# Patient Record
Sex: MALE | Race: WHITE | Employment: OTHER | URBAN - METROPOLITAN AREA
[De-identification: names, ages, dates, MRNs, and addresses within clinical notes are randomized per-mention and may not be internally consistent; named-entity substitution may affect disease eponyms.]

---

## 2017-01-03 ENCOUNTER — ANTICOAGULATION THERAPY VISIT (OUTPATIENT)
Dept: NURSING | Facility: CLINIC | Age: 67
End: 2017-01-03
Payer: COMMERCIAL

## 2017-01-03 DIAGNOSIS — Z79.01 LONG-TERM (CURRENT) USE OF ANTICOAGULANTS: Primary | ICD-10-CM

## 2017-01-03 DIAGNOSIS — I26.99 PULMONARY EMBOLISM WITH INFARCTION (H): ICD-10-CM

## 2017-01-03 LAB — INR POINT OF CARE: 3.3 (ref 0.86–1.14)

## 2017-01-03 PROCEDURE — 85610 PROTHROMBIN TIME: CPT | Mod: QW

## 2017-01-03 PROCEDURE — 99207 ZZC NO CHARGE NURSE ONLY: CPT

## 2017-01-03 PROCEDURE — 36416 COLLJ CAPILLARY BLOOD SPEC: CPT

## 2017-01-03 NOTE — MR AVS SNAPSHOT
Collin Frank   1/3/2017 2:40 PM   Anticoagulation Therapy Visit    Description:  66 year old male   Provider:  CP ANTI COAG   Department:  Cp Nurse           INR as of 1/3/2017     Selected INR 3.3! (1/3/2017)      Anticoagulation Summary as of 1/3/2017     INR goal 2.0-3.0   Selected INR 3.3! (1/3/2017)   Full instructions 1/3: 2.5 mg; 1/5: 2.5 mg; 1/7: 2.5 mg; Otherwise 7.5 mg on Thu; 5 mg all other days   Next INR check 1/10/2017    Indications   Long-term (current) use of anticoagulants [Z79.01] [Z79.01]  Pulmonary embolism with infarction (HCC) [I26.99] [I26.99]         Your next Anticoagulation Clinic appointment(s)     Gordon 10, 2017  2:40 PM   [facility]    [address] [facility] [id number] [phone]            Jan 19, 2017  1:40 PM   [facility] CP AN[facility] COAG   [facility]    [address] [facility] [id number] [phone]              Contact Numbers     [facility]  Please call [phone] or [phone]  to cancel and/or reschedule your appointment.   Please call [phone] with any problems or questions regarding your therapy          January 2017 Details    Sun Mon Tue Wed Thu Fri Sat     1               2               3      2.5 mg   See details      4      5 mg         5      2.5 mg         6      5 mg         7      2.5 mg           8      5 mg         9      5 mg         10            11               12               13               14                 15               16               17               18               19               20               21                 22               23               24               25               26               27               28                 29               30               31                     Date Details   01/03 This INR check       Date of next INR:  1/10/2017         How to take your warfarin dose     To take:  2.5 mg Take 0.5 of a 5 mg tablet.    To take:  5 mg Take 1 of the 5 mg tablets.

## 2017-01-03 NOTE — PROGRESS NOTES
ANTICOAGULATION FOLLOW-UP CLINIC VISIT    Patient Name:  Collin Frank  Date:  1/3/2017  Contact Type:  Face to Face    SUBJECTIVE:     Patient Findings     Positives Antibiotic use or infection (cipro respiratory)           OBJECTIVE    INR PROTIME   Date Value Ref Range Status   01/03/2017 3.3* 0.86 - 1.14 Final       ASSESSMENT / PLAN  INR assessment SUPRA    Recheck INR In: 1 WEEK    INR Location Clinic      Anticoagulation Summary as of 1/3/2017     INR goal 2.0-3.0   Selected INR 3.3! (1/3/2017)   Maintenance plan 7.5 mg (5 mg x 1.5) on Thu; 5 mg (5 mg x 1) all other days   Full instructions 1/3: 2.5 mg; 1/5: 2.5 mg; 1/7: 2.5 mg; Otherwise 7.5 mg on Thu; 5 mg all other days   Weekly total 37.5 mg   Plan last modified Minal Carrion, RN (9/1/2016)   Next INR check 1/10/2017   Target end date     Indications   Long-term (current) use of anticoagulants [Z79.01] [Z79.01]  Pulmonary embolism with infarction (HCC) [I26.99] [I26.99]         Anticoagulation Episode Summary     INR check location     Preferred lab     Send INR reminders to [facility]    Comments       Anticoagulation Care Providers     Provider Role Specialty Phone number    Isaac Membreno MD  [facility] [phone]            See the Encounter Report to view Anticoagulation Flowsheet and Dosing Calendar (Go to Encounters tab in chart review, and find the Anticoagulation Therapy Visit)    Dosage adjustment made based on physician directed care plan.    Minal Carrion, RN

## 2017-01-10 ENCOUNTER — ANTICOAGULATION THERAPY VISIT (OUTPATIENT)
Dept: NURSING | Facility: CLINIC | Age: 67
End: 2017-01-10
Payer: COMMERCIAL

## 2017-01-10 DIAGNOSIS — Z79.01 LONG-TERM (CURRENT) USE OF ANTICOAGULANTS: Primary | ICD-10-CM

## 2017-01-10 DIAGNOSIS — I26.99 PULMONARY EMBOLISM WITH INFARCTION (H): ICD-10-CM

## 2017-01-10 LAB — INR POINT OF CARE: 2.3 (ref 0.86–1.14)

## 2017-01-10 PROCEDURE — 36416 COLLJ CAPILLARY BLOOD SPEC: CPT

## 2017-01-10 PROCEDURE — 85610 PROTHROMBIN TIME: CPT | Mod: QW

## 2017-01-10 PROCEDURE — 99207 ZZC NO CHARGE NURSE ONLY: CPT

## 2017-01-10 NOTE — MR AVS SNAPSHOT
Collin Frank   1/10/2017 2:40 PM   Anticoagulation Therapy Visit    Description:  66 year old male   Provider:  CP ANTI COAG   Department:  C[facility] Nurse           INR as of 1/10/2017     Selected INR 2.3 (1/10/2017)      Anticoagulation Summary as of 1/10/2017     INR goal 2.0-3.0   Selected INR 2.3 (1/10/2017)   Full instructions 7.5 mg on Thu; 5 mg all other days   Next INR check 2/9/2017    Indications   Long-term (current) use of anticoagulants [Z79.01] [Z79.01]  Pulmonary embolism with infarction (HCC) [I26.99] [I26.99]         Your next Anticoagulation Clinic appointment(s)     Gordon 10, 2017  2:40 PM   Anticoagulation Visit with CP ANTI COAG   [facility]    [address] [facility] [id number] [phone]            Feb 09, 2017  1:40 PM   Anticoagulation Visit with CP ANTI COAG   [facility]    [address] [facility] [id number] [phone]              Contact Numbers     [facility]  Please call [phone] or [phone]  to cancel and/or reschedule your appointment.   Please call [phone] with any problems or questions regarding your therapy          January 2017 Details    Sun Mon Tue Wed Thu Fri Sat     1               2               3               4               5               6               7                 8               9               10      5 mg   See details      11      5 mg         12      7.5 mg         13      5 mg         14      5 mg           15      5 mg         16      5 mg         17      5 mg         18      5 mg         19      7.5 mg         20      5 mg         21      5 mg           22      5 mg         23      5 mg         24      5 mg         25      5 mg         26      7.5 mg         27      5 mg         28      5 mg           29      5 mg         30      5 mg         31      5  mg              Date Details   01/10 This INR check               How to take your warfarin dose     To take:  5 mg Take 1 of the 5 mg tablets.    To take:  7.5 mg Take 1.5 of the 5 mg tablets.           February 2017 Details    Sun Mon Tue Wed Thu Fri Sat        1      5 mg         2      7.5 mg         3      5 mg         4      5 mg           5      5 mg         6      5 mg         7      5 mg         8      5 mg         9            10               11                 12               13               14               15               16               17               18                 19               20               21               22               23               24               25                 26               27               28                    Date Details   No additional details    Date of next INR:  2/9/2017         How to take your warfarin dose     To take:  5 mg Take 1 of the 5 mg tablets.    To take:  7.5 mg Take 1.5 of the 5 mg tablets.

## 2017-01-10 NOTE — PROGRESS NOTES
ANTICOAGULATION FOLLOW-UP CLINIC VISIT    Patient Name:  Collin Frank  Date:  1/10/2017  Contact Type:  Face to Face    SUBJECTIVE: Patient is here in follow-up to finishing Cirpo Abx for respiratory illness.  During the duration we decreased his warfarin by  About 1/2-3/4 of his usual dose and he remained in therapeutic range with adjustment.  Plan to have patient call if he goes back on abx, otherwise we plan to recheck in his regular routine.     Patient Findings     Positives Antibiotic use or infection (done with cipro for respiratory illness)           OBJECTIVE    INR PROTIME   Date Value Ref Range Status   01/10/2017 2.3* 0.86 - 1.14 Final       ASSESSMENT / PLAN  INR assessment THER    Recheck INR In: 4 WEEKS    INR Location Clinic      Anticoagulation Summary as of 1/10/2017     INR goal 2.0-3.0   Selected INR 2.3 (1/10/2017)   Maintenance plan 7.5 mg (5 mg x 1.5) on Thu; 5 mg (5 mg x 1) all other days   Full instructions 7.5 mg on Thu; 5 mg all other days   Weekly total 37.5 mg   No change documented Minal Carrion, RN   Plan last modified Minal Carrion, RN (9/1/2016)   Next INR check 2/9/2017   Target end date     Indications   Long-term (current) use of anticoagulants [Z79.01] [Z79.01]  Pulmonary embolism with infarction (HCC) [I26.99] [I26.99]         Anticoagulation Episode Summary     INR check location     Preferred lab     Send INR reminders to [facility]    Comments       Anticoagulation Care Providers     Provider Role Specialty Phone number    ChentelarryIsaac MD  [facility] [phone]            See the Encounter Report to view Anticoagulation Flowsheet and Dosing Calendar (Go to Encounters tab in chart review, and find the Anticoagulation Therapy Visit)    Dosage adjustment made based on physician directed care plan.    Minal Carrion RN

## 2017-02-09 ENCOUNTER — OFFICE VISIT (OUTPATIENT)
Dept: FAMILY MEDICINE | Facility: CLINIC | Age: 67
End: 2017-02-09
Payer: COMMERCIAL

## 2017-02-09 ENCOUNTER — ANTICOAGULATION THERAPY VISIT (OUTPATIENT)
Dept: NURSING | Facility: CLINIC | Age: 67
End: 2017-02-09
Payer: COMMERCIAL

## 2017-02-09 VITALS
HEART RATE: 55 BPM | SYSTOLIC BLOOD PRESSURE: 129 MMHG | BODY MASS INDEX: 30.21 KG/M2 | HEIGHT: 69 IN | TEMPERATURE: 97.7 F | DIASTOLIC BLOOD PRESSURE: 77 MMHG | WEIGHT: 204 LBS

## 2017-02-09 DIAGNOSIS — Z23 NEED FOR VACCINATION AGAINST STREPTOCOCCUS PNEUMONIAE: ICD-10-CM

## 2017-02-09 DIAGNOSIS — D68.51 FACTOR 5 LEIDEN MUTATION, HETEROZYGOUS (H): Primary | ICD-10-CM

## 2017-02-09 DIAGNOSIS — Z79.01 LONG-TERM (CURRENT) USE OF ANTICOAGULANTS: Primary | ICD-10-CM

## 2017-02-09 DIAGNOSIS — E72.11 HYPERHOMOCYSTEINEMIA (H): ICD-10-CM

## 2017-02-09 DIAGNOSIS — I26.99 PULMONARY EMBOLISM WITH INFARCTION (H): ICD-10-CM

## 2017-02-09 LAB — INR POINT OF CARE: 2.2 (ref 0.86–1.14)

## 2017-02-09 PROCEDURE — 99207 ZZC NO CHARGE NURSE ONLY: CPT

## 2017-02-09 PROCEDURE — 90670 PCV13 VACCINE IM: CPT | Performed by: FAMILY MEDICINE

## 2017-02-09 PROCEDURE — 36416 COLLJ CAPILLARY BLOOD SPEC: CPT

## 2017-02-09 PROCEDURE — G0009 ADMIN PNEUMOCOCCAL VACCINE: HCPCS | Performed by: FAMILY MEDICINE

## 2017-02-09 PROCEDURE — 99213 OFFICE O/P EST LOW 20 MIN: CPT | Mod: 25 | Performed by: FAMILY MEDICINE

## 2017-02-09 PROCEDURE — 85610 PROTHROMBIN TIME: CPT | Mod: QW

## 2017-02-09 RX ORDER — WARFARIN SODIUM 5 MG/1
5 TABLET ORAL DAILY
COMMUNITY
End: 2017-02-09

## 2017-02-09 RX ORDER — WARFARIN SODIUM 5 MG/1
5 TABLET ORAL DAILY
Qty: 90 TABLET | Refills: 3 | Status: SHIPPED | OUTPATIENT
Start: 2017-02-09 | End: 2018-04-16

## 2017-02-09 NOTE — MR AVS SNAPSHOT
"              After Visit Summary   2/9/2017    Collin Frank    MRN: [id number]           Patient Information     Date Of Birth          1950        Visit Information        Provider Department      2/9/2017 2:00 PM Engelmann, Lauren Anneliese, MD [facility]        Today's Diagnoses     Factor 5 Leiden mutation, heterozygous (H)    -  1     Hyperhomocysteinemia (H)            Follow-ups after your visit        Your next 10 appointments already scheduled     Mar 09, 2017  1:40 PM   Anticoagulation Visit with CP ANTI COAG   [facility]    [address] [facility] [phone]              Who to contact     If you have questions or need follow up information about today's clinic visit or your schedule please contact [facility] directly at [phone].  Normal or non-critical lab and imaging results will be communicated to you by MyChart, letter or phone within 4 business days after the clinic has received the results. If you do not hear from us within 7 days, please contact the clinic through MyChart or phone. If you have a critical or abnormal lab result, we will notify you by phone as soon as possible.  Submit refill requests through [employer] or call your pharmacy and they will forward the refill request to us. Please allow 3 business days for your refill to be completed.          Additional Information About Your Visit        MyChart Information     [employer] lets you send messages to your doctor, view your test results, renew your prescriptions, schedule appointments and more. To sign up, go to [email or web address] . Click on \"Log in\" on the left side of the screen, which will take you to the Welcome page. Then click on \"Sign up Now\" on the right side of the page.     You will be asked to enter the access code listed below, as well as some personal information. " "Please follow the directions to create your username and password.     Your access code is: [email or web address]  Expires: 5/10/2017  3:01 PM     Your access code will  in 90 days. If you need help or a new code, please call your [facility] clinic or [phone].        Care EveryWhere ID     This is your Care EveryWhere ID. This could be used by other organizations to access your [facility] medical records  [id number]        Your Vitals Were     Pulse Temperature Height BMI (Body Mass Index)          62 97.7  F (36.5  C) (Oral) 5' 8.5\" (1.74 m) 30.56 kg/m2         Blood Pressure from Last 3 Encounters:   17 146/102   16 132/84   16 136/78    Weight from Last 3 Encounters:   17 204 lb (92.534 kg)   16 208 lb (94.348 kg)   16 202 lb (91.627 kg)              Today, you had the following     No orders found for display         Where to get your medicines      These medications were sent to [facility] - [place], [place] - [address] [place] [address]  [address] [place] [address], [facility] [address]    Hours:  Mon-Fri 8:30am-5:00pm Toll Free [phone] Phone:  [phone]    - warfarin 5 MG tablet       Primary Care Provider Office Phone # Fax #    Lauren Anneliese Engelmann, [phone]       [address] [address]        Thank you!     Thank you for choosing [facility]  for your care. Our goal is always to provide you with excellent care. Hearing back from our patients is one way we can continue to improve our services. Please take a few minutes to complete the written survey that you may receive in the mail after your visit with us. Thank you!             Your Updated Medication List - Protect others around you: Learn how to safely use, store and throw away your medicines at www.disposemymeds.org.          This list is accurate as of: 17  3:01 PM.  Always use your most " recent med list.                   Brand Name Dispense Instructions for use    albuterol 108 (90 BASE) MCG/ACT Inhaler    PROAIR HFA/PROVENTIL HFA/VENTOLIN HFA     Inhale 2 puffs into the lungs       cholecalciferol 1000 UNIT tablet    vitamin D    100 tablet    Take 1 tablet (1,000 Units) by mouth daily       warfarin 5 MG tablet    JANTOVEN    90 tablet    Take 1 tablet (5 mg) by mouth daily

## 2017-02-09 NOTE — NURSING NOTE
"Chief Complaint   Patient presents with     Recheck Medication       Initial /102 mmHg  Pulse 62  Temp(Src) 97.7  F (36.5  C) (Oral)  Ht 5' 8.5\" (1.74 m)  Wt 204 lb (92.534 kg)  BMI 30.56 kg/m2 Estimated body mass index is 30.56 kg/(m^2) as calculated from the following:    Height as of this encounter: 5' 8.5\" (1.74 m).    Weight as of this encounter: 204 lb (92.534 kg).  Medication Reconciliation: complete  Alfredito Goldman MA    "

## 2017-02-09 NOTE — PROGRESS NOTES
ANTICOAGULATION FOLLOW-UP CLINIC VISIT    Patient Name:  Collin Frank  Date:  2/9/2017  Contact Type:  Face to Face    SUBJECTIVE:  Patient reports he is seeing new PCP today to f/u on medication renewal due to previous MD assisted.  Patient also reports LLE discomfort similar to feeling of past blood blot / dvt back in 2011.  States no change in CMS, no lumps or bumps, and no skin discoloration.  He admits he did a lot of walking recently so might be a muscle strain or spasm.  He will tell doctor today.  His INR has been therapeutic.     Patient Findings     Positives No Problem Findings           OBJECTIVE    INR PROTIME   Date Value Ref Range Status   02/09/2017 2.2* 0.86 - 1.14 Final       ASSESSMENT / PLAN  INR assessment THER    Recheck INR In: 4 WEEKS    INR Location Clinic      Anticoagulation Summary as of 2/9/2017     INR goal 2.0-3.0   Selected INR 2.2 (2/9/2017)   Maintenance plan 7.5 mg (5 mg x 1.5) on Thu; 5 mg (5 mg x 1) all other days   Full instructions 7.5 mg on Thu; 5 mg all other days   Weekly total 37.5 mg   No change documented Minal Carrion, RN   Plan last modified Minal Carrion, RN (9/1/2016)   Next INR check 3/9/2017   Target end date     Indications   Long-term (current) use of anticoagulants [Z79.01] [Z79.01]  Pulmonary embolism with infarction (HCC) [I26.99] [I26.99]         Anticoagulation Episode Summary     INR check location     Preferred lab     Send INR reminders to [facility]    Comments       Anticoagulation Care Providers     Provider Role Specialty Phone number    Isaac Membreno MD  Family Practice [phone]            See the Encounter Report to view Anticoagulation Flowsheet and Dosing Calendar (Go to Encounters tab in chart review, and find the Anticoagulation Therapy Visit)    Dosage adjustment made based on physician directed care plan.    Minal Carrion, RN

## 2017-02-09 NOTE — PROGRESS NOTES
SUBJECTIVE:                                                    Collin Frank is a 66 year old male who presents to clinic today for the following health issues:    Medication Followup of Jantoven and refill     Taking Medication as prescribed: yes    Side Effects:  None    Medication Helping Symptoms:  Yes    Patient here for yearly renewal of warfarin rx. He reports no issues with the medications and regularly follows with INR clinic, last visit was today.        Problem list and histories reviewed & adjusted, as indicated.  Additional history: as documented    Patient Active Problem List   Diagnosis     Advanced directives, counseling/discussion     Hyperhomocysteinemia (H)     Acute venous embolism and thrombosis of deep vessels of proximal lower extremity (H)     CARDIOVASCULAR SCREENING; LDL GOAL LESS THAN 130     Factor 5 Leiden mutation, heterozygous (H)     May-Thurner syndrome     Long term current use of anticoagulant     Vitamin D deficiency     Renal stones     Hepatic hemangioma     FH: prostate cancer     Long-term (current) use of anticoagulants [Z79.01]     Pulmonary embolism with infarction (HCC) [I26.99]     Post-traumatic osteoarthritis of right knee     Benign non-nodular prostatic hyperplasia without lower urinary tract symptoms     Past Surgical History   Procedure Laterality Date     Colonoscopy  08     Normal. Repeat in 10 years     Rt knee orif       [facility]       Social History   Substance Use Topics     Smoking status: Former Smoker     Smokeless tobacco: Never Used     Alcohol use No     Family History   Problem Relation Age of Onset     Alzheimer Disease Mother      HEART DISEASE Father      Prostate Cancer Father      CANCER Brother 65     pancreatic        Prostate Cancer Brother          Current Outpatient Prescriptions   Medication Sig Dispense Refill     warfarin (JANTOVEN) 5 MG tablet Take 1 tablet (5 mg) by mouth daily 90 tablet 3     albuterol (PROAIR  "HFA/PROVENTIL HFA/VENTOLIN HFA) 108 (90 BASE) MCG/ACT Inhaler Inhale 2 puffs into the lungs       cholecalciferol (VITAMIN D) 1000 UNIT tablet Take 1 tablet (1,000 Units) by mouth daily 100 tablet 3       ROS:  C: NEGATIVE for fever, chills, change in weight  E/M: NEGATIVE for ear, mouth and throat problems  R: NEGATIVE for significant cough or SOB  CV: NEGATIVE for chest pain, palpitations or peripheral edema  ROS otherwise negative    OBJECTIVE:                                                    /77  Pulse 55  Temp 97.7  F (36.5  C) (Oral)  Ht 5' 8.5\" (1.74 m)  Wt 204 lb (92.5 kg)  BMI 30.57 kg/m2  Body mass index is 30.57 kg/(m^2).  GENERAL: healthy, alert and no distress  RESP: lungs clear to auscultation - no rales, rhonchi or wheezes  CV: regular rate and rhythm, normal S1 S2, no S3 or S4, no murmur, click or rub, no peripheral edema and peripheral pulses strong  ABDOMEN: soft, nontender, no hepatosplenomegaly, no masses and bowel sounds normal  MS: no gross musculoskeletal defects noted, no edema  PSYCH: mentation appears normal, affect normal/bright    Diagnostic Test Results:  Results for orders placed or performed in visit on 02/09/17   INR point of care   Result Value Ref Range    INR Protime 2.2 (A) 0.86 - 1.14        ASSESSMENT/PLAN:                                                        ICD-10-CM    1. Factor 5 Leiden mutation, heterozygous (H) D68.51 warfarin (JANTOVEN) 5 MG tablet   2. Hyperhomocysteinemia (H) E72.11 warfarin (JANTOVEN) 5 MG tablet   3. Need for vaccination against Streptococcus pneumoniae Z23 PNEUMOCOCCAL CONJ VACCINE 13 VALENT IM     VACCINE ADMINISTRATION, INITIAL     Will refill warfarin for 1 year. Advised continuing with INR nurse as scheduled.   Will update PCV-13 vaccine today.   Otherwise up to date with health maintenance, had yearly wellness exam in December.         See Patient Instructions    Lauren A. Engelmann, MD  [facility]    "

## 2017-02-09 NOTE — NURSING NOTE
Patient is covered under this program for the following reason:    Screening Questionnaire for Adult Immunization   Are you sick today?   No    Do you have allergies to medications, food, a vaccine component or latex?   No    Have you ever had a serious reaction after receiving a vaccination?   No    Do you have a long-term health problem with heart disease, lung disease,  asthma, kidney disease, metabolic (e.g., diabetes), anemia, or other blood disorder?   No    Do you have cancer, leukemia,HIV/ AIDS, or any immune system problem?   No       In the past 3 months, have you taken medications that weaken your immune system, such as cortisone, prednisone, other steroids, or anticancer drugs, or have you had radiation treatments?   No    Have you had a seizure or a brain, or other nervous system problem?   No    During the past year, have you received a transfusion of blood or blood       products, or been given a  immune (gamma) globulin or an antiviral drug?   No    For women: Are you pregnant or is there a chance you could become         pregnant during the next month?   No    Have you received any vaccinations in the past 4 weeks?   No     Immunization questionnaire answers were all negative.     VIS for Prevnar 13  given on same date of administration.  Staff signature/Title: Alfredito Goldman MA  Patient  informed to wait 20 min after shot

## 2017-03-09 ENCOUNTER — ANTICOAGULATION THERAPY VISIT (OUTPATIENT)
Dept: NURSING | Facility: CLINIC | Age: 67
End: 2017-03-09
Payer: COMMERCIAL

## 2017-03-09 DIAGNOSIS — Z79.01 LONG-TERM (CURRENT) USE OF ANTICOAGULANTS: ICD-10-CM

## 2017-03-09 DIAGNOSIS — I26.99 PULMONARY EMBOLISM WITH INFARCTION (H): ICD-10-CM

## 2017-03-09 LAB — INR POINT OF CARE: 3 (ref 0.86–1.14)

## 2017-03-09 PROCEDURE — 85610 PROTHROMBIN TIME: CPT | Mod: QW

## 2017-03-09 PROCEDURE — 99207 ZZC NO CHARGE NURSE ONLY: CPT

## 2017-03-09 PROCEDURE — 36416 COLLJ CAPILLARY BLOOD SPEC: CPT

## 2017-03-09 NOTE — PROGRESS NOTES
ANTICOAGULATION FOLLOW-UP CLINIC VISIT    Patient Name:  Collin Frank  Date:  3/9/2017  Contact Type:  Face to Face    SUBJECTIVE:  Patient is here today reports he is feeling great.  He is not using a cane.     Patient Findings     Positives No Problem Findings           OBJECTIVE    INR Protime   Date Value Ref Range Status   03/09/2017 3.0 (A) 0.86 - 1.14 Final       ASSESSMENT / PLAN  INR assessment THER    Recheck INR In: 4 WEEKS    INR Location Clinic      Anticoagulation Summary as of 3/9/2017     INR goal 2.0-3.0   Today's INR 3.0   Maintenance plan 7.5 mg (5 mg x 1.5) on Thu; 5 mg (5 mg x 1) all other days   Full instructions 7.5 mg on Thu; 5 mg all other days   Weekly total 37.5 mg   No change documented Minal Carrion RN   Plan last modified Minal Carrion RN (9/1/2016)   Next INR check 4/6/2017   Target end date     Indications   Long-term (current) use of anticoagulants [Z79.01] [Z79.01]  Pulmonary embolism with infarction (HCC) [I26.99] [I26.99]         Anticoagulation Episode Summary     INR check location     Preferred lab     Send INR reminders to  [facility]    Comments       Anticoagulation Care Providers     Provider Role Specialty Phone number    Isaac Membreno MD  Family Practice [phone]            See the Encounter Report to view Anticoagulation Flowsheet and Dosing Calendar (Go to Encounters tab in chart review, and find the Anticoagulation Therapy Visit)    Dosage adjustment made based on physician directed care plan.    Minal Carrion RN

## 2017-03-09 NOTE — MR AVS SNAPSHOT
Collin Frank   3/9/2017 1:40 PM   Anticoagulation Therapy Visit    Description:  66 year old male   Provider:  CP ANTI COAG   Department:  Cp Nurse           INR as of 3/9/2017     Today's INR 3.0      Anticoagulation Summary as of 3/9/2017     INR goal 2.0-3.0   Today's INR 3.0   Full instructions 7.5 mg on Thu; 5 mg all other days   Next INR check 4/6/2017    Indications   Long-term (current) use of anticoagulants [Z79.01] [Z79.01]  Pulmonary embolism with infarction (HCC) [I26.99] [I26.99]         Your next Anticoagulation Clinic appointment(s)     Mar 09, 2017  1:40 PM CST   Anticoagulation Visit with CP ANTI COAG   [facility] ([facility]    [address] [facility] [id number] [phone]            Apr 06, 2017  1:40 PM CDT   Anticoagulation Visit with CP ANTI COAG   [facility] ([facility])    [address] [facility] [id number] [phone]              Contact Numbers     [facility]  Please call [phone] or [phone]  to cancel and/or reschedule your appointment.   Please call [phone] with any problems or questions regarding your therapy          March 2017 Details    Sun Mon Tue Wed Thu Fri Sat        1               2               3               4                 5               6               7               8               9      7.5 mg   See details      10      5 mg         11      5 mg           12      5 mg         13      5 mg         14      5 mg         15      5 mg         16      7.5 mg         17      5 mg         18      5 mg           19      5 mg         20      5 mg         21      5 mg         22      5 mg         23      7.5 mg         24      5 mg         25      5 mg           26      5 mg         27      5 mg         28      5 mg         29      5 mg         30      7.5 mg         31      5 mg            Date Details   03/09 This INR check               How to take your warfarin dose     To take:  5 mg Take 1 of the 5 mg tablets.    To take:  7.5 mg Take 1.5 of the 5 mg tablets.           April 2017 Details    Sun Mon Tue Wed Thu Fri Sat           1      5 mg           2      5 mg         3      5 mg         4      5 mg         5      5 mg         6            7               8                 9               10               11               12               13               14               15                 16               17               18               19               20               21               22                 23               24               25               26               27               28               29                 30                      Date Details   No additional details    Date of next INR:  4/6/2017         How to take your warfarin dose     To take:  5 mg Take 1 of the 5 mg tablets.    To take:  7.5 mg Take 1.5 of the 5 mg tablets.

## 2017-03-28 ENCOUNTER — TRANSFERRED RECORDS (OUTPATIENT)
Dept: HEALTH INFORMATION MANAGEMENT | Facility: CLINIC | Age: 67
End: 2017-03-28

## 2017-03-31 ENCOUNTER — OFFICE VISIT (OUTPATIENT)
Dept: FAMILY MEDICINE | Facility: CLINIC | Age: 67
End: 2017-03-31
Payer: COMMERCIAL

## 2017-03-31 VITALS
OXYGEN SATURATION: 98 % | TEMPERATURE: 97 F | DIASTOLIC BLOOD PRESSURE: 75 MMHG | HEART RATE: 54 BPM | WEIGHT: 201 LBS | BODY MASS INDEX: 30.12 KG/M2 | SYSTOLIC BLOOD PRESSURE: 127 MMHG

## 2017-03-31 DIAGNOSIS — N20.0 CALCULUS OF KIDNEY: ICD-10-CM

## 2017-03-31 DIAGNOSIS — R61 NIGHT SWEATS: Primary | ICD-10-CM

## 2017-03-31 DIAGNOSIS — R31.29 MICROSCOPIC HEMATURIA: ICD-10-CM

## 2017-03-31 LAB
ALBUMIN UR-MCNC: NEGATIVE MG/DL
APPEARANCE UR: CLEAR
BILIRUB UR QL STRIP: NEGATIVE
COLOR UR AUTO: YELLOW
GLUCOSE UR STRIP-MCNC: NEGATIVE MG/DL
HGB UR QL STRIP: ABNORMAL
KETONES UR STRIP-MCNC: NEGATIVE MG/DL
LEUKOCYTE ESTERASE UR QL STRIP: NEGATIVE
MUCOUS THREADS #/AREA URNS LPF: PRESENT /LPF
NITRATE UR QL: NEGATIVE
PH UR STRIP: 6 PH (ref 5–7)
RBC #/AREA URNS AUTO: ABNORMAL /HPF (ref 0–2)
SP GR UR STRIP: >1.03 (ref 1–1.03)
URN SPEC COLLECT METH UR: ABNORMAL
UROBILINOGEN UR STRIP-ACNC: 0.2 EU/DL (ref 0.2–1)
WBC #/AREA URNS AUTO: ABNORMAL /HPF (ref 0–2)

## 2017-03-31 PROCEDURE — 99214 OFFICE O/P EST MOD 30 MIN: CPT | Performed by: FAMILY MEDICINE

## 2017-03-31 PROCEDURE — 81001 URINALYSIS AUTO W/SCOPE: CPT | Performed by: FAMILY MEDICINE

## 2017-03-31 ASSESSMENT — PAIN SCALES - GENERAL: PAINLEVEL: NO PAIN (0)

## 2017-03-31 NOTE — PROGRESS NOTES
SUBJECTIVE:                                                    Collin Frank is a 66 year old male who presents to clinic today for the following health issues:      ED/UC Followup: [place]    Facility:  [place]  Date of visit: 3/29/17  Reason for visit: Night sweats  Current Status: Good       Problem list and histories reviewed & adjusted, as indicated.  Additional history: as documented    Patient Active Problem List   Diagnosis     Advanced directives, counseling/discussion     Hyperhomocysteinemia (H)     Acute venous embolism and thrombosis of deep vessels of proximal lower extremity (H)     CARDIOVASCULAR SCREENING; LDL GOAL LESS THAN 130     Factor 5 Leiden mutation, heterozygous (H)     May-Thurner syndrome     Long term current use of anticoagulant     Vitamin D deficiency     Renal stones     Hepatic hemangioma     FH: prostate cancer     Long-term (current) use of anticoagulants [Z79.01]     Pulmonary embolism with infarction (HCC) [I26.99]     Post-traumatic osteoarthritis of right knee     Benign non-nodular prostatic hyperplasia without lower urinary tract symptoms     Past Surgical History:   Procedure Laterality Date     COLONOSCOPY  08    Normal. Repeat in 10 years     rt knee ORIF      [facility]       Social History   Substance Use Topics     Smoking status: Former Smoker     Smokeless tobacco: Never Used     Alcohol use No     Family History   Problem Relation Age of Onset     Alzheimer Disease Mother      HEART DISEASE Father      Prostate Cancer Father      CANCER Brother 65     pancreatic        Prostate Cancer Brother          Current Outpatient Prescriptions   Medication Sig Dispense Refill     warfarin (JANTOVEN) 5 MG tablet Take 1 tablet (5 mg) by mouth daily 90 tablet 3     cholecalciferol (VITAMIN D) 1000 UNIT tablet Take 1 tablet (1,000 Units) by mouth daily 100 tablet 3     No Known Allergies  Recent Labs   Lab Test  16   1509  12/01/15   1056  10/30/14    0751  10/16/12   1038  11/28/11   1051   A1C   --    --    --   5.5  5.9   LDL   --   108*  147*  141*  133*   HDL   --   53  54  47  31*   TRIG   --   122  66  67  110   ALT   --    --    --   33  61   CR  0.78  0.84  0.86  0.66  0.74   GFRESTIMATED  >90  Non  GFR Calc    >90  Non  GFR Calc    89  >90  >90   GFRESTBLACK  >90   GFR Calc    >90   GFR Calc    >90   GFR Calc    >90  >90   POTASSIUM  4.4  4.4  4.0  4.3  4.3   TSH   --    --    --    --   1.90      BP Readings from Last 3 Encounters:   03/31/17 127/75   02/09/17 129/77   12/27/16 132/84    Wt Readings from Last 3 Encounters:   03/31/17 201 lb (91.2 kg)   02/09/17 204 lb (92.5 kg)   12/27/16 208 lb (94.3 kg)          Review Of Systems  Skin: negative  Eyes: negative  Ears/Nose/Throat: negative  Respiratory: No shortness of breath, dyspnea on exertion, cough, or hemoptysis  Cardiovascular: negative  Gastrointestinal: negative  Genitourinary: negative  Musculoskeletal: negative  Neurologic: negative  Psychiatric: negative  Hematologic/Lymphatic/Immunologic: negative and patient has a history of clotting in past   Endocrine: negative    O: /75  Pulse 54  Temp 97  F (36.1  C) (Oral)  Wt 201 lb (91.2 kg)  SpO2 98%  BMI 30.12 kg/m2    Head: Normocephalic, atraumatic.  Eyes: Conjunctiva clear, non icteric. PERRLA.  Ears: External ears and TMs normal BL.  Nose: Septum midline, nasal mucosa pink and moist. No discharge.  Mouth / Throat: Normal dentition.  No oral lesions. Pharynx non erythematous, tonsils without hypertrophy.  Neck: Supple, no enlarged LN, trachea midline.    Chest wall normal to inspection and palpation. Good excursion bilaterally. Lungs clear to auscultation. Good air movement bilaterally without rales, wheezes, or rhonchi.   Regular rate and  rhythm. S1 and S2 normal, no murmurs, clicks, gallops or rubs. No edema or JVD.    The abdomen is soft without  tenderness, guarding, mass, rebound or organomegaly. Bowel sounds are normal. No CVA tenderness or inguinal adenopathy noted.    Skin abrasion on right ankle, no signs of infection             Reviewed and updated as needed this visit by clinical staff       Reviewed and updated as needed this visit by Provider         Results for orders placed or performed in visit on 03/31/17   *UA reflex to Microscopic and Culture ([facility] (except [place] and [place])   Result Value Ref Range    Color Urine Yellow     Appearance Urine Clear     Glucose Urine Negative NEG mg/dL    Bilirubin Urine Negative NEG    Ketones Urine Negative NEG mg/dL    Specific Gravity Urine >1.030 1.003 - 1.035    Blood Urine Large (A) NEG    pH Urine 6.0 5.0 - 7.0 pH    Protein Albumin Urine Negative NEG mg/dL    Urobilinogen Urine 0.2 0.2 - 1.0 EU/dL    Nitrite Urine Negative NEG    Leukocyte Esterase Urine Negative NEG    Source Midstream Urine    Urine Microscopic   Result Value Ref Range    WBC Urine O - 2 0 - 2 /HPF    RBC Urine 25-50 (A) 0 - 2 /HPF    Mucous Urine Present (A) NEG /LPF     (R31.29) Microscopic hematuria  (primary encounter diagnosis)  Comment: patient has a bladder diverticulum and non obstructing stones in the kidney   Plan: *UA reflex to Microscopic and Culture ([facility] (except [place] and         [place]), Urine Microscopic        Urology appointment       If night sweats not going away follow up in 1 month

## 2017-03-31 NOTE — LETTER
[facility]   [address] [place]  [address]  [phone]                                   March 31, 2017    Collin Frank  [address]   [facility] [address]        Dear Collin,    Your urine shows red blood cells in your urine     Results for orders placed or performed in visit on 03/31/17   *UA reflex to Microscopic and Culture ([facility] (except [place] and [place])   Result Value Ref Range    Color Urine Yellow     Appearance Urine Clear     Glucose Urine Negative NEG mg/dL    Bilirubin Urine Negative NEG    Ketones Urine Negative NEG mg/dL    Specific Gravity Urine >1.030 1.003 - 1.035    Blood Urine Large (A) NEG    pH Urine 6.0 5.0 - 7.0 pH    Protein Albumin Urine Negative NEG mg/dL    Urobilinogen Urine 0.2 0.2 - 1.0 EU/dL    Nitrite Urine Negative NEG    Leukocyte Esterase Urine Negative NEG    Source Midstream Urine    Urine Microscopic   Result Value Ref Range    WBC Urine O - 2 0 - 2 /HPF    RBC Urine 25-50 (A) 0 - 2 /HPF    Mucous Urine Present (A) NEG /LPF       If you have any questions please call the clinic at [phone]    Sincerely,    Tyler Pugh MD  bmd

## 2017-03-31 NOTE — PATIENT INSTRUCTIONS
Follow up with urology     Follow up regarding night sweats if symptoms tdo not go away in 1 month

## 2017-03-31 NOTE — NURSING NOTE
"Chief Complaint   Patient presents with     Emergency Department     3/29/17 - Night Sweats       Initial /75  Pulse 54  Temp 97  F (36.1  C) (Oral)  Wt 201 lb (91.2 kg)  SpO2 98%  BMI 30.12 kg/m2 Estimated body mass index is 30.12 kg/(m^2) as calculated from the following:    Height as of 2/9/17: 5' 8.5\" (1.74 m).    Weight as of this encounter: 201 lb (91.2 kg).  Medication Reconciliation: complete   Jose C MCDOWELL      "

## 2017-03-31 NOTE — MR AVS SNAPSHOT
After Visit Summary   3/31/2017    Collin Frank    MRN: [id number]           Patient Information     Date Of Birth          1950        Visit Information        Provider Department      3/31/2017 8:20 AM Tyler Pugh MD [facility]        Today's Diagnoses     Night sweats    -  1    Microscopic hematuria        Calculus of kidney          Care Instructions    Follow up with urology     Follow up regarding night sweats if symptoms tdo not go away in 1 month        Follow-ups after your visit        Additional Services     UROLOGY ADULT REFERRAL       Your provider has referred you to: [facility]: [facility] [phone]   [email or web address]    Please be aware that coverage of these services is subject to the terms and limitations of your health insurance plan.  Call member services at your health plan with any benefit or coverage questions.      Please bring the following with you to your appointment:    (1) Any X-Rays, CTs or MRIs which have been performed.  Contact the facility where they were done to arrange for  prior to your scheduled appointment.    (2) List of current medications  (3) This referral request   (4) Any documents/labs given to you for this referral                  Your next 10 appointments already scheduled     Apr 06, 2017  1:40 PM CDT   Anticoagulation Visit with CP ANTI COAG   [facility] ([facility])    [address] [place] [id number] [phone]              Who to contact     If you have questions or need follow up information about today's clinic visit or your schedule please contact [facility] directly at [phone].  Normal or non-critical lab and imaging results will be communicated to you by MyChart, letter or phone within 4 business days after the clinic has received the results. If  "you do not hear from us within 7 days, please contact the clinic through [employer] or phone. If you have a critical or abnormal lab result, we will notify you by phone as soon as possible.  Submit refill requests through [employer] or call your pharmacy and they will forward the refill request to us. Please allow 3 business days for your refill to be completed.          Additional Information About Your Visit        [employer] Doctor Information     [email or web address] lets you send messages to your doctor, view your test results, renew your prescriptions, schedule appointments and more. To sign up, go to [email or web address] . Click on \"Log in\" on the left side of the screen, which will take you to the Welcome page. Then click on \"Sign up Now\" on the right side of the page.     You will be asked to enter the access code listed below, as well as some personal information. Please follow the directions to create your username and password.     Your access code is: [email or web address]  Expires: 5/10/2017  4:01 PM     Your access code will  in 90 days. If you need help or a new code, please call your [facility] clinic or [phone].        Care EveryWhere ID     This is your Care EveryWhere ID. This could be used by other organizations to access your [facility] medical records  [id number]        Your Vitals Were     Pulse Temperature Pulse Oximetry BMI (Body Mass Index)          54 97  F (36.1  C) (Oral) 98% 30.12 kg/m2         Blood Pressure from Last 3 Encounters:   17 127/75   17 129/77   16 132/84    Weight from Last 3 Encounters:   17 201 lb (91.2 kg)   17 204 lb (92.5 kg)   16 208 lb (94.3 kg)              We Performed the Following     *UA reflex to Microscopic and Culture ([place] [facility] (except [place] and [place])     Urine Microscopic     UROLOGY ADULT REFERRAL          Today's Medication Changes          These changes are accurate as of: 3/31/17  9:56 AM.  If you have any " questions, ask your nurse or doctor.               Stop taking these medicines if you haven't already. Please contact your care team if you have questions.     albuterol 108 (90 BASE) MCG/ACT Inhaler   Commonly known as:  PROAIR HFA/PROVENTIL HFA/VENTOLIN HFA   Stopped by:  Tyler Pugh MD                    Primary Care Provider Office Phone # Fax #    Kendy Anneliese Engelmann, [phone]       [facility] [address]  [facility] [address]        Thank you!     Thank you for choosing [facility]  for your care. Our goal is always to provide you with excellent care. Hearing back from our patients is one way we can continue to improve our services. Please take a few minutes to complete the written survey that you may receive in the mail after your visit with us. Thank you!             Your Updated Medication List - Protect others around you: Learn how to safely use, store and throw away your medicines at www.disposemymeds.org.          This list is accurate as of: 3/31/17  9:56 AM.  Always use your most recent med list.                   Brand Name Dispense Instructions for use    cholecalciferol 1000 UNIT tablet    vitamin D    100 tablet    Take 1 tablet (1,000 Units) by mouth daily       warfarin 5 MG tablet    JANTOVEN    90 tablet    Take 1 tablet (5 mg) by mouth daily

## 2017-04-03 ENCOUNTER — ANTICOAGULATION THERAPY VISIT (OUTPATIENT)
Dept: NURSING | Facility: CLINIC | Age: 67
End: 2017-04-03
Payer: COMMERCIAL

## 2017-04-03 DIAGNOSIS — Z79.01 LONG-TERM (CURRENT) USE OF ANTICOAGULANTS: ICD-10-CM

## 2017-04-03 DIAGNOSIS — I26.99 PULMONARY EMBOLISM WITH INFARCTION (H): ICD-10-CM

## 2017-04-03 LAB — INR POINT OF CARE: 3.1 (ref 0.86–1.14)

## 2017-04-03 PROCEDURE — 99207 ZZC NO CHARGE NURSE ONLY: CPT

## 2017-04-03 PROCEDURE — 36416 COLLJ CAPILLARY BLOOD SPEC: CPT

## 2017-04-03 PROCEDURE — 85610 PROTHROMBIN TIME: CPT | Mod: QW

## 2017-04-03 NOTE — MR AVS SNAPSHOT
Collin Frank   4/3/2017 2:00 PM   Anticoagulation Therapy Visit    Description:  66 year old male   Provider:  CP ANTI COAG   Department:  Cp Nurse           INR as of 4/3/2017     Today's INR 3.1!      Anticoagulation Summary as of 4/3/2017     INR goal 2.0-3.0   Today's INR 3.1!   Full instructions 5 mg every day   Next INR check 4/24/2017    Indications   Long-term (current) use of anticoagulants [Z79.01] [Z79.01]  Pulmonary embolism with infarction (HCC) [I26.99] [I26.99]         Your next Anticoagulation Clinic appointment(s)     Apr 03, 2017  2:00 PM CDT   Anticoagulation Visit with CP ANTI COAG   [facility]    [address] [facility] [id number] [phone]            Apr 24, 2017  1:40 PM CDT   Anticoagulation Visit with CP ANTI COAG   [facility]    [address] [facility] [id number] [phone]              Contact Numbers     [facility]  Please call [phone] or [phone]  to cancel and/or reschedule your appointment.   Please call [phone] with any problems or questions regarding your therapy          April 2017 Details    Sun Mon Tue Wed Thu Fri Sat           1                 2               3      5 mg   See details      4      5 mg         5      5 mg         6      5 mg         7      5 mg         8      5 mg           9      5 mg         10      5 mg         11      5 mg         12      5 mg         13      5 mg         14      5 mg         15      5 mg           16      5 mg         17      5 mg         18      5 mg         19      5 mg         20      5 mg         21      5 mg         22      5 mg           23      5 mg         24            25               26               27               28               29                 30                      Date Details   04/03 This INR check        Date of next INR:  4/24/2017         How to take your warfarin dose     To take:  5 mg Take 1 of the 5 mg tablets.

## 2017-04-03 NOTE — PROGRESS NOTES
ANTICOAGULATION FOLLOW-UP CLINIC VISIT    Patient Name:  Collin Frank  Date:  4/3/2017  Contact Type:  Face to Face    SUBJECTIVE:     Patient Findings     Positives Change in diet/appetite (to see kidney doc for microscopic blood in urine sample.  also using ensure 1 a week.  avoid dairy products)           OBJECTIVE    INR Protime   Date Value Ref Range Status   04/03/2017 3.1 (A) 0.86 - 1.14 Final       ASSESSMENT / PLAN  INR assessment THER    Recheck INR In: 3 WEEKS    INR Location Clinic      Anticoagulation Summary as of 4/3/2017     INR goal 2.0-3.0   Today's INR 3.1!   Maintenance plan 5 mg (5 mg x 1) every day   Full instructions 5 mg every day   Weekly total 35 mg   Plan last modified Minal Carrion, RN (4/3/2017)   Next INR check 4/24/2017   Target end date     Indications   Long-term (current) use of anticoagulants [Z79.01] [Z79.01]  Pulmonary embolism with infarction (HCC) [I26.99] [I26.99]         Anticoagulation Episode Summary     INR check location     Preferred lab     Send INR reminders to [facility]    Comments       Anticoagulation Care Providers     Provider Role Specialty Phone number    OjdavidIsaac MD  [facility] [phone]            See the Encounter Report to view Anticoagulation Flowsheet and Dosing Calendar (Go to Encounters tab in chart review, and find the Anticoagulation Therapy Visit)    Dosage adjustment made based on physician directed care plan.    Minal Carrion, TIFFANIE

## 2017-04-10 ENCOUNTER — ANTICOAGULATION THERAPY VISIT (OUTPATIENT)
Dept: NURSING | Facility: CLINIC | Age: 67
End: 2017-04-10
Payer: COMMERCIAL

## 2017-04-10 DIAGNOSIS — Z79.01 LONG-TERM (CURRENT) USE OF ANTICOAGULANTS: ICD-10-CM

## 2017-04-10 DIAGNOSIS — I26.99 PULMONARY EMBOLISM WITH INFARCTION (H): ICD-10-CM

## 2017-04-10 LAB — INR POINT OF CARE: 3.8 (ref 0.86–1.14)

## 2017-04-10 PROCEDURE — 85610 PROTHROMBIN TIME: CPT | Mod: QW

## 2017-04-10 PROCEDURE — 99207 ZZC NO CHARGE NURSE ONLY: CPT

## 2017-04-10 PROCEDURE — 36416 COLLJ CAPILLARY BLOOD SPEC: CPT

## 2017-04-10 NOTE — MR AVS SNAPSHOT
Collin Frank   4/10/2017 1:40 PM   Anticoagulation Therapy Visit    Description:  66 year old male   Provider:  CP ANTI COAG   Department:  Cp Nurse           INR as of 4/10/2017     Today's INR 3.8!      Anticoagulation Summary as of 4/10/2017     INR goal 2.0-3.0   Today's INR 3.8!   Full instructions 4/10: Hold; Otherwise 2.5 mg on Sun, Wed, Fri; 5 mg all other days   Next INR check 4/17/2017    Indications   Long-term (current) use of anticoagulants [Z79.01] [Z79.01]  Pulmonary embolism with infarction (HCC) [I26.99] [I26.99]         Your next Anticoagulation Clinic appointment(s)     Apr 10, 2017  1:40 PM CDT   Anticoagulation Visit with CP ANTI COAG   [facility]    [address] [facility] [id number] [phone]            Apr 17, 2017  1:40 PM CDT   Anticoagulation Visit with CP ANTI COAG   [facility]    [address] [facility] [id number] [phone]              Contact Numbers     [facility]  Please call [phone] or [phone]  to cancel and/or reschedule your appointment.   Please call [phone] with any problems or questions regarding your therapy          April 2017 Details    Sun Mon Tue Wed Thu Fri Sat           1                 2               3               4               5               6               7               8                 9               10      Hold   See details      11      5 mg         12      2.5 mg         13      5 mg         14      2.5 mg         15      5 mg           16      2.5 mg         17            18               19               20               21               22                 23               24               25               26               27               28               29                 30                      Date Details   04/10 This INR check        Date of next INR:  4/17/2017         How to take your warfarin dose     To take:  2.5 mg Take 0.5 of a 5 mg tablet.    To take:  5 mg Take 1 of the 5 mg tablets.    Hold Do not take your warfarin dose. See the Details table to the right for additional instructions.

## 2017-04-10 NOTE — PROGRESS NOTES
ANTICOAGULATION FOLLOW-UP CLINIC VISIT    Patient Name:  Collin Frank  Date:  4/10/2017  Contact Type:  Face to Face    SUBJECTIVE: patient states night sweats are gone and thinks he may have passed a kidney stone over the weekend.  He is planning on cortisone shot this Thursday and will continue his diet change until he see's the urologist.     Patient Findings     Positives Change in diet/appetite (liq diet, adat.  some couscous and tabouleh, ensure daily)           OBJECTIVE    INR Protime   Date Value Ref Range Status   04/10/2017 3.8 (A) 0.86 - 1.14 Final       ASSESSMENT / PLAN  INR assessment SUPRA    Recheck INR In: 1 WEEK    INR Location Clinic      Anticoagulation Summary as of 4/10/2017     INR goal 2.0-3.0   Today's INR 3.8!   Maintenance plan 2.5 mg (5 mg x 0.5) on Sun, Wed, Fri; 5 mg (5 mg x 1) all other days   Full instructions 4/10: Hold; Otherwise 2.5 mg on Sun, Wed, Fri; 5 mg all other days   Weekly total 27.5 mg   Plan last modified Minal Carrion, RN (4/10/2017)   Next INR check 4/17/2017   Target end date     Indications   Long-term (current) use of anticoagulants [Z79.01] [Z79.01]  Pulmonary embolism with infarction (HCC) [I26.99] [I26.99]         Anticoagulation Episode Summary     INR check location     Preferred lab     Send INR reminders to  [facility]    Comments       Anticoagulation Care Providers     Provider Role Specialty Phone number    TemiIsaac MD  [facility] [phone]            See the Encounter Report to view Anticoagulation Flowsheet and Dosing Calendar (Go to Encounters tab in chart review, and find the Anticoagulation Therapy Visit)    Dosage adjustment made based on physician directed care plan.    Minal Carrion, RN

## 2017-04-13 ENCOUNTER — OFFICE VISIT (OUTPATIENT)
Dept: ORTHOPEDICS | Facility: CLINIC | Age: 67
End: 2017-04-13
Payer: COMMERCIAL

## 2017-04-13 VITALS — TEMPERATURE: 97.5 F | WEIGHT: 200 LBS | BODY MASS INDEX: 29.62 KG/M2 | HEIGHT: 69 IN | RESPIRATION RATE: 18 BRPM

## 2017-04-13 DIAGNOSIS — M17.12 PRIMARY OSTEOARTHRITIS OF LEFT KNEE: Primary | ICD-10-CM

## 2017-04-13 PROCEDURE — 20610 DRAIN/INJ JOINT/BURSA W/O US: CPT | Mod: LT | Performed by: ORTHOPAEDIC SURGERY

## 2017-04-13 RX ORDER — METHYLPREDNISOLONE ACETATE 80 MG/ML
80 INJECTION, SUSPENSION INTRA-ARTICULAR; INTRALESIONAL; INTRAMUSCULAR; SOFT TISSUE ONCE
Qty: 1 ML | Refills: 0 | OUTPATIENT
Start: 2017-04-13 | End: 2017-04-13

## 2017-04-13 NOTE — NURSING NOTE
"Chief Complaint   Patient presents with     RECHECK     Left knee OA last injection 11/8/16.       Initial Temp 97.5  F (36.4  C)  Resp 18  Ht 1.74 m (5' 8.5\")  Wt 90.7 kg (200 lb)  BMI 29.97 kg/m2 Estimated body mass index is 29.97 kg/(m^2) as calculated from the following:    Height as of this encounter: 1.74 m (5' 8.5\").    Weight as of this encounter: 90.7 kg (200 lb).  Medication Reconciliation: complete   Saray Turk MA      "

## 2017-04-13 NOTE — MR AVS SNAPSHOT
After Visit Summary   4/13/2017    Collin Frank    MRN: [id number]           Patient Information     Date Of Birth          1950        Visit Information        Provider Department      4/13/2017 2:30 PM Hernesto Love MD [facility]        Today's Diagnoses     Primary osteoarthritis of left knee    -  1      Care Instructions    You have had a steroid injection today.  For the first 2 hours there will likely be some numbing in the joint from the lidocaine.  This is a good sign, indicating that the injection is in the right place.  In 2 hours the lidocaine will wear off, and the joint will hurt like you had a shot.  Each day the cortisone makes it feel better.  It reaches peak effect in 2 weeks.  We expect it to last for 3 months.  You may resume regular activity when you feel ready.  If you are diabetic, your glucoses will be quite high for several days.          Follow-ups after your visit        Your next 10 appointments already scheduled     Apr 13, 2017  2:30 PM CDT   Return Visit with Hernesto Love MD   [facility] ([facility]    [address] [facility] [address]   [phone]            Apr 17, 2017  1:40 PM CDT   Anticoagulation Visit with CP ANTI COAG   [facility]    [address] [facility] [address]   [phone]            Apr 21, 2017 11:30 AM CDT   New Visit with Delfin Salazar MD   [facility]    [address] [facility] [facility] [place] [address]   [phone]            Apr 24, 2017  1:40 PM CDT   Anticoagulation Visit with CP ANTI COAG   [facility]    [address] [facility] [address]   [phone]              Who to contact     If you have questions or need follow up information about today's clinic  "visit or your schedule please contact [facility] directly at [phone].  Normal or non-critical lab and imaging results will be communicated to you by MyChart, letter or phone within 4 business days after the clinic has received the results. If you do not hear from us within 7 days, please contact the clinic through [employer] or phone. If you have a critical or abnormal lab result, we will notify you by phone as soon as possible.  Submit refill requests through [employer] or call your pharmacy and they will forward the refill request to us. Please allow 3 business days for your refill to be completed.          Additional Information About Your Visit        MyChart Information     [employer] lets you send messages to your doctor, view your test results, renew your prescriptions, schedule appointments and more. To sign up, go to [email or web address] . Click on \"Log in\" on the left side of the screen, which will take you to the Welcome page. Then click on \"Sign up Now\" on the right side of the page.     You will be asked to enter the access code listed below, as well as some personal information. Please follow the directions to create your username and password.     Your access code is: [email or web address]  Expires: 5/10/2017  4:01 PM     Your access code will  in 90 days. If you need help or a new code, please call your [place] clinic or [phone].        Care EveryWhere ID     This is your Care EveryWhere ID. This could be used by other organizations to access your [place] medical records  [id number]        Your Vitals Were     Temperature Respirations Height BMI (Body Mass Index)          97.5  F (36.4  C) 18 1.74 m (5' 8.5\") 29.97 kg/m2         Blood Pressure from Last 3 Encounters:   17 127/75   17 129/77   16 132/84    Weight from Last 3 Encounters:   17 90.7 kg (200 lb)   17 91.2 kg (201 lb)   17 92.5 kg (204 lb)              Today, you had the following     " No orders found for display       Primary Care Provider Office Phone # Fax #    Lauren Anneliese Engelmann, [phone]       [place] [address] [facility] [address]        Thank you!     Thank you for choosing [facility]  for your care. Our goal is always to provide you with excellent care. Hearing back from our patients is one way we can continue to improve our services. Please take a few minutes to complete the written survey that you may receive in the mail after your visit with us. Thank you!             Your Updated Medication List - Protect others around you: Learn how to safely use, store and throw away your medicines at www.disposemymeds.org.          This list is accurate as of: 4/13/17  2:06 PM.  Always use your most recent med list.                   Brand Name Dispense Instructions for use    cholecalciferol 1000 UNIT tablet    vitamin D    100 tablet    Take 1 tablet (1,000 Units) by mouth daily       warfarin 5 MG tablet    JANTOVEN    90 tablet    Take 1 tablet (5 mg) by mouth daily

## 2017-04-13 NOTE — LETTER
4/13/2017       RE: Collin Frank  [address]   [facility] [address]           Dear Colleague,    Thank you for referring your patient, Collin Frank, to the [facility]. Please see a copy of my visit note below.    Follow up left knee primary osteoarthritis.  Last injection 11/8/16.  Range of motion 3-124 degrees .    He  desires injection today of left knee(s).  Risks, benefits, potential complications and alternatives were discussed.   With the patient's consent, sterile prep was performed of left knee(s).  Left knee was injected with Depo Medrol 80 mg and lidocaine at anterolateral site.  Return to clinic as needed.    He eventually will want bilateral total knee arthroplasty.       The patient's left knee was prepped with betadine solution after verification of allergies. Area approximately 10 cm x 10 cm prepped in a sterile fashion. After injection, betadine removed with soap and water and band-aids applied.    1ml depo-medrol with 1% lidocaine plain injected into patient's left knee by Dr. Hernesto Love  LOT# [id number]  Exp. 08/2019    Again, thank you for allowing me to participate in the care of your patient.        Sincerely,              Hernesto Love MD

## 2017-04-13 NOTE — PROGRESS NOTES
Follow up left knee primary osteoarthritis.  Last injection 11/8/16.  Range of motion 3-124 degrees .    He  desires injection today of left knee(s).  Risks, benefits, potential complications and alternatives were discussed.   With the patient's consent, sterile prep was performed of left knee(s).  Left knee was injected with Depo Medrol 80 mg and lidocaine at anterolateral site.  Return to clinic as needed.    He eventually will want bilateral total knee arthroplasty.

## 2017-04-13 NOTE — PROGRESS NOTES
The patient's left knee was prepped with betadine solution after verification of allergies. Area approximately 10 cm x 10 cm prepped in a sterile fashion. After injection, betadine removed with soap and water and band-aids applied.    1ml depo-medrol with 1% lidocaine plain injected into patient's left knee by Dr. Hernesto Love  LOT# [id number]  Exp. 08/2019

## 2017-04-17 ENCOUNTER — ANTICOAGULATION THERAPY VISIT (OUTPATIENT)
Dept: NURSING | Facility: CLINIC | Age: 67
End: 2017-04-17
Payer: COMMERCIAL

## 2017-04-17 DIAGNOSIS — I26.99 PULMONARY EMBOLISM WITH INFARCTION (H): ICD-10-CM

## 2017-04-17 DIAGNOSIS — Z79.01 LONG-TERM (CURRENT) USE OF ANTICOAGULANTS: ICD-10-CM

## 2017-04-17 LAB — INR POINT OF CARE: 1.6 (ref 0.86–1.14)

## 2017-04-17 PROCEDURE — 85610 PROTHROMBIN TIME: CPT | Mod: QW

## 2017-04-17 PROCEDURE — 99207 ZZC NO CHARGE NURSE ONLY: CPT

## 2017-04-17 PROCEDURE — 36416 COLLJ CAPILLARY BLOOD SPEC: CPT

## 2017-04-17 NOTE — PROGRESS NOTES
ANTICOAGULATION FOLLOW-UP CLINIC VISIT    Patient Name:  Collin rFank  Date:  4/17/2017  Contact Type:  Face to Face    SUBJECTIVE:     Patient Findings     Positives Change in diet/appetite (continues to ADAT with spinach, cous cous)           OBJECTIVE    INR Protime   Date Value Ref Range Status   04/10/2017 3.8 (A) 0.86 - 1.14 Final       ASSESSMENT / PLAN  INR assessment SUB    Recheck INR In: 1 WEEK    INR Location Clinic      Anticoagulation Summary as of 4/17/2017     INR goal 2.0-3.0   Today's INR    Maintenance plan 7.5 mg (5 mg x 1.5) on Mon; 5 mg (5 mg x 1) all other days   Full instructions 7.5 mg on Mon; 5 mg all other days   Weekly total 37.5 mg   Plan last modified Minal Carrion RN (4/17/2017)   Next INR check 4/24/2017   Target end date     Indications   Long-term (current) use of anticoagulants [Z79.01] [Z79.01]  Pulmonary embolism with infarction (HCC) [I26.99] [I26.99]         Anticoagulation Episode Summary     INR check location     Preferred lab     Send INR reminders to  [facility]    Comments       Anticoagulation Care Providers     Provider Role Specialty Phone number    Isaac Membreno MD  [facility] [phone]            See the Encounter Report to view Anticoagulation Flowsheet and Dosing Calendar (Go to Encounters tab in chart review, and find the Anticoagulation Therapy Visit)    Dosage adjustment made based on physician directed care plan.    Minal Carrion RN

## 2017-04-17 NOTE — MR AVS SNAPSHOT
Collin Frank   4/17/2017 1:40 PM   Anticoagulation Therapy Visit    Description:  66 year old male   Provider:  CLAUDIA ANTI COAG   Department:  Cp Nurse           INR as of 4/17/2017     Today's INR 1.6!      Anticoagulation Summary as of 4/17/2017     INR goal 2.0-3.0   Today's INR 1.6!   Full instructions 7.5 mg on Mon; 5 mg all other days   Next INR check 4/24/2017    Indications   Long-term (current) use of anticoagulants [Z79.01] [Z79.01]  Pulmonary embolism with infarction (HCC) [I26.99] [I26.99]         Your next Anticoagulation Clinic appointment(s)     Apr 24, 2017  1:40 PM CDT   Anticoagulation Visit with CLAUDIA ANTI COAG   [facility]    [address] [place] [address] [phone]              Contact Numbers     [facility]  Please call [phone] or [phone]  to cancel and/or reschedule your appointment.   Please call [phone] with any problems or questions regarding your therapy          April 2017 Details    Sun Mon Tue Wed Thu Fri Sat           1                 2               3               4               5               6               7               8                 9               10               11               12               13               14               15                 16               17      7.5 mg   See details      18      5 mg         19      5 mg         20      5 mg         21      5 mg         22      5 mg           23      5 mg         24            25               26               27               28               29                 30                      Date Details   04/17 This INR check       Date of next INR:  4/24/2017         How to take your warfarin dose     To take:  5 mg Take 1 of the 5 mg tablets.    To take:  7.5 mg Take 1.5 of the 5 mg tablets.

## 2017-04-19 ENCOUNTER — TELEPHONE (OUTPATIENT)
Dept: UROLOGY | Facility: CLINIC | Age: 67
End: 2017-04-19

## 2017-04-19 NOTE — TELEPHONE ENCOUNTER
Called and spoke to patient's sister, updated that we did receive documentation from [facility]. Chelsi Gil RN

## 2017-04-19 NOTE — TELEPHONE ENCOUNTER
Reason for Call:  Medical records    Detailed comments: Sister is calling to see if the records from [facility] have been received yet. States she sent LIT to [place] on 4-5-2017 to have them send over patients records and she wants to make sure you have them before patient comes in to see you on Friday 4-21-17. Records were to have been faxed to this number ([phone]). Please call.    Phone Number Patient can be reached at: Other phone number: [phone]     Best Time: any    Can we leave a detailed message on this number? YES    Call taken on 4/19/2017 at 10:25 AM by Janelle Monroy

## 2017-04-21 ENCOUNTER — OFFICE VISIT (OUTPATIENT)
Dept: UROLOGY | Facility: CLINIC | Age: 67
End: 2017-04-21
Payer: COMMERCIAL

## 2017-04-21 VITALS — HEART RATE: 58 BPM | SYSTOLIC BLOOD PRESSURE: 126 MMHG | RESPIRATION RATE: 12 BRPM | DIASTOLIC BLOOD PRESSURE: 82 MMHG

## 2017-04-21 DIAGNOSIS — N20.0 CALCULUS OF KIDNEY: ICD-10-CM

## 2017-04-21 DIAGNOSIS — R31.29 MICROSCOPIC HEMATURIA: Primary | ICD-10-CM

## 2017-04-21 DIAGNOSIS — N32.3 BLADDER DIVERTICULUM: ICD-10-CM

## 2017-04-21 PROCEDURE — 88112 CYTOPATH CELL ENHANCE TECH: CPT | Performed by: UROLOGY

## 2017-04-21 PROCEDURE — 99204 OFFICE O/P NEW MOD 45 MIN: CPT | Mod: 25 | Performed by: UROLOGY

## 2017-04-21 PROCEDURE — 51798 US URINE CAPACITY MEASURE: CPT | Performed by: UROLOGY

## 2017-04-21 NOTE — NURSING NOTE
"Chief Complaint   Patient presents with     Hematuria       Initial /82 (BP Location: Right arm, Patient Position: Chair, Cuff Size: Adult Regular)  Pulse 58  Resp 12 Estimated body mass index is 29.97 kg/(m^2) as calculated from the following:    Height as of 4/13/17: 1.74 m (5' 8.5\").    Weight as of 4/13/17: 90.7 kg (200 lb).  Medication Reconciliation: complete   Chelsi Gil RN       "

## 2017-04-21 NOTE — PATIENT INSTRUCTIONS
Your cystoscopy is scheduled 5/19/2017 @ 10:00am. No preparation necessary. Please call  if you need to reschedule this appointment. Please complete your CT Scan sometime prior to your appointment for cystoscopy.   Please call  to schedule the CT scan at [facility], [address] [place] [address].

## 2017-04-21 NOTE — PROGRESS NOTES
Urology Note            S:  Collin Frank is a 66 year old male who was seen in a consultation at the request of Dr. STEVENSON for hematuria.   Patient has microscopic hematuria.  He has no other voiding  in addition to hematuria.  He has no flank pain.  He has no history of kidney stone but recent CT scan without contrast showed multiple small stones and bladder diverticulum.  He denies any trauma.  Recent UA showed 25-50 rbc/hpf.  Urine culture was neg.  He was a former smoker.  He was on coumadin.  Past Medical History:   Diagnosis Date     Long term current use of anticoagulant 10/16/2012     Current Outpatient Prescriptions   Medication Sig Dispense Refill     warfarin (JANTOVEN) 5 MG tablet Take 1 tablet (5 mg) by mouth daily 90 tablet 3     cholecalciferol (VITAMIN D) 1000 UNIT tablet Take 1 tablet (1,000 Units) by mouth daily 100 tablet 3     Past Surgical History:   Procedure Laterality Date     COLONOSCOPY  08    Normal. Repeat in 10 years     rt knee ORIF      [facility]      Social History     Social History     Marital status: Single     Spouse name: N/A     Number of children: N/A     Years of education: N/A     Occupational History     Not on file.     Social History Main Topics     Smoking status: Former Smoker     Smokeless tobacco: Never Used     Alcohol use No     Drug use: No     Sexual activity: No     Other Topics Concern     Parent/Sibling W/ Cabg, Mi Or Angioplasty Before 65f 55m? No     Social History Narrative     Family History   Problem Relation Age of Onset     Alzheimer Disease Mother      HEART DISEASE Father      Prostate Cancer Father      CANCER Brother 65     pancreatic        Prostate Cancer Brother           REVIEW OF SYSTEMS  =================  C: NEGATIVE for fever, chills, change in weight  I: NEGATIVE for worrisome rashes, moles or lesions  E/M: NEGATIVE for ear, mouth and throat problems  R: NEGATIVE for significant cough or SHORTNESS OF BREATH  CV:  NEGATIVE  for chest pain, palpitations or peripheral edema  GI: NEGATIVE for nausea, abdominal pain, heartburn, or change in bowel habits  NEURO: NEGATIVE numbness/weakness  : see HPI  PSYCH: NEGATIVE depression/anxiety  LYmph: no new enlarged lymph nodes  Ortho: no new trauma/movements           O: Exam:  Constitutional: healthy, alert and no distress  Head: Normocephalic. No masses, lesions, tenderness or abnormalities  ENT: ENT exam normal, no neck nodes or sinus tenderness  Cardiovascular: negative, PMI normal.   Respiratory: negative, no evidence of respiratory distress  Gastrointestinal: Abdomen soft, non-tender. BS normal. No masses, organomegaly  : no cva tenderness  Musculoskeletal: extremities normal- no gross deformities noted, gait normal and normal muscle tone  Skin: no suspicious lesions or rashes  Neurologic: Alert and oriented  Psychiatric: mentation appears normal. and affect normal/bright  Hematologic/Lymphatic/Immunologic: normal ant/post cervical, axillary, supraclavicular and inguinal nodes    Assessment:  Hematuria, microscopic  - h/o smoking and on coumdain                           Renal stones - no symptomatic                           Bladder diverticulum found on CT scan - bladder scan 103 ml    Recommendation: Schedule patient for CT scan with IV contrast, cysto, urine cytology next.

## 2017-04-21 NOTE — MR AVS SNAPSHOT
After Visit Summary   4/21/2017    Collin Frank    MRN: [id number]           Patient Information     Date Of Birth          1950        Visit Information        Provider Department      4/21/2017 11:30 AM Delfin Salazar MD [facility]        Today's Diagnoses     Microscopic hematuria    -  1    Calculus of kidney        Bladder diverticulum          Care Instructions    Your cystoscopy is scheduled 5/19/2017 @ 10:00am. No preparation necessary. Please call  if you need to reschedule this appointment. Please complete your CT Scan sometime prior to your appointment for cystoscopy.   Please call  to schedule the CT scan at [facility], [address] [place] [address].          Follow-ups after your visit        Your next 10 appointments already scheduled     Apr 21, 2017 11:30 AM CDT   New Visit with Delfin Salazar MD   [facility] ([facility]    [address] [place], [place] [address] [address]   [phone]            Apr 24, 2017  1:40 PM CDT   Anticoagulation Visit with CP ANTI COAG   [facility] ([facility])    [address] [place] [address]   [phone]            May 19, 2017 10:00 AM CDT   Return Visit with Delfin Salazar MD, BABAK CYSTO PROC ROOM   [facility] ([facility])    [address] [place] [place] [address] [address]   [phone]              Future tests that were ordered for you today     Open Future Orders        Priority Expected Expires Ordered    CT Abdomen Pelvis w Contrast Routine 4/21/2017 4/21/2018 4/21/2017            Who to contact     If you have questions or need follow up information about today's clinic visit or your schedule please contact [facility] directly at [phone].  Normal or non-critical lab and imaging results will be communicated to you by  "MyChart, letter or phone within 4 business days after the clinic has received the results. If you do not hear from us within 7 days, please contact the clinic through [employer] or phone. If you have a critical or abnormal lab result, we will notify you by phone as soon as possible.  Submit refill requests through [employer] or call your pharmacy and they will forward the refill request to us. Please allow 3 business days for your refill to be completed.          Additional Information About Your Visit        [employer] MD Information     [employer] lets you send messages to your doctor, view your test results, renew your prescriptions, schedule appointments and more. To sign up, go to [email or web address] . Click on \"Log in\" on the left side of the screen, which will take you to the Welcome page. Then click on \"Sign up Now\" on the right side of the page.     You will be asked to enter the access code listed below, as well as some personal information. Please follow the directions to create your username and password.     Your access code is: [email or web address]  Expires: 5/10/2017  4:01 PM     Your access code will  in 90 days. If you need help or a new code, please call your [place] clinic or [phone].        Care EveryWhere ID     This is your Care EveryWhere ID. This could be used by other organizations to access your [place] medical records  [id number]        Your Vitals Were     Pulse Respirations                58 12           Blood Pressure from Last 3 Encounters:   17 126/82   17 127/75   17 129/77    Weight from Last 3 Encounters:   17 90.7 kg (200 lb)   17 91.2 kg (201 lb)   17 92.5 kg (204 lb)              We Performed the Following     Cytology non gyn     MEASURE POST-VOID RESIDUAL URINE/BLADDER CAPACITY, US NON-IMAGING        Primary Care Provider Office Phone # Fax #    Lauren Anneliese Engelmann, [phone]       [facility] [address] [facility] [address]        Thank you!     Thank you for choosing [facility] [place]  for your care. Our goal is always to provide you with excellent care. Hearing back from our patients is one way we can continue to improve our services. Please take a few minutes to complete the written survey that you may receive in the mail after your visit with us. Thank you!             Your Updated Medication List - Protect others around you: Learn how to safely use, store and throw away your medicines at www.disposemymeds.org.          This list is accurate as of: 4/21/17 11:25 AM.  Always use your most recent med list.                   Brand Name Dispense Instructions for use    cholecalciferol 1000 UNIT tablet    vitamin D    100 tablet    Take 1 tablet (1,000 Units) by mouth daily       warfarin 5 MG tablet    JANTOVEN    90 tablet    Take 1 tablet (5 mg) by mouth daily

## 2017-04-24 ENCOUNTER — ANTICOAGULATION THERAPY VISIT (OUTPATIENT)
Dept: NURSING | Facility: CLINIC | Age: 67
End: 2017-04-24
Payer: COMMERCIAL

## 2017-04-24 DIAGNOSIS — Z79.01 LONG-TERM (CURRENT) USE OF ANTICOAGULANTS: ICD-10-CM

## 2017-04-24 DIAGNOSIS — I26.99 PULMONARY EMBOLISM WITH INFARCTION (H): ICD-10-CM

## 2017-04-24 LAB
COPATH REPORT: NORMAL
INR POINT OF CARE: 2.6 (ref 0.86–1.14)

## 2017-04-24 PROCEDURE — 36416 COLLJ CAPILLARY BLOOD SPEC: CPT

## 2017-04-24 PROCEDURE — 99207 ZZC NO CHARGE NURSE ONLY: CPT

## 2017-04-24 PROCEDURE — 85610 PROTHROMBIN TIME: CPT | Mod: QW

## 2017-04-24 NOTE — MR AVS SNAPSHOT
Collin Frank   4/24/2017 1:40 PM   Anticoagulation Therapy Visit    Description:  66 year old male   Provider:  CP ANTI COAG   Department:  Cp Nurse           INR as of 4/24/2017     Today's INR 2.6      Anticoagulation Summary as of 4/24/2017     INR goal 2.0-3.0   Today's INR 2.6   Full instructions 7.5 mg on Mon; 5 mg all other days   Next INR check 5/22/2017    Indications   Long-term (current) use of anticoagulants [Z79.01] [Z79.01]  Pulmonary embolism with infarction (HCC) [I26.99] [I26.99]         Your next Anticoagulation Clinic appointment(s)     Apr 24, 2017  1:40 PM CDT   Anticoagulation Visit with CP ANTI COAG   [facility] ([facility]    [address] [facility] [address] [phone]            May 22, 2017  1:40 PM CDT   Anticoagulation Visit with CP ANTI COAG   [facility] ([facility])    [address] [facility] [address] [phone]              Contact Numbers     [facility]  Please call [phone] or [phone]  to cancel and/or reschedule your appointment.   Please call [phone] with any problems or questions regarding your therapy          April 2017 Details    Sun Mon Tue Wed Thu Fri Sat           1                 2               3               4               5               6               7               8                 9               10               11               12               13               14               15                 16               17               18               19               20               21               22                 23               24      7.5 mg   See details      25      5 mg         26      5 mg         27      5 mg         28      5 mg         29      5 mg           30      5 mg                Date Details   04/24 This INR check               How to take your  warfarin dose     To take:  5 mg Take 1 of the 5 mg tablets.    To take:  7.5 mg Take 1.5 of the 5 mg tablets.           May 2017 Details    Sun Mon Tue Wed Thu Fri Sat      1      7.5 mg         2      5 mg         3      5 mg         4      5 mg         5      5 mg         6      5 mg           7      5 mg         8      7.5 mg         9      5 mg         10      5 mg         11      5 mg         12      5 mg         13      5 mg           14      5 mg         15      7.5 mg         16      5 mg         17      5 mg         18      5 mg         19      5 mg         20      5 mg           21      5 mg         22            23               24               25               26               27                 28               29               30               31                   Date Details   No additional details    Date of next INR:  5/22/2017         How to take your warfarin dose     To take:  5 mg Take 1 of the 5 mg tablets.    To take:  7.5 mg Take 1.5 of the 5 mg tablets.

## 2017-04-24 NOTE — PROGRESS NOTES
ANTICOAGULATION FOLLOW-UP CLINIC VISIT    Patient Name:  Collin Frank  Date:  4/24/2017  Contact Type:  Face to Face    SUBJECTIVE:     Patient Findings     Positives No Problem Findings           OBJECTIVE    INR Protime   Date Value Ref Range Status   04/24/2017 2.6 (A) 0.86 - 1.14 Final       ASSESSMENT / PLAN  INR assessment THER    Recheck INR In: 4 WEEKS    INR Location Clinic      Anticoagulation Summary as of 4/24/2017     INR goal 2.0-3.0   Today's INR 2.6   Maintenance plan 7.5 mg (5 mg x 1.5) on Mon; 5 mg (5 mg x 1) all other days   Full instructions 7.5 mg on Mon; 5 mg all other days   Weekly total 37.5 mg   No change documented Minal Carrion RN   Plan last modified Minal Carrion RN (4/17/2017)   Next INR check 5/22/2017   Target end date     Indications   Long-term (current) use of anticoagulants [Z79.01] [Z79.01]  Pulmonary embolism with infarction (HCC) [I26.99] [I26.99]         Anticoagulation Episode Summary     INR check location     Preferred lab     Send INR reminders to [facility] CLINIC    Comments       Anticoagulation Care Providers     Provider Role Specialty Phone number    Isaac Membreno MD  [facility] [phone]            See the Encounter Report to view Anticoagulation Flowsheet and Dosing Calendar (Go to Encounters tab in chart review, and find the Anticoagulation Therapy Visit)    Dosage adjustment made based on physician directed care plan.    Minal Carrion RN

## 2017-05-19 ENCOUNTER — OFFICE VISIT (OUTPATIENT)
Dept: UROLOGY | Facility: CLINIC | Age: 67
End: 2017-05-19
Payer: COMMERCIAL

## 2017-05-19 VITALS — OXYGEN SATURATION: 97 % | SYSTOLIC BLOOD PRESSURE: 132 MMHG | DIASTOLIC BLOOD PRESSURE: 71 MMHG | HEART RATE: 60 BPM

## 2017-05-19 DIAGNOSIS — N20.1 CALCULUS OF URETER: ICD-10-CM

## 2017-05-19 DIAGNOSIS — R31.9 HEMATURIA: Primary | ICD-10-CM

## 2017-05-19 PROCEDURE — 99207 ZZC DROP WITH A PROCEDURE: CPT | Mod: 25 | Performed by: UROLOGY

## 2017-05-19 PROCEDURE — 52000 CYSTOURETHROSCOPY: CPT | Performed by: UROLOGY

## 2017-05-19 NOTE — PROGRESS NOTES
S: Collin Frank is a 66 year old male returns for hematuria.    Patient is draped and prepped.  Flexible cystoscopy placed under direct vision.      The anterior urethra is normal   The prostatic urethra showed trilobar enlargement.     The length is 2cm,  the coaptation is 2 cm.     In the bladder there is trabeculation grade 1 with bladder diverticulum.  EXAMINATION: CT ABDOMEN PELVIS W/O & W CONTRAST, 4/25/2017 10:43  AM    TECHNIQUE:  Helical CT images from the lung bases through the  symphysis pubis were obtained without and with contrast using CT  urogram protocol. Contrast dose: Isovue 370 123cc    COMPARISON: None available    HISTORY: Other microscopic hematuria    FINDINGS:    Urinary tract: There are punctate calcifications in the superior pole  of the left kidney (series 3 image 127) which may represent tiny  nonobstructing renal calculi or vascular calcifications. There is a  punctate calcification in the distal right ureter (series 6 image 382)  measuring approximately 3 mm. There is no upstream ureteral dilation,  but there is asymmetric subtle urothelial enhancement (series 6 image  373). There is no calyceal or ureteral filling defect on delayed phase  images. Incidentally noted right posterolateral bladder diverticulum.  The urinary bladder is otherwise unremarkable. There is a fluid  attenuating lesion in the superior pole of the right kidney likely  represents simple cyst. A smaller hypoattenuating lesion of the  inferior pole of the left kidney is too small to characterize.    Prostatic     Remainder of the abdomen and pelvis: A hypoattenuating lesion of the  right hepatic lobe (series 6 image 104) is noted measuring 1 cm.  Additional smaller hypoattenuating lesions are too small to  characterize. The spleen, pancreas, gallbladder, and adrenal glands  are unremarkable.    The small and large bowel are normal in caliber. Mild diverticuli of  the sigmoid colon. The appendix is visualized and  unremarkable. There  is no abdominal free fluid or free air. A stent is noted in the left  external iliac vein and left common femoral vein. Prominent bilateral  inguinal fat.    Lung bases:  No suspicious pulmonary nodules. No pleural or  pericardial effusion.    Bones and soft tissues: Degenerative changes of the lumbar spine and  femoral acetabular joints, left greater than right.             Impression             IMPRESSION:   1. In the distal right ureter there is a 4 mm calcification with  adjacent urothelial thickening and subtle enhancement.  Finding raises  concern for a distal ureteral calculus with secondary inflammation. No  upstream ureteral dilatation, no right hydronephrosis to suggest  definite obstruction. No definite intraluminal filling defect is  identified to suggest definite malignancy at this time.  Follow up CT  may be considered, given the subtle enhancement surrounding the  calcification.    2. Low density hepatic lesion in the right hepatic lobe with possible  peripheral enhancement, could represent hemangioma, however  indeterminate and no definite delayed enhancement. Further evaluation  with ultrasound may be useful.   3. Punctate calcifications in the superior pole of the right kidney  may represent small renal calculi or vascular calcifications.    I have personally reviewed the examination and initial interpretation  and I agree with the findings.    RIMA HOUSTON MD        Assessment/Plan:  (R31.9) Hematuria  (primary encounter diagnosis)  Comment:    Plan:  See below  (N20.1) Calculus of ureter  Comment: possible ureteral stone.  He is not symptomatic.  Plan: repeat CT scan in six weeks

## 2017-05-19 NOTE — PATIENT INSTRUCTIONS
Please call [facility] to schedule a CT scan in 6 weeks. [phone]. Please arrange follow up with Dr. Salazar to discuss the results.

## 2017-05-19 NOTE — NURSING NOTE
"Chief Complaint   Patient presents with     Cystoscopy       Initial /71 (BP Location: Left arm, Patient Position: Chair, Cuff Size: Adult Large)  Pulse 60  SpO2 97% Estimated body mass index is 29.97 kg/(m^2) as calculated from the following:    Height as of 4/13/17: 1.74 m (5' 8.5\").    Weight as of 4/13/17: 90.7 kg (200 lb).  Medication Reconciliation: complete   Lauren Lund CMA      "

## 2017-05-19 NOTE — MR AVS SNAPSHOT
After Visit Summary   5/19/2017    Collin Frank    MRN: [id number]           Patient Information     Date Of Birth          1950        Visit Information        Provider Department      5/19/2017 10:00 AM Delfin Salazar MD; [facility] ROOM [facility]        Today's Diagnoses     Hematuria    -  1      Care Instructions    Please call [facility] to schedule a CT scan in 6 weeks. [phone]. Please arrange follow up with Dr. Salazar to discuss the results.           Follow-ups after your visit        Your next 10 appointments already scheduled     May 19, 2017 10:00 AM CDT   Return Visit with Delfin Salazar MD, [facility]   [facility] ([facility]    [address] [place] [id number] [phone]            May 22, 2017  1:40 PM CDT   Anticoagulation Visit with CP ANTI COAG   [facility] ([facility])    [address] [facility] [id number] [phone]              Future tests that were ordered for you today     Open Future Orders        Priority Expected Expires Ordered    CT Abdomen Pelvis w/o Contrast Routine 6/30/2017 7/3/2017 5/19/2017            Who to contact     If you have questions or need follow up information about today's clinic visit or your schedule please contact [facility] directly at [phone].  Normal or non-critical lab and imaging results will be communicated to you by MyChart, letter or phone within 4 business days after the clinic has received the results. If you do not hear from us within 7 days, please contact the clinic through MyChart or phone. If you have a critical or abnormal lab result, we will notify you by phone as soon as possible.  Submit refill requests through [employer] or call your pharmacy and they will forward the refill request to us. Please allow 3 business days for your refill to be  "completed.          Additional Information About Your Visit        [employer] Information     [employer] lets you send messages to your doctor, view your test results, renew your prescriptions, schedule appointments and more. To sign up, go to [email or web address] . Click on \"Log in\" on the left side of the screen, which will take you to the Welcome page. Then click on \"Sign up Now\" on the right side of the page.     You will be asked to enter the access code listed below, as well as some personal information. Please follow the directions to create your username and password.     Your access code is: [email or web address]  Expires: 2017  9:54 AM     Your access code will  in 90 days. If you need help or a new code, please call your [facility] clinic or [phone].        Care EveryWhere ID     This is your Care EveryWhere ID. This could be used by other organizations to access your [facility] medical records  [id number]        Your Vitals Were     Pulse Pulse Oximetry                60 97%           Blood Pressure from Last 3 Encounters:   17 132/71   17 126/82   17 127/75    Weight from Last 3 Encounters:   17 90.7 kg (200 lb)   17 91.2 kg (201 lb)   17 92.5 kg (204 lb)              We Performed the Following     CYSTOURETHROSCOPY        Primary Care Provider Office Phone # Fax #    Kendy Anneliese Engelmann, [phone]       [address] [address]        Thank you!     Thank you for choosing [facility]  for your care. Our goal is always to provide you with excellent care. Hearing back from our patients is one way we can continue to improve our services. Please take a few minutes to complete the written survey that you may receive in the mail after your visit with us. Thank you!             Your Updated Medication List - Protect others around you: Learn how to safely use, store and throw " away your medicines at www.disposemymeds.org.          This list is accurate as of: 5/19/17  9:54 AM.  Always use your most recent med list.                   Brand Name Dispense Instructions for use    cholecalciferol 1000 UNIT tablet    vitamin D    100 tablet    Take 1 tablet (1,000 Units) by mouth daily       warfarin 5 MG tablet    JANTOVEN    90 tablet    Take 1 tablet (5 mg) by mouth daily

## 2017-05-22 ENCOUNTER — ANTICOAGULATION THERAPY VISIT (OUTPATIENT)
Dept: NURSING | Facility: CLINIC | Age: 67
End: 2017-05-22
Payer: COMMERCIAL

## 2017-05-22 DIAGNOSIS — I26.99 PULMONARY EMBOLISM WITH INFARCTION (H): ICD-10-CM

## 2017-05-22 DIAGNOSIS — Z79.01 LONG-TERM (CURRENT) USE OF ANTICOAGULANTS: ICD-10-CM

## 2017-05-22 LAB — INR POINT OF CARE: 3 (ref 0.86–1.14)

## 2017-05-22 PROCEDURE — 99207 ZZC NO CHARGE NURSE ONLY: CPT

## 2017-05-22 PROCEDURE — 36416 COLLJ CAPILLARY BLOOD SPEC: CPT

## 2017-05-22 PROCEDURE — 85610 PROTHROMBIN TIME: CPT | Mod: QW

## 2017-05-22 NOTE — MR AVS SNAPSHOT
Collin Frank   5/22/2017 1:40 PM   Anticoagulation Therapy Visit    Description:  66 year old male   Provider:  CP ANTI COAG   Department:  Cp Nurse           INR as of 5/22/2017     Today's INR 3.0      Anticoagulation Summary as of 5/22/2017     INR goal 2.0-3.0   Today's INR 3.0   Full instructions 7.5 mg on Mon; 5 mg all other days   Next INR check 6/19/2017    Indications   Long-term (current) use of anticoagulants [Z79.01] [Z79.01]  Pulmonary embolism with infarction (HCC) [I26.99] [I26.99]         Your next Anticoagulation Clinic appointment(s)     May 22, 2017  1:40 PM CDT   Anticoagulation Visit with CP ANTI COAG   [facility] ([facility])    [address] [facility] [id number] [phone]            Jun 19, 2017  1:40 PM CDT   Anticoagulation Visit with CP ANTI COAG   [facility] ([facility])    [address] [facility] [phone]              Contact Numbers     [facility]  Please call [phone] or [phone]  to cancel and/or reschedule your appointment.   Please call [phone] with any problems or questions regarding your therapy          May 2017 Details    Sun Mon Tue Wed Thu Fri Sat      1               2               3               4               5               6                 7               8               9               10               11               12               13                 14               15               16               17               18               19               20                 21               22      7.5 mg   See details      23      5 mg         24      5 mg         25      5 mg         26      5 mg         27      5 mg           28      5 mg         29      7.5 mg         30      5 mg         31      5 mg             Date Details   05/22 This INR check               How  to take your warfarin dose     To take:  5 mg Take 1 of the 5 mg tablets.    To take:  7.5 mg Take 1.5 of the 5 mg tablets.           June 2017 Details    Sun Mon Tue Wed Thu Fri Sat         1      5 mg         2      5 mg         3      5 mg           4      5 mg         5      7.5 mg         6      5 mg         7      5 mg         8      5 mg         9      5 mg         10      5 mg           11      5 mg         12      7.5 mg         13      5 mg         14      5 mg         15      5 mg         16      5 mg         17      5 mg           18      5 mg         19            20               21               22               23               24                 25               26               27               28               29               30                 Date Details   No additional details    Date of next INR:  6/19/2017         How to take your warfarin dose     To take:  5 mg Take 1 of the 5 mg tablets.    To take:  7.5 mg Take 1.5 of the 5 mg tablets.

## 2017-05-22 NOTE — PROGRESS NOTES
ANTICOAGULATION FOLLOW-UP CLINIC VISIT    Patient Name:  Collin Frank  Date:  5/22/2017  Contact Type:  Face to Face    SUBJECTIVE:     Patient Findings     Positives No Problem Findings           OBJECTIVE    INR Protime   Date Value Ref Range Status   05/22/2017 3.0 (A) 0.86 - 1.14 Final       ASSESSMENT / PLAN  INR assessment THER    Recheck INR In: 4 WEEKS    INR Location Clinic      Anticoagulation Summary as of 5/22/2017     INR goal 2.0-3.0   Today's INR 3.0   Maintenance plan 7.5 mg (5 mg x 1.5) on Mon; 5 mg (5 mg x 1) all other days   Full instructions 7.5 mg on Mon; 5 mg all other days   Weekly total 37.5 mg   No change documented Minal Carrion RN   Plan last modified Minal Carrion RN (4/17/2017)   Next INR check 6/19/2017   Target end date     Indications   Long-term (current) use of anticoagulants [Z79.01] [Z79.01]  Pulmonary embolism with infarction (HCC) [I26.99] [I26.99]         Anticoagulation Episode Summary     INR check location     Preferred lab     Send INR reminders to [facility] CLINIC    Comments       Anticoagulation Care Providers     Provider Role Specialty Phone number    Isaac Membreno MD  [facility] [phone]            See the Encounter Report to view Anticoagulation Flowsheet and Dosing Calendar (Go to Encounters tab in chart review, and find the Anticoagulation Therapy Visit)    Dosage adjustment made based on physician directed care plan.    Minal Carrion RN

## 2017-06-19 ENCOUNTER — ANTICOAGULATION THERAPY VISIT (OUTPATIENT)
Dept: NURSING | Facility: CLINIC | Age: 67
End: 2017-06-19
Payer: COMMERCIAL

## 2017-06-19 DIAGNOSIS — Z79.01 LONG-TERM (CURRENT) USE OF ANTICOAGULANTS: ICD-10-CM

## 2017-06-19 DIAGNOSIS — I26.99 PULMONARY EMBOLISM WITH INFARCTION (H): ICD-10-CM

## 2017-06-19 LAB — INR POINT OF CARE: 2.2 (ref 0.86–1.14)

## 2017-06-19 PROCEDURE — 36416 COLLJ CAPILLARY BLOOD SPEC: CPT

## 2017-06-19 PROCEDURE — 99207 ZZC NO CHARGE NURSE ONLY: CPT

## 2017-06-19 PROCEDURE — 85610 PROTHROMBIN TIME: CPT | Mod: QW

## 2017-06-19 NOTE — PROGRESS NOTES
ANTICOAGULATION FOLLOW-UP CLINIC VISIT    Patient Name:  Collin Frank  Date:  6/19/2017  Contact Type:  Face to Face    SUBJECTIVE:     Patient Findings     Positives No Problem Findings           OBJECTIVE    INR Protime   Date Value Ref Range Status   06/19/2017 2.2 (A) 0.86 - 1.14 Final       ASSESSMENT / PLAN  INR assessment THER    Recheck INR In: 4 WEEKS    INR Location Clinic      Anticoagulation Summary as of 6/19/2017     INR goal 2.0-3.0   Today's INR 2.2   Maintenance plan 7.5 mg (5 mg x 1.5) on Mon; 5 mg (5 mg x 1) all other days   Full instructions 7.5 mg on Mon; 5 mg all other days   Weekly total 37.5 mg   No change documented Minal Carrion RN   Plan last modified Minal Carrion RN (4/17/2017)   Next INR check 7/17/2017   Target end date     Indications   Long-term (current) use of anticoagulants [Z79.01] [Z79.01]  Pulmonary embolism with infarction (HCC) [I26.99] [I26.99]         Anticoagulation Episode Summary     INR check location     Preferred lab     Send INR reminders to [facility] CLINIC    Comments       Anticoagulation Care Providers     Provider Role Specialty Phone number    Isaac Membreno MD  [facility] [phone]            See the Encounter Report to view Anticoagulation Flowsheet and Dosing Calendar (Go to Encounters tab in chart review, and find the Anticoagulation Therapy Visit)    Dosage adjustment made based on physician directed care plan.    Minal Carrion RN

## 2017-06-19 NOTE — MR AVS SNAPSHOT
Collin Frank   6/19/2017 1:40 PM   Anticoagulation Therapy Visit    Description:  67 year old male   Provider:  CP ANTI COAG   Department:  Cp Nurse           INR as of 6/19/2017     Today's INR 2.2      Anticoagulation Summary as of 6/19/2017     INR goal 2.0-3.0   Today's INR 2.2   Full instructions 7.5 mg on Mon; 5 mg all other days   Next INR check 7/17/2017    Indications   Long-term (current) use of anticoagulants [Z79.01] [Z79.01]  Pulmonary embolism with infarction (HCC) [I26.99] [I26.99]         Your next Anticoagulation Clinic appointment(s)     Jun 19, 2017  1:40 PM CDT   Anticoagulation Visit with CP ANTI COAG   [facility]    [address] [facility] [id number] [phone]            Jul 17, 2017  1:40 PM CDT   Anticoagulation Visit with CP ANTI COAG   [facility] ([facility])    [address] [facility] [id number] [phone]              Contact Numbers     [facility]  Please call [phone] or [phone]  to cancel and/or reschedule your appointment.   Please call [phone] with any problems or questions regarding your therapy          June 2017 Details    Sun Mon Tue Wed Thu Fri Sat         1               2               3                 4               5               6               7               8               9               10                 11               12               13               14               15               16               17                 18               19      7.5 mg   See details      20      5 mg         21      5 mg         22      5 mg         23      5 mg         24      5 mg           25      5 mg         26      7.5 mg         27      5 mg         28      5 mg         29      5 mg         30      5 mg           Date Details   06/19 This INR check               How to take  your warfarin dose     To take:  5 mg Take 1 of the 5 mg tablets.    To take:  7.5 mg Take 1.5 of the 5 mg tablets.           July 2017 Details    Sun Mon Tue Wed Thu Fri Sat           1      5 mg           2      5 mg         3      7.5 mg         4      5 mg         5      5 mg         6      5 mg         7      5 mg         8      5 mg           9      5 mg         10      7.5 mg         11      5 mg         12      5 mg         13      5 mg         14      5 mg         15      5 mg           16      5 mg         17            18               19               20               21               22                 23               24               25               26               27               28               29                 30               31                     Date Details   No additional details    Date of next INR:  7/17/2017         How to take your warfarin dose     To take:  5 mg Take 1 of the 5 mg tablets.    To take:  7.5 mg Take 1.5 of the 5 mg tablets.

## 2017-07-11 ENCOUNTER — OFFICE VISIT (OUTPATIENT)
Dept: UROLOGY | Facility: CLINIC | Age: 67
End: 2017-07-11
Payer: COMMERCIAL

## 2017-07-11 VITALS — SYSTOLIC BLOOD PRESSURE: 130 MMHG | RESPIRATION RATE: 11 BRPM | DIASTOLIC BLOOD PRESSURE: 74 MMHG | HEART RATE: 55 BPM

## 2017-07-11 DIAGNOSIS — N20.1 CALCULUS OF URETER: Primary | ICD-10-CM

## 2017-07-11 PROCEDURE — 99213 OFFICE O/P EST LOW 20 MIN: CPT | Performed by: UROLOGY

## 2017-07-11 NOTE — NURSING NOTE
"Chief Complaint   Patient presents with     Results       Initial /74 (BP Location: Right arm, Patient Position: Chair, Cuff Size: Adult Regular)  Pulse 55  Resp 11 Estimated body mass index is 29.97 kg/(m^2) as calculated from the following:    Height as of 4/13/17: 1.74 m (5' 8.5\").    Weight as of 4/13/17: 90.7 kg (200 lb).  Medication Reconciliation: complete   Chelsi Gil RN       "

## 2017-07-11 NOTE — PROGRESS NOTES
Chief Complaint   Patient presents with     Results       Collin Frank is a 67 year old male who presents today for follow up of   Chief Complaint   Patient presents with     Results    f/u for hematuria from ureteral stone.  He is doing well since last visit.  He has no pain or hematuria.    Current Outpatient Prescriptions   Medication Sig Dispense Refill     warfarin (JANTOVEN) 5 MG tablet Take 1 tablet (5 mg) by mouth daily 90 tablet 3     cholecalciferol (VITAMIN D) 1000 UNIT tablet Take 1 tablet (1,000 Units) by mouth daily 100 tablet 3     No Known Allergies   Past Medical History:   Diagnosis Date     Long term current use of anticoagulant 10/16/2012     Past Surgical History:   Procedure Laterality Date     COLONOSCOPY  08    Normal. Repeat in 10 years     rt knee ORIF      [facility]     Family History   Problem Relation Age of Onset     Alzheimer Disease Mother      HEART DISEASE Father      Prostate Cancer Father      CANCER Brother 65     pancreatic        Prostate Cancer Brother      Social History     Social History     Marital status: Single     Spouse name: N/A     Number of children: N/A     Years of education: N/A     Social History Main Topics     Smoking status: Former Smoker     Types: Cigarettes     Quit date: 2011     Smokeless tobacco: Never Used     Alcohol use No     Drug use: No     Sexual activity: No     Other Topics Concern     Parent/Sibling W/ Cabg, Mi Or Angioplasty Before 65f 55m? No     Social History Narrative       REVIEW OF SYSTEMS  =================  C: NEGATIVE for fever, chills, change in weight  I: NEGATIVE for worrisome rashes, moles or lesions  E/M: NEGATIVE for ear, mouth and throat problems  R: NEGATIVE for significant cough or SHORTNESS OF BREATH,   CV: NEGATIVE for chest pain, palpitations or peripheral edema  GI: NEGATIVE for nausea, abdominal pain, heartburn, or change in bowel habits  NEURO: NEGATIVE any motor/sensory changes  PSYCH:  NEGATIVE for recent mood disorder    Physical Exam:  /74 (BP Location: Right arm, Patient Position: Chair, Cuff Size: Adult Regular)  Pulse 55  Resp 11   Patient is pleasant, in no acute distress, good general condition.  Lung: no evidence of respiratory distress    Abdomen: Soft, nondistended, non tender. No masses. No rebound or guarding.   Exam: no cva tenderness  Skin: Warm and dry.  No redness.  Psych: normal mood and affect  Neuro: alert and oriented  CT ABDOMEN PELVIS W/O CONTRAST   6/29/2017 7:13 AM      HISTORY: Hematuria, unspecified    COMPARISON: CT on 4/25/2017    Technique: CT of the abdomen and pelvis were obtained without  intravenous contrast media. Images were reconstructed in the sagittal  and coronal planes.    Dose: 102  mGy*cm    Findings:  Abdomen and pelvis:   Limited evaluation of abdominal organs due to lack of contrast. Liver,  spleen, adrenals, gallbladder are unremarkable in this unenhanced  exam. Fatty atrophy of pancreas. Punctate calcification in the  posterior midpole of the right kidney (series 3, image 142) vascular  calcification versus a nonobstructing stone. The previously seen stone  in the distal right ureter is no longer visualized, likely passed.    Punctate stone in the midpole of the left kidney and another stone in  the upper pole of the right kidney. Bladder diverticulum from the  posterior lateral wall of the bladder better seen on delayed images on  4/25/2017.    No free fluid in the abdomen or pelvis. No free air in the abdomen.  Small bowel and colon are normal caliber without appreciable wall  thickening. No lymphadenopathy in the abdomen or pelvis. Coarse  calcifications within the prostate. Vascular stents noted in the left  external iliac vein and left femoral vein. Normal appendix. Sigmoid  diverticulosis without diverticulitis. Fat-containing inguinal  hernias.    Lung bases:  Clear    Bones:   No acute or suspicious bony abnormalities. Bulky  osteophytes in the  lower thoracic spine. Disc height loss at L4-L5. Degenerative changes  of the left hip.             Impression             Impression:  1. Punctate nonobstructing stone in left kidney and punctate stone in  upper pole of right kidney. Previously seen stone in distal right  ureter is no longer visualized, likely passed. No hydronephrosis.  2. Sigmoid diverticulosis.        I have personally reviewed the examination and initial interpretation  and I agree with the findings.    ELIN BERNAL MD        Assessment/Plan:   (N20.1) Calculus of ureter  (primary encounter diagnosis)  Comment: stone passed  Plan: f/u as needed.

## 2017-07-11 NOTE — MR AVS SNAPSHOT
"              After Visit Summary   7/11/2017    Collin Frank    MRN: [id number]           Patient Information     Date Of Birth          1950        Visit Information        Provider Department      7/11/2017 8:00 AM Delfin Salazar MD [facility]        Today's Diagnoses     Calculus of ureter    -  1       Follow-ups after your visit        Your next 10 appointments already scheduled     Jul 17, 2017  1:40 PM CDT   Anticoagulation Visit with CP ANTI COAG   [facility] ([facility])    [address] [facility] [address] [phone]              Who to contact     If you have questions or need follow up information about today's clinic visit or your schedule please contact [facility] directly at [phone].  Normal or non-critical lab and imaging results will be communicated to you by MyChart, letter or phone within 4 business days after the clinic has received the results. If you do not hear from us within 7 days, please contact the clinic through MyChart or phone. If you have a critical or abnormal lab result, we will notify you by phone as soon as possible.  Submit refill requests through [employer] or call your pharmacy and they will forward the refill request to us. Please allow 3 business days for your refill to be completed.          Additional Information About Your Visit        MyChart Information     [employer] lets you send messages to your doctor, view your test results, renew your prescriptions, schedule appointments and more. To sign up, go to [email or web address] . Click on \"Log in\" on the left side of the screen, which will take you to the Welcome page. Then click on \"Sign up Now\" on the right side of the page.     You will be asked to enter the access code listed below, as well as some personal information. Please follow the directions to create your username and password.     Your access " code is: [email or web address]  Expires: 2017  9:54 AM     Your access code will  in 90 days. If you need help or a new code, please call your [facility] clinic or [phone].        Care EveryWhere ID     This is your Care EveryWhere ID. This could be used by other organizations to access your [facility] medical records  [id number]        Your Vitals Were     Pulse Respirations                55 11           Blood Pressure from Last 3 Encounters:   17 130/74   17 132/71   17 126/82    Weight from Last 3 Encounters:   17 200 lb (90.7 kg)   17 201 lb (91.2 kg)   17 204 lb (92.5 kg)              Today, you had the following     No orders found for display       Primary Care Provider Office Phone # Fax #    Lauren Anneliese Engelmann, [phone]       [address] [facility] [address]        Equal Access to Services     COOKIE [facility]: Hadii aad ku hadasho Soomaali, waaxda luqadaha, qaybta kaalmada adeegyada, waxay idiin hayaan ubaldo herrera . So [facility] [phone].    ATENCIÓN: Si habla español, tiene a chen disposición servicios gratuitos de asistencia lingüística. Llame al [phone].    We comply with applicable federal civil rights laws and [place] laws. We do not discriminate on the basis of race, color, national origin, age, disability sex, sexual orientation or gender identity.            Thank you!     Thank you for choosing [facility]  for your care. Our goal is always to provide you with excellent care. Hearing back from our patients is one way we can continue to improve our services. Please take a few minutes to complete the written survey that you may receive in the mail after your visit with us. Thank you!             Your Updated Medication List - Protect others around you: Learn how to safely use, store and throw away your medicines at www.disposemymeds.org.          This list is  accurate as of: 7/11/17  9:15 AM.  Always use your most recent med list.                   Brand Name Dispense Instructions for use Diagnosis    cholecalciferol 1000 UNIT tablet    vitamin D    100 tablet    Take 1 tablet (1,000 Units) by mouth daily    Unspecified vitamin D deficiency       warfarin 5 MG tablet    JANTOVEN    90 tablet    Take 1 tablet (5 mg) by mouth daily    Hyperhomocysteinemia (H), Factor 5 Leiden mutation, heterozygous (H)

## 2017-07-17 ENCOUNTER — ANTICOAGULATION THERAPY VISIT (OUTPATIENT)
Dept: NURSING | Facility: CLINIC | Age: 67
End: 2017-07-17
Payer: COMMERCIAL

## 2017-07-17 DIAGNOSIS — Z79.01 LONG-TERM (CURRENT) USE OF ANTICOAGULANTS: ICD-10-CM

## 2017-07-17 DIAGNOSIS — I26.99 PULMONARY EMBOLISM WITH INFARCTION (H): ICD-10-CM

## 2017-07-17 LAB — INR POINT OF CARE: 2.5 (ref 0.86–1.14)

## 2017-07-17 PROCEDURE — 85610 PROTHROMBIN TIME: CPT | Mod: QW

## 2017-07-17 PROCEDURE — 99207 ZZC NO CHARGE NURSE ONLY: CPT

## 2017-07-17 PROCEDURE — 36416 COLLJ CAPILLARY BLOOD SPEC: CPT

## 2017-07-17 NOTE — MR AVS SNAPSHOT
Collin Frank   7/17/2017 1:40 PM   Anticoagulation Therapy Visit    Description:  67 year old male   Provider:  CLAUDIA ANTI COAG   Department:  Cp Nurse           INR as of 7/17/2017     Today's INR 2.5      Anticoagulation Summary as of 7/17/2017     INR goal 2.0-3.0   Today's INR 2.5   Full instructions 7.5 mg on Mon; 5 mg all other days   Next INR check 8/14/2017    Indications   Long-term (current) use of anticoagulants [Z79.01] [Z79.01]  Pulmonary embolism with infarction (HCC) [I26.99] [I26.99]         Your next Anticoagulation Clinic appointment(s)     Aug 14, 2017  1:40 PM CDT   Anticoagulation Visit with CLAUDIA ANTI JADON   [facility] ([facility]    [address] [place] [address] [phone]              Contact Numbers     [facility]  Please call [phone] or [phone]  to cancel and/or reschedule your appointment.   Please call [phone] with any problems or questions regarding your therapy          July 2017 Details    Sun Mon Tue Wed Thu Fri Sat           1                 2               3               4               5               6               7               8                 9               10               11               12               13               14               15                 16               17      7.5 mg   See details      18      5 mg         19      5 mg         20      5 mg         21      5 mg         22      5 mg           23      5 mg         24      7.5 mg         25      5 mg         26      5 mg         27      5 mg         28      5 mg         29      5 mg           30      5 mg         31      7.5 mg               Date Details   07/17 This INR check               How to take your warfarin dose     To take:  5 mg Take 1 of the 5 mg tablets.    To take:  7.5 mg Take 1.5 of the 5 mg tablets.           August 2017 Details    Sun Mon Tue Wed Thu Fri Sat       1      5  mg         2      5 mg         3      5 mg         4      5 mg         5      5 mg           6      5 mg         7      7.5 mg         8      5 mg         9      5 mg         10      5 mg         11      5 mg         12      5 mg           13      5 mg         14            15               16               17               18               19                 20               21               22               23               24               25               26                 27               28               29               30               31                  Date Details   No additional details    Date of next INR:  8/14/2017         How to take your warfarin dose     To take:  5 mg Take 1 of the 5 mg tablets.    To take:  7.5 mg Take 1.5 of the 5 mg tablets.

## 2017-07-17 NOTE — PROGRESS NOTES
ANTICOAGULATION FOLLOW-UP CLINIC VISIT    Patient Name:  Collin Frank  Date:  7/17/2017  Contact Type:  Face to Face    SUBJECTIVE:     Patient Findings     Positives No Problem Findings           OBJECTIVE    INR Protime   Date Value Ref Range Status   07/17/2017 2.5 (A) 0.86 - 1.14 Final       ASSESSMENT / PLAN  INR assessment THER    Recheck INR In: 4 WEEKS    INR Location Clinic      Anticoagulation Summary as of 7/17/2017     INR goal 2.0-3.0   Today's INR 2.5   Maintenance plan 7.5 mg (5 mg x 1.5) on Mon; 5 mg (5 mg x 1) all other days   Full instructions 7.5 mg on Mon; 5 mg all other days   Weekly total 37.5 mg   No change documented Minal Carrion, RN   Plan last modified Minal Carrion RN (4/17/2017)   Next INR check 8/14/2017   Target end date Indefinite    Indications   Long-term (current) use of anticoagulants [Z79.01] [Z79.01]  Pulmonary embolism with infarction (HCC) [I26.99] [I26.99]         Anticoagulation Episode Summary     INR check location     Preferred lab     Send INR reminders to  [facility]    Comments       Anticoagulation Care Providers     Provider Role Specialty Phone number    Engelmann, Lauren Anneliese, MD  [facility] [phone]            See the Encounter Report to view Anticoagulation Flowsheet and Dosing Calendar (Go to Encounters tab in chart review, and find the Anticoagulation Therapy Visit)    Dosage adjustment made based on physician directed care plan.    Minal Carrion RN

## 2017-07-31 ENCOUNTER — OFFICE VISIT (OUTPATIENT)
Dept: FAMILY MEDICINE | Facility: CLINIC | Age: 67
End: 2017-07-31
Payer: COMMERCIAL

## 2017-07-31 VITALS
HEART RATE: 58 BPM | OXYGEN SATURATION: 98 % | DIASTOLIC BLOOD PRESSURE: 80 MMHG | WEIGHT: 199 LBS | SYSTOLIC BLOOD PRESSURE: 150 MMHG | BODY MASS INDEX: 29.82 KG/M2 | TEMPERATURE: 97.8 F

## 2017-07-31 DIAGNOSIS — R61 NIGHT SWEATS: Primary | ICD-10-CM

## 2017-07-31 LAB
BASOPHILS # BLD AUTO: 0.1 10E9/L (ref 0–0.2)
BASOPHILS NFR BLD AUTO: 0.6 %
DIFFERENTIAL METHOD BLD: ABNORMAL
EOSINOPHIL # BLD AUTO: 0.2 10E9/L (ref 0–0.7)
EOSINOPHIL NFR BLD AUTO: 2.1 %
ERYTHROCYTE [DISTWIDTH] IN BLOOD BY AUTOMATED COUNT: 13.2 % (ref 10–15)
HCT VFR BLD AUTO: 41.3 % (ref 40–53)
HGB BLD-MCNC: 13.9 G/DL (ref 13.3–17.7)
LYMPHOCYTES # BLD AUTO: 1.9 10E9/L (ref 0.8–5.3)
LYMPHOCYTES NFR BLD AUTO: 24 %
MCH RBC QN AUTO: 32.9 PG (ref 26.5–33)
MCHC RBC AUTO-ENTMCNC: 33.7 G/DL (ref 31.5–36.5)
MCV RBC AUTO: 98 FL (ref 78–100)
MONOCYTES # BLD AUTO: 0.8 10E9/L (ref 0–1.3)
MONOCYTES NFR BLD AUTO: 9.8 %
NEUTROPHILS # BLD AUTO: 4.9 10E9/L (ref 1.6–8.3)
NEUTROPHILS NFR BLD AUTO: 63.5 %
PLATELET # BLD AUTO: 262 10E9/L (ref 150–450)
RBC # BLD AUTO: 4.22 10E12/L (ref 4.4–5.9)
WBC # BLD AUTO: 7.8 10E9/L (ref 4–11)

## 2017-07-31 PROCEDURE — 99214 OFFICE O/P EST MOD 30 MIN: CPT | Performed by: FAMILY MEDICINE

## 2017-07-31 PROCEDURE — 36415 COLL VENOUS BLD VENIPUNCTURE: CPT | Performed by: FAMILY MEDICINE

## 2017-07-31 PROCEDURE — 85025 COMPLETE CBC W/AUTO DIFF WBC: CPT | Performed by: FAMILY MEDICINE

## 2017-07-31 NOTE — MR AVS SNAPSHOT
"              After Visit Summary   7/31/2017    Collin Frank    MRN: [id number]           Patient Information     Date Of Birth          1950        Visit Information        Provider Department      7/31/2017 3:20 PM Tyler Pugh MD [facility]        Today's Diagnoses     Night sweats    -  1       Follow-ups after your visit        Your next 10 appointments already scheduled     Aug 14, 2017  1:40 PM CDT   [facility] CP ANTI COAG   [facility]    [address] [facility] [address] [phone]              Who to contact     If you have questions or need follow up information about today's clinic visit or your schedule please contact [facility] directly at [phone].  Normal or non-critical lab and imaging results will be communicated to you by MyChart, letter or phone within 4 business days after the clinic has received the results. If you do not hear from us within 7 days, please contact the clinic through MyChart or phone. If you have a critical or abnormal lab result, we will notify you by phone as soon as possible.  Submit refill requests through [employer] or call your pharmacy and they will forward the refill request to us. Please allow 3 business days for your refill to be completed.          Additional Information About Your Visit        MyChart Information     [employer] lets you send messages to your doctor, view your test results, renew your prescriptions, schedule appointments and more. To sign up, go to [email or web address] . Click on \"Log in\" on the left side of the screen, which will take you to the Welcome page. Then click on \"Sign up Now\" on the right side of the page.     You will be asked to enter the access code listed below, as well as some personal information. Please follow the directions to create your username and password.   "   Your access code is: [email or web address]  Expires: 2017  9:54 AM     Your access code will  in 90 days. If you need help or a new code, please call your [facility] or [phone].        Care EveryWhere ID     This is your Care EveryWhere ID. This could be used by other organizations to access your [facility] medical records  [id number]        Your Vitals Were     Pulse Temperature Pulse Oximetry BMI (Body Mass Index)          58 97.8  F (36.6  C) (Oral) 98% 29.82 kg/m2         Blood Pressure from Last 3 Encounters:   17 150/80   17 130/74   17 132/71    Weight from Last 3 Encounters:   17 199 lb (90.3 kg)   17 200 lb (90.7 kg)   17 201 lb (91.2 kg)              We Performed the Following     CBC with platelets differential        Primary Care Provider Office Phone # Fax #    Kendy Anneliese Engelmann, [phone]       [facility] [address] [facility] [address]        Equal Access to Services     COOKIE [facility] : Hadii aad ku hadasho Soomaali, waaxda luqadaha, qaybta kaalmada adeegyada, dami woodsin hayezn ubaldo herrera . So [facility] [phone].    ATENCIÓN: Si habla español, tiene a chen disposición servicios gratuitos de asistencia lingüística. Llame al [phone].    We comply with applicable federal civil rights laws and [place] laws. We do not discriminate on the basis of race, color, national origin, age, disability sex, sexual orientation or gender identity.            Thank you!     Thank you for choosing [facility]  for your care. Our goal is always to provide you with excellent care. Hearing back from our patients is one way we can continue to improve our services. Please take a few minutes to complete the written survey that you may receive in the mail after your visit with us. Thank you!             Your Updated Medication List - Protect others around you: Learn how to  safely use, store and throw away your medicines at www.disposemymeds.org.          This list is accurate as of: 7/31/17 11:59 PM.  Always use your most recent med list.                   Brand Name Dispense Instructions for use Diagnosis    cholecalciferol 1000 UNIT tablet    vitamin D    100 tablet    Take 1 tablet (1,000 Units) by mouth daily    Unspecified vitamin D deficiency       warfarin 5 MG tablet    JANTOVEN    90 tablet    Take 1 tablet (5 mg) by mouth daily    Hyperhomocysteinemia (H), Factor 5 Leiden mutation, heterozygous (H)

## 2017-07-31 NOTE — LETTER
[facility]   [address] [place]  [address]  [phone]                                   July 31, 2017    Collin Frank  [address]    [facility] [address]        Dear Collin,    Your complete blood count is normal     Results for orders placed or performed in visit on 07/31/17   CBC with platelets differential   Result Value Ref Range    WBC 7.8 4.0 - 11.0 10e9/L    RBC Count 4.22 (L) 4.4 - 5.9 10e12/L    Hemoglobin 13.9 13.3 - 17.7 g/dL    Hematocrit 41.3 40.0 - 53.0 %    MCV 98 78 - 100 fl    MCH 32.9 26.5 - 33.0 pg    MCHC 33.7 31.5 - 36.5 g/dL    RDW 13.2 10.0 - 15.0 %    Platelet Count 262 150 - 450 10e9/L    Diff Method Automated Method     % Neutrophils 63.5 %    % Lymphocytes 24.0 %    % Monocytes 9.8 %    % Eosinophils 2.1 %    % Basophils 0.6 %    Absolute Neutrophil 4.9 1.6 - 8.3 10e9/L    Absolute Lymphocytes 1.9 0.8 - 5.3 10e9/L    Absolute Monocytes 0.8 0.0 - 1.3 10e9/L    Absolute Eosinophils 0.2 0.0 - 0.7 10e9/L    Absolute Basophils 0.1 0.0 - 0.2 10e9/L       If you have any questions please call the clinic at [phone]    Sincerely,    Tyler Pugh MD  bmd

## 2017-07-31 NOTE — NURSING NOTE
"Chief Complaint   Patient presents with     Night Sweats     saturday       Initial /80 (BP Location: Right arm, Patient Position: Chair, Cuff Size: Adult Regular)  Pulse 58  Temp 97.8  F (36.6  C) (Oral)  Wt 199 lb (90.3 kg)  SpO2 98%  BMI 29.82 kg/m2 Estimated body mass index is 29.82 kg/(m^2) as calculated from the following:    Height as of 4/13/17: 5' 8.5\" (1.74 m).    Weight as of this encounter: 199 lb (90.3 kg).  Medication Reconciliation: complete   Divya See JEREMIAS Deutsch      "

## 2017-08-07 ENCOUNTER — OFFICE VISIT (OUTPATIENT)
Dept: FAMILY MEDICINE | Facility: CLINIC | Age: 67
End: 2017-08-07
Payer: COMMERCIAL

## 2017-08-07 VITALS
TEMPERATURE: 97.1 F | DIASTOLIC BLOOD PRESSURE: 73 MMHG | SYSTOLIC BLOOD PRESSURE: 149 MMHG | HEART RATE: 52 BPM | HEIGHT: 70 IN | OXYGEN SATURATION: 97 % | BODY MASS INDEX: 26.92 KG/M2 | WEIGHT: 188 LBS
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
## 2017-08-07 PROCEDURE — 99213 OFFICE O/P EST LOW 20 MIN: CPT | Performed by: FAMILY MEDICINE

## 2017-08-07 RX ORDER — DOXYCYCLINE HYCLATE 100 MG
100 TABLET ORAL 2 TIMES DAILY
Qty: 28 TABLET | Refills: 0 | Status: SHIPPED | OUTPATIENT
Start: 2017-08-07 | End: 2017-09-15

## 2017-08-07 ASSESSMENT — PAIN SCALES - GENERAL: PAINLEVEL: NO PAIN (0)

## 2017-08-07 NOTE — PATIENT INSTRUCTIONS
Reduce dose to 5 mg today and 2.5 mg tomorrow and then go back to 5 mg daily while on medication.    Follow up with Minal on Thursday this week

## 2017-08-07 NOTE — NURSING NOTE
"Chief Complaint   Patient presents with     Derm Problem       Initial /73 (BP Location: Left arm, Patient Position: Chair, Cuff Size: Adult Regular)  Pulse 52  Temp 97.1  F (36.2  C) (Oral)  Ht 5' 10\" (1.778 m)  Wt 188 lb (85.3 kg)  SpO2 97%  BMI 26.98 kg/m2 Estimated body mass index is 26.98 kg/(m^2) as calculated from the following:    Height as of this encounter: 5' 10\" (1.778 m).    Weight as of this encounter: 188 lb (85.3 kg).  Medication Reconciliation: complete   Piedad Novoa MA      "

## 2017-08-07 NOTE — PROGRESS NOTES
"  SUBJECTIVE:                                                    Collin Frank is a 67 year old male who presents to clinic today for the following health issues:    Rash      Duration: 8/3/17    Description  Location: Left Buttock  Itching: mild    Intensity:  mild    Accompanying signs and symptoms: Bumps on left side of buttocks    History (similar episodes/previous evaluation): None    Precipitating or alleviating factors:  New exposures:  None  Recent travel: no      Therapies tried and outcome: none      Piedad Novoa MA    O; /73 (BP Location: Left arm, Patient Position: Chair, Cuff Size: Adult Regular)  Pulse 52  Temp 97.1  F (36.2  C) (Oral)  Ht 5' 10\" (1.778 m)  Wt 188 lb (85.3 kg)  SpO2 97%  BMI 26.98 kg/m2      Patient has a rash on the left buttock   It has a central area with some blood in the center as if he got a bite   He has a ring around it   Approximately 3 cm in diameter       ICD-10-CM    1. Bug bite, initial encounter W57.XXXA      Reduce dose to 5 mg today and 2.5 mg tomorrow and then go back to 5 mg daily while on medication.    Follow up with Minal on Thursday this week         "

## 2017-08-10 ENCOUNTER — ANTICOAGULATION THERAPY VISIT (OUTPATIENT)
Dept: NURSING | Facility: CLINIC | Age: 67
End: 2017-08-10
Payer: COMMERCIAL

## 2017-08-10 DIAGNOSIS — I26.99 PULMONARY EMBOLISM WITH INFARCTION (H): ICD-10-CM

## 2017-08-10 DIAGNOSIS — Z79.01 LONG-TERM (CURRENT) USE OF ANTICOAGULANTS: ICD-10-CM

## 2017-08-10 LAB — INR POINT OF CARE: 3.1 (ref 0.86–1.14)

## 2017-08-10 PROCEDURE — 36416 COLLJ CAPILLARY BLOOD SPEC: CPT

## 2017-08-10 PROCEDURE — 85610 PROTHROMBIN TIME: CPT | Mod: QW

## 2017-08-10 NOTE — MR AVS SNAPSHOT
Collin Frank   8/10/2017 3:20 PM   Anticoagulation Therapy Visit    Description:  67 year old male   Provider:  CLAUDIA ANTI COAG   Department:  Cp Nurse           INR as of 8/10/2017     Today's INR 3.1!      Anticoagulation Summary as of 8/10/2017     INR goal 2.0-3.0   Today's INR 3.1!   Full instructions 8/10: 2.5 mg; 8/12: 2.5 mg; 8/14: 2.5 mg; Otherwise 7.5 mg on Mon; 5 mg all other days   Next INR check 8/17/2017    Indications   Long-term (current) use of anticoagulants [Z79.01] [Z79.01]  Pulmonary embolism with infarction (HCC) [I26.99] [I26.99]         Your next Anticoagulation Clinic appointment(s)     Aug 17, 2017  2:20 PM CDT   Anticoagulation Visit with CP ANTI COAG   [facility] ([facility])    [address] [facility] [address] [phone]              Contact Numbers     [facility]  Please call [phone] or [phone]  to cancel and/or reschedule your appointment.   Please call [phone] with any problems or questions regarding your therapy          August 2017 Details    Sun Mon Tue Wed Thu Fri Sat       1               2               3               4               5                 6               7               8               9               10      2.5 mg   See details      11      5 mg         12      2.5 mg           13      5 mg         14      2.5 mg         15      5 mg         16      5 mg         17            18               19                 20               21               22               23               24               25               26                 27               28               29               30               31                  Date Details   08/10 This INR check       Date of next INR:  8/17/2017         How to take your warfarin dose     To take:  2.5 mg Take 0.5 of a 5 mg tablet.    To take:  5 mg Take 1 of the 5 mg tablets.

## 2017-08-17 ENCOUNTER — ANTICOAGULATION THERAPY VISIT (OUTPATIENT)
Dept: NURSING | Facility: CLINIC | Age: 67
End: 2017-08-17
Payer: COMMERCIAL

## 2017-08-17 DIAGNOSIS — Z79.01 LONG-TERM (CURRENT) USE OF ANTICOAGULANTS: ICD-10-CM

## 2017-08-17 DIAGNOSIS — I26.99 PULMONARY EMBOLISM WITH INFARCTION (H): ICD-10-CM

## 2017-08-17 LAB — INR POINT OF CARE: 2.2 (ref 0.86–1.14)

## 2017-08-17 PROCEDURE — 85610 PROTHROMBIN TIME: CPT | Mod: QW

## 2017-08-17 PROCEDURE — 36416 COLLJ CAPILLARY BLOOD SPEC: CPT

## 2017-08-17 PROCEDURE — 99207 ZZC NO CHARGE NURSE ONLY: CPT

## 2017-08-17 NOTE — PROGRESS NOTES
ANTICOAGULATION FOLLOW-UP CLINIC VISIT    Patient Name:  Collin Frank  Date:  8/17/2017  Contact Type:  Face to Face    SUBJECTIVE: Patient reports his bug bite area is completely healed.  He continues the abx and s/b done next Tues. He states he does have somewhat loose stool on the abx, but not too bad.  We have cut back on warfarin dose while on abx due to drug interaction.  Today he is therapeutic.  We will recheck him next week after he is done with abx's.  Denies clotting or bleeding symptoms.  No other concerns.     Patient Findings     Positives Antibiotic use or infection (vibramycin x 2 weeks.  done 8-22-17.)           OBJECTIVE    INR Protime   Date Value Ref Range Status   08/17/2017 2.2 (A) 0.86 - 1.14 Final       ASSESSMENT / PLAN  INR assessment THER    Recheck INR In: 1 WEEK    INR Location Clinic      Anticoagulation Summary as of 8/17/2017     INR goal 2.0-3.0   Today's INR 2.2   Maintenance plan 7.5 mg (5 mg x 1.5) on Mon; 5 mg (5 mg x 1) all other days   Full instructions 8/17: 2.5 mg; 8/19: 2.5 mg; 8/21: 5 mg; Otherwise 7.5 mg on Mon; 5 mg all other days   Weekly total 37.5 mg   Plan last modified Minal Carrion RN (4/17/2017)   Next INR check 8/24/2017   Target end date Indefinite    Indications   Long-term (current) use of anticoagulants [Z79.01] [Z79.01]  Pulmonary embolism with infarction (HCC) [I26.99] [I26.99]         Anticoagulation Episode Summary     INR check location     Preferred lab     Send INR reminders to  [facility] CL[facility]IC    Comments       Anticoagulation Care Providers     Provider Role Specialty Phone number    Engelmann, Lauren Anneliese, MD  Family Practice [phone]            See the Encounter Report to view Anticoagulation Flowsheet and Dosing Calendar (Go to Encounters tab in chart review, and find the Anticoagulation Therapy Visit)    Dosage adjustment made based on physician directed care plan.    Minal Carrion RN

## 2017-08-17 NOTE — MR AVS SNAPSHOT
Collin Frank   8/17/2017 2:20 PM   Anticoagulation Therapy Visit    Description:  67 year old male   Provider:  CLAUDIA ANTI COAG   Department:  Cp Nurse           INR as of 8/17/2017     Today's INR 2.2      Anticoagulation Summary as of 8/17/2017     INR goal 2.0-3.0   Today's INR 2.2   Full instructions 8/17: 2.5 mg; 8/19: 2.5 mg; 8/21: 5 mg; Otherwise 7.5 mg on Mon; 5 mg all other days   Next INR check 8/24/2017    Indications   Long-term (current) use of anticoagulants [Z79.01] [Z79.01]  Pulmonary embolism with infarction (HCC) [I26.99] [I26.99]         Your next Anticoagulation Clinic appointment(s)     Aug 24, 2017  1:20 PM CDT   Anticoagulation Visit with CP ANTI COAG   [facility] ([facility])    [address] [facility] [id number] [phone]              Contact Numbers     [facility]  Please call [phone] or [phone]  to cancel and/or reschedule your appointment.   Please call [phone] with any problems or questions regarding your therapy          August 2017 Details    Sun Mon Tue Wed Thu Fri Sat       1               2               3               4               5                 6               7               8               9               10               11               12                 13               14               15               16               17      2.5 mg   See details      18      5 mg         19      2.5 mg           20      5 mg         21      5 mg         22      5 mg         23      5 mg         24            25               26                 27               28               29               30               31                  Date Details   08/17 This INR check       Date of next INR:  8/24/2017         How to take your warfarin dose     To take:  2.5 mg Take 0.5 of a 5 mg tablet.    To take:  5 mg Take 1 of the 5 mg tablets.

## 2017-08-24 ENCOUNTER — ANTICOAGULATION THERAPY VISIT (OUTPATIENT)
Dept: NURSING | Facility: CLINIC | Age: 67
End: 2017-08-24
Payer: COMMERCIAL

## 2017-08-24 DIAGNOSIS — D68.51 FACTOR 5 LEIDEN MUTATION, HETEROZYGOUS (H): ICD-10-CM

## 2017-08-24 DIAGNOSIS — I26.99 PULMONARY EMBOLISM WITH INFARCTION (H): ICD-10-CM

## 2017-08-24 DIAGNOSIS — I82.4Y1: ICD-10-CM

## 2017-08-24 DIAGNOSIS — Z79.01 LONG-TERM (CURRENT) USE OF ANTICOAGULANTS: ICD-10-CM

## 2017-08-24 LAB — INR POINT OF CARE: 1.7 (ref 0.86–1.14)

## 2017-08-24 PROCEDURE — 36416 COLLJ CAPILLARY BLOOD SPEC: CPT

## 2017-08-24 PROCEDURE — 85610 PROTHROMBIN TIME: CPT | Mod: QW

## 2017-08-24 PROCEDURE — 99207 ZZC NO CHARGE NURSE ONLY: CPT

## 2017-08-24 NOTE — MR AVS SNAPSHOT
Collin Frank   8/24/2017 1:20 PM   Anticoagulation Therapy Visit    Description:  67 year old male   Provider:  CLAUDIA ANTI COAG   Department:  Cp Nurse           INR as of 8/24/2017     Today's INR 1.7!      Anticoagulation Summary as of 8/24/2017     INR goal 2.0-3.0   Today's INR 1.7!   Full instructions 8/24: 7.5 mg; Otherwise 7.5 mg on Mon; 5 mg all other days   Next INR check 9/7/2017    Indications   Long-term (current) use of anticoagulants [Z79.01] [Z79.01]  Pulmonary embolism with infarction (HCC) [I26.99] [I26.99]  Acute venous embolism and thrombosis of deep vessels of proximal lower extremity (H) [I82.4Y9]  Factor 5 Leiden mutation  heterozygous (H) [D68.51]         Your next Anticoagulation Clinic appointment(s)     Aug 24, 2017  1:20 PM CDT   [facility]ticoagulation [facility] with CP ANTI COAG   [facility]    [address] [facility] [id number] [phone]            Sep 07, 2017  2:00 PM CDT   Anticoagulation Visit with CP ANTI COAG   [facility]    [address] [facility] [id number] [phone]              Contact Numbers     [facility]  Please call [phone] or [phone]  to cancel and/or reschedule your appointment.   Please call [phone] with any problems or questions regarding your therapy          August 2017 Details    Sun Mon Tue Wed Thu Fri Sat       1               2               3               4               5                 6               7               8               9               10               11               12                 13               14               15               16               17               18               19                 20               21               22               23               24      7.5 mg   See details      25      5 mg         26      5 mg            27      5 mg         28      7.5 mg         29      5 mg         30      5 mg         31      5 mg            Date Details   08/24 This INR check               How to take your warfarin dose     To take:  5 mg Take 1 of the 5 mg tablets.    To take:  7.5 mg Take 1.5 of the 5 mg tablets.           September 2017 Details    Sun Mon Tue Wed Thu Fri Sat          1      5 mg         2      5 mg           3      5 mg         4      7.5 mg         5      5 mg         6      5 mg         7            8               9                 10               11               12               13               14               15               16                 17               18               19               20               21               22               23                 24               25               26               27               28               29               30                Date Details   No additional details    Date of next INR:  9/7/2017         How to take your warfarin dose     To take:  5 mg Take 1 of the 5 mg tablets.    To take:  7.5 mg Take 1.5 of the 5 mg tablets.

## 2017-08-24 NOTE — PROGRESS NOTES
ANTICOAGULATION FOLLOW-UP CLINIC VISIT    Patient Name:  Collin Frank  Date:  8/24/2017  Contact Type:  Face to Face    SUBJECTIVE: patient reports he really ate a lot of spinach the past couple days.  He also finished below med.  Denies symptoms of clotting or bleeding.  No other concerns today.     Patient Findings     Positives Antibiotic use or infection (done with doxycycline)           OBJECTIVE    INR Protime   Date Value Ref Range Status   08/24/2017 1.7 (A) 0.86 - 1.14 Final       ASSESSMENT / PLAN  INR assessment SUB    Recheck INR In: 2 WEEKS    INR Location Clinic      Anticoagulation Summary as of 8/24/2017     INR goal 2.0-3.0   Today's INR 1.7!   Maintenance plan 7.5 mg (5 mg x 1.5) on Mon; 5 mg (5 mg x 1) all other days   Full instructions 8/24: 7.5 mg; Otherwise 7.5 mg on Mon; 5 mg all other days   Weekly total 37.5 mg   Plan last modified Minal Carrion RN (4/17/2017)   Next INR check 9/7/2017   Target end date Indefinite    Indications   Long-term (current) use of anticoagulants [Z79.01] [Z79.01]  Pulmonary embolism with infarction (HCC) [I26.99] [I26.99]  Acute venous embolism and thrombosis of deep vessels of proximal lower extremity (H) [I82.4Y9]  Factor 5 Leiden mutation  heterozygous (H) [D68.51]         Anticoagulation Episode Summary     INR check location     Preferred lab     Send INR reminders to [facility]    Comments       Anticoagulation Care Providers     Provider Role Specialty Phone number    Engelmann, Lauren Anneliese, MD  [facility] [phone]            See the Encounter Report to view Anticoagulation Flowsheet and Dosing Calendar (Go to Encounters tab in chart review, and find the Anticoagulation Therapy Visit)    Dosage adjustment made based on physician directed care plan.    Minal Carrion RN

## 2017-09-07 ENCOUNTER — ANTICOAGULATION THERAPY VISIT (OUTPATIENT)
Dept: NURSING | Facility: CLINIC | Age: 67
End: 2017-09-07
Payer: COMMERCIAL

## 2017-09-07 DIAGNOSIS — I26.99 PULMONARY EMBOLISM WITH INFARCTION (H): ICD-10-CM

## 2017-09-07 DIAGNOSIS — Z79.01 LONG-TERM (CURRENT) USE OF ANTICOAGULANTS: ICD-10-CM

## 2017-09-07 DIAGNOSIS — D68.51 FACTOR 5 LEIDEN MUTATION, HETEROZYGOUS (H): ICD-10-CM

## 2017-09-07 DIAGNOSIS — I82.4Y1: ICD-10-CM

## 2017-09-07 LAB — INR POINT OF CARE: 2.2 (ref 0.86–1.14)

## 2017-09-07 PROCEDURE — 85610 PROTHROMBIN TIME: CPT | Mod: QW

## 2017-09-07 PROCEDURE — 99207 ZZC NO CHARGE NURSE ONLY: CPT

## 2017-09-07 PROCEDURE — 36416 COLLJ CAPILLARY BLOOD SPEC: CPT

## 2017-09-07 NOTE — PROGRESS NOTES
ANTICOAGULATION FOLLOW-UP CLINIC VISIT    Patient Name:  Collin Frank  Date:  9/7/2017  Contact Type:  Face to Face    SUBJECTIVE: Denies symptoms of bleeding or clotting.  No changes in medicine or diet.  No concerns today.     Patient Findings     Positives No Problem Findings           OBJECTIVE    INR Protime   Date Value Ref Range Status   09/07/2017 2.2 (A) 0.86 - 1.14 Final       ASSESSMENT / PLAN  INR assessment THER    Recheck INR In: 4 WEEKS    INR Location Clinic      Anticoagulation Summary as of 9/7/2017     INR goal 2.0-3.0   Today's INR 2.2   Maintenance plan 7.5 mg (5 mg x 1.5) on Mon; 5 mg (5 mg x 1) all other days   Full instructions 7.5 mg on Mon; 5 mg all other days   Weekly total 37.5 mg   No change documented Minal Carrion RN   Plan last modified Minal Carrion RN (4/17/2017)   Next INR check 10/5/2017   Target end date Indefinite    Indications   Long-term (current) use of anticoagulants [Z79.01] [Z79.01]  Pulmonary embolism with infarction (HCC) [I26.99] [I26.99]  Acute venous embolism and thrombosis of deep vessels of proximal lower extremity (H) [I82.4Y9]  Factor 5 Leiden mutation  heterozygous (H) [D68.51]         Anticoagulation Episode Summary     INR check location     Preferred lab     Send INR reminders to [facility]    Comments       Anticoagulation Care Providers     Provider Role Specialty Phone number    Engelmann, Lauren Anneliese, MD  [facility] [phone]            See the Encounter Report to view Anticoagulation Flowsheet and Dosing Calendar (Go to Encounters tab in chart review, and find the Anticoagulation Therapy Visit)    Dosage adjustment made based on physician directed care plan.    Minal Carrion, TIFFANIE

## 2017-09-07 NOTE — MR AVS SNAPSHOT
Collin Frank   9/7/2017 2:00 PM   Anticoagulation Therapy Visit    Description:  67 year old male   Provider:  CP ANTI COAG   Department:  Cp Nurse           INR as of 9/7/2017     Today's INR 2.2      Anticoagulation Summary as of 9/7/2017     INR goal 2.0-3.0   Today's INR 2.2   Full instructions 7.5 mg on Mon; 5 mg all other days   Next INR check 10/5/2017    Indications   Long-term (current) use of anticoagulants [Z79.01] [Z79.01]  Pulmonary embolism with infarction (HCC) [I26.99] [I26.99]  Acute venous embolism and thrombosis of deep vessels of proximal lower extremity (H) [I82.4Y9]  Factor 5 Leiden mutation  heterozygous (H) [D68.51]         Your next Anticoagulation Clinic appointment(s)     Sep 07, 2017  2:00 PM CDT   Anticoagulation Visit with CP ANTI COAG   [facility]    [address] [facility] [id number] [phone]            Oct 05, 2017  1:40 PM CDT   Anticoagulation Visit with CP ANTI COAG   [facility]    [address] [facility] [id number] [phone]              Contact Numbers     [facility]  Please call [phone] or [phone]  to cancel and/or reschedule your appointment.   Please call [phone] with any problems or questions regarding your therapy          September 2017 Details    Sun Mon Tue Wed Thu Fri Sat          1               2                 3               4               5               6               7      5 mg   See details      8      5 mg         9      5 mg           10      5 mg         11      7.5 mg         12      5 mg         13      5 mg         14      5 mg         15      5 mg         16      5 mg           17      5 mg         18      7.5 mg         19      5 mg         20      5 mg         21      5 mg         22      5 mg         23      5 mg           24      5  mg         25      7.5 mg         26      5 mg         27      5 mg         28      5 mg         29      5 mg         30      5 mg          Date Details   09/07 This INR check               How to take your warfarin dose     To take:  5 mg Take 1 of the 5 mg tablets.    To take:  7.5 mg Take 1.5 of the 5 mg tablets.           October 2017 Details    Sun Mon Tue Wed Thu Fri Sat     1      5 mg         2      7.5 mg         3      5 mg         4      5 mg         5            6               7                 8               9               10               11               12               13               14                 15               16               17               18               19               20               21                 22               23               24               25               26               27               28                 29               30               31                    Date Details   No additional details    Date of next INR:  10/5/2017         How to take your warfarin dose     To take:  5 mg Take 1 of the 5 mg tablets.    To take:  7.5 mg Take 1.5 of the 5 mg tablets.

## 2017-09-12 NOTE — MR AVS SNAPSHOT
"              After Visit Summary   8/7/2017    Collin Frank    MRN: [id number]           Patient Information     Date Of Birth          1950        Visit Information        Provider Department      8/7/2017 9:20 AM Tyler Pugh MD [facility]        Today's Diagnoses     Bug bite, initial encounter    -  1      Care Instructions    Reduce dose to 5 mg today and 2.5 mg tomorrow and then go back to 5 mg daily while on medication.    Follow up with Minal on Thursday this week           Follow-ups after your visit        Your next 10 appointments already scheduled     Aug 14, 2017  1:40 PM CDT   Anticoagulation Visit with CP ANTI COAG   [facility] ([facility]    [address] [place] [address] [phone]              Who to contact     If you have questions or need follow up information about today's clinic visit or your schedule please contact [facility] directly at [phone].  Normal or non-critical lab and imaging results will be communicated to you by [employer], letter or phone within 4 business days after the clinic has received the results. If you do not hear from us within 7 days, please contact the clinic through [employer] or phone. If you have a critical or abnormal lab result, we will notify you by phone as soon as possible.  Submit refill requests through [employer] or call your pharmacy and they will forward the refill request to us. Please allow 3 business days for your refill to be completed.          Additional Information About Your Visit        [employer] Information     [employer] lets you send messages to your doctor, view your test results, renew your prescriptions, schedule appointments and more. To sign up, go to [email or web address] . Click on \"Log in\" on the left side of the screen, which will take you to the Welcome page. Then click on \"Sign up Now\" on the " "right side of the page.     You will be asked to enter the access code listed below, as well as some personal information. Please follow the directions to create your username and password.     Your access code is: [email or web address]  Expires: 2017  9:54 AM     Your access code will  in 90 days. If you need help or a new code, please call your [facility] or [phone].        Care EveryWhere ID     This is your Care EveryWhere ID. This could be used by other organizations to access your [facility] medical records  [id number]        Your Vitals Were     Pulse Temperature Height Pulse Oximetry BMI (Body Mass Index)       52 97.1  F (36.2  C) (Oral) 5' 10\" (1.778 m) 97% 26.98 kg/m2        Blood Pressure from Last 3 Encounters:   17 149/73   17 150/80   17 130/74    Weight from Last 3 Encounters:   17 188 lb (85.3 kg)   17 199 lb (90.3 kg)   17 200 lb (90.7 kg)              Today, you had the following     No orders found for display         Today's Medication Changes          These changes are accurate as of: 17 10:02 AM.  If you have any questions, ask your nurse or doctor.               Start taking these medicines.        Dose/Directions    doxycycline 100 MG tablet   Commonly known as:  VIBRA-TABS   Used for:  Bug bite, initial encounter   Started by:  Tyler Pugh MD        Dose:  100 mg   Take 1 tablet (100 mg) by mouth 2 times daily   Quantity:  28 tablet   Refills:  0            Where to get your medicines      These medications were sent to [facility] - [place], [place] - [address] [place] [address]  [address] [facility] [address]    Hours:  Mon-Fri 8:30am-5:00pm Toll Free [phone] Phone:  [phone]     doxycycline 100 MG tablet                Primary Care Provider Office Phone # Fax #    Lauren Anneliese Engelmann, [phone]       [address]" [facility] [address]        Equal Access to Services     [facility] WAN : Hadii aad ku hadfrancisco jenoch Janeeali, waharshda luqadaha, qaybta kaalmada alesha, dami cast. So [facility] [phone].    ATENCIÓN: Si habla español, tiene a chen disposición servicios gratuitos de asistencia lingüística. Solaame al [phone].    We comply with applicable federal civil rights laws and [place] laws. We do not discriminate on the basis of race, color, national origin, age, disability sex, sexual orientation or gender identity.            Thank you!     Thank you for choosing [facility]  for your care. Our goal is always to provide you with excellent care. Hearing back from our patients is one way we can continue to improve our services. Please take a few minutes to complete the written survey that you may receive in the mail after your visit with us. Thank you!             Your Updated Medication List - Protect others around you: Learn how to safely use, store and throw away your medicines at www.disposemymeds.org.          This list is accurate as of: 8/7/17 10:02 AM.  Always use your most recent med list.                   Brand Name Dispense Instructions for use Diagnosis    cholecalciferol 1000 UNIT tablet    vitamin D    100 tablet    Take 1 tablet (1,000 Units) by mouth daily    Unspecified vitamin D deficiency       doxycycline 100 MG tablet    VIBRA-TABS    28 tablet    Take 1 tablet (100 mg) by mouth 2 times daily    Bug bite, initial encounter       warfarin 5 MG tablet    JANTOVEN    90 tablet    Take 1 tablet (5 mg) by mouth daily    Hyperhomocysteinemia (H), Factor 5 Leiden mutation, heterozygous (H)          No adenopathy or splenomegaly. No cervical or inguinal lymphadenopathy.

## 2017-09-15 ENCOUNTER — OFFICE VISIT (OUTPATIENT)
Dept: FAMILY MEDICINE | Facility: CLINIC | Age: 67
End: 2017-09-15
Payer: COMMERCIAL

## 2017-09-15 VITALS
HEIGHT: 70 IN | DIASTOLIC BLOOD PRESSURE: 70 MMHG | TEMPERATURE: 97.3 F | BODY MASS INDEX: 28.92 KG/M2 | WEIGHT: 202 LBS | SYSTOLIC BLOOD PRESSURE: 148 MMHG | HEART RATE: 58 BPM | OXYGEN SATURATION: 98 %

## 2017-09-15 DIAGNOSIS — R50.9 FEBRILE ILLNESS: Primary | ICD-10-CM

## 2017-09-15 DIAGNOSIS — R31.29 MICROSCOPIC HEMATURIA: ICD-10-CM

## 2017-09-15 LAB
ALBUMIN UR-MCNC: NEGATIVE MG/DL
APPEARANCE UR: CLEAR
BASOPHILS # BLD AUTO: 0.1 10E9/L (ref 0–0.2)
BASOPHILS NFR BLD AUTO: 1.4 %
BILIRUB UR QL STRIP: NEGATIVE
COLOR UR AUTO: YELLOW
DIFFERENTIAL METHOD BLD: NORMAL
EOSINOPHIL # BLD AUTO: 0.1 10E9/L (ref 0–0.7)
EOSINOPHIL NFR BLD AUTO: 2.2 %
ERYTHROCYTE [DISTWIDTH] IN BLOOD BY AUTOMATED COUNT: 12.8 % (ref 10–15)
GLUCOSE UR STRIP-MCNC: NEGATIVE MG/DL
HCT VFR BLD AUTO: 43.4 % (ref 40–53)
HGB BLD-MCNC: 14.9 G/DL (ref 13.3–17.7)
HGB UR QL STRIP: ABNORMAL
KETONES UR STRIP-MCNC: NEGATIVE MG/DL
LEUKOCYTE ESTERASE UR QL STRIP: NEGATIVE
LYMPHOCYTES # BLD AUTO: 1.9 10E9/L (ref 0.8–5.3)
LYMPHOCYTES NFR BLD AUTO: 28.4 %
MCH RBC QN AUTO: 32.6 PG (ref 26.5–33)
MCHC RBC AUTO-ENTMCNC: 34.3 G/DL (ref 31.5–36.5)
MCV RBC AUTO: 95 FL (ref 78–100)
MONOCYTES # BLD AUTO: 0.6 10E9/L (ref 0–1.3)
MONOCYTES NFR BLD AUTO: 8.8 %
MUCOUS THREADS #/AREA URNS LPF: PRESENT /LPF
NEUTROPHILS # BLD AUTO: 3.9 10E9/L (ref 1.6–8.3)
NEUTROPHILS NFR BLD AUTO: 59.2 %
NITRATE UR QL: NEGATIVE
PH UR STRIP: 6.5 PH (ref 5–7)
PLATELET # BLD AUTO: 241 10E9/L (ref 150–450)
RBC # BLD AUTO: 4.57 10E12/L (ref 4.4–5.9)
RBC #/AREA URNS AUTO: ABNORMAL /HPF
SOURCE: ABNORMAL
SP GR UR STRIP: 1.01 (ref 1–1.03)
UROBILINOGEN UR STRIP-ACNC: 0.2 EU/DL (ref 0.2–1)
WBC # BLD AUTO: 6.5 10E9/L (ref 4–11)
WBC #/AREA URNS AUTO: ABNORMAL /HPF

## 2017-09-15 PROCEDURE — 81001 URINALYSIS AUTO W/SCOPE: CPT | Performed by: FAMILY MEDICINE

## 2017-09-15 PROCEDURE — 36415 COLL VENOUS BLD VENIPUNCTURE: CPT | Performed by: FAMILY MEDICINE

## 2017-09-15 PROCEDURE — 85025 COMPLETE CBC W/AUTO DIFF WBC: CPT | Performed by: FAMILY MEDICINE

## 2017-09-15 PROCEDURE — 87086 URINE CULTURE/COLONY COUNT: CPT | Performed by: FAMILY MEDICINE

## 2017-09-15 PROCEDURE — 99214 OFFICE O/P EST MOD 30 MIN: CPT | Performed by: FAMILY MEDICINE

## 2017-09-15 RX ORDER — CIPROFLOXACIN 250 MG/1
250 TABLET, FILM COATED ORAL 2 TIMES DAILY
Qty: 6 TABLET | Refills: 0 | Status: SHIPPED | OUTPATIENT
Start: 2017-09-15 | End: 2017-09-26

## 2017-09-15 NOTE — MR AVS SNAPSHOT
"              After Visit Summary   9/15/2017    Collin Frank    MRN: [id number]           Patient Information     Date Of Birth          1950        Visit Information        Provider Department      9/15/2017 11:20 AM Tyler Pugh MD [facility]        Today's Diagnoses     Febrile illness    -  1    Microscopic hematuria           Follow-ups after your visit        Your next 10 appointments already scheduled     Oct 05, 2017  1:40 PM CDT   Anticoagulation Visit with CP ANTI COAG   [facility]    [address] [facility] [address] [phone]              Who to contact     If you have questions or need follow up information about today's clinic visit or your schedule please contact [facility] directly at [phone].  Normal or non-critical lab and imaging results will be communicated to you by MyChart, letter or phone within 4 business days after the clinic has received the results. If you do not hear from us within 7 days, please contact the clinic through MyChart or phone. If you have a critical or abnormal lab result, we will notify you by phone as soon as possible.  Submit refill requests through [employer] or call your pharmacy and they will forward the refill request to us. Please allow 3 business days for your refill to be completed.          Additional Information About Your Visit        MyChart Information     [employer] lets you send messages to your doctor, view your test results, renew your prescriptions, schedule appointments and more. To sign up, go to [email or web address] . Click on \"Log in\" on the left side of the screen, which will take you to the Welcome page. Then click on \"Sign up Now\" on the right side of the page.     You will be asked to enter the access code listed below, as well as some personal information. Please follow the directions to " "create your username and password.     Your access code is: [email or web address]  Expires: 2017 12:27 PM     Your access code will  in 90 days. If you need help or a new code, please call your [place] clinic or [phone].        Care EveryWhere ID     This is your Care EveryWhere ID. This could be used by other organizations to access your [place] medical records  [id number]        Your Vitals Were     Pulse Temperature Height Pulse Oximetry BMI (Body Mass Index)       58 97.3  F (36.3  C) (Oral) 5' 10\" (1.778 m) 98% 28.98 kg/m2        Blood Pressure from Last 3 Encounters:   09/15/17 148/70   17 149/73   17 150/80    Weight from Last 3 Encounters:   09/15/17 202 lb (91.6 kg)   17 188 lb (85.3 kg)   17 199 lb (90.3 kg)              We Performed the Following     CBC with platelets differential     UA reflex to Microscopic and Culture     Urine Culture Aerobic Bacterial     Urine Microscopic          Today's Medication Changes          These changes are accurate as of: 9/15/17 12:27 PM.  If you have any questions, ask your nurse or doctor.               Start taking these medicines.        Dose/Directions    ciprofloxacin 250 MG tablet   Commonly known as:  CIPRO   Used for:  Microscopic hematuria, Febrile illness   Started by:  Tyler Pugh MD        Dose:  250 mg   Take 1 tablet (250 mg) by mouth 2 times daily   Quantity:  6 tablet   Refills:  0            Where to get your medicines      These medications were sent to [facility] [address]  [address] [place], [place]. \[facility]"" [address]     Phone:  [phone]     ciprofloxacin 250 MG tablet                Primary Care Provider Office Phone # Fax #    Lauren Anneliese Engelmann, [phone]       [facility] [address] [facility] [address]        Equal Access to Services     BRENDA BLAS AH: Hadii aad ku " brittney Chaudhry, waharshda luashleyadaha, qaybta kaasiya eduardo, dami parsons bhaskartyler melizacisco lalucinafaith serene. So [facility] [phone].    ATENCIÓN: Si habla ansley, tiene a chen disposición servicios gratuitos de asistencia lingüística. Lopez al [phone].    We comply with applicable federal civil rights laws and [place] laws. We do not discriminate on the basis of race, color, national origin, age, disability sex, sexual orientation or gender identity.            Thank you!     Thank you for choosing [facility]  for your care. Our goal is always to provide you with excellent care. Hearing back from our patients is one way we can continue to improve our services. Please take a few minutes to complete the written survey that you may receive in the mail after your visit with us. Thank you!             Your Updated Medication List - Protect others around you: Learn how to safely use, store and throw away your medicines at www.disposemymeds.org.          This list is accurate as of: 9/15/17 12:27 PM.  Always use your most recent med list.                   Brand Name Dispense Instructions for use Diagnosis    cholecalciferol 1000 UNIT tablet    vitamin D    100 tablet    Take 1 tablet (1,000 Units) by mouth daily    Unspecified vitamin D deficiency       ciprofloxacin 250 MG tablet    CIPRO    6 tablet    Take 1 tablet (250 mg) by mouth 2 times daily    Microscopic hematuria, Febrile illness       warfarin 5 MG tablet    JANTOVEN    90 tablet    Take 1 tablet (5 mg) by mouth daily    Hyperhomocysteinemia (H), Factor 5 Leiden mutation, heterozygous (H)

## 2017-09-15 NOTE — PROGRESS NOTES
"  SUBJECTIVE:   Collin Frank is a 67 year old male who presents to clinic today for the following health issues:      ENT Symptoms             Symptoms: cc Present Absent Comment   Fever/Chills x   Doesn't know highest temp   Fatigue   x    Muscle Aches   x    Eye Irritation   x    Sneezing   x    Nasal Klaus/Drg   x    Sinus Pressure/Pain   x    Loss of smell   x    Dental pain   x    Sore Throat   x    Swollen Glands   x    Ear Pain/Fullness   x    Cough   x    Wheeze   x    Chest Pain   x    Shortness of breath   x    Rash   x    Other  x  Cold feet, head sweating profusely      Symptom duration:  on/off for 8 days   Symptom severity:  moderate   Treatments tried:  hot tea and honey   Contacts:  none     Denies abdominal pain   No trouble urinating   No dysuria   Nor feeling lightheaded   Does not camp     Current Outpatient Prescriptions   Medication Sig Dispense Refill     warfarin (JANTOVEN) 5 MG tablet Take 1 tablet (5 mg) by mouth daily 90 tablet 3     cholecalciferol (VITAMIN D) 1000 UNIT tablet Take 1 tablet (1,000 Units) by mouth daily 100 tablet 3     O; /70  Pulse 58  Temp 97.3  F (36.3  C) (Oral)  Ht 5' 10\" (1.778 m)  Wt 202 lb (91.6 kg)  SpO2 98%  BMI 28.98 kg/m2     Head: Normocephalic, atraumatic.  Eyes: Conjunctiva clear, non icteric. PERRLA.  Ears: External ears and TMs normal BL.  Nose: Septum midline, nasal mucosa pink and moist. No discharge.  Mouth / Throat: Normal dentition.  No oral lesions. Pharynx non erythematous, tonsils without hypertrophy.  Neck: Supple, no enlarged LN, trachea midline.    Chest wall normal to inspection and palpation. Good excursion bilaterally. Lungs clear to auscultation. Good air movement bilaterally without rales, wheezes, or rhonchi.   Regular rate and  rhythm. S1 and S2 normal, no murmurs, clicks, gallops or rubs. No edema or JVD.        ICD-10-CM    1. Febrile illness R50.9 CBC with platelets differential     UA reflex to Microscopic and Culture    "  Urine Microscopic     Urine Culture Aerobic Bacterial     ciprofloxacin (CIPRO) 250 MG tablet   2. Microscopic hematuria R31.29 ciprofloxacin (CIPRO) 250 MG tablet       Check INR next week   Call if any worsening symptoms   Reviewed ct abdomen ; known stone former and stones in the poles of the kidney

## 2017-09-15 NOTE — NURSING NOTE
"Chief Complaint   Patient presents with     Cold Symptoms     Fever       Initial /87 (Cuff Size: Adult Regular)  Pulse 58  Temp 97.3  F (36.3  C) (Oral)  Ht 5' 10\" (1.778 m)  Wt 202 lb (91.6 kg)  SpO2 98%  BMI 28.98 kg/m2 Estimated body mass index is 28.98 kg/(m^2) as calculated from the following:    Height as of this encounter: 5' 10\" (1.778 m).    Weight as of this encounter: 202 lb (91.6 kg).  Medication Reconciliation: complete   Alma Rizzo, Certified Medical Assistant (AAMA)     "

## 2017-09-15 NOTE — PROGRESS NOTES
Patient given results of ua   With history of stones, mostl liklely the blood is from that.    Culture is pending

## 2017-09-15 NOTE — LETTER
[facility]   [address] [place]  [address]  [phone]                                   September 18, 2017    Collin Frank  [address]    [facility] [address]        Dear Collin,    Urine culture was normal  I would consideer repeat the urine in 3-4 weeks to see if the blood clears    Results for orders placed or performed in visit on 09/15/17   CBC with platelets differential   Result Value Ref Range    WBC 6.5 4.0 - 11.0 10e9/L    RBC Count 4.57 4.4 - 5.9 10e12/L    Hemoglobin 14.9 13.3 - 17.7 g/dL    Hematocrit 43.4 40.0 - 53.0 %    MCV 95 78 - 100 fl    MCH 32.6 26.5 - 33.0 pg    MCHC 34.3 31.5 - 36.5 g/dL    RDW 12.8 10.0 - 15.0 %    Platelet Count 241 150 - 450 10e9/L    Diff Method Automated Method     % Neutrophils 59.2 %    % Lymphocytes 28.4 %    % Monocytes 8.8 %    % Eosinophils 2.2 %    % Basophils 1.4 %    Absolute Neutrophil 3.9 1.6 - 8.3 10e9/L    Absolute Lymphocytes 1.9 0.8 - 5.3 10e9/L    Absolute Monocytes 0.6 0.0 - 1.3 10e9/L    Absolute Eosinophils 0.1 0.0 - 0.7 10e9/L    Absolute Basophils 0.1 0.0 - 0.2 10e9/L   UA reflex to Microscopic and Culture   Result Value Ref Range    Color Urine Yellow     Appearance Urine Clear     Glucose Urine Negative NEG^Negative mg/dL    Bilirubin Urine Negative NEG^Negative    Ketones Urine Negative NEG^Negative mg/dL    Specific Gravity Urine 1.010 1.003 - 1.035    Blood Urine Moderate (A) NEG^Negative    pH Urine 6.5 5.0 - 7.0 pH    Protein Albumin Urine Negative NEG^Negative mg/dL    Urobilinogen Urine 0.2 0.2 - 1.0 EU/dL    Nitrite Urine Negative NEG^Negative    Leukocyte Esterase Urine Negative NEG^Negative    Source Midstream Urine    Urine Microscopic   Result Value Ref Range    WBC Urine O - 2 OTO2^O - 2 /HPF    RBC Urine 25-50 (A) OTO2^O - 2 /HPF    Mucous Urine Present (A) NEG^Negative /LPF   Urine Culture Aerobic Bacterial   Result Value Ref Range    Specimen Description Midstream Urine      Culture Micro       <10,000 colonies/mL  urogenital ernesto  Susceptibility testing not routinely done         If you have any questions please call the clinic at [phone]    Sincerely,    Tyler Pugh MD  bmd

## 2017-09-15 NOTE — LETTER
[facility]   [address] [place]  [address]  [phone]                                   September 18, 2017    Collin Frank  [address]    [facility] [address]        Dear Collin,    Patient given results of ua   With history of stones, divina martinez the blood is from that.    Culture is pending    Results for orders placed or performed in visit on 09/15/17   CBC with platelets differential   Result Value Ref Range    WBC 6.5 4.0 - 11.0 10e9/L    RBC Count 4.57 4.4 - 5.9 10e12/L    Hemoglobin 14.9 13.3 - 17.7 g/dL    Hematocrit 43.4 40.0 - 53.0 %    MCV 95 78 - 100 fl    MCH 32.6 26.5 - 33.0 pg    MCHC 34.3 31.5 - 36.5 g/dL    RDW 12.8 10.0 - 15.0 %    Platelet Count 241 150 - 450 10e9/L    Diff Method Automated Method     % Neutrophils 59.2 %    % Lymphocytes 28.4 %    % Monocytes 8.8 %    % Eosinophils 2.2 %    % Basophils 1.4 %    Absolute Neutrophil 3.9 1.6 - 8.3 10e9/L    Absolute Lymphocytes 1.9 0.8 - 5.3 10e9/L    Absolute Monocytes 0.6 0.0 - 1.3 10e9/L    Absolute Eosinophils 0.1 0.0 - 0.7 10e9/L    Absolute Basophils 0.1 0.0 - 0.2 10e9/L   UA reflex to Microscopic and Culture   Result Value Ref Range    Color Urine Yellow     Appearance Urine Clear     Glucose Urine Negative NEG^Negative mg/dL    Bilirubin Urine Negative NEG^Negative    Ketones Urine Negative NEG^Negative mg/dL    Specific Gravity Urine 1.010 1.003 - 1.035    Blood Urine Moderate (A) NEG^Negative    pH Urine 6.5 5.0 - 7.0 pH    Protein Albumin Urine Negative NEG^Negative mg/dL    Urobilinogen Urine 0.2 0.2 - 1.0 EU/dL    Nitrite Urine Negative NEG^Negative    Leukocyte Esterase Urine Negative NEG^Negative    Source Midstream Urine    Urine Microscopic   Result Value Ref Range    WBC Urine O - 2 OTO2^O - 2 /HPF    RBC Urine 25-50 (A) OTO2^O - 2 /HPF    Mucous Urine Present (A) NEG^Negative /LPF   Urine Culture Aerobic Bacterial   Result Value Ref Range    Specimen Description  Midstream Urine     Culture Micro       <10,000 colonies/mL  urogenital ernesto  Susceptibility testing not routinely done         If you have any questions please call the clinic at [phone]    Sincerely,    Tyler Pugh MD  bmd

## 2017-09-16 LAB
BACTERIA SPEC CULT: NORMAL
SPECIMEN SOURCE: NORMAL

## 2017-09-18 ENCOUNTER — ANTICOAGULATION THERAPY VISIT (OUTPATIENT)
Dept: NURSING | Facility: CLINIC | Age: 67
End: 2017-09-18
Payer: COMMERCIAL

## 2017-09-18 DIAGNOSIS — I26.99 PULMONARY EMBOLISM WITH INFARCTION (H): ICD-10-CM

## 2017-09-18 DIAGNOSIS — I82.4Y1: ICD-10-CM

## 2017-09-18 DIAGNOSIS — D68.51 FACTOR 5 LEIDEN MUTATION, HETEROZYGOUS (H): ICD-10-CM

## 2017-09-18 DIAGNOSIS — Z79.01 LONG-TERM (CURRENT) USE OF ANTICOAGULANTS: ICD-10-CM

## 2017-09-18 LAB — INR POINT OF CARE: 2.3 (ref 0.86–1.14)

## 2017-09-18 PROCEDURE — 85610 PROTHROMBIN TIME: CPT | Mod: QW

## 2017-09-18 PROCEDURE — 36416 COLLJ CAPILLARY BLOOD SPEC: CPT

## 2017-09-18 PROCEDURE — 99207 ZZC NO CHARGE NURSE ONLY: CPT

## 2017-09-18 NOTE — MR AVS SNAPSHOT
Collin Frank   9/18/2017 1:00 PM   Anticoagulation Therapy Visit    Description:  67 year old male   Provider:  CLAUDIA ANTI COAG   Department:  [facility] Nurse           INR as of 9/18/2017     Today's INR 2.3      Anticoagulation Summary as of 9/18/2017     INR goal 2.0-3.0   Today's INR 2.3   Full instructions 7.5 mg on Mon; 5 mg all other days   Next INR check 10/19/2017    Indications   Long-term (current) use of anticoagulants [Z79.01] [Z79.01]  Pulmonary embolism with infarction (HCC) [I26.99] [I26.99]  Acute venous embolism and thrombosis of deep vessels of proximal lower extremity (H) [I82.4Y9]  Factor 5 Leiden mutation  heterozygous (H) [D68.51]         Your next Anticoagulation Clinic appointment(s)     Sep 18, 2017  1:00 PM CDT   An[facility]agulation [facility] with CP ANTI COAG   [facility]    [address] [facility] [id number] [phone]            Oct 19, 2017  1:40 PM CDT   Anticoagulation Visit with CP ANTI COAG   [facility]    [address] [facility] [id number] [phone]              Contact Numbers     [facility]  Please call [phone] or [phone]  to cancel and/or reschedule your appointment.   Please call [phone] with any problems or questions regarding your therapy          September 2017 Details    Sun Mon Tue Wed Thu Fri Sat          1               2                 3               4               5               6               7               8               9                 10               11               12               13               14               15               16                 17               18      7.5 mg   See details      19      5 mg         20      5 mg         21      5 mg         22      5 mg         23      5 mg           24      5 mg         25      7.5 mg         26      5  mg         27      5 mg         28      5 mg         29      5 mg         30      5 mg          Date Details   09/18 This INR check               How to take your warfarin dose     To take:  5 mg Take 1 of the 5 mg tablets.    To take:  7.5 mg Take 1.5 of the 5 mg tablets.           October 2017 Details    Sun Mon Tue Wed Thu Fri Sat     1      5 mg         2      7.5 mg         3      5 mg         4      5 mg         5      5 mg         6      5 mg         7      5 mg           8      5 mg         9      7.5 mg         10      5 mg         11      5 mg         12      5 mg         13      5 mg         14      5 mg           15      5 mg         16      7.5 mg         17      5 mg         18      5 mg         19            20               21                 22               23               24               25               26               27               28                 29               30               31                    Date Details   No additional details    Date of next INR:  10/19/2017         How to take your warfarin dose     To take:  5 mg Take 1 of the 5 mg tablets.    To take:  7.5 mg Take 1.5 of the 5 mg tablets.

## 2017-09-18 NOTE — PROGRESS NOTES
ANTICOAGULATION FOLLOW-UP CLINIC VISIT    Patient Name:  Collin Frank  Date:  9/18/2017  Contact Type:  Face to Face    SUBJECTIVE: patient here for early INR test due to abx below.  He also did 1/2 dose of warfarin yesterday per INR nurse recommendation.  He denies bleeding or clotting symptoms.  He states it's too early to tell if the abx was helpful.     Patient Findings     Positives Antibiotic use or infection (cipro 3 day course for URI)           OBJECTIVE    INR Protime   Date Value Ref Range Status   09/18/2017 2.3 (A) 0.86 - 1.14 Final       ASSESSMENT / PLAN  INR assessment THER    Recheck INR In: 4 WEEKS    INR Location Clinic      Anticoagulation Summary as of 9/18/2017     INR goal 2.0-3.0   Today's INR 2.3   Maintenance plan 7.5 mg (5 mg x 1.5) on Mon; 5 mg (5 mg x 1) all other days   Full instructions 7.5 mg on Mon; 5 mg all other days   Weekly total 37.5 mg   No change documented Minal Carrion RN   Plan last modified Minal Carroin RN (4/17/2017)   Next INR check 10/19/2017   Target end date Indefinite    Indications   Long-term (current) use of anticoagulants [Z79.01] [Z79.01]  Pulmonary embolism with infarction (HCC) [I26.99] [I26.99]  Acute venous embolism and thrombosis of deep vessels of proximal lower extremity (H) [I82.4Y9]  Factor 5 Leiden mutation  heterozygous (H) [D68.51]         Anticoagulation Episode Summary     INR check location     Preferred lab     Send INR reminders to [facility] CLINIC    Comments       Anticoagulation Care Providers     Provider Role Specialty Phone number    Engelmann, Lauren Anneliese, MD  Family Practice [phone]            See the Encounter Report to view Anticoagulation Flowsheet and Dosing Calendar (Go to Encounters tab in chart review, and find the Anticoagulation Therapy Visit)    Dosage adjustment made based on physician directed care plan.    Minal Carrion RN

## 2017-09-18 NOTE — PROGRESS NOTES
Urine culture was normal  I would consideer repeat the urine in 3-4 weeks to see if the blood clears

## 2017-09-24 ENCOUNTER — TRANSFERRED RECORDS (OUTPATIENT)
Dept: HEALTH INFORMATION MANAGEMENT | Facility: CLINIC | Age: 67
End: 2017-09-24

## 2017-09-26 ENCOUNTER — OFFICE VISIT (OUTPATIENT)
Dept: FAMILY MEDICINE | Facility: CLINIC | Age: 67
End: 2017-09-26
Payer: COMMERCIAL

## 2017-09-26 VITALS
SYSTOLIC BLOOD PRESSURE: 134 MMHG | DIASTOLIC BLOOD PRESSURE: 77 MMHG | OXYGEN SATURATION: 98 % | HEART RATE: 60 BPM | BODY MASS INDEX: 28.27 KG/M2 | TEMPERATURE: 97.6 F | WEIGHT: 197 LBS

## 2017-09-26 DIAGNOSIS — R68.83 CHILLS: Primary | ICD-10-CM

## 2017-09-26 PROCEDURE — 99213 OFFICE O/P EST LOW 20 MIN: CPT | Performed by: FAMILY MEDICINE

## 2017-09-26 NOTE — NURSING NOTE
"Chief Complaint   Patient presents with     ER F/U     Results     Lab from [facility] on 9/24/17       Initial /77  Pulse 60  Temp 97.6  F (36.4  C) (Oral)  Wt 197 lb (89.4 kg)  SpO2 98%  BMI 28.27 kg/m2 Estimated body mass index is 28.27 kg/(m^2) as calculated from the following:    Height as of 9/15/17: 5' 10\" (1.778 m).    Weight as of this encounter: 197 lb (89.4 kg).  Medication Reconciliation: complete.  Gabby Whitmore MA      "

## 2017-09-26 NOTE — MR AVS SNAPSHOT
"              After Visit Summary   9/26/2017    Collin Frank    MRN: [id number]           Patient Information     Date Of Birth          1950        Visit Information        Provider Department      9/26/2017 9:40 AM Tyler Pugh MD [facility]         Follow-ups after your visit        Your next 10 appointments already scheduled     Oct 19, 2017  1:40 PM CDT   [facility]agulation [facility] with CP ANTI COAG   [facility]    [address] [facility] [id number] [phone]              Who to contact     If you have questions or need follow up information about today's clinic visit or your schedule please contact [facility] directly at [phone].  Normal or non-critical lab and imaging results will be communicated to you by MyChart, letter or phone within 4 business days after the clinic has received the results. If you do not hear from us within 7 days, please contact the clinic through MyChart or phone. If you have a critical or abnormal lab result, we will notify you by phone as soon as possible.  Submit refill requests through [employer] or call your pharmacy and they will forward the refill request to us. Please allow 3 business days for your refill to be completed.          Additional Information About Your Visit        MyChart Information     [employer] lets you send messages to your doctor, view your test results, renew your prescriptions, schedule appointments and more. To sign up, go to [email or web address] . Click on \"Log in\" on the left side of the screen, which will take you to the Welcome page. Then click on \"Sign up Now\" on the right side of the page.     You will be asked to enter the access code listed below, as well as some personal information. Please follow the directions to create your username and password.     Your access code is: [email or web address]  Expires: " 2017 12:27 PM     Your access code will  in 90 days. If you need help or a new code, please call your [facility] clinic or [phone].        Care EveryWhere ID     This is your Care EveryWhere ID. This could be used by other organizations to access your [facility] medical records  [id number]        Your Vitals Were     Pulse Temperature Pulse Oximetry BMI (Body Mass Index)          60 97.6  F (36.4  C) (Oral) 98% 28.27 kg/m2         Blood Pressure from Last 3 Encounters:   17 134/77   09/15/17 148/70   17 149/73    Weight from Last 3 Encounters:   17 197 lb (89.4 kg)   09/15/17 202 lb (91.6 kg)   17 188 lb (85.3 kg)              Today, you had the following     No orders found for display         Today's Medication Changes          These changes are accurate as of: 17 10:33 AM.  If you have any questions, ask your nurse or doctor.               Stop taking these medicines if you haven't already. Please contact your care team if you have questions.     ciprofloxacin 250 MG tablet   Commonly known as:  CIPRO   Stopped by:  Tyler Pugh MD                    Primary Care Provider Office Phone # Fax #    Kendy Anneliese Engelmann, [phone]       [facility] [address] [facility] [address]        Equal Access to Services     BRENDA BLAS AH: Hadii chelly moseley hadasho Soreannaali, waaxda luqadaha, qaybta kaalmada adeegyada, dami cast. So [facility] [phone].    ATENCIÓN: Si habla español, tiene a chen disposición servicios gratuitos de asistencia lingüística. Llame al [phone].    We comply with applicable federal civil rights laws and [place] laws. We do not discriminate on the basis of race, color, national origin, age, disability sex, sexual orientation or gender identity.            Thank you!     Thank you for choosing [facility]  for your care. Our goal is always  to provide you with excellent care. Hearing back from our patients is one way we can continue to improve our services. Please take a few minutes to complete the written survey that you may receive in the mail after your visit with us. Thank you!             Your Updated Medication List - Protect others around you: Learn how to safely use, store and throw away your medicines at www.disposemymeds.org.          This list is accurate as of: 9/26/17 10:33 AM.  Always use your most recent med list.                   Brand Name Dispense Instructions for use Diagnosis    cholecalciferol 1000 UNIT tablet    vitamin D    100 tablet    Take 1 tablet (1,000 Units) by mouth daily    Unspecified vitamin D deficiency       warfarin 5 MG tablet    JANTOVEN    90 tablet    Take 1 tablet (5 mg) by mouth daily    Hyperhomocysteinemia (H), Factor 5 Leiden mutation, heterozygous (H)

## 2017-09-26 NOTE — PROGRESS NOTES
SUBJECTIVE:   Collin Frank is a 67 year old male who presents to clinic today for the following health issues:      ED/UC Followup:    Facility:  [facility]  Date of visit: 9/24/17  Reason for visit: Chills  Current Status: Little better       Father and two brothers had lymphoma   Lab Results from [facility] on 9/24/17  Feels like he is getting cold after a shower or after exercising     Patient had no dysuria   He has no fever   Reviewed hospital record   troponin normal   Blood culture is normal   Cbc   was normal     xr chest was normal     O; /77  Pulse 60  Temp 97.6  F (36.4  C) (Oral)  Wt 197 lb (89.4 kg)  SpO2 98%  BMI 28.27 kg/m2    Head: Normocephalic, atraumatic.  Eyes: Conjunctiva clear, non icteric. PERRLA.  Ears: External ears and TMs normal BL.  Nose: Septum midline, nasal mucosa pink and moist. No discharge.  Mouth / Throat: Normal dentition.  No oral lesions. Pharynx non erythematous, tonsils without hypertrophy.  Neck: Supple, no enlarged LN, trachea midline.    Chest wall normal to inspection and palpation. Good excursion bilaterally. Lungs clear to auscultation. Good air movement bilaterally without rales, wheezes, or rhonchi.   Regular rate and  rhythm. S1 and S2 normal, no murmurs, clicks, gallops or rubs. No edema or JVD.    The abdomen is soft without tenderness, guarding, mass, rebound or organomegaly. Bowel sounds are normal. No CVA tenderness or inguinal adenopathy noted.     Problem list and histories reviewed & adjusted, as indicated.    A: chills     P: observation   Recheck in 1 month if not better     Come back sooner if a problem

## 2017-10-16 ENCOUNTER — ANTICOAGULATION THERAPY VISIT (OUTPATIENT)
Dept: NURSING | Facility: CLINIC | Age: 67
End: 2017-10-16
Payer: COMMERCIAL

## 2017-10-16 DIAGNOSIS — Z23 NEED FOR PROPHYLACTIC VACCINATION AND INOCULATION AGAINST INFLUENZA: Primary | ICD-10-CM

## 2017-10-16 DIAGNOSIS — D68.51 FACTOR 5 LEIDEN MUTATION, HETEROZYGOUS (H): ICD-10-CM

## 2017-10-16 DIAGNOSIS — Z79.01 LONG-TERM (CURRENT) USE OF ANTICOAGULANTS: ICD-10-CM

## 2017-10-16 DIAGNOSIS — I26.99 PULMONARY EMBOLISM WITH INFARCTION (H): ICD-10-CM

## 2017-10-16 DIAGNOSIS — I82.4Y1 ACUTE VENOUS EMBOLISM AND THROMBOSIS OF DEEP VESSELS OF PROXIMAL END OF RIGHT LOWER EXTREMITY (H): ICD-10-CM

## 2017-10-16 LAB — INR POINT OF CARE: 5.4 (ref 0.86–1.14)

## 2017-10-16 PROCEDURE — 90662 IIV NO PRSV INCREASED AG IM: CPT

## 2017-10-16 PROCEDURE — G0008 ADMIN INFLUENZA VIRUS VAC: HCPCS

## 2017-10-16 PROCEDURE — 99207 ZZC NO CHARGE NURSE ONLY: CPT

## 2017-10-16 PROCEDURE — 85610 PROTHROMBIN TIME: CPT | Mod: QW

## 2017-10-16 PROCEDURE — 36416 COLLJ CAPILLARY BLOOD SPEC: CPT

## 2017-10-16 NOTE — PROGRESS NOTES
"  ANTICOAGULATION FOLLOW-UP CLINIC VISIT    Patient Name:  Collin Frank  Date:  10/16/2017  Contact Type:  Face to Face - INR done today x 2.  5.4 / 5.2 result.    SUBJECTIVE: Patient called for INR today because his dentist wants INR before removing loose tooth.  The patient denies symptoms of bleeding or clotting.  States he feels \"hot\" / \"feverish\" and we did a temp oral 98.4.  Patient will advise dentist of INR.  We will hold today's 7.5mg dose and do 2.5mg or 1/2 dose tomorrow then usual schedule with recheck in 3 days; Thursday 10-19-17.  Patient asks for flu vaccine today.     Patient Findings     Positives Inflammation (broken tooth with possible infection)           OBJECTIVE    INR Protime   Date Value Ref Range Status   10/16/2017 5.4 (A) 0.86 - 1.14 Final       ASSESSMENT / PLAN  INR assessment SUPRA    Recheck INR In: 3 DAYS    INR Location Clinic      Anticoagulation Summary as of 10/16/2017     INR goal 2.0-3.0   Today's INR 5.4!   Maintenance plan 7.5 mg (5 mg x 1.5) on Mon; 5 mg (5 mg x 1) all other days   Full instructions 10/16: Hold; 10/17: 2.5 mg; Otherwise 7.5 mg on Mon; 5 mg all other days   Weekly total 37.5 mg   Plan last modified Minal Carrion, RN (4/17/2017)   Next INR check 10/19/2017   Target end date Indefinite    Indications   Long-term (current) use of anticoagulants [Z79.01] [Z79.01]  Pulmonary embolism with infarction (HCC) [I26.99] [I26.99]  Acute venous embolism and thrombosis of deep vessels of proximal lower extremity (H) [I82.4Y9]  Factor 5 Leiden mutation  heterozygous (H) [D68.51]         Anticoagulation Episode Summary     INR check location     Preferred lab     Send INR reminders to [facility]    Comments       Anticoagulation Care Providers     Provider Role Specialty Phone number    Engelmann, Lauren Anneliese, MD  [facility] [phone]            See the Encounter Report to view Anticoagulation Flowsheet and Dosing Calendar (Go to Encounters tab in " chart review, and find the Anticoagulation Therapy Visit)    Dosage adjustment made based on physician directed care plan.    Minal Carrion RN               Injectable Influenza Immunization Documentation    1.  Is the person to be vaccinated sick today?   No    2. Does the person to be vaccinated have an allergy to a component   of the vaccine?   No    3. Has the person to be vaccinated ever had a serious reaction   to influenza vaccine in the past?   No    4. Has the person to be vaccinated ever had Guillain-Barré syndrome?   No    Form completed by Minal Carrion RN       [facility]      ROS      Physical Exam

## 2017-10-16 NOTE — MR AVS SNAPSHOT
Collin Frank   10/16/2017 8:00 AM   Anticoagulation Therapy Visit    Description:  67 year old male   Provider:  CLAUDIA ANTI COAG   Department:  [facility] Nurse           INR as of 10/16/2017     Today's INR 5.4!      Anticoagulation Summary as of 10/16/2017     INR goal 2.0-3.0   Today's INR 5.4!   Full instructions 10/16: Hold; 10/17: 2.5 mg; Otherwise 7.5 mg on Mon; 5 mg all other days   Next INR check 10/19/2017    Indications   Long-term (current) use of anticoagulants [Z79.01] [Z79.01]  Pulmonary embolism with infarction (HCC) [I26.99] [I26.99]  Acute venous embolism and thrombosis of deep vessels of proximal lower extremity (H) [I82.4Y9]  Factor 5 Leiden mutation  heterozygous (H) [D68.51]         Your next Anticoagulation Clinic appointment(s)     Oct 19, 2017  1:40 PM CDT   [facility]agulation [facility] with CP ANTI COAG   [facility]    [address] [facility] [id number] [phone]            Oct 24, 2017  9:20 AM CDT   Anticoagulation Visit with CP ANTI COAG   [facility]    [address] [facility] [id number] [phone]              Contact Numbers     [facility]  Please call [phone] or [phone]  to cancel and/or reschedule your appointment.   Please call [phone] with any problems or questions regarding your therapy          October 2017 Details    Sun Mon Tue Wed Thu Fri Sat     1               2               3               4               5               6               7                 8               9               10               11               12               13               14                 15               16      Hold   See details      17      2.5 mg         18      5 mg         19            20               21                 22               23               24               25                26               27               28                 29               30               31                    Date Details   10/16 This INR check       Date of next INR:  10/19/2017         How to take your warfarin dose     To take:  2.5 mg Take 0.5 of a 5 mg tablet.    To take:  5 mg Take 1 of the 5 mg tablets.    Hold Do not take your warfarin dose. See the Details table to the right for additional instructions.

## 2017-10-19 ENCOUNTER — ANTICOAGULATION THERAPY VISIT (OUTPATIENT)
Dept: NURSING | Facility: CLINIC | Age: 67
End: 2017-10-19
Payer: COMMERCIAL

## 2017-10-19 DIAGNOSIS — I82.4Y1 ACUTE VENOUS EMBOLISM AND THROMBOSIS OF DEEP VESSELS OF PROXIMAL END OF RIGHT LOWER EXTREMITY (H): ICD-10-CM

## 2017-10-19 DIAGNOSIS — Z79.01 LONG-TERM (CURRENT) USE OF ANTICOAGULANTS: ICD-10-CM

## 2017-10-19 DIAGNOSIS — I26.99 PULMONARY EMBOLISM WITH INFARCTION (H): ICD-10-CM

## 2017-10-19 DIAGNOSIS — D68.51 FACTOR 5 LEIDEN MUTATION, HETEROZYGOUS (H): ICD-10-CM

## 2017-10-19 LAB — INR POINT OF CARE: 1.9 (ref 0.86–1.14)

## 2017-10-19 PROCEDURE — 36416 COLLJ CAPILLARY BLOOD SPEC: CPT

## 2017-10-19 PROCEDURE — 99207 ZZC NO CHARGE NURSE ONLY: CPT

## 2017-10-19 PROCEDURE — 85610 PROTHROMBIN TIME: CPT | Mod: QW

## 2017-10-19 NOTE — MR AVS SNAPSHOT
Collin Frank   10/19/2017 9:40 AM   Anticoagulation Therapy Visit    Description:  67 year old male   Provider:  CLAUDIA ANTI COAG   Department:  C[facility] Nurse           INR as of 10/19/2017     Today's INR 1.9!      Anticoagulation Summary as of 10/19/2017     INR goal 2.0-3.0   Today's INR 1.9!   Full instructions 7.5 mg on Mon; 5 mg all other days   Next INR check 10/24/2017    Indications   Long-term (current) use of anticoagulants [Z79.01] [Z79.01]  Pulmonary embolism with infarction (HCC) [I26.99] [I26.99]  Acute venous embolism and thrombosis of deep vessels of proximal lower extremity (H) [I82.4Y9]  Factor 5 Leiden mutation  heterozygous (H) [D68.51]         Your next Anticoagulation Clinic appointment(s)     Oct 24, 2017  9:20 AM CDT   Anticoagulation [facility] with CLAUDIA ANTI COAG   [facility]    [address] [place] [id number] [phone]              Contact Numbers     [facility]  Please call [phone] or [phone]  to cancel and/or reschedule your appointment.   Please call [phone] with any problems or questions regarding your therapy          October 2017 Details    Sun Mon Tue Wed Thu Fri Sat     1               2               3               4               5               6               7                 8               9               10               11               12               13               14                 15               16               17               18               19      5 mg   See details      20      5 mg         21      5 mg           22      5 mg         23      7.5 mg         24            25               26               27               28                 29               30               31                    Date Details   10/19 This INR check       Date of next INR:  10/24/2017         How to take your warfarin dose     To take:  5 mg Take 1 of the 5 mg  tablets.    To take:  7.5 mg Take 1.5 of the 5 mg tablets.

## 2017-10-19 NOTE — PROGRESS NOTES
ANTICOAGULATION FOLLOW-UP CLINIC VISIT    Patient Name:  Collin Frank  Date:  10/19/2017  Contact Type:  Face to Face    SUBJECTIVE: Patient is here after high INR reading thought to be from infection of broken tooth.  Patient's INR needs to be under 2.4 for the dentist px.  Plan usual warfarin dose and recheck after he is done with abx's and his procedure.     Patient Findings     Positives Antibiotic use or infection (amoxicillin for tooth infection)           OBJECTIVE    INR Protime   Date Value Ref Range Status   10/19/2017 1.9 (A) 0.86 - 1.14 Final       ASSESSMENT / PLAN  INR assessment THER    Recheck INR In: 5 DAYS    INR Location Clinic      Anticoagulation Summary as of 10/19/2017     INR goal 2.0-3.0   Today's INR 1.9!   Maintenance plan 7.5 mg (5 mg x 1.5) on Mon; 5 mg (5 mg x 1) all other days   Full instructions 7.5 mg on Mon; 5 mg all other days   Weekly total 37.5 mg   No change documented Minal Carrion RN   Plan last modified Minal Carrion RN (4/17/2017)   Next INR check 10/24/2017   Target end date Indefinite    Indications   Long-term (current) use of anticoagulants [Z79.01] [Z79.01]  Pulmonary embolism with infarction (HCC) [I26.99] [I26.99]  Acute venous embolism and thrombosis of deep vessels of proximal lower extremity (H) [I82.4Y9]  Factor 5 Leiden mutation  heterozygous (H) [D68.51]         Anticoagulation Episode Summary     INR check location     Preferred lab     Send INR reminders to [facility]    Comments       Anticoagulation Care Providers     Provider Role Specialty Phone number    Engelmann, Lauren Anneliese, MD  Family Practice [phone]            See the Encounter Report to view Anticoagulation Flowsheet and Dosing Calendar (Go to Encounters tab in chart review, and find the Anticoagulation Therapy Visit)    Dosage adjustment made based on physician directed care plan.    Minal Carrion RN

## 2017-10-24 ENCOUNTER — ANTICOAGULATION THERAPY VISIT (OUTPATIENT)
Dept: NURSING | Facility: CLINIC | Age: 67
End: 2017-10-24
Payer: COMMERCIAL

## 2017-10-24 DIAGNOSIS — Z79.01 LONG-TERM (CURRENT) USE OF ANTICOAGULANTS: ICD-10-CM

## 2017-10-24 DIAGNOSIS — D68.51 FACTOR 5 LEIDEN MUTATION, HETEROZYGOUS (H): ICD-10-CM

## 2017-10-24 DIAGNOSIS — I26.99 PULMONARY EMBOLISM WITH INFARCTION (H): ICD-10-CM

## 2017-10-24 DIAGNOSIS — I82.4Y1 ACUTE VENOUS EMBOLISM AND THROMBOSIS OF DEEP VESSELS OF PROXIMAL END OF RIGHT LOWER EXTREMITY (H): ICD-10-CM

## 2017-10-24 LAB — INR POINT OF CARE: 3.3 (ref 0.86–1.14)

## 2017-10-24 PROCEDURE — 99207 ZZC NO CHARGE NURSE ONLY: CPT

## 2017-10-24 PROCEDURE — 85610 PROTHROMBIN TIME: CPT | Mod: QW

## 2017-10-24 PROCEDURE — 36416 COLLJ CAPILLARY BLOOD SPEC: CPT

## 2017-10-24 NOTE — PROGRESS NOTES
ANTICOAGULATION FOLLOW-UP CLINIC VISIT    Patient Name:  Collin Frank  Date:  10/24/2017  Contact Type:  Face to Face    SUBJECTIVE: Patient here for INR following infected tooth with extraction.  He finished abx over the weekend and is slightly over range today.  We will correct with 1/2 dose warfarin today and then resume his usual schedule with a recheck in 2 weeks.  Patient agreeable to plan of care.     Patient Findings     Positives Antibiotic use or infection (finished abx for tooth infection Friday or Saturday.), No Problem Findings           OBJECTIVE    INR Protime   Date Value Ref Range Status   10/24/2017 3.3 (A) 0.86 - 1.14 Final       ASSESSMENT / PLAN  INR assessment SUPRA    Recheck INR In: 2 WEEKS    INR Location Clinic      Anticoagulation Summary as of 10/24/2017     INR goal 2.0-3.0   Today's INR 3.3!   Maintenance plan 7.5 mg (5 mg x 1.5) on Mon; 5 mg (5 mg x 1) all other days   Full instructions 10/24: 2.5 mg; Otherwise 7.5 mg on Mon; 5 mg all other days   Weekly total 37.5 mg   Plan last modified Minal Carrion, RN (4/17/2017)   Next INR check 11/7/2017   Target end date Indefinite    Indications   Long-term (current) use of anticoagulants [Z79.01] [Z79.01]  Pulmonary embolism with infarction (HCC) [I26.99] [I26.99]  Acute venous embolism and thrombosis of deep vessels of proximal lower extremity (H) [I82.4Y9]  Factor 5 Leiden mutation  heterozygous (H) [D68.51]         Anticoagulation Episode Summary     INR check location     Preferred lab     Send INR reminders to [facility]    Comments       Anticoagulation Care Providers     Provider Role Specialty Phone number    Engelmann, Lauren Anneliese, MD  Family Practice [phone]            See the Encounter Report to view Anticoagulation Flowsheet and Dosing Calendar (Go to Encounters tab in chart review, and find the Anticoagulation Therapy Visit)    Dosage adjustment made based on physician directed care plan.    Minal Carrion  RN

## 2017-11-07 ENCOUNTER — ANTICOAGULATION THERAPY VISIT (OUTPATIENT)
Dept: NURSING | Facility: CLINIC | Age: 67
End: 2017-11-07
Payer: COMMERCIAL

## 2017-11-07 DIAGNOSIS — Z79.01 LONG-TERM (CURRENT) USE OF ANTICOAGULANTS: ICD-10-CM

## 2017-11-07 DIAGNOSIS — I26.99 PULMONARY EMBOLISM WITH INFARCTION (H): ICD-10-CM

## 2017-11-07 DIAGNOSIS — D68.51 FACTOR 5 LEIDEN MUTATION, HETEROZYGOUS (H): ICD-10-CM

## 2017-11-07 DIAGNOSIS — I82.4Y9 ACUTE VENOUS EMBOLISM AND THROMBOSIS OF DEEP VESSELS OF PROXIMAL LOWER EXTREMITY (H): ICD-10-CM

## 2017-11-07 LAB — INR POINT OF CARE: 3.9 (ref 0.86–1.14)

## 2017-11-07 PROCEDURE — 99207 ZZC NO CHARGE NURSE ONLY: CPT

## 2017-11-07 PROCEDURE — 85610 PROTHROMBIN TIME: CPT | Mod: QW

## 2017-11-07 PROCEDURE — 36416 COLLJ CAPILLARY BLOOD SPEC: CPT

## 2017-11-07 NOTE — PROGRESS NOTES
ANTICOAGULATION FOLLOW-UP CLINIC VISIT    Patient Name:  Collin Frank  Date:  11/7/2017  Contact Type:  Face to Face    SUBJECTIVE:    Patient denied bleeding, bruising, clotting abnormalities.  He denies med regimen or dietary changes.  His INR has increased since last visit - will have him hold today's dose and return in 1 week.  Noted that he has Factor V Leiden mutation, if high INR will investigate further at next visit.     Patient Findings     Positives No Problem Findings           OBJECTIVE    INR Protime   Date Value Ref Range Status   11/07/2017 3.9 (A) 0.86 - 1.14 Final       ASSESSMENT / PLAN  INR assessment SUPRA    Recheck INR In: 1 WEEK    INR Location Clinic      Anticoagulation Summary as of 11/7/2017     INR goal 2.0-3.0   Today's INR 3.9!   Maintenance plan 7.5 mg (5 mg x 1.5) on Mon; 5 mg (5 mg x 1) all other days   Full instructions 11/7: Hold; Otherwise 7.5 mg on Mon; 5 mg all other days   Weekly total 37.5 mg   Plan last modified Minal Carrion RN (4/17/2017)   Next INR check 11/14/2017   Target end date Indefinite    Indications   Long-term (current) use of anticoagulants [Z79.01] [Z79.01]  Pulmonary embolism with infarction (HCC) [I26.99] [I26.99]  Acute venous embolism and thrombosis of deep vessels of proximal lower extremity (H) [I82.4Y9]  Factor 5 Leiden mutation  heterozygous (H) [D68.51]         Anticoagulation Episode Summary     INR check location     Preferred lab     Send INR reminders to [facility]    Comments       Anticoagulation Care Providers     Provider Role Specialty Phone number    Engelmann, Lauren Anneliese, MD  Family Practice [phone]            See the Encounter Report to view Anticoagulation Flowsheet and Dosing Calendar (Go to Encounters tab in chart review, and find the Anticoagulation Therapy Visit)    Dosage adjustment made based on physician directed care plan.    Chelsie Gu RN

## 2017-11-07 NOTE — MR AVS SNAPSHOT
Collin Frank   11/7/2017 3:00 PM   Anticoagulation Therapy Visit    Description:  67 year old male   Provider:  CLAUDIA ANTI COAG   Department:  Cp Nurse           INR as of 11/7/2017     Today's INR 3.9!      Anticoagulation Summary as of 11/7/2017     INR goal 2.0-3.0   Today's INR 3.9!   Full instructions 11/7: Hold; Otherwise 7.5 mg on Mon; 5 mg all other days   Next INR check 11/14/2017    Indications   Long-term (current) use of anticoagulants [Z79.01] [Z79.01]  Pulmonary embolism with infarction (HCC) [I26.99] [I26.99]  Acute venous embolism and thrombosis of deep vessels of proximal lower extremity (H) [I82.4Y9]  Factor 5 Leiden mutation  heterozygous (H) [D68.51]         Your next Anticoagulation Clinic appointment(s)     Nov 07, 2017  3:00 PM CST   Anticoagulation [facility] with CP ANTI COAG   [facility]    [address] [id number] [phone]            Nov 14, 2017  2:40 PM CST   Anticoagulation Visit with CP ANTI COAG   [facility]    [address] [id number] [phone]              Contact Numbers     [facility]  Please call [phone] or [phone]  to cancel and/or reschedule your appointment.   Please call [phone] with any problems or questions regarding your therapy          November 2017 Details    Sun Mon Tue Wed Thu Fri Sat        1               2               3               4                 5               6               7      Hold   See details      8      5 mg         9      5 mg         10      5 mg         11      5 mg           12      5 mg         13      7.5 mg         14            15               16               17               18                 19               20               21               22               23               24               25                  26               27               28               29               30                  Date Details   11/07 This INR check       Date of next INR:  11/14/2017         How to take your warfarin dose     To take:  5 mg Take 1 of the 5 mg tablets.    To take:  7.5 mg Take 1.5 of the 5 mg tablets.    Hold Do not take your warfarin dose. See the Details table to the right for additional instructions.

## 2017-11-14 ENCOUNTER — ANTICOAGULATION THERAPY VISIT (OUTPATIENT)
Dept: NURSING | Facility: CLINIC | Age: 67
End: 2017-11-14
Payer: COMMERCIAL

## 2017-11-14 DIAGNOSIS — D68.51 FACTOR 5 LEIDEN MUTATION, HETEROZYGOUS (H): ICD-10-CM

## 2017-11-14 DIAGNOSIS — I82.4Y9 ACUTE VENOUS EMBOLISM AND THROMBOSIS OF DEEP VESSELS OF PROXIMAL LOWER EXTREMITY (H): ICD-10-CM

## 2017-11-14 DIAGNOSIS — Z79.01 LONG-TERM (CURRENT) USE OF ANTICOAGULANTS: ICD-10-CM

## 2017-11-14 DIAGNOSIS — I26.99 PULMONARY EMBOLISM WITH INFARCTION (H): ICD-10-CM

## 2017-11-14 LAB — INR POINT OF CARE: 2.3 (ref 0.86–1.14)

## 2017-11-14 PROCEDURE — 85610 PROTHROMBIN TIME: CPT | Mod: QW

## 2017-11-14 PROCEDURE — 99207 ZZC NO CHARGE NURSE ONLY: CPT

## 2017-11-14 PROCEDURE — 36416 COLLJ CAPILLARY BLOOD SPEC: CPT

## 2017-11-14 NOTE — PROGRESS NOTES
ANTICOAGULATION FOLLOW-UP CLINIC VISIT    Patient Name:  Collin Frank  Date:  11/14/2017  Contact Type:  Face to Face    SUBJECTIVE:    Patient here for follow up from elevated INR, is within range now.  Denies bruising, bleeding, clotting abnormalities. Denies med regimen or dietary changes.  Will change dose for 2 weeks to 5mg QD to see if that keeps him in range.      Patient Findings     Positives No Problem Findings           OBJECTIVE    INR Protime   Date Value Ref Range Status   11/14/2017 2.3 (A) 0.86 - 1.14 Final       ASSESSMENT / PLAN  INR assessment THER    Recheck INR In: 2 WEEKS    INR Location Clinic      Anticoagulation Summary as of 11/14/2017     INR goal 2.0-3.0   Today's INR 2.3   Maintenance plan 7.5 mg (5 mg x 1.5) on Mon; 5 mg (5 mg x 1) all other days   Full instructions 11/20: 5 mg; 11/27: 5 mg; Otherwise 7.5 mg on Mon; 5 mg all other days   Weekly total 37.5 mg   Plan last modified Minal Carrion RN (4/17/2017)   Next INR check 11/28/2017   Target end date Indefinite    Indications   Long-term (current) use of anticoagulants [Z79.01] [Z79.01]  Pulmonary embolism with infarction (HCC) [I26.99] [I26.99]  Acute venous embolism and thrombosis of deep vessels of proximal lower extremity (H) [I82.4Y9]  Factor 5 Leiden mutation  heterozygous (H) [D68.51]         Anticoagulation Episode Summary     INR check location     Preferred lab     Send INR reminders to [facility]    Comments       Anticoagulation Care Providers     Provider Role Specialty Phone number    Engelmann, Lauren Anneliese, MD  Family Practice [phone]            See the Encounter Report to view Anticoagulation Flowsheet and Dosing Calendar (Go to Encounters tab in chart review, and find the Anticoagulation Therapy Visit)    Dosage adjustment made based on physician directed care plan.    Chelsie Gu RN

## 2017-11-14 NOTE — MR AVS SNAPSHOT
Collin Frank   11/14/2017 2:40 PM   Anticoagulation Therapy Visit    Description:  67 year old male   Provider:  CLAUDIA ANTI COAG   Department:  Cp Nurse           INR as of 11/14/2017     Today's INR 2.3      Anticoagulation Summary as of 11/14/2017     INR goal 2.0-3.0   Today's INR 2.3   Full instructions 11/20: 5 mg; 11/27: 5 mg; Otherwise 7.5 mg on Mon; 5 mg all other days   Next INR check 11/28/2017    Indications   Long-term (current) use of anticoagulants [Z79.01] [Z79.01]  Pulmonary embolism with infarction (HCC) [I26.99] [I26.99]  Acute venous embolism and thrombosis of deep vessels of proximal lower extremity (H) [I82.4Y9]  Factor 5 Leiden mutation  heterozygous (H) [D68.51]         Your next Anticoagulation Clinic appointment(s)     Nov 28, 2017  2:20 PM CST   Anticoagulation [facility] with CLAUDIA ANTI COAG   [facility]    [address] [address] [phone]              Contact Numbers     [facility]  Please call [phone] or [phone]  to cancel and/or reschedule your appointment.   Please call [phone] with any problems or questions regarding your therapy          November 2017 Details    Sun Mon Tue Wed Thu Fri Sat        1               2               3               4                 5               6               7               8               9               10               11                 12               13               14      5 mg   See details      15      5 mg         16      5 mg         17      5 mg         18      5 mg           19      5 mg         20      5 mg         21      5 mg         22      5 mg         23      5 mg         24      5 mg         25      5 mg           26      5 mg         27      5 mg         28            29               30                  Date Details   11/14 This INR check       Date of next INR:  11/28/2017         How to take your  warfarin dose     To take:  5 mg Take 1 of the 5 mg tablets.

## 2017-11-28 ENCOUNTER — ANTICOAGULATION THERAPY VISIT (OUTPATIENT)
Dept: NURSING | Facility: CLINIC | Age: 67
End: 2017-11-28
Payer: COMMERCIAL

## 2017-11-28 ENCOUNTER — OFFICE VISIT (OUTPATIENT)
Dept: ORTHOPEDICS | Facility: CLINIC | Age: 67
End: 2017-11-28
Payer: COMMERCIAL

## 2017-11-28 VITALS — WEIGHT: 197 LBS | BODY MASS INDEX: 28.2 KG/M2 | TEMPERATURE: 97.9 F | HEIGHT: 70 IN | RESPIRATION RATE: 18 BRPM

## 2017-11-28 DIAGNOSIS — I26.99 PULMONARY EMBOLISM WITH INFARCTION (H): ICD-10-CM

## 2017-11-28 DIAGNOSIS — D68.51 FACTOR 5 LEIDEN MUTATION, HETEROZYGOUS (H): ICD-10-CM

## 2017-11-28 DIAGNOSIS — M17.12 PRIMARY OSTEOARTHRITIS OF LEFT KNEE: Primary | ICD-10-CM

## 2017-11-28 DIAGNOSIS — Z79.01 LONG-TERM (CURRENT) USE OF ANTICOAGULANTS: ICD-10-CM

## 2017-11-28 DIAGNOSIS — I82.4Y9 ACUTE VENOUS EMBOLISM AND THROMBOSIS OF DEEP VESSELS OF PROXIMAL LOWER EXTREMITY, UNSPECIFIED LATERALITY (H): ICD-10-CM

## 2017-11-28 LAB — INR POINT OF CARE: 2.1 (ref 0.86–1.14)

## 2017-11-28 PROCEDURE — 99207 ZZC NO CHARGE NURSE ONLY: CPT

## 2017-11-28 PROCEDURE — 20610 DRAIN/INJ JOINT/BURSA W/O US: CPT | Mod: LT | Performed by: ORTHOPAEDIC SURGERY

## 2017-11-28 PROCEDURE — 85610 PROTHROMBIN TIME: CPT | Mod: QW

## 2017-11-28 PROCEDURE — 36416 COLLJ CAPILLARY BLOOD SPEC: CPT

## 2017-11-28 RX ORDER — METHYLPREDNISOLONE ACETATE 80 MG/ML
80 INJECTION, SUSPENSION INTRA-ARTICULAR; INTRALESIONAL; INTRAMUSCULAR; SOFT TISSUE ONCE
Qty: 1 ML | Refills: 0 | OUTPATIENT
Start: 2017-11-28 | End: 2017-11-28

## 2017-11-28 NOTE — PROGRESS NOTES
The patient's left knee was prepped with betadine solution after verification of allergies. Area approximately 10 cm x 10 cm prepped in a sterile fashion. After injection, betadine removed with soap and water and band-aids applied.    1ml depo-medrol with 1% lidocaine plain injected into patient's left knee by Dr. Hernesto Love  LOT# [id number]  Exp. 10/2018    Nawaf Snell PA-C  Supervising physician: Hernesto Love MD  Dept. of Orthopedics  [facility]

## 2017-11-28 NOTE — LETTER
11/28/2017         RE: Collin Frank  [address]    [place] [address]        Dear Colleague,    Thank you for referring your patient, Collin Frank, to the [facility]. Please see a copy of my visit note below.    The patient's left knee was prepped with betadine solution after verification of allergies. Area approximately 10 cm x 10 cm prepped in a sterile fashion. After injection, betadine removed with soap and water and band-aids applied.    1ml depo-medrol with 1% lidocaine plain injected into patient's left knee by Dr. Hernesto Love  LOT# [id number]  Exp. 10/2018    Nawaf Snell PA-C  Supervising physician: Hernesto Love MD  Dept. of Orthopedics  [facility]          Follow up left knee primary osteoarthritis.  Last injection 4/13/17  Range of motion 3-124 degrees .    He  desires injection today of left knee(s).  Risks, benefits, potential complications and alternatives were discussed.   With the patient's consent, sterile prep was performed of left knee(s).  Left knee was injected with Depo Medrol 80 mg and lidocaine at anterolateral site.  Return to clinic as needed.    He eventually will want bilateral total knee arthroplasty.  He is thinking after the first of the year.  We should obtain bilateral knee x-ray and plan for total knee arthroplasty next visit          Again, thank you for allowing me to participate in the care of your patient.        Sincerely,        Hernesto Love MD

## 2017-11-28 NOTE — PROGRESS NOTES
Follow up left knee primary osteoarthritis.  Last injection 4/13/17  Range of motion 3-124 degrees .    He  desires injection today of left knee(s).  Risks, benefits, potential complications and alternatives were discussed.   With the patient's consent, sterile prep was performed of left knee(s).  Left knee was injected with Depo Medrol 80 mg and lidocaine at anterolateral site.  Return to clinic as needed.    He eventually will want bilateral total knee arthroplasty.  He is thinking after the first of the year.  We should obtain bilateral knee x-ray and plan for total knee arthroplasty next visit

## 2017-11-28 NOTE — NURSING NOTE
"Chief Complaint   Patient presents with     RECHECK     Left knee OA last injection 4/13/17.       Initial Temp 97.9  F (36.6  C)  Resp 18  Ht 1.778 m (5' 10\")  Wt 89.4 kg (197 lb)  BMI 28.27 kg/m2 Estimated body mass index is 28.27 kg/(m^2) as calculated from the following:    Height as of this encounter: 1.778 m (5' 10\").    Weight as of this encounter: 89.4 kg (197 lb).  Medication Reconciliation: complete   Saray Turk MA      "

## 2017-11-28 NOTE — MR AVS SNAPSHOT
Collin Frank   11/28/2017 2:20 PM   Anticoagulation Therapy Visit    Description:  67 year old male   Provider:  CLAUDIA ANTI JADON   Department:  Cp Nurse           INR as of 11/28/2017     Today's INR 2.1      Anticoagulation Summary as of 11/28/2017     INR goal 2.0-3.0   Today's INR 2.1   Full instructions 5 mg every day   Next INR check 12/8/2017    Indications   Long-term (current) use of anticoagulants [Z79.01] [Z79.01]  Pulmonary embolism with infarction (HCC) [I26.99] [I26.99]  Acute venous embolism and thrombosis of deep vessels of proximal lower extremity (H) [I82.4Y9]  Factor 5 Leiden mutation  heterozygous (H) [D68.51]         Your next Anticoagulation Clinic appointment(s)     Nov 28, 2017  2:20 PM CST   [facility]agulation [facility] CP ANTI COAG   [facility]    [address] [facility] [address] [phone]              Contact Numbers     [facility]  Please call [phone] or [phone]  to cancel and/or reschedule your appointment.   Please call [phone] with any problems or questions regarding your therapy          November 2017 Details    Sun Mon Tue Wed Thu Fri Sat        1               2               3               4                 5               6               7               8               9               10               11                 12               13               14               15               16               17               18                 19               20               21               22               23               24               25                 26               27               28      5 mg   See details      29      5 mg         30      5 mg            Date Details   11/28 This INR check               How to take your warfarin dose     To take:  5 mg Take 1 of the 5 mg tablets.           December 2017 Details    Sun Mon Tue Wed u Fri Sat           1      5 mg         2      5 mg           3      5 mg         4      5 mg         5      5 mg         6      5 mg         7      5 mg         8            9                 10               11               12               13               14               15               16                 17               18               19               20               21               22               23                 24               25               26               27               28               29               30                 31                      Date Details   No additional details    Date of next INR:  12/8/2017         How to take your warfarin dose     To take:  5 mg Take 1 of the 5 mg tablets.

## 2017-11-28 NOTE — MR AVS SNAPSHOT
"              After Visit Summary   11/28/2017    Collin Frank    MRN: [id number]           Patient Information     Date Of Birth          1950        Visit Information        Provider Department      11/28/2017 2:45 PM Hernesto Love MD [facility]        Today's Diagnoses     Primary osteoarthritis of left knee    -  1       Follow-ups after your visit        Your next 10 appointments already scheduled     Dec 05, 2017  3:00 PM CST   Anticoagulation Visit with CP ANTI COAG   [facility]    [address] [facility] [facility] [address] [phone]            Dec 05, 2017  3:30 PM CST   Return Visit with Hernesto Love MD   [facility]    [address] [id number] [phone]              Who to contact     If you have questions or need follow up information about today's clinic visit or your schedule please contact [facility] directly at [phone].  Normal or non-critical lab and imaging results will be communicated to you by [employer], letter or phone within 4 business days after the clinic has received the results. If you do not hear from us within 7 days, please contact the clinic through [employer] or phone. If you have a critical or abnormal lab result, we will notify you by phone as soon as possible.  Submit refill requests through [email or web address] or call your pharmacy and they will forward the refill request to us. Please allow 3 business days for your refill to be completed.          Additional Information About Your Visit        [employer] [employer] Information     [email or web address] lets you send messages to your doctor, view your test results, renew your prescriptions, schedule appointments and more. To sign up, go to [email or web address] . Click on \"Log in\" on the left side of the screen, which will take you " "to the Welcome page. Then click on \"Sign up Now\" on the right side of the page.     You will be asked to enter the access code listed below, as well as some personal information. Please follow the directions to create your username and password.     Your access code is: [email or web address]  Expires: 2017 11:27 AM     Your access code will  in 90 days. If you need help or a new code, please call your [place] clinic or [phone].        Care EveryWhere ID     This is your Care EveryWhere ID. This could be used by other organizations to access your [place] medical records  [id number]        Your Vitals Were     Temperature Respirations Height BMI (Body Mass Index)          97.9  F (36.6  C) 18 1.778 m (5' 10\") 28.27 kg/m2         Blood Pressure from Last 3 Encounters:   17 134/77   09/15/17 148/70   17 149/73    Weight from Last 3 Encounters:   17 89.4 kg (197 lb)   17 89.4 kg (197 lb)   09/15/17 91.6 kg (202 lb)              We Performed the Following     DRAIN/INJECT LARGE JOINT/BURSA     METHYLPREDNISOLONE 80 MG INJ          Today's Medication Changes          These changes are accurate as of: 17  3:10 PM.  If you have any questions, ask your nurse or doctor.               Start taking these medicines.        Dose/Directions    methylPREDNISolone acetate 80 MG/ML injection   Commonly known as:  DEPO-MEDROL   Used for:  Primary osteoarthritis of left knee   Started by:  Hernesto Love MD        Dose:  80 mg   1 mL (80 mg) by INTRA-ARTICULAR route once for 1 dose   Quantity:  1 mL   Refills:  0            Where to get your medicines      Some of these will need a paper prescription and others can be bought over the counter.  Ask your nurse if you have questions.     You don't need a prescription for these medications     methylPREDNISolone acetate 80 MG/ML injection                Primary Care Provider Office Phone # Fax #    Lauren Anneliese Engelmann, [phone] " [phone]       [facility] [address]  [facility] [address]        Equal Access to Services     BRENDA BLAS : Hadii aad ku hadfrancisco jenoch Chaudhry, waharshda luqadaha, qaybta kaalmada bhaskarsoniaheri, dami harrison joséfaith murotyler melizajoeyjoey cast. So [facility] [phone].    ATENCIÓN: Si habla español, tiene a chen disposición servicios gratuitos de asistencia lingüística. Llame al [phone].    We comply with applicable federal civil rights laws and [place] laws. We do not discriminate on the basis of race, color, national origin, age, disability, sex, sexual orientation, or gender identity.            Thank you!     Thank you for choosing [facility]  for your care. Our goal is always to provide you with excellent care. Hearing back from our patients is one way we can continue to improve our services. Please take a few minutes to complete the written survey that you may receive in the mail after your visit with us. Thank you!             Your Updated Medication List - Protect others around you: Learn how to safely use, store and throw away your medicines at www.disposemymeds.org.          This list is accurate as of: 11/28/17  3:10 PM.  Always use your most recent med list.                   Brand Name Dispense Instructions for use Diagnosis    cholecalciferol 1000 UNIT tablet    vitamin D3    100 tablet    Take 1 tablet (1,000 Units) by mouth daily    Unspecified vitamin D deficiency       methylPREDNISolone acetate 80 MG/ML injection    DEPO-MEDROL    1 mL    1 mL (80 mg) by INTRA-ARTICULAR route once for 1 dose    Primary osteoarthritis of left knee       warfarin 5 MG tablet    JANTOVEN    90 tablet    Take 1 tablet (5 mg) by mouth daily    Hyperhomocysteinemia (H), Factor 5 Leiden mutation, heterozygous (H)

## 2017-12-04 ENCOUNTER — OFFICE VISIT (OUTPATIENT)
Dept: FAMILY MEDICINE | Facility: CLINIC | Age: 67
End: 2017-12-04
Payer: COMMERCIAL

## 2017-12-04 VITALS
DIASTOLIC BLOOD PRESSURE: 87 MMHG | OXYGEN SATURATION: 97 % | WEIGHT: 200 LBS | SYSTOLIC BLOOD PRESSURE: 150 MMHG | HEART RATE: 67 BPM | TEMPERATURE: 98.7 F | BODY MASS INDEX: 28.7 KG/M2

## 2017-12-04 DIAGNOSIS — R68.83 CHILLS (WITHOUT FEVER): Primary | ICD-10-CM

## 2017-12-04 PROCEDURE — 99213 OFFICE O/P EST LOW 20 MIN: CPT | Performed by: FAMILY MEDICINE

## 2017-12-04 ASSESSMENT — PAIN SCALES - GENERAL: PAINLEVEL: NO PAIN (0)

## 2017-12-04 NOTE — NURSING NOTE
"Chief Complaint   Patient presents with     Patient Request     Night sweats       Initial /87 (BP Location: Right arm, Patient Position: Chair, Cuff Size: Adult Regular)  Pulse 67  Temp 98.7  F (37.1  C) (Oral)  Wt 200 lb (90.7 kg)  SpO2 97%  BMI 28.7 kg/m2 Estimated body mass index is 28.7 kg/(m^2) as calculated from the following:    Height as of 11/28/17: 5' 10\" (1.778 m).    Weight as of this encounter: 200 lb (90.7 kg).  Medication Reconciliation: complete   Piedad Novoa MA      "

## 2017-12-04 NOTE — MR AVS SNAPSHOT
"              After Visit Summary   12/4/2017    Collin Frank    MRN: [id number]           Patient Information     Date Of Birth          1950        Visit Information        Provider Department      12/4/2017 1:40 PM Engelmann, Lauren Anneliese, MD [facility]         Follow-ups after your visit        Your next 10 appointments already scheduled     Dec 05, 2017  3:00 PM CST   Anticoagulation Visit with CP ANTI COAG   [facility] ([facility]    [address] [address]   [phone]            Dec 11, 2017  8:40 AM CST   PHYSICAL with Lauren Anneliese Engelmann, MD   [facility] ([facility])    [address] [address]   [phone]              Who to contact     If you have questions or need follow up information about today's clinic visit or your schedule please contact [facility] directly at [phone].  Normal or non-critical lab and imaging results will be communicated to you by ecoVenthart, letter or phone within 4 business days after the clinic has received the results. If you do not hear from us within 7 days, please contact the clinic through [employer] Dataworkst or phone. If you have a critical or abnormal lab result, we will notify you by phone as soon as possible.  Submit refill requests through Diffbot or call your pharmacy and they will forward the refill request to us. Please allow 3 business days for your refill to be completed.          Additional Information About Your Visit        ecoVentharHomeSphere Information     Diffbot lets you send messages to your doctor, view your test results, renew your prescriptions, schedule appointments and more. To sign up, go to [email or web address] . Click on \"Log in\" on the left side of the screen, which will take you to the Welcome page. Then click on \"Sign up Now\" on " the right side of the page.     You will be asked to enter the access code listed below, as well as some personal information. Please follow the directions to create your username and password.     Your access code is: [email or web address]  Expires: 2017 11:27 AM     Your access code will  in 90 days. If you need help or a new code, please call your [place] clinic or [phone].        Care EveryWhere ID     This is your Care EveryWhere ID. This could be used by other organizations to access your [place] medical records  [id number]        Your Vitals Were     Pulse Temperature Pulse Oximetry BMI (Body Mass Index)          67 98.7  F (37.1  C) (Oral) 97% 28.7 kg/m2         Blood Pressure from Last 3 Encounters:   17 150/87   17 134/77   09/15/17 148/70    Weight from Last 3 Encounters:   17 200 lb (90.7 kg)   17 197 lb (89.4 kg)   17 197 lb (89.4 kg)              Today, you had the following     No orders found for display       Primary Care Provider Office Phone # Fax #    Kendy Anneliese Engelmann, [phone]       [address] [facility] [address]        Equal Access to Services     BRENDA BLAS : Hadii aad ku hadasho Soomaali, waaxda luqadaha, qaybta kaalmada adeegyada, dami herrera . So [facility] [phone].    ATENCIÓN: Si habla español, tiene a chen disposición servicios gratuitos de asistencia lingüística. Llame al [phone].    We comply with applicable federal civil rights laws and [place] laws. We do not discriminate on the basis of race, color, national origin, age, disability, sex, sexual orientation, or gender identity.            Thank you!     Thank you for choosing [facility]  for your care. Our goal is always to provide you with excellent care. Hearing back from our patients is one way we can continue to improve our services. Please take a few  minutes to complete the written survey that you may receive in the mail after your visit with us. Thank you!             Your Updated Medication List - Protect others around you: Learn how to safely use, store and throw away your medicines at www.disposemymeds.org.          This list is accurate as of: 12/4/17  2:27 PM.  Always use your most recent med list.                   Brand Name Dispense Instructions for use Diagnosis    cholecalciferol 1000 UNIT tablet    vitamin D3    100 tablet    Take 1 tablet (1,000 Units) by mouth daily    Unspecified vitamin D deficiency       warfarin 5 MG tablet    JANTOVEN    90 tablet    Take 1 tablet (5 mg) by mouth daily    Hyperhomocysteinemia (H), Factor 5 Leiden mutation, heterozygous (H)

## 2017-12-04 NOTE — PROGRESS NOTES
SUBJECTIVE:   Collin Frank is a 67 year old male who presents to clinic today for the following health issues:    Night sweats      Duration: 11/30/17    Description (location/character/radiation): Stated they are still there but have lessened, feeling flushed in the face. Stated he had this last Spring when he had Pneumonia    Intensity:  mild    Accompanying signs and symptoms: None    History (similar episodes/previous evaluation): None    Precipitating or alleviating factors: None    Therapies tried and outcome: None     Wants to make sure he doesn't have pneumonia.   Has been seen several times this fall for the same complaint. No focal findings.     Problem list and histories reviewed & adjusted, as indicated.  Additional history: as documented    Patient Active Problem List   Diagnosis     Advanced directives, counseling/discussion     Hyperhomocysteinemia (H)     Acute venous embolism and thrombosis of deep vessels of proximal lower extremity (H)     CARDIOVASCULAR SCREENING; LDL GOAL LESS THAN 130     Factor 5 Leiden mutation, heterozygous (H)     May-Thurner syndrome     Long term current use of anticoagulant     Vitamin D deficiency     Renal stones     Hepatic hemangioma     FH: prostate cancer     Long-term (current) use of anticoagulants [Z79.01]     Pulmonary embolism with infarction (HCC) [I26.99]     Post-traumatic osteoarthritis of right knee     Benign non-nodular prostatic hyperplasia without lower urinary tract symptoms     Primary osteoarthritis of left knee     Past Surgical History:   Procedure Laterality Date     COLONOSCOPY  1-17-08    Normal. Repeat in 10 years     rt knee ORIF  1975    [facility]       Social History   Substance Use Topics     Smoking status: Former Smoker     Types: Cigarettes     Quit date: 2/1/2011     Smokeless tobacco: Never Used     Alcohol use No     Family History   Problem Relation Age of Onset     Alzheimer Disease Mother      HEART DISEASE Father       Prostate Cancer Father      CANCER Brother 65     pancreatic        Prostate Cancer Brother              Reviewed and updated as needed this visit by clinical staffTobacco  Allergies  Meds  Med Hx  Surg Hx  Fam Hx  Soc Hx      Reviewed and updated as needed this visit by Provider         ROS:  Constitutional, HEENT, cardiovascular, pulmonary, gi and gu systems are negative, except as otherwise noted.      OBJECTIVE:   /87 (BP Location: Right arm, Patient Position: Chair, Cuff Size: Adult Regular)  Pulse 67  Temp 98.7  F (37.1  C) (Oral)  Wt 200 lb (90.7 kg)  SpO2 97%  BMI 28.7 kg/m2  Body mass index is 28.7 kg/(m^2).  GENERAL: healthy, alert and no distress  NECK: no adenopathy, no asymmetry, masses, or scars and thyroid normal to palpation  RESP: lungs clear to auscultation - no rales, rhonchi or wheezes  CV: regular rate and rhythm, normal S1 S2, no S3 or S4, no murmur, click or rub, no peripheral edema and peripheral pulses strong  ABDOMEN: soft, nontender, no hepatosplenomegaly, no masses and bowel sounds normal  MS: no gross musculoskeletal defects noted, no edema  SKIN: no suspicious lesions or rashes  BACK: no CVA tenderness, no paralumbar tenderness    Diagnostic Test Results:  none     ASSESSMENT/PLAN:       ICD-10-CM    1. Chills (without fever) R68.83        Patient appears non-toxic. Physical exam normal.   No focal findings to suggest infection.     FUTURE APPOINTMENTS:       - Follow-up for annual visit or as needed  See Patient Instructions    Lauren A. Engelmann, MD  [facility]

## 2017-12-05 ENCOUNTER — ANTICOAGULATION THERAPY VISIT (OUTPATIENT)
Dept: NURSING | Facility: CLINIC | Age: 67
End: 2017-12-05
Payer: COMMERCIAL

## 2017-12-05 DIAGNOSIS — I82.4Y9 ACUTE VENOUS EMBOLISM AND THROMBOSIS OF DEEP VESSELS OF PROXIMAL LOWER EXTREMITY, UNSPECIFIED LATERALITY (H): ICD-10-CM

## 2017-12-05 DIAGNOSIS — D68.51 FACTOR 5 LEIDEN MUTATION, HETEROZYGOUS (H): ICD-10-CM

## 2017-12-05 DIAGNOSIS — Z79.01 LONG-TERM (CURRENT) USE OF ANTICOAGULANTS: ICD-10-CM

## 2017-12-05 DIAGNOSIS — I26.99 PULMONARY EMBOLISM WITH INFARCTION (H): ICD-10-CM

## 2017-12-05 LAB — INR POINT OF CARE: 3.2 (ref 0.86–1.14)

## 2017-12-05 PROCEDURE — 85610 PROTHROMBIN TIME: CPT | Mod: QW

## 2017-12-05 PROCEDURE — 36416 COLLJ CAPILLARY BLOOD SPEC: CPT

## 2017-12-05 PROCEDURE — 99207 ZZC NO CHARGE NURSE ONLY: CPT

## 2017-12-05 NOTE — MR AVS SNAPSHOT
Collin Frank   12/5/2017 3:00 PM   Anticoagulation Therapy Visit    Description:  67 year old male   Provider:  CLAUDIA ANTI COAG   Department:  Cp Nurse           INR as of 12/5/2017     Today's INR 3.2!      Anticoagulation Summary as of 12/5/2017     INR goal 2.0-3.0   Today's INR 3.2!   Full instructions 12/5: 2.5 mg; Otherwise 7.5 mg on Mon; 5 mg all other days   Next INR check 12/19/2017    Indications   Long-term (current) use of anticoagulants [Z79.01] [Z79.01]  Pulmonary embolism with infarction (HCC) [I26.99] [I26.99]  Acute venous embolism and thrombosis of deep vessels of proximal lower extremity (H) [I82.4Y9]  Factor 5 Leiden mutation  heterozygous (H) [D68.51]         Your next Anticoagulation Clinic appointment(s)     Dec 05, 2017  3:00 PM CST   [facility]    [address] [id number] [phone]            Dec 19, 2017  2:40 PM CST   [facility]agulation [facility] with CP AN[facility] COAG   [facility]    [address] [id number] [phone]              Contact Numbers     [facility]  Please call [phone] or [phone]  to cancel and/or reschedule your appointment.   Please call [phone] with any problems or questions regarding your therapy          December 2017 Details    Sun Mon Tue Wed Thu Fri Sat          1               2                 3               4               5      2.5 mg   See details      6      5 mg         7      5 mg         8      5 mg         9      5 mg           10      5 mg         11      7.5 mg         12      5 mg         13      5 mg         14      5 mg         15      5 mg         16      5 mg           17      5 mg         18      7.5 mg         19            20               21               22               23                  24               25               26               27               28               29               30                 31                      Date Details   12/05 This INR check       Date of next INR:  12/19/2017         How to take your warfarin dose     To take:  2.5 mg Take 0.5 of a 5 mg tablet.    To take:  5 mg Take 1 of the 5 mg tablets.    To take:  7.5 mg Take 1.5 of the 5 mg tablets.

## 2017-12-05 NOTE — PROGRESS NOTES
ANTICOAGULATION FOLLOW-UP CLINIC VISIT    Patient Name:  Collin Frank  Date:  12/5/2017  Contact Type:  Face to Face    SUBJECTIVE: Patient here for INR following cortisone knee injection with decreased warfarin dose to prevent INR from getting too high as it has in the past with these shots.  He is very close to range today with that change.  Plan 1/2 dose today and then resume usual warfarin dose with recheck in 2 weeks.  Denies bleeding or clotting symptoms.  No other changes to health, diet, or medicine.     Patient Findings     Positives Change in medications (cortisone shot)           OBJECTIVE    INR Protime   Date Value Ref Range Status   12/05/2017 3.2 (A) 0.86 - 1.14 Final       ASSESSMENT / PLAN  INR assessment THER    Recheck INR In: 2 WEEKS    INR Location Clinic      Anticoagulation Summary as of 12/5/2017     INR goal 2.0-3.0   Today's INR 3.2!   Maintenance plan 7.5 mg (5 mg x 1.5) on Mon; 5 mg (5 mg x 1) all other days   Full instructions 12/5: 2.5 mg; Otherwise 7.5 mg on Mon; 5 mg all other days   Weekly total 37.5 mg   Plan last modified Minal Carrion, RN (12/5/2017)   Next INR check 12/19/2017   Target end date Indefinite    Indications   Long-term (current) use of anticoagulants [Z79.01] [Z79.01]  Pulmonary embolism with infarction (HCC) [I26.99] [I26.99]  Acute venous embolism and thrombosis of deep vessels of proximal lower extremity (H) [I82.4Y9]  Factor 5 Leiden mutation  heterozygous (H) [D68.51]         Anticoagulation Episode Summary     INR check location     Preferred lab     Send INR reminders to  [facility]    Comments       Anticoagulation Care Providers     Provider Role Specialty Phone number    Engelmann, Lauren Anneliese, MD  [facility] [phone]            See the Encounter Report to view Anticoagulation Flowsheet and Dosing Calendar (Go to Encounters tab in chart review, and find the Anticoagulation Therapy Visit)    Dosage adjustment made based on physician  directed care plan.    Minal Carrion RN

## 2017-12-11 ENCOUNTER — OFFICE VISIT (OUTPATIENT)
Dept: FAMILY MEDICINE | Facility: CLINIC | Age: 67
End: 2017-12-11
Payer: COMMERCIAL

## 2017-12-11 VITALS
OXYGEN SATURATION: 98 % | HEIGHT: 70 IN | DIASTOLIC BLOOD PRESSURE: 73 MMHG | WEIGHT: 205 LBS | SYSTOLIC BLOOD PRESSURE: 128 MMHG | BODY MASS INDEX: 29.35 KG/M2 | TEMPERATURE: 97.3 F | HEART RATE: 54 BPM

## 2017-12-11 DIAGNOSIS — D68.51 FACTOR 5 LEIDEN MUTATION, HETEROZYGOUS (H): ICD-10-CM

## 2017-12-11 DIAGNOSIS — Z12.11 SPECIAL SCREENING FOR MALIGNANT NEOPLASMS, COLON: ICD-10-CM

## 2017-12-11 DIAGNOSIS — Z80.42 FH: PROSTATE CANCER: ICD-10-CM

## 2017-12-11 DIAGNOSIS — E55.9 VITAMIN D DEFICIENCY: ICD-10-CM

## 2017-12-11 DIAGNOSIS — N40.0 BENIGN NON-NODULAR PROSTATIC HYPERPLASIA WITHOUT LOWER URINARY TRACT SYMPTOMS: ICD-10-CM

## 2017-12-11 DIAGNOSIS — Z00.00 ROUTINE HISTORY AND PHYSICAL EXAMINATION OF ADULT: Primary | ICD-10-CM

## 2017-12-11 DIAGNOSIS — Z79.01 LONG TERM CURRENT USE OF ANTICOAGULANT: ICD-10-CM

## 2017-12-11 LAB
ANION GAP SERPL CALCULATED.3IONS-SCNC: 7 MMOL/L (ref 3–14)
BASOPHILS # BLD AUTO: 0.1 10E9/L (ref 0–0.2)
BASOPHILS NFR BLD AUTO: 1 %
BUN SERPL-MCNC: 13 MG/DL (ref 7–30)
CALCIUM SERPL-MCNC: 8.9 MG/DL (ref 8.5–10.1)
CHLORIDE SERPL-SCNC: 103 MMOL/L (ref 94–109)
CO2 SERPL-SCNC: 30 MMOL/L (ref 20–32)
CREAT SERPL-MCNC: 0.95 MG/DL (ref 0.66–1.25)
DIFFERENTIAL METHOD BLD: NORMAL
EOSINOPHIL # BLD AUTO: 0.4 10E9/L (ref 0–0.7)
EOSINOPHIL NFR BLD AUTO: 5.9 %
ERYTHROCYTE [DISTWIDTH] IN BLOOD BY AUTOMATED COUNT: 13.7 % (ref 10–15)
GFR SERPL CREATININE-BSD FRML MDRD: 78 ML/MIN/1.7M2
GLUCOSE SERPL-MCNC: 89 MG/DL (ref 70–99)
HCT VFR BLD AUTO: 45.3 % (ref 40–53)
HGB BLD-MCNC: 14.9 G/DL (ref 13.3–17.7)
LYMPHOCYTES # BLD AUTO: 1.9 10E9/L (ref 0.8–5.3)
LYMPHOCYTES NFR BLD AUTO: 29.8 %
MCH RBC QN AUTO: 32.1 PG (ref 26.5–33)
MCHC RBC AUTO-ENTMCNC: 32.9 G/DL (ref 31.5–36.5)
MCV RBC AUTO: 98 FL (ref 78–100)
MONOCYTES # BLD AUTO: 0.5 10E9/L (ref 0–1.3)
MONOCYTES NFR BLD AUTO: 8.6 %
NEUTROPHILS # BLD AUTO: 3.4 10E9/L (ref 1.6–8.3)
NEUTROPHILS NFR BLD AUTO: 54.7 %
PLATELET # BLD AUTO: 238 10E9/L (ref 150–450)
POTASSIUM SERPL-SCNC: 4.3 MMOL/L (ref 3.4–5.3)
PSA SERPL-MCNC: 1.11 UG/L (ref 0–4)
RBC # BLD AUTO: 4.64 10E12/L (ref 4.4–5.9)
SODIUM SERPL-SCNC: 140 MMOL/L (ref 133–144)
WBC # BLD AUTO: 6.3 10E9/L (ref 4–11)

## 2017-12-11 PROCEDURE — 84153 ASSAY OF PSA TOTAL: CPT | Performed by: FAMILY MEDICINE

## 2017-12-11 PROCEDURE — 80048 BASIC METABOLIC PNL TOTAL CA: CPT | Performed by: FAMILY MEDICINE

## 2017-12-11 PROCEDURE — 82306 VITAMIN D 25 HYDROXY: CPT | Performed by: FAMILY MEDICINE

## 2017-12-11 PROCEDURE — 85025 COMPLETE CBC W/AUTO DIFF WBC: CPT | Performed by: FAMILY MEDICINE

## 2017-12-11 PROCEDURE — 99397 PER PM REEVAL EST PAT 65+ YR: CPT | Performed by: FAMILY MEDICINE

## 2017-12-11 PROCEDURE — 36415 COLL VENOUS BLD VENIPUNCTURE: CPT | Performed by: FAMILY MEDICINE

## 2017-12-11 ASSESSMENT — PAIN SCALES - GENERAL: PAINLEVEL: NO PAIN (0)

## 2017-12-11 NOTE — PROGRESS NOTES
"  SUBJECTIVE:   CC: Collin Frank is an 67 year old male who presents for preventative health visit.     Healthy Habits:    Do you get at least three servings of calcium containing foods daily (dairy, green leafy vegetables, etc.)? {YES/NO, DAIRY INTAKE:133579::\"yes\"}    Amount of exercise or daily activities, outside of work: {AMOUNT EXERCISE:419232}    Problems taking medications regularly {Yes /No default:284159::\"No\"}    Medication side effects: {Yes /No default.:547277::\"No\"}    Have you had an eye exam in the past two years? {YESNOBLANK:914916}    Do you see a dentist twice per year? {YESNOBLANK:828665}    Do you have sleep apnea, excessive snoring or daytime drowsiness?{YESNOBLANK:859036}    {Outside tests to abstract? :508405}    {additional problems to add (Optional):237864}    Today's PHQ-2 Score:   PHQ-2 ( 1999 Pfizer) 12/11/2017 12/6/2016   Q1: Little interest or pleasure in doing things - 0   Q2: Feeling down, depressed or hopeless - 0   PHQ-2 Score - 0   PHQ-2 Score Incomplete -     {PHQ-2 LOOK IN ASSESSMENTS :702680}  Abuse: Current or Past(Physical, Sexual or Emotional)- {YES/NO/NA:655820}  Do you feel safe in your environment - {YES/NO/NA:382190}    Social History   Substance Use Topics     Smoking status: Former Smoker     Types: Cigarettes     Quit date: 2/1/2011     Smokeless tobacco: Never Used     Alcohol use No     {ETOH AUDIT:139022}    Last PSA:   PSA   Date Value Ref Range Status   12/06/2016 2.72 0 - 4 ug/L Final     Comment:     Assay Method:  Chemiluminescence using Siemens Vista analyzer       Reviewed orders with patient. Reviewed health maintenance and updated orders accordingly - {Yes/No:082726::\"Yes\"}  {Chronicprobdata (Optional):229776}    Reviewed and updated as needed this visit by clinical staff         Reviewed and updated as needed this visit by Provider        {HISTORY OPTIONS (Optional):702882}    ROS:  {MALE ROS-adult preventive care package:552995::\"C: NEGATIVE for fever, " "chills, change in weight\",\"I: NEGATIVE for worrisome rashes, moles or lesions\",\"E: NEGATIVE for vision changes or irritation\",\"ENT: NEGATIVE for ear, mouth and throat problems\",\"R: NEGATIVE for significant cough or SOB\",\"CV: NEGATIVE for chest pain, palpitations or peripheral edema\",\"GI: NEGATIVE for nausea, abdominal pain, heartburn, or change in bowel habits\",\" male: negative for dysuria, hematuria, decreased urinary stream, erectile dysfunction, urethral discharge\",\"M: NEGATIVE for significant arthralgias or myalgia\",\"N: NEGATIVE for weakness, dizziness or paresthesias\",\"P: NEGATIVE for changes in mood or affect\"}    OBJECTIVE:   There were no vitals taken for this visit.  EXAM:  {Exam Choices:653658}    ASSESSMENT/PLAN:   {Diag Picklist:175412}    COUNSELING:  {MALE COUNSELING MESSAGES:497770::\"Reviewed preventive health counseling, as reflected in patient instructions\"}    {BP Counseling- Complete if BP >= 120/80  (Optional):252557}     reports that he quit smoking about 6 years ago. His smoking use included Cigarettes. He has never used smokeless tobacco.  {Tobacco Cessation -- Complete if patient is a smoker (Optional):159434}  Estimated body mass index is 28.7 kg/(m^2) as calculated from the following:    Height as of 11/28/17: 5' 10\" (1.778 m).    Weight as of 12/4/17: 200 lb (90.7 kg).   {Weight Management Plan (ACO) Complete if BMI is abnormal-  Ages 18-64  BMI >24.9.  Age 65+ with BMI <23 or >30 (Optional):044138}    Counseling Resources:  ATP IV Guidelines  Pooled Cohorts Equation Calculator  FRAX Risk Assessment  ICSI Preventive Guidelines  Dietary Guidelines for Americans, 2010  USDA's MyPlate  ASA Prophylaxis  Lung CA Screening    Lauren A. Engelmann, MD  [facility]  "

## 2017-12-11 NOTE — LETTER
[facility]   [address] [place]  [address]  [phone]                                   December 18, 2017    Collin Frank  [address]    [facility] [address]        Dear Collin,    Your lab tests are enclosed - they are all normal and can be repeated in 1 year. Please let me know if you have questions.     Results for orders placed or performed in visit on 12/11/17   Basic metabolic panel   Result Value Ref Range    Sodium 140 133 - 144 mmol/L    Potassium 4.3 3.4 - 5.3 mmol/L    Chloride 103 94 - 109 mmol/L    Carbon Dioxide 30 20 - 32 mmol/L    Anion Gap 7 3 - 14 mmol/L    Glucose 89 70 - 99 mg/dL    Urea Nitrogen 13 7 - 30 mg/dL    Creatinine 0.95 0.66 - 1.25 mg/dL    GFR Estimate 78 >60 mL/min/1.7m2    GFR Estimate If Black >90 >60 mL/min/1.7m2    Calcium 8.9 8.5 - 10.1 mg/dL   CBC with platelets and differential   Result Value Ref Range    WBC 6.3 4.0 - 11.0 10e9/L    RBC Count 4.64 4.4 - 5.9 10e12/L    Hemoglobin 14.9 13.3 - 17.7 g/dL    Hematocrit 45.3 40.0 - 53.0 %    MCV 98 78 - 100 fl    MCH 32.1 26.5 - 33.0 pg    MCHC 32.9 31.5 - 36.5 g/dL    RDW 13.7 10.0 - 15.0 %    Platelet Count 238 150 - 450 10e9/L    Diff Method Automated Method     % Neutrophils 54.7 %    % Lymphocytes 29.8 %    % Monocytes 8.6 %    % Eosinophils 5.9 %    % Basophils 1.0 %    Absolute Neutrophil 3.4 1.6 - 8.3 10e9/L    Absolute Lymphocytes 1.9 0.8 - 5.3 10e9/L    Absolute Monocytes 0.5 0.0 - 1.3 10e9/L    Absolute Eosinophils 0.4 0.0 - 0.7 10e9/L    Absolute Basophils 0.1 0.0 - 0.2 10e9/L   Vitamin D Deficiency   Result Value Ref Range    Vitamin D Deficiency screening 39 20 - 75 ug/L   PSA, tumor marker   Result Value Ref Range    PSA 1.11 0 - 4 ug/L       If you have any questions please call the clinic at [phone]    Sincerely,    Lauren Anneliese Engelmann MD  bmd

## 2017-12-11 NOTE — MR AVS SNAPSHOT
After Visit Summary   12/11/2017    Collin Frank    MRN: [id number]           Patient Information     Date Of Birth          1950        Visit Information        Provider Department      12/11/2017 8:40 AM Engelmann, Lauren Anneliese, [facility]        Today's Diagnoses     Routine history and physical examination of adult    -  1    Factor 5 Leiden mutation, heterozygous (H)        Long term current use of anticoagulant        FH: prostate cancer        Benign non-nodular prostatic hyperplasia without lower urinary tract symptoms        Special screening for malignant neoplasms, colon        Vitamin D deficiency          Care Instructions      Preventive Health Recommendations:   Male Ages 65 and over    Yearly exam:             See your health care provider every year in order to  o   Review health changes.   o   Discuss preventive care.    o   Review your medicines if your doctor has prescribed any.    Talk with your health care provider about whether you should have a test to screen for prostate cancer (PSA).    Every 3 years, have a diabetes test (fasting glucose). If you are at risk for diabetes, you should have this test more often.    Every 5 years, have a cholesterol test. Have this test more often if you are at risk for high cholesterol or heart disease.     Every 10 years, have a colonoscopy. Or, have a yearly FIT test (stool test). These exams will check for colon cancer.    Talk to with your health care provider about screening for Abdominal Aortic Aneurysm if you have a family history of AAA or have a history of smoking.    Shots:     Get a flu shot each year.     Get a tetanus shot every 10 years.     Talk to your doctor about your pneumonia vaccines. There are now two you should receive - Pneumovax (PPSV 23) and Prevnar (PCV 13).     Talk to your doctor about a shingles vaccine.     Talk to your doctor about the hepatitis B vaccine.  Nutrition:     Eat  at least 5 servings of fruits and vegetables each day.     Eat whole-grain bread, whole-wheat pasta and brown rice instead of white grains and rice.     Talk to your provider about Calcium and Vitamin D.   Lifestyle    Exercise for at least 150 minutes a week (30 minutes a day, 5 days a week). This will help you control your weight and prevent disease.     Limit alcohol to one drink per day.     No smoking.     Wear sunscreen to prevent skin cancer.     See your dentist every six months for an exam and cleaning.     See your eye doctor every 1 to 2 years to screen for conditions such as glaucoma, macular degeneration, cataracts, etc   Preventive Health Recommendations:       Male Ages 65 and over    Yearly exam:             See your health care provider every year in order to  o   Review health changes.   o   Discuss preventive care.    o   Review your medicines if your doctor has prescribed any.  Talk with your health care provider about whether you should have a test to screen for prostate cancer (PSA).  Every 3 years, have a diabetes test (fasting glucose). If you are at risk for diabetes, you should have this test more often.  Every 5 years, have a cholesterol test. Have this test more often if you are at risk for high cholesterol or heart disease.   Every 10 years, have a colonoscopy. Or, have a yearly FIT test (stool test). These exams will check for colon cancer.  Talk to with your health care provider about screening for Abdominal Aortic Aneurysm if you have a family history of AAA or have a history of smoking.  Shots:   Get a flu shot each year.   Get a tetanus shot every 10 years.   Talk to your doctor about your pneumonia vaccines. There are now two you should receive - Pneumovax (PPSV 23) and Prevnar (PCV 13).  Talk to your doctor about a shingles vaccine.   Talk to your doctor about the hepatitis B vaccine.  Nutrition:   Eat at least 5 servings of fruits and vegetables each day.   Eat whole-grain bread,  whole-wheat pasta and brown rice instead of white grains and rice.   Talk to your doctor about Calcium and Vitamin D.   Lifestyle  Exercise for at least 150 minutes a week (30 minutes a day, 5 days a week). This will help you control your weight and prevent disease.   Limit alcohol to one drink per day.   No smoking.   Wear sunscreen to prevent skin cancer.   See your dentist every six months for an exam and cleaning.   See your eye doctor every 1 to 2 years to screen for conditions such as glaucoma, macular degeneration and cataracts.          Follow-ups after your visit        Additional Services     GENERAL SURG ADULT REFERRAL       Your provider has referred you to: G: [facility] [place] Kevin Hilliard [phone]   [email or web address]    Dr. Flores for colonoscopy    Please be aware that coverage of these services is subject to the terms and limitations of your health insurance plan.  Call member services at your health plan with any benefit or coverage questions.      Please bring the following with you to your appointment:    (1) Any X-Rays, CTs or MRIs which have been performed.  Contact the facility where they were done to arrange for  prior to your scheduled appointment.   (2) List of current medications   (3) This referral request   (4) Any documents/labs given to you for this referral                  Your next 10 appointments already scheduled     Dec 19, 2017  2:40 PM CST   Anticoagulation Visit with CLAUDIA ANTI COAG   [facility]    [address] [id number] [phone]              Who to contact     If you have questions or need follow up information about today's clinic visit or your schedule please contact [facility] directly at [phone].  Normal or non-critical lab and imaging results will be communicated to you by MyChart, letter or phone  "within 4 business days after the clinic has received the results. If you do not hear from us within 7 days, please contact the clinic through [employer] or phone. If you have a critical or abnormal lab result, we will notify you by phone as soon as possible.  Submit refill requests through [employer] or call your pharmacy and they will forward the refill request to us. Please allow 3 business days for your refill to be completed.          Additional Information About Your Visit        [employer] Information     [employer] lets you send messages to your doctor, view your test results, renew your prescriptions, schedule appointments and more. To sign up, go to [email or web address] . Click on \"Log in\" on the left side of the screen, which will take you to the Welcome page. Then click on \"Sign up Now\" on the right side of the page.     You will be asked to enter the access code listed below, as well as some personal information. Please follow the directions to create your username and password.     Your access code is: [email or web address]  Expires: 2017 11:27 AM     Your access code will  in 90 days. If you need help or a new code, please call your [facility] clinic or [phone].        Care EveryWhere ID     This is your Care EveryWhere ID. This could be used by other organizations to access your [facility] medical records  [id number]        Your Vitals Were     Pulse Temperature Height Pulse Oximetry BMI (Body Mass Index)       54 97.3  F (36.3  C) (Oral) 5' 10\" (1.778 m) 98% 29.41 kg/m2        Blood Pressure from Last 3 Encounters:   17 128/73   17 150/87   17 134/77    Weight from Last 3 Encounters:   17 205 lb (93 kg)   17 200 lb (90.7 kg)   17 197 lb (89.4 kg)              We Performed the Following     Basic metabolic panel     CBC with platelets and differential     GENERAL SURG ADULT REFERRAL     PSA, tumor marker     Vitamin D Deficiency        Primary Care Provider Office " Phone # Fax #    Lauren Anneliese Engelmann, [phone]       [facility] [address]  [facility] [address]        Equal Access to Services     BRENDA BLAS : Hadii aad ku hadfrancisco jo Soreannaali, waaxda luqadaha, qaybta kaalmada adetyleryada, dami kumarfaith murotyler almonte sharon cast. So [facility] [phone].    ATENCIÓN: Si habla español, tiene a chen disposición servicios gratuitos de asistencia lingüística. Llame al [phone].    We comply with applicable federal civil rights laws and [place] laws. We do not discriminate on the basis of race, color, national origin, age, disability, sex, sexual orientation, or gender identity.            Thank you!     Thank you for choosing [facility]  for your care. Our goal is always to provide you with excellent care. Hearing back from our patients is one way we can continue to improve our services. Please take a few minutes to complete the written survey that you may receive in the mail after your visit with us. Thank you!             Your Updated Medication List - Protect others around you: Learn how to safely use, store and throw away your medicines at www.disposemymeds.org.          This list is accurate as of: 12/11/17  9:37 AM.  Always use your most recent med list.                   Brand Name Dispense Instructions for use Diagnosis    cholecalciferol 1000 UNIT tablet    vitamin D3    100 tablet    Take 1 tablet (1,000 Units) by mouth daily    Unspecified vitamin D deficiency       warfarin 5 MG tablet    JANTOVEN    90 tablet    Take 1 tablet (5 mg) by mouth daily    Hyperhomocysteinemia (H), Factor 5 Leiden mutation, heterozygous (H)

## 2017-12-11 NOTE — NURSING NOTE
"Chief Complaint   Patient presents with     Physical       Initial /73 (BP Location: Right arm, Patient Position: Chair, Cuff Size: Adult Regular)  Pulse 54  Temp 97.3  F (36.3  C) (Oral)  Ht 5' 10\" (1.778 m)  Wt 205 lb (93 kg)  SpO2 98%  BMI 29.41 kg/m2 Estimated body mass index is 29.41 kg/(m^2) as calculated from the following:    Height as of this encounter: 5' 10\" (1.778 m).    Weight as of this encounter: 205 lb (93 kg).  Medication Reconciliation: complete   Piedad Novoa MA      "

## 2017-12-11 NOTE — PROGRESS NOTES
SUBJECTIVE:   Collin Frank is a 67 year old male who presents for Preventive Visit.  Are you in the first 12 months of your Medicare Part B coverage?  No    Healthy Habits:    Do you get at least three servings of calcium containing foods daily (dairy, green leafy vegetables, etc.)? yes    Amount of exercise or daily activities, outside of work: 7 day(s) per week    Problems taking medications regularly No    Medication side effects: No    Have you had an eye exam in the past two years? yes    Do you see a dentist twice per year? yes    Do you have sleep apnea, excessive snoring or daytime drowsiness?no    COGNITIVE SCREEN  1) Repeat 3 items (Banana, Sunrise, Chair)    2) Clock draw: NORMAL  3) 3 item recall: Recalls 2 objects   Results: NORMAL clock, 1-2 items recalled: COGNITIVE IMPAIRMENT LESS LIKELY    Mini-CogTM Copyright S Samuel. Licensed by the author for use in [facility]; reprinted with permission (davon@[facility]). All rights reserved.        Reviewed and updated as needed this visit by clinical staffTobacco  Allergies  Meds  Med Hx  Surg Hx  Fam Hx  Soc Hx        Reviewed and updated as needed this visit by Provider        Social History   Substance Use Topics     Smoking status: Former Smoker     Types: Cigarettes     Quit date: 2/1/2011     Smokeless tobacco: Never Used     Alcohol use No       The patient does not drink >3 drinks per day nor >7 drinks per week.     Today's PHQ-2 Score:   PHQ-2 ( 1999 Pfizer) 12/11/2017 12/6/2016   Q1: Little interest or pleasure in doing things 0 0   Q2: Feeling down, depressed or hopeless 0 0   PHQ-2 Score 0 0   PHQ-2 Score Incomplete -         Do you feel safe in your environment - Yes    Do you have a Health Care Directive?: Yes: Patient states has Advance Directive and will bring in a copy to clinic.    Current providers sharing in care for this patient include:   Patient Care Team:  Engelmann, Lauren Anneliese, MD as PCP - General (Family  "Practice)      Hearing impairment: Yes, Difficulty understanding soft or whispered speech.    Ability to successfully perform activities of daily living: Yes, no assistance needed     Fall risk:  Fall Risk Assessment not completed.    Home safety:  none identified      The following health maintenance items are reviewed in Epic and correct as of today:  Health Maintenance   Topic Date Due     ADVANCE DIRECTIVE PLANNING Q5 YRS  11/28/2016     OP ANNUAL INR REFERRAL  12/01/2016     FALL RISK ASSESSMENT  12/06/2017     COLON CANCER SCREEN (SYSTEM ASSIGNED)  01/17/2018     LIPID SCREEN Q5 YR MALE (SYSTEM ASSIGNED)  12/01/2020     TETANUS IMMUNIZATION (SYSTEM ASSIGNED)  10/16/2022     INFLUENZA VACCINE (SYSTEM ASSIGNED)  Completed     PNEUMOCOCCAL  Completed     AORTIC ANEURYSM SCREENING (SYSTEM ASSIGNED)  Completed     HEPATITIS C SCREENING  Completed     Labs reviewed in EPIC  BP Readings from Last 3 Encounters:   12/11/17 128/73   12/04/17 150/87   09/26/17 134/77    Wt Readings from Last 3 Encounters:   12/11/17 205 lb (93 kg)   12/04/17 200 lb (90.7 kg)   11/28/17 197 lb (89.4 kg)            ROS:  Constitutional, HEENT, cardiovascular, pulmonary, gi and gu systems are negative, except as otherwise noted.      OBJECTIVE:   /73 (BP Location: Right arm, Patient Position: Chair, Cuff Size: Adult Regular)  Pulse 54  Temp 97.3  F (36.3  C) (Oral)  Ht 5' 10\" (1.778 m)  Wt 205 lb (93 kg)  SpO2 98%  BMI 29.41 kg/m2 Estimated body mass index is 29.41 kg/(m^2) as calculated from the following:    Height as of this encounter: 5' 10\" (1.778 m).    Weight as of this encounter: 205 lb (93 kg).  EXAM:   GENERAL: healthy, alert and no distress  NECK: no adenopathy, no asymmetry, masses, or scars and thyroid normal to palpation  RESP: lungs clear to auscultation - no rales, rhonchi or wheezes  CV: regular rate and rhythm, normal S1 S2, no S3 or S4, no murmur, click or rub, no peripheral edema and peripheral pulses " "strong  ABDOMEN: soft, nontender, no hepatosplenomegaly, no masses and bowel sounds normal  MS: no gross musculoskeletal defects noted, no edema  SKIN: no suspicious lesions or rashes  NEURO: Normal strength and tone, mentation intact and speech normal  PSYCH: mentation appears normal, affect normal/bright    ASSESSMENT / PLAN:       ICD-10-CM    1. Routine history and physical examination of adult Z00.00 CANCELED: Lipid panel reflex to direct LDL Fasting   2. Factor 5 Leiden mutation, heterozygous (H) D68.51 Basic metabolic panel     CBC with platelets and differential   3. Benign non-nodular prostatic hyperplasia without lower urinary tract symptoms N40.0 PSA, tumor marker   4. Vitamin D deficiency E55.9 Vitamin D Deficiency   5. Long term current use of anticoagulant Z79.01 CBC with platelets and differential   6. FH: prostate cancer Z80.42 PSA, tumor marker   7. Special screening for malignant neoplasms, colon Z12.11 GENERAL SURG ADULT REFERRAL     CANCELED: GENERAL SURG ADULT REFERRAL       End of Life Planning:  Patient currently has an advanced directive: Yes.  Practitioner is supportive of decision.    COUNSELING:  Reviewed preventive health counseling, as reflected in patient instructions       Regular exercise       Healthy diet/nutrition       Colon cancer screening        Estimated body mass index is 29.41 kg/(m^2) as calculated from the following:    Height as of this encounter: 5' 10\" (1.778 m).    Weight as of this encounter: 205 lb (93 kg).     reports that he quit smoking about 6 years ago. His smoking use included Cigarettes. He has never used smokeless tobacco.        Appropriate preventive services were discussed with this patient, including applicable screening as appropriate for cardiovascular disease, diabetes, osteopenia/osteoporosis, and glaucoma.  As appropriate for age/gender, discussed screening for colorectal cancer, prostate cancer, breast cancer, and cervical cancer. Checklist " reviewing preventive services available has been given to the patient.    Reviewed patients plan of care and provided an AVS. The Basic Care Plan (routine screening as documented in Health Maintenance) for Collin meets the Care Plan requirement. This Care Plan has been established and reviewed with the Patient.    Counseling Resources:  ATP IV Guidelines  Pooled Cohorts Equation Calculator  Breast Cancer Risk Calculator  FRAX Risk Assessment  ICSI Preventive Guidelines  Dietary Guidelines for Americans, 2010  USDA's MyPlate  ASA Prophylaxis  Lung CA Screening    Lauren A. Engelmann, MD  [facility]

## 2017-12-12 LAB — DEPRECATED CALCIDIOL+CALCIFEROL SERPL-MC: 39 UG/L (ref 20–75)

## 2017-12-18 ENCOUNTER — TELEPHONE (OUTPATIENT)
Dept: FAMILY MEDICINE | Facility: CLINIC | Age: 67
End: 2017-12-18

## 2017-12-18 NOTE — TELEPHONE ENCOUNTER
It does not appear that lab results from 12/11/17 visit have been addressed by provider.    Routed to Dr. Ibarra for result note and then TC can mail.    Cami Jolley RN  [facility]

## 2017-12-18 NOTE — PROGRESS NOTES
Please mail lab letter:     Dear Mr. Frank,     Your lab tests are enclosed - they are all normal and can be repeated in 1 year. Please let me know if you have questions.     Sincerely,   Lauren Engelmann, MD

## 2017-12-18 NOTE — TELEPHONE ENCOUNTER
Reason for Call:  Request for results:    Name of test or procedure: N/A    Date of test of procedure: 12/11/17    Location of the test or procedure: [facility]    OK to leave the result message on voice mail or with a family member? Not Applicable    Phone number Patient can be reached at:  Home number on file [phone] (home)    Additional comments: Patient would like his results sent to him at his home address via mail.     Call taken on 12/18/2017 at 9:57 AM by Rodrigo Silva

## 2017-12-19 ENCOUNTER — ANTICOAGULATION THERAPY VISIT (OUTPATIENT)
Dept: NURSING | Facility: CLINIC | Age: 67
End: 2017-12-19
Payer: COMMERCIAL

## 2017-12-19 DIAGNOSIS — D68.51 FACTOR 5 LEIDEN MUTATION, HETEROZYGOUS (H): ICD-10-CM

## 2017-12-19 DIAGNOSIS — I26.99 PULMONARY EMBOLISM WITH INFARCTION (H): ICD-10-CM

## 2017-12-19 DIAGNOSIS — I82.4Y9 ACUTE VENOUS EMBOLISM AND THROMBOSIS OF DEEP VESSELS OF PROXIMAL LOWER EXTREMITY, UNSPECIFIED LATERALITY (H): ICD-10-CM

## 2017-12-19 DIAGNOSIS — Z79.01 LONG-TERM (CURRENT) USE OF ANTICOAGULANTS: ICD-10-CM

## 2017-12-19 LAB — INR POINT OF CARE: 2.9 (ref 0.86–1.14)

## 2017-12-19 PROCEDURE — 99207 ZZC NO CHARGE NURSE ONLY: CPT

## 2017-12-19 PROCEDURE — 36416 COLLJ CAPILLARY BLOOD SPEC: CPT

## 2017-12-19 PROCEDURE — 85610 PROTHROMBIN TIME: CPT | Mod: QW

## 2017-12-19 NOTE — PROGRESS NOTES
ANTICOAGULATION FOLLOW-UP CLINIC VISIT    Patient Name:  Collin Frank  Date:  12/19/2017  Contact Type:  Face to Face    SUBJECTIVE: Patient reports diet and medicine are unchanged.  He has no immediate plans for any additional steroid injections, he might have knee replacements next year.  Denies symptoms of bleeding or clotting.  No illness.  No GI problems.  Plan same dose and recheck.     Patient Findings     Positives No Problem Findings           OBJECTIVE    INR Protime   Date Value Ref Range Status   12/19/2017 2.9 (A) 0.86 - 1.14 Final       ASSESSMENT / PLAN  INR assessment THER    Recheck INR In: 4 WEEKS    INR Location Clinic      Anticoagulation Summary as of 12/19/2017     INR goal 2.0-3.0   Today's INR 2.9   Maintenance plan 7.5 mg (5 mg x 1.5) on Mon; 5 mg (5 mg x 1) all other days   Full instructions 7.5 mg on Mon; 5 mg all other days   Weekly total 37.5 mg   No change documented Minal Carrion RN   Plan last modified Minal Carrion RN (12/5/2017)   Next INR check 1/16/2018   Target end date Indefinite    Indications   Long-term (current) use of anticoagulants [Z79.01] [Z79.01]  Pulmonary embolism with infarction (HCC) [I26.99] [I26.99]  Acute venous embolism and thrombosis of deep vessels of proximal lower extremity (H) [I82.4Y9]  Factor 5 Leiden mutation  heterozygous (H) [D68.51]         Anticoagulation Episode Summary     INR check location     Preferred lab     Send INR reminders to [facility]    Comments       Anticoagulation Care Providers     Provider Role Specialty Phone number    Engelmann, Lauren Anneliese, MD  [facility] [phone]            See the Encounter Report to view Anticoagulation Flowsheet and Dosing Calendar (Go to Encounters tab in chart review, and find the Anticoagulation Therapy Visit)    Dosage adjustment made based on physician directed care plan.    Minal Carrion RN

## 2018-01-16 ENCOUNTER — ANTICOAGULATION THERAPY VISIT (OUTPATIENT)
Dept: NURSING | Facility: CLINIC | Age: 68
End: 2018-01-16
Payer: COMMERCIAL

## 2018-01-16 DIAGNOSIS — D68.51 FACTOR 5 LEIDEN MUTATION, HETEROZYGOUS (H): ICD-10-CM

## 2018-01-16 DIAGNOSIS — I26.99 PULMONARY EMBOLISM WITH INFARCTION (H): ICD-10-CM

## 2018-01-16 DIAGNOSIS — I82.4Y9 ACUTE VENOUS EMBOLISM AND THROMBOSIS OF DEEP VESSELS OF PROXIMAL LOWER EXTREMITY, UNSPECIFIED LATERALITY (H): ICD-10-CM

## 2018-01-16 DIAGNOSIS — Z79.01 LONG-TERM (CURRENT) USE OF ANTICOAGULANTS: ICD-10-CM

## 2018-01-16 LAB — INR POINT OF CARE: 4.4 (ref 0.86–1.14)

## 2018-01-16 PROCEDURE — 36416 COLLJ CAPILLARY BLOOD SPEC: CPT

## 2018-01-16 PROCEDURE — 99207 ZZC NO CHARGE NURSE ONLY: CPT

## 2018-01-16 PROCEDURE — 85610 PROTHROMBIN TIME: CPT | Mod: QW

## 2018-01-16 NOTE — PROGRESS NOTES
ANTICOAGULATION FOLLOW-UP CLINIC VISIT    Patient Name:  Collin Frank  Date:  1/16/2018  Contact Type:  Face to Face    SUBJECTIVE: Patient denies symptoms of bleeding or clotting.  Reports medicine is the same.  He has had more tea recently, green and black but no food dietary changes.  He is thinking about having Sarah supplement but will wait on that.  He denies being in pain or any recent illness or GI issues. States he needs a colonoscopy because it has been 10 years.  I printed his referral and he will check on that.  When that is arranged we will get warfarin orders from his primary doctor.  Otherwise today he will hold warfarin then 1/2 dose tomorrow and decrease weekly schedule to 5 MG daily and recheck in 2 weeks.  Patient agreeable to plan of care.     Patient Findings     Positives No Problem Findings           OBJECTIVE    INR Protime   Date Value Ref Range Status   01/16/2018 4.4 (A) 0.86 - 1.14 Final       ASSESSMENT / PLAN  INR assessment SUPRA    Recheck INR In: 2 WEEKS    INR Location Clinic      Anticoagulation Summary as of 1/16/2018     INR goal 2.0-3.0   Today's INR 4.4!   Maintenance plan 5 mg (5 mg x 1) every day   Full instructions 1/16: Hold; 1/17: 2.5 mg; Otherwise 5 mg every day   Weekly total 35 mg   Plan last modified Minal Carrion, RN (1/16/2018)   Next INR check 1/30/2018   Target end date Indefinite    Indications   Long-term (current) use of anticoagulants [Z79.01] [Z79.01]  Pulmonary embolism with infarction (HCC) [I26.99] [I26.99]  Acute venous embolism and thrombosis of deep vessels of proximal lower extremity (H) [I82.4Y9]  Factor 5 Leiden mutation  heterozygous (H) [D68.51]         Anticoagulation Episode Summary     INR check location     Preferred lab     Send IN[facility] reminders to [facility]    Comments       Anticoagulation Care Providers     Provider Role Specialty Phone number    Engelmann, Lauren Anneliese, MD  Family Practice [phone]            See the  Encounter Report to view Anticoagulation Flowsheet and Dosing Calendar (Go to Encounters tab in chart review, and find the Anticoagulation Therapy Visit)    Dosage adjustment made based on physician directed care plan.    Minal Carrion RN

## 2018-01-16 NOTE — MR AVS SNAPSHOT
Collin Frank   1/16/2018 2:40 PM   Anticoagulation Therapy Visit    Description:  67 year old male   Provider:  CLAUDIA ANTI COAG   Department:  [facility]           INR as of 1/16/2018     Today's INR 4.4!      Anticoagulation Summary as of 1/16/2018     INR goal 2.0-3.0   Today's INR 4.4!   Full instructions 1/16: Hold; 1/17: 2.5 mg; Otherwise 5 mg every day   Next INR check 1/30/2018    Indications   Long-term (current) use of anticoagulants [Z79.01] [Z79.01]  Pulmonary embolism with infarction (HCC) [I26.99] [I26.99]  Acute venous embolism and thrombosis of deep vessels of proximal lower extremity (H) [I82.4Y9]  Factor 5 Leiden mutation  heterozygous (H) [D68.51]         Your next Anticoagulation Clinic appointment(s)     Jan 30, 2018  1:40 PM CST   Anticoagulation [facility] with CLAUDIA ANTI SALONIG   [facility]    [address] [address] [phone]              Contact Numbers     [facility]  Please call [phone] or [phone]  to cancel and/or reschedule your appointment.   Please call [phone] with any problems or questions regarding your therapy          January 2018 Details    Sun Mon Tue Wed Thu Fri Sat      1               2               3               4               5               6                 7               8               9               10               11               12               13                 14               15               16      Hold   See details      17      2.5 mg         18      5 mg         19      5 mg         20      5 mg           21      5 mg         22      5 mg         23      5 mg         24      5 mg         25      5 mg         26      5 mg         27      5 mg           28      5 mg         29      5 mg         30            31                   Date Details   01/16 This INR check       Date of next INR:  1/30/2018         How to take your warfarin  dose     To take:  2.5 mg Take 0.5 of a 5 mg tablet.    To take:  5 mg Take 1 of the 5 mg tablets.    Hold Do not take your warfarin dose. See the Details table to the right for additional instructions.

## 2018-01-18 ENCOUNTER — TELEPHONE (OUTPATIENT)
Dept: FAMILY MEDICINE | Facility: CLINIC | Age: 68
End: 2018-01-18

## 2018-01-18 NOTE — TELEPHONE ENCOUNTER
Patient left voicemail with INR Clinic that he is having a colonoscopy with Dr Flores at the [facility] on 2-1-18.    Their office instructions are to be off warfarin x 7 days and our guidelines are usually 5 days.  He is calling to verify orders for warfarin from primary doctor and does he need bridging?  If so, he uses pharmacy above.  Please advise.  Thank you    Route to team 2 / PCP    Minal Humphrey RN

## 2018-01-23 NOTE — TELEPHONE ENCOUNTER
Patient called again today to inquire on below inquiry last week.    I advised patient that Dr Salgado needs to let us know if Lovenox Shots are needed when patient stops Warfarin Saturday 1-27-18 for the 2-1-18 Colonoscopy.    Please advise.  He uses the pharmacy above.    Route to PCP    Minal Humphrey RN

## 2018-01-23 NOTE — TELEPHONE ENCOUNTER
I will have an answer for him tomorrow - I am out of the clinic today and do not have a copy of the guideline, and I want to consult with the endoscopist.     Thank you.   Lauren Engelmann, MD

## 2018-01-23 NOTE — TELEPHONE ENCOUNTER
I called and spoke to patient at home number, advised him provider will address this tomorrow; advised he call us in early afternoon if we have not gotten back to him.  Will leave this open in RN basket to address follow up.  Cami Jolley RN  [facility]

## 2018-01-24 NOTE — TELEPHONE ENCOUNTER
Addressed by Dr. Engelmann this afternoon.   Can wait for her, she wants to discuss with Endoscopist.    Usually, if no other specific risks for VTE, for low bleeding risk procedure hold coumadin 5 days prior to procedure, no bridging is needed.     Eli Sauer MD.   Family Physician.  [facility].

## 2018-01-24 NOTE — TELEPHONE ENCOUNTER
Provider is not in the office today. Routing to a covering provider to determine what his orders should be for warfarin prior to having a Colonoscopy and if he needs bridging.     Lucía Painting RN  [facility]

## 2018-01-25 NOTE — TELEPHONE ENCOUNTER
Noted.     Therefore, no need to bridge with Lovenox before colonoscopy.     His last dose should be on January 27, then hold.     Resume normal dose the evening of his colonoscopy (after the procedure).     Thanks!  Lauren Engelmann, MD

## 2018-01-25 NOTE — TELEPHONE ENCOUNTER
Attempt # 1  Called patient at home number.  Was call answered?  Yes, relayed below message from Dr. Engelmann - patient verbalized understanding    Lulu Myers RN  [employer]

## 2018-01-25 NOTE — TELEPHONE ENCOUNTER
"Attempted to call Collin at his home number listed, no answering service, \"remote access code\" was requested.   I called JUVE Whitney, she is with Collin, she put him on speaker phone and I advised him of plan for holding and resuming warfarin for procedure (colonoscopy on Feb 1).    Patient verbalized understanding of and agreement with plan.  Cami Jolley RN  [facility]      "

## 2018-01-25 NOTE — TELEPHONE ENCOUNTER
Please call patient and ask him if he has ever had a blood clot when he was off warfarin for a procedure. Need to know this info today. Thanks!    Lauren Engelmann, MD

## 2018-01-25 NOTE — TELEPHONE ENCOUNTER
Patient returned call    Patient states the only blood clot he has every had was in 2011 went to [facility] for it and they prescribed Warfarin then. Denies being on any kind of blood thinner prior to that incident.    Lulu Myers RN  [facility]

## 2018-01-25 NOTE — TELEPHONE ENCOUNTER
Attempt # 1  Called patient at home number and wife's mobile number.  Was call answered?  No, left message to call nurse line.     Lulu Myers RN  [facility]

## 2018-01-30 ENCOUNTER — ANTICOAGULATION THERAPY VISIT (OUTPATIENT)
Dept: NURSING | Facility: CLINIC | Age: 68
End: 2018-01-30
Payer: COMMERCIAL

## 2018-01-30 DIAGNOSIS — I26.99 PULMONARY EMBOLISM WITH INFARCTION (H): ICD-10-CM

## 2018-01-30 DIAGNOSIS — D68.51 FACTOR 5 LEIDEN MUTATION, HETEROZYGOUS (H): ICD-10-CM

## 2018-01-30 DIAGNOSIS — Z79.01 LONG-TERM (CURRENT) USE OF ANTICOAGULANTS: ICD-10-CM

## 2018-01-30 DIAGNOSIS — I82.4Y9 ACUTE VENOUS EMBOLISM AND THROMBOSIS OF DEEP VESSELS OF PROXIMAL LOWER EXTREMITY, UNSPECIFIED LATERALITY (H): ICD-10-CM

## 2018-01-30 LAB — INR POINT OF CARE: 1.7 (ref 0.86–1.14)

## 2018-01-30 PROCEDURE — 85610 PROTHROMBIN TIME: CPT | Mod: QW

## 2018-01-30 PROCEDURE — 36416 COLLJ CAPILLARY BLOOD SPEC: CPT

## 2018-01-30 PROCEDURE — 99207 ZZC NO CHARGE NURSE ONLY: CPT

## 2018-01-30 NOTE — PROGRESS NOTES
ANTICOAGULATION FOLLOW-UP CLINIC VISIT    Patient Name:  Collin Frank  Date:  1/30/2018  Contact Type:  Face to Face    SUBJECTIVE: Patient reports he started to hold warfarin as ordered before colonoscopy this Thurs.  He denies symptoms of clotting or bleeding.  His other medicines are the same except for the colon prep.  His primary doctor orders were no bridging with lovenox needed. Plan extra warfarin x 2 days when he restarts warfarin the evening of the procedure and recheck that Monday in INR Clinic.  Patient to call nurse with any changes or problems. Patient agreeable to plan of care.     Patient Findings     Positives Intentional hold of therapy (warfarin hold x 5 days before colonoscopy 2-1-18.  No briding with lovenox per primary doctor engelmann)           OBJECTIVE    INR Protime   Date Value Ref Range Status   01/30/2018 1.7 (A) 0.86 - 1.14 Final       ASSESSMENT / PLAN  INR assessment SUB    Recheck INR In: 6 DAYS    INR Location Clinic      Anticoagulation Summary as of 1/30/2018     INR goal 2.0-3.0   Today's INR 1.7!   Maintenance plan 5 mg (5 mg x 1) every day   Full instructions 1/30: Hold; 1/31: Hold; 2/1: 7.5 mg; 2/2: 7.5 mg; Otherwise 5 mg every day   Weekly total 35 mg   Plan last modified Minal Carrion, RN (1/16/2018)   Next INR check 2/5/2018   Target end date Indefinite    Indications   Long-term (current) use of anticoagulants [Z79.01] [Z79.01]  Pulmonary embolism with infarction (HCC) [I26.99] [I26.99]  Acute venous embolism and thrombosis of deep vessels of proximal lower extremity (H) [I82.4Y9]  Factor 5 Leiden mutation  heterozygous (H) [D68.51]         Anticoagulation Episode Summary     INR check location     Preferred lab     Send INR reminders to [facility]    Comments       Anticoagulation Care Providers     Provider Role Specialty Phone number    Engelmann, Lauren Anneliese, MD  Family Practice [phone]            See the Encounter Report to view Anticoagulation  Flowsheet and Dosing Calendar (Go to Encounters tab in chart review, and find the Anticoagulation Therapy Visit)    Dosage adjustment made based on physician directed care plan.    Minal Carrion RN

## 2018-01-30 NOTE — MR AVS SNAPSHOT
Collin Frank   1/30/2018 1:40 PM   Anticoagulation Therapy Visit    Description:  67 year old male   Provider:  CLAUDIA ANTI COAG   Department:  Cp Nurse           INR as of 1/30/2018     Today's INR 1.7!      Anticoagulation Summary as of 1/30/2018     INR goal 2.0-3.0   Today's INR 1.7!   Full instructions 1/30: Hold; 1/31: Hold; 2/1: 7.5 mg; 2/2: 7.5 mg; Otherwise 5 mg every day   Next INR check 2/5/2018    Indications   Long-term (current) use of anticoagulants [Z79.01] [Z79.01]  Pulmonary embolism with infarction (HCC) [I26.99] [I26.99]  Acute venous embolism and thrombosis of deep vessels of proximal lower extremity (H) [I82.4Y9]  Factor 5 Leiden mutation  heterozygous (H) [D68.51]         Your next Anticoagulation Clinic appointment(s)     Feb 05, 2018  2:40 PM CST   Anticoagulation Visit with CP ANTI COAG   [facility]    [address] [place] [address] [phone]              Contact Numbers     [facility]  Please call [phone] or [phone]  to cancel and/or reschedule your appointment.   Please call [phone] with any problems or questions regarding your therapy          January 2018 Details    Sun Mon Tue Wed Thu Fri Sat      1               2               3               4               5               6                 7               8               9               10               11               12               13                 14               15               16               17               18               19               20                 21               22               23               24               25               26               27                 28               29               30      Hold   See details      31      Hold             Date Details   01/30 This INR check               How to take your warfarin dose     Hold Do not take your warfarin dose. See  the Details table to the right for additional instructions.                February 2018 Details    Sun Mon Tue Wed Thu Fri Sat         1      7.5 mg         2      7.5 mg         3      5 mg           4      5 mg         5            6               7               8               9               10                 11               12               13               14               15               16               17                 18               19               20               21               22               23               24                 25               26               27               28                   Date Details   No additional details    Date of next INR:  2/5/2018         How to take your warfarin dose     To take:  5 mg Take 1 of the 5 mg tablets.    To take:  7.5 mg Take 1.5 of the 5 mg tablets.

## 2018-02-01 ENCOUNTER — SURGERY (OUTPATIENT)
Age: 68
End: 2018-02-01

## 2018-02-01 ENCOUNTER — HOSPITAL ENCOUNTER (OUTPATIENT)
Facility: AMBULATORY SURGERY CENTER | Age: 68
Discharge: HOME | End: 2018-02-01
Attending: SURGERY | Admitting: SURGERY
Payer: COMMERCIAL

## 2018-02-01 VITALS
DIASTOLIC BLOOD PRESSURE: 65 MMHG | OXYGEN SATURATION: 92 % | TEMPERATURE: 97.5 F | RESPIRATION RATE: 16 BRPM | SYSTOLIC BLOOD PRESSURE: 128 MMHG

## 2018-02-01 DIAGNOSIS — Z79.01 LONG-TERM (CURRENT) USE OF ANTICOAGULANTS: Primary | ICD-10-CM

## 2018-02-01 LAB
COLONOSCOPY: NORMAL
INR BLD: 1.5 (ref 0.86–1.14)

## 2018-02-01 PROCEDURE — G0121 COLON CA SCRN NOT HI RSK IND: HCPCS

## 2018-02-01 PROCEDURE — G8907 PT DOC NO EVENTS ON DISCHARG: HCPCS

## 2018-02-01 PROCEDURE — G0121 COLON CA SCRN NOT HI RSK IND: HCPCS | Performed by: SURGERY

## 2018-02-01 PROCEDURE — 36416 COLLJ CAPILLARY BLOOD SPEC: CPT | Performed by: FAMILY MEDICINE

## 2018-02-01 PROCEDURE — G0500 MOD SEDAT ENDO SERVICE >5YRS: HCPCS | Performed by: SURGERY

## 2018-02-01 PROCEDURE — G8918 PT W/O PREOP ORDER IV AB PRO: HCPCS

## 2018-02-01 PROCEDURE — 85610 PROTHROMBIN TIME: CPT | Mod: QW | Performed by: FAMILY MEDICINE

## 2018-02-01 RX ORDER — LIDOCAINE 40 MG/G
CREAM TOPICAL
Status: DISCONTINUED | OUTPATIENT
Start: 2018-02-01 | End: 2018-02-02 | Stop reason: HOSPADM

## 2018-02-01 RX ORDER — FENTANYL CITRATE 50 UG/ML
INJECTION, SOLUTION INTRAMUSCULAR; INTRAVENOUS PRN
Status: DISCONTINUED | OUTPATIENT
Start: 2018-02-01 | End: 2018-02-01 | Stop reason: HOSPADM

## 2018-02-01 RX ADMIN — FENTANYL CITRATE 50 MCG: 50 INJECTION, SOLUTION INTRAMUSCULAR; INTRAVENOUS at 09:57

## 2018-02-01 RX ADMIN — FENTANYL CITRATE 50 MCG: 50 INJECTION, SOLUTION INTRAMUSCULAR; INTRAVENOUS at 09:55

## 2018-02-05 ENCOUNTER — ANTICOAGULATION THERAPY VISIT (OUTPATIENT)
Dept: NURSING | Facility: CLINIC | Age: 68
End: 2018-02-05
Payer: COMMERCIAL

## 2018-02-05 DIAGNOSIS — Z79.01 LONG-TERM (CURRENT) USE OF ANTICOAGULANTS: ICD-10-CM

## 2018-02-05 DIAGNOSIS — I82.4Y9 ACUTE VENOUS EMBOLISM AND THROMBOSIS OF DEEP VESSELS OF PROXIMAL LOWER EXTREMITY, UNSPECIFIED LATERALITY (H): ICD-10-CM

## 2018-02-05 DIAGNOSIS — I26.99 PULMONARY EMBOLISM WITH INFARCTION (H): ICD-10-CM

## 2018-02-05 DIAGNOSIS — D68.51 FACTOR 5 LEIDEN MUTATION, HETEROZYGOUS (H): ICD-10-CM

## 2018-02-05 LAB — INR POINT OF CARE: 2.1 (ref 0.86–1.14)

## 2018-02-05 PROCEDURE — 99207 ZZC NO CHARGE NURSE ONLY: CPT

## 2018-02-05 PROCEDURE — 36416 COLLJ CAPILLARY BLOOD SPEC: CPT

## 2018-02-05 PROCEDURE — 85610 PROTHROMBIN TIME: CPT | Mod: QW

## 2018-02-05 NOTE — PROGRESS NOTES
ANTICOAGULATION FOLLOW-UP CLINIC VISIT    Patient Name:  Collin Frank  Date:  2/5/2018  Contact Type:  Face to Face    SUBJECTIVE: Patient here for recheck after intentional hold with colonoscopy, see previous encounters.  We restarted his warfarin at higher dose x 2 days and then resume usual schedule and he is in range. He states no new medicine was prescribed and he is advancing diet as tolerated.  He has not experienced bleeding or clotting.  He feels well.  Plan 5 MG warfarin daily and recheck in 2 weeks.  Patient agreeable to plan of care.     Patient Findings     Positives Other complaints (pt had colonoscopy and diverticulosis was found), No Problem Findings           OBJECTIVE    INR Protime   Date Value Ref Range Status   02/05/2018 2.1 (A) 0.86 - 1.14 Final       ASSESSMENT / PLAN  INR assessment THER    Recheck INR In: 2 WEEKS    INR Location Clinic      Anticoagulation Summary as of 2/5/2018     INR goal 2.0-3.0   Today's INR 2.1   Maintenance plan 5 mg (5 mg x 1) every day   Full instructions 5 mg every day   Weekly total 35 mg   No change documented Minal Carrion, RN   Plan last modified Minal Carrion, RN (1/16/2018)   Next INR check 2/19/2018   Target end date Indefinite    Indications   Long-term (current) use of anticoagulants [Z79.01] [Z79.01]  Pulmonary embolism with infarction (HCC) [I26.99] [I26.99]  Acute venous embolism and thrombosis of deep vessels of proximal lower extremity (H) [I82.4Y9]  Factor 5 Leiden mutation  heterozygous (H) [D68.51]         Anticoagulation Episode Summary     INR check location     Preferred lab     Send INR reminders to [facility]    Comments       Anticoagulation Care Providers     Provider Role Specialty Phone number    Engelmann, Lauren Anneliese, MD  Family Practice [phone]            See the Encounter Report to view Anticoagulation Flowsheet and Dosing Calendar (Go to Encounters tab in chart review, and find the Anticoagulation Therapy  Visit)    Dosage adjustment made based on physician directed care plan.    Minal Carrion RN

## 2018-02-05 NOTE — MR AVS SNAPSHOT
Collin Frank   2/5/2018 2:40 PM   Anticoagulation Therapy Visit    Description:  67 year old male   Provider:  CP ANTI COAG   Department:  Cp Nurse           INR as of 2/5/2018     Today's INR 2.1      Anticoagulation Summary as of 2/5/2018     INR goal 2.0-3.0   Today's INR 2.1   Full instructions 5 mg every day   Next INR check 2/19/2018    Indications   Long-term (current) use of anticoagulants [Z79.01] [Z79.01]  Pulmonary embolism with infarction (HCC) [I26.99] [I26.99]  Acute venous embolism and thrombosis of deep vessels of proximal lower extremity (H) [I82.4Y9]  Factor 5 Leiden mutation  heterozygous (H) [D68.51]         Your next Anticoagulation Clinic appointment(s)     Feb 05, 2018  2:40 PM CST   Anticoagulation Visit with CP ANTI COAG   [facility]    [address] [id number] [phone]            Feb 19, 2018  1:40 PM CST   Anticoagulation Visit with CP ANTI COAG   [facility] ([facility])    [address] [id number] [phone]              Contact Numbers     [facility]  Please call [phone] or [phone]  to cancel and/or reschedule your appointment.   Please call [phone] with any problems or questions regarding your therapy          February 2018 Details    Sun Mon Tue Wed Thu Fri Sat         1               2               3                 4               5      5 mg   See details      6      5 mg         7      5 mg         8      5 mg         9      5 mg         10      5 mg           11      5 mg         12      5 mg         13      5 mg         14      5 mg         15      5 mg         16      5 mg         17      5 mg           18      5 mg         19            20               21               22               23               24                 25               26                27               28                   Date Details   02/05 This INR check       Date of next INR:  2/19/2018         How to take your warfarin dose     To take:  5 mg Take 1 of the 5 mg tablets.

## 2018-02-19 ENCOUNTER — ANTICOAGULATION THERAPY VISIT (OUTPATIENT)
Dept: NURSING | Facility: CLINIC | Age: 68
End: 2018-02-19
Payer: COMMERCIAL

## 2018-02-19 DIAGNOSIS — I26.99 PULMONARY EMBOLISM WITH INFARCTION (H): ICD-10-CM

## 2018-02-19 DIAGNOSIS — I82.4Y9 ACUTE VENOUS EMBOLISM AND THROMBOSIS OF DEEP VESSELS OF PROXIMAL LOWER EXTREMITY, UNSPECIFIED LATERALITY (H): ICD-10-CM

## 2018-02-19 DIAGNOSIS — Z79.01 LONG-TERM (CURRENT) USE OF ANTICOAGULANTS: ICD-10-CM

## 2018-02-19 DIAGNOSIS — D68.51 FACTOR 5 LEIDEN MUTATION, HETEROZYGOUS (H): ICD-10-CM

## 2018-02-19 LAB — INR POINT OF CARE: 2.7 (ref 0.86–1.14)

## 2018-02-19 PROCEDURE — 36416 COLLJ CAPILLARY BLOOD SPEC: CPT

## 2018-02-19 PROCEDURE — 99207 ZZC NO CHARGE NURSE ONLY: CPT

## 2018-02-19 PROCEDURE — 85610 PROTHROMBIN TIME: CPT | Mod: QW

## 2018-02-19 NOTE — MR AVS SNAPSHOT
Collin Frank   2/19/2018 1:40 PM   Anticoagulation Therapy Visit    Description:  67 year old male   Provider:  CLAUDIA ANTI COAG   Department:  Cp Nurse           INR as of 2/19/2018     Today's INR 2.7      Anticoagulation Summary as of 2/19/2018     INR goal 2.0-3.0   Today's INR 2.7   Full instructions 5 mg every day   Next INR check 3/15/2018    Indications   Long-term (current) use of anticoagulants [Z79.01] [Z79.01]  Pulmonary embolism with infarction (HCC) [I26.99] [I26.99]  Acute venous embolism and thrombosis of deep vessels of proximal lower extremity (H) [I82.4Y9]  Factor 5 Leiden mutation  heterozygous (H) [D68.51]         Your next Anticoagulation Clinic appointment(s)     Mar 15, 2018  1:40 PM CDT   Anticoagulation Visit with CLAUDIA ANTI JADON   [facility]    [address] [address] [phone]              Contact Numbers     [facility]  Please call [phone] or [phone]  to cancel and/or reschedule your appointment.   Please call [phone] with any problems or questions regarding your therapy          February 2018 Details    Sun Mon Tue Wed Thu Fri Sat         1               2               3                 4               5               6               7               8               9               10                 11               12               13               14               15               16               17                 18               19      5 mg   See details      20      5 mg         21      5 mg         22      5 mg         23      5 mg         24      5 mg           25      5 mg         26      5 mg         27      5 mg         28      5 mg             Date Details   02/19 This INR check               How to take your warfarin dose     To take:  5 mg Take 1 of the 5 mg tablets.           March 2018 Details    Sun Mon Tue Wed Thu Fri Sat         1      5  mg         2      5 mg         3      5 mg           4      5 mg         5      5 mg         6      5 mg         7      5 mg         8      5 mg         9      5 mg         10      5 mg           11      5 mg         12      5 mg         13      5 mg         14      5 mg         15            16               17                 18               19               20               21               22               23               24                 25               26               27               28               29               30               31                Date Details   No additional details    Date of next INR:  3/15/2018         How to take your warfarin dose     To take:  5 mg Take 1 of the 5 mg tablets.

## 2018-02-19 NOTE — PROGRESS NOTES
ANTICOAGULATION FOLLOW-UP CLINIC VISIT    Patient Name:  Collin Frank  Date:  2018  Contact Type:  Face to Face    SUBJECTIVE: Patient here for routine INR.  His INR's have been up and down since his Dad .  Patient cannot think of anything he is doing that is different, but is watching his diet to maintain consistency.  Assessment questions are negative for concerns.  Plan same warfarin dose and recheck in 3 1/2 weeks to coordinate with available schedule.  Patient agreeable to plan of care and will call in-between appointment with concerns.     Patient Findings     Positives No Problem Findings           OBJECTIVE    INR Protime   Date Value Ref Range Status   2018 2.7 (A) 0.86 - 1.14 Final       ASSESSMENT / PLAN  INR assessment THER    Recheck INR In: 4 WEEKS    INR Location Clinic      Anticoagulation Summary as of 2018     INR goal 2.0-3.0   Today's INR 2.7   Maintenance plan 5 mg (5 mg x 1) every day   Full instructions 5 mg every day   Weekly total 35 mg   No change documented Minal Carrion, RN   Plan last modified Minal Carrion, RN (2018)   Next INR check 3/15/2018   Target end date Indefinite    Indications   Long-term (current) use of anticoagulants [Z79.01] [Z79.01]  Pulmonary embolism with infarction (HCC) [I26.99] [I26.99]  Acute venous embolism and thrombosis of deep vessels of proximal lower extremity (H) [I82.4Y9]  Factor 5 Leiden mutation  heterozygous (H) [D68.51]         Anticoagulation Episode Summary     INR check location     Preferred lab     Send INR reminders to [facility]    Comments       Anticoagulation Care Providers     Provider Role Specialty Phone number    Engelmann, Lauren Anneliese, MD  Family Practice [phone]            See the Encounter Report to view Anticoagulation Flowsheet and Dosing Calendar (Go to Encounters tab in chart review, and find the Anticoagulation Therapy Visit)    Dosage adjustment made based on physician directed care  plan.    Minal Carrion RN

## 2018-03-15 ENCOUNTER — ANTICOAGULATION THERAPY VISIT (OUTPATIENT)
Dept: NURSING | Facility: CLINIC | Age: 68
End: 2018-03-15
Payer: COMMERCIAL

## 2018-03-15 DIAGNOSIS — Z79.01 LONG-TERM (CURRENT) USE OF ANTICOAGULANTS: ICD-10-CM

## 2018-03-15 DIAGNOSIS — D68.51 FACTOR 5 LEIDEN MUTATION, HETEROZYGOUS (H): ICD-10-CM

## 2018-03-15 DIAGNOSIS — I82.4Y9 ACUTE VENOUS EMBOLISM AND THROMBOSIS OF DEEP VESSELS OF PROXIMAL LOWER EXTREMITY, UNSPECIFIED LATERALITY (H): ICD-10-CM

## 2018-03-15 DIAGNOSIS — I26.99 PULMONARY EMBOLISM WITH INFARCTION (H): ICD-10-CM

## 2018-03-15 LAB — INR POINT OF CARE: 3.1 (ref 0.86–1.14)

## 2018-03-15 PROCEDURE — 99207 ZZC NO CHARGE NURSE ONLY: CPT

## 2018-03-15 PROCEDURE — 85610 PROTHROMBIN TIME: CPT | Mod: QW

## 2018-03-15 PROCEDURE — 36416 COLLJ CAPILLARY BLOOD SPEC: CPT

## 2018-03-15 NOTE — PROGRESS NOTES
ANTICOAGULATION FOLLOW-UP CLINIC VISIT    Patient Name:  Collin Frank  Date:  3/15/2018  Contact Type:  Face to Face    SUBJECTIVE: Patient here for routine INR.  Assessment questions negative for changes or concerns.  He is feeling well, no N/V/D or illness.  Plan 1/2 warfarin dose x 1 today and then resume usual schedule.  Patient agreeable to plan of care.     Patient Findings     Positives No Problem Findings           OBJECTIVE    INR Protime   Date Value Ref Range Status   03/15/2018 3.1 (A) 0.86 - 1.14 Final       ASSESSMENT / PLAN  INR assessment THER    Recheck INR In: 4 WEEKS    INR Location Clinic      Anticoagulation Summary as of 3/15/2018     INR goal 2.0-3.0   Today's INR 3.1!   Maintenance plan 5 mg (5 mg x 1) every day   Full instructions 3/15: 2.5 mg; Otherwise 5 mg every day   Weekly total 35 mg   Plan last modified Minal Carrion RN (1/16/2018)   Next INR check 4/12/2018   Target end date Indefinite    Indications   Long-term (current) use of anticoagulants [Z79.01] [Z79.01]  Pulmonary embolism with infarction (HCC) [I26.99] [I26.99]  Acute venous embolism and thrombosis of deep vessels of proximal lower extremity (H) [I82.4Y9]  Factor 5 Leiden mutation  heterozygous (H) [D68.51]         Anticoagulation Episode Summary     INR check location     Preferred lab     Send INR reminders to [facility]    Comments       Anticoagulation Care Providers     Provider Role Specialty Phone number    Engelmann, Lauren Anneliese, MD  [facility] [phone]            See the Encounter Report to view Anticoagulation Flowsheet and Dosing Calendar (Go to Encounters tab in chart review, and find the Anticoagulation Therapy Visit)    Dosage adjustment made based on physician directed care plan.    Minal Carrion, RN

## 2018-03-15 NOTE — MR AVS SNAPSHOT
Collin Frank   3/15/2018 1:40 PM   Anticoagulation Therapy Visit    Description:  67 year old male   Provider:  CLAUDIA ANTI COAG   Department:  Cp Nurse           INR as of 3/15/2018     Today's INR 3.1!      Anticoagulation Summary as of 3/15/2018     INR goal 2.0-3.0   Today's INR 3.1!   Full instructions 3/15: 2.5 mg; Otherwise 5 mg every day   Next INR check 4/12/2018    Indications   Long-term (current) use of anticoagulants [Z79.01] [Z79.01]  Pulmonary embolism with infarction (HCC) [I26.99] [I26.99]  Acute venous embolism and thrombosis of deep vessels of proximal lower extremity (H) [I82.4Y9]  Factor 5 Leiden mutation  heterozygous (H) [D68.51]         Your next Anticoagulation Clinic appointment(s)     Apr 12, 2018  1:40 PM CDT   Anticoagulation Visit with CLAUDIA ANTI SALONIG   [facility]    [address] [address] [phone]              Contact Numbers     [facility]  Please call [phone] or [phone]  to cancel and/or reschedule your appointment.   Please call [phone] with any problems or questions regarding your therapy          March 2018 Details    Sun Mon Tue Wed Thu Fri Sat         1               2               3                 4               5               6               7               8               9               10                 11               12               13               14               15      2.5 mg   See details      16      5 mg         17      5 mg           18      5 mg         19      5 mg         20      5 mg         21      5 mg         22      5 mg         23      5 mg         24      5 mg           25      5 mg         26      5 mg         27      5 mg         28      5 mg         29      5 mg         30      5 mg         31      5 mg          Date Details   03/15 This INR check               How to take your warfarin dose     To take:  2.5 mg  Take 0.5 of a 5 mg tablet.    To take:  5 mg Take 1 of the 5 mg tablets.           April 2018 Details    Sun Mon Tue Wed Thu Fri Sat     1      5 mg         2      5 mg         3      5 mg         4      5 mg         5      5 mg         6      5 mg         7      5 mg           8      5 mg         9      5 mg         10      5 mg         11      5 mg         12            13               14                 15               16               17               18               19               20               21                 22               23               24               25               26               27               28                 29               30                     Date Details   No additional details    Date of next INR:  4/12/2018         How to take your warfarin dose     To take:  5 mg Take 1 of the 5 mg tablets.

## 2018-04-12 ENCOUNTER — ANTICOAGULATION THERAPY VISIT (OUTPATIENT)
Dept: NURSING | Facility: CLINIC | Age: 68
End: 2018-04-12
Payer: COMMERCIAL

## 2018-04-12 DIAGNOSIS — I82.4Y9 ACUTE VENOUS EMBOLISM AND THROMBOSIS OF DEEP VESSELS OF PROXIMAL LOWER EXTREMITY, UNSPECIFIED LATERALITY (H): ICD-10-CM

## 2018-04-12 DIAGNOSIS — D68.51 FACTOR 5 LEIDEN MUTATION, HETEROZYGOUS (H): ICD-10-CM

## 2018-04-12 DIAGNOSIS — Z79.01 LONG-TERM (CURRENT) USE OF ANTICOAGULANTS: ICD-10-CM

## 2018-04-12 DIAGNOSIS — I26.99 PULMONARY EMBOLISM WITH INFARCTION (H): ICD-10-CM

## 2018-04-12 LAB — INR POINT OF CARE: 2.8 (ref 0.86–1.14)

## 2018-04-12 PROCEDURE — 85610 PROTHROMBIN TIME: CPT | Mod: QW

## 2018-04-12 PROCEDURE — 99207 ZZC NO CHARGE NURSE ONLY: CPT

## 2018-04-12 PROCEDURE — 36416 COLLJ CAPILLARY BLOOD SPEC: CPT

## 2018-04-12 NOTE — MR AVS SNAPSHOT
Collin Frank   4/12/2018 1:40 PM   Anticoagulation Therapy Visit    Description:  67 year old male   Provider:  CLAUDIA ANTI COAG   Department:  [facility]           INR as of 4/12/2018     Today's INR 2.8      Anticoagulation Summary as of 4/12/2018     INR goal 2.0-3.0   Today's INR 2.8   Full instructions 5 mg every day   Next INR check 5/8/2018    Indications   Long-term (current) use of anticoagulants [Z79.01] [Z79.01]  Pulmonary embolism with infarction (HCC) [I26.99] [I26.99]  Acute venous embolism and thrombosis of deep vessels of proximal lower extremity (H) [I82.4Y9]  Factor 5 Leiden mutation  heterozygous (H) [D68.51]         Your next Anticoagulation Clinic appointment(s)     May 08, 2018  1:40 PM CDT   [facility] CLAUDIA ANTI JADON   [facility]    [address] [address] [phone]              Contact Numbers     [facility]  Please call [phone] or [phone]  to cancel and/or reschedule your appointment.   Please call [phone] with any problems or questions regarding your therapy          April 2018 Details    Sun Mon Tue Wed Thu Fri Sat     1               2               3               4               5               6               7                 8               9               10               11               12      5 mg   See details      13      5 mg         14      5 mg           15      5 mg         16      5 mg         17      5 mg         18      5 mg         19      5 mg         20      5 mg         21      5 mg           22      5 mg         23      5 mg         24      5 mg         25      5 mg         26      5 mg         27      5 mg         28      5 mg           29      5 mg         30      5 mg               Date Details   04/12 This INR check               How to take your warfarin dose     To take:  5 mg Take 1 of the 5 mg tablets.            May 2018 Details    Sun Mon Tue Wed Thu Fri Sat       1      5 mg         2      5 mg         3      5 mg         4      5 mg         5      5 mg           6      5 mg         7      5 mg         8            9               10               11               12                 13               14               15               16               17               18               19                 20               21               22               23               24               25               26                 27               28               29               30               31                  Date Details   No additional details    Date of next INR:  5/8/2018         How to take your warfarin dose     To take:  5 mg Take 1 of the 5 mg tablets.

## 2018-04-12 NOTE — PROGRESS NOTES
ANTICOAGULATION FOLLOW-UP CLINIC VISIT    Patient Name:  Collin Frank  Date:  4/12/2018  Contact Type:  Face to Face    SUBJECTIVE: Patient is here for routine INR.  He reports everything is the same, no changes or problems.  He asks me to review his mail order warfarin prescription paperwork and send it in, this was done.  We will continue his same warfarin dose and 1 month recheck.  Patient agreeable to plan of care.     Patient Findings     Positives No Problem Findings           OBJECTIVE    INR Protime   Date Value Ref Range Status   04/12/2018 2.8 (A) 0.86 - 1.14 Final       ASSESSMENT / PLAN  INR assessment THER    Recheck INR In: 4 WEEKS    INR Location Clinic      Anticoagulation Summary as of 4/12/2018     INR goal 2.0-3.0   Today's INR 2.8   Maintenance plan 5 mg (5 mg x 1) every day   Full instructions 5 mg every day   Weekly total 35 mg   No change documented Minal Carrion, RN   Plan last modified Minal Carrion RN (1/16/2018)   Next INR check 5/8/2018   Target end date Indefinite    Indications   Long-term (current) use of anticoagulants [Z79.01] [Z79.01]  Pulmonary embolism with infarction (HCC) [I26.99] [I26.99]  Acute venous embolism and thrombosis of deep vessels of proximal lower extremity (H) [I82.4Y9]  Factor 5 Leiden mutation  heterozygous (H) [D68.51]         Anticoagulation Episode Summary     INR check location     Preferred lab     Send INR reminders to [facility]    Comments       Anticoagulation Care Providers     Provider Role Specialty Phone number    Engelmann, Lauren Anneliese, MD  Family Practice [phone]            See the Encounter Report to view Anticoagulation Flowsheet and Dosing Calendar (Go to Encounters tab in chart review, and find the Anticoagulation Therapy Visit)    Dosage adjustment made based on physician directed care plan.    Minal Carrion, RN

## 2018-04-16 DIAGNOSIS — E72.11 HYPERHOMOCYSTEINEMIA (H): ICD-10-CM

## 2018-04-16 DIAGNOSIS — D68.51 FACTOR 5 LEIDEN MUTATION, HETEROZYGOUS (H): ICD-10-CM

## 2018-04-16 RX ORDER — WARFARIN SODIUM 5 MG/1
TABLET ORAL
Qty: 90 TABLET | Refills: 3 | Status: SHIPPED | OUTPATIENT
Start: 2018-04-16 | End: 2019-04-19

## 2018-04-16 NOTE — TELEPHONE ENCOUNTER
Maintenance plan 5 mg (5 mg x 1) every day     warfarin (JANTOVEN) 5 MG tablet 90 tablet 3 2/9/2017  No   Sig: Take 1 tablet (5 mg) by mouth daily

## 2018-05-08 ENCOUNTER — ANTICOAGULATION THERAPY VISIT (OUTPATIENT)
Dept: NURSING | Facility: CLINIC | Age: 68
End: 2018-05-08
Payer: COMMERCIAL

## 2018-05-08 DIAGNOSIS — I82.4Y9 ACUTE VENOUS EMBOLISM AND THROMBOSIS OF DEEP VESSELS OF PROXIMAL LOWER EXTREMITY, UNSPECIFIED LATERALITY (H): ICD-10-CM

## 2018-05-08 DIAGNOSIS — D68.51 FACTOR 5 LEIDEN MUTATION, HETEROZYGOUS (H): ICD-10-CM

## 2018-05-08 DIAGNOSIS — Z79.01 LONG-TERM (CURRENT) USE OF ANTICOAGULANTS: ICD-10-CM

## 2018-05-08 DIAGNOSIS — I26.99 PULMONARY EMBOLISM WITH INFARCTION (H): ICD-10-CM

## 2018-05-08 LAB — INR POINT OF CARE: 2.8 (ref 0.86–1.14)

## 2018-05-08 PROCEDURE — 85610 PROTHROMBIN TIME: CPT | Mod: QW

## 2018-05-08 PROCEDURE — 36416 COLLJ CAPILLARY BLOOD SPEC: CPT

## 2018-05-08 PROCEDURE — 99207 ZZC NO CHARGE NURSE ONLY: CPT

## 2018-05-08 NOTE — PROGRESS NOTES
ANTICOAGULATION FOLLOW-UP CLINIC VISIT    Patient Name:  Collin Frank  Date:  5/8/2018  Contact Type:  Face to Face    SUBJECTIVE: Patient is here for routine INR.  He reports everything is the same, no changes or problems.  He is feeling well.  Plan to continue same warfarin dose and 1 month recheck.  Patient agreeable to plan of care.     Patient Findings     Positives No Problem Findings           OBJECTIVE    INR Protime   Date Value Ref Range Status   05/08/2018 2.8 (A) 0.86 - 1.14 Final       ASSESSMENT / PLAN  INR assessment THER    Recheck INR In: 4 WEEKS    INR Location Clinic      Anticoagulation Summary as of 5/8/2018     INR goal 2.0-3.0   Today's INR 2.8   Maintenance plan 5 mg (5 mg x 1) every day   Full instructions 5 mg every day   Weekly total 35 mg   No change documented Minal Carrion RN   Plan last modified Minal Carrion, RN (1/16/2018)   Next INR check 6/5/2018   Target end date Indefinite    Indications   Long-term (current) use of anticoagulants [Z79.01] [Z79.01]  Pulmonary embolism with infarction (HCC) [I26.99] [I26.99]  Acute venous embolism and thrombosis of deep vessels of proximal lower extremity (H) [I82.4Y9]  Factor 5 Leiden mutation  heterozygous (H) [D68.51]         Anticoagulation Episode Summary     INR check location     Preferred lab     Send INR reminders to [facility]    Comments       Anticoagulation Care Providers     Provider Role Specialty Phone number    Engelmann, Lauren Anneliese, MD  Family Practice [phone]            See the Encounter Report to view Anticoagulation Flowsheet and Dosing Calendar (Go to Encounters tab in chart review, and find the Anticoagulation Therapy Visit)    Dosage adjustment made based on physician directed care plan.    Minal Carrion, RN

## 2018-05-08 NOTE — MR AVS SNAPSHOT
Collin Frank   5/8/2018 1:40 PM   Anticoagulation Therapy Visit    Description:  67 year old male   Provider:  CLAUDIA ANTI COAG   Department:  [facility]           INR as of 5/8/2018     Today's INR 2.8      Anticoagulation Summary as of 5/8/2018     INR goal 2.0-3.0   Today's INR 2.8   Full instructions 5 mg every day   Next INR check 6/5/2018    Indications   Long-term (current) use of anticoagulants [Z79.01] [Z79.01]  Pulmonary embolism with infarction (HCC) [I26.99] [I26.99]  Acute venous embolism and thrombosis of deep vessels of proximal lower extremity (H) [I82.4Y9]  Factor 5 Leiden mutation  heterozygous (H) [D68.51]         Your next Anticoagulation Clinic appointment(s)     Jun 05, 2018  1:40 PM CDT   Anticoagulation Visit with CLAUDIA ANTI JADON   [facility]    [address] [address] [phone]              Contact Numbers     [facility]  Please call [phone] or [phone]  to cancel and/or reschedule your appointment.   Please call [phone] with any problems or questions regarding your therapy          May 2018 Details    Sun Mon Tue Wed Thu Fri Sat       1               2               3               4               5                 6               7               8      5 mg   See details      9      5 mg         10      5 mg         11      5 mg         12      5 mg           13      5 mg         14      5 mg         15      5 mg         16      5 mg         17      5 mg         18      5 mg         19      5 mg           20      5 mg         21      5 mg         22      5 mg         23      5 mg         24      5 mg         25      5 mg         26      5 mg           27      5 mg         28      5 mg         29      5 mg         30      5 mg         31      5 mg            Date Details   05/08 This INR check               How to take your warfarin dose     To take:  5 mg Take 1 of  the 5 mg tablets.           June 2018 Details    Sun Mon Tue Wed Thu Fri Sat          1      5 mg         2      5 mg           3      5 mg         4      5 mg         5            6               7               8               9                 10               11               12               13               14               15               16                 17               18               19               20               21               22               23                 24               25               26               27               28               29               30                Date Details   No additional details    Date of next INR:  6/5/2018         How to take your warfarin dose     To take:  5 mg Take 1 of the 5 mg tablets.

## 2018-06-05 ENCOUNTER — ANTICOAGULATION THERAPY VISIT (OUTPATIENT)
Dept: NURSING | Facility: CLINIC | Age: 68
End: 2018-06-05
Payer: COMMERCIAL

## 2018-06-05 DIAGNOSIS — I82.4Y9 ACUTE VENOUS EMBOLISM AND THROMBOSIS OF DEEP VESSELS OF PROXIMAL LOWER EXTREMITY, UNSPECIFIED LATERALITY (H): ICD-10-CM

## 2018-06-05 DIAGNOSIS — I26.99 PULMONARY EMBOLISM WITH INFARCTION (H): ICD-10-CM

## 2018-06-05 DIAGNOSIS — D68.51 FACTOR 5 LEIDEN MUTATION, HETEROZYGOUS (H): ICD-10-CM

## 2018-06-05 DIAGNOSIS — Z79.01 LONG-TERM (CURRENT) USE OF ANTICOAGULANTS: ICD-10-CM

## 2018-06-05 LAB — INR POINT OF CARE: 2 (ref 0.86–1.14)

## 2018-06-05 PROCEDURE — 36416 COLLJ CAPILLARY BLOOD SPEC: CPT

## 2018-06-05 PROCEDURE — 99207 ZZC NO CHARGE NURSE ONLY: CPT

## 2018-06-05 PROCEDURE — 85610 PROTHROMBIN TIME: CPT | Mod: QW

## 2018-06-05 NOTE — PROGRESS NOTES
ANTICOAGULATION FOLLOW-UP CLINIC VISIT    Patient Name:  Collin Frank  Date:  6/5/2018  Contact Type:  Face to Face    SUBJECTIVE: Patient is here for routine INR.  He denies changes or problems.  He is well.  He reports on his supplement use below just as an update.  He has always used them, but says he watches amount based on INR.  Plan same warfarin dose and one month recheck.  Patient agreeable to plan of care.     Patient Findings     Positives Herbal/Supplements (patient reports flaxseed, tumeric, green tea supplements helpful to his joints.  he will watch the amounts based on INR result today.)           OBJECTIVE    INR Protime   Date Value Ref Range Status   06/05/2018 2.0 (A) 0.86 - 1.14 Final       ASSESSMENT / PLAN  INR assessment THER    Recheck INR In: 4 WEEKS    INR Location Clinic      Anticoagulation Summary as of 6/5/2018     INR goal 2.0-3.0   Today's INR 2.0   Warfarin maintenance plan 5 mg (5 mg x 1) every day   Full warfarin instructions 5 mg every day   Weekly warfarin total 35 mg   No change documented Minal Carrion, RN   Plan last modified Minal Carrion, RN (1/16/2018)   Next INR check 7/3/2018   Target end date Indefinite    Indications   Long-term (current) use of anticoagulants [Z79.01] [Z79.01]  Pulmonary embolism with infarction (HCC) [I26.99] [I26.99]  Acute venous embolism and thrombosis of deep vessels of proximal lower extremity (H) [I82.4Y9]  Factor 5 Leiden mutation  heterozygous (H) [D68.51]         Anticoagulation Episode Summary     INR check location     Preferred lab     Send INR reminders to [facility]    Comments       Anticoagulation Care Providers     Provider Role Specialty Phone number    Engelmann, Lauren Anneliese, MD  Family Practice [phone]            See the Encounter Report to view Anticoagulation Flowsheet and Dosing Calendar (Go to Encounters tab in chart review, and find the Anticoagulation Therapy Visit)    Dosage adjustment made based on  physician directed care plan.    Minal Carrion RN

## 2018-06-05 NOTE — MR AVS SNAPSHOT
Collin Frank   6/5/2018 1:40 PM   Anticoagulation Therapy Visit    Description:  67 year old male   Provider:  CLAUDIA ANTI COAG   Department:  [facility]           INR as of 6/5/2018     Today's INR 2.0      Anticoagulation Summary as of 6/5/2018     INR goal 2.0-3.0   Today's INR 2.0   Full warfarin instructions 5 mg every day   Next INR check 7/3/2018    Indications   Long-term (current) use of anticoagulants [Z79.01] [Z79.01]  Pulmonary embolism with infarction (HCC) [I26.99] [I26.99]  Acute venous embolism and thrombosis of deep vessels of proximal lower extremity (H) [I82.4Y9]  Factor 5 Leiden mutation  heterozygous (H) [D68.51]         Your next Anticoagulation Clinic appointment(s)     Jul 03, 2018  1:40 PM CDT   Anticoagulation Visit with CP ANTI COAG   [facility]    [address] [address] [phone]              Contact Numbers     [facility]  Please call [phone] or [phone]  to cancel and/or reschedule your appointment.   Please call [phone] with any problems or questions regarding your therapy          June 2018 Details    Sun Mon Tue Wed Thu Fri Sat          1               2                 3               4               5      5 mg   See details      6      5 mg         7      5 mg         8      5 mg         9      5 mg           10      5 mg         11      5 mg         12      5 mg         13      5 mg         14      5 mg         15      5 mg         16      5 mg           17      5 mg         18      5 mg         19      5 mg         20      5 mg         21      5 mg         22      5 mg         23      5 mg           24      5 mg         25      5 mg         26      5 mg         27      5 mg         28      5 mg         29      5 mg         30      5 mg          Date Details   06/05 This INR check               How to take your warfarin dose     To take:  5 mg Take 1  of the 5 mg tablets.           July 2018 Details    Sun Mon Tue Wed Thu Fri Sat     1      5 mg         2      5 mg         3            4               5               6               7                 8               9               10               11               12               13               14                 15               16               17               18               19               20               21                 22               23               24               25               26               27               28                 29               30               31                    Date Details   No additional details    Date of next INR:  7/3/2018         How to take your warfarin dose     To take:  5 mg Take 1 of the 5 mg tablets.

## 2018-07-03 ENCOUNTER — ANTICOAGULATION THERAPY VISIT (OUTPATIENT)
Dept: NURSING | Facility: CLINIC | Age: 68
End: 2018-07-03
Payer: COMMERCIAL

## 2018-07-03 DIAGNOSIS — D68.51 FACTOR 5 LEIDEN MUTATION, HETEROZYGOUS (H): ICD-10-CM

## 2018-07-03 DIAGNOSIS — I82.4Y9 ACUTE VENOUS EMBOLISM AND THROMBOSIS OF DEEP VESSELS OF PROXIMAL LOWER EXTREMITY, UNSPECIFIED LATERALITY (H): ICD-10-CM

## 2018-07-03 DIAGNOSIS — I26.99 PULMONARY EMBOLISM WITH INFARCTION (H): ICD-10-CM

## 2018-07-03 DIAGNOSIS — Z79.01 LONG-TERM (CURRENT) USE OF ANTICOAGULANTS: ICD-10-CM

## 2018-07-03 LAB — INR POINT OF CARE: 2.8 (ref 0.86–1.14)

## 2018-07-03 PROCEDURE — 99207 ZZC NO CHARGE NURSE ONLY: CPT

## 2018-07-03 PROCEDURE — 36416 COLLJ CAPILLARY BLOOD SPEC: CPT

## 2018-07-03 PROCEDURE — 85610 PROTHROMBIN TIME: CPT | Mod: QW

## 2018-07-03 NOTE — MR AVS SNAPSHOT
Collin Frank   7/3/2018 1:40 PM   Anticoagulation Therapy Visit    Description:  68 year old male   Provider:  CLAUDIA ANTI COAG   Department:  [facility]           INR as of 7/3/2018     Today's INR 2.8      Anticoagulation Summary as of 7/3/2018     INR goal 2.0-3.0   Today's INR 2.8   Full warfarin instructions 5 mg every day   Next INR check 8/7/2018    Indications   Long-term (current) use of anticoagulants [Z79.01] [Z79.01]  Pulmonary embolism with infarction (HCC) [I26.99] [I26.99]  Acute venous embolism and thrombosis of deep vessels of proximal lower extremity (H) [I82.4Y9]  Factor 5 Leiden mutation  heterozygous (H) [D68.51]         Your next Anticoagulation Clinic appointment(s)     Aug 07, 2018  1:40 PM CDT   Anticoagulation Visit with CLAUDIA ANTI JADON   [facility]    [address] [address] [phone]              Contact Numbers     [facility]  Please call [phone] or [phone]  to cancel and/or reschedule your appointment.   Please call [phone] with any problems or questions regarding your therapy          July 2018 Details    Sun Mon Tue Wed Thu Fri Sat     1               2               3      5 mg   See details      4      5 mg         5      5 mg         6      5 mg         7      5 mg           8      5 mg         9      5 mg         10      5 mg         11      5 mg         12      5 mg         13      5 mg         14      5 mg           15      5 mg         16      5 mg         17      5 mg         18      5 mg         19      5 mg         20      5 mg         21      5 mg           22      5 mg         23      5 mg         24      5 mg         25      5 mg         26      5 mg         27      5 mg         28      5 mg           29      5 mg         30      5 mg         31      5 mg              Date Details   07/03 This INR check               How to take your warfarin  dose     To take:  5 mg Take 1 of the 5 mg tablets.           August 2018 Details    Sun Mon Tue Wed Thu Fri Sat        1      5 mg         2      5 mg         3      5 mg         4      5 mg           5      5 mg         6      5 mg         7            8               9               10               11                 12               13               14               15               16               17               18                 19               20               21               22               23               24               25                 26               27               28               29               30               31                 Date Details   No additional details    Date of next INR:  8/7/2018         How to take your warfarin dose     To take:  5 mg Take 1 of the 5 mg tablets.

## 2018-07-03 NOTE — PROGRESS NOTES
ANTICOAGULATION FOLLOW-UP CLINIC VISIT    Patient Name:  Collin Frank  Date:  7/3/2018  Contact Type:  Face to Face    SUBJECTIVE: Patient here for routine INR.  He shows me an empty container of supplement milk, see below.  He states he will have once in a while.  Otherwise no medicine changes or symptoms of concern.  He is well.  Plan same warfarin dose and 1 month recheck.  Patient agreeable to plan of care.     Patient Findings     Positives Herbal/Supplements (occasionally drinking Rebbl Kathleen Milk w/turmeric / super herb powdered coconut elixir milk), No Problem Findings           OBJECTIVE    INR Protime   Date Value Ref Range Status   07/03/2018 2.8 (A) 0.86 - 1.14 Final       ASSESSMENT / PLAN  INR assessment THER    Recheck INR In: 4 WEEKS    INR Location Clinic      Anticoagulation Summary as of 7/3/2018     INR goal 2.0-3.0   Today's INR 2.8   Warfarin maintenance plan 5 mg (5 mg x 1) every day   Full warfarin instructions 5 mg every day   Weekly warfarin total 35 mg   No change documented Minal Carrion RN   Plan last modified Minal Carrion RN (1/16/2018)   Next INR check 8/7/2018   Target end date Indefinite    Indications   Long-term (current) use of anticoagulants [Z79.01] [Z79.01]  Pulmonary embolism with infarction (HCC) [I26.99] [I26.99]  Acute venous embolism and thrombosis of deep vessels of proximal lower extremity (H) [I82.4Y9]  Factor 5 Leiden mutation  heterozygous (H) [D68.51]         Anticoagulation Episode Summary     INR check location     Preferred lab     Send [facility] reminders to [facility]    Comments       Anticoagulation Care Providers     Provider Role Specialty Phone number    Engelmann, Lauren Anneliese, MD  Family Practice [phone]            See the Encounter Report to view Anticoagulation Flowsheet and Dosing Calendar (Go to Encounters tab in chart review, and find the Anticoagulation Therapy Visit)    Dosage adjustment made based on physician directed care  plan.    Minal Carrion RN

## 2018-07-09 ENCOUNTER — ANTICOAGULATION THERAPY VISIT (OUTPATIENT)
Dept: NURSING | Facility: CLINIC | Age: 68
End: 2018-07-09
Payer: COMMERCIAL

## 2018-07-09 DIAGNOSIS — Z79.01 LONG-TERM (CURRENT) USE OF ANTICOAGULANTS: ICD-10-CM

## 2018-07-09 DIAGNOSIS — I82.4Y9 ACUTE VENOUS EMBOLISM AND THROMBOSIS OF DEEP VESSELS OF PROXIMAL LOWER EXTREMITY, UNSPECIFIED LATERALITY (H): ICD-10-CM

## 2018-07-09 DIAGNOSIS — I26.99 PULMONARY EMBOLISM WITH INFARCTION (H): ICD-10-CM

## 2018-07-09 DIAGNOSIS — D68.51 FACTOR 5 LEIDEN MUTATION, HETEROZYGOUS (H): ICD-10-CM

## 2018-07-09 LAB — INR POINT OF CARE: 2.4 (ref 0.86–1.14)

## 2018-07-09 PROCEDURE — 36416 COLLJ CAPILLARY BLOOD SPEC: CPT

## 2018-07-09 PROCEDURE — 99207 ZZC NO CHARGE NURSE ONLY: CPT

## 2018-07-09 PROCEDURE — 85610 PROTHROMBIN TIME: CPT | Mod: QW

## 2018-07-09 NOTE — PROGRESS NOTES
ANTICOAGULATION FOLLOW-UP CLINIC VISIT    Patient Name:  Collin Frank  Date:  7/9/2018  Contact Type:  Face to Face    SUBJECTIVE: Patient is here for an INR spot check.  He experienced a bruise on left upper arm about the size of a golf ball.  It is visually almost gone.  There are no lumps or bumps.  His INR is mid-range today.  He tells me he lifts weights every day and during these hot months he tries to push the fluids.  He also asks me to get his b/p today 168/83 pulse 86.  Plan is for him to push fluids, use heat on muscle / bruise area and when it is gone go back to his usual routine.  He should see his doctor for further evaluation if recurs or he is concerned.     Patient Findings     Positives Bruising (Left arm bruise)           OBJECTIVE    INR Protime   Date Value Ref Range Status   07/09/2018 2.4 (A) 0.86 - 1.14 Final       ASSESSMENT / PLAN  INR assessment THER    Recheck INR In: 4 WEEKS    INR Location Clinic      Anticoagulation Summary as of 7/9/2018     INR goal 2.0-3.0   Today's INR 2.4   Warfarin maintenance plan 5 mg (5 mg x 1) every day   Full warfarin instructions 5 mg every day   Weekly warfarin total 35 mg   No change documented Minal Carrion, RN   Plan last modified Minal Carrion, RN (1/16/2018)   Next INR check 8/7/2018   Target end date Indefinite    Indications   Long-term (current) use of anticoagulants [Z79.01] [Z79.01]  Pulmonary embolism with infarction (HCC) [I26.99] [I26.99]  Acute venous embolism and thrombosis of deep vessels of proximal lower extremity (H) [I82.4Y9]  Factor 5 Leiden mutation  heterozygous (H) [D68.51]         Anticoagulation Episode Summary     INR check location     Preferred lab     Send INR reminders to [facility]    Comments       Anticoagulation Care Providers     Provider Role Specialty Phone number    Engelmann, Lauren Anneliese, MD  Family Practice [phone]            See the Encounter Report to view Anticoagulation Flowsheet and  Dosing Calendar (Go to Encounters tab in chart review, and find the Anticoagulation Therapy Visit)    Dosage adjustment made based on physician directed care plan.    Minal Carrion RN

## 2018-07-09 NOTE — MR AVS SNAPSHOT
Collin Frank   7/9/2018 3:40 PM   Anticoagulation Therapy Visit    Description:  68 year old male   Provider:  CLAUDIA ANTI COAG   Department:  [facility]           INR as of 7/9/2018     Today's INR 2.4      Anticoagulation Summary as of 7/9/2018     INR goal 2.0-3.0   Today's INR 2.4   Full warfarin instructions 5 mg every day   Next INR check 8/7/2018    Indications   Long-term (current) use of anticoagulants [Z79.01] [Z79.01]  Pulmonary embolism with infarction (HCC) [I26.99] [I26.99]  Acute venous embolism and thrombosis of deep vessels of proximal lower extremity (H) [I82.4Y9]  Factor 5 Leiden mutation  heterozygous (H) [D68.51]         Your next Anticoagulation Clinic appointment(s)     Aug 07, 2018  1:40 PM CDT   Anticoagulation Visit with CLAUDIA ANTI JADON   [facility]    [address] [address] [phone]              Contact Numbers     [facility]  Please call [phone] or [phone]  to cancel and/or reschedule your appointment.   Please call [phone] with any problems or questions regarding your therapy          July 2018 Details    Sun Mon Tue Wed Thu Fri Sat     1               2               3               4               5               6               7                 8               9      5 mg   See details      10      5 mg         11      5 mg         12      5 mg         13      5 mg         14      5 mg           15      5 mg         16      5 mg         17      5 mg         18      5 mg         19      5 mg         20      5 mg         21      5 mg           22      5 mg         23      5 mg         24      5 mg         25      5 mg         26      5 mg         27      5 mg         28      5 mg           29      5 mg         30      5 mg         31      5 mg              Date Details   07/09 This INR check               How to take your warfarin dose     To take:  5 mg  Take 1 of the 5 mg tablets.           August 2018 Details    Sun Mon Tue Wed Thu Fri Sat        1      5 mg         2      5 mg         3      5 mg         4      5 mg           5      5 mg         6      5 mg         7            8               9               10               11                 12               13               14               15               16               17               18                 19               20               21               22               23               24               25                 26               27               28               29               30               31                 Date Details   No additional details    Date of next INR:  8/7/2018         How to take your warfarin dose     To take:  5 mg Take 1 of the 5 mg tablets.

## 2018-08-07 ENCOUNTER — ANTICOAGULATION THERAPY VISIT (OUTPATIENT)
Dept: NURSING | Facility: CLINIC | Age: 68
End: 2018-08-07
Payer: COMMERCIAL

## 2018-08-07 DIAGNOSIS — D68.51 FACTOR 5 LEIDEN MUTATION, HETEROZYGOUS (H): ICD-10-CM

## 2018-08-07 DIAGNOSIS — Z79.01 LONG-TERM (CURRENT) USE OF ANTICOAGULANTS: ICD-10-CM

## 2018-08-07 DIAGNOSIS — I26.99 PULMONARY EMBOLISM WITH INFARCTION (H): ICD-10-CM

## 2018-08-07 DIAGNOSIS — I82.4Y9 ACUTE VENOUS EMBOLISM AND THROMBOSIS OF DEEP VESSELS OF PROXIMAL LOWER EXTREMITY, UNSPECIFIED LATERALITY (H): ICD-10-CM

## 2018-08-07 LAB — INR POINT OF CARE: 3.7 (ref 0.86–1.14)

## 2018-08-07 PROCEDURE — 99207 ZZC NO CHARGE NURSE ONLY: CPT

## 2018-08-07 PROCEDURE — 36416 COLLJ CAPILLARY BLOOD SPEC: CPT

## 2018-08-07 PROCEDURE — 85610 PROTHROMBIN TIME: CPT | Mod: QW

## 2018-08-07 NOTE — MR AVS SNAPSHOT
Collin Frank   8/7/2018 1:40 PM   Anticoagulation Therapy Visit    Description:  68 year old male   Provider:  CLAUDIA ANTI COAG   Department:  [facility]           INR as of 8/7/2018     Today's INR 3.7!      Anticoagulation Summary as of 8/7/2018     INR goal 2.0-3.0   Today's INR 3.7!   Full warfarin instructions 8/7: Hold; Otherwise 5 mg every day   Next INR check 9/4/2018    Indications   Long-term (current) use of anticoagulants [Z79.01] [Z79.01]  Pulmonary embolism with infarction (HCC) [I26.99] [I26.99]  Acute venous embolism and thrombosis of deep vessels of proximal lower extremity (H) [I82.4Y9]  Factor 5 Leiden mutation  heterozygous (H) [D68.51]         Your next Anticoagulation Clinic appointment(s)     Aug 07, 2018  1:40 PM CDT   Anticoagulation Visit with CP ANTI COAG   [facility] ([facility]    [address] [id number] [phone]            Sep 04, 2018  1:40 PM CDT   Anticoagulation Visit with CP ANTI COAG   [facility] ([facility])    [address] [id number] [phone]              Contact Numbers     [facility]  Please call [phone] or [phone]  to cancel and/or reschedule your appointment.   Please call [phone] with any problems or questions regarding your therapy          August 2018 Details    Sun Mon Tue Wed Thu Fri Sat        1               2               3               4                 5               6               7      Hold   See details      8      5 mg         9      5 mg         10      5 mg         11      5 mg           12      5 mg         13      5 mg         14      5 mg         15      5 mg         16      5 mg         17      5 mg         18      5 mg           19      5 mg         20      5 mg         21      5 mg         22      5 mg         23      5 mg         24      5  mg         25      5 mg           26      5 mg         27      5 mg         28      5 mg         29      5 mg         30      5 mg         31      5 mg           Date Details   08/07 This INR check               How to take your warfarin dose     To take:  5 mg Take 1 of the 5 mg tablets.    Hold Do not take your warfarin dose. See the Details table to the right for additional instructions.                September 2018 Details    Sun Mon Tue Wed Thu Fri Sat           1      5 mg           2      5 mg         3      5 mg         4            5               6               7               8                 9               10               11               12               13               14               15                 16               17               18               19               20               21               22                 23               24               25               26               27               28               29                 30                      Date Details   No additional details    Date of next INR:  9/4/2018         How to take your warfarin dose     To take:  5 mg Take 1 of the 5 mg tablets.

## 2018-08-07 NOTE — PROGRESS NOTES
ANTICOAGULATION FOLLOW-UP CLINIC VISIT    Patient Name:  Collin Frank  Date:  8/7/2018  Contact Type:  Face to Face    SUBJECTIVE: Patient is here for routine INR.  He denies changes or concerns.  He shows me one bruise, but otherwise no signs of bleeding or clotting.  He feels well, no N/V/D, etc.  Plan to skip warfarin dose today and then resume usual schedule.     Patient Findings     Positives Change in diet/appetite (states he has eaten more fish, salmon, halibut lately.), Bruising (bruise inner left ankle size of quarter.  not tender. unknown injury.)           OBJECTIVE    INR Protime   Date Value Ref Range Status   08/07/2018 3.7 (A) 0.86 - 1.14 Final       ASSESSMENT / PLAN  INR assessment SUPRA    Recheck INR In: 4 WEEKS    INR Location Clinic      Anticoagulation Summary as of 8/7/2018     INR goal 2.0-3.0   Today's INR 3.7!   Warfarin maintenance plan 5 mg (5 mg x 1) every day   Full warfarin instructions 8/7: Hold; Otherwise 5 mg every day   Weekly warfarin total 35 mg   Plan last modified Minal Carrion, RN (1/16/2018)   Next INR check 9/4/2018   Target end date Indefinite    Indications   Long-term (current) use of anticoagulants [Z79.01] [Z79.01]  Pulmonary embolism with infarction (HCC) [I26.99] [I26.99]  Acute venous embolism and thrombosis of deep vessels of proximal lower extremity (H) [I82.4Y9]  Factor 5 Leiden mutation  heterozygous (H) [D68.51]         Anticoagulation Episode Summary     INR check location     Preferred lab     Send INR reminders to [facility]    Comments       Anticoagulation Care Providers     Provider Role Specialty Phone number    Engelmann, Lauren Anneliese, MD  Family Practice [phone]            See the Encounter Report to view Anticoagulation Flowsheet and Dosing Calendar (Go to Encounters tab in chart review, and find the Anticoagulation Therapy Visit)    Dosage adjustment made based on physician directed care plan.    Minal Carrion, TIFFANIE

## 2018-08-16 ENCOUNTER — ANTICOAGULATION THERAPY VISIT (OUTPATIENT)
Dept: NURSING | Facility: CLINIC | Age: 68
End: 2018-08-16
Payer: COMMERCIAL

## 2018-08-16 DIAGNOSIS — Z79.01 LONG-TERM (CURRENT) USE OF ANTICOAGULANTS: ICD-10-CM

## 2018-08-16 DIAGNOSIS — I26.99 PULMONARY EMBOLISM WITH INFARCTION (H): ICD-10-CM

## 2018-08-16 DIAGNOSIS — D68.51 FACTOR 5 LEIDEN MUTATION, HETEROZYGOUS (H): ICD-10-CM

## 2018-08-16 DIAGNOSIS — I82.4Y9 ACUTE VENOUS EMBOLISM AND THROMBOSIS OF DEEP VESSELS OF PROXIMAL LOWER EXTREMITY, UNSPECIFIED LATERALITY (H): ICD-10-CM

## 2018-08-16 LAB — INR POINT OF CARE: 1.7 (ref 0.86–1.14)

## 2018-08-16 PROCEDURE — 36416 COLLJ CAPILLARY BLOOD SPEC: CPT

## 2018-08-16 PROCEDURE — 85610 PROTHROMBIN TIME: CPT | Mod: QW

## 2018-08-16 PROCEDURE — 99207 ZZC NO CHARGE NURSE ONLY: CPT

## 2018-08-16 NOTE — PROGRESS NOTES
ANTICOAGULATION FOLLOW-UP CLINIC VISIT    Patient Name:  Collin Frank  Date:  8/16/2018  Contact Type:  Face to Face    SUBJECTIVE: Patient is here for INR spot check before dentist today.  He held his warfarin yesterday, on his own, prior to procedure per below.  Plan extra warfarin tonight and 1 week recheck.  Advised patient in the future to obtain orders from PCP if INR office is closed regarding any questions with procedures and holding warfarin.  I printed him a flowsheet with INR results for his dentist.     Patient Findings     Positives Dental/Other procedures (held for dentisit today), Missed doses (one missed dose)           OBJECTIVE    INR Protime   Date Value Ref Range Status   08/16/2018 1.7 (A) 0.86 - 1.14 Final       ASSESSMENT / PLAN  INR assessment SUB    Recheck INR In: 1 WEEK    INR Location Clinic      Anticoagulation Summary as of 8/16/2018     INR goal 2.0-3.0   Today's INR 1.7!   Warfarin maintenance plan 5 mg (5 mg x 1) every day   Full warfarin instructions 8/16: 7.5 mg; Otherwise 5 mg every day   Weekly warfarin total 35 mg   Plan last modified Minal Carrion, RN (1/16/2018)   Next INR check 8/23/2018   Target end date Indefinite    Indications   Long-term (current) use of anticoagulants [Z79.01] [Z79.01]  Pulmonary embolism with infarction (HCC) [I26.99] [I26.99]  Acute venous embolism and thrombosis of deep vessels of proximal lower extremity (H) [I82.4Y9]  Factor 5 Leiden mutation  heterozygous (H) [D68.51]         Anticoagulation Episode Summary     INR check location     Preferred lab     Send INR reminders to [facility]    Comments       Anticoagulation Care Providers     Provider Role Specialty Phone number    Engelmann, Lauren Anneliese, MD  Family Practice [phone]            See the Encounter Report to view Anticoagulation Flowsheet and Dosing Calendar (Go to Encounters tab in chart review, and find the Anticoagulation Therapy Visit)    Dosage adjustment made  based on physician directed care plan.    Minal Carrion RN

## 2018-08-16 NOTE — MR AVS SNAPSHOT
Collin Frank   8/16/2018 9:00 AM   Anticoagulation Therapy Visit    Description:  68 year old male   Provider:  CLAUDIA ANTI COAMEHUL   Department:  [facility]           INR as of 8/16/2018     Today's INR 1.7!      Anticoagulation Summary as of 8/16/2018     INR goal 2.0-3.0   Today's INR 1.7!   Full warfarin instructions 8/16: 7.5 mg; Otherwise 5 mg every day   Next INR check 8/23/2018    Indications   Long-term (current) use of anticoagulants [Z79.01] [Z79.01]  Pulmonary embolism with infarction (HCC) [I26.99] [I26.99]  Acute venous embolism and thrombosis of deep vessels of proximal lower extremity (H) [I82.4Y9]  Factor 5 Leiden mutation  heterozygous (H) [D68.51]         Your next Anticoagulation Clinic appointment(s)     Aug 23, 2018 10:20 AM CDT   Anticoagulation Visit with CP ANTI COAG   [facility]    [address] [facility] [id number] [phone]            Sep 04, 2018  1:40 PM CDT   Anticoagulation Visit with CP ANTI COAG   [facility]    [address] [id number] [phone]              Contact Numbers     [facility]  Please call [phone] or [phone]  to cancel and/or reschedule your appointment.   Please call [phone] with any problems or questions regarding your therapy          August 2018 Details    Sun Mon Tue Wed Thu Fri Sat        1               2               3               4                 5               6               7               8               9               10               11                 12               13               14               15               16      7.5 mg   See details      17      5 mg         18      5 mg           19      5 mg         20      5 mg         21      5 mg         22      5 mg         23            24               25                 26                27               28               29               30               31                 Date Details   08/16 This INR check       Date of next INR:  8/23/2018         How to take your warfarin dose     To take:  5 mg Take 1 of the 5 mg tablets.    To take:  7.5 mg Take 1.5 of the 5 mg tablets.

## 2018-08-23 ENCOUNTER — ANTICOAGULATION THERAPY VISIT (OUTPATIENT)
Dept: NURSING | Facility: CLINIC | Age: 68
End: 2018-08-23
Payer: COMMERCIAL

## 2018-08-23 DIAGNOSIS — D68.51 FACTOR 5 LEIDEN MUTATION, HETEROZYGOUS (H): ICD-10-CM

## 2018-08-23 DIAGNOSIS — I26.99 PULMONARY EMBOLISM WITH INFARCTION (H): ICD-10-CM

## 2018-08-23 DIAGNOSIS — I82.4Y9 ACUTE VENOUS EMBOLISM AND THROMBOSIS OF DEEP VESSELS OF PROXIMAL LOWER EXTREMITY, UNSPECIFIED LATERALITY (H): ICD-10-CM

## 2018-08-23 DIAGNOSIS — Z79.01 LONG-TERM (CURRENT) USE OF ANTICOAGULANTS: ICD-10-CM

## 2018-08-23 LAB — INR POINT OF CARE: 2.3 (ref 0.86–1.14)

## 2018-08-23 PROCEDURE — 36416 COLLJ CAPILLARY BLOOD SPEC: CPT

## 2018-08-23 PROCEDURE — 85610 PROTHROMBIN TIME: CPT | Mod: QW

## 2018-08-23 PROCEDURE — 99207 ZZC NO CHARGE NURSE ONLY: CPT

## 2018-08-23 NOTE — PROGRESS NOTES
ANTICOAGULATION FOLLOW-UP CLINIC VISIT    Patient Name:  Collin Frank  Date:  8/23/2018  Contact Type:  Face to Face    SUBJECTIVE:     Patient Findings     Positives No Problem Findings           OBJECTIVE    INR Protime   Date Value Ref Range Status   08/23/2018 2.3 (A) 0.86 - 1.14 Final       ASSESSMENT / PLAN  INR assessment THER    Recheck INR In: 4 WEEKS    INR Location Clinic      Anticoagulation Summary as of 8/23/2018     INR goal 2.0-3.0   Today's INR 2.3   Warfarin maintenance plan 5 mg (5 mg x 1) every day   Full warfarin instructions 5 mg every day   Weekly warfarin total 35 mg   No change documented Minal Carrion, RN   Plan last modified Minal Carrion RN (1/16/2018)   Next INR check 9/20/2018   Target end date Indefinite    Indications   Long-term (current) use of anticoagulants [Z79.01] [Z79.01]  Pulmonary embolism with infarction (HCC) [I26.99] [I26.99]  Acute venous embolism and thrombosis of deep vessels of proximal lower extremity (H) [I82.4Y9]  Factor 5 Leiden mutation  heterozygous (H) [D68.51]         Anticoagulation Episode Summary     INR check location     Preferred lab     Send [facility]R reminders to  [facility]    Comments       Anticoagulation Care Providers     Provider Role Specialty Phone number    Engelmann, Lauren Anneliese, MD  Family Practice [phone]            See the Encounter Report to view Anticoagulation Flowsheet and Dosing Calendar (Go to Encounters tab in chart review, and find the Anticoagulation Therapy Visit)    Dosage adjustment made based on physician directed care plan.    Minal Carrion RN

## 2018-08-23 NOTE — MR AVS SNAPSHOT
Collin Frank   8/23/2018 10:20 AM   Anticoagulation Therapy Visit    Description:  68 year old male   Provider:  CLAUDIA ANTI COAMEHUL   Department:  [facility]           INR as of 8/23/2018     Today's INR 2.3      Anticoagulation Summary as of 8/23/2018     INR goal 2.0-3.0   Today's INR 2.3   Full warfarin instructions 5 mg every day   Next INR check 9/20/2018    Indications   Long-term (current) use of anticoagulants [Z79.01] [Z79.01]  Pulmonary embolism with infarction (HCC) [I26.99] [I26.99]  Acute venous embolism and thrombosis of deep vessels of proximal lower extremity (H) [I82.4Y9]  Factor 5 Leiden mutation  heterozygous (H) [D68.51]         Your next Anticoagulation Clinic appointment(s)     Sep 20, 2018  1:40 PM CDT   Anticoagulation Visit with CLAUDIA ANTI JADON   [facility]    [address] [address] [phone]              Contact Numbers     [facility]  Please call [phone] or [phone]  to cancel and/or reschedule your appointment.   Please call [phone] with any problems or questions regarding your therapy          August 2018 Details    Sun Mon Tue Wed Thu Fri Sat        1               2               3               4                 5               6               7               8               9               10               11                 12               13               14               15               16               17               18                 19               20               21               22               23      5 mg   See details      24      5 mg         25      5 mg           26      5 mg         27      5 mg         28      5 mg         29      5 mg         30      5 mg         31      5 mg           Date Details   08/23 This INR check               How to take your warfarin dose     To take:  5 mg Take 1 of the 5 mg tablets.           September 2018  Details    Sun Mon Tue Wed Thu Fri Sat           1      5 mg           2      5 mg         3      5 mg         4      5 mg         5      5 mg         6      5 mg         7      5 mg         8      5 mg           9      5 mg         10      5 mg         11      5 mg         12      5 mg         13      5 mg         14      5 mg         15      5 mg           16      5 mg         17      5 mg         18      5 mg         19      5 mg         20            21               22                 23               24               25               26               27               28               29                 30                      Date Details   No additional details    Date of next INR:  9/20/2018         How to take your warfarin dose     To take:  5 mg Take 1 of the 5 mg tablets.

## 2018-09-20 ENCOUNTER — ANTICOAGULATION THERAPY VISIT (OUTPATIENT)
Dept: NURSING | Facility: CLINIC | Age: 68
End: 2018-09-20
Payer: COMMERCIAL

## 2018-09-20 DIAGNOSIS — D68.51 FACTOR 5 LEIDEN MUTATION, HETEROZYGOUS (H): ICD-10-CM

## 2018-09-20 DIAGNOSIS — Z79.01 LONG-TERM (CURRENT) USE OF ANTICOAGULANTS: ICD-10-CM

## 2018-09-20 DIAGNOSIS — I82.4Y9 ACUTE VENOUS EMBOLISM AND THROMBOSIS OF DEEP VESSELS OF PROXIMAL LOWER EXTREMITY, UNSPECIFIED LATERALITY (H): ICD-10-CM

## 2018-09-20 DIAGNOSIS — I26.99 PULMONARY EMBOLISM WITH INFARCTION (H): ICD-10-CM

## 2018-09-20 LAB — INR POINT OF CARE: 2.5 (ref 0.86–1.14)

## 2018-09-20 PROCEDURE — 36416 COLLJ CAPILLARY BLOOD SPEC: CPT

## 2018-09-20 PROCEDURE — 85610 PROTHROMBIN TIME: CPT | Mod: QW

## 2018-09-20 PROCEDURE — 99207 ZZC NO CHARGE NURSE ONLY: CPT

## 2018-09-20 NOTE — PROGRESS NOTES
ANTICOAGULATION FOLLOW-UP CLINIC VISIT    Patient Name:  Collin Frank  Date:  9/20/2018  Contact Type:  Face to Face    SUBJECTIVE: continue same warfarin dose and 1 month recheck.     Patient Findings     Positives No Problem Findings           OBJECTIVE    INR Protime   Date Value Ref Range Status   09/20/2018 2.5 (A) 0.86 - 1.14 Final       ASSESSMENT / PLAN  INR assessment THER    Recheck INR In: 4 WEEKS    INR Location Clinic      Anticoagulation Summary as of 9/20/2018     INR goal 2.0-3.0   Today's INR 2.5   Warfarin maintenance plan 5 mg (5 mg x 1) every day   Full warfarin instructions 5 mg every day   Weekly warfarin total 35 mg   No change documented Minal Carrion RN   Plan last modified Minal Carrion RN (1/16/2018)   Next INR check 10/18/2018   Target end date Indefinite    Indications   Long-term (current) use of anticoagulants [Z79.01] [Z79.01]  Pulmonary embolism with infarction (HCC) [I26.99] [I26.99]  Acute venous embolism and thrombosis of deep vessels of proximal lower extremity (H) [I82.4Y9]  Factor 5 Leiden mutation  heterozygous (H) [D68.51]         Anticoagulation Episode Summary     INR check location     Preferred lab     Send INR reminders to [facility]    Comments       Anticoagulation Care Providers     Provider Role Specialty Phone number    Engelmann, Lauren Anneliese, MD  [facility] [phone]            See the Encounter Report to view Anticoagulation Flowsheet and Dosing Calendar (Go to Encounters tab in chart review, and find the Anticoagulation Therapy Visit)    Dosage adjustment made based on physician directed care plan.    Minal Carrion RN

## 2018-09-20 NOTE — MR AVS SNAPSHOT
Collin Frank   9/20/2018 1:40 PM   Anticoagulation Therapy Visit    Description:  68 year old male   Provider:  CLAUDIA ANTI COAMEHUL   Department:  [facility]           INR as of 9/20/2018     Today's INR 2.5      Anticoagulation Summary as of 9/20/2018     INR goal 2.0-3.0   Today's INR 2.5   Full warfarin instructions 5 mg every day   Next INR check 10/18/2018    Indications   Long-term (current) use of anticoagulants [Z79.01] [Z79.01]  Pulmonary embolism with infarction (HCC) [I26.99] [I26.99]  Acute venous embolism and thrombosis of deep vessels of proximal lower extremity (H) [I82.4Y9]  Factor 5 Leiden mutation  heterozygous (H) [D68.51]         Your next Anticoagulation Clinic appointment(s)     Oct 18, 2018  1:40 PM CDT   Anticoagulation Visit with CP ANTI COAG   [facility] ([facility])    [address] [address] [phone]              Contact Numbers     [facility]  Please call [phone] or [phone]  to cancel and/or reschedule your appointment.   Please call [phone] with any problems or questions regarding your therapy          September 2018 Details    Sun Mon Tue Wed Thu Fri Sat           1                 2               3               4               5               6               7               8                 9               10               11               12               13               14               15                 16               17               18               19               20      5 mg   See details      21      5 mg         22      5 mg           23      5 mg         24      5 mg         25      5 mg         26      5 mg         27      5 mg         28      5 mg         29      5 mg           30      5 mg                Date Details   09/20 This INR check               How to take your warfarin dose     To take:  5 mg Take 1 of the 5 mg tablets.           October  2018 Details    Sun Mon Tue Wed Thu Fri Sat      1      5 mg         2      5 mg         3      5 mg         4      5 mg         5      5 mg         6      5 mg           7      5 mg         8      5 mg         9      5 mg         10      5 mg         11      5 mg         12      5 mg         13      5 mg           14      5 mg         15      5 mg         16      5 mg         17      5 mg         18            19               20                 21               22               23               24               25               26               27                 28               29               30               31                   Date Details   No additional details    Date of next INR:  10/18/2018         How to take your warfarin dose     To take:  5 mg Take 1 of the 5 mg tablets.

## 2018-10-18 ENCOUNTER — ANTICOAGULATION THERAPY VISIT (OUTPATIENT)
Dept: NURSING | Facility: CLINIC | Age: 68
End: 2018-10-18
Payer: COMMERCIAL

## 2018-10-18 DIAGNOSIS — I82.4Y9 ACUTE VENOUS EMBOLISM AND THROMBOSIS OF DEEP VESSELS OF PROXIMAL LOWER EXTREMITY, UNSPECIFIED LATERALITY (H): ICD-10-CM

## 2018-10-18 DIAGNOSIS — Z23 NEED FOR PROPHYLACTIC VACCINATION AND INOCULATION AGAINST INFLUENZA: Primary | ICD-10-CM

## 2018-10-18 DIAGNOSIS — I26.99 PULMONARY EMBOLISM WITH INFARCTION (H): ICD-10-CM

## 2018-10-18 DIAGNOSIS — D68.51 FACTOR 5 LEIDEN MUTATION, HETEROZYGOUS (H): ICD-10-CM

## 2018-10-18 LAB — INR POINT OF CARE: 2.5 (ref 0.86–1.14)

## 2018-10-18 PROCEDURE — 85610 PROTHROMBIN TIME: CPT | Mod: QW

## 2018-10-18 PROCEDURE — 90662 IIV NO PRSV INCREASED AG IM: CPT

## 2018-10-18 PROCEDURE — 36416 COLLJ CAPILLARY BLOOD SPEC: CPT

## 2018-10-18 PROCEDURE — G0008 ADMIN INFLUENZA VIRUS VAC: HCPCS

## 2018-10-18 PROCEDURE — 99207 ZZC NO CHARGE NURSE ONLY: CPT

## 2018-10-18 NOTE — PROGRESS NOTES
ANTICOAGULATION FOLLOW-UP CLINIC VISIT    Patient Name:  Collin Frakn  Date:  10/18/2018  Contact Type:  Face to Face    SUBJECTIVE: continue same warfarin dose and 1 month recheck     Patient Findings     Positives No Problem Findings           OBJECTIVE    INR Protime   Date Value Ref Range Status   10/18/2018 2.5 (A) 0.86 - 1.14 Final       ASSESSMENT / PLAN  INR assessment THER    Recheck INR In: 4 WEEKS    INR Location Clinic      Anticoagulation Summary as of 10/18/2018     INR goal 2.0-3.0   Today's INR 2.5   Warfarin maintenance plan 5 mg (5 mg x 1) every day   Full warfarin instructions 5 mg every day   Weekly warfarin total 35 mg   No change documented Minal Carrion RN   Plan last modified Minal Carrion RN (1/16/2018)   Next INR check 11/15/2018   Target end date Indefinite    Indications   Long-term (current) use of anticoagulants [Z79.01] [Z79.01]  Pulmonary embolism with infarction (HCC) [I26.99] [I26.99]  Acute venous embolism and thrombosis of deep vessels of proximal lower extremity (H) [I82.4Y9]  Factor 5 Leiden mutation  heterozygous (H) [D68.51]         Anticoagulation Episode Summary     INR check location     Preferred lab     Send INR reminders to  [facility]    Comments       Anticoagulation Care Providers     Provider Role Specialty Phone number    Engelmann, Lauren Anneliese, MD  [facility] [phone]            See the Encounter Report to view Anticoagulation Flowsheet and Dosing Calendar (Go to Encounters tab in chart review, and find the Anticoagulation Therapy Visit)    Dosage adjustment made based on physician directed care plan.    Minal Carrion RN                 Injectable Influenza Immunization Documentation    1.  Is the person to be vaccinated sick today?   No    2. Does the person to be vaccinated have an allergy to a component   of the vaccine?   No  Egg Allergy Algorithm Link    3. Has the person to be vaccinated ever had a serious reaction   to influenza  vaccine in the past?   No    4. Has the person to be vaccinated ever had Guillain-Barré syndrome?   No    Form completed by Minal Humphrey RN

## 2018-10-18 NOTE — MR AVS SNAPSHOT
Collin Frank   10/18/2018 1:40 PM   Anticoagulation Therapy Visit    Description:  68 year old male   Provider:  CLAUDIA ANTI JADON   Department:  [facility] Nurse           INR as of 10/18/2018     Today's INR 2.5      Anticoagulation Summary as of 10/18/2018     INR goal 2.0-3.0   Today's INR 2.5   Full warfarin instructions 5 mg every day   Next INR check 11/15/2018    Indications   Long-term (current) use of anticoagulants [Z79.01] [Z79.01]  Pulmonary embolism with infarction (HCC) [I26.99] [I26.99]  Acute venous embolism and thrombosis of deep vessels of proximal lower extremity (H) [I82.4Y9]  Factor 5 Leiden mutation  heterozygous (H) [D68.51]         Your next Anticoagulation Clinic appointment(s)     Nov 15, 2018  1:40 PM CST   An[facility]agulation [facility] with CP ANTI COAG   [facility]    [address] [address] [phone]              Contact Numbers     [facility]  Please call [phone] or [phone]  to cancel and/or reschedule your appointment.   Please call [phone] with any problems or questions regarding your therapy          October 2018 Details    Sun Mon Tue Wed Thu Fri Sat      1               2               3               4               5               6                 7               8               9               10               11               12               13                 14               15               16               17               18      5 mg   See details      19      5 mg         20      5 mg           21      5 mg         22      5 mg         23      5 mg         24      5 mg         25      5 mg         26      5 mg         27      5 mg           28      5 mg         29      5 mg         30      5 mg         31      5 mg             Date Details   10/18 This INR check               How to take your warfarin dose     To take:  5 mg Take 1 of the 5 mg tablets.            November 2018 Details    Sun Mon Tue Wed Thu Fri Sat         1      5 mg         2      5 mg         3      5 mg           4      5 mg         5      5 mg         6      5 mg         7      5 mg         8      5 mg         9      5 mg         10      5 mg           11      5 mg         12      5 mg         13      5 mg         14      5 mg         15            16               17                 18               19               20               21               22               23               24                 25               26               27               28               29               30                 Date Details   No additional details    Date of next INR:  11/15/2018         How to take your warfarin dose     To take:  5 mg Take 1 of the 5 mg tablets.

## 2018-11-15 ENCOUNTER — ANTICOAGULATION THERAPY VISIT (OUTPATIENT)
Dept: NURSING | Facility: CLINIC | Age: 68
End: 2018-11-15
Payer: COMMERCIAL

## 2018-11-15 DIAGNOSIS — D68.51 FACTOR 5 LEIDEN MUTATION, HETEROZYGOUS (H): ICD-10-CM

## 2018-11-15 DIAGNOSIS — I82.4Y9 ACUTE VENOUS EMBOLISM AND THROMBOSIS OF DEEP VESSELS OF PROXIMAL LOWER EXTREMITY, UNSPECIFIED LATERALITY (H): ICD-10-CM

## 2018-11-15 DIAGNOSIS — I26.99 PULMONARY EMBOLISM WITH INFARCTION (H): ICD-10-CM

## 2018-11-15 LAB — INR POINT OF CARE: 1.9 (ref 0.86–1.14)

## 2018-11-15 PROCEDURE — 36416 COLLJ CAPILLARY BLOOD SPEC: CPT

## 2018-11-15 PROCEDURE — 85610 PROTHROMBIN TIME: CPT | Mod: QW

## 2018-11-15 PROCEDURE — 99207 ZZC NO CHARGE NURSE ONLY: CPT

## 2018-11-15 NOTE — MR AVS SNAPSHOT
Collin Frank   11/15/2018 1:40 PM   Anticoagulation Therapy Visit    Description:  68 year old male   Provider:  CLAUDIA ANTI COAG   Department:  [facility]           INR as of 11/15/2018     Today's INR 1.9!      Anticoagulation Summary as of 11/15/2018     INR goal 2.0-3.0   Today's INR 1.9!   Full warfarin instructions 11/15: 7.5 mg; Otherwise 5 mg every day   Next INR check 12/13/2018    Indications   Pulmonary embolism with infarction (HCC) [I26.99] [I26.99]  Factor 5 Leiden mutation  heterozygous (H) [D68.51]  Acute venous embolism and thrombosis of deep vessels of proximal lower extremity (H) [I82.4Y9]         Your next Anticoagulation Clinic appointment(s)     Dec 13, 2018  2:20 PM CST   Anticoagulation Visit with CLAUDIA ANTI SALNOIG   [facility]    [address] [facility] [address] [phone]              Contact Numbers     [facility]  Please call [phone] or [phone]  to cancel and/or reschedule your appointment.   Please call [phone] with any problems or questions regarding your therapy          November 2018 Details    Sun Mon Tue Wed Thu Fri Sat         1               2               3                 4               5               6               7               8               9               10                 11               12               13               14               15      7.5 mg   See details      16      5 mg         17      5 mg           18      5 mg         19      5 mg         20      5 mg         21      5 mg         22      5 mg         23      5 mg         24      5 mg           25      5 mg         26      5 mg         27      5 mg         28      5 mg         29      5 mg         30      5 mg           Date Details   11/15 This INR check               How to take your warfarin dose     To take:  5 mg Take 1 of the 5 mg tablets.    To take:  7.5 mg Take 1.5 of the 5  mg tablets.           December 2018 Details    Sun Mon Tue Wed Thu Fri Sat           1      5 mg           2      5 mg         3      5 mg         4      5 mg         5      5 mg         6      5 mg         7      5 mg         8      5 mg           9      5 mg         10      5 mg         11      5 mg         12      5 mg         13            14               15                 16               17               18               19               20               21               22                 23               24               25               26               27               28               29                 30               31                     Date Details   No additional details    Date of next INR:  12/13/2018         How to take your warfarin dose     To take:  5 mg Take 1 of the 5 mg tablets.

## 2018-11-15 NOTE — PROGRESS NOTES
ANTICOAGULATION FOLLOW-UP CLINIC VISIT    Patient Name:  Collin Frank  Date:  11/15/2018  Contact Type:  Face to Face    SUBJECTIVE: continue same plan of care     Patient Findings     Positives Change in diet/appetite (eating tons of salmon and taboula)           OBJECTIVE    INR Protime   Date Value Ref Range Status   11/15/2018 1.9 (A) 0.86 - 1.14 Final       ASSESSMENT / PLAN  INR assessment THER    Recheck INR In: 4 WEEKS    INR Location Clinic      Anticoagulation Summary as of 11/15/2018     INR goal 2.0-3.0   Today's INR 1.9!   Warfarin maintenance plan 5 mg (5 mg x 1) every day   Full warfarin instructions 11/15: 7.5 mg; Otherwise 5 mg every day   Weekly warfarin total 35 mg   Plan last modified Minal Carrion RN (1/16/2018)   Next INR check 12/13/2018   Target end date Indefinite    Indications   Pulmonary embolism with infarction (HCC) [I26.99] [I26.99]  Factor 5 Leiden mutation  heterozygous (H) [D68.51]  Acute venous embolism and thrombosis of deep vessels of proximal lower extremity (H) [I82.4Y9]         Anticoagulation Episode Summary     INR check location     Preferred lab     Send INR reminders to  [facility]    Comments       Anticoagulation Care Providers     Provider Role Specialty Phone number    Engelmann, Lauren Anneliese, MD  Family Practice [phone]            See the Encounter Report to view Anticoagulation Flowsheet and Dosing Calendar (Go to Encounters tab in chart review, and find the Anticoagulation Therapy Visit)    Dosage adjustment made based on physician directed care plan.    Minal Carrion RN

## 2018-11-16 ENCOUNTER — TRANSFERRED RECORDS (OUTPATIENT)
Dept: HEALTH INFORMATION MANAGEMENT | Facility: CLINIC | Age: 68
End: 2018-11-16

## 2018-11-29 ENCOUNTER — OFFICE VISIT (OUTPATIENT)
Dept: FAMILY MEDICINE | Facility: CLINIC | Age: 68
End: 2018-11-29
Payer: COMMERCIAL

## 2018-11-29 VITALS
WEIGHT: 197 LBS | HEART RATE: 57 BPM | DIASTOLIC BLOOD PRESSURE: 85 MMHG | SYSTOLIC BLOOD PRESSURE: 163 MMHG | BODY MASS INDEX: 28.27 KG/M2 | TEMPERATURE: 97.9 F | OXYGEN SATURATION: 97 %

## 2018-11-29 DIAGNOSIS — J06.9 VIRAL URI: ICD-10-CM

## 2018-11-29 DIAGNOSIS — I10 BENIGN ESSENTIAL HYPERTENSION: Primary | ICD-10-CM

## 2018-11-29 PROCEDURE — 99214 OFFICE O/P EST MOD 30 MIN: CPT | Performed by: FAMILY MEDICINE

## 2018-11-29 RX ORDER — LOSARTAN POTASSIUM 25 MG/1
25 TABLET ORAL DAILY
Qty: 30 TABLET | Refills: 1 | Status: SHIPPED | OUTPATIENT
Start: 2018-11-29 | End: 2018-12-21

## 2018-11-29 ASSESSMENT — PAIN SCALES - GENERAL: PAINLEVEL: NO PAIN (0)

## 2018-11-29 NOTE — PROGRESS NOTES
"  SUBJECTIVE:   Collin Frank is a 68 year old male who presents to clinic today for the following health issues:    ED/UC Followup:    Facility:  [facility]  Date of visit: 11/25/18  Reason for visit: URI  Current Status: Patient states he is feeling good, having sweating at night, sometimes feet are cold. Concerned about the continued head sweating at night.     Went to the emergency department because he was worried about his \"head sweat.\" Diagnosed with URI. Feeling better.     We discussed his BP today, he is wary of starting a new medication for it. Denies chest pain, perkins, headaches, vision changes. Mom had a history of HTN.     Problem list and histories reviewed & adjusted, as indicated.  Additional history: as documented    Patient Active Problem List   Diagnosis     Advanced directives, counseling/discussion     Hyperhomocysteinemia (H)     Acute venous embolism and thrombosis of deep vessels of proximal lower extremity (H)     CARDIOVASCULAR SCREENING; LDL GOAL LESS THAN 130     Factor 5 Leiden mutation, heterozygous (H)     May-Thurner syndrome     Long term current use of anticoagulant     Vitamin D deficiency     Renal stones     Hepatic hemangioma     FH: prostate cancer     Long-term (current) use of anticoagulants [Z79.01]     Pulmonary embolism with infarction (HCC) [I26.99]     Post-traumatic osteoarthritis of right knee     Benign non-nodular prostatic hyperplasia without lower urinary tract symptoms     Primary osteoarthritis of left knee     Benign essential hypertension     Past Surgical History:   Procedure Laterality Date     COLONOSCOPY  1-17-08    Normal. Repeat in 10 years     COLONOSCOPY WITH CO2 INSUFFLATION N/A 2/1/2018    Procedure: COLONOSCOPY WITH CO2 INSUFFLATION;  COLON SCREEN/ ENGELMANN;  Surgeon: Jan Flores MD;  Location: [facility] OR     rt knee ORIF  1975    [facility]       Social History   Substance Use Topics     Smoking status: Former Smoker     Types: Cigarettes "     Quit date: 2011     Smokeless tobacco: Never Used     Alcohol use No     Family History   Problem Relation Age of Onset     Alzheimer Disease Mother      Heart Disease Father      Prostate Cancer Father      Cancer Brother 65     pancreatic        Prostate Cancer Brother            Reviewed and updated as needed this visit by clinical staff  Tobacco  Allergies  Meds  Med Hx  Surg Hx  Fam Hx  Soc Hx      Reviewed and updated as needed this visit by Provider         ROS:  Constitutional, HEENT, cardiovascular, pulmonary, gi and gu systems are negative, except as otherwise noted.    OBJECTIVE:     /85 (BP Location: Left arm, Patient Position: Chair, Cuff Size: Adult Regular)  Pulse 57  Temp 97.9  F (36.6  C) (Oral)  Wt 197 lb (89.4 kg)  SpO2 97%  BMI 28.27 kg/m2  Body mass index is 28.27 kg/(m^2).  GENERAL: healthy, alert and no distress  NECK: no adenopathy, no asymmetry, masses, or scars and thyroid normal to palpation  RESP: lungs clear to auscultation - no rales, rhonchi or wheezes  CV: regular rate and rhythm, normal S1 S2, no S3 or S4, no murmur, click or rub, no peripheral edema and peripheral pulses strong  ABDOMEN: soft, nontender, no hepatosplenomegaly, no masses and bowel sounds normal  MS: no gross musculoskeletal defects noted, no edema  BACK: no CVA tenderness, no paralumbar tenderness  PSYCH: mentation appears normal, affect normal/bright    Diagnostic Test Results:  No results found for this or any previous visit (from the past 24 hour(s)).    ASSESSMENT/PLAN:       ICD-10-CM    1. Benign essential hypertension I10 losartan (COZAAR) 25 MG tablet   2. Viral URI J06.9      I discussed the risks of untreated HTN including stroke, MI, heart failure.     I would like to start lose ARB and he can follow up in 2 weeks for BP check. We will draw labs.     See Patient Instructions    Lauren A. Engelmann, MD  [facility]

## 2018-11-29 NOTE — MR AVS SNAPSHOT
After Visit Summary   11/29/2018    Collin Frank    MRN: [id number]           Patient Information     Date Of Birth          1950        Visit Information        Provider Department      11/29/2018 8:00 AM Engelmann, Lauren Anneliese, MD [facility]        Today's Diagnoses     Benign essential hypertension    -  1      Care Instructions    Take your new blood pressure medication once per day. It is okay to take it at the same time as warfarin.   You have factor 5 Leiden deficiency - it makes your blood sticky and prone to clotting. You take the warfarin to help keep your blood thin.                 Follow-ups after your visit        Follow-up notes from your care team     Return in about 2 weeks (around 12/13/2018) for Physical Exam, BP Recheck.      Your next 10 appointments already scheduled     Dec 12, 2018  8:00 AM CST   PHYSICAL with Lauren Anneliese Engelmann, MD   [facility]    [address] [place] [address]   [phone]            Dec 13, 2018  2:20 PM CST   Anticoagulation Visit with CP ANTI COAG   [facility]    [address] [place] [address]   [phone]              Who to contact     If you have questions or need follow up information about today's clinic visit or your schedule please contact [facility] directly at [phone].  Normal or non-critical lab and imaging results will be communicated to you by MyChart, letter or phone within 4 business days after the clinic has received the results. If you do not hear from us within 7 days, please contact the clinic through MyChart or phone. If you have a critical or abnormal lab result, we will notify you by phone as soon as possible.  Submit refill requests through [employer] or call your pharmacy and they will  forward the refill request to us. Please allow 3 business days for your refill to be completed.          Additional Information About Your Visit        Care EveryWhere ID     This is your Care EveryWhere ID. This could be used by other organizations to access your [place] medical records  [id number]        Your Vitals Were     Pulse Temperature Pulse Oximetry BMI (Body Mass Index)          57 97.9  F (36.6  C) (Oral) 97% 28.27 kg/m2         Blood Pressure from Last 3 Encounters:   11/29/18 163/85   02/01/18 128/65   12/11/17 128/73    Weight from Last 3 Encounters:   11/29/18 197 lb (89.4 kg)   12/11/17 205 lb (93 kg)   12/04/17 200 lb (90.7 kg)              Today, you had the following     No orders found for display         Today's Medication Changes          These changes are accurate as of 11/29/18  8:37 AM.  If you have any questions, ask your nurse or doctor.               Start taking these medicines.        Dose/Directions    losartan 25 MG tablet   Commonly known as:  COZAAR   Used for:  Benign essential hypertension   Started by:  Engelmann, Lauren Anneliese, MD        Dose:  25 mg   Take 1 tablet (25 mg) by mouth daily   Quantity:  30 tablet   Refills:  1            Where to get your medicines      These medications were sent to [facility] [address]  [address] [facility] [address]     Phone:  [phone]     losartan 25 MG tablet                Primary Care Provider Office Phone # Fax #    Lauren Anneliese Engelmann, [phone]       [facility] [address] [facility] [address]        Equal Access to Services     [facility] Center[facility] : Hadii chelly moseley hadasho Sojeff, waaxda luqadaha, qaybta kaalmada alesha, dami cast. So [facility] [phone].    ATENCIÓN: Si habla español, tiene a chen disposición servicios gratuitos de asistencia lingüística. Llame al  [phone].    We comply with applicable federal civil rights laws and [place] laws. We do not discriminate on the basis of race, color, national origin, age, disability, sex, sexual orientation, or gender identity.            Thank you!     Thank you for choosing [facility]  for your care. Our goal is always to provide you with excellent care. Hearing back from our patients is one way we can continue to improve our services. Please take a few minutes to complete the written survey that you may receive in the mail after your visit with us. Thank you!             Your Updated Medication List - Protect others around you: Learn how to safely use, store and throw away your medicines at www.disposemymeds.org.          This list is accurate as of 11/29/18  8:37 AM.  Always use your most recent med list.                   Brand Name Dispense Instructions for use Diagnosis    losartan 25 MG tablet    COZAAR    30 tablet    Take 1 tablet (25 mg) by mouth daily    Benign essential hypertension       vitamin D3 1000 units (25 mcg) tablet    CHOLECALCIFEROL    100 tablet    Take 1 tablet (1,000 Units) by mouth daily    Unspecified vitamin D deficiency       warfarin 5 MG tablet    JANTOVEN    90 tablet    Take 1 tablet (5MG) by mouth daily    Hyperhomocysteinemia (H), Factor 5 Leiden mutation, heterozygous (H)

## 2018-11-29 NOTE — PATIENT INSTRUCTIONS
Take your new blood pressure medication once per day. It is okay to take it at the same time as warfarin.   You have factor 5 Leiden deficiency - it makes your blood sticky and prone to clotting. You take the warfarin to help keep your blood thin.

## 2018-12-12 ENCOUNTER — OFFICE VISIT (OUTPATIENT)
Dept: FAMILY MEDICINE | Facility: CLINIC | Age: 68
End: 2018-12-12
Payer: COMMERCIAL

## 2018-12-12 VITALS
HEART RATE: 58 BPM | WEIGHT: 198 LBS | HEIGHT: 69 IN | DIASTOLIC BLOOD PRESSURE: 79 MMHG | TEMPERATURE: 97.9 F | BODY MASS INDEX: 29.33 KG/M2 | OXYGEN SATURATION: 97 % | SYSTOLIC BLOOD PRESSURE: 146 MMHG

## 2018-12-12 DIAGNOSIS — Z23 NEED FOR SHINGLES VACCINE: ICD-10-CM

## 2018-12-12 DIAGNOSIS — Z00.00 ROUTINE HISTORY AND PHYSICAL EXAMINATION OF ADULT: Primary | ICD-10-CM

## 2018-12-12 DIAGNOSIS — Z12.5 SCREENING FOR PROSTATE CANCER: ICD-10-CM

## 2018-12-12 DIAGNOSIS — I10 ESSENTIAL HYPERTENSION: ICD-10-CM

## 2018-12-12 DIAGNOSIS — Z13.1 SCREENING FOR DIABETES MELLITUS: ICD-10-CM

## 2018-12-12 DIAGNOSIS — Z13.220 SCREENING FOR LIPID DISORDERS: ICD-10-CM

## 2018-12-12 DIAGNOSIS — Z87.891 PERSONAL HISTORY OF TOBACCO USE: ICD-10-CM

## 2018-12-12 DIAGNOSIS — R61 NIGHT SWEATS: ICD-10-CM

## 2018-12-12 LAB
ALBUMIN SERPL-MCNC: 3.7 G/DL (ref 3.4–5)
ALP SERPL-CCNC: 90 U/L (ref 40–150)
ALT SERPL W P-5'-P-CCNC: 36 U/L (ref 0–70)
ANION GAP SERPL CALCULATED.3IONS-SCNC: 5 MMOL/L (ref 3–14)
AST SERPL W P-5'-P-CCNC: 29 U/L (ref 0–45)
BILIRUB DIRECT SERPL-MCNC: 0.2 MG/DL (ref 0–0.2)
BILIRUB SERPL-MCNC: 0.9 MG/DL (ref 0.2–1.3)
BUN SERPL-MCNC: 14 MG/DL (ref 7–30)
CALCIUM SERPL-MCNC: 9 MG/DL (ref 8.5–10.1)
CHLORIDE SERPL-SCNC: 105 MMOL/L (ref 94–109)
CHOLEST SERPL-MCNC: 201 MG/DL
CO2 SERPL-SCNC: 29 MMOL/L (ref 20–32)
CREAT SERPL-MCNC: 0.95 MG/DL (ref 0.66–1.25)
ERYTHROCYTE [DISTWIDTH] IN BLOOD BY AUTOMATED COUNT: 13.4 % (ref 10–15)
ERYTHROCYTE [SEDIMENTATION RATE] IN BLOOD BY WESTERGREN METHOD: 8 MM/H (ref 0–20)
GFR SERPL CREATININE-BSD FRML MDRD: 79 ML/MIN/1.7M2
GLUCOSE SERPL-MCNC: 83 MG/DL (ref 70–99)
HCT VFR BLD AUTO: 49.1 % (ref 40–53)
HDLC SERPL-MCNC: 48 MG/DL
HGB BLD-MCNC: 16.5 G/DL (ref 13.3–17.7)
LDLC SERPL CALC-MCNC: 132 MG/DL
MCH RBC QN AUTO: 32.5 PG (ref 26.5–33)
MCHC RBC AUTO-ENTMCNC: 33.6 G/DL (ref 31.5–36.5)
MCV RBC AUTO: 97 FL (ref 78–100)
NONHDLC SERPL-MCNC: 153 MG/DL
PLATELET # BLD AUTO: 274 10E9/L (ref 150–450)
POTASSIUM SERPL-SCNC: 4 MMOL/L (ref 3.4–5.3)
PROT SERPL-MCNC: 7.4 G/DL (ref 6.8–8.8)
PSA SERPL-ACNC: 1.66 UG/L (ref 0–4)
RBC # BLD AUTO: 5.07 10E12/L (ref 4.4–5.9)
SODIUM SERPL-SCNC: 139 MMOL/L (ref 133–144)
TRIGL SERPL-MCNC: 106 MG/DL
WBC # BLD AUTO: 8 10E9/L (ref 4–11)

## 2018-12-12 PROCEDURE — 80048 BASIC METABOLIC PNL TOTAL CA: CPT | Performed by: FAMILY MEDICINE

## 2018-12-12 PROCEDURE — 85027 COMPLETE CBC AUTOMATED: CPT | Performed by: FAMILY MEDICINE

## 2018-12-12 PROCEDURE — 80061 LIPID PANEL: CPT | Performed by: FAMILY MEDICINE

## 2018-12-12 PROCEDURE — 99213 OFFICE O/P EST LOW 20 MIN: CPT | Mod: 25 | Performed by: FAMILY MEDICINE

## 2018-12-12 PROCEDURE — 80076 HEPATIC FUNCTION PANEL: CPT | Performed by: FAMILY MEDICINE

## 2018-12-12 PROCEDURE — 99397 PER PM REEVAL EST PAT 65+ YR: CPT | Performed by: FAMILY MEDICINE

## 2018-12-12 PROCEDURE — 36415 COLL VENOUS BLD VENIPUNCTURE: CPT | Performed by: FAMILY MEDICINE

## 2018-12-12 PROCEDURE — G0296 VISIT TO DETERM LDCT ELIG: HCPCS | Performed by: FAMILY MEDICINE

## 2018-12-12 PROCEDURE — G0103 PSA SCREENING: HCPCS | Performed by: FAMILY MEDICINE

## 2018-12-12 PROCEDURE — 85652 RBC SED RATE AUTOMATED: CPT | Performed by: FAMILY MEDICINE

## 2018-12-12 ASSESSMENT — MIFFLIN-ST. JEOR: SCORE: 1648.5

## 2018-12-12 ASSESSMENT — PAIN SCALES - GENERAL: PAINLEVEL: NO PAIN (0)

## 2018-12-12 NOTE — PATIENT INSTRUCTIONS
Preventive Health Recommendations:     See your health care provider every year to    Review health changes.     Discuss preventive care.      Review your medicines if your doctor has prescribed any.    Talk with your health care provider about whether you should have a test to screen for prostate cancer (PSA).    Every 3 years, have a diabetes test (fasting glucose). If you are at risk for diabetes, you should have this test more often.    Every 5 years, have a cholesterol test. Have this test more often if you are at risk for high cholesterol or heart disease.     Every 10 years, have a colonoscopy. Or, have a yearly FIT test (stool test). These exams will check for colon cancer.    Talk to with your health care provider about screening for Abdominal Aortic Aneurysm if you have a family history of AAA or have a history of smoking.  Shots:     Get a flu shot each year.     Get a tetanus shot every 10 years.     Talk to your doctor about your pneumonia vaccines. There are now two you should receive - Pneumovax (PPSV 23) and Prevnar (PCV 13).    Talk to your pharmacist about a shingles vaccine.     Talk to your doctor about the hepatitis B vaccine.  Nutrition:     Eat at least 5 servings of fruits and vegetables each day.     Eat whole-grain bread, whole-wheat pasta and brown rice instead of white grains and rice.     Get adequate Calcium and Vitamin D.   Lifestyle    Exercise for at least 150 minutes a week (30 minutes a day, 5 days a week). This will help you control your weight and prevent disease.     Limit alcohol to one drink per day.     No smoking.     Wear sunscreen to prevent skin cancer.     See your dentist every six months for an exam and cleaning.     See your eye doctor every 1 to 2 years to screen for conditions such as glaucoma, macular degeneration and cataracts.    Personalized Prevention Plan  You are due for the preventive services outlined below.  Your care team is available to assist you in  scheduling these services.  If you have already completed any of these items, please share that information with your care team to update in your medical record.    Health Maintenance Due   Topic Date Due     Zoster (Chicken Pox) Vaccine (1 of 2) 06/10/1998     Diptheria Tetanus Pertussis (DTAP/TDAP/TD) Vaccine (2 - Td) 11/13/2012     Discuss Advance Directive Planning  11/28/2016     INR CLINIC REFERRAL - yearly  12/01/2016     FALL RISK ASSESSMENT  12/06/2017     Depression Assessment 2 - yearly  12/11/2018     Lung Cancer Screening   Frequently Asked Questions  If you are at high-risk for lung cancer, getting screened with low-dose computed tomography (LDCT) every year can help save your life. This handout offers answers to some of the most common questions about lung cancer screening. If you have other questions, please call [phone] ([phone]).     What is it?  Lung cancer screening uses special X-ray technology to create an image of your lung tissue. The exam is quick and easy and takes less than 10 seconds. We don t give you any medicine or use any needles. You can eat before and after the exam. You don t need to change your clothes as long as the clothing on your chest doesn t contain metal. But, you do need to be able to hold your breath for at least 6 seconds during the exam.    What is the goal of lung cancer screening?  The goal of lung cancer screening is to save lives. Many times, lung cancer is not found until a person starts having physical symptoms. Lung cancer screening can help detect lung cancer in the earliest stages when it may be easier to treat.    Who should be screened for lung cancer?  We suggest lung cancer screening for anyone who is at high-risk for lung cancer. You are in the high-risk group if you:      are between the ages of 55 and 79, and    have smoked at least 1 pack of cigarettes a day for 30 or more years, and    still smoke or have quit within the past 15  years.    However, if you have a new cough or shortness of breath, you should talk to your doctor before being screened.    Some national lung health advocacy groups also recommend screening for people ages 50 to 79 who have smoked an average of 1 pack of cigarettes a day for 20 years. They must also have at least 1 other risk factor for lung cancer, not including exposure to secondhand smoke. Other risk factors are having had cancer in the past, emphysema, pulmonary fibrosis, COPD, a family history of lung cancer, or exposure to certain materials such as arsenic, asbestos, beryllium, cadmium, chromium, diesel fumes, nickel, radon or silica. Your care team can help you know if you have one of these risk factors.     Why does it matter if I have symptoms?  Certain symptoms can be a sign that you have a condition in your lungs that should be checked and treated by your doctor. These symptoms include fever, chest pain, a new or changing cough, shortness of breath that you have never felt before, coughing up blood or unexplained weight loss. Having any of these symptoms can greatly affect the results of lung cancer screening.       Should all smokers get an LDCT lung cancer screening exam?  It depends. Lung cancer screening is for a very specific group of men and women who have a history of heavy smoking over a long period of time (see  Who should be screened for lung cancer  above).  I am in the high-risk group, but have been diagnosed with cancer in the past. Is LDCT lung cancer screening right for me?  In some cases, you should not have LDCT lung screening, such as when your doctor is already following your cancer with CT scan studies. Your doctor will help you decide if LDCT lung screening is right for you.  Do I need to have a screening exam every year?  Yes. If you are in the high-risk group described earlier, you should get an LDCT lung cancer screening exam every year until you are 79, or are no longer willing  or able to undergo screening and possible procedures to diagnose and treat lung cancer.  How effective is LDCT at preventing death from lung cancer?  Studies have shown that LDCT lung cancer screening can lower the risk of death from lung cancer by 20 percent in people who are at high-risk.  What are the risks?  There are some risks and limitations of LDCT lung cancer screening. We want to make sure you understand the risks and benefits, so please let us know if you have any questions. Your doctor may want to talk with you more about these risks.    Radiation exposure: As with any exam that uses radiation, there is a very small increased risk of cancer. The amount of radiation in LDCT is small--about the same amount a person would get from a mammogram. Your doctor orders the exam when he or she feels the potential benefits outweigh the risks.    False negatives: No test is perfect, including LDCT. It is possible that you may have a medical condition, including lung cancer, that is not found during your exam. This is called a false negative result.    False positives and more testing: LDCT very often finds something in the lung that could be cancer, but in fact is not. This is called a false positive result. False positive tests often cause anxiety. To make sure these findings are not cancer, you may need to have more tests. These tests will be done only if you give us permission. Sometimes patients need a treatment that can have side effects, such as a biopsy. For more information on false positives, see  What can I expect from the results?     Findings not related to lung cancer: Your LDCT exam also takes pictures of areas of your body next to your lungs. In a very small number of cases, the CT scan will show an abnormal finding in one of these areas, such as your kidneys, adrenal glands, liver or thyroid. This finding may not be serious, but you may need more tests. Your doctor can help you decide what other tests  you may need, if any.  What can I expect from the results?  About 1 out of 4 LDCT exams will find something that may need more tests. Most of the time, these findings are lung nodules. Lung nodules are very small collections of tissue in the lung. These nodules are very common, and the vast majority--more than 97 percent--are not cancer (benign). Most are normal lymph nodes or small areas of scarring from past infections.  But, if a small lung nodule is found to be cancer, the cancer can be cured more than 90 percent of the time. To know if the nodule is cancer, we may need to get more images before your next yearly screening exam. If the nodule has suspicious features (for example, it is large, has an odd shape or grows over time), we will refer you to a specialist for further testing.  Will my doctor also get the results?  Yes. Your doctor will get a copy of your results.  Is it okay to keep smoking now that there s a cancer screening exam?  No. Tobacco is one of the strongest cancer-causing agents. It causes not only lung cancer, but other cancers and cardiovascular (heart) diseases as well. The damage caused by smoking builds over time. This means that the longer you smoke, the higher your risk of disease. While it is never too late to quit, the sooner you quit, the better.  Where can I find help to quit smoking?  The best way to prevent lung cancer is to stop smoking. If you have already quit smoking, congratulations and keep it up! For help on quitting smoking, please call [employer] at 4-501-233-UQKT (4992) or the American Cancer Society at 1-537.162.8554 to find local resources near you.  One-on-one health coaching:  If you d prefer to work individually with a health care provider on tobacco cessation, we offer:      Medication Therapy Management:  Our specially trained pharmacists work closely with you and your doctor to help you quit smoking.  Call [phone] or [phone] (toll free).     Can Do: Health  coaching offered by [facility].  [email or web address]

## 2018-12-12 NOTE — PROGRESS NOTES
"  SUBJECTIVE:   Collin Frank is a 68 year old male who presents for Preventive Visit.  Are you in the first 12 months of your Medicare Part B coverage?  No    Physical Health:    In general, how would you rate your overall physical health? good    Outside of work, how many days during the week do you exercise? 6-7 days/week    Outside of work, approximately how many minutes a day do you exercise?15-30 minutes    If you drink alcohol do you typically have >3 drinks per day or >7 drinks per week? No    Do you usually eat at least 4 servings of fruit and vegetables a day, include whole grains & fiber and avoid regularly eating high fat or \"junk\" foods? NO    Do you have any problems taking medications regularly?  No    Do you have any side effects from medications? none    Needs assistance for the following daily activities: no assistance needed    Which of the following safety concerns are present in your home?  none identified     Hearing impairment: Yes, Difficulty following a conversation in a noisy restaurant or crowded room.    In the past 6 months, have you been bothered by leaking of urine? no    Mental Health:    In general, how would you rate your overall mental or emotional health? excellent  PHQ-2 Score:      Do you feel safe in your environment? Yes    Do you have a Health Care Directive? Yes: Patient states has Advance Directive and will bring in a copy to clinic.    Additional concerns to address?  Yes- still sweating at night mainly on the head. Notes that it is covered in sweat, wakes him up early in the morning. No other symptoms along with this. Denies headache, ear ache, tooth ache. No sore throat. No rash.    Started losartan for HTN. No adverse effects noted. No chest pain, SOB, FOX.     One brother had prostate cancer; he is on Lupron and doing okay.   Other brother has lung cancer, he was a smoker, had a lobectomy and was told it was \"aggressive.\"     Fall risk:  Fall Risk Assessment not " completed.  Cognitive Screenin) Repeat 3 items (Leader, Season, Table)    2) Clock draw: NORMAL  3) 3 item recall: Recalls 3 objects  Results: 3 items recalled: COGNITIVE IMPAIRMENT LESS LIKELY    Mini-CogTM Copyright S Samuel. Licensed by the author for use in [facility]; reprinted with permission (davon@[email or web address]). All rights reserved.       Do you have sleep apnea, excessive snoring or daytime drowsiness?: no    Wondering about Shingrix.    Reviewed and updated as needed this visit by clinical staff  Tobacco  Allergies  Meds  Med Hx  Surg Hx  Fam Hx  Soc Hx        Reviewed and updated as needed this visit by Provider  Meds        Social History     Tobacco Use     Smoking status: Former Smoker     Types: Cigarettes     Last attempt to quit: 2011     Years since quittin.8     Smokeless tobacco: Never Used   Substance Use Topics     Alcohol use: No                           Current providers sharing in care for this patient include:   Patient Care Team:  Engelmann, Lauren Anneliese, MD as PCP - General (Family Practice)    The following health maintenance items are reviewed in Epic and correct as of today:  Health Maintenance   Topic Date Due     ZOSTER IMMUNIZATION (1 of 2) 06/10/1998     DTAP/TDAP/TD IMMUNIZATION (2 - Td) 2012     ADVANCE DIRECTIVE PLANNING Q5 YRS  2016     OP ANNUAL INR REFERRAL  2016     FALL RISK ASSESSMENT  2017     PHQ-2 Q1 YR  2018     LIPID SCREEN Q5 YR MALE (SYSTEM ASSIGNED)  2020     COLON CANCER SCREEN (SYSTEM ASSIGNED)  2028     INFLUENZA VACCINE  Completed     PNEUMOVAX IMMUNIZATION 65+ LOW/MEDIUM RISK  Completed     AORTIC ANEURYSM SCREENING (SYSTEM ASSIGNED)  Completed     HEPATITIS C SCREENING  Completed     IPV IMMUNIZATION  Aged Out     MENINGITIS IMMUNIZATION  Aged Out     Labs reviewed in EPIC        ROS:  Constitutional, HEENT, cardiovascular, pulmonary, GI, , musculoskeletal, neuro, skin, endocrine and  "psych systems are negative, except as otherwise noted.    OBJECTIVE:   /79 (BP Location: Right arm, Patient Position: Chair, Cuff Size: Adult Regular)   Pulse 58   Temp 97.9  F (36.6  C) (Oral)   Ht 1.753 m (5' 9\")   Wt 89.8 kg (198 lb)   SpO2 97%   BMI 29.24 kg/m   Estimated body mass index is 29.24 kg/m  as calculated from the following:    Height as of this encounter: 1.753 m (5' 9\").    Weight as of this encounter: 89.8 kg (198 lb).  EXAM:   GENERAL: healthy, alert and no distress  EYES: Eyes grossly normal to inspection, PERRL and conjunctivae and sclerae normal  HENT: ear canals and TM's normal, nose and mouth without ulcers or lesions  NECK: no adenopathy, no asymmetry, masses, or scars and thyroid normal to palpation  RESP: lungs clear to auscultation - no rales, rhonchi or wheezes  CV: regular rate and rhythm, normal S1 S2, no S3 or S4, no murmur, click or rub, no peripheral edema and peripheral pulses strong  ABDOMEN: soft, nontender, no hepatosplenomegaly, no masses and bowel sounds normal  MS: no gross musculoskeletal defects noted, no edema  SKIN: no suspicious lesions or rashes  NEURO: Normal strength and tone, mentation intact and speech normal  BACK: no CVA tenderness, no paralumbar tenderness  PSYCH: mentation appears normal, affect normal/bright    Diagnostic Test Results:  Results for orders placed or performed in visit on 12/12/18 (from the past 24 hour(s))   CBC with platelets   Result Value Ref Range    WBC 8.0 4.0 - 11.0 10e9/L    RBC Count 5.07 4.4 - 5.9 10e12/L    Hemoglobin 16.5 13.3 - 17.7 g/dL    Hematocrit 49.1 40.0 - 53.0 %    MCV 97 78 - 100 fl    MCH 32.5 26.5 - 33.0 pg    MCHC 33.6 31.5 - 36.5 g/dL    RDW 13.4 10.0 - 15.0 %    Platelet Count 274 150 - 450 10e9/L   ESR: Erythrocyte sedimentation rate   Result Value Ref Range    Sed Rate 8 0 - 20 mm/h       ASSESSMENT / PLAN:       ICD-10-CM    1. Routine history and physical examination of adult Z00.00    2. Personal " "history of tobacco use Z87.891 Prof Fee: Shared Decision Making Visit for Lung Cancer Screening     CT Chest Lung Cancer Scrn Low Dose wo   3. Screening for diabetes mellitus Z13.1 Basic metabolic panel   4. Screening for lipid disorders Z13.220 Lipid panel reflex to direct LDL Fasting   5. Screening for prostate cancer Z12.5 PSA, screen   6. Essential hypertension I10 Basic metabolic panel   7. Night sweats R61 CBC with platelets     ESR: Erythrocyte sedimentation rate     Hepatic panel (Albumin, ALT, AST, Bili, Alk Phos, TP)   8. Need for shingles vaccine Z23 zoster vaccine recombinant adjuvanted (SHINGRIX) injection     VACCINE ADMINISTRATION, INITIAL     CANCELED: ZOSTER VACCINE RECOMBINANT ADJUVANTED IM NJX       End of Life Planning:  Patient currently has an advanced directive: Yes.  Practitioner is supportive of decision.    COUNSELING:  Reviewed preventive health counseling, as reflected in patient instructions       Consider AAA screening for ages 65-75 and smoking history       Regular exercise       Healthy diet/nutrition       Consider lung cancer screening for ages 55-80 years and 30 pack-year smoking history     BP Readings from Last 1 Encounters:   12/12/18 146/79     Estimated body mass index is 29.24 kg/m  as calculated from the following:    Height as of this encounter: 1.753 m (5' 9\").    Weight as of this encounter: 89.8 kg (198 lb).           reports that he quit smoking about 7 years ago. His smoking use included cigarettes. he has never used smokeless tobacco.      Appropriate preventive services were discussed with this patient, including applicable screening as appropriate for cardiovascular disease, diabetes, osteopenia/osteoporosis, and glaucoma.  As appropriate for age/gender, discussed screening for colorectal cancer, prostate cancer, breast cancer, and cervical cancer. Checklist reviewing preventive services available has been given to the patient.    Reviewed patients plan of care and " provided an AVS. The Basic Care Plan (routine screening as documented in Health Maintenance) for Collin meets the Care Plan requirement. This Care Plan has been established and reviewed with the Patient.    Counseling Resources:  ATP IV Guidelines  Pooled Cohorts Equation Calculator  Breast Cancer Risk Calculator  FRAX Risk Assessment  ICSI Preventive Guidelines  Dietary Guidelines for Americans, 2010  [employer]'s MyPlate  ASA Prophylaxis  Lung CA Screening    Lauren A. Engelmann, MD  [facility]    Lung Cancer Screening Shared Decision Making Visit     Collin Frank is eligible for lung cancer screening on the basis of the information provided in my signed lung cancer screening order.     I have discussed with patient the risks and benefits of screening for lung cancer with low-dose CT.     The risks include:  radiation exposure: one low dose chest CT has as much ionizing radiation as about 15 chest x-rays or 6 months of background radiation living in [place]    false positives: 96% of positive findings/nodules are NOT cancer, but some might still require additional diagnostic evaluation, including biopsy  over-diagnosis: some slow growing cancers that might never have been clinically significant will be detected and treated unnecessarily     The benefit of early detection of lung cancer is contingent upon adherence to annual screening or more frequent follow up if indicated.     Furthermore, reaping the benefits of screening requires Collin Frank to be willing and physically able to undergo diagnostic procedures, if indicated. Although no specific guide is available for determining severity of comorbidities, it is reasonable to withhold screening in patients who have greater mortality risk from other diseases.     We did discuss that the only way to prevent lung cancer is to not smoke. He quit 6 years ago.    I did offer risk estimation using a calculator such as this one:    ShouldIScreen

## 2018-12-13 ENCOUNTER — ANTICOAGULATION THERAPY VISIT (OUTPATIENT)
Dept: NURSING | Facility: CLINIC | Age: 68
End: 2018-12-13
Payer: COMMERCIAL

## 2018-12-13 ENCOUNTER — TELEPHONE (OUTPATIENT)
Dept: FAMILY MEDICINE | Facility: CLINIC | Age: 68
End: 2018-12-13

## 2018-12-13 DIAGNOSIS — I26.99 PULMONARY EMBOLISM WITH INFARCTION (H): ICD-10-CM

## 2018-12-13 DIAGNOSIS — D68.51 FACTOR 5 LEIDEN MUTATION, HETEROZYGOUS (H): ICD-10-CM

## 2018-12-13 DIAGNOSIS — D68.51 FACTOR 5 LEIDEN MUTATION, HETEROZYGOUS (H): Primary | ICD-10-CM

## 2018-12-13 DIAGNOSIS — I82.4Y9 ACUTE VENOUS EMBOLISM AND THROMBOSIS OF DEEP VESSELS OF PROXIMAL LOWER EXTREMITY, UNSPECIFIED LATERALITY (H): ICD-10-CM

## 2018-12-13 LAB — INR POINT OF CARE: 3.5 (ref 0.86–1.14)

## 2018-12-13 PROCEDURE — 99207 ZZC NO CHARGE NURSE ONLY: CPT

## 2018-12-13 PROCEDURE — 85610 PROTHROMBIN TIME: CPT | Mod: QW

## 2018-12-13 PROCEDURE — 36416 COLLJ CAPILLARY BLOOD SPEC: CPT

## 2018-12-13 NOTE — PROGRESS NOTES
ANTICOAGULATION FOLLOW-UP CLINIC VISIT    Patient Name:  Collin Frank  Date:  12/13/2018  Contact Type:  Face to Face    SUBJECTIVE: Recheck in 2 wks     Patient Findings     Positives:   Change in medications (cozaar b/p med new.  also had shingles vaccine.)           OBJECTIVE    INR Protime   Date Value Ref Range Status   12/13/2018 3.5 (A) 0.86 - 1.14 Final       ASSESSMENT / PLAN  INR assessment SUPRA    Recheck INR In: 2 WEEKS    INR Location Clinic      Anticoagulation Summary  As of 12/13/2018    INR goal:   2.0-3.0   TTR:   69.4 % (2.8 y)   INR used for dosing:   3.5! (12/13/2018)   Warfarin maintenance plan:   5 mg (5 mg x 1) every day   Full warfarin instructions:   12/13: 2.5 mg; Otherwise 5 mg every day   Weekly warfarin total:   35 mg   Plan last modified:   Minal Carrion RN (1/16/2018)   Next INR check:   12/27/2018   Target end date:   Indefinite    Indications    Pulmonary embolism with infarction (HCC) [I26.99] [I26.99]  Factor 5 Leiden mutation  heterozygous (H) [D68.51]  Acute venous embolism and thrombosis of deep vessels of proximal lower extremity (H) [I82.4Y9]             Anticoagulation Episode Summary     INR check location:       Preferred lab:       Send INR reminders to:    [facility]    Comments:         Anticoagulation Care Providers     Provider Role Specialty Phone number    Engelmann, Lauren Anneliese, MD  Family Practice [phone]            See the Encounter Report to view Anticoagulation Flowsheet and Dosing Calendar (Go to Encounters tab in chart review, and find the Anticoagulation Therapy Visit)    Dosage adjustment made based on physician directed care plan.    Minal Carrion RN

## 2018-12-13 NOTE — TELEPHONE ENCOUNTER
Please review pended / orders for annual INR Clinic Referral.  If correct, please sign and close this encounter.  Thanks    Has the patient previously taken warfarin? yes  If yes, for what indication? Factor 5 Leiden Mutation Heterozygous    Does the patient have any of the following indications for a higher range of 2.5-3.5:    Mitral position mechanical valve? no    Ritika-Shiley, Ball and Cage or Monoleaflet valve (regardless of position) no    Other (if yes, please explain) no    Route to PCP    Minal Humphrey RN

## 2018-12-14 ENCOUNTER — ANCILLARY PROCEDURE (OUTPATIENT)
Dept: CT IMAGING | Facility: CLINIC | Age: 68
End: 2018-12-14
Attending: FAMILY MEDICINE
Payer: COMMERCIAL

## 2018-12-14 DIAGNOSIS — Z87.891 PERSONAL HISTORY OF TOBACCO USE: ICD-10-CM

## 2018-12-20 ENCOUNTER — TELEPHONE (OUTPATIENT)
Dept: FAMILY MEDICINE | Facility: CLINIC | Age: 68
End: 2018-12-20

## 2018-12-20 DIAGNOSIS — E78.5 HYPERLIPIDEMIA LDL GOAL <130: Primary | ICD-10-CM

## 2018-12-20 DIAGNOSIS — I10 BENIGN ESSENTIAL HYPERTENSION: ICD-10-CM

## 2018-12-20 NOTE — TELEPHONE ENCOUNTER
Called patient at [phone] (home). Left message on voicemail to return phone call to triage line at [phone].  Nataliia Thompson RN [facility] Triage.

## 2018-12-20 NOTE — TELEPHONE ENCOUNTER
NURSE TO CALL    Please let patient know that he has worsening cholesterol. In light of his high blood pressure, I think he needs to start medication for it. Is he willing to start a statin? It helps lower risk of stroke and heart attack. Thanks.

## 2018-12-21 RX ORDER — SIMVASTATIN 20 MG
20 TABLET ORAL AT BEDTIME
Qty: 90 TABLET | Refills: 1 | Status: SHIPPED | OUTPATIENT
Start: 2018-12-21 | End: 2019-03-04

## 2018-12-21 RX ORDER — LOSARTAN POTASSIUM 25 MG/1
25 TABLET ORAL DAILY
Qty: 90 TABLET | Refills: 1 | Status: SHIPPED | OUTPATIENT
Start: 2018-12-21 | End: 2019-02-22

## 2018-12-21 NOTE — TELEPHONE ENCOUNTER
I called and spoke to patient, he is willing to start a statin.  Pharmacy cued.  He would like lab results mailed.    He also wonders if he is to stay on losartan.   It was new med end of November.   If so, please send refills of that as well.    BP Readings from Last 3 Encounters:   12/12/18 146/79   11/29/18 163/85   02/01/18 128/65     Potassium   Date Value Ref Range Status   12/12/2018 4.0 3.4 - 5.3 mmol/L Final     Creatinine   Date Value Ref Range Status   12/12/2018 0.95 0.66 - 1.25 mg/dL Final       Routed to Dr. Engelmann to address questions and refills.    Cami Jolley RN  [facility]

## 2018-12-21 NOTE — TELEPHONE ENCOUNTER
I called patient, advised him of Rx's sent and follow up plan.    Patient verbalized understanding of and agreement with plan.    Cami Jolley RN  [facility]

## 2018-12-21 NOTE — TELEPHONE ENCOUNTER
Sent simvastatin to the pharmacy.   He is absolutely supposed to be on the losartan daily, and I sent refills of this as well.     He should follow up with me in 3 months for BP and med check. Thank you!    Lauren Engelmann, MD

## 2018-12-27 ENCOUNTER — DOCUMENTATION ONLY (OUTPATIENT)
Dept: OTHER | Facility: CLINIC | Age: 68
End: 2018-12-27

## 2018-12-27 ENCOUNTER — ANTICOAGULATION THERAPY VISIT (OUTPATIENT)
Dept: NURSING | Facility: CLINIC | Age: 68
End: 2018-12-27
Payer: COMMERCIAL

## 2018-12-27 DIAGNOSIS — I26.99 PULMONARY EMBOLISM WITH INFARCTION (H): ICD-10-CM

## 2018-12-27 DIAGNOSIS — I82.4Y9 ACUTE VENOUS EMBOLISM AND THROMBOSIS OF DEEP VESSELS OF PROXIMAL LOWER EXTREMITY, UNSPECIFIED LATERALITY (H): ICD-10-CM

## 2018-12-27 DIAGNOSIS — D68.51 FACTOR 5 LEIDEN MUTATION, HETEROZYGOUS (H): ICD-10-CM

## 2018-12-27 LAB — INR POINT OF CARE: 1.6 (ref 0.86–1.14)

## 2018-12-27 PROCEDURE — 99207 ZZC NO CHARGE NURSE ONLY: CPT

## 2018-12-27 PROCEDURE — 36416 COLLJ CAPILLARY BLOOD SPEC: CPT

## 2018-12-27 PROCEDURE — 85610 PROTHROMBIN TIME: CPT | Mod: QW

## 2018-12-27 NOTE — PROGRESS NOTES
ANTICOAGULATION FOLLOW-UP CLINIC VISIT    Patient Name:  Collin Frank  Date:  2018  Contact Type:  Face to Face    SUBJECTIVE: recheck in 2 weeks     Patient Findings     Positives:   Change in medications (zocor / simvastatin new.  started ), Change in diet/appetite (ate too much spinach in [place] meals)           OBJECTIVE    INR Protime   Date Value Ref Range Status   2018 1.6 (A) 0.86 - 1.14 Final       ASSESSMENT / PLAN  INR assessment SUB    Recheck INR In: 2 WEEKS    INR Location Clinic      Anticoagulation Summary  As of 2018    INR goal:   2.0-3.0   TTR:   69.2 % (2.8 y)   INR used for dosin.6! (2018)   Warfarin maintenance plan:   5 mg (5 mg x 1) every day   Full warfarin instructions:   : 7.5 mg; Otherwise 5 mg every day   Weekly warfarin total:   35 mg   Plan last modified:   Minal Carrion RN (2018)   Next INR check:   1/10/2019   Target end date:   Indefinite    Indications    Pulmonary embolism with infarction (HCC) [I26.99] [I26.99]  Factor 5 Leiden mutation  heterozygous (H) [D68.51]  Acute venous embolism and thrombosis of deep vessels of proximal lower extremity (H) [I82.4Y9]             Anticoagulation Episode Summary     INR check location:       Preferred lab:       Send INR reminders to:   [facility]    Comments:         Anticoagulation Care Providers     Provider Role Specialty Phone number    Engelmann, Lauren Anneliese, MD  [facility] [phone]            See the Encounter Report to view Anticoagulation Flowsheet and Dosing Calendar (Go to Encounters tab in chart review, and find the Anticoagulation Therapy Visit)    Dosage adjustment made based on physician directed care plan.    Minal Carrion, TIFFANIE

## 2019-01-10 ENCOUNTER — ANTICOAGULATION THERAPY VISIT (OUTPATIENT)
Dept: NURSING | Facility: CLINIC | Age: 69
End: 2019-01-10
Payer: COMMERCIAL

## 2019-01-10 DIAGNOSIS — D68.51 FACTOR 5 LEIDEN MUTATION, HETEROZYGOUS (H): ICD-10-CM

## 2019-01-10 DIAGNOSIS — I26.99 PULMONARY EMBOLISM WITH INFARCTION (H): ICD-10-CM

## 2019-01-10 DIAGNOSIS — I82.4Y9 ACUTE VENOUS EMBOLISM AND THROMBOSIS OF DEEP VESSELS OF PROXIMAL LOWER EXTREMITY, UNSPECIFIED LATERALITY (H): ICD-10-CM

## 2019-01-10 LAB — INR POINT OF CARE: 2.8 (ref 0.86–1.14)

## 2019-01-10 PROCEDURE — 36416 COLLJ CAPILLARY BLOOD SPEC: CPT

## 2019-01-10 PROCEDURE — 99207 ZZC NO CHARGE NURSE ONLY: CPT

## 2019-01-10 PROCEDURE — 85610 PROTHROMBIN TIME: CPT | Mod: QW

## 2019-01-10 NOTE — PROGRESS NOTES
ANTICOAGULATION FOLLOW-UP CLINIC VISIT    Patient Name:  Collin Frank  Date:  1/10/2019  Contact Type:  Face to Face    SUBJECTIVE: continue same plan of care.  Fr[place]ka in 2 wks due to chol med possible interaction.     Patient Findings     Positives:   Change in medications (zocor new)           OBJECTIVE    INR Protime   Date Value Ref Range Status   01/10/2019 2.8 (A) 0.86 - 1.14 Final       ASSESSMENT / PLAN  INR assessment THER    Recheck INR In: 2 WEEKS    INR Location Clinic      Anticoagulation Summary  As of 1/10/2019    INR goal:   2.0-3.0   TTR:   69.2 % (2.8 y)   INR used for dosin.8 (1/10/2019)   Warfarin maintenance plan:   5 mg (5 mg x 1) every day   Full warfarin instructions:   5 mg every day   Weekly warfarin total:   35 mg   No change documented:   Minal Carrion RN   Plan last modified:   Minal Carrion RN (2018)   Next INR check:   2019   Target end date:   Indefinite    Indications    Pulmonary embolism with infarction (HCC) [I26.99] [I26.99]  Factor 5 Leiden mutation  heterozygous (H) [D68.51]  Acute venous embolism and thrombosis of deep vessels of proximal lower extremity (H) [I82.4Y9]             Anticoagulation Episode Summary     INR check location:       Preferred lab:       Send INR reminders to:    [facility]    Comments:         Anticoagulation Care Providers     Provider Role Specialty Phone number    Engelmann, Lauren Anneliese, MD  Family Practice [phone]            See the Encounter Report to view Anticoagulation Flowsheet and Dosing Calendar (Go to Encounters tab in chart review, and find the Anticoagulation Therapy Visit)    Dosage adjustment made based on physician directed care plan.    Minal Carrion RN

## 2019-01-24 ENCOUNTER — ANTICOAGULATION THERAPY VISIT (OUTPATIENT)
Dept: NURSING | Facility: CLINIC | Age: 69
End: 2019-01-24
Payer: COMMERCIAL

## 2019-01-24 DIAGNOSIS — D68.51 FACTOR 5 LEIDEN MUTATION, HETEROZYGOUS (H): ICD-10-CM

## 2019-01-24 DIAGNOSIS — I82.4Y9 ACUTE VENOUS EMBOLISM AND THROMBOSIS OF DEEP VESSELS OF PROXIMAL LOWER EXTREMITY, UNSPECIFIED LATERALITY (H): ICD-10-CM

## 2019-01-24 DIAGNOSIS — I26.99 PULMONARY EMBOLISM WITH INFARCTION (H): ICD-10-CM

## 2019-01-24 LAB — INR POINT OF CARE: 2.3 (ref 0.86–1.14)

## 2019-01-24 PROCEDURE — 85610 PROTHROMBIN TIME: CPT | Mod: QW

## 2019-01-24 PROCEDURE — 99207 ZZC NO CHARGE NURSE ONLY: CPT

## 2019-01-24 PROCEDURE — 36416 COLLJ CAPILLARY BLOOD SPEC: CPT

## 2019-01-24 NOTE — PROGRESS NOTES
ANTICOAGULATION FOLLOW-UP CLINIC VISIT    Patient Name:  Collin Frank  Date:  2019  Contact Type:  Face to Face    SUBJECTIVE: continue same plan of care.     Patient Findings     Positives:   Change in medications (b/p and chol pills new a couple mo now), No Problem Findings           OBJECTIVE    INR Protime   Date Value Ref Range Status   2019 2.3 (A) 0.86 - 1.14 Final       ASSESSMENT / PLAN  INR assessment THER    Recheck INR In: 4 WEEKS    INR Location Clinic      Anticoagulation Summary  As of 2019    INR goal:   2.0-3.0   TTR:   69.6 % (2.9 y)   INR used for dosin.3 (2019)   Warfarin maintenance plan:   5 mg (5 mg x 1) every day   Full warfarin instructions:   5 mg every day   Weekly warfarin total:   35 mg   No change documented:   Minal Carrion RN   Plan last modified:   Minal Carrion RN (2018)   Next INR check:   2019   Target end date:   Indefinite    Indications    Pulmonary embolism with infarction (HCC) [I26.99] [I26.99]  Factor 5 Leiden mutation  heterozygous (H) [D68.51]  Acute venous embolism and thrombosis of deep vessels of proximal lower extremity (H) [I82.4Y9]             Anticoagulation Episode Summary     INR check location:       Preferred lab:       Send INR reminders to:    [facility]    Comments:         Anticoagulation Care Providers     Provider Role Specialty Phone number    Engelmann, Lauren Anneliese, MD  Family Practice [phone]            See the Encounter Report to view Anticoagulation Flowsheet and Dosing Calendar (Go to Encounters tab in chart review, and find the Anticoagulation Therapy Visit)    Dosage adjustment made based on physician directed care plan.    Minal Carrion RN

## 2019-01-29 ENCOUNTER — OFFICE VISIT (OUTPATIENT)
Dept: FAMILY MEDICINE | Facility: CLINIC | Age: 69
End: 2019-01-29
Payer: COMMERCIAL

## 2019-01-29 VITALS
BODY MASS INDEX: 29.92 KG/M2 | SYSTOLIC BLOOD PRESSURE: 150 MMHG | DIASTOLIC BLOOD PRESSURE: 80 MMHG | OXYGEN SATURATION: 99 % | WEIGHT: 202 LBS | HEIGHT: 69 IN | TEMPERATURE: 97.7 F | HEART RATE: 60 BPM

## 2019-01-29 DIAGNOSIS — J06.9 ACUTE URI: Primary | ICD-10-CM

## 2019-01-29 PROCEDURE — 99213 OFFICE O/P EST LOW 20 MIN: CPT | Performed by: FAMILY MEDICINE

## 2019-01-29 ASSESSMENT — MIFFLIN-ST. JEOR: SCORE: 1666.65

## 2019-01-29 NOTE — PROGRESS NOTES
SUBJECTIVE:   Collin Frank is a 68 year old male who presents to clinic today for the following health issues:      Acute Illness   Acute illness concerns: URI  Onset: x 1 week    Fever: no    Chills/Sweats: YES- Sweats at night    Headache (location?): no    Sinus Pressure:YES    Conjunctivitis:  no    Ear Pain: no    Rhinorrhea: YES    Congestion: no     Sore Throat: no      Cough: YES - Little    Wheeze: no    Decreased Appetite: no     Nausea: no    Vomiting: no    Diarrhea:  no    Dysuria/Freq.: no    Fatigue/Achiness: no    Sick/Strep Exposure: no     Therapies Tried and outcome: Nothing    Current Outpatient Medications   Medication Sig Dispense Refill     cholecalciferol (VITAMIN D) 1000 UNIT tablet Take 1 tablet (1,000 Units) by mouth daily 100 tablet 3     losartan (COZAAR) 25 MG tablet Take 1 tablet (25 mg) by mouth daily 90 tablet 1     simvastatin (ZOCOR) 20 MG tablet Take 1 tablet (20 mg) by mouth At Bedtime 90 tablet 1     warfarin (JANTOVEN) 5 MG tablet Take 1 tablet (5MG) by mouth daily 90 tablet 3     Generally bp is normal at home     Up the last couple of days     Past Medical History:   Diagnosis Date     Antiplatelet or antithrombotic long-term use     blood clot in leg     Long term current use of anticoagulant 10/16/2012       Past Surgical History:   Procedure Laterality Date     COLONOSCOPY  08    Normal. Repeat in 10 years     COLONOSCOPY WITH CO2 INSUFFLATION N/A 2018    Procedure: COLONOSCOPY WITH CO2 INSUFFLATION;  COLON SCREEN/ ENGELMANN;  Surgeon: Jan Flores MD;  Location: [facility] OR     rt knee ORIF      [facility]       Family History   Problem Relation Age of Onset     Alzheimer Disease Mother      Heart Disease Father      Prostate Cancer Father      Cancer Brother 65        pancreatic        Prostate Cancer Brother        Social History     Tobacco Use     Smoking status: Former Smoker     Types: Cigarettes     Last attempt to quit: 2011  "    Years since quittin.9     Smokeless tobacco: Never Used   Substance Use Topics     Alcohol use: No           O: /84 (BP Location: Left arm, Patient Position: Sitting, Cuff Size: Adult Regular)   Pulse 60   Temp 97.7  F (36.5  C) (Oral)   Ht 1.753 m (5' 9\")   Wt 91.6 kg (202 lb)   SpO2 99%   BMI 29.83 kg/m      Head: Normocephalic, atraumatic.  Eyes: Conjunctiva clear, non icteric. PERRLA.  Ears: External ears and TMs normal BL.  Nose: Septum midline, nasal mucosa pink and moist. No discharge.  Mouth / Throat: Normal dentition.  No oral lesions. Pharynx non erythematous, tonsils without hypertrophy.  Neck: Supple, no enlarged LN, trachea midline.    Chest wall normal to inspection and palpation. Good excursion bilaterally. Lungs clear to auscultation. Good air movement bilaterally without rales, wheezes, or rhonchi.   Regular rate and  rhythm. S1 and S2 normal, no murmurs, clicks, gallops or rubs. No edema or JVD.          ICD-10-CM    1. Acute URI J06.9      Given warning signs of his cold turning into something more serious   He can use otc meds for now  Problem list and histories reviewed & adjusted, as indicated.    "

## 2019-01-29 NOTE — PATIENT INSTRUCTIONS

## 2019-02-04 ENCOUNTER — APPOINTMENT (OUTPATIENT)
Dept: CT IMAGING | Facility: CLINIC | Age: 69
End: 2019-02-04
Payer: COMMERCIAL

## 2019-02-04 ENCOUNTER — APPOINTMENT (OUTPATIENT)
Dept: GENERAL RADIOLOGY | Facility: CLINIC | Age: 69
End: 2019-02-04
Payer: COMMERCIAL

## 2019-02-04 ENCOUNTER — APPOINTMENT (OUTPATIENT)
Dept: CARDIOLOGY | Facility: CLINIC | Age: 69
End: 2019-02-04
Payer: COMMERCIAL

## 2019-02-04 ENCOUNTER — HOSPITAL ENCOUNTER (OUTPATIENT)
Facility: CLINIC | Age: 69
Setting detail: OBSERVATION
Discharge: HOME | End: 2019-02-04
Attending: EMERGENCY MEDICINE | Admitting: EMERGENCY MEDICINE
Payer: COMMERCIAL

## 2019-02-04 VITALS
RESPIRATION RATE: 16 BRPM | WEIGHT: 215 LBS | SYSTOLIC BLOOD PRESSURE: 142 MMHG | HEART RATE: 69 BPM | OXYGEN SATURATION: 98 % | HEIGHT: 69 IN | BODY MASS INDEX: 31.84 KG/M2 | DIASTOLIC BLOOD PRESSURE: 83 MMHG | TEMPERATURE: 98.7 F

## 2019-02-04 DIAGNOSIS — R07.9 ACUTE CHEST PAIN: ICD-10-CM

## 2019-02-04 DIAGNOSIS — Z79.01 LONG TERM (CURRENT) USE OF ANTICOAGULANTS: ICD-10-CM

## 2019-02-04 LAB
ANION GAP SERPL CALCULATED.3IONS-SCNC: 7 MMOL/L (ref 3–14)
BASOPHILS # BLD AUTO: 0.1 10E9/L (ref 0–0.2)
BASOPHILS NFR BLD AUTO: 0.9 %
BUN SERPL-MCNC: 14 MG/DL (ref 7–30)
CALCIUM SERPL-MCNC: 8.3 MG/DL (ref 8.5–10.1)
CHLORIDE SERPL-SCNC: 107 MMOL/L (ref 94–109)
CO2 SERPL-SCNC: 26 MMOL/L (ref 20–32)
CREAT SERPL-MCNC: 0.85 MG/DL (ref 0.66–1.25)
D DIMER PPP FEU-MCNC: 0.7 UG/ML FEU (ref 0–0.5)
DIFFERENTIAL METHOD BLD: ABNORMAL
EOSINOPHIL # BLD AUTO: 0.1 10E9/L (ref 0–0.7)
EOSINOPHIL NFR BLD AUTO: 0.8 %
ERYTHROCYTE [DISTWIDTH] IN BLOOD BY AUTOMATED COUNT: 13.2 % (ref 10–15)
GFR SERPL CREATININE-BSD FRML MDRD: 88 ML/MIN/{1.73_M2}
GLUCOSE SERPL-MCNC: 98 MG/DL (ref 70–99)
HCT VFR BLD AUTO: 42.8 % (ref 40–53)
HGB BLD-MCNC: 14.2 G/DL (ref 13.3–17.7)
IMM GRANULOCYTES # BLD: 0 10E9/L (ref 0–0.4)
IMM GRANULOCYTES NFR BLD: 0.1 %
INR PPP: 2.38 (ref 0.86–1.14)
INTERPRETATION ECG - MUSE: NORMAL
LYMPHOCYTES # BLD AUTO: 1.6 10E9/L (ref 0.8–5.3)
LYMPHOCYTES NFR BLD AUTO: 18.9 %
MCH RBC QN AUTO: 32.5 PG (ref 26.5–33)
MCHC RBC AUTO-ENTMCNC: 33.2 G/DL (ref 31.5–36.5)
MCV RBC AUTO: 98 FL (ref 78–100)
MONOCYTES # BLD AUTO: 0.5 10E9/L (ref 0–1.3)
MONOCYTES NFR BLD AUTO: 6.3 %
NEUTROPHILS # BLD AUTO: 6.2 10E9/L (ref 1.6–8.3)
NEUTROPHILS NFR BLD AUTO: 73 %
NRBC # BLD AUTO: 0 10*3/UL
NRBC BLD AUTO-RTO: 0 /100
PLATELET # BLD AUTO: 274 10E9/L (ref 150–450)
POTASSIUM SERPL-SCNC: 4.2 MMOL/L (ref 3.4–5.3)
RADIOLOGIST FLAGS: NORMAL
RBC # BLD AUTO: 4.37 10E12/L (ref 4.4–5.9)
SODIUM SERPL-SCNC: 141 MMOL/L (ref 133–144)
TROPONIN I SERPL-MCNC: 0.02 UG/L (ref 0–0.04)
TROPONIN I SERPL-MCNC: <0.015 UG/L (ref 0–0.04)
WBC # BLD AUTO: 8.5 10E9/L (ref 4–11)

## 2019-02-04 PROCEDURE — 25000125 ZZHC RX 250: Performed by: EMERGENCY MEDICINE

## 2019-02-04 PROCEDURE — 93005 ELECTROCARDIOGRAM TRACING: CPT | Mod: 59 | Performed by: EMERGENCY MEDICINE

## 2019-02-04 PROCEDURE — 93018 CV STRESS TEST I&R ONLY: CPT | Performed by: INTERNAL MEDICINE

## 2019-02-04 PROCEDURE — 93016 CV STRESS TEST SUPVJ ONLY: CPT | Performed by: INTERNAL MEDICINE

## 2019-02-04 PROCEDURE — 71046 X-RAY EXAM CHEST 2 VIEWS: CPT

## 2019-02-04 PROCEDURE — A9270 NON-COVERED ITEM OR SERVICE: HCPCS | Mod: GY | Performed by: EMERGENCY MEDICINE

## 2019-02-04 PROCEDURE — 71260 CT THORAX DX C+: CPT

## 2019-02-04 PROCEDURE — 99235 HOSP IP/OBS SAME DATE MOD 70: CPT | Mod: Z6 | Performed by: EMERGENCY MEDICINE

## 2019-02-04 PROCEDURE — 40000264 ECHO STRESS ECHOCARDIOGRAM

## 2019-02-04 PROCEDURE — 93325 DOPPLER ECHO COLOR FLOW MAPG: CPT | Mod: 26 | Performed by: INTERNAL MEDICINE

## 2019-02-04 PROCEDURE — 85379 FIBRIN DEGRADATION QUANT: CPT | Performed by: EMERGENCY MEDICINE

## 2019-02-04 PROCEDURE — G0378 HOSPITAL OBSERVATION PER HR: HCPCS

## 2019-02-04 PROCEDURE — 85025 COMPLETE CBC W/AUTO DIFF WBC: CPT | Performed by: EMERGENCY MEDICINE

## 2019-02-04 PROCEDURE — 25000132 ZZH RX MED GY IP 250 OP 250 PS 637: Performed by: EMERGENCY MEDICINE

## 2019-02-04 PROCEDURE — 25000132 ZZH RX MED GY IP 250 OP 250 PS 637: Performed by: PHYSICIAN ASSISTANT

## 2019-02-04 PROCEDURE — 93010 ELECTROCARDIOGRAM REPORT: CPT | Mod: Z6 | Performed by: EMERGENCY MEDICINE

## 2019-02-04 PROCEDURE — 84484 ASSAY OF TROPONIN QUANT: CPT | Mod: 91 | Performed by: EMERGENCY MEDICINE

## 2019-02-04 PROCEDURE — 93321 DOPPLER ECHO F-UP/LMTD STD: CPT | Mod: 26 | Performed by: INTERNAL MEDICINE

## 2019-02-04 PROCEDURE — 99285 EMERGENCY DEPT VISIT HI MDM: CPT | Mod: 25 | Performed by: EMERGENCY MEDICINE

## 2019-02-04 PROCEDURE — 80048 BASIC METABOLIC PNL TOTAL CA: CPT | Performed by: EMERGENCY MEDICINE

## 2019-02-04 PROCEDURE — 25500064 ZZH RX 255 OP 636: Performed by: EMERGENCY MEDICINE

## 2019-02-04 PROCEDURE — 25000128 H RX IP 250 OP 636: Performed by: EMERGENCY MEDICINE

## 2019-02-04 PROCEDURE — 93350 STRESS TTE ONLY: CPT | Mod: 26 | Performed by: INTERNAL MEDICINE

## 2019-02-04 PROCEDURE — 85610 PROTHROMBIN TIME: CPT | Performed by: EMERGENCY MEDICINE

## 2019-02-04 RX ORDER — ACETAMINOPHEN 500 MG
1000 TABLET ORAL EVERY 6 HOURS PRN
Status: DISCONTINUED | OUTPATIENT
Start: 2019-02-04 | End: 2019-02-04 | Stop reason: HOSPADM

## 2019-02-04 RX ORDER — LIDOCAINE 40 MG/G
CREAM TOPICAL
Status: DISCONTINUED | OUTPATIENT
Start: 2019-02-04 | End: 2019-02-04 | Stop reason: HOSPADM

## 2019-02-04 RX ORDER — SIMVASTATIN 20 MG
20 TABLET ORAL AT BEDTIME
Status: DISCONTINUED | OUTPATIENT
Start: 2019-02-04 | End: 2019-02-04 | Stop reason: HOSPADM

## 2019-02-04 RX ORDER — IOPAMIDOL 755 MG/ML
100 INJECTION, SOLUTION INTRAVASCULAR ONCE
Status: COMPLETED | OUTPATIENT
Start: 2019-02-04 | End: 2019-02-04

## 2019-02-04 RX ORDER — NITROGLYCERIN 0.4 MG/1
0.4 TABLET SUBLINGUAL EVERY 5 MIN PRN
Status: DISCONTINUED | OUTPATIENT
Start: 2019-02-04 | End: 2019-02-04 | Stop reason: HOSPADM

## 2019-02-04 RX ORDER — NALOXONE HYDROCHLORIDE 0.4 MG/ML
.1-.4 INJECTION, SOLUTION INTRAMUSCULAR; INTRAVENOUS; SUBCUTANEOUS
Status: DISCONTINUED | OUTPATIENT
Start: 2019-02-04 | End: 2019-02-04 | Stop reason: HOSPADM

## 2019-02-04 RX ORDER — ALUMINA, MAGNESIA, AND SIMETHICONE 2400; 2400; 240 MG/30ML; MG/30ML; MG/30ML
30 SUSPENSION ORAL EVERY 4 HOURS PRN
Status: DISCONTINUED | OUTPATIENT
Start: 2019-02-04 | End: 2019-02-04 | Stop reason: HOSPADM

## 2019-02-04 RX ORDER — LOSARTAN POTASSIUM 25 MG/1
25 TABLET ORAL DAILY
Status: DISCONTINUED | OUTPATIENT
Start: 2019-02-04 | End: 2019-02-04 | Stop reason: HOSPADM

## 2019-02-04 RX ORDER — ASPIRIN 81 MG/1
324 TABLET, CHEWABLE ORAL ONCE
Status: COMPLETED | OUTPATIENT
Start: 2019-02-04 | End: 2019-02-04

## 2019-02-04 RX ADMIN — SODIUM CHLORIDE, PRESERVATIVE FREE 100 ML: 5 INJECTION INTRAVENOUS at 04:42

## 2019-02-04 RX ADMIN — PERFLUTREN 5 ML: 6.52 INJECTION, SUSPENSION INTRAVENOUS at 09:32

## 2019-02-04 RX ADMIN — LOSARTAN POTASSIUM 25 MG: 25 TABLET, FILM COATED ORAL at 11:09

## 2019-02-04 RX ADMIN — LIDOCAINE HYDROCHLORIDE 30 ML: 20 SOLUTION ORAL; TOPICAL at 02:07

## 2019-02-04 RX ADMIN — IOPAMIDOL 100 ML: 755 INJECTION, SOLUTION INTRAVENOUS at 04:42

## 2019-02-04 RX ADMIN — ASPIRIN 81 MG CHEWABLE TABLET 324 MG: 81 TABLET CHEWABLE at 02:06

## 2019-02-04 ASSESSMENT — MIFFLIN-ST. JEOR: SCORE: 1725.61

## 2019-02-04 NOTE — H&P
[facility], [place]    History and Physical - ED Observation Service       Date of Admission:  2/4/2019    Assessment & Plan   Collin Frank is a 68 year old male admitted on 2/4/2019. He has a history of HTN, HLD, DVT, May Thurners Syndrome, LE stents who presents to ED with an episode of chest discomfort and diaphoresis.     1. Chest Discomfort: cold like symptoms for the last week, occasional productive cough but seems to have resolved yesterday. No fever or chills. 4-6p while doing crossword puzzle leaned over and noted a sternal chest squeeze that went away spontaneously. No associated palpitations, paresthesias, SOB, nausea, vomiting, lightheadedness. Works out daily with cardio and weights and no events during exercise in AM. Laid down at 6pm due to the chest discomfort and awoke ~ 8pm due to diaphoresis but no more chest discomfort. H/o tobacco use ~ 1ppd x 40 years. No alcohol use. No illicit drug use. Denies any increased caffeine intake, anxiety, stress, heartburn, reflux, gassy, bloated, constipation. Denies any ear ache, sore throat, bodyaches, fatigued, no sick contacts. Did receive flu shot this season. Never had a stress test.  In ED, HR 60's-80's, 's-180's/70's-100's, RR 16-23, SaO2 93-98% on RA, Temp 98.3.  Normal BMP, CBC. Troponin I negative x 2. D-dimer 0.7. INR 2.38. EKG NSR without any acute findings. CXR reports left basilar subsegmental atelectasis, left lung base; early pneumonia possible.  CT Chest no PE; new RML peribronchovascular nodularity suggestive of infection; nodular opacities in LLL at base and lingula, suggestive of infectious process; multiple left basilar pulmonary nodules favoring infection; small hiatal hernia. In the ED the patient was given ASA 324mg po x 1. He is being admitted for ACS work up. Risk Factors include age, male, HTN, HLD, family history ACS, tobacco use.  - Serial troponins q4h x 2 more  - Continuous telemetry  - Exercixe  Stress Test in the morning  - Nitro PRN  - ASA 81mg daily  - NPO  - Consider start a course of abx for early CAP    2. H/o DVT: INR 2.38.  - Pharmacy consult to dose coumadin    Chronic Medical Problems:  # HTN: - Continue with PTA Losartan    # HLD: - Continue with PTA Zocor      Diet: Combination Diet Clear Liquid; No Caffeine Diet    DVT Prophylaxis: Warfarin  Frias Catheter: not present  Code Status: Full Code      Disposition Plan   Expected discharge: Today, recommended to prior living arrangement once adequate pain management/ tolerating PO medications and ACS work up completed.  Entered: SHIRA Villar 02/04/2019, 6:18 AM     The patient's care was discussed with the Attending Physician, Dr. Clark.    SHIRA Villar  [facility], [place]  Ascom: [phone]  Please see sticky note for cross cover information  ______________________________________________________________________    Chief Complaint   Chest discomfort, diaphoresis    History is obtained from the patient    History of Present Illness   Collin Frank is a 68 year old male with a history of HTN, HLD, DVT, LE stents and possible IVC filter (2011) who presents to ED with an episode of chest discomfort and diaphoresis. Reports having cold like symptoms for the last week. Had a occasional productive cough but seems to have resolved yesterday. No fever or chills. States he works out every day. He does 15 minutes on the treadmill and lifts 5lb weights. He did that yesterday morning uneventfully. Between 4-6p while doing crossword puzzle he leaned over and noted a sternal chest squeeze that went away spontaneously. No associated palpitations, paresthesias, SOB, nausea, vomiting, lightheadedness. He went to lay down about 6pm due to the chest discomfort and awoke ~ 8pm due to diaphoresis but no more chest discomfort. He felt generally unwell and felt like something wasn't right. He denies any more chest  discomfort. Reports father who  at 101 and ~ 80's had stent/bypass? No knowledge of anyone else with CAD. H/o tobacco use ~ 1ppd x 40 years. No alcohol use. No illicit drug use. Denies any increased caffeine intake, anxiety, stress, heartburn, reflux, gassy, bloated, constipation. Denies any ear ache, sore throat, bodyaches, fatigued, no sick contacts. Did receive flu shot this season. Never had a stress test.     In the ED, HR 60's-80's, 's-180's/70's-100's, RR 16-23, SaO2 93-98% on RA, Temp 98.3. Labs show normal BMP, CBC. Troponin I negative x 2. D-dimer 0.7. INR 2.38. EKG NSR without any acute findings. CXR reports left basilar subsegmental atelectasis, left lung base; early pneumonia possible.  CT Chest no PE; new RML peribronchovascular nodularity suggestive of infection; nodular opacities in LLL at base and lingula, suggestive of infectious process; multiple left basilar pulmonary nodules favoring infection; small hiatal hernia. In the ED the patient was given ASA 324mg po x 1. He is being admitted for ACS work up.       Review of Systems    The 10 point Review of Systems is negative other than noted in the HPI or here.    Past Medical History    I have reviewed this patient's medical history and updated it with pertinent information if needed.   Past Medical History:   Diagnosis Date     Antiplatelet or antithrombotic long-term use     blood clot in leg     Long term current use of anticoagulant 10/16/2012       Past Surgical History   I have reviewed this patient's surgical history and updated it with pertinent information if needed.  Past Surgical History:   Procedure Laterality Date     COLONOSCOPY  08    Normal. Repeat in 10 years     COLONOSCOPY WITH CO2 INSUFFLATION N/A 2018    Procedure: COLONOSCOPY WITH CO2 INSUFFLATION;  COLON SCREEN/ ENGELMANN;  Surgeon: Jan Flores MD;  Location: [facility] OR     rt knee ORIF      [facility]       Social History   I have reviewed  this patient's social history and updated it with pertinent information if needed.  Social History     Tobacco Use     Smoking status: Former Smoker     Types: Cigarettes     Last attempt to quit: 2011     Years since quittin.0     Smokeless tobacco: Never Used   Substance Use Topics     Alcohol use: No     Drug use: No       Family History   I have reviewed this patient's family history and updated it with pertinent information if needed.   Family History   Problem Relation Age of Onset     Alzheimer Disease Mother      Heart Disease Father      Prostate Cancer Father      Cancer Brother 65        pancreatic        Prostate Cancer Brother        Prior to Admission Medications   Prior to Admission Medications   Prescriptions Last Dose Informant Patient Reported? Taking?   cholecalciferol (VITAMIN D) 1000 UNIT tablet   Yes No   Sig: Take 1 tablet (1,000 Units) by mouth daily   losartan (COZAAR) 25 MG tablet   No No   Sig: Take 1 tablet (25 mg) by mouth daily   simvastatin (ZOCOR) 20 MG tablet   No No   Sig: Take 1 tablet (20 mg) by mouth At Bedtime   warfarin (JANTOVEN) 5 MG tablet   No No   Sig: Take 1 tablet (5MG) by mouth daily   zoster vaccine recombinant adjuvanted (SHINGRIX) injection   No No   Sig: Inject 0.5 mLs into the muscle once for 1 dose      Facility-Administered Medications: None     Allergies   No Known Allergies    Physical Exam   Vital Signs: Temp: 98.3  F (36.8  C) Temp src: Oral BP: 147/80 Pulse: 64 Heart Rate: 65 Resp: 18 SpO2: 95 % O2 Device: None (Room air)    Weight: 215 lbs 0 oz    Constitutional: awake, alert, cooperative, no apparent distress, and appears stated age  Eyes: Lids and lashes normal, pupils equal, round and reactive to light, extra ocular muscles intact, sclera clear, conjunctiva normal  ENT: Normocephalic, without obvious abnormality, atraumatic, sinuses nontender on palpation, external ears without lesions, oral pharynx with moist mucous membranes, tonsils  without erythema or exudates, gums normal and good dentition.  Hematologic / Lymphatic: no cervical lymphadenopathy  Respiratory: No increased work of breathing, good air exchange, clear to auscultation bilaterally, no crackles or wheezing  Cardiovascular: Normal apical impulse, regular rate and rhythm, normal S1 and S2, no S3 or S4, and no murmur noted  GI: No scars, normal bowel sounds, soft, non-distended, non-tender, no masses palpated, no [id number]}  Skin: no bruising or bleeding  Musculoskeletal: There is no redness, warmth, or swelling of the joints.  Full range of motion noted.  Motor strength is 5 out of 5 all extremities bilaterally.  Tone is normal.  Neurologic: Awake, alert, oriented to name, place and time.  Cranial nerves II-XII are grossly intact.  Motor is 5 out of 5 bilaterally.  Cerebellar finger to nose, heel to shin intact.  Sensory is intact.  Babinski down going, Romberg negative, and gait is normal.  Neuropsychiatric: General: normal, calm and normal eye contact    Data   Data reviewed today: I reviewed all medications, new labs and imaging results over the last 24 hours. I personally reviewed the EKG tracing showing NSR.    Recent Labs   Lab 02/04/19  0521 02/04/19 0148   WBC  --  8.5   HGB  --  14.2   MCV  --  98   PLT  --  274   INR  --  2.38*   NA  --  141   POTASSIUM  --  4.2   CHLORIDE  --  107   CO2  --  26   BUN  --  14   CR  --  0.85   ANIONGAP  --  7   MARTA  --  8.3*   GLC  --  98   TROPI 0.016 <0.015     Most Recent 3 CBC's:  Recent Labs   Lab Test 02/04/19 0148 12/12/18  0919 12/11/17  0940   WBC 8.5 8.0 6.3   HGB 14.2 16.5 14.9   MCV 98 97 98    274 238     Most Recent 3 BMP's:  Recent Labs   Lab Test 02/04/19 0148 12/12/18  0919 12/11/17  0940    139 140   POTASSIUM 4.2 4.0 4.3   CHLORIDE 107 105 103   CO2 26 29 30   BUN 14 14 13   CR 0.85 0.95 0.95   ANIONGAP 7 5 7   MARTA 8.3* 9.0 8.9   GLC 98 83 89     Most Recent 2 LFT's:  Recent Labs   Lab Test  12/12/18  0919 10/16/12  1038   AST 29  --    ALT 36 33   ALKPHOS 90  --    BILITOTAL 0.9  --      Most Recent 3 INR's:  Recent Labs   Lab Test 02/04/19  0148 01/24/19 01/10/19   INR 2.38* 2.3* 2.8*     Anticoagulation Dose History     Recent Dosing and Labs Latest Ref Rng & Units 10/18/2018 11/15/2018 12/13/2018 12/27/2018 1/10/2019 1/24/2019 2/4/2019    INR 0.86 - 1.14 - - - - - - 2.38(H)    INR 0.86 - 1.14 2.5(A) 1.9(A) 3.5(A) 1.6(A) 2.8(A) 2.3(A) -    INR Point of Care 0.86 - 1.14 - - - - - - -          Most Recent 3 Troponin's:  Recent Labs   Lab Test 02/04/19  0521 02/04/19  0148   TROPI 0.016 <0.015     Most Recent 3 BNP's:No lab results found.  Most Recent D-dimer:  Recent Labs   Lab Test 02/04/19  0148   DD 0.7*     Most Recent Cholesterol Panel:  Recent Labs   Lab Test 12/12/18  0919   CHOL 201*   *   HDL 48   TRIG 106     Most Recent TSH and T4:  Recent Labs   Lab Test 11/28/11  1051   TSH 1.90     Recent Results (from the past 24 hour(s))   XR Chest 2 Views    Narrative    XR CHEST 2 VW  2/4/2019 3:07 AM      HISTORY: chest pain    COMPARISON: CT 12/14/2018    FINDINGS: The cardiomediastinal silhouette and pulmonary vasculature  are within normal limits. Linear streaky opacity in the left lower  lung, likely atelectasis. No pleural effusion or pneumothorax are  identified. No suspicious osseous lesion. Upper abdomen is  unremarkable.      Impression    IMPRESSION: Left basilar subsegmental atelectasis, left lung base.  Early pneumonia can have this appearance.    I have personally reviewed the examination and initial interpretation  and I agree with the findings.    CODY GRIGGS MD   Chest CT, IV contrast only - PE protocol   Result Value    Radiologist flags Lung nodules    Narrative    EXAMINATION: CT CHEST PULMONARY EMBOLISM W CONTRAST, 2/4/2019 4:48 AM     COMPARISON: CT 12/14/2018.    HISTORY: Chest pain, positive d-dimer    TECHNIQUE: Volumetric helical acquisition of CT images of the  chest  from the lung apices to the kidneys were acquired after the  administration of 100 mL Isovue-370. Three-dimensional (3D)  post-processed angiographic images were reconstructed, archived to  PACS and used in interpretation of this study.    Diagnostic quality: Adequate    FINDINGS:     Pulmonary embolism: None.   Right heart strain: None.   Pulmonary arteries: Normal in caliber.     Lung parenchyma: Subsegmental atelectasis in the right lower lobe near  the base. Tree-in-bud nodules in the right middle lobe, new compared  to prior examination. Peribronchovascular groundglass nodules in the  left lower lobe near the base. There are few left lower lobe basilar  nodules measuring up to 8 mm, new compared to prior examination. Few  additional up to 6 mm lingular pulmonary nodules are noted, new since  prior examination.  Pleural effusion: None.   Central airways: Normal.     Adenopathy: None.   Heart and great vessels: Normal.     Upper abdomen: Small hiatal hernia. Small fat-containing right  Bochdalek hernia. Few tiny nonobstructive urothelial calcifications  versus tiny calculus in the kidneys measuring up to 2 to 3 mm. No  hydronephrosis. Limited evaluation of the upper abdomen is otherwise  unremarkable.  Bones: Degenerative changes of the right shoulder joint and lower  thoracic spine. No aggressive osseous lesion.       Impression    IMPRESSION:   1. No pulmonary embolism demonstrated.  2. New right middle lobe peribronchovascular nodularity, suggestive of  infection. In addition, there are nodular opacities in the left lower  lobe at the base and lingula, which are also suggestive of infectious  process.  3. Multiple left basilar pulmonary nodules favoring infection.  Consider CT chest follow-up to ensure resolution.   4. Small hiatal hernia.    [Consider Follow Up: Lung nodules]    This report will be copied to the [facility] to ensure a  provider acknowledges the finding.

## 2019-02-04 NOTE — ED NOTES
Report given to RN on 6D. All questions answered. Pt notified of transfer and updated on POC. Transport ordered to bring pt to room 14 on unit 6D.

## 2019-02-04 NOTE — PHARMACY-ANTICOAGULATION SERVICE
Clinical Pharmacy - Warfarin Dosing Consult     Pharmacy has been consulted to manage this patient s warfarin therapy.  Indication: DVT/PE Prophylaxis  Therapy Goal: INR 2-3  OP Anticoag Clinic: [facility]  Warfarin Prior to Admission: Yes  Warfarin PTA Regimen: 5 mg daily  Significant drug interactions: none    INR   Date Value Ref Range Status   02/04/2019 2.38 (H) 0.86 - 1.14 Final     INR Protime   Date Value Ref Range Status   01/24/2019 2.3 (A) 0.86 - 1.14 Final       Recommend warfarin 5 mg today.  Pharmacy will monitor Collin Frank daily and order warfarin doses to achieve specified goal.      Please contact pharmacy as soon as possible if the warfarin needs to be held for a procedure or if the warfarin goals change.      Kianna Pearce, PharmD Student

## 2019-02-04 NOTE — PROGRESS NOTES
9:35 AM  Patient was seen and examined the case discussed at length with nurse practitioner on the observation unit.  Subjective: Patient presented with vague left-sided chest discomfort that has largely resolved that occurred in the setting of URI type symptoms for the past week with most notable symptom being postnasal drip.  Currently denies fever, cough, shortness of breath, chest pain or other ongoing symptoms.  Objective:  Vitals:    02/04/19 0620 02/04/19 0650 02/04/19 0700 02/04/19 0827   BP: (!) 154/91 158/80 159/85 148/76   BP Location:    Left arm   Pulse:   69    Resp: 18 18 18 16   Temp:   98.1  F (36.7  C) 98.7  F (37.1  C)   TempSrc:   Oral Oral   SpO2: 95% 94% 94% 96%   Weight:       Height:         Chest is clear to auscultation.  Cardiovascular exam regular rate and rhythm.  Abdomen is soft nontender.  Results for orders placed or performed during the hospital encounter of 02/04/19   XR Chest 2 Views    Narrative    XR CHEST 2 VW  2/4/2019 3:07 AM      HISTORY: chest pain    COMPARISON: CT 12/14/2018    FINDINGS: The cardiomediastinal silhouette and pulmonary vasculature  are within normal limits. Linear streaky opacity in the left lower  lung, likely atelectasis. No pleural effusion or pneumothorax are  identified. No suspicious osseous lesion. Upper abdomen is  unremarkable.      Impression    IMPRESSION: Left basilar subsegmental atelectasis, left lung base.  Early pneumonia can have this appearance.    I have personally reviewed the examination and initial interpretation  and I agree with the findings.    CODY GRIGGS MD   Chest CT, IV contrast only - PE protocol   Result Value Ref Range    Radiologist flags Lung nodules     Narrative    EXAMINATION: CT CHEST PULMONARY EMBOLISM W CONTRAST, 2/4/2019 4:48 AM     COMPARISON: CT 12/14/2018.    HISTORY: Chest pain, positive d-dimer    TECHNIQUE: Volumetric helical acquisition of CT images of the chest  from the lung apices to the kidneys were  acquired after the  administration of 100 mL Isovue-370. Three-dimensional (3D)  post-processed angiographic images were reconstructed, archived to  PACS and used in interpretation of this study.    Diagnostic quality: Adequate    FINDINGS:     Pulmonary embolism: None.   Right heart strain: None.   Pulmonary arteries: Normal in caliber.     Lung parenchyma: Subsegmental atelectasis in the right lower lobe near  the base. Tree-in-bud nodules in the right middle lobe, new compared  to prior examination. Peribronchovascular groundglass nodules in the  left lower lobe near the base. There are few left lower lobe basilar  nodules measuring up to 8 mm, new compared to prior examination. Few  additional up to 6 mm lingular pulmonary nodules are noted, new since  prior examination.  Pleural effusion: None.   Central airways: Normal.     Adenopathy: None.   Heart and great vessels: Normal.     Upper abdomen: Small hiatal hernia. Small fat-containing right  Bochdalek hernia. Few tiny nonobstructive urothelial calcifications  versus tiny calculus in the kidneys measuring up to 2 to 3 mm. No  hydronephrosis. Limited evaluation of the upper abdomen is otherwise  unremarkable.  Bones: Degenerative changes of the right shoulder joint and lower  thoracic spine. No aggressive osseous lesion.       Impression    IMPRESSION:   1. No pulmonary embolism demonstrated.  2. New right middle lobe peribronchovascular nodularity, suggestive of  infection. In addition, there are nodular opacities in the left lower  lobe at the base and lingula, which are also suggestive of infectious  process.  3. Multiple left basilar pulmonary nodules favoring infection.  Consider follow-up to document resolution.  4. Small hiatal hernia.    [Consider Follow Up: Lung nodules]    This report will be copied to the [facility] to ensure a  provider acknowledges the finding.     I have personally reviewed the examination and initial  interpretation  and I agree with the findings.    NUBAI GARCIA MD   Troponin I   Result Value Ref Range    Troponin I ES <0.015 0.000 - 0.045 ug/L   CBC with platelets differential   Result Value Ref Range    WBC 8.5 4.0 - 11.0 10e9/L    RBC Count 4.37 (L) 4.4 - 5.9 10e12/L    Hemoglobin 14.2 13.3 - 17.7 g/dL    Hematocrit 42.8 40.0 - 53.0 %    MCV 98 78 - 100 fl    MCH 32.5 26.5 - 33.0 pg    MCHC 33.2 31.5 - 36.5 g/dL    RDW 13.2 10.0 - 15.0 %    Platelet Count 274 150 - 450 10e9/L    Diff Method Automated Method     % Neutrophils 73.0 %    % Lymphocytes 18.9 %    % Monocytes 6.3 %    % Eosinophils 0.8 %    % Basophils 0.9 %    % Immature Granulocytes 0.1 %    Nucleated RBCs 0 0 /100    Absolute Neutrophil 6.2 1.6 - 8.3 10e9/L    Absolute Lymphocytes 1.6 0.8 - 5.3 10e9/L    Absolute Monocytes 0.5 0.0 - 1.3 10e9/L    Absolute Eosinophils 0.1 0.0 - 0.7 10e9/L    Absolute Basophils 0.1 0.0 - 0.2 10e9/L    Abs Immature Granulocytes 0.0 0 - 0.4 10e9/L    Absolute Nucleated RBC 0.0    Basic metabolic panel   Result Value Ref Range    Sodium 141 133 - 144 mmol/L    Potassium 4.2 3.4 - 5.3 mmol/L    Chloride 107 94 - 109 mmol/L    Carbon Dioxide 26 20 - 32 mmol/L    Anion Gap 7 3 - 14 mmol/L    Glucose 98 70 - 99 mg/dL    Urea Nitrogen 14 7 - 30 mg/dL    Creatinine 0.85 0.66 - 1.25 mg/dL    GFR Estimate 88 >60 mL/min/[1.73_m2]    GFR Estimate If Black >90 >60 mL/min/[1.73_m2]    Calcium 8.3 (L) 8.5 - 10.1 mg/dL   D dimer quantitative   Result Value Ref Range    D Dimer 0.7 (H) 0.0 - 0.50 ug/ml FEU   INR   Result Value Ref Range    INR 2.38 (H) 0.86 - 1.14   Troponin I   Result Value Ref Range    Troponin I ES 0.016 0.000 - 0.045 ug/L   EKG 12 lead   Result Value Ref Range    Interpretation ECG Click View Image link to view waveform and result      Assessment/plan 89-year-old male with URI type symptoms the past week presents with left-sided chest discomfort.  Workup thus far has been unremarkable with exception of CT  findings potentially suggestive of pneumonia.  However, patient has no current symptoms or clinical findings on exam to support pneumonia.  Would hold on antibiotics currently with plans of stress echo today.

## 2019-02-04 NOTE — PROGRESS NOTES
[facility], [place]   ED Nurse to Floor Handoff     Collin Frank is a 68 year old male who speaks English and lives alone,  in a home  They arrived in the ED by ambulance from home    ED Chief Complaint: Chest Pain    ED Dx;   Final diagnoses:   Acute chest pain         Needed?: No    Allergies: No Known Allergies.  Past Medical Hx:   Past Medical History:   Diagnosis Date     Antiplatelet or antithrombotic long-term use     blood clot in leg     Long term current use of anticoagulant 10/16/2012      Baseline Mental status: WDL  Current Mental Status changes: at basesline    Infection present or suspected this encounter: no  Sepsis suspected: No  Isolation type: No active isolations     Activity level - Baseline/Home:  Independent  Activity Level - Current:   Independent    Bariatric equipment needed?: No    In the ED these meds were given:   Medications   aspirin (ASA) chewable tablet 324 mg (324 mg Oral Given 2/4/19 0206)   lidocaine VISCOUS (XYLOCAINE) 2 % 15 mL, alum & mag hydroxide-simethicone (MYLANTA ES/MAALOX  ES) 15 mL GI Cocktail (30 mLs Oral Given 2/4/19 0207)   iopamidol (ISOVUE-370) solution 100 mL (100 mLs Intravenous Given 2/4/19 0442)   sodium chloride 0.9 % bag 500mL for CT scan flush use (100 mLs Intravenous Given 2/4/19 0442)       Drips running?  No    Home pump  No    Current LDAs  Peripheral IV 02/01/18 Right Hand (Active)   Number of days: 368       Peripheral IV 02/04/19 Right Lower forearm (Active)   Site Assessment [facility] 2/4/2019  1:53 AM   Phlebitis Scale 0-->no symptoms 2/4/2019  1:53 AM   Number of days: 0       Labs results:   Labs Ordered and Resulted from Time of ED Arrival Up to the Time of Departure from the ED   CBC WITH PLATELETS DIFFERENTIAL - Abnormal; Notable for the following components:       Result Value    RBC Count 4.37 (*)     All other components within normal limits   BASIC METABOLIC PANEL - Abnormal; Notable for the following  components:    Calcium 8.3 (*)     All other components within normal limits   D DIMER QUANTITATIVE - Abnormal; Notable for the following components:    D Dimer 0.7 (*)     All other components within normal limits   INR - Abnormal; Notable for the following components:    INR 2.38 (*)     All other components within normal limits   TROPONIN I   TROPONIN I   CARDIAC CONTINUOUS MONITORING       Imaging Studies:   Recent Results (from the past 24 hour(s))   XR Chest 2 Views    Narrative    XR CHEST 2 VW  2/4/2019 3:07 AM      HISTORY: chest pain    COMPARISON: CT 12/14/2018    FINDINGS: The cardiomediastinal silhouette and pulmonary vasculature  are within normal limits. Linear streaky opacity in the left lower  lung, likely atelectasis. No focal airspace opacities. No pleural  effusion or pneumothorax are identified. No suspicious osseous lesion.  Upper abdomen is unremarkable.      Impression    IMPRESSION: No acute cardiopulmonary abnormality.   Chest CT, IV contrast only - PE protocol   Result Value    Radiologist flags Lung nodules    Narrative    EXAMINATION: CT CHEST PULMONARY EMBOLISM W CONTRAST, 2/4/2019 4:48 AM     COMPARISON: CT 12/14/2018.    HISTORY: Chest pain, positive d-dimer    TECHNIQUE: Volumetric helical acquisition of CT images of the chest  from the lung apices to the kidneys were acquired after the  administration of 100 mL Isovue-370. Three-dimensional (3D)  post-processed angiographic images were reconstructed, archived to  PACS and used in interpretation of this study.    Diagnostic quality: Adequate    FINDINGS:     Pulmonary embolism: None.   Right heart strain: None.   Pulmonary arteries: Normal in caliber.     Lung parenchyma: Subsegmental atelectasis in the right lower lobe near  the base. Tree-in-bud nodules in the right middle lobe, new compared  to prior examination. Peribronchovascular groundglass nodules in the  left lower lobe near the base. There are few left lower lobe  "basilar  nodules measuring up to 8 mm, new compared to prior examination. Few  additional up to 6 mm lingular pulmonary nodules are noted, new since  prior examination.  Pleural effusion: None.   Central airways: Normal.     Adenopathy: None.   Heart and great vessels: Normal.     Upper abdomen: Small hiatal hernia. Small fat-containing right  Bochdalek hernia. Few tiny nonobstructive urothelial calcifications  versus tiny calculus in the kidneys measuring up to 2 to 3 mm. No  hydronephrosis. Limited evaluation of the upper abdomen is otherwise  unremarkable.  Bones: Degenerative changes of the right shoulder joint and lower  thoracic spine. No aggressive osseous lesion.       Impression    IMPRESSION:   1. No pulmonary embolism demonstrated.  2. New right middle lobe peribronchovascular nodularity, suggestive of  infection. In addition, there are nodular opacities in the left lower  lobe at the base and lingula, which are also suggestive of infectious  process.  3. Multiple left basilar pulmonary nodules favoring infection.  Consider CT chest follow-up to ensure resolution.   4. Small hiatal hernia.    [Consider Follow Up: Lung nodules]    This report will be copied to the [facility] to ensure a  provider acknowledges the finding.          Recent vital signs:   /84   Pulse 66   Temp 98.3  F (36.8  C) (Oral)   Resp 20   Ht 1.753 m (5' 9\")   Wt 97.5 kg (215 lb)   SpO2 95%   BMI 31.75 kg/m      Cardiac Rhythm: Normal Sinus  Pt needs tele? Yes  Skin/wound Issues: None    Code Status: Full Code    Pain control: good    Nausea control: pt had none    Abnormal labs/tests/findings requiring intervention: see epic    Family present during ED course? No   Family Comments/Social Situation comments: Pt's sister notified per his request.    Tasks needing completion: None    Krystin Melendez, RN  [phone] [facility]      "

## 2019-02-04 NOTE — DISCHARGE SUMMARY
[facility], [place]  Hospitalist Discharge Summary       Date of Admission:  2/4/2019  Date of Discharge:  2/4/2019  Discharging Provider: CHITRA Cannon CNP  Discharge Team: Emergency Department Observation Unit    Discharge Diagnoses   Chest pain    Follow-ups Needed After Discharge   Follow-up Appointments     Adult [facility] Follow-up and recommended labs and tests      Follow up with PCP in one week, return to the ER if having chest pain, syncope, shortness of breath or fever greater than 101F.     Appointments on [place] and/or [place] campus (with [facility] or [facility] provider or service). Call [phone] if you haven't heard regarding these appointments within 7 days of discharge.           Additional follow-up instructions/to-do's for PCP    :Hospital follow up and pneumonia symptoms.     Unresulted Labs Ordered in the Past 30 Days of this Admission     No orders found from 12/6/2018 to 2/5/2019.      These results will be followed up by PCP    Hospital Course   Collin Frank is a 68 year old male admitted on 2/4/2019. He has a history of HTN, HLD, DVT, May Thurners Syndrome, Hyperhomocysteinemia, LE stents, IVC filter who presents to ED with an episode of chest discomfort and diaphoresis.      1. Chest Discomfort: cold like symptoms for the last week, occasional productive cough but seems to have resolved yesterday. No fever or chills. Works out daily with cardio and weights and no events during exercise in AM. Yesterday, at 4-6p while doing crossword puzzle leaned over and noted a sternal chest squeeze that went away spontaneously. No associated palpitations, paresthesias, SOB, nausea, vomiting, lightheadedness. Laid down at 6pm due to the chest discomfort and awoke ~ 8pm due to diaphoresis but no more chest discomfort. H/o tobacco use ~ 1ppd x 40 years. Troponins negative x 2. CXR reports left basilar subsegmental atelectasis, left lung base; early pneumonia possible.   CT Chest no PE; new RML peribronchovascular nodularity suggestive of infection; nodular opacities in LLL at base and lingula, suggestive of infectious process; multiple left basilar pulmonary nodules favoring infection; small hiatal hernia. Patient has no current symptoms or clinical findings on exam to support pneumonia.  Would hold on antibiotics currently. Patient underwent a exercise stress test which showed EKG at baseline with ST-T changes. At peak stress, there are horizontal-downsloping 1-2 mm ST depressions in inferolateral leads.  Screening 2D echocardiogram demonstrated no significant valve disease. Normal  sized proximal ascending aorta. Patient developed no chest pain during exercise. Cardiology curbsided and reported the stress test was normal. Recommended patient follow up with PCP and notify PCP or return to the ED if symptomatic.      2. H/o DVT: INR 2.38.  - Pharmacy consult to dose coumadin     Chronic Medical Problems:  # HTN: - Continue with PTA Losartan     # HLD: - Continue with PTA Zocor           Consultations This Hospital Stay   PHARMACY TO DOSE WARFARIN  CARDIOLOGY GENERAL ADULT IP CONSULT    Code Status   Full Code    Time Spent on this Encounter   I, Kristin Crystal, personally saw the patient today and spent less than or equal to 30 minutes discharging this patient.       CHITRA Cannon [facility], [place]  ______________________________________________________________________    Physical Exam   Vital Signs: Temp: 98.7  F (37.1  C) Temp src: Oral BP: 142/83 Pulse: 69 Heart Rate: 62 Resp: 16 SpO2: 98 % O2 Device: None (Room air)    Weight: 215 lbs 0 oz  Constitutional: awake, alert, cooperative, no apparent distress, and appears stated age  Eyes: Lids and lashes normal, pupils equal, round and reactive to light, extra ocular muscles intact, sclera clear, conjunctiva normal  ENT: Normocephalic, without obvious abnormality, atraumatic, sinuses  nontender on palpation, external ears without lesions, oral pharynx with moist mucous membranes, tonsils without erythema or exudates, gums normal and good dentition.  Hematologic / Lymphatic: no cervical lymphadenopathy  Respiratory: No increased work of breathing, good air exchange, clear to auscultation bilaterally, no crackles or wheezing  Cardiovascular: Normal apical impulse, regular rate and rhythm, normal S1 and S2, no S3 or S4, and no murmur noted  GI: No scars, normal bowel sounds, soft, non-distended, non-tender, no masses palpated, no [id number]}  Skin: no bruising or bleeding  Musculoskeletal: There is no redness, warmth, or swelling of the joints.  Full range of motion noted.  Motor strength is 5 out of 5 all extremities bilaterally.  Tone is normal.  Neurologic: Awake, alert, oriented to name, place and time.  Cranial nerves II-XII are grossly intact.  Motor is 5 out of 5 bilaterally.  Cerebellar finger to nose, heel to shin intact.  Sensory is intact.  Babinski down going, Romberg negative, and gait is normal.  Neuropsychiatric: General: normal, calm and normal eye contact             Primary Care Physician   Lauren A. Engelmann    Discharge Disposition   Discharged to home  Condition at discharge: Stable    Significant Results and Procedures   Results for orders placed or performed during the hospital encounter of 02/04/19   XR Chest 2 Views    Narrative    XR CHEST 2 VW  2/4/2019 3:07 AM      HISTORY: chest pain    COMPARISON: CT 12/14/2018    FINDINGS: The cardiomediastinal silhouette and pulmonary vasculature  are within normal limits. Linear streaky opacity in the left lower  lung, likely atelectasis. No pleural effusion or pneumothorax are  identified. No suspicious osseous lesion. Upper abdomen is  unremarkable.      Impression    IMPRESSION: Left basilar subsegmental atelectasis, left lung base.  Early pneumonia can have this appearance.    I have personally reviewed the  examination and initial interpretation  and I agree with the findings.    CODY GRIGGS MD   Chest CT, IV contrast only - PE protocol     Value    Radiologist flags Lung nodules    Narrative    EXAMINATION: CT CHEST PULMONARY EMBOLISM W CONTRAST, 2/4/2019 4:48 AM     COMPARISON: CT 12/14/2018.    HISTORY: Chest pain, positive d-dimer    TECHNIQUE: Volumetric helical acquisition of CT images of the chest  from the lung apices to the kidneys were acquired after the  administration of 100 mL Isovue-370. Three-dimensional (3D)  post-processed angiographic images were reconstructed, archived to  PACS and used in interpretation of this study.    Diagnostic quality: Adequate    FINDINGS:     Pulmonary embolism: None.   Right heart strain: None.   Pulmonary arteries: Normal in caliber.     Lung parenchyma: Subsegmental atelectasis in the right lower lobe near  the base. Tree-in-bud nodules in the right middle lobe, new compared  to prior examination. Peribronchovascular groundglass nodules in the  left lower lobe near the base. There are few left lower lobe basilar  nodules measuring up to 8 mm, new compared to prior examination. Few  additional up to 6 mm lingular pulmonary nodules are noted, new since  prior examination.  Pleural effusion: None.   Central airways: Normal.     Adenopathy: None.   Heart and great vessels: Normal.     Upper abdomen: Small hiatal hernia. Small fat-containing right  Bochdalek hernia. Few tiny nonobstructive urothelial calcifications  versus tiny calculus in the kidneys measuring up to 2 to 3 mm. No  hydronephrosis. Limited evaluation of the upper abdomen is otherwise  unremarkable.  Bones: Degenerative changes of the right shoulder joint and lower  thoracic spine. No aggressive osseous lesion.       Impression    IMPRESSION:   1. No pulmonary embolism demonstrated.  2. New right middle lobe peribronchovascular nodularity, suggestive of  infection. In addition, there are nodular opacities in  the left lower  lobe at the base and lingula, which are also suggestive of infectious  process.  3. Multiple left basilar pulmonary nodules favoring infection.  Consider follow-up to document resolution.  4. Small hiatal hernia.    [Consider Follow Up: Lung nodules]    This report will be copied to the [facility] to ensure a  provider acknowledges the finding.     I have personally reviewed the examination and initial interpretation  and I agree with the findings.    NUBIA GARCIA MD       Discharge Orders      Reason for your hospital stay    Chest pain     Adult [facility] Follow-up and recommended labs and tests    Follow up with PCP in one week, return to the ER if having chest pain, syncope, shortness of breath or fever greater than 101F.     Appointments on [place] and/or [facility] (with [facility] or [facility] provider or service). Call [phone] if you haven't heard regarding these appointments within 7 days of discharge.     Activity    Your activity upon discharge: activity as tolerated     When to contact your care team    Call your healthcare provider right away if any of these occur:    Shortness of breath gets worse    Rapid breathing (more than 25 breaths per minute)    Coughing up blood    Chest pain gets worse with breathing    Fever of 100.4 F (38 C) or higher that doesn't get better with fever medicine    Weakness, dizziness, or fainting that gets worse    Thirst or dry mouth that gets worse    Sinus pain, headache, or a stiff neck    Chest pain not caused by coughing     Discharge Instructions    The chest pain you came into the emergency room for does not appear to be cardiac chest pain. Your heart enzymes were normal which tells us there was no damage to the heart muscle. Your stress test was negative for heart disease. CT of your chest showed a possible pneumonia. You were not prescribed any antibiotics as you were asymptomatic. Please follow up with your primary care doctor in  the next week for hospital follow up.     Diet    Follow this diet upon discharge: Regular     Discharge Medications   Current Discharge Medication List      CONTINUE these medications which have NOT CHANGED    Details   cholecalciferol (VITAMIN D) 1000 UNIT tablet Take 1 tablet (1,000 Units) by mouth daily  Qty: 100 tablet, Refills: 3    Associated Diagnoses: Unspecified vitamin D deficiency      losartan (COZAAR) 25 MG tablet Take 1 tablet (25 mg) by mouth daily  Qty: 90 tablet, Refills: 1    Associated Diagnoses: Benign essential hypertension      simvastatin (ZOCOR) 20 MG tablet Take 1 tablet (20 mg) by mouth At Bedtime  Qty: 90 tablet, Refills: 1    Associated Diagnoses: Hyperlipidemia LDL goal <130      warfarin (JANTOVEN) 5 MG tablet Take 1 tablet (5MG) by mouth daily  Qty: 90 tablet, Refills: 3    Comments: Dose is current above  Associated Diagnoses: Hyperhomocysteinemia (H); Factor 5 Leiden mutation, heterozygous (H)         STOP taking these medications       zoster vaccine recombinant adjuvanted (SHINGRIX) injection Comments:   Reason for Stopping:             Allergies   No Known Allergies

## 2019-02-04 NOTE — PROGRESS NOTES
Had discussion with ED Observation staff regarding patient's stress test this morning--patient had normal echocardiographic response to his stress test including increasing his EF and no development of wall motion anomalies. Patient reached 93% of his predicted heart rate. He did have some nonspecific EKG changes towards the end of his test and during his recovery, but these changes are non-specific. This stress test should be interpreted as a normal stress test which was negative for ischemia.    Mathew Chappell MD PhD  Cardiology Fellow  P: [phone]

## 2019-02-04 NOTE — ED TRIAGE NOTES
"Lifting weights today, recent cold symptoms but felt better today, went to bed and awoke around 2000 \"not feeling right\" upset stomach. Grabbing L side of chest, describes discomfort as a \"palpation\". Pt also reporting diaphoresis.  EMS also reports possible word finding troubles, stroke scale negative per EMS, a&Ox3.    On Coumadin for leg blood clot in 2012. This month started lovastatin.  "

## 2019-02-04 NOTE — PLAN OF CARE
Observation goals to be met before discharge    - Serial troponins and stress test complete. -Yes  - Seen and cleared by consultant if applicable -No  - Adequate pain control on oral analgesia -Yes, pt denies any pain  - Vital signs normal or at patient baseline -Yes  - Safe disposition plan has been identified -Yes  - Nurse to notify provider when observation goals have been met and patient is ready for discharge.

## 2019-02-04 NOTE — ED PROVIDER NOTES
"  History     Chief Complaint   Patient presents with     Chest Pain     HPI  Collin Frank is a 68 year old male with PMH notable for PE now on warfarin, hyperlipidemia, hypertension who presents to the ED with chest pain. Patient reports that several hours prior to presentation, he began having chest pain. It was fairly mild, substernal, non-radiating. No had no shortness of breath. Pain has been constant. He went to bed an hour after onset and began feeling sweaty. Pain currently minimal but present. No recent cough, no fevers.     I have reviewed the Medications, Allergies, Past Medical and Surgical History, and Social History in the Epic system.    Review of Systems  A complete review of systems was performed with pertinent positives and negatives noted in the HPI, and all other systems negative.     Physical Exam   BP: (!) 182/100  Pulse: 81  Heart Rate: 83  Temp: 98.3  F (36.8  C)  Resp: 16  Height: 175.3 cm (5' 9\")  Weight: 97.5 kg (215 lb)  SpO2: 93 %    Physical Exam  General: no acute distress. Appears stated age.   HENT: MMM, no oropharyngeal lesions  Eyes: PERRL, normal sclerae  Cardio: regular rate. Regular rhythm. Extremities well perfused  Resp: Normal work of breathing, clear breath sounds  Abdomen: no tenderness, non-distended, no rebound, no guarding  Neuro: alert and fully oriented. CN II-XII grossly intact. Grossly normal strength and sensation in all extremities.   MSK: no deformities.   Integumentary/Skin: no rash visualized, normal color  Psych: normal affect, normal behavior    ED Course      Procedures             EKG Interpretation:      Interpreted by Jorje La  Time reviewed: 0149  Symptoms at time of EKG: chest pain   Rhythm: normal sinus   Rate: normal  Axis: normal  Ectopy: none  Conduction: normal  ST Segments/ T Waves: No ST-T wave changes  Q Waves: none  Comparison to prior: Unchanged from 3/17/2013    Clinical Impression: normal EKG    Critical Care time:  None    "     Labs Ordered and Resulted from Time of ED Arrival Up to the Time of Departure from the ED   CBC WITH PLATELETS DIFFERENTIAL - Abnormal; Notable for the following components:       Result Value    RBC Count 4.37 (*)     All other components within normal limits   BASIC METABOLIC PANEL - Abnormal; Notable for the following components:    Calcium 8.3 (*)     All other components within normal limits   D DIMER QUANTITATIVE - Abnormal; Notable for the following components:    D Dimer 0.7 (*)     All other components within normal limits   INR - Abnormal; Notable for the following components:    INR 2.38 (*)     All other components within normal limits   TROPONIN I   TROPONIN I   TROPONIN I   CARDIAC CONTINUOUS MONITORING   IP ASSIGN PROVIDER TEAM TO TREATMENT TEAM   OBSERVATION GOALS   PATIENT CARE ORDER   CARDIAC CONTINUOUS MONITORING   ASSESS   NOTIFY PHYSICIAN   VITAL SIGNS   ACTIVITY   NOTIFY PHYSICIAN   NOTIFY   NOTIFY   PERIPHERAL IV CATHETER            Assessments & Plan (with Medical Decision Making)   Patient presenting with chest pain. Vitals in the ED wnl. Initial differential diagnosis includes but not limited to ACS, GERD, PE, MSK pain.     ECG showed NSR without evidence of acute ischemia, initial troponin wnl. HEART score 4 (moderate suspicion, 2 risk factors, age > 65). The patient was given ASA.     GI cocktail given with mild improvement. No leukocytosis nor fever to suggest significant infection. INR therapeutic. D-dimer positive. CT pulmonary angiogram showed no PE, did have non-specific nodularities. Patient without infectious symptoms.     The complete clinical picture is most consistent with acute chest pain. After counseling on the diagnosis, work-up, and treatment plan, the patient was admitted to ED obs to complete the ACS evaluation.       Clinical Impression:  Acute chest pain       Jorje La MD  Emergency Medicine     I have reviewed the nursing notes.  I have reviewed the  findings, diagnosis, plan and need for follow up with the patient.  Current Discharge Medication List          Final diagnoses:   Acute chest pain       2/4/2019   [facility], [place], EMERGENCY DEPARTMENT     Jorje La MD  02/04/19 0622

## 2019-02-05 ENCOUNTER — TELEPHONE (OUTPATIENT)
Dept: FAMILY MEDICINE | Facility: CLINIC | Age: 69
End: 2019-02-05

## 2019-02-05 NOTE — TELEPHONE ENCOUNTER
This patient was discharged from [facility] on 02/04/2019.    Discharge Diagnosis: Acute Chest Pain    Follow-up instructions: **  Appointments on [place] and/or [facility] (with [facility] or [facility] provider or service). Call [phone] if you haven't heard regarding these appointments within 7 days of discharge.            Additional follow-up instructions/to-do's for PCP    :Hospital follow up and pneumonia symptoms.             A follow-up visit has not been scheduled.      Please follow-up with patient.

## 2019-02-05 NOTE — TELEPHONE ENCOUNTER
"  ED for acute condition Discharge Protocol    \"Hi, my name is Desireeguilherme Canseco, a registered nurse, and I am calling from [facility]  I am calling to follow up and see how things are going for you after your recent emergency visit.\"    Tell me how you are doing now that you are home?\" Patient states he is doing good at home. Denies any further chest pain or changes.      Discharge Instructions    \"Let's review your discharge instructions.  What is/are the follow-up recommendations?  Pt. Response: follow up with PCP    \"Has an appointment with your primary care provider been scheduled?\"  No (needed - schedule appointment and remind to bring meds)    Medications    \"Tell me what changed about your medicines when you discharged?\"    None    \"What questions do you have about your medications?\"   None    On warfarin: \"Were you given any recommendations for follow-up with the anticoagulation clinic?\" Yes - Anticoagulation clinic appointment is already scheduled at appropriate interval    Call Summary    \"What questions or concerns do you have about your recent visit and your follow-up care?\"     none    \"If you have questions or things don't continue to improve, we encourage you contact us through the main clinic number (give number).  Even if the clinic is not open, triage nurses are available 24/7 to help you.     We would like you to know that our clinic has extended hours (provide information).  We also have urgent care (provide details on closest location and hours/contact info)\"    \"Thank you for your time and take care!\"                "

## 2019-02-22 ENCOUNTER — ANTICOAGULATION THERAPY VISIT (OUTPATIENT)
Dept: NURSING | Facility: CLINIC | Age: 69
End: 2019-02-22
Payer: COMMERCIAL

## 2019-02-22 ENCOUNTER — OFFICE VISIT (OUTPATIENT)
Dept: FAMILY MEDICINE | Facility: CLINIC | Age: 69
End: 2019-02-22
Payer: COMMERCIAL

## 2019-02-22 VITALS
SYSTOLIC BLOOD PRESSURE: 147 MMHG | BODY MASS INDEX: 29.09 KG/M2 | TEMPERATURE: 98.4 F | WEIGHT: 197 LBS | DIASTOLIC BLOOD PRESSURE: 80 MMHG | HEART RATE: 57 BPM | OXYGEN SATURATION: 97 %

## 2019-02-22 DIAGNOSIS — I26.99 PULMONARY EMBOLISM WITH INFARCTION (H): ICD-10-CM

## 2019-02-22 DIAGNOSIS — Z09 HOSPITAL DISCHARGE FOLLOW-UP: Primary | ICD-10-CM

## 2019-02-22 DIAGNOSIS — I10 BENIGN ESSENTIAL HYPERTENSION: ICD-10-CM

## 2019-02-22 DIAGNOSIS — D68.51 FACTOR 5 LEIDEN MUTATION, HETEROZYGOUS (H): ICD-10-CM

## 2019-02-22 DIAGNOSIS — I82.4Y9 ACUTE VENOUS EMBOLISM AND THROMBOSIS OF DEEP VESSELS OF PROXIMAL LOWER EXTREMITY, UNSPECIFIED LATERALITY (H): ICD-10-CM

## 2019-02-22 LAB — INR POINT OF CARE: 1.8 (ref 0.86–1.14)

## 2019-02-22 PROCEDURE — 99214 OFFICE O/P EST MOD 30 MIN: CPT | Performed by: FAMILY MEDICINE

## 2019-02-22 PROCEDURE — 36416 COLLJ CAPILLARY BLOOD SPEC: CPT

## 2019-02-22 PROCEDURE — 99207 ZZC NO CHARGE NURSE ONLY: CPT

## 2019-02-22 PROCEDURE — 85610 PROTHROMBIN TIME: CPT | Mod: QW

## 2019-02-22 RX ORDER — LOSARTAN POTASSIUM 50 MG/1
50 TABLET ORAL DAILY
Qty: 90 TABLET | Refills: 1 | Status: SHIPPED | OUTPATIENT
Start: 2019-02-22 | End: 2019-03-04

## 2019-02-22 ASSESSMENT — PAIN SCALES - GENERAL: PAINLEVEL: NO PAIN (0)

## 2019-02-22 NOTE — PROGRESS NOTES
ANTICOAGULATION FOLLOW-UP CLINIC VISIT    Patient Name:  Collin Frank  Date:  2019  Contact Type:  Face to Face    SUBJECTIVE: zi 2 wks or call if changes in medicine     Patient Findings     Positives:   Change in diet/appetite (after INR done states he is eating alot of spinach)           OBJECTIVE    INR Protime   Date Value Ref Range Status   2019 1.8 (A) 0.86 - 1.14 Final       ASSESSMENT / PLAN  INR assessment THER    Recheck INR In: 2 WEEKS    INR Location Clinic      Anticoagulation Summary  As of 2019    INR goal:   2.0-3.0   TTR:   69.8 % (3 y)   INR used for dosin.8! (2019)   Warfarin maintenance plan:   5 mg (5 mg x 1) every day   Full warfarin instructions:   : 7.5 mg; Otherwise 5 mg every day   Weekly warfarin total:   35 mg   Plan last modified:   Minal Carrion RN (2018)   Next INR check:   3/7/2019   Target end date:   Indefinite    Indications    Pulmonary embolism with infarction (HCC) [I26.99] [I26.99]  Factor 5 Leiden mutation  heterozygous (H) [D68.51]  Acute venous embolism and thrombosis of deep vessels of proximal lower extremity (H) [I82.4Y9]             Anticoagulation Episode Summary     INR check location:       Preferred lab:       Send INR reminders to:    [facility]    Comments:         Anticoagulation Care Providers     Provider Role Specialty Phone number    Engelmann, Lauren Anneliese, MD  Family Practice [phone]            See the Encounter Report to view Anticoagulation Flowsheet and Dosing Calendar (Go to Encounters tab in chart review, and find the Anticoagulation Therapy Visit)    Dosage adjustment made based on physician directed care plan.    Minal Carrion, TIFFANIE

## 2019-02-22 NOTE — PATIENT INSTRUCTIONS
We are increasing your blood pressure medication today.   For now, take TWO tabs of Losartan 25mg every morning - new dose is 50mg.   When you run out of tablets,  the new prescription for 50mg tablets.

## 2019-02-22 NOTE — PROGRESS NOTES
SUBJECTIVE:   Collin Frank is a 68 year old male who presents to clinic today for the following health issues:        Hospital Follow-up Visit:    Hospital/Nursing Home/IP Rehab Facility: [facility]  Date of Admission: 2/4/19  Date of Discharge: 2/4/19  Reason(s) for Admission: atypical chest pain, hx of PE on anticoagulation            Problems taking medications regularly:  None       Medication changes since discharge: None       Problems adhering to non-medication therapy:  None    Summary of hospitalization:  [facility] discharge summary reviewed  Diagnostic Tests/Treatments reviewed.  Follow up needed: none  Other Healthcare Providers Involved in Patient s Care:         None  Update since discharge: improved.     Post Discharge Medication Reconciliation: discharge medications reconciled and changed, per note/orders (see AVS).  Plan of care communicated with patient     Coding guidelines for this visit:  Type of Medical   Decision Making Face-to-Face Visit       within 7 Days of discharge Face-to-Face Visit        within 14 days of discharge   Moderate Complexity 95353 [id number]   High Complexity 04261 12092          Hypertension Follow-up      Outpatient blood pressures are being checked at home.  Results are 150s/90s.    Low Salt Diet: no added salt      Problem list and histories reviewed & adjusted, as indicated.  Additional history: as documented    Patient Active Problem List   Diagnosis     Advanced directives, counseling/discussion     Hyperhomocysteinemia (H)     Acute venous embolism and thrombosis of deep vessels of proximal lower extremity (H)     CARDIOVASCULAR SCREENING; LDL GOAL LESS THAN 130     Factor 5 Leiden mutation, heterozygous (H)     May-Thurner syndrome     Long term current use of anticoagulant     Vitamin D deficiency     Renal stones     Hepatic hemangioma     FH: prostate cancer     Long-term (current) use of anticoagulants [Z79.01]     Pulmonary  embolism with infarction (HCC) [I26.99]     Post-traumatic osteoarthritis of right knee     Benign non-nodular prostatic hyperplasia without lower urinary tract symptoms     Primary osteoarthritis of left knee     Benign essential hypertension     Past Surgical History:   Procedure Laterality Date     COLONOSCOPY  08    Normal. Repeat in 10 years     COLONOSCOPY WITH CO2 INSUFFLATION N/A 2018    Procedure: COLONOSCOPY WITH CO2 INSUFFLATION;  COLON SCREEN/ ENGELMANN;  Surgeon: Jan Flores MD;  Location: [facility] OR     rt knee ORIF      [facility]       Social History     Tobacco Use     Smoking status: Former Smoker     Types: Cigarettes     Last attempt to quit: 2011     Years since quittin.0     Smokeless tobacco: Never Used   Substance Use Topics     Alcohol use: No     Family History   Problem Relation Age of Onset     Alzheimer Disease Mother      Heart Disease Father      Prostate Cancer Father      Cancer Brother 65        pancreatic        Prostate Cancer Brother            Reviewed and updated as needed this visit by clinical staff  Tobacco  Allergies  Meds  Med Hx  Surg Hx  Fam Hx  Soc Hx      Reviewed and updated as needed this visit by Provider         ROS:  Constitutional, HEENT, cardiovascular, pulmonary, gi and gu systems are negative, except as otherwise noted.    OBJECTIVE:     /80 (BP Location: Left arm, Patient Position: Chair, Cuff Size: Adult Regular)   Pulse 57   Temp 98.4  F (36.9  C) (Oral)   Wt 89.4 kg (197 lb)   SpO2 97%   BMI 29.09 kg/m    Body mass index is 29.09 kg/m .  GENERAL: healthy, alert, no distress and over weight  NECK: no adenopathy, no asymmetry, masses, or scars and thyroid normal to palpation  RESP: lungs clear to auscultation - no rales, rhonchi or wheezes  CV: regular rate and rhythm, normal S1 S2, no S3 or S4, no murmur, click or rub, no peripheral edema and peripheral pulses strong  ABDOMEN: soft, nontender, no  hepatosplenomegaly, no masses and bowel sounds normal  MS: no gross musculoskeletal defects noted, no edema  NEURO: Normal strength and tone, mentation intact and speech normal  BACK: no CVA tenderness, no paralumbar tenderness    Diagnostic Test Results:  none     ASSESSMENT/PLAN:       ICD-10-CM    1. Hospital discharge follow-up Z09    2. Benign essential hypertension I10 losartan (COZAAR) 50 MG tablet     Reviewed hospital records including stress echo, CT chest. No acute findings.   Chest pain has completely resolved.   BP still not at goal --> increase losartan to 50mg daily.     See Patient Instructions    Lauren A. Engelmann, MD  [facility]

## 2019-03-04 ENCOUNTER — TELEPHONE (OUTPATIENT)
Dept: FAMILY MEDICINE | Facility: CLINIC | Age: 69
End: 2019-03-04

## 2019-03-04 DIAGNOSIS — D68.51 FACTOR 5 LEIDEN MUTATION, HETEROZYGOUS (H): ICD-10-CM

## 2019-03-04 DIAGNOSIS — I10 BENIGN ESSENTIAL HYPERTENSION: ICD-10-CM

## 2019-03-04 DIAGNOSIS — E78.5 HYPERLIPIDEMIA LDL GOAL <130: ICD-10-CM

## 2019-03-04 DIAGNOSIS — E72.11 HYPERHOMOCYSTEINEMIA (H): ICD-10-CM

## 2019-03-04 RX ORDER — SIMVASTATIN 20 MG
20 TABLET ORAL AT BEDTIME
Qty: 90 TABLET | Refills: 1 | Status: SHIPPED | OUTPATIENT
Start: 2019-03-04 | End: 2019-03-07

## 2019-03-04 RX ORDER — LOSARTAN POTASSIUM 50 MG/1
50 TABLET ORAL DAILY
Qty: 90 TABLET | Refills: 0 | Status: SHIPPED | OUTPATIENT
Start: 2019-03-04 | End: 2019-03-07

## 2019-03-04 NOTE — TELEPHONE ENCOUNTER
Patient called INR clinic to get refills on Zocor and Cozaar.    Last doctor visit plan per below:    We are increasing your blood pressure medication today.   For now, take TWO tabs of Losartan 25mg every morning - new dose is 50mg.   When you run out of tablets,  the new prescription for 50mg tablets.      Return in about 3 months (around 5/22/2019) for BP Recheck    I advised I will send to pharmacy, but in the future he needs to call the pharmacy directly for refill.    Minal Humphrey RN

## 2019-03-07 ENCOUNTER — ANTICOAGULATION THERAPY VISIT (OUTPATIENT)
Dept: NURSING | Facility: CLINIC | Age: 69
End: 2019-03-07
Payer: COMMERCIAL

## 2019-03-07 DIAGNOSIS — D68.51 FACTOR 5 LEIDEN MUTATION, HETEROZYGOUS (H): ICD-10-CM

## 2019-03-07 DIAGNOSIS — I10 BENIGN ESSENTIAL HYPERTENSION: ICD-10-CM

## 2019-03-07 DIAGNOSIS — E78.5 HYPERLIPIDEMIA LDL GOAL <130: ICD-10-CM

## 2019-03-07 DIAGNOSIS — I82.4Y9 ACUTE VENOUS EMBOLISM AND THROMBOSIS OF DEEP VESSELS OF PROXIMAL LOWER EXTREMITY, UNSPECIFIED LATERALITY (H): ICD-10-CM

## 2019-03-07 DIAGNOSIS — I26.99 PULMONARY EMBOLISM WITH INFARCTION (H): ICD-10-CM

## 2019-03-07 LAB — INR POINT OF CARE: 2.9 (ref 0.86–1.14)

## 2019-03-07 PROCEDURE — 36416 COLLJ CAPILLARY BLOOD SPEC: CPT

## 2019-03-07 PROCEDURE — 99207 ZZC NO CHARGE NURSE ONLY: CPT

## 2019-03-07 PROCEDURE — 85610 PROTHROMBIN TIME: CPT | Mod: QW

## 2019-03-07 RX ORDER — LOSARTAN POTASSIUM 50 MG/1
50 TABLET ORAL DAILY
Qty: 90 TABLET | Refills: 1 | Status: SHIPPED | OUTPATIENT
Start: 2019-03-07 | End: 2019-12-07

## 2019-03-07 RX ORDER — SIMVASTATIN 20 MG
20 TABLET ORAL AT BEDTIME
Qty: 90 TABLET | Refills: 1 | Status: SHIPPED | OUTPATIENT
Start: 2019-03-07 | End: 2019-12-09

## 2019-03-07 NOTE — PROGRESS NOTES
ANTICOAGULATION FOLLOW-UP CLINIC VISIT    Patient Name:  Collin Frank  Date:  3/7/2019  Contact Type:  Face to Face    SUBJECTIVE: continue same plan of care.     Patient Findings     Positives:   Change in medications (zocor new since 2018)           OBJECTIVE    INR Protime   Date Value Ref Range Status   2019 2.9 (A) 0.86 - 1.14 Final       ASSESSMENT / PLAN  INR assessment THER    Recheck INR In: 4 WEEKS    INR Location Clinic      Anticoagulation Summary  As of 3/7/2019    INR goal:   2.0-3.0   TTR:   70.0 % (3 y)   INR used for dosin.9 (3/7/2019)   Warfarin maintenance plan:   5 mg (5 mg x 1) every day   Full warfarin instructions:   5 mg every day   Weekly warfarin total:   35 mg   No change documented:   Minal Carrion RN   Plan last modified:   Minal Carrion RN (2018)   Next INR check:   2019   Target end date:   Indefinite    Indications    Pulmonary embolism with infarction (HCC) [I26.99] [I26.99]  Factor 5 Leiden mutation  heterozygous (H) [D68.51]  Acute venous embolism and thrombosis of deep vessels of proximal lower extremity (H) [I82.4Y9]             Anticoagulation Episode Summary     INR check location:       Preferred lab:       Send INR reminders to:   [facility]    Comments:         Anticoagulation Care Providers     Provider Role Specialty Phone number    Engelmann, Lauren Anneliese, MD  Family Practice [phone]            See the Encounter Report to view Anticoagulation Flowsheet and Dosing Calendar (Go to Encounters tab in chart review, and find the Anticoagulation Therapy Visit)    Dosage adjustment made based on physician directed care plan.    Minal Carrion RN

## 2019-03-20 ENCOUNTER — TELEPHONE (OUTPATIENT)
Dept: FAMILY MEDICINE | Facility: CLINIC | Age: 69
End: 2019-03-20

## 2019-03-20 NOTE — TELEPHONE ENCOUNTER
He has no contraindications on my end. He would need to see Dr. Love to discuss with him. He seems to be established with him already so I don't think he'd need a referral.     Thanks    Lauren Engelmann, MD

## 2019-03-20 NOTE — TELEPHONE ENCOUNTER
Called patient and advised of the message below.  He also stated that he was going to get a shingles vaccine and is wondering how long he should wait in between getting the vaccine and the cortisone shot.  Nataliia Thompson RN CPC Triage.

## 2019-03-20 NOTE — TELEPHONE ENCOUNTER
Notified patient of the message below per PCP.  Patient stated understanding and agreeable with the plan of care. Nataliia Thompson,MATILDAN,RN, CPC Triage.

## 2019-03-20 NOTE — TELEPHONE ENCOUNTER
Reason for Call:  Other call back    Detailed comments: Patient would like to know if he can get a cortisone shot from Dr. Love    Phone Number Patient can be reached at: Home number on file [phone] (home)    Best Time: Any time    Can we leave a detailed message on this number? YES     Thank you!  Laney MARTINEZ  Patient Representative  [facility]        Call taken on 3/20/2019 at 8:07 AM by Laney Vincent

## 2019-03-21 ENCOUNTER — TRANSFERRED RECORDS (OUTPATIENT)
Dept: HEALTH INFORMATION MANAGEMENT | Facility: CLINIC | Age: 69
End: 2019-03-21

## 2019-03-30 ENCOUNTER — HOSPITAL ENCOUNTER (EMERGENCY)
Facility: CLINIC | Age: 69
Discharge: HOME | End: 2019-03-30
Attending: EMERGENCY MEDICINE | Admitting: EMERGENCY MEDICINE
Payer: COMMERCIAL

## 2019-03-30 VITALS
TEMPERATURE: 98.8 F | DIASTOLIC BLOOD PRESSURE: 77 MMHG | BODY MASS INDEX: 28.53 KG/M2 | RESPIRATION RATE: 14 BRPM | HEIGHT: 69 IN | WEIGHT: 192.6 LBS | SYSTOLIC BLOOD PRESSURE: 134 MMHG | HEART RATE: 53 BPM | OXYGEN SATURATION: 94 %

## 2019-03-30 DIAGNOSIS — Z87.891 PERSONAL HISTORY OF TOBACCO USE, PRESENTING HAZARDS TO HEALTH: ICD-10-CM

## 2019-03-30 DIAGNOSIS — R07.89 ATYPICAL CHEST PAIN: ICD-10-CM

## 2019-03-30 LAB
ALBUMIN UR-MCNC: NEGATIVE MG/DL
APPEARANCE UR: CLEAR
BILIRUB UR QL STRIP: NEGATIVE
COLOR UR AUTO: ABNORMAL
GLUCOSE UR STRIP-MCNC: NEGATIVE MG/DL
HGB UR QL STRIP: ABNORMAL
INR PPP: 2.09 (ref 0.86–1.14)
KETONES UR STRIP-MCNC: NEGATIVE MG/DL
LEUKOCYTE ESTERASE UR QL STRIP: NEGATIVE
NITRATE UR QL: NEGATIVE
PH UR STRIP: 5.5 PH (ref 5–7)
RBC #/AREA URNS AUTO: 2 /HPF (ref 0–2)
SOURCE: ABNORMAL
SP GR UR STRIP: 1 (ref 1–1.03)
TROPONIN I SERPL-MCNC: <0.015 UG/L (ref 0–0.04)
TROPONIN I SERPL-MCNC: <0.015 UG/L (ref 0–0.04)
UROBILINOGEN UR STRIP-MCNC: NORMAL MG/DL (ref 0–2)
WBC #/AREA URNS AUTO: <1 /HPF (ref 0–5)

## 2019-03-30 PROCEDURE — 85610 PROTHROMBIN TIME: CPT | Performed by: EMERGENCY MEDICINE

## 2019-03-30 PROCEDURE — 84484 ASSAY OF TROPONIN QUANT: CPT | Performed by: EMERGENCY MEDICINE

## 2019-03-30 PROCEDURE — 99284 EMERGENCY DEPT VISIT MOD MDM: CPT

## 2019-03-30 PROCEDURE — 93005 ELECTROCARDIOGRAM TRACING: CPT

## 2019-03-30 PROCEDURE — 99284 EMERGENCY DEPT VISIT MOD MDM: CPT | Mod: 25 | Performed by: EMERGENCY MEDICINE

## 2019-03-30 PROCEDURE — 81001 URINALYSIS AUTO W/SCOPE: CPT | Performed by: EMERGENCY MEDICINE

## 2019-03-30 PROCEDURE — 93010 ELECTROCARDIOGRAM REPORT: CPT | Mod: Z6 | Performed by: EMERGENCY MEDICINE

## 2019-03-30 ASSESSMENT — ENCOUNTER SYMPTOMS
DIAPHORESIS: 1
WEAKNESS: 1

## 2019-03-30 ASSESSMENT — MIFFLIN-ST. JEOR: SCORE: 1624.01

## 2019-03-30 NOTE — DISCHARGE INSTRUCTIONS
All of your tests are normal  I do not think your pain is from a cardiac cause  Follow-up with your doctor next week

## 2019-03-30 NOTE — ED AVS SNAPSHOT
[facility], [place], Emergency Department  [address] [place] [id number]  Phone:  [phone]                                    Collin Frank   MRN: [id number]    Department:  [facility], Emergency Department   Date of Visit:  3/30/2019           After Visit Summary Signature Page    I have received my discharge instructions, and my questions have been answered. I have discussed any challenges I see with this plan with the nurse or doctor.    ..........................................................................................................................................  Patient/Patient Representative Signature      ..........................................................................................................................................  Patient Representative Print Name and Relationship to Patient    ..................................................               ................................................  Date                                   Time    ..........................................................................................................................................  Reviewed by Signature/Title    ...................................................              ..............................................  Date                                               Time          22EPIC Rev 08/18

## 2019-03-30 NOTE — ED PROVIDER NOTES
History     Chief Complaint   Patient presents with     Chest Pain     Hypertension     HPI  Collin Frank is a 68 year old male with a history of hypertension (on Losartan), hyperlipidemia, DVT (on coumadin), May Turners Syndrome, status post LE stenting and IVC filter, who presents to the Emergency Department for evaluation of chest pain. Patient complains of intermittent chest pressure that began last night. Patient reports that he woke up this morning with persistent pressure along with generalized weakness and diaphoresis, prompting his arrival. He denies any lower extremity swelling. Patient continues to be anticoagulated for his DVT in .    Per chart review, patient was admitted to the ED Observation Unit for a day on 2019 for chest pain and diaphoresis, which was determined to not be cardiac in nature, as he had a negative troponin and negative stress echocardiogram. He did have a CT of the chest which was notable for possible pneumonia, however, he was discharged without antibiotics as he was asymptomatic at that time .     I have reviewed the Medications, Allergies, Past Medical and Surgical History, and Social History in the [employer] system.    Past Medical History:   Diagnosis Date     Antiplatelet or antithrombotic long-term use     blood clot in leg     Long term current use of anticoagulant 10/16/2012       Past Surgical History:   Procedure Laterality Date     COLONOSCOPY  08    Normal. Repeat in 10 years     COLONOSCOPY WITH CO2 INSUFFLATION N/A 2018    Procedure: COLONOSCOPY WITH CO2 INSUFFLATION;  COLON SCREEN/ ENGELMANN;  Surgeon: Jan Flores MD;  Location: [facility] OR     rt knee ORIF      [facility]       Family History   Problem Relation Age of Onset     Alzheimer Disease Mother      Heart Disease Father      Prostate Cancer Father      Cancer Brother 65        pancreatic        Prostate Cancer Brother        Social History     Tobacco Use     Smoking  "status: Former Smoker     Types: Cigarettes     Last attempt to quit: 2011     Years since quittin.1     Smokeless tobacco: Never Used   Substance Use Topics     Alcohol use: No     No current facility-administered medications for this encounter.      Current Outpatient Medications   Medication     cholecalciferol (VITAMIN D) 1000 UNIT tablet     losartan (COZAAR) 50 MG tablet     simvastatin (ZOCOR) 20 MG tablet     warfarin (JANTOVEN) 5 MG tablet      No Known Allergies    Review of Systems   Constitutional: Positive for diaphoresis.   Cardiovascular: Positive for chest pain.   Neurological: Positive for weakness (generalized).   All other systems reviewed and are negative.      Physical Exam   BP: 147/83  Pulse: 72  Heart Rate: 60  Temp: 98.8  F (37.1  C)  Resp: 16  Height: 175.3 cm (5' 9\")  Weight: 87.4 kg (192 lb 9.6 oz)  SpO2: 98 %      Physical Exam   Constitutional: He is oriented to person, place, and time. He appears well-developed and well-nourished. No distress.   Eyes: No scleral icterus.   Neck: Neck supple.   Cardiovascular: Normal rate, regular rhythm and normal heart sounds.   Pulmonary/Chest: Effort normal and breath sounds normal. No respiratory distress. He has no wheezes.   Abdominal: Soft.   Neurological: He is alert and oriented to person, place, and time. No cranial nerve deficit.   Skin: Skin is warm and dry. He is not diaphoretic.   Psychiatric: He has a normal mood and affect. His behavior is normal.   Nursing note and vitals reviewed.      ED Course        Procedures             EKG Interpretation:      Interpreted by Isaac Cisneros  Time reviewed: 1045  Symptoms at time of EKG: chest pain   Rhythm: normal sinus   Rate: normal  Axis: normal  Ectopy: none  Conduction: normal  ST Segments/ T Waves: No ST-T wave changes  Q Waves: none  Comparison to prior: Unchanged from 2019    Clinical Impression: normal EKG               Labs Ordered and Resulted from Time of ED Arrival " Up to the Time of Departure from the ED   INR - Abnormal; Notable for the following components:       Result Value    INR 2.09 (*)     All other components within normal limits   ROUTINE UA WITH MICROSCOPIC - Abnormal; Notable for the following components:    Blood Urine Small (*)     All other components within normal limits   TROPONIN I   TROPONIN I            Assessments & Plan (with Medical Decision Making)     This is a 68 year old male coming in with atypical chest pain symptoms. His pain is sharp. A review of his record shows that he had a negative stress test a couple months ago. He has a history of DVT and PE, he is currently on warfarin and is therapeutically anticoagulated. Here has normal routine labs, he has a normal EKG and he has two negative troponin's  by four hours. He does have considerable anxiety. I am comfortable that this is not cardiac etiology and he can be discharged home. He should follow up with his PCP. I do not think that he needs any additional work-up. He is not tachycardic, he is not hypoxic, and he is therapeutically anticoagulated. It does not seem like pulmonary embolism either.    This part of the medical record was transcribed by Olivia Chawla, Medical Scribe, from a dictation done by Isaac Cisneros MD.     I have reviewed the nursing notes.    I have reviewed the findings, diagnosis, plan and need for follow up with the patient.       Medication List      ASK your doctor about these medications    zoster vaccine recombinant adjuvanted injection  Commonly known as:  SHINGRIX  1 each, Intramuscular, ONCE  Ask about: Should I take this medication?            Final diagnoses:   Atypical chest pain     IOlivia, am serving as a trained medical scribe to document services personally performed by Isaac Cisneros MD, based on the provider's statements to me.   I, Isaac Cisneros MD, was physically present and have reviewed and verified the accuracy of this  note documented by Olivia Chawla.    3/30/2019   [facility], [place], EMERGENCY DEPARTMENT     Isaac Cisneros MD  03/30/19 9715

## 2019-03-30 NOTE — ED TRIAGE NOTES
Pt presents ambulatory to triage with c/o intermittent chest tightness and high blood pressure that began last night and still present this morning. Pt also reports urinary frequency.

## 2019-03-31 LAB — INTERPRETATION ECG - MUSE: NORMAL

## 2019-04-01 ENCOUNTER — ALLIED HEALTH/NURSE VISIT (OUTPATIENT)
Dept: NURSING | Facility: CLINIC | Age: 69
End: 2019-04-01
Payer: COMMERCIAL

## 2019-04-01 DIAGNOSIS — Z23 NEED FOR SHINGLES VACCINE: Primary | ICD-10-CM

## 2019-04-01 PROCEDURE — 90471 IMMUNIZATION ADMIN: CPT

## 2019-04-01 PROCEDURE — 99207 ZZC NO CHARGE NURSE ONLY: CPT

## 2019-04-04 ENCOUNTER — OFFICE VISIT (OUTPATIENT)
Dept: FAMILY MEDICINE | Facility: CLINIC | Age: 69
End: 2019-04-04
Payer: COMMERCIAL

## 2019-04-04 ENCOUNTER — ANTICOAGULATION THERAPY VISIT (OUTPATIENT)
Dept: NURSING | Facility: CLINIC | Age: 69
End: 2019-04-04
Payer: COMMERCIAL

## 2019-04-04 VITALS
TEMPERATURE: 97.7 F | SYSTOLIC BLOOD PRESSURE: 128 MMHG | HEART RATE: 53 BPM | OXYGEN SATURATION: 98 % | BODY MASS INDEX: 28.94 KG/M2 | DIASTOLIC BLOOD PRESSURE: 78 MMHG | WEIGHT: 196 LBS

## 2019-04-04 DIAGNOSIS — I10 BENIGN ESSENTIAL HYPERTENSION: ICD-10-CM

## 2019-04-04 DIAGNOSIS — D68.51 FACTOR 5 LEIDEN MUTATION, HETEROZYGOUS (H): ICD-10-CM

## 2019-04-04 DIAGNOSIS — I26.99 PULMONARY EMBOLISM WITH INFARCTION (H): ICD-10-CM

## 2019-04-04 DIAGNOSIS — R07.89 ATYPICAL CHEST PAIN: Primary | ICD-10-CM

## 2019-04-04 DIAGNOSIS — I82.4Y9 ACUTE VENOUS EMBOLISM AND THROMBOSIS OF DEEP VESSELS OF PROXIMAL LOWER EXTREMITY, UNSPECIFIED LATERALITY (H): ICD-10-CM

## 2019-04-04 LAB — INR POINT OF CARE: 1.8 (ref 0.86–1.14)

## 2019-04-04 PROCEDURE — 99207 ZZC NO CHARGE NURSE ONLY: CPT

## 2019-04-04 PROCEDURE — 99213 OFFICE O/P EST LOW 20 MIN: CPT | Performed by: FAMILY MEDICINE

## 2019-04-04 PROCEDURE — 85610 PROTHROMBIN TIME: CPT | Mod: QW

## 2019-04-04 PROCEDURE — 36416 COLLJ CAPILLARY BLOOD SPEC: CPT

## 2019-04-04 ASSESSMENT — PAIN SCALES - GENERAL: PAINLEVEL: NO PAIN (0)

## 2019-04-04 NOTE — PROGRESS NOTES
"  SUBJECTIVE:   Collin Frank is a 68 year old male who presents to clinic today for the following health issues:    Musculoskeletal problem/pain      Duration: ***    Description  Location: ***    Intensity:  {mild,moderate,severe:118449}    Accompanying signs and symptoms: {OTHER MS SYMPTOMS:984019::\"none\"}    History  Previous similar problem: { :411578}  Previous evaluation:  {PREVIOUS ms evaluation:569096::\"none\"}    Precipitating or alleviating factors:  Trauma or overuse: { :054145}  Aggravating factors include: {AGGRAVATING MS FACTORS CHRONIC PROB:060609::\"none\"}    Therapies tried and outcome: {MS RELIEF ITEMS:874460::\"nothing\"}    Piedad Novoa MA    {additional problems for provider to add:602454}    Problem list and histories reviewed & adjusted, as indicated.  Additional history: {NONE - AS DOCUMENTED:626783::\"as documented\"}    {HIST REVIEW/ LINKS 2:191639}    Reviewed and updated as needed this visit by clinical staff       Reviewed and updated as needed this visit by Provider         {PROVIDER CHARTING PREFERENCE:198163}    "

## 2019-04-04 NOTE — PROGRESS NOTES
SUBJECTIVE:   Collin Frank is a 68 year old male who presents to clinic today for the following health issues:    ED/UC Followup:    Facility:  [facility]  Date of visit: 3/30/19  Reason for visit: Chest pain, Hypertenstion  Current Status: Patient is not having any pain with his chest now     Went to the ED for atypical chest pain, resolved on its own. Had neg troponins x 2, normal EKG. Had stress echo about 6 weeks ago, unremarkable.     Feels completely fine today.     Problem list and histories reviewed & adjusted, as indicated.  Additional history: as documented    Patient Active Problem List   Diagnosis     Advanced directives, counseling/discussion     Hyperhomocysteinemia (H)     Acute venous embolism and thrombosis of deep vessels of proximal lower extremity (H)     CARDIOVASCULAR SCREENING; LDL GOAL LESS THAN 130     Factor 5 Leiden mutation, heterozygous (H)     May-Thurner syndrome     Long term current use of anticoagulant     Vitamin D deficiency     Renal stones     Hepatic hemangioma     FH: prostate cancer     Long-term (current) use of anticoagulants [Z79.01]     Pulmonary embolism with infarction (HCC) [I26.99]     Post-traumatic osteoarthritis of right knee     Benign non-nodular prostatic hyperplasia without lower urinary tract symptoms     Primary osteoarthritis of left knee     Benign essential hypertension     Past Surgical History:   Procedure Laterality Date     COLONOSCOPY  08    Normal. Repeat in 10 years     COLONOSCOPY WITH CO2 INSUFFLATION N/A 2018    Procedure: COLONOSCOPY WITH CO2 INSUFFLATION;  COLON SCREEN/ ENGELMANN;  Surgeon: Jan Flores MD;  Location: [facility] OR     rt knee ORIF      [facility]       Social History     Tobacco Use     Smoking status: Former Smoker     Types: Cigarettes     Last attempt to quit: 2011     Years since quittin.1     Smokeless tobacco: Never Used   Substance Use Topics     Alcohol use: No     Family History   Problem  Relation Age of Onset     Alzheimer Disease Mother      Heart Disease Father      Prostate Cancer Father      Cancer Brother 65        pancreatic        Prostate Cancer Brother            Reviewed and updated as needed this visit by clinical staff  Tobacco  Allergies  Meds  Med Hx  Surg Hx  Fam Hx  Soc Hx      Reviewed and updated as needed this visit by Provider         ROS:  Constitutional, HEENT, cardiovascular, pulmonary, gi and gu systems are negative, except as otherwise noted.    OBJECTIVE:     /78 (BP Location: Right arm, Patient Position: Chair, Cuff Size: Adult Regular)   Pulse 53   Temp 97.7  F (36.5  C) (Oral)   Wt 88.9 kg (196 lb)   SpO2 98%   BMI 28.94 kg/m    Body mass index is 28.94 kg/m .  GENERAL: healthy, alert and no distress  NECK: no adenopathy, no asymmetry, masses, or scars and thyroid normal to palpation  RESP: lungs clear to auscultation - no rales, rhonchi or wheezes  CV: regular rate and rhythm, normal S1 S2, no S3 or S4, no murmur, click or rub, no peripheral edema and peripheral pulses strong  ABDOMEN: soft, nontender, no hepatosplenomegaly, no masses and bowel sounds normal  MS: no gross musculoskeletal defects noted, no edema    Diagnostic Test Results:  Results for orders placed or performed during the hospital encounter of 19   INR   Result Value Ref Range    INR 2.09 (H) 0.86 - 1.14   Troponin I   Result Value Ref Range    Troponin I ES <0.015 0.000 - 0.045 ug/L   UA with Microscopic   Result Value Ref Range    Color Urine Light Yellow     Appearance Urine Clear     Glucose Urine Negative NEG^Negative mg/dL    Bilirubin Urine Negative NEG^Negative    Ketones Urine Negative NEG^Negative mg/dL    Specific Gravity Urine 1.005 1.003 - 1.035    Blood Urine Small (A) NEG^Negative    pH Urine 5.5 5.0 - 7.0 pH    Protein Albumin Urine Negative NEG^Negative mg/dL    Urobilinogen mg/dL Normal 0.0 - 2.0 mg/dL    Nitrite Urine Negative NEG^Negative    Leukocyte  Esterase Urine Negative NEG^Negative    Source Midstream Urine     WBC Urine <1 0 - 5 /HPF    RBC Urine 2 0 - 2 /HPF   Troponin I   Result Value Ref Range    Troponin I ES <0.015 0.000 - 0.045 ug/L   EKG 12 lead   Result Value Ref Range    Interpretation ECG Click View Image link to view waveform and result        ASSESSMENT/PLAN:       ICD-10-CM    1. Atypical chest pain R07.89    2. Benign essential hypertension I10      BP at goal, no changes to plan. Discussed signs of chest pain that warrant emergency evaluation. Can always call FNA to discuss symptoms but reassured that it is always okay to be evaluated in the ED for chest pain.     See Patient Instructions    Lauren A. Engelmann, MD  [facility]

## 2019-04-04 NOTE — PROGRESS NOTES
ANTICOAGULATION FOLLOW-UP CLINIC VISIT    Patient Name:  Collin Frank  Date:  2019  Contact Type:  Face to Face    SUBJECTIVE: 1 wk zi     Patient Findings     Positives:   Missed doses (missed Tues dose this week), Change in medications (cozaar 50mg daily)           OBJECTIVE    INR Protime   Date Value Ref Range Status   2019 1.8 (A) 0.86 - 1.14 Final       ASSESSMENT / PLAN  INR assessment THER    Recheck INR In: 1 WEEK    INR Location Clinic      Anticoagulation Summary  As of 2019    INR goal:   2.0-3.0   TTR:   70.4 % (3.1 y)   INR used for dosin.8! (2019)   Warfarin maintenance plan:   5 mg (5 mg x 1) every day   Full warfarin instructions:   : 7.5 mg; : 7.5 mg; Otherwise 5 mg every day   Weekly warfarin total:   35 mg   Plan last modified:   Minal Carrion RN (2018)   Next INR check:   2019   Target end date:   Indefinite    Indications    Pulmonary embolism with infarction (HCC) [I26.99] [I26.99]  Factor 5 Leiden mutation  heterozygous (H) [D68.51]  Acute venous embolism and thrombosis of deep vessels of proximal lower extremity (H) [I82.4Y9]             Anticoagulation Episode Summary     INR check location:       Preferred lab:       Send INR reminders to:    [facility]    Comments:         Anticoagulation Care Providers     Provider Role Specialty Phone number    Engelmann, Lauren Anneliese, MD  [facility] [phone]            See the Encounter Report to view Anticoagulation Flowsheet and Dosing Calendar (Go to Encounters tab in chart review, and find the Anticoagulation Therapy Visit)    Dosage adjustment made based on physician directed care plan.    Minal Carrion, TIFFANIE

## 2019-04-11 ENCOUNTER — ANTICOAGULATION THERAPY VISIT (OUTPATIENT)
Dept: NURSING | Facility: CLINIC | Age: 69
End: 2019-04-11
Payer: COMMERCIAL

## 2019-04-11 DIAGNOSIS — I26.99 PULMONARY EMBOLISM WITH INFARCTION (H): ICD-10-CM

## 2019-04-11 DIAGNOSIS — D68.51 FACTOR 5 LEIDEN MUTATION, HETEROZYGOUS (H): ICD-10-CM

## 2019-04-11 DIAGNOSIS — I82.4Y9 ACUTE VENOUS EMBOLISM AND THROMBOSIS OF DEEP VESSELS OF PROXIMAL LOWER EXTREMITY, UNSPECIFIED LATERALITY (H): ICD-10-CM

## 2019-04-11 LAB — INR POINT OF CARE: 2.4 (ref 0.86–1.14)

## 2019-04-11 PROCEDURE — 99207 ZZC NO CHARGE NURSE ONLY: CPT

## 2019-04-11 PROCEDURE — 36416 COLLJ CAPILLARY BLOOD SPEC: CPT

## 2019-04-11 PROCEDURE — 85610 PROTHROMBIN TIME: CPT | Mod: QW

## 2019-04-11 NOTE — PROGRESS NOTES
ANTICOAGULATION FOLLOW-UP CLINIC VISIT    Patient Name:  Collin Frank  Date:  2019  Contact Type:  Face to Face    SUBJECTIVE: same plan of care     Patient Findings            OBJECTIVE    INR Protime   Date Value Ref Range Status   2019 2.4 (A) 0.86 - 1.14 Final       ASSESSMENT / PLAN  INR assessment THER    Recheck INR In: 4 WEEKS    INR Location Clinic      Anticoagulation Summary  As of 2019    INR goal:   2.0-3.0   TTR:   70.4 % (3.1 y)   INR used for dosin.4 (2019)   Warfarin maintenance plan:   5 mg (5 mg x 1) every day   Full warfarin instructions:   5 mg every day   Weekly warfarin total:   35 mg   No change documented:   Minal Carrion RN   Plan last modified:   Minal Carrion RN (2018)   Next INR check:   2019   Target end date:   Indefinite    Indications    Pulmonary embolism with infarction (HCC) [I26.99] [I26.99]  Factor 5 Leiden mutation  heterozygous (H) [D68.51]  Acute venous embolism and thrombosis of deep vessels of proximal lower extremity (H) [I82.4Y9]             Anticoagulation Episode Summary     INR check location:       Preferred lab:       Send INR reminders to:    [facility]    Comments:         Anticoagulation Care Providers     Provider Role Specialty Phone number    Engelmann, Lauren Anneliese, MD  [facility] [phone]            See the Encounter Report to view Anticoagulation Flowsheet and Dosing Calendar (Go to Encounters tab in chart review, and find the Anticoagulation Therapy Visit)    Dosage adjustment made based on physician directed care plan.    Minal Carrion RN

## 2019-04-19 DIAGNOSIS — D68.51 FACTOR 5 LEIDEN MUTATION, HETEROZYGOUS (H): ICD-10-CM

## 2019-04-19 DIAGNOSIS — E72.11 HYPERHOMOCYSTEINEMIA (H): ICD-10-CM

## 2019-04-19 RX ORDER — WARFARIN SODIUM 5 MG/1
TABLET ORAL
Qty: 100 TABLET | Refills: 3 | Status: SHIPPED | OUTPATIENT
Start: 2019-04-19 | End: 2019-12-09

## 2019-04-19 NOTE — TELEPHONE ENCOUNTER
warfarin (JANTOVEN) 5 MG tablet    Full warfarin instructions:   5 mg every day

## 2019-05-09 ENCOUNTER — ANTICOAGULATION THERAPY VISIT (OUTPATIENT)
Dept: NURSING | Facility: CLINIC | Age: 69
End: 2019-05-09
Payer: COMMERCIAL

## 2019-05-09 DIAGNOSIS — I26.99 PULMONARY EMBOLISM WITH INFARCTION (H): ICD-10-CM

## 2019-05-09 DIAGNOSIS — D68.51 FACTOR 5 LEIDEN MUTATION, HETEROZYGOUS (H): ICD-10-CM

## 2019-05-09 DIAGNOSIS — I82.4Y9 ACUTE VENOUS EMBOLISM AND THROMBOSIS OF DEEP VESSELS OF PROXIMAL LOWER EXTREMITY, UNSPECIFIED LATERALITY (H): ICD-10-CM

## 2019-05-09 LAB — INR POINT OF CARE: 1.9 (ref 0.86–1.14)

## 2019-05-09 PROCEDURE — 85610 PROTHROMBIN TIME: CPT | Mod: QW

## 2019-05-09 PROCEDURE — 36416 COLLJ CAPILLARY BLOOD SPEC: CPT

## 2019-05-09 NOTE — PROGRESS NOTES
"Discharge Summary     Mariah Green is a 3 days female                                               MRN: [id number]    Attending Physician:Arelis Ulloa MD      Delivery Date: 2018     Delivery time:  7:07 PM       Type of Delivery: , Low Transverse    Gestation Age: Gestational Age: 37w5d    Diagnoses:   Active Hospital Problems    Diagnosis  POA    *Single liveborn infant [Z38.2]  Yes      Resolved Hospital Problems   No resolved problems to display.                 Admission Wt: Weight: 3120 g (6 lb 14.1 oz)(Filed from Delivery Summary)  Admission HC: Head Circumference: 36.2 cm  Admission Length:Height: 48.3 cm (19")    Discharge Date/Time: 2018     Discharge Weight: Weight: 3030 g (6 lb 10.9 oz)    Maternal History:  The pregnancy was complicated by HTN-gestational, pre-eclampsia.    Membranes ruptured on 2018 at 0100 by SROM.     Prenatal Labs Review:   ABO/Rh:   Lab Results   Component Value Date/Time    GROUPTRH A POS 2018 02:25 AM    GROUPTRH A POS 2018 02:15 PM     Group B Beta Strep:   Lab Results   Component Value Date/Time    STREPBCULT No Group B Streptococcus isolated 2018 04:51 PM     HIV: Negative    RPR:   Lab Results   Component Value Date/Time    RPR Non-reactive 2018 02:24 AM     Hepatitis B Surface Antigen:   Lab Results   Component Value Date/Time    HEPBSAG Negative 2018 02:15 PM     Rubella Immune Status:   Lab Results   Component Value Date/Time    RUBELLAIMMUN Reactive 2018 02:15 PM     Gonococcus Culture:   Lab Results   Component Value Date/Time    LABNGO Not Detected 10/26/2017 04:36 PM         Delivery Information:  Infant delivered on 2018 at 7:07 PM by , Low Transverse. Apgars were 1Min.: 9, 5 Min.: 9, 10 Min.: . Amniotic fluid color clear.  Intervention/Resuscitation: Bulb suction, tactile stimulation.    Infant's Labs:  Recent Results (from the past 168 hour(s))   Cord blood evaluation    " ANTICOAGULATION FOLLOW-UP CLINIC VISIT    Patient Name:  Collin Frank  Date:  2019  Contact Type:  Face to Face    SUBJECTIVE: patient requests his usual recheck schedule.    Patient Findings         Clinical Outcomes     Negatives:   Major bleeding event, Thromboembolic event, Anticoagulation-related hospital admission, Anticoagulation-related ED visit, Anticoagulation-related fatality           OBJECTIVE    INR Protime   Date Value Ref Range Status   2019 1.9 (A) 0.86 - 1.14 Final       ASSESSMENT / PLAN  INR assessment THER    Recheck INR In: 4 WEEKS    INR Location Clinic      Anticoagulation Summary  As of 2019    INR goal:   2.0-3.0   TTR:   70.6 % (3.2 y)   INR used for dosin.9! (2019)   Warfarin maintenance plan:   5 mg (5 mg x 1) every day   Full warfarin instructions:   : 7.5 mg; Otherwise 5 mg every day   Weekly warfarin total:   35 mg   Plan last modified:   Minal Carrion RN (2018)   Next INR check:   2019   Target end date:   Indefinite    Indications    Pulmonary embolism with infarction (HCC) [I26.99] [I26.99]  Factor 5 Leiden mutation  heterozygous (H) [D68.51]  Acute venous embolism and thrombosis of deep vessels of proximal lower extremity (H) [I82.4Y9]             Anticoagulation Episode Summary     INR check location:       Preferred lab:       Send INR reminders to:   [facility]    Comments:         Anticoagulation Care Providers     Provider Role Specialty Phone number    Engelmann, Lauren Anneliese, MD  [facility] [phone]            See the Encounter Report to view Anticoagulation Flowsheet and Dosing Calendar (Go to Encounters tab in chart review, and find the Anticoagulation Therapy Visit)    Dosage adjustment made based on physician directed care plan.    Minal Carrion RN                  Collection Time: 18  7:07 PM   Result Value Ref Range    Cord ABO AB     Cord Rh POS     Cord Direct Becka NEG    Bilirubin, Total,     Collection Time: 18  9:40 PM   Result Value Ref Range    Bilirubin, Total -  5.7 0.1 - 6.0 mg/dL       Nursery Course:   Feeding well, Breastfeed, supplement with formula, ad rosa according to nurses notes and mom.     Screen sent greater than 24 hours?: YES     · Hearing Screen Right Ear:Pass    Left Ear:  Pass     · Stooling and Voiding: yes    · SpO2 Preductal (Rt Hand): SpO2: Pre-Ductal (Right Hand): 99 %        SpO2 Postductal : SpO2: Post-Ductal: 99 %      · Therapeutic Interventions: none    · Surgical Procedures: none    Discharge Exam and Assessment:     Discharge Weight: Weight: 3030 g (6 lb 10.9 oz)  Weight Change Since Birth:-3%     Screen sent greater than 24 hours?: Yes    Temp:  [97.7 °F (36.5 °C)-98.5 °F (36.9 °C)]   Pulse:  [106-160]   Resp:  [28-60]   SpO2:  [99 %]       Physical Exam:    General: active and reactive for age, non-dysmorphic  Head: normocephalic, anterior fontanel is open, soft and flat  Eyes: lids open, eyes clear without drainage and red reflex is present  Ears: normally set  Nose: nares patent  Oropharynx: palate: intact and moist mucus membranes  Neck: no deformities, clavicles intact  Chest: clear and equal breath sounds bilaterally, no retractions, chest rise symmetrical  Heart: quiet precordium, regular rate and rhythm, normal S1 and S2, no murmur, femoral pulses equal, brisk capillary refill  Abdomen: soft, non-tender, non-distended, no hepatosplenomegaly, no masses and bowel sounds present  Genitourinary: normal genitalia  Musculoskeletal/Extremities: moves all extremities, no deformities  Back: spine intact, no garry, lesions, or dimples  Hips: no clicks or clunks  Neurologic: active and responsive, spontaneous activity, appropriate tone for gestational age, normal suck, gag Present  Skin:  Condition:  Warm, Color: pink  Anus: present - normally placed        PLAN:     Immunization:  Immunization History   Administered Date(s) Administered    Hepatitis B, Pediatric/Adolescent 2018       Patient Instructions:  There are no discharge medications for this patient.  Continue feeding at rosa breast milk or formula  Give indirect and direct sunlight to keep bilirubin down  If the baby gets more yellow, has fever greater than 100.5 F, vomiting, or there is another problem, please call [phone].      Special Instructions: none    Discharged Condition: good    Consults: none    Disposition: Home with mother. Appointment with Dr. Ulloa on Tuesday, 2018, at 9 AM.

## 2019-06-06 ENCOUNTER — ANTICOAGULATION THERAPY VISIT (OUTPATIENT)
Dept: NURSING | Facility: CLINIC | Age: 69
End: 2019-06-06
Payer: COMMERCIAL

## 2019-06-06 DIAGNOSIS — I26.99 PULMONARY EMBOLISM WITH INFARCTION (H): ICD-10-CM

## 2019-06-06 DIAGNOSIS — D68.51 FACTOR 5 LEIDEN MUTATION, HETEROZYGOUS (H): ICD-10-CM

## 2019-06-06 DIAGNOSIS — I82.4Y9 ACUTE VENOUS EMBOLISM AND THROMBOSIS OF DEEP VESSELS OF PROXIMAL LOWER EXTREMITY, UNSPECIFIED LATERALITY (H): ICD-10-CM

## 2019-06-06 LAB — INR POINT OF CARE: 2 (ref 0.86–1.14)

## 2019-06-06 PROCEDURE — 99207 ZZC NO CHARGE NURSE ONLY: CPT

## 2019-06-06 PROCEDURE — 36416 COLLJ CAPILLARY BLOOD SPEC: CPT

## 2019-06-06 PROCEDURE — 85610 PROTHROMBIN TIME: CPT | Mod: QW

## 2019-06-06 NOTE — PROGRESS NOTES
ANTICOAGULATION FOLLOW-UP CLINIC VISIT    Patient Name:  Collin Frank  Date:  2019  Contact Type:  Face to Face    SUBJECTIVE: same plan of care.    Patient Findings         Clinical Outcomes     Negatives:   Major bleeding event, Thromboembolic event, Anticoagulation-related hospital admission, Anticoagulation-related ED visit, Anticoagulation-related fatality           OBJECTIVE    INR Protime   Date Value Ref Range Status   2019 2.0 (A) 0.86 - 1.14 Final       ASSESSMENT / PLAN  INR assessment THER    Recheck INR In: 4 WEEKS    INR Location Clinic      Anticoagulation Summary  As of 2019    INR goal:   2.0-3.0   TTR:   69.0 % (3.3 y)   INR used for dosin.0 (2019)   Warfarin maintenance plan:   5 mg (5 mg x 1) every day   Full warfarin instructions:   5 mg every day   Weekly warfarin total:   35 mg   No change documented:   Minal Carrion RN   Plan last modified:   Minal Carrion RN (2018)   Next INR check:      Target end date:   Indefinite    Indications    Pulmonary embolism with infarction (HCC) [I26.99] [I26.99]  Factor 5 Leiden mutation  heterozygous (H) [D68.51]  Acute venous embolism and thrombosis of deep vessels of proximal lower extremity (H) [I82.4Y9]             Anticoagulation Episode Summary     INR check location:       Preferred lab:       Send INR reminders to:   [facility]    Comments:         Anticoagulation Care Providers     Provider Role Specialty Phone number    Engelmann, Lauren Anneliese, MD  [facility] [phone]            See the Encounter Report to view Anticoagulation Flowsheet and Dosing Calendar (Go to Encounters tab in chart review, and find the Anticoagulation Therapy Visit)    Dosage adjustment made based on physician directed care plan.    Minal Carrion RN

## 2019-06-13 ENCOUNTER — OFFICE VISIT (OUTPATIENT)
Dept: FAMILY MEDICINE | Facility: CLINIC | Age: 69
End: 2019-06-13
Payer: COMMERCIAL

## 2019-06-13 VITALS
HEART RATE: 61 BPM | TEMPERATURE: 97.6 F | WEIGHT: 193 LBS | HEIGHT: 68 IN | DIASTOLIC BLOOD PRESSURE: 77 MMHG | OXYGEN SATURATION: 98 % | SYSTOLIC BLOOD PRESSURE: 144 MMHG | BODY MASS INDEX: 29.25 KG/M2

## 2019-06-13 DIAGNOSIS — R10.9 RIGHT FLANK PAIN: Primary | ICD-10-CM

## 2019-06-13 DIAGNOSIS — R31.9 HEMATURIA, UNSPECIFIED TYPE: ICD-10-CM

## 2019-06-13 DIAGNOSIS — N20.0 KIDNEY STONE: ICD-10-CM

## 2019-06-13 LAB
ALBUMIN UR-MCNC: NEGATIVE MG/DL
APPEARANCE UR: CLEAR
BACTERIA #/AREA URNS HPF: ABNORMAL /HPF
BILIRUB UR QL STRIP: NEGATIVE
COLOR UR AUTO: YELLOW
GLUCOSE UR STRIP-MCNC: NEGATIVE MG/DL
HGB UR QL STRIP: ABNORMAL
HYALINE CASTS #/AREA URNS LPF: ABNORMAL /LPF
KETONES UR STRIP-MCNC: NEGATIVE MG/DL
LEUKOCYTE ESTERASE UR QL STRIP: NEGATIVE
MUCOUS THREADS #/AREA URNS LPF: PRESENT /LPF
NITRATE UR QL: NEGATIVE
NON-SQ EPI CELLS #/AREA URNS LPF: ABNORMAL /LPF
PH UR STRIP: 5.5 PH (ref 5–7)
RBC #/AREA URNS AUTO: ABNORMAL /HPF
SOURCE: ABNORMAL
SP GR UR STRIP: 1.02 (ref 1–1.03)
UROBILINOGEN UR STRIP-ACNC: 0.2 EU/DL (ref 0.2–1)
WBC #/AREA URNS AUTO: ABNORMAL /HPF

## 2019-06-13 PROCEDURE — 81001 URINALYSIS AUTO W/SCOPE: CPT | Performed by: FAMILY MEDICINE

## 2019-06-13 PROCEDURE — 99213 OFFICE O/P EST LOW 20 MIN: CPT | Performed by: FAMILY MEDICINE

## 2019-06-13 ASSESSMENT — MIFFLIN-ST. JEOR: SCORE: 1604.94

## 2019-06-13 ASSESSMENT — PAIN SCALES - GENERAL: PAINLEVEL: NO PAIN (0)

## 2019-06-13 NOTE — PROGRESS NOTES
Subjective     Collin Frank is a 69 year old male who presents to clinic today for the following health issues:  Patient with previous history of kidney stone, comes in today with concern of right flank pain he had 3 days ago.  He reports he does not have any pain at this point.  He did not have any blood in his urine he has no pain with urination.  He reports last time he had kidney stone in 2017.  Patient did have a abdominal CT scan back in June 2017, small renal stone in the mid pole of the right kidney and in the left kidney as well.    Patient chronically on Coumadin denies any fever, chills night sweats he has been drinking a lot of water.    Genitourinary - Male  Onset: Saturday night    Description:   Dysuria (painful urination): no   Hematuria (blood in urine): no   Frequency: YES, about ten times a day  Are you urinating at night : YES- three times  Hesitancy (delay in urine): no   Retention (unable to empty): no   Decrease in urinary flow: no   Incontinence: no     Progression of Symptoms:  improving    Accompanying Signs & Symptoms:  Fever: no   Back/Flank pain: no   Urethral discharge: no   Testicle lumps/masses/pain: no   Nausea and/or vomiting: no   Abdominal pain: no     History:   History of frequent UTI's: no   History of kidney stones: YES  History of hernias: no   Personal or Family history of Prostate problems: YES- borther  Sexually active: no     Precipitating factors:   Had beats soup    Alleviating factors:  Tea and water    Patient Active Problem List   Diagnosis     Advanced directives, counseling/discussion     Hyperhomocysteinemia (H)     Acute venous embolism and thrombosis of deep vessels of proximal lower extremity (H)     CARDIOVASCULAR SCREENING; LDL GOAL LESS THAN 130     Factor 5 Leiden mutation, heterozygous (H)     May-Thurner syndrome     Long term current use of anticoagulant     Vitamin D deficiency     Renal stones     Hepatic hemangioma     FH: prostate cancer      "Long-term (current) use of anticoagulants [Z79.01]     Pulmonary embolism with infarction (HCC) [I26.99]     Post-traumatic osteoarthritis of right knee     Benign non-nodular prostatic hyperplasia without lower urinary tract symptoms     Primary osteoarthritis of left knee     Benign essential hypertension     Past Surgical History:   Procedure Laterality Date     COLONOSCOPY  08    Normal. Repeat in 10 years     COLONOSCOPY WITH CO2 INSUFFLATION N/A 2018    Procedure: COLONOSCOPY WITH CO2 INSUFFLATION;  COLON SCREEN/ ENGELMANN;  Surgeon: Jan Flores MD;  Location: [facility] OR     rt knee ORIF      [facility]       Social History     Tobacco Use     Smoking status: Former Smoker     Types: Cigarettes     Last attempt to quit: 2011     Years since quittin.3     Smokeless tobacco: Never Used   Substance Use Topics     Alcohol use: No     Family History   Problem Relation Age of Onset     Alzheimer Disease Mother      Heart Disease Father      Prostate Cancer Father      Cancer Brother 65        pancreatic        Prostate Cancer Brother          Current Outpatient Medications   Medication Sig Dispense Refill     cholecalciferol (VITAMIN D) 1000 UNIT tablet Take 1 tablet (1,000 Units) by mouth daily 100 tablet 3     losartan (COZAAR) 50 MG tablet Take 1 tablet (50 mg) by mouth daily 90 tablet 1     simvastatin (ZOCOR) 20 MG tablet Take 1 tablet (20 mg) by mouth At Bedtime 90 tablet 1     warfarin (JANTOVEN) 5 MG tablet TAKE 1 TABLET BY MOUTH     EVERY  tablet 3     No Known Allergies    Reviewed and updated as needed this visit by Provider         Review of Systems   ROS COMP: Constitutional, HEENT, cardiovascular, pulmonary, gi and gu systems are negative, except as otherwise noted.      Objective    /77 (BP Location: Right arm, Patient Position: Chair, Cuff Size: Adult Large)   Pulse 61   Temp 97.6  F (36.4  C) (Oral)   Ht 1.727 m (5' 8\")   Wt 87.5 kg (193 lb)  "  SpO2 98%   BMI 29.35 kg/m    Body mass index is 29.35 kg/m .  Physical Exam   GENERAL: healthy, alert and no distress  NECK: no adenopathy, no asymmetry, masses, or scars and thyroid normal to palpation  RESP: lungs clear to auscultation - no rales, rhonchi or wheezes  CV: regular rate and rhythm, normal S1 S2, no S3 or S4, no murmur, click or rub, no peripheral edema and peripheral pulses strong  ABDOMEN: soft, nontender, no hepatosplenomegaly, no masses and bowel sounds normal  MS: no gross musculoskeletal defects noted, no edema  NO CVA tenderness    Diagnostic Test Results:  UA RESULTS:  Recent Labs   Lab Test 06/13/19  1410   COLOR Yellow   APPEARANCE Clear   URINEGLC Negative   URINEBILI Negative   URINEKETONE Negative   SG 1.020   UBLD Large*   URINEPH 5.5   PROTEIN Negative   UROBILINOGEN 0.2   NITRITE Negative   LEUKEST Negative   RBCU 10-25*   WBCU 0 - 5       Assessment & Plan       ICD-10-CM    1. Right flank pain R10.9 UA reflex to Microscopic and Culture     Urine Microscopic     CT Renal Mass wo & w Contrast   2. Kidney stone N20.0 UA reflex to Microscopic and Culture     Urine Microscopic     CT Renal Mass wo & w Contrast   3. Hematuria, unspecified type R31.9 UA reflex to Microscopic and Culture     Urine Microscopic     CT Renal Mass wo & w Contrast     Reviewed his urine results with him.  It did show he has 10-25 for the blood cells with no sign of infection.  Patient denies any pain at this point.  He reports all his symptom was resolved 2 days ago.  He has no fever, no chills no night sweats.    I did discuss with the patient to continue increasing his fluid intake, and avoid constipation.  I did order a renal CT scan in case the patient started having symptoms.  I told him if he had recurrent symptoms he could call the office, and scheduled the CT scan    See Patient Instructions    No follow-ups on file.    Satish Crisostomo MD  [facility]

## 2019-07-02 ENCOUNTER — ANCILLARY PROCEDURE (OUTPATIENT)
Dept: GENERAL RADIOLOGY | Facility: CLINIC | Age: 69
End: 2019-07-02
Attending: ORTHOPAEDIC SURGERY
Payer: COMMERCIAL

## 2019-07-02 ENCOUNTER — OFFICE VISIT (OUTPATIENT)
Dept: FAMILY MEDICINE | Facility: CLINIC | Age: 69
End: 2019-07-02
Payer: COMMERCIAL

## 2019-07-02 ENCOUNTER — OFFICE VISIT (OUTPATIENT)
Dept: ORTHOPEDICS | Facility: CLINIC | Age: 69
End: 2019-07-02
Payer: COMMERCIAL

## 2019-07-02 VITALS
DIASTOLIC BLOOD PRESSURE: 89 MMHG | RESPIRATION RATE: 13 BRPM | HEART RATE: 61 BPM | OXYGEN SATURATION: 99 % | WEIGHT: 195 LBS | BODY MASS INDEX: 29.55 KG/M2 | SYSTOLIC BLOOD PRESSURE: 152 MMHG | HEIGHT: 68 IN

## 2019-07-02 VITALS
DIASTOLIC BLOOD PRESSURE: 73 MMHG | HEART RATE: 59 BPM | SYSTOLIC BLOOD PRESSURE: 123 MMHG | OXYGEN SATURATION: 96 % | TEMPERATURE: 97.9 F | BODY MASS INDEX: 29.95 KG/M2 | WEIGHT: 197 LBS

## 2019-07-02 DIAGNOSIS — M17.0 PRIMARY OSTEOARTHRITIS OF BOTH KNEES: ICD-10-CM

## 2019-07-02 DIAGNOSIS — M17.0 OSTEOARTHRITIS OF BOTH KNEES, UNSPECIFIED OSTEOARTHRITIS TYPE: Primary | ICD-10-CM

## 2019-07-02 DIAGNOSIS — M17.0 OSTEOARTHRITIS OF BOTH KNEES, UNSPECIFIED OSTEOARTHRITIS TYPE: ICD-10-CM

## 2019-07-02 DIAGNOSIS — L30.8 OTHER ECZEMA: Primary | ICD-10-CM

## 2019-07-02 PROCEDURE — 99214 OFFICE O/P EST MOD 30 MIN: CPT | Performed by: ORTHOPAEDIC SURGERY

## 2019-07-02 PROCEDURE — 99213 OFFICE O/P EST LOW 20 MIN: CPT | Performed by: FAMILY MEDICINE

## 2019-07-02 PROCEDURE — 73562 X-RAY EXAM OF KNEE 3: CPT | Mod: RT

## 2019-07-02 ASSESSMENT — MIFFLIN-ST. JEOR: SCORE: 1614.01

## 2019-07-02 NOTE — PATIENT INSTRUCTIONS
Patient Education     Atopic Dermatitis (Adult)  Atopic dermatitis is a dry, itchy, red rash. It s also called eczema. The rash is chronic, or ongoing. It can come and go over time. The disease is often passed down in families. It causes a problem with the skin barrier that makes the skin more sensitive to the environment and other factors. The increased skin sensitivity causes an itch, which causes scratching. Scratching can worsen the itching or also break the skin. This can put the skin at risk of infection.  The condition is most common in people with asthma, hay fever, hives, or dry or sensitive skin. The rash may be caused by extreme heat or heavy sweating. Skin irritants can cause the rash to flare up. These can include wool or silk clothing, grease, oils, some medicines, and harsh soaps and detergents. Emotional stress can also be a trigger.  Treatment is done to relieve the itching and inflammation of the skin.  Home care  Follow these tips to care for your condition:    Keep the areas of rash clean by bathing at least every other day. Use lukewarm water to bathe. Don t use hot water, which can dry out the skin.    Don t use soaps with strong detergents. Use mild soaps made for sensitive skin.    Apply a cream or ointment to damp skin right after bathing.    Avoid things that irritate your skin. Wear absorbent, soft fabrics next to the skin rather than rough or scratchy materials.    Use mild laundry soap free of scents and perfumes. Make sure to rinse all the soap out of your clothes.    Treat any skin infection as directed.    Use oral diphenhydramine to help reduce itching. This is an antihistamine you can buy at drug and grocery stores. It can make you sleepy, so use lower doses during the daytime. Or you can use loratadine. This is an antihistamine that will not make you sleepy. Do not use diphenhydramine if you have glaucoma or have trouble urinating due to an enlarged prostate.  Follow-up care  See  your healthcare provider, or as advised. If your symptoms don t get better or if they get worse in the next 7 days, make an appointment with your healthcare provider.  When to seek medical advice  Call your healthcare provider right away  if any of these occur:    Increasing area of redness or pain in the skin    Yellow crusts or wet drainage from the rash    Fever of 100.4 F (38 C) or higher, or as directed by your healthcare provider  Date Last Reviewed: 9/1/2016 2000-2018 [employer]. [address] [place], [place] [address]. All rights reserved. This information is not intended as a substitute for professional medical care. Always follow your healthcare professional's instructions.

## 2019-07-02 NOTE — LETTER
2019         RE: Collin Frank  [address]  [address]  [facility] [address]        Dear Colleague,    Thank you for referring your patient, Collin Frank, to the [facility]. Please see a copy of my visit note below.    Collin Frank is seen in follow up of bilateral knee pain..  Prior treatments: NSAIDs and Corticosteroid injections  He had been having significant pain and was considering total knee arthroplasty.  He is actually feeling pretty good at this time.  In fact, he does not think he needs injections.  He just wanted to find out a bit more about total knee arthroplasty.    Past Medical History:   Diagnosis Date     Antiplatelet or antithrombotic long-term use     blood clot in leg     Long term current use of anticoagulant 10/16/2012       Past Surgical History:   Procedure Laterality Date     COLONOSCOPY  08    Normal. Repeat in 10 years     COLONOSCOPY WITH CO2 INSUFFLATION N/A 2018    Procedure: COLONOSCOPY WITH CO2 INSUFFLATION;  COLON SCREEN/ ENGELMANN;  Surgeon: Jan Flores MD;  Location: [facility] OR     rt knee ORIF      [facility]       Family History   Problem Relation Age of Onset     Alzheimer Disease Mother      Heart Disease Father      Prostate Cancer Father      Cancer Brother 65        pancreatic        Prostate Cancer Brother        Social History     Socioeconomic History     Marital status: Single     Spouse name: Not on file     Number of children: Not on file     Years of education: Not on file     Highest education level: Not on file   Occupational History     Not on file   Social Needs     Financial resource strain: Not on file     Food insecurity:     Worry: Not on file     Inability: Not on file     Transportation needs:     Medical: Not on file     Non-medical: Not on file   Tobacco Use     Smoking status: Former Smoker     Types: Cigarettes     Last attempt to quit: 2011     Years since quittin.4      Smokeless tobacco: Never Used   Substance and Sexual Activity     Alcohol use: No     Drug use: No     Sexual activity: Never   Lifestyle     Physical activity:     Days per week: Not on file     Minutes per session: Not on file     Stress: Not on file   Relationships     Social connections:     Talks on phone: Not on file     Gets together: Not on file     Attends Yarsani service: Not on file     Active member of club or organization: Not on file     Attends meetings of clubs or organizations: Not on file     Relationship status: Not on file     Intimate partner violence:     Fear of current or ex partner: Not on file     Emotionally abused: Not on file     Physically abused: Not on file     Forced sexual activity: Not on file   Other Topics Concern     Parent/sibling w/ CABG, MI or angioplasty before 65F 55M? No   Social History Narrative     Not on file       Current Outpatient Medications   Medication Sig Dispense Refill     cholecalciferol (VITAMIN D) 1000 UNIT tablet Take 1 tablet (1,000 Units) by mouth daily 100 tablet 3     losartan (COZAAR) 50 MG tablet Take 1 tablet (50 mg) by mouth daily 90 tablet 1     simvastatin (ZOCOR) 20 MG tablet Take 1 tablet (20 mg) by mouth At Bedtime 90 tablet 1     warfarin (JANTOVEN) 5 MG tablet TAKE 1 TABLET BY MOUTH     EVERY  tablet 3       No Known Allergies    REVIEW OF SYSTEMS:  CONSTITUTIONAL:  NEGATIVE for fever, chills, change in weight  INTEGUMENTARY/SKIN:  NEGATIVE for worrisome rashes, moles or lesions  EYES:  NEGATIVE for vision changes or irritation  ENT/MOUTH:  NEGATIVE for ear, mouth and throat problems  RESP:  NEGATIVE for significant cough or SOB  BREAST:  NEGATIVE for masses, tenderness or discharge  CV:  NEGATIVE for chest pain, palpitations or peripheral edema  GI:  NEGATIVE for nausea, abdominal pain, heartburn, or change in bowel habits  :  Negative   MUSCULOSKELETAL:  See HPI above  NEURO:  NEGATIVE for weakness, dizziness or  "paresthesias  ENDOCRINE:  NEGATIVE for temperature intolerance, skin/hair changes  HEME/ALLERGY/IMMUNE:  NEGATIVE for bleeding problems  PSYCHIATRIC:  NEGATIVE for changes in mood or affect    Vitals: /89   Pulse 61   Resp 13   Ht 1.727 m (5' 8\")   Wt 88.5 kg (195 lb)   SpO2 99%   BMI 29.65 kg/m     BMI= Body mass index is 29.65 kg/m .  Exam:Lumbar spine has full range of motion.  Straight leg raise is negative in both legs to 90 degrees.  Gait: normal  Hips show full range of motion.  No greater trochanter tenderness.   He has pain at medial bilateral knee.  No ligamentous laxity of medial collateral ligament, lateral collateral ligament or cruciates.  There is mild bilateral effusion.    Range of motion left 4-114 degrees, right 4-12 degrees.   Alise:  Right:  Medial positive, lateral negative  Left: medial positive, lateral negative.  Has moderate bilateral patello-femoral crepitation.  No increased warmth or erythema of either leg.    Xray shows severe bilateral knee osteoarthritis with 2 screws in right tibia and severe lateral bone-on-bone.  Left has bone-on-bone medial     Assessment:  bilateral knee osteoarthritis..  Plan:  Pamphlets were given for total knee arthroplasty.  When he has enough pain, he is considering bilateral total knee arthroplasty.  Return to clinic as needed.         Again, thank you for allowing me to participate in the care of your patient.        Sincerely,        Hernesto Love MD    "

## 2019-07-02 NOTE — PATIENT INSTRUCTIONS
Please remember to call and schedule a follow up appointment if one was recommended at your earliest convenience.  Orthopedics CLINIC HOURS TELEPHONE NUMBER   Dr. Kate So  Medical Assistant   Mondays- 8:00 - 4:00  [facility]    Tuesdays- 8:00-4:00  [facility] in the morning and [facility] in the afternoon   Thursdays- 8:00-4:00  [facility] in the morning and [facility] in the afternoon  Specialty schedulers:   [phone] to make an appointment with any Specialty Provider.   Main Clinic:   [phone] to make an appointment with your primary provider   Urgent Care locations:    [facility]   Monday-Friday Closed  Saturday-Sunday 9 am-5pm    Monday-Friday 12 pm - 8 pm  Saturday-Sunday 9 am-5 pm   [phone]     If SURGERY has been recommended, please call our Specialty Schedulers at [phone] to schedule your procedure.    If you need a medication refill, please contact your pharmacy. Please allow 3 business days for your refill to be completed.  Use [employer] (secure e-mail communication and access to your chart) to send a message or to make an appointment. Please ask how you can sign up for [employer].

## 2019-07-02 NOTE — PROGRESS NOTES
Subjective     Collin Frakn is a 69 year old male who presents to clinic today for the following health issues:        Check blister on Left ankle x 1 mo   Patient concern for rash, blister to his left medial ankle area he reports his the leslee been present for over a year, however ,he started noticing more redness and he thinks he may  about a month ago.  He reports no pain, no discharges that is not warm or tender to touch.  He does report that he  walk a lot.  However, he is not sure if any skin friction to his shoes.  He report sometimes it does itchy    Patient Active Problem List   Diagnosis     Advanced directives, counseling/discussion     Hyperhomocysteinemia (H)     Acute venous embolism and thrombosis of deep vessels of proximal lower extremity (H)     CARDIOVASCULAR SCREENING; LDL GOAL LESS THAN 130     Factor 5 Leiden mutation, heterozygous (H)     May-Thurner syndrome     Long term current use of anticoagulant     Vitamin D deficiency     Renal stones     Hepatic hemangioma     FH: prostate cancer     Long-term (current) use of anticoagulants [Z79.01]     Pulmonary embolism with infarction (HCC) [I26.99]     Post-traumatic osteoarthritis of right knee     Benign non-nodular prostatic hyperplasia without lower urinary tract symptoms     Primary osteoarthritis of left knee     Benign essential hypertension     Past Surgical History:   Procedure Laterality Date     COLONOSCOPY  08    Normal. Repeat in 10 years     COLONOSCOPY WITH CO2 INSUFFLATION N/A 2018    Procedure: COLONOSCOPY WITH CO2 INSUFFLATION;  COLON SCREEN/ ENGELMANN;  Surgeon: Jan Flores MD;  Location: [facility] OR     rt knee ORIF      [facility]       Social History     Tobacco Use     Smoking status: Former Smoker     Types: Cigarettes     Last attempt to quit: 2011     Years since quittin.4     Smokeless tobacco: Never Used   Substance Use Topics     Alcohol use: No     Family History   Problem Relation Age  of Onset     Alzheimer Disease Mother      Heart Disease Father      Prostate Cancer Father      Cancer Brother 65        pancreatic        Prostate Cancer Brother          Current Outpatient Medications   Medication Sig Dispense Refill     cholecalciferol (VITAMIN D) 1000 UNIT tablet Take 1 tablet (1,000 Units) by mouth daily 100 tablet 3     losartan (COZAAR) 50 MG tablet Take 1 tablet (50 mg) by mouth daily 90 tablet 1     simvastatin (ZOCOR) 20 MG tablet Take 1 tablet (20 mg) by mouth At Bedtime 90 tablet 1     warfarin (JANTOVEN) 5 MG tablet TAKE 1 TABLET BY MOUTH     EVERY  tablet 3       Reviewed and updated as needed this visit by Provider         Review of Systems   ROS COMP: Constitutional, HEENT, cardiovascular, pulmonary, gi and gu systems are negative, except as otherwise noted.      Objective    There were no vitals taken for this visit.  There is no height or weight on file to calculate BMI.  Physical Exam   GENERAL: healthy, alert and no distress  Skin exam: left, medial ankle area, dry scaling rash, with 3 cm by 2 cm.  No redness and not war to touch.    Diagnostic Test Results:  Labs reviewed in Epic  none         Assessment & Plan   (L30.8) Other eczema  (primary encounter diagnosis)  I discussed with him likely dermatitis.  I did recommend to use topical Kenalog,cream patient declines any medication at this point.    I advised him he could always use over-the-counter medication, as needed, such as hydrocortisone cream 2% applied to few times a day.  See Patient Instructions    No follow-ups on file.    Satish Crisostomo MD  [facility]

## 2019-07-03 PROBLEM — M17.0 PRIMARY OSTEOARTHRITIS OF BOTH KNEES: Status: ACTIVE | Noted: 2019-07-03

## 2019-07-03 NOTE — PROGRESS NOTES
Collin MARVIN Oscardanielle is seen in follow up of bilateral knee pain..  Prior treatments: NSAIDs and Corticosteroid injections  He had been having significant pain and was considering total knee arthroplasty.  He is actually feeling pretty good at this time.  In fact, he does not think he needs injections.  He just wanted to find out a bit more about total knee arthroplasty.    Past Medical History:   Diagnosis Date     Antiplatelet or antithrombotic long-term use     blood clot in leg     Long term current use of anticoagulant 10/16/2012       Past Surgical History:   Procedure Laterality Date     COLONOSCOPY  08    Normal. Repeat in 10 years     COLONOSCOPY WITH CO2 INSUFFLATION N/A 2018    Procedure: COLONOSCOPY WITH CO2 INSUFFLATION;  COLON SCREEN/ ANNMARIEELMARI;  Surgeon: Jan Flores MD;  Location: [facility] OR     rt knee ORIF      [facility]       Family History   Problem Relation Age of Onset     Alzheimer Disease Mother      Heart Disease Father      Prostate Cancer Father      Cancer Brother 65        pancreatic        Prostate Cancer Brother        Social History     Socioeconomic History     Marital status: Single     Spouse name: Not on file     Number of children: Not on file     Years of education: Not on file     Highest education level: Not on file   Occupational History     Not on file   Social Needs     Financial resource strain: Not on file     Food insecurity:     Worry: Not on file     Inability: Not on file     Transportation needs:     Medical: Not on file     Non-medical: Not on file   Tobacco Use     Smoking status: Former Smoker     Types: Cigarettes     Last attempt to quit: 2011     Years since quittin.4     Smokeless tobacco: Never Used   Substance and Sexual Activity     Alcohol use: No     Drug use: No     Sexual activity: Never   Lifestyle     Physical activity:     Days per week: Not on file     Minutes per session: Not on file     Stress: Not on file  "  Relationships     Social connections:     Talks on phone: Not on file     Gets together: Not on file     Attends Mosque service: Not on file     Active member of club or organization: Not on file     Attends meetings of clubs or organizations: Not on file     Relationship status: Not on file     Intimate partner violence:     Fear of current or ex partner: Not on file     Emotionally abused: Not on file     Physically abused: Not on file     Forced sexual activity: Not on file   Other Topics Concern     Parent/sibling w/ CABG, MI or angioplasty before 65F 55M? No   Social History Narrative     Not on file       Current Outpatient Medications   Medication Sig Dispense Refill     cholecalciferol (VITAMIN D) 1000 UNIT tablet Take 1 tablet (1,000 Units) by mouth daily 100 tablet 3     losartan (COZAAR) 50 MG tablet Take 1 tablet (50 mg) by mouth daily 90 tablet 1     simvastatin (ZOCOR) 20 MG tablet Take 1 tablet (20 mg) by mouth At Bedtime 90 tablet 1     warfarin (JANTOVEN) 5 MG tablet TAKE 1 TABLET BY MOUTH     EVERY  tablet 3       No Known Allergies    REVIEW OF SYSTEMS:  CONSTITUTIONAL:  NEGATIVE for fever, chills, change in weight  INTEGUMENTARY/SKIN:  NEGATIVE for worrisome rashes, moles or lesions  EYES:  NEGATIVE for vision changes or irritation  ENT/MOUTH:  NEGATIVE for ear, mouth and throat problems  RESP:  NEGATIVE for significant cough or SOB  BREAST:  NEGATIVE for masses, tenderness or discharge  CV:  NEGATIVE for chest pain, palpitations or peripheral edema  GI:  NEGATIVE for nausea, abdominal pain, heartburn, or change in bowel habits  :  Negative   MUSCULOSKELETAL:  See HPI above  NEURO:  NEGATIVE for weakness, dizziness or paresthesias  ENDOCRINE:  NEGATIVE for temperature intolerance, skin/hair changes  HEME/ALLERGY/IMMUNE:  NEGATIVE for bleeding problems  PSYCHIATRIC:  NEGATIVE for changes in mood or affect    Vitals: /89   Pulse 61   Resp 13   Ht 1.727 m (5' 8\")   Wt 88.5 kg " (195 lb)   SpO2 99%   BMI 29.65 kg/m    BMI= Body mass index is 29.65 kg/m .  Exam:Lumbar spine has full range of motion.  Straight leg raise is negative in both legs to 90 degrees.  Gait: normal  Hips show full range of motion.  No greater trochanter tenderness.   He has pain at medial bilateral knee.  No ligamentous laxity of medial collateral ligament, lateral collateral ligament or cruciates.  There is mild bilateral effusion.    Range of motion left 4-114 degrees, right 4-12 degrees.   Alise:  Right:  Medial positive, lateral negative  Left: medial positive, lateral negative.  Has moderate bilateral patello-femoral crepitation.  No increased warmth or erythema of either leg.    Xray shows severe bilateral knee osteoarthritis with 2 screws in right tibia and severe lateral bone-on-bone.  Left has bone-on-bone medial     Assessment:  bilateral knee osteoarthritis..  Plan:  Pamphlets were given for total knee arthroplasty.  When he has enough pain, he is considering bilateral total knee arthroplasty.  Return to clinic as needed.

## 2019-07-11 ENCOUNTER — ANTICOAGULATION THERAPY VISIT (OUTPATIENT)
Dept: NURSING | Facility: CLINIC | Age: 69
End: 2019-07-11
Payer: COMMERCIAL

## 2019-07-11 DIAGNOSIS — I26.99 PULMONARY EMBOLISM WITH INFARCTION (H): ICD-10-CM

## 2019-07-11 DIAGNOSIS — D68.51 FACTOR 5 LEIDEN MUTATION, HETEROZYGOUS (H): ICD-10-CM

## 2019-07-11 DIAGNOSIS — I82.4Y9 ACUTE VENOUS EMBOLISM AND THROMBOSIS OF DEEP VESSELS OF PROXIMAL LOWER EXTREMITY, UNSPECIFIED LATERALITY (H): ICD-10-CM

## 2019-07-11 LAB — INR POINT OF CARE: 2.3 (ref 0.86–1.14)

## 2019-07-11 PROCEDURE — 85610 PROTHROMBIN TIME: CPT | Mod: QW

## 2019-07-11 PROCEDURE — 36416 COLLJ CAPILLARY BLOOD SPEC: CPT

## 2019-07-11 PROCEDURE — 99207 ZZC NO CHARGE NURSE ONLY: CPT

## 2019-07-11 NOTE — PROGRESS NOTES
ANTICOAGULATION FOLLOW-UP CLINIC VISIT    Patient Name:  Collin Frank  Date:  2019  Contact Type:  Face to Face    SUBJECTIVE: same plan of care.  Patient Findings         Clinical Outcomes     Negatives:   Major bleeding event, Thromboembolic event, Anticoagulation-related hospital admission, Anticoagulation-related ED visit, Anticoagulation-related fatality           OBJECTIVE    INR Protime   Date Value Ref Range Status   2019 2.3 (A) 0.86 - 1.14 Final       ASSESSMENT / PLAN  INR assessment THER    Recheck INR In: 4 WEEKS    INR Location Clinic      Anticoagulation Summary  As of 2019    INR goal:   2.0-3.0   TTR:   69.9 % (3.3 y)   INR used for dosin.3 (2019)   Warfarin maintenance plan:   5 mg (5 mg x 1) every day   Full warfarin instructions:   5 mg every day   Weekly warfarin total:   35 mg   No change documented:   Minal Carrion RN   Plan last modified:   Minal Carrion RN (2018)   Next INR check:   2019   Target end date:   Indefinite    Indications    Pulmonary embolism with infarction (HCC) [I26.99] [I26.99]  Factor 5 Leiden mutation  heterozygous (H) [D68.51]  Acute venous embolism and thrombosis of deep vessels of proximal lower extremity (H) [I82.4Y9]             Anticoagulation Episode Summary     INR check location:       Preferred lab:       Send INR reminders to:   [facility]    Comments:         Anticoagulation Care Providers     Provider Role Specialty Phone number    Engelmann, Lauren Anneliese, MD  Family Practice [phone]            See the Encounter Report to view Anticoagulation Flowsheet and Dosing Calendar (Go to Encounters tab in chart review, and find the Anticoagulation Therapy Visit)    Dosage adjustment made based on physician directed care plan.    Minal Carrion RN

## 2019-07-22 ENCOUNTER — DOCUMENTATION ONLY (OUTPATIENT)
Dept: OTHER | Facility: CLINIC | Age: 69
End: 2019-07-22

## 2019-08-08 ENCOUNTER — ANTICOAGULATION THERAPY VISIT (OUTPATIENT)
Dept: NURSING | Facility: CLINIC | Age: 69
End: 2019-08-08
Payer: COMMERCIAL

## 2019-08-08 DIAGNOSIS — I82.4Y9 ACUTE VENOUS EMBOLISM AND THROMBOSIS OF DEEP VESSELS OF PROXIMAL LOWER EXTREMITY, UNSPECIFIED LATERALITY (H): ICD-10-CM

## 2019-08-08 DIAGNOSIS — I26.99 PULMONARY EMBOLISM WITH INFARCTION (H): ICD-10-CM

## 2019-08-08 DIAGNOSIS — D68.51 FACTOR 5 LEIDEN MUTATION, HETEROZYGOUS (H): ICD-10-CM

## 2019-08-08 LAB — INR POINT OF CARE: 1.8 (ref 0.86–1.14)

## 2019-08-08 PROCEDURE — 36416 COLLJ CAPILLARY BLOOD SPEC: CPT

## 2019-08-08 PROCEDURE — 85610 PROTHROMBIN TIME: CPT | Mod: QW

## 2019-08-08 PROCEDURE — 99207 ZZC NO CHARGE NURSE ONLY: CPT

## 2019-08-08 NOTE — PROGRESS NOTES
ANTICOAGULATION FOLLOW-UP CLINIC VISIT    Patient Name:  Collin Frank  Date:  2019  Contact Type:  Face to Face    SUBJECTIVE: 2 wk zi  Patient Findings     Positives:   Change in diet/appetite (patient ate alot of sockeye salmon)    Comments:   Assessment negative for signs or symptoms of bleeding / clotting this visit.        Clinical Outcomes     Negatives:   Major bleeding event, Thromboembolic event, Anticoagulation-related hospital admission, Anticoagulation-related ED visit, Anticoagulation-related fatality    Comments:   Assessment negative for signs or symptoms of bleeding / clotting this visit.           OBJECTIVE    INR Protime   Date Value Ref Range Status   2019 1.8 (A) 0.86 - 1.14 Final       ASSESSMENT / PLAN  INR assessment SUB    Recheck INR In: 2 WEEKS    INR Location Clinic      Anticoagulation Summary  As of 2019    INR goal:   2.0-3.0   TTR:   69.6 % (3.4 y)   INR used for dosin.8! (2019)   Warfarin maintenance plan:   5 mg (5 mg x 1) every day   Full warfarin instructions:   : 7.5 mg; Otherwise 5 mg every day   Weekly warfarin total:   35 mg   Plan last modified:   Minal Carrion, RN (2018)   Next INR check:   2019   Target end date:   Indefinite    Indications    Pulmonary embolism with infarction (HCC) [I26.99] [I26.99]  Factor 5 Leiden mutation  heterozygous (H) [D68.51]  Acute venous embolism and thrombosis of deep vessels of proximal lower extremity (H) [I82.4Y9]             Anticoagulation Episode Summary     INR check location:       Preferred lab:       Send INR reminders to:   [facility]    Comments:         Anticoagulation Care Providers     Provider Role Specialty Phone number    Satish Crisostomo MD  Family Practice [phone]            See the Encounter Report to view Anticoagulation Flowsheet and Dosing Calendar (Go to Encounters tab in chart review, and find the Anticoagulation Therapy Visit)    Dosage adjustment made based  on physician directed care plan.    Minal Carrion RN

## 2019-08-22 ENCOUNTER — ANTICOAGULATION THERAPY VISIT (OUTPATIENT)
Dept: NURSING | Facility: CLINIC | Age: 69
End: 2019-08-22
Payer: COMMERCIAL

## 2019-08-22 DIAGNOSIS — D68.51 FACTOR 5 LEIDEN MUTATION, HETEROZYGOUS (H): ICD-10-CM

## 2019-08-22 DIAGNOSIS — I26.99 PULMONARY EMBOLISM WITH INFARCTION (H): ICD-10-CM

## 2019-08-22 DIAGNOSIS — I82.4Y9 ACUTE VENOUS EMBOLISM AND THROMBOSIS OF DEEP VESSELS OF PROXIMAL LOWER EXTREMITY, UNSPECIFIED LATERALITY (H): ICD-10-CM

## 2019-08-22 LAB — INR POINT OF CARE: 2.1 (ref 0.86–1.14)

## 2019-08-22 PROCEDURE — 85610 PROTHROMBIN TIME: CPT | Mod: QW

## 2019-08-22 PROCEDURE — 99207 ZZC NO CHARGE NURSE ONLY: CPT

## 2019-08-22 PROCEDURE — 36416 COLLJ CAPILLARY BLOOD SPEC: CPT

## 2019-08-22 NOTE — PROGRESS NOTES
ANTICOAGULATION FOLLOW-UP CLINIC VISIT    Patient Name:  Collin Frank  Date:  2019  Contact Type:  Face to Face    SUBJECTIVE: raised dose to one that worked in the past.  Recheck 2 wks.  See below.    Patient Findings     Positives:   Change in diet/appetite (is not having any greens and he wants to have them.  )    Comments:   Assessment negative for symptoms of bleeding or clotting this visit.        Clinical Outcomes     Negatives:   Major bleeding event, Thromboembolic event, Anticoagulation-related hospital admission, Anticoagulation-related ED visit, Anticoagulation-related fatality    Comments:   Assessment negative for symptoms of bleeding or clotting this visit.           OBJECTIVE    INR Protime   Date Value Ref Range Status   2019 2.1 (A) 0.86 - 1.14 Final       ASSESSMENT / PLAN  INR assessment THER    Recheck INR In: 2 WEEKS    INR Location Clinic      Anticoagulation Summary  As of 2019    INR goal:   2.0-3.0   TTR:   69.2 % (3.5 y)   INR used for dosin.1 (2019)   Warfarin maintenance plan:   7.5 mg (5 mg x 1.5) every Mon; 5 mg (5 mg x 1) all other days   Full warfarin instructions:   7.5 mg every Mon; 5 mg all other days   Weekly warfarin total:   37.5 mg   Plan last modified:   Minal Carrion RN (2019)   Next INR check:   2019   Target end date:   Indefinite    Indications    Pulmonary embolism with infarction (HCC) [I26.99] [I26.99]  Factor 5 Leiden mutation  heterozygous (H) [D68.51]  Acute venous embolism and thrombosis of deep vessels of proximal lower extremity (H) [I82.4Y9]             Anticoagulation Episode Summary     INR check location:       Preferred lab:       Send INR reminders to:   [facility]    Comments:         Anticoagulation Care Providers     Provider Role Specialty Phone number    Satish Crisostomo MD  Family Practice [phone]            See the Encounter Report to view Anticoagulation Flowsheet and Dosing Calendar (Go to  Encounters tab in chart review, and find the Anticoagulation Therapy Visit)    Dosage adjustment made based on physician directed care plan.    Minal Carrion RN

## 2019-09-05 ENCOUNTER — ANTICOAGULATION THERAPY VISIT (OUTPATIENT)
Dept: NURSING | Facility: CLINIC | Age: 69
End: 2019-09-05
Payer: COMMERCIAL

## 2019-09-05 DIAGNOSIS — I26.99 PULMONARY EMBOLISM WITH INFARCTION (H): ICD-10-CM

## 2019-09-05 DIAGNOSIS — D68.51 FACTOR 5 LEIDEN MUTATION, HETEROZYGOUS (H): ICD-10-CM

## 2019-09-05 DIAGNOSIS — I82.4Y9 ACUTE VENOUS EMBOLISM AND THROMBOSIS OF DEEP VESSELS OF PROXIMAL LOWER EXTREMITY, UNSPECIFIED LATERALITY (H): ICD-10-CM

## 2019-09-05 LAB — INR POINT OF CARE: 2.6 (ref 0.86–1.14)

## 2019-09-05 PROCEDURE — 36416 COLLJ CAPILLARY BLOOD SPEC: CPT

## 2019-09-05 PROCEDURE — 99207 ZZC NO CHARGE NURSE ONLY: CPT

## 2019-09-05 PROCEDURE — 85610 PROTHROMBIN TIME: CPT | Mod: QW

## 2019-09-05 NOTE — PROGRESS NOTES
ANTICOAGULATION FOLLOW-UP CLINIC VISIT    Patient Name:  Collin Frank  Date:  2019  Contact Type:  Face to Face    SUBJECTIVE: 2 wk zi  Patient Findings     Comments:   Assessment negative for symptoms of bleeding or clotting this visit        Clinical Outcomes     Negatives:   Major bleeding event, Thromboembolic event, Anticoagulation-related hospital admission, Anticoagulation-related ED visit, Anticoagulation-related fatality    Comments:   Assessment negative for symptoms of bleeding or clotting this visit           OBJECTIVE    INR Protime   Date Value Ref Range Status   2019 2.6 (A) 0.86 - 1.14 Final       ASSESSMENT / PLAN  INR assessment THER    Recheck INR In: 2 WEEKS    INR Location Clinic      Anticoagulation Summary  As of 2019    INR goal:   2.0-3.0   TTR:   69.6 % (3.5 y)   INR used for dosin.6 (2019)   Warfarin maintenance plan:   7.5 mg (5 mg x 1.5) every Mon; 5 mg (5 mg x 1) all other days   Full warfarin instructions:   7.5 mg every Mon; 5 mg all other days   Weekly warfarin total:   37.5 mg   No change documented:   Minal Carrion RN   Plan last modified:   Minal Carrion RN (2019)   Next INR check:   2019   Target end date:   Indefinite    Indications    Pulmonary embolism with infarction (HCC) [I26.99] [I26.99]  Factor 5 Leiden mutation  heterozygous (H) [D68.51]  Acute venous embolism and thrombosis of deep vessels of proximal lower extremity (H) [I82.4Y9]             Anticoagulation Episode Summary     INR check location:       Preferred lab:       Send INR reminders to:   [facility]    Comments:         Anticoagulation Care Providers     Provider Role Specialty Phone number    Satish Crisostomo MD  Family Practice [phone]            See the Encounter Report to view Anticoagulation Flowsheet and Dosing Calendar (Go to Encounters tab in chart review, and find the Anticoagulation Therapy Visit)    Dosage adjustment made based on  physician directed care plan.    Minal Carrion RN

## 2019-09-15 ENCOUNTER — HOSPITAL ENCOUNTER (EMERGENCY)
Facility: CLINIC | Age: 69
Discharge: HOME | End: 2019-09-15
Attending: EMERGENCY MEDICINE | Admitting: EMERGENCY MEDICINE
Payer: COMMERCIAL

## 2019-09-15 VITALS
HEART RATE: 63 BPM | HEIGHT: 68 IN | OXYGEN SATURATION: 99 % | RESPIRATION RATE: 12 BRPM | BODY MASS INDEX: 31.83 KG/M2 | TEMPERATURE: 98.2 F | SYSTOLIC BLOOD PRESSURE: 141 MMHG | WEIGHT: 210 LBS | DIASTOLIC BLOOD PRESSURE: 82 MMHG

## 2019-09-15 DIAGNOSIS — R07.89 ATYPICAL CHEST PAIN: ICD-10-CM

## 2019-09-15 LAB
INTERPRETATION ECG - MUSE: NORMAL
TROPONIN I SERPL-MCNC: <0.015 UG/L (ref 0–0.04)
TROPONIN I SERPL-MCNC: <0.015 UG/L (ref 0–0.04)

## 2019-09-15 PROCEDURE — 99284 EMERGENCY DEPT VISIT MOD MDM: CPT | Performed by: EMERGENCY MEDICINE

## 2019-09-15 PROCEDURE — 93010 ELECTROCARDIOGRAM REPORT: CPT | Mod: Z6 | Performed by: EMERGENCY MEDICINE

## 2019-09-15 PROCEDURE — 93005 ELECTROCARDIOGRAM TRACING: CPT | Performed by: EMERGENCY MEDICINE

## 2019-09-15 PROCEDURE — 99284 EMERGENCY DEPT VISIT MOD MDM: CPT | Mod: 25 | Performed by: EMERGENCY MEDICINE

## 2019-09-15 PROCEDURE — 84484 ASSAY OF TROPONIN QUANT: CPT | Performed by: EMERGENCY MEDICINE

## 2019-09-15 ASSESSMENT — ENCOUNTER SYMPTOMS
DIAPHORESIS: 1
BACK PAIN: 1

## 2019-09-15 ASSESSMENT — MIFFLIN-ST. JEOR: SCORE: 1682.05

## 2019-09-15 NOTE — ED NOTES
Bed: ED33  Expected date:   Expected time:   Means of arrival:   Comments:  H429  69M  Generalized weakness, diaphoretic  Yellow in 6 min

## 2019-09-15 NOTE — ED PROVIDER NOTES
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 Patient reports that he was walking to [place] this morning and after approximately ten blocks he developed sudden perfuse sweating along with left-sided chest discomfort. He walked to a nearby fire station and was sent here.     Patient currently continues to have chest discomfort. He characterizes this as a sensation of a thumb directly pressing into his left chest. He also endorses intermittent back pain. Patient reports that the diaphoresis is not entirely abnormal for him, but he has noticed an increase of this within the past two days.    Per chart review, patient was seen in the ED on 3/30/2019 for atypical chest pain that spontaneously resolved. He had a negative work-up at that time including serial troponin, UA and EKG. Prior to that visit, he was admitted to the ED Observation Unit on 2/4/2019 for chest pain and diaphoresis, which was determined to not be cardiac in nature, as he had a negative troponin and negative stress echocardiogram. He did have a CT of the chest which was notable for possible pneumonia, however, he was discharged without antibiotics as he was asymptomatic at that time.    I have reviewed the Medications, Allergies, Past Medical and Surgical History, and Social History in the [facility] system.    Past Medical History:   Diagnosis Date     Antiplatelet or antithrombotic long-term use     blood clot in leg     Long term current use of anticoagulant 10/16/2012       Past Surgical History:   Procedure Laterality Date     COLONOSCOPY  1-17-08    Normal. Repeat in 10 years     COLONOSCOPY WITH CO2 INSUFFLATION N/A 2/1/2018  "   Procedure: COLONOSCOPY WITH CO2 INSUFFLATION;  COLON SCREEN/ ENGELMANN;  Surgeon: Jan Flores MD;  Location: [facility] OR     rt knee ORIF      [facility]       Family History   Problem Relation Age of Onset     Alzheimer Disease Mother      Heart Disease Father      Prostate Cancer Father      Cancer Brother 65        pancreatic        Prostate Cancer Brother        Social History     Tobacco Use     Smoking status: Former Smoker     Types: Cigarettes     Last attempt to quit: 2011     Years since quittin.6     Smokeless tobacco: Never Used   Substance Use Topics     Alcohol use: No     No current facility-administered medications for this encounter.      Current Outpatient Medications   Medication     cholecalciferol (VITAMIN D) 1000 UNIT tablet     losartan (COZAAR) 50 MG tablet     simvastatin (ZOCOR) 20 MG tablet     warfarin (JANTOVEN) 5 MG tablet      No Known Allergies    Review of Systems   Constitutional: Positive for diaphoresis.   Cardiovascular: Positive for chest pain (left-sided).   Musculoskeletal: Positive for back pain.   All other systems reviewed and are negative.      Physical Exam   BP: (!) 144/124  Pulse: 65  Heart Rate: 67  Temp: 98.2  F (36.8  C)  Resp: 22  Height: 172.7 cm (5' 8\")  Weight: 95.3 kg (210 lb)  SpO2: 96 %      Physical Exam   Constitutional: He is oriented to person, place, and time. He appears well-developed and well-nourished. No distress.   HENT:   Head: Normocephalic and atraumatic.   Eyes: Pupils are equal, round, and reactive to light. Conjunctivae and EOM are normal. No scleral icterus.   Neck: Neck supple.   Cardiovascular: Normal rate, regular rhythm and normal heart sounds.   Pulmonary/Chest: Effort normal and breath sounds normal. No respiratory distress. He has no wheezes.   Neurological: He is alert and oriented to person, place, and time. No cranial nerve deficit.   Skin: Skin is warm and dry.   Psychiatric: He has a normal mood and " affect. His behavior is normal.   Nursing note and vitals reviewed.      ED Course        Procedures             EKG Interpretation:      Interpreted by Isaac Cisneros MD  Time reviewed: 0940  Symptoms at time of EKG: none   Rhythm: normal sinus   Rate: normal  Axis: normal  Ectopy: none  Conduction: normal  ST Segments/ T Waves: No ST-T wave changes  Q Waves: none  Comparison to prior: Unchanged    Clinical Impression: normal EKG             Labs Ordered and Resulted from Time of ED Arrival Up to the Time of Departure from the ED   TROPONIN I   TROPONIN I            Assessments & Plan (with Medical Decision Making)   This is a 69 year old male who presents after having an episode of diaphoresis while he was walking. He didn't really have chest pain or shortness of breath. He has no documented cardiac history. He had a recent negative stress test. His EKG was normal, and 2 troponins are negative.  I do not think this represents ACS. I am comfortable discharging him home he should follow-up with his primary care provider.  Return if any problems or concerns.    This part of the medical record was transcribed by Pradeep Campos Medical Scribe, from a dictation done by Isaac Cisneros MD.       I have reviewed the nursing notes.    I have reviewed the findings, diagnosis, plan and need for follow up with the patient.    Discharge Medication List as of 9/15/2019  2:28 PM          Final diagnoses:   Atypical chest pain     IOlivia, am serving as a trained medical scribe to document services personally performed by Isaac Cisneros MD, based on the provider's statements to me.   Isaac AREVALO MD, was physically present and have reviewed and verified the accuracy of this note documented by Olivia Chawla.    9/15/2019   [facility], EMERGENCY DEPARTMENT     Isaac Cisneros MD  09/18/19 8408

## 2019-09-15 NOTE — ED TRIAGE NOTES
Pt BIBA for weakness. Pt was walking to [place] when started feeling weak. Walked into fire station where they called EMS. Had similar episode yesterday which lasted 15 min. Hypertensive in 190s for EMS. BG 95.

## 2019-09-15 NOTE — ED AVS SNAPSHOT
[facility], [place], Emergency Department  [address] [place] [id number]  Phone:  [phone]                                    Collin Frank   MRN: [id number]    Department:  [facility], Emergency Department   Date of Visit:  9/15/2019           After Visit Summary Signature Page    I have received my discharge instructions, and my questions have been answered. I have discussed any challenges I see with this plan with the nurse or doctor.    ..........................................................................................................................................  Patient/Patient Representative Signature      ..........................................................................................................................................  Patient Representative Print Name and Relationship to Patient    ..................................................               ................................................  Date                                   Time    ..........................................................................................................................................  Reviewed by Signature/Title    ...................................................              ..............................................  Date                                               Time          22EPIC Rev 08/18

## 2019-09-16 ENCOUNTER — OFFICE VISIT (OUTPATIENT)
Dept: FAMILY MEDICINE | Facility: CLINIC | Age: 69
End: 2019-09-16
Payer: COMMERCIAL

## 2019-09-16 VITALS
HEART RATE: 55 BPM | DIASTOLIC BLOOD PRESSURE: 75 MMHG | BODY MASS INDEX: 30.11 KG/M2 | WEIGHT: 198 LBS | SYSTOLIC BLOOD PRESSURE: 129 MMHG | TEMPERATURE: 97.9 F | OXYGEN SATURATION: 97 %

## 2019-09-16 DIAGNOSIS — R63.5 RAPID WEIGHT GAIN: ICD-10-CM

## 2019-09-16 DIAGNOSIS — I26.99 PULMONARY EMBOLISM WITH INFARCTION (H): ICD-10-CM

## 2019-09-16 DIAGNOSIS — I10 BENIGN ESSENTIAL HYPERTENSION: ICD-10-CM

## 2019-09-16 DIAGNOSIS — R07.89 ATYPICAL CHEST PAIN: Primary | ICD-10-CM

## 2019-09-16 DIAGNOSIS — Z13.6 CARDIOVASCULAR SCREENING; LDL GOAL LESS THAN 130: ICD-10-CM

## 2019-09-16 LAB
ALBUMIN SERPL-MCNC: 3.7 G/DL (ref 3.4–5)
ALP SERPL-CCNC: 80 U/L (ref 40–150)
ALT SERPL W P-5'-P-CCNC: 39 U/L (ref 0–70)
ANION GAP SERPL CALCULATED.3IONS-SCNC: 5 MMOL/L (ref 3–14)
AST SERPL W P-5'-P-CCNC: 35 U/L (ref 0–45)
BASOPHILS # BLD AUTO: 0.1 10E9/L (ref 0–0.2)
BASOPHILS NFR BLD AUTO: 1.3 %
BILIRUB DIRECT SERPL-MCNC: 0.2 MG/DL (ref 0–0.2)
BILIRUB SERPL-MCNC: 0.7 MG/DL (ref 0.2–1.3)
BUN SERPL-MCNC: 12 MG/DL (ref 7–30)
CALCIUM SERPL-MCNC: 9 MG/DL (ref 8.5–10.1)
CHLORIDE SERPL-SCNC: 108 MMOL/L (ref 94–109)
CHOLEST SERPL-MCNC: 121 MG/DL
CO2 SERPL-SCNC: 27 MMOL/L (ref 20–32)
CREAT SERPL-MCNC: 0.87 MG/DL (ref 0.66–1.25)
DIFFERENTIAL METHOD BLD: NORMAL
EOSINOPHIL # BLD AUTO: 0.3 10E9/L (ref 0–0.7)
EOSINOPHIL NFR BLD AUTO: 5.2 %
ERYTHROCYTE [DISTWIDTH] IN BLOOD BY AUTOMATED COUNT: 13.3 % (ref 10–15)
GFR SERPL CREATININE-BSD FRML MDRD: 87 ML/MIN/{1.73_M2}
GLUCOSE SERPL-MCNC: 88 MG/DL (ref 70–99)
HCT VFR BLD AUTO: 44.9 % (ref 40–53)
HDLC SERPL-MCNC: 60 MG/DL
HGB BLD-MCNC: 15.1 G/DL (ref 13.3–17.7)
LDLC SERPL CALC-MCNC: 51 MG/DL
LYMPHOCYTES # BLD AUTO: 1.5 10E9/L (ref 0.8–5.3)
LYMPHOCYTES NFR BLD AUTO: 27.7 %
MCH RBC QN AUTO: 32.1 PG (ref 26.5–33)
MCHC RBC AUTO-ENTMCNC: 33.6 G/DL (ref 31.5–36.5)
MCV RBC AUTO: 95 FL (ref 78–100)
MONOCYTES # BLD AUTO: 0.6 10E9/L (ref 0–1.3)
MONOCYTES NFR BLD AUTO: 11.5 %
NEUTROPHILS # BLD AUTO: 2.9 10E9/L (ref 1.6–8.3)
NEUTROPHILS NFR BLD AUTO: 54.3 %
NONHDLC SERPL-MCNC: 61 MG/DL
PLATELET # BLD AUTO: 235 10E9/L (ref 150–450)
POTASSIUM SERPL-SCNC: 4 MMOL/L (ref 3.4–5.3)
PROT SERPL-MCNC: 7 G/DL (ref 6.8–8.8)
RBC # BLD AUTO: 4.71 10E12/L (ref 4.4–5.9)
SODIUM SERPL-SCNC: 140 MMOL/L (ref 133–144)
TRIGL SERPL-MCNC: 50 MG/DL
TSH SERPL DL<=0.005 MIU/L-ACNC: 1.43 MU/L (ref 0.4–4)
WBC # BLD AUTO: 5.4 10E9/L (ref 4–11)

## 2019-09-16 PROCEDURE — 80061 LIPID PANEL: CPT | Performed by: INTERNAL MEDICINE

## 2019-09-16 PROCEDURE — 80076 HEPATIC FUNCTION PANEL: CPT | Performed by: INTERNAL MEDICINE

## 2019-09-16 PROCEDURE — 84443 ASSAY THYROID STIM HORMONE: CPT | Performed by: INTERNAL MEDICINE

## 2019-09-16 PROCEDURE — 80048 BASIC METABOLIC PNL TOTAL CA: CPT | Performed by: INTERNAL MEDICINE

## 2019-09-16 PROCEDURE — 36415 COLL VENOUS BLD VENIPUNCTURE: CPT | Performed by: INTERNAL MEDICINE

## 2019-09-16 PROCEDURE — 99214 OFFICE O/P EST MOD 30 MIN: CPT | Performed by: INTERNAL MEDICINE

## 2019-09-16 PROCEDURE — 85025 COMPLETE CBC W/AUTO DIFF WBC: CPT | Performed by: INTERNAL MEDICINE

## 2019-09-16 NOTE — LETTER
[facility]   [address] [place]  [address]  [phone]                                   September 17, 2019    Collin Frank  [address]    [facility] [address]        Dear Collin,    Labs look great.  Pain is not coming from heart.  Will continue to evaluate with you, but all the testing is looking good    Results for orders placed or performed in visit on 09/16/19   CBC with platelets and differential   Result Value Ref Range    WBC 5.4 4.0 - 11.0 10e9/L    RBC Count 4.71 4.4 - 5.9 10e12/L    Hemoglobin 15.1 13.3 - 17.7 g/dL    Hematocrit 44.9 40.0 - 53.0 %    MCV 95 78 - 100 fl    MCH 32.1 26.5 - 33.0 pg    MCHC 33.6 31.5 - 36.5 g/dL    RDW 13.3 10.0 - 15.0 %    Platelet Count 235 150 - 450 10e9/L    % Neutrophils 54.3 %    % Lymphocytes 27.7 %    % Monocytes 11.5 %    % Eosinophils 5.2 %    % Basophils 1.3 %    Absolute Neutrophil 2.9 1.6 - 8.3 10e9/L    Absolute Lymphocytes 1.5 0.8 - 5.3 10e9/L    Absolute Monocytes 0.6 0.0 - 1.3 10e9/L    Absolute Eosinophils 0.3 0.0 - 0.7 10e9/L    Absolute Basophils 0.1 0.0 - 0.2 10e9/L    Diff Method Automated Method    Basic metabolic panel   Result Value Ref Range    Sodium 140 133 - 144 mmol/L    Potassium 4.0 3.4 - 5.3 mmol/L    Chloride 108 94 - 109 mmol/L    Carbon Dioxide 27 20 - 32 mmol/L    Anion Gap 5 3 - 14 mmol/L    Glucose 88 70 - 99 mg/dL    Urea Nitrogen 12 7 - 30 mg/dL    Creatinine 0.87 0.66 - 1.25 mg/dL    GFR Estimate 87 >60 mL/min/[1.73_m2]    GFR Estimate If Black >90 >60 mL/min/[1.73_m2]    Calcium 9.0 8.5 - 10.1 mg/dL   Hepatic panel (Albumin, ALT, AST, Bili, Alk Phos, TP)   Result Value Ref Range    Bilirubin Direct 0.2 0.0 - 0.2 mg/dL    Bilirubin Total 0.7 0.2 - 1.3 mg/dL    Albumin 3.7 3.4 - 5.0 g/dL    Protein Total 7.0 6.8 - 8.8 g/dL    Alkaline Phosphatase 80 40 - 150 U/L    ALT 39 0 - 70 U/L    AST 35 0 - 45 U/L   **TSH with free T4 reflex FUTURE anytime   Result Value Ref Range    TSH  1.43 0.40 - 4.00 mU/L   Lipid panel reflex to direct LDL Non-fasting   Result Value Ref Range    Cholesterol 121 <200 mg/dL    Triglycerides 50 <150 mg/dL    HDL Cholesterol 60 >39 mg/dL    LDL Cholesterol Calculated 51 <100 mg/dL    Non HDL Cholesterol 61 <130 mg/dL       If you have any questions please call the clinic at [phone]    Sincerely,    Collin Salazar MD  [facility]

## 2019-09-16 NOTE — PROGRESS NOTES
Subjective     Collin Frank is a 69 year old male who presents to clinic today for the following health issues:    HPI     Establish Care    ED/UC Followup:    Facility:  [facility]  Date of visit: 9/15/19  Reason for visit: Atypical Chest Pain  Current Status: Better     10 blocks walking    Felt weak and sweating ( when to the ER)  No asthma    Stress test nomral in February  Warfarin ( )         Patient Active Problem List   Diagnosis     Hyperhomocysteinemia (H)     Acute venous embolism and thrombosis of deep vessels of proximal lower extremity (H)     CARDIOVASCULAR SCREENING; LDL GOAL LESS THAN 130     Factor 5 Leiden mutation, heterozygous (H)     May-Thurner syndrome     Long term current use of anticoagulant     Vitamin D deficiency     Renal stones     Hepatic hemangioma     FH: prostate cancer     Long-term (current) use of anticoagulants [Z79.01]     Pulmonary embolism with infarction (HCC) [I26.99]     Post-traumatic osteoarthritis of right knee     Benign non-nodular prostatic hyperplasia without lower urinary tract symptoms     Primary osteoarthritis of left knee     Benign essential hypertension     Primary osteoarthritis of both knees     Past Surgical History:   Procedure Laterality Date     COLONOSCOPY  08    Normal. Repeat in 10 years     COLONOSCOPY WITH CO2 INSUFFLATION N/A 2018    Procedure: COLONOSCOPY WITH CO2 INSUFFLATION;  COLON SCREEN/ ANNMARIEELMARI;  Surgeon: Jan Flores MD;  Location: [facility] OR     rt knee ORIF      [facility]       Social History     Tobacco Use     Smoking status: Former Smoker     Types: Cigarettes     Last attempt to quit: 2011     Years since quittin.6     Smokeless tobacco: Never Used   Substance Use Topics     Alcohol use: No     Family History   Problem Relation Age of Onset     Alzheimer Disease Mother      Heart Disease Father      Prostate Cancer Father      Cancer Brother 65        pancreatic        Prostate Cancer  Brother          Current Outpatient Medications   Medication Sig Dispense Refill     cholecalciferol (VITAMIN D) 1000 UNIT tablet Take 1 tablet (1,000 Units) by mouth daily 100 tablet 3     losartan (COZAAR) 50 MG tablet Take 1 tablet (50 mg) by mouth daily 90 tablet 1     simvastatin (ZOCOR) 20 MG tablet Take 1 tablet (20 mg) by mouth At Bedtime 90 tablet 1     warfarin (JANTOVEN) 5 MG tablet TAKE 1 TABLET BY MOUTH     EVERY  tablet 3     No Known Allergies  Recent Labs   Lab Test 09/16/19  1052 02/04/19  0148 12/12/18  0919  12/01/15  1056  10/16/12  1038 11/28/11  1051   A1C  --   --   --   --   --   --  5.5 5.9   LDL 51  --  132*  --  108*   < > 141* 133*   HDL 60  --  48  --  53   < > 47 31*   TRIG 50  --  106  --  122   < > 67 110   ALT 39  --  36  --   --   --  33 61   CR 0.87 0.85 0.95   < > 0.84   < > 0.66 0.74   GFRESTIMATED 87 88 79   < > >90  Non  GFR Calc     < > >90 >90   GFRESTBLACK >90 >90 >90   < > >90  African American GFR Calc     < > >90 >90   POTASSIUM 4.0 4.2 4.0   < > 4.4   < > 4.3 4.3   TSH 1.43  --   --   --   --   --   --  1.90    < > = values in this interval not displayed.      BP Readings from Last 3 Encounters:   09/16/19 129/75   09/15/19 (!) 141/82   07/02/19 152/89    Wt Readings from Last 3 Encounters:   09/16/19 89.8 kg (198 lb)   09/15/19 95.3 kg (210 lb)   07/02/19 88.5 kg (195 lb)                      Reviewed and updated as needed this visit by Provider         Review of Systems   ROS COMP: Constitutional, HEENT, cardiovascular, pulmonary, gi and gu systems are negative, except as otherwise noted.      Objective    /75 (BP Location: Right arm, Patient Position: Chair, Cuff Size: Adult Regular)   Pulse 55   Temp 97.9  F (36.6  C) (Oral)   Wt 89.8 kg (198 lb)   SpO2 97%   BMI 30.11 kg/m    Body mass index is 30.11 kg/m .  Physical Exam   GENERAL: healthy, alert and no distress  EYES: Eyes grossly normal to inspection, PERRL and conjunctivae and  sclerae normal  HENT: ear canals and TM's normal, nose and mouth without ulcers or lesions  NECK: no adenopathy, no asymmetry, masses, or scars and thyroid normal to palpation  RESP: lungs clear to auscultation - no rales, rhonchi or wheezes  CV: regular rate and rhythm, normal S1 S2, no S3 or S4, no murmur, click or rub, no peripheral edema and peripheral pulses strong  ABDOMEN: soft, nontender, no hepatosplenomegaly, no masses and bowel sounds normal  MS: no gross musculoskeletal defects noted, no edema  SKIN: no suspicious lesions or rashes  NEURO: Normal strength and tone, mentation intact and speech normal  BACK: no CVA tenderness, no paralumbar tenderness  PSYCH: mentation appears normal, affect normal/bright  LYMPH: no cervical, supraclavicular, axillary, or inguinal adenopathy    Diagnostic Test Results:  Labs reviewed in Epic  Results for orders placed or performed in visit on 09/16/19   CBC with platelets and differential   Result Value Ref Range    WBC 5.4 4.0 - 11.0 10e9/L    RBC Count 4.71 4.4 - 5.9 10e12/L    Hemoglobin 15.1 13.3 - 17.7 g/dL    Hematocrit 44.9 40.0 - 53.0 %    MCV 95 78 - 100 fl    MCH 32.1 26.5 - 33.0 pg    MCHC 33.6 31.5 - 36.5 g/dL    RDW 13.3 10.0 - 15.0 %    Platelet Count 235 150 - 450 10e9/L    % Neutrophils 54.3 %    % Lymphocytes 27.7 %    % Monocytes 11.5 %    % Eosinophils 5.2 %    % Basophils 1.3 %    Absolute Neutrophil 2.9 1.6 - 8.3 10e9/L    Absolute Lymphocytes 1.5 0.8 - 5.3 10e9/L    Absolute Monocytes 0.6 0.0 - 1.3 10e9/L    Absolute Eosinophils 0.3 0.0 - 0.7 10e9/L    Absolute Basophils 0.1 0.0 - 0.2 10e9/L    Diff Method Automated Method    Basic metabolic panel   Result Value Ref Range    Sodium 140 133 - 144 mmol/L    Potassium 4.0 3.4 - 5.3 mmol/L    Chloride 108 94 - 109 mmol/L    Carbon Dioxide 27 20 - 32 mmol/L    Anion Gap 5 3 - 14 mmol/L    Glucose 88 70 - 99 mg/dL    Urea Nitrogen 12 7 - 30 mg/dL    Creatinine 0.87 0.66 - 1.25 mg/dL    GFR Estimate 87 >60  "mL/min/[1.73_m2]    GFR Estimate If Black >90 >60 mL/min/[1.73_m2]    Calcium 9.0 8.5 - 10.1 mg/dL   Hepatic panel (Albumin, ALT, AST, Bili, Alk Phos, TP)   Result Value Ref Range    Bilirubin Direct 0.2 0.0 - 0.2 mg/dL    Bilirubin Total 0.7 0.2 - 1.3 mg/dL    Albumin 3.7 3.4 - 5.0 g/dL    Protein Total 7.0 6.8 - 8.8 g/dL    Alkaline Phosphatase 80 40 - 150 U/L    ALT 39 0 - 70 U/L    AST 35 0 - 45 U/L   **TSH with free T4 reflex FUTURE anytime   Result Value Ref Range    TSH 1.43 0.40 - 4.00 mU/L   Lipid panel reflex to direct LDL Non-fasting   Result Value Ref Range    Cholesterol 121 <200 mg/dL    Triglycerides 50 <150 mg/dL    HDL Cholesterol 60 >39 mg/dL    LDL Cholesterol Calculated 51 <100 mg/dL    Non HDL Cholesterol 61 <130 mg/dL           Assessment & Plan       ICD-10-CM    1. Atypical chest pain R07.89 CBC with platelets and differential   2. CARDIOVASCULAR SCREENING; LDL GOAL LESS THAN 130 Z13.6 Lipid panel reflex to direct LDL Non-fasting   3. Benign essential hypertension I10 Basic metabolic panel     Hepatic panel (Albumin, ALT, AST, Bili, Alk Phos, TP)   4. Pulmonary embolism with infarction (HCC) [I26.99] I26.99 INR CLINIC REFERRAL   5. Rapid weight gain R63.5 **TSH with free T4 reflex FUTURE anytime        BMI:   Estimated body mass index is 30.11 kg/m  as calculated from the following:    Height as of 9/15/19: 1.727 m (5' 8\").    Weight as of this encounter: 89.8 kg (198 lb).   Weight management plan: Discussed healthy diet and exercise guidelines        Regular exercise    No follow-ups on file.    Collin Salazar MD  [facility]"

## 2019-09-19 ENCOUNTER — ANTICOAGULATION THERAPY VISIT (OUTPATIENT)
Dept: NURSING | Facility: CLINIC | Age: 69
End: 2019-09-19
Payer: COMMERCIAL

## 2019-09-19 DIAGNOSIS — I26.99 PULMONARY EMBOLISM WITH INFARCTION (H): ICD-10-CM

## 2019-09-19 DIAGNOSIS — D68.51 FACTOR 5 LEIDEN MUTATION, HETEROZYGOUS (H): ICD-10-CM

## 2019-09-19 DIAGNOSIS — I82.4Y9 ACUTE VENOUS EMBOLISM AND THROMBOSIS OF DEEP VESSELS OF PROXIMAL LOWER EXTREMITY, UNSPECIFIED LATERALITY (H): ICD-10-CM

## 2019-09-19 LAB — INR POINT OF CARE: 3.8 (ref 0.86–1.14)

## 2019-09-19 PROCEDURE — 85610 PROTHROMBIN TIME: CPT | Mod: QW

## 2019-09-19 PROCEDURE — 36416 COLLJ CAPILLARY BLOOD SPEC: CPT

## 2019-09-19 PROCEDURE — 99207 ZZC NO CHARGE NURSE ONLY: CPT

## 2019-09-19 NOTE — PROGRESS NOTES
ANTICOAGULATION FOLLOW-UP CLINIC VISIT    Patient Name:  Collin Frank  Date:  9/19/2019  Contact Type:  Face to Face    SUBJECTIVE: 2 wk zi  Patient Findings     Positives:   Emergency department visit (chest pain / sob / r/o cardiac), Bruising (left antecubital bruise from IV / blood draw at hospital)    Comments:   Assessment negative for symptoms of bleeding or clotting this visit        Clinical Outcomes     Negatives:   Major bleeding event, Thromboembolic event, Anticoagulation-related hospital admission, Anticoagulation-related ED visit, Anticoagulation-related fatality    Comments:   Assessment negative for symptoms of bleeding or clotting this visit           OBJECTIVE    INR Protime   Date Value Ref Range Status   09/19/2019 3.8 (A) 0.86 - 1.14 Final       ASSESSMENT / PLAN  INR assessment SUPRA    Recheck INR In: 2 WEEKS    INR Location Clinic      Anticoagulation Summary  As of 9/19/2019    INR goal:   2.0-3.0   TTR:   69.2 % (3.5 y)   INR used for dosing:   3.8! (9/19/2019)   Warfarin maintenance plan:   7.5 mg (5 mg x 1.5) every Mon; 5 mg (5 mg x 1) all other days   Full warfarin instructions:   9/19: Hold; Otherwise 7.5 mg every Mon; 5 mg all other days   Weekly warfarin total:   37.5 mg   Plan last modified:   Minal Carrion, RN (8/22/2019)   Next INR check:   10/3/2019   Target end date:   Indefinite    Indications    Pulmonary embolism with infarction (HCC) [I26.99] [I26.99]  Factor 5 Leiden mutation  heterozygous (H) [D68.51]  Acute venous embolism and thrombosis of deep vessels of proximal lower extremity (H) [I82.4Y9]             Anticoagulation Episode Summary     INR check location:       Preferred lab:       Send INR reminders to:   [facility]    Comments:         Anticoagulation Care Providers     Provider Role Specialty Phone number    Satish Crisostomo MD  [facility] [phone]            See the Encounter Report to view Anticoagulation Flowsheet and Dosing Calendar  (Go to Encounters tab in chart review, and find the Anticoagulation Therapy Visit)    Dosage adjustment made based on physician directed care plan.    Minal Carrion RN

## 2019-10-03 ENCOUNTER — ANTICOAGULATION THERAPY VISIT (OUTPATIENT)
Dept: NURSING | Facility: CLINIC | Age: 69
End: 2019-10-03
Payer: COMMERCIAL

## 2019-10-03 DIAGNOSIS — D68.51 FACTOR 5 LEIDEN MUTATION, HETEROZYGOUS (H): ICD-10-CM

## 2019-10-03 DIAGNOSIS — I82.4Y9 ACUTE VENOUS EMBOLISM AND THROMBOSIS OF DEEP VESSELS OF PROXIMAL LOWER EXTREMITY, UNSPECIFIED LATERALITY (H): ICD-10-CM

## 2019-10-03 DIAGNOSIS — I26.99 PULMONARY EMBOLISM WITH INFARCTION (H): ICD-10-CM

## 2019-10-03 DIAGNOSIS — Z23 NEED FOR PROPHYLACTIC VACCINATION AND INOCULATION AGAINST INFLUENZA: Primary | ICD-10-CM

## 2019-10-03 LAB — INR POINT OF CARE: 2.7 (ref 0.86–1.14)

## 2019-10-03 PROCEDURE — 85610 PROTHROMBIN TIME: CPT | Mod: QW

## 2019-10-03 PROCEDURE — 36416 COLLJ CAPILLARY BLOOD SPEC: CPT

## 2019-10-03 PROCEDURE — 90662 IIV NO PRSV INCREASED AG IM: CPT

## 2019-10-03 PROCEDURE — G0008 ADMIN INFLUENZA VIRUS VAC: HCPCS

## 2019-10-03 PROCEDURE — 99207 ZZC NO CHARGE NURSE ONLY: CPT

## 2019-10-03 NOTE — PROGRESS NOTES
ANTICOAGULATION FOLLOW-UP CLINIC VISIT    Patient Name:  Collin Frank  Date:  10/3/2019  Contact Type:  Face to Face    SUBJECTIVE: same plan of care.  Patient Findings     Comments:   Assessment negative for symptoms of bleeding or clotting this visit        Clinical Outcomes     Negatives:   Major bleeding event, Thromboembolic event, Anticoagulation-related hospital admission, Anticoagulation-related ED visit, Anticoagulation-related fatality    Comments:   Assessment negative for symptoms of bleeding or clotting this visit           OBJECTIVE    INR Protime   Date Value Ref Range Status   10/03/2019 2.7 (A) 0.86 - 1.14 Final       ASSESSMENT / PLAN  INR assessment THER    Recheck INR In: 4 WEEKS    INR Location Clinic      Anticoagulation Summary  As of 10/3/2019    INR goal:   2.0-3.0   TTR:   68.7 % (3.6 y)   INR used for dosin.7 (10/3/2019)   Warfarin maintenance plan:   7.5 mg (5 mg x 1.5) every Mon; 5 mg (5 mg x 1) all other days   Full warfarin instructions:   7.5 mg every Mon; 5 mg all other days   Weekly warfarin total:   37.5 mg   No change documented:   Minal Carrion RN   Plan last modified:   Minal Carrion RN (2019)   Next INR check:   2019   Target end date:   Indefinite    Indications    Pulmonary embolism with infarction (HCC) [I26.99] [I26.99]  Factor 5 Leiden mutation  heterozygous (H) [D68.51]  Acute venous embolism and thrombosis of deep vessels of proximal lower extremity (H) [I82.4Y9]             Anticoagulation Episode Summary     INR check location:       Preferred lab:       Send INR reminders to:   [facility]    Comments:         Anticoagulation Care Providers     Provider Role Specialty Phone number    Collin Salazar MD  Internal Medicine [phone]            See the Encounter Report to view Anticoagulation Flowsheet and Dosing Calendar (Go to Encounters tab in chart review, and find the Anticoagulation Therapy Visit)    Dosage adjustment made  based on physician directed care plan.    Minal Carrion RN

## 2019-11-04 ENCOUNTER — ANTICOAGULATION THERAPY VISIT (OUTPATIENT)
Dept: NURSING | Facility: CLINIC | Age: 69
End: 2019-11-04
Payer: COMMERCIAL

## 2019-11-04 DIAGNOSIS — D68.51 FACTOR 5 LEIDEN MUTATION, HETEROZYGOUS (H): ICD-10-CM

## 2019-11-04 DIAGNOSIS — I26.99 PULMONARY EMBOLISM WITH INFARCTION (H): ICD-10-CM

## 2019-11-04 DIAGNOSIS — I82.4Y9 ACUTE VENOUS EMBOLISM AND THROMBOSIS OF DEEP VESSELS OF PROXIMAL LOWER EXTREMITY, UNSPECIFIED LATERALITY (H): ICD-10-CM

## 2019-11-04 LAB — INR POINT OF CARE: 2.6 (ref 0.86–1.14)

## 2019-11-04 PROCEDURE — 85610 PROTHROMBIN TIME: CPT | Mod: QW

## 2019-11-04 PROCEDURE — 36416 COLLJ CAPILLARY BLOOD SPEC: CPT

## 2019-11-04 PROCEDURE — 99207 ZZC NO CHARGE NURSE ONLY: CPT

## 2019-11-04 NOTE — PROGRESS NOTES
ANTICOAGULATION FOLLOW-UP CLINIC VISIT    Patient Name:  Collin Frank  Date:  2019  Contact Type:  Face to Face    SUBJECTIVE: same plan of care  Patient Findings     Comments:   Assessment negative for symptoms of concern this visit        Clinical Outcomes     Negatives:   Major bleeding event, Thromboembolic event, Anticoagulation-related hospital admission, Anticoagulation-related ED visit, Anticoagulation-related fatality    Comments:   Assessment negative for symptoms of concern this visit           OBJECTIVE    INR Protime   Date Value Ref Range Status   2019 2.6 (A) 0.86 - 1.14 Final       ASSESSMENT / PLAN  INR assessment THER    Recheck INR In: 4 WEEKS    INR Location Clinic      Anticoagulation Summary  As of 2019    INR goal:   2.0-3.0   TTR:   69.5 % (3.7 y)   INR used for dosin.6 (2019)   Warfarin maintenance plan:   7.5 mg (5 mg x 1.5) every Mon; 5 mg (5 mg x 1) all other days   Full warfarin instructions:   7.5 mg every Mon; 5 mg all other days   Weekly warfarin total:   37.5 mg   No change documented:   Minal Carrion RN   Plan last modified:   Minal Carrion RN (2019)   Next INR check:   2019   Target end date:   Indefinite    Indications    Pulmonary embolism with infarction (HCC) [I26.99] [I26.99]  Factor 5 Leiden mutation  heterozygous (H) [D68.51]  Acute venous embolism and thrombosis of deep vessels of proximal lower extremity (H) [I82.4Y9]             Anticoagulation Episode Summary     INR check location:       Preferred lab:       Send INR reminders to:   [facility]    Comments:         Anticoagulation Care Providers     Provider Role Specialty Phone number    Collin Salazar MD  Internal Medicine [phone]            See the Encounter Report to view Anticoagulation Flowsheet and Dosing Calendar (Go to Encounters tab in chart review, and find the Anticoagulation Therapy Visit)    Dosage adjustment made based on physician directed  care plan.    Minal Carrion RN

## 2019-11-25 ENCOUNTER — TELEPHONE (OUTPATIENT)
Dept: FAMILY MEDICINE | Facility: CLINIC | Age: 69
End: 2019-11-25
Payer: COMMERCIAL

## 2019-11-25 ENCOUNTER — NURSE TRIAGE (OUTPATIENT)
Dept: NURSING | Facility: CLINIC | Age: 69
End: 2019-11-25

## 2019-11-25 DIAGNOSIS — Z87.891 PERSONAL HISTORY OF TOBACCO USE: Primary | ICD-10-CM

## 2019-11-25 PROCEDURE — G0296 VISIT TO DETERM LDCT ELIG: HCPCS | Performed by: INTERNAL MEDICINE

## 2019-11-25 NOTE — TELEPHONE ENCOUNTER
Last screening was 12/14/2018 with recommendation for annual screening.    Routing to PCP to order.      Lulu Myers RN  [facility]

## 2019-11-25 NOTE — TELEPHONE ENCOUNTER
Clinic Action Needed:Yes, Please call patient [phone] (home)   Reason for Call:Patient calling stating he received a lung cancer screening reminder by mail for [place]. Patient had last screening 12/14/19 see [facility].  Stating letter is stating to have his primary MD order.    Alma Hammonds RN  [facility]

## 2019-11-26 ENCOUNTER — OFFICE VISIT (OUTPATIENT)
Dept: FAMILY MEDICINE | Facility: CLINIC | Age: 69
End: 2019-11-26
Payer: COMMERCIAL

## 2019-11-26 VITALS
OXYGEN SATURATION: 95 % | HEART RATE: 61 BPM | WEIGHT: 206.6 LBS | DIASTOLIC BLOOD PRESSURE: 80 MMHG | TEMPERATURE: 98 F | BODY MASS INDEX: 31.41 KG/M2 | SYSTOLIC BLOOD PRESSURE: 138 MMHG

## 2019-11-26 DIAGNOSIS — I10 ESSENTIAL HYPERTENSION: ICD-10-CM

## 2019-11-26 DIAGNOSIS — Z87.891 HISTORY OF NICOTINE DEPENDENCE: ICD-10-CM

## 2019-11-26 DIAGNOSIS — R63.5 RAPID WEIGHT GAIN: ICD-10-CM

## 2019-11-26 DIAGNOSIS — R07.89 ATYPICAL CHEST PAIN: Primary | ICD-10-CM

## 2019-11-26 PROCEDURE — 99214 OFFICE O/P EST MOD 30 MIN: CPT | Performed by: FAMILY MEDICINE

## 2019-11-26 NOTE — PROGRESS NOTES
Subjective     Collin Frank is a 69 year old male who presents to clinic today for the following health issues:    HPI     Blood pressure follow up  Has a home monitor   He felt some chest tightness in the chest while reading   He did some stretches at did some stretches   It felt sore   It lasted for 2 days   He does not know what his pressure was     No heartburn     No radiation into the neck, arm or chest   Or back     bp gradually went down     Denies salty food intake      ROS: 10 point ROS neg other than the symptoms noted above in the HPI.    Past Medical History:   Diagnosis Date     Antiplatelet or antithrombotic long-term use     blood clot in leg     Long term current use of anticoagulant 10/16/2012       Past Surgical History:   Procedure Laterality Date     COLONOSCOPY  08    Normal. Repeat in 10 years     COLONOSCOPY WITH CO2 INSUFFLATION N/A 2018    Procedure: COLONOSCOPY WITH CO2 INSUFFLATION;  COLON SCREEN/ ENGELMANN;  Surgeon: Jan Flores MD;  Location: [facility] OR     rt knee ORIF      [facility]       Family History   Problem Relation Age of Onset     Alzheimer Disease Mother      Heart Disease Father      Prostate Cancer Father      Cancer Brother 65        pancreatic        Prostate Cancer Brother        Social History     Tobacco Use     Smoking status: Former Smoker     Types: Cigarettes     Last attempt to quit: 2011     Years since quittin.8     Smokeless tobacco: Never Used   Substance Use Topics     Alcohol use: No           O: /80 (BP Location: Right arm, Patient Position: Sitting, Cuff Size: Adult Large)   Pulse 61   Temp 98  F (36.7  C) (Oral)   Wt 93.7 kg (206 lb 9.6 oz)   SpO2 95%   BMI 31.41 kg/m      Wt Readings from Last 4 Encounters:   19 93.7 kg (206 lb 9.6 oz)   19 89.8 kg (198 lb)   09/15/19 95.3 kg (210 lb)   19 88.5 kg (195 lb)     Chest wall normal to inspection and palpation. Good excursion bilaterally.  Lungs clear to auscultation. Good air movement bilaterally without rales, wheezes, or rhonchi.   Regular rate and  rhythm. S1 and S2 normal, no murmurs, clicks, gallops or rubs. No edema or JVD.    DVT  in 2011   DVT at that time pulmonary embolism listed int he chart , no documentation of an event and per patient he never had one   Still on blood thinner     Factor v leiden       Reviewed and updated as needed this visit by Provider          ICD-10-CM    1. Atypical chest pain R07.89    2. Rapid weight gain R63.5    3. Essential hypertension I10      He is due for ct of his lung   This will be scheduled   bp stable now.  Discussed short rises in bp   If asymptomatic no need to return sooner  Than 3 months   Follow up about 3 months and willneed med refills at that time     I do not believe patient every had a pulmonary embolism based on chart review.   Next doctor may want to verify, but patient denies having one

## 2019-11-26 NOTE — PATIENT INSTRUCTIONS

## 2019-11-26 NOTE — TELEPHONE ENCOUNTER
Lung Cancer Screening Shared Decision Making Visit     Collin Frank is eligible for lung cancer screening on the basis of the information provided in my signed lung cancer screening order.     I have discussed with patient the risks and benefits of screening for lung cancer with low-dose CT.     The risks include:  radiation exposure: one low dose chest CT has as much ionizing radiation as about 15 chest x-rays or 6 months of background radiation living in [place]    false positives: 96% of positive findings/nodules are NOT cancer, but some might still require additional diagnostic evaluation, including biopsy  over-diagnosis: some slow growing cancers that might never have been clinically significant will be detected and treated unnecessarily     The benefit of early detection of lung cancer is contingent upon adherence to annual screening or more frequent follow up if indicated.     Furthermore, reaping the benefits of screening requires Collin Frank to be willing and physically able to undergo diagnostic procedures, if indicated. Although no specific guide is available for determining severity of comorbidities, it is reasonable to withhold screening in patients who have greater mortality risk from other diseases.     We did discuss that the only way to prevent lung cancer is to not smoke. Smoking cessation assistance was offered.    I did offer risk estimation using a calculator such as this one:    ShouldIScreen

## 2019-12-05 ENCOUNTER — ANTICOAGULATION THERAPY VISIT (OUTPATIENT)
Dept: NURSING | Facility: CLINIC | Age: 69
End: 2019-12-05
Payer: COMMERCIAL

## 2019-12-05 DIAGNOSIS — I82.4Y9 ACUTE VENOUS EMBOLISM AND THROMBOSIS OF DEEP VESSELS OF PROXIMAL LOWER EXTREMITY, UNSPECIFIED LATERALITY (H): ICD-10-CM

## 2019-12-05 DIAGNOSIS — D68.51 FACTOR 5 LEIDEN MUTATION, HETEROZYGOUS (H): ICD-10-CM

## 2019-12-05 DIAGNOSIS — I26.99 PULMONARY EMBOLISM WITH INFARCTION (H): ICD-10-CM

## 2019-12-05 LAB — INR POINT OF CARE: 2.6 (ref 0.86–1.14)

## 2019-12-05 PROCEDURE — 99207 ZZC NO CHARGE NURSE ONLY: CPT

## 2019-12-05 PROCEDURE — 36416 COLLJ CAPILLARY BLOOD SPEC: CPT

## 2019-12-05 PROCEDURE — 85610 PROTHROMBIN TIME: CPT | Mod: QW

## 2019-12-05 NOTE — PROGRESS NOTES
ANTICOAGULATION FOLLOW-UP CLINIC VISIT    Patient Name:  Collin Frank  Date:  2019  Contact Type:  Face to Face    SUBJECTIVE: same plan of care  Patient Findings     Comments:   Assessment negative for symptoms of concern today        Clinical Outcomes     Negatives:   Major bleeding event, Thromboembolic event, Anticoagulation-related hospital admission, Anticoagulation-related ED visit, Anticoagulation-related fatality    Comments:   Assessment negative for symptoms of concern today           OBJECTIVE    INR Protime   Date Value Ref Range Status   2019 2.6 (A) 0.86 - 1.14 Final       ASSESSMENT / PLAN  INR assessment THER    Recheck INR In: 4 WEEKS    INR Location Clinic      Anticoagulation Summary  As of 2019    INR goal:   2.0-3.0   TTR:   70.7 % (1 y)   INR used for dosin.6 (2019)   Warfarin maintenance plan:   7.5 mg (5 mg x 1.5) every Mon; 5 mg (5 mg x 1) all other days   Full warfarin instructions:   7.5 mg every Mon; 5 mg all other days   Weekly warfarin total:   37.5 mg   No change documented:   Minal Carrion RN   Plan last modified:   Minal Carrion RN (2019)   Next INR check:   2020   Priority:   Maintenance   Target end date:   Indefinite    Indications    Factor 5 Leiden mutation  heterozygous (H) [D68.51]  Acute venous embolism and thrombosis of deep vessels of proximal lower extremity (H) [I82.4Y9]             Anticoagulation Episode Summary     INR check location:       Preferred lab:       Send INR reminders to:   [facility]    Comments:         Anticoagulation Care Providers     Provider Role Specialty Phone number    Collin Salazar MD  Internal Medicine [phone]            See the Encounter Report to view Anticoagulation Flowsheet and Dosing Calendar (Go to Encounters tab in chart review, and find the Anticoagulation Therapy Visit)    Dosage adjustment made based on physician directed care plan.    Minal Carrion RN

## 2019-12-07 ENCOUNTER — TELEPHONE (OUTPATIENT)
Dept: FAMILY MEDICINE | Facility: CLINIC | Age: 69
End: 2019-12-07

## 2019-12-07 DIAGNOSIS — I10 BENIGN ESSENTIAL HYPERTENSION: ICD-10-CM

## 2019-12-07 RX ORDER — LOSARTAN POTASSIUM 50 MG/1
50 TABLET ORAL DAILY
Qty: 30 TABLET | Refills: 0 | Status: SHIPPED | OUTPATIENT
Start: 2019-12-07 | End: 2019-12-09

## 2019-12-08 NOTE — TELEPHONE ENCOUNTER
I was notified by Liliam Palencia RN ([facility]) that Mr. Frank has ran out of Losartan.    The patient is requesting limited supply Rx for Losartan to be sent to [facility].  However, this pharmacy closed at 6 pm today.    I spoke with Mr. Frank and Rx was then sent to [facility] at [facility]. .

## 2019-12-08 NOTE — TELEPHONE ENCOUNTER
Patient needs a renewed prescription for Cozaar.  Saw his PCP on 9-16-19.  He is completely out of his medication.  Uses mail [facility] pharmacy, but will need some to tide him over until he can call the clinic on Monday 12-9-19.     Paged Dr Pacheco Vocal to [phone] via .  He will send a short term supply to the [facility] at [address] [place] [address].  Phone number [phone] per patient.    Patient notified, and he will follow up with clinic on Monday.    Routed to CP Team 3    Liliam Palencia RN  [facility]

## 2019-12-09 ENCOUNTER — TELEPHONE (OUTPATIENT)
Dept: FAMILY MEDICINE | Facility: CLINIC | Age: 69
End: 2019-12-09

## 2019-12-09 DIAGNOSIS — E78.5 HYPERLIPIDEMIA LDL GOAL <130: ICD-10-CM

## 2019-12-09 DIAGNOSIS — D68.51 FACTOR 5 LEIDEN MUTATION, HETEROZYGOUS (H): ICD-10-CM

## 2019-12-09 DIAGNOSIS — I10 BENIGN ESSENTIAL HYPERTENSION: ICD-10-CM

## 2019-12-09 DIAGNOSIS — E72.11 HYPERHOMOCYSTEINEMIA (H): ICD-10-CM

## 2019-12-09 RX ORDER — SIMVASTATIN 20 MG
20 TABLET ORAL AT BEDTIME
Qty: 30 TABLET | Refills: 1 | Status: SHIPPED | OUTPATIENT
Start: 2019-12-09 | End: 2019-12-13

## 2019-12-09 RX ORDER — WARFARIN SODIUM 5 MG/1
TABLET ORAL
Qty: 100 TABLET | Refills: 0 | Status: SHIPPED | OUTPATIENT
Start: 2019-12-09 | End: 2019-12-13

## 2019-12-09 RX ORDER — LOSARTAN POTASSIUM 50 MG/1
50 TABLET ORAL DAILY
Qty: 30 TABLET | Refills: 0 | Status: SHIPPED | OUTPATIENT
Start: 2019-12-09 | End: 2019-12-13

## 2019-12-09 NOTE — TELEPHONE ENCOUNTER
Reason for Call:  Other call back    Detailed comments: Pt would like a call back to discuss his prescription renewal of all his medications.    Phone Number Patient can be reached at: Home number on file [phone] (home)    Best Time: Anytime    Can we leave a detailed message on this number? NO    Call taken on 12/9/2019 at 10:09 AM by Aleja Hazel

## 2019-12-09 NOTE — TELEPHONE ENCOUNTER
Called patient and he stated his mail order pharmacy informed him they cannot sent out his refills because they have not heard from us. Nurse informed the patient we have not received refill request from them. He has his annual exam Friday and is requesting a short supply to be sent to our pharmacy to  at his appointment. He stated he will discuss longer term refills for the mail order pharmacy at this appointment.     Routed to provider.     Barbara Benjamin RN

## 2019-12-13 ENCOUNTER — OFFICE VISIT (OUTPATIENT)
Dept: FAMILY MEDICINE | Facility: CLINIC | Age: 69
End: 2019-12-13
Payer: COMMERCIAL

## 2019-12-13 VITALS
SYSTOLIC BLOOD PRESSURE: 126 MMHG | OXYGEN SATURATION: 99 % | WEIGHT: 206 LBS | HEIGHT: 68 IN | BODY MASS INDEX: 31.22 KG/M2 | TEMPERATURE: 97.6 F | HEART RATE: 62 BPM | DIASTOLIC BLOOD PRESSURE: 78 MMHG

## 2019-12-13 DIAGNOSIS — E78.5 HYPERLIPIDEMIA LDL GOAL <130: ICD-10-CM

## 2019-12-13 DIAGNOSIS — Z00.00 ENCOUNTER FOR MEDICARE ANNUAL WELLNESS EXAM: Primary | ICD-10-CM

## 2019-12-13 DIAGNOSIS — E72.11 HYPERHOMOCYSTEINEMIA (H): ICD-10-CM

## 2019-12-13 DIAGNOSIS — D68.51 FACTOR 5 LEIDEN MUTATION, HETEROZYGOUS (H): ICD-10-CM

## 2019-12-13 DIAGNOSIS — I10 BENIGN ESSENTIAL HYPERTENSION: ICD-10-CM

## 2019-12-13 PROCEDURE — 99397 PER PM REEVAL EST PAT 65+ YR: CPT | Performed by: INTERNAL MEDICINE

## 2019-12-13 RX ORDER — LOSARTAN POTASSIUM 50 MG/1
50 TABLET ORAL DAILY
Qty: 90 TABLET | Refills: 3 | Status: SHIPPED | OUTPATIENT
Start: 2019-12-13 | End: 2020-01-05

## 2019-12-13 RX ORDER — SIMVASTATIN 20 MG
20 TABLET ORAL AT BEDTIME
Qty: 90 TABLET | Refills: 3 | Status: SHIPPED | OUTPATIENT
Start: 2019-12-13 | End: 2020-01-09

## 2019-12-13 RX ORDER — WARFARIN SODIUM 5 MG/1
TABLET ORAL
Qty: 100 TABLET | Refills: 0 | Status: SHIPPED | OUTPATIENT
Start: 2019-12-13 | End: 2020-01-09

## 2019-12-13 ASSESSMENT — ENCOUNTER SYMPTOMS
DYSURIA: 0
HEADACHES: 0
SHORTNESS OF BREATH: 0
CONSTIPATION: 0
MYALGIAS: 0
PARESTHESIAS: 0
HEARTBURN: 0
EYE PAIN: 0
SORE THROAT: 0
NERVOUS/ANXIOUS: 0
PALPITATIONS: 0
WEAKNESS: 0
DIZZINESS: 0
FEVER: 0
ARTHRALGIAS: 0
HEMATOCHEZIA: 0
HEMATURIA: 0
FREQUENCY: 0
NAUSEA: 0
JOINT SWELLING: 0
ABDOMINAL PAIN: 0
DIARRHEA: 0
CHILLS: 0
COUGH: 0

## 2019-12-13 ASSESSMENT — MIFFLIN-ST. JEOR: SCORE: 1664.1

## 2019-12-13 ASSESSMENT — ACTIVITIES OF DAILY LIVING (ADL): CURRENT_FUNCTION: NO ASSISTANCE NEEDED

## 2019-12-13 NOTE — PATIENT INSTRUCTIONS
Patient Education   Personalized Prevention Plan  You are due for the preventive services outlined below.  Your care team is available to assist you in scheduling these services.  If you have already completed any of these items, please share that information with your care team to update in your medical record.  Health Maintenance Due   Topic Date Due     INR CLINIC REFERRAL - yearly  12/01/2016     Annual Wellness Visit  12/12/2019     Lung Cancer Screening (CT Scan)  02/04/2020

## 2019-12-13 NOTE — PROGRESS NOTES
"SUBJECTIVE:   Collin Frank is a 69 year old male who presents for Preventive Visit.    Are you in the first 12 months of your Medicare coverage?  No    Healthy Habits:     In general, how would you rate your overall health?  Good    Frequency of exercise:  4-5 days/week    Duration of exercise:  Less than 15 minutes    Do you usually eat at least 4 servings of fruit and vegetables a day, include whole grains    & fiber and avoid regularly eating high fat or \"junk\" foods?  Yes    Medication side effects:  None    Ability to successfully perform activities of daily living:  No assistance needed    Home Safety:  No safety concerns identified    Hearing Impairment:  Difficulty following a conversation in a noisy restaurant or crowded room    In the past 6 months, have you been bothered by leaking of urine?  No    In general, how would you rate your overall mental or emotional health?  Excellent      PHQ-2 Total Score: 0    Additional concerns today:  No    Do you feel safe in your environment? Yes    Have you ever done Advance Care Planning? (For example, a Health Directive, POLST, or a discussion with a medical provider or your loved ones about your wishes): Yes, advance care planning is on file.      Fall risk  Fallen 2 or more times in the past year?: No  Any fall with injury in the past year?: No    Cognitive Screening   1) Repeat 3 items (Leader, Season, Table)    2) Clock draw: NORMAL  3) 3 item recall: Recalls 3 objects  Results: 3 items recalled: COGNITIVE IMPAIRMENT LESS LIKELY    Mini-CogTM Copyright MARYANN Baker. Licensed by the author for use in [facility]; reprinted with permission (davon@.[facility]). All rights reserved.      Do you have sleep apnea, excessive snoring or daytime drowsiness?: no    Reviewed and updated as needed this visit by clinical staff  Tobacco  Allergies  Meds  Med Hx  Surg Hx  Fam Hx  Soc Hx        Reviewed and updated as needed this visit by Provider        Social " History     Tobacco Use     Smoking status: Former Smoker     Types: Cigarettes     Last attempt to quit: 2011     Years since quittin.8     Smokeless tobacco: Never Used   Substance Use Topics     Alcohol use: No         Alcohol Use 2019   Prescreen: >3 drinks/day or >7 drinks/week? Not Applicable   Prescreen: >3 drinks/day or >7 drinks/week? -   No flowsheet data found.        Diastolic high 2 weeks check at home   Few times  No change in 2 weeks in regards to diet   Sometimes pain in the right side  Off and on      Current providers sharing in care for this patient include:   Patient Care Team:  Collin Salazar MD as PCP - General (Internal Medicine)  Satish Crisostomo MD as Assigned PCP    The following health maintenance items are reviewed in Epic and correct as of today:  Health Maintenance   Topic Date Due     OP ANNUAL INR REFERRAL  2016     MEDICARE ANNUAL WELLNESS VISIT  2019     LUNG CANCER SCREENING ANNUAL  2020     FALL RISK ASSESSMENT  2020     DTAP/TDAP/TD IMMUNIZATION (2 - Td) 10/16/2022     ADVANCE CARE PLANNING  2024     LIPID  2024     COLONOSCOPY  2028     HEPATITIS C SCREENING  Completed     PHQ-2  Completed     INFLUENZA VACCINE  Completed     PNEUMOCOCCAL IMMUNIZATION 65+ LOW/MEDIUM RISK  Completed     ZOSTER IMMUNIZATION  Completed     AORTIC ANEURYSM SCREENING (SYSTEM ASSIGNED)  Completed     IPV IMMUNIZATION  Aged Out     MENINGITIS IMMUNIZATION  Aged Out     Lab work is in process  Labs reviewed in EPIC  BP Readings from Last 3 Encounters:   19 (!) 143/86   19 138/80   19 129/75    Wt Readings from Last 3 Encounters:   19 93.4 kg (206 lb)   19 93.7 kg (206 lb 9.6 oz)   19 89.8 kg (198 lb)                  Patient Active Problem List   Diagnosis     Hyperhomocysteinemia (H)     Acute venous embolism and thrombosis of deep vessels of proximal lower extremity (H)     CARDIOVASCULAR SCREENING; LDL GOAL  LESS THAN 130     Factor 5 Leiden mutation, heterozygous (H)     May-Thurner syndrome     Long term current use of anticoagulant     Vitamin D deficiency     Renal stones     Hepatic hemangioma     FH: prostate cancer     Long-term (current) use of anticoagulants [Z79.01]     Pulmonary embolism with infarction (HCC) [I26.99]     Post-traumatic osteoarthritis of right knee     Benign non-nodular prostatic hyperplasia without lower urinary tract symptoms     Primary osteoarthritis of left knee     Benign essential hypertension     Primary osteoarthritis of both knees     Past Surgical History:   Procedure Laterality Date     COLONOSCOPY  08    Normal. Repeat in 10 years     COLONOSCOPY WITH CO2 INSUFFLATION N/A 2018    Procedure: COLONOSCOPY WITH CO2 INSUFFLATION;  COLON SCREEN/ ENGELMANN;  Surgeon: Jan Flores MD;  Location: [facility] OR     rt knee ORIF      [facility]       Social History     Tobacco Use     Smoking status: Former Smoker     Types: Cigarettes     Last attempt to quit: 2011     Years since quittin.8     Smokeless tobacco: Never Used   Substance Use Topics     Alcohol use: No     Family History   Problem Relation Age of Onset     Alzheimer Disease Mother      Heart Disease Father      Prostate Cancer Father      Cancer Brother 65        pancreatic        Prostate Cancer Brother          Current Outpatient Medications   Medication Sig Dispense Refill     cholecalciferol (VITAMIN D) 1000 UNIT tablet Take 1 tablet (1,000 Units) by mouth daily 100 tablet 3     losartan (COZAAR) 50 MG tablet Take 1 tablet (50 mg) by mouth daily 90 tablet 3     simvastatin (ZOCOR) 20 MG tablet Take 1 tablet (20 mg) by mouth At Bedtime 90 tablet 3     warfarin ANTICOAGULANT (JANTOVEN ANTICOAGULANT) 5 MG tablet TAKE 1 TABLET BY MOUTH     EVERY  tablet 0     No Known Allergies      Review of Systems   Constitutional: Negative for chills and fever.   HENT: Positive for hearing loss.  "Negative for congestion, ear pain and sore throat.    Eyes: Negative for pain and visual disturbance.   Respiratory: Negative for cough and shortness of breath.    Cardiovascular: Negative for chest pain, palpitations and peripheral edema.   Gastrointestinal: Negative for abdominal pain, constipation, diarrhea, heartburn, hematochezia and nausea.   Genitourinary: Negative for discharge, dysuria, frequency, genital sores, hematuria, impotence and urgency.   Musculoskeletal: Negative for arthralgias, joint swelling and myalgias.   Skin: Negative for rash.   Neurological: Negative for dizziness, weakness, headaches and paresthesias.   Psychiatric/Behavioral: Negative for mood changes. The patient is not nervous/anxious.      Constitutional, HEENT, cardiovascular, pulmonary, gi and gu systems are negative, except as otherwise noted.    OBJECTIVE:   BP (!) 143/86 (BP Location: Left arm, Patient Position: Chair, Cuff Size: Adult Regular)   Pulse 62   Temp 97.6  F (36.4  C) (Oral)   Ht 1.727 m (5' 8.01\")   Wt 93.4 kg (206 lb)   SpO2 99%   BMI 31.31 kg/m   Estimated body mass index is 31.31 kg/m  as calculated from the following:    Height as of this encounter: 1.727 m (5' 8.01\").    Weight as of this encounter: 93.4 kg (206 lb).  Physical Exam  GENERAL: healthy, alert and no distress  NECK: no adenopathy, no asymmetry, masses, or scars and thyroid normal to palpation  RESP: lungs clear to auscultation - no rales, rhonchi or wheezes  CV: regular rate and rhythm, normal S1 S2, no S3 or S4, no murmur, click or rub, no peripheral edema and peripheral pulses strong  ABDOMEN: soft, nontender, no hepatosplenomegaly, no masses and bowel sounds normal  MS: no gross musculoskeletal defects noted, no edema    Diagnostic Test Results:  Labs reviewed in Epic    ASSESSMENT / PLAN:       ICD-10-CM    1. Encounter for Medicare annual wellness exam Z00.00    2. Benign essential hypertension I10 losartan (COZAAR) 50 MG tablet   3. " "Hyperlipidemia LDL goal <130 E78.5 simvastatin (ZOCOR) 20 MG tablet   4. Hyperhomocysteinemia (H) E72.11 warfarin ANTICOAGULANT (JANTOVEN ANTICOAGULANT) 5 MG tablet   5. Factor 5 Leiden mutation, heterozygous (H) D68.51 warfarin ANTICOAGULANT (JANTOVEN ANTICOAGULANT) 5 MG tablet       COUNSELING:  Reviewed preventive health counseling, as reflected in patient instructions       Consider AAA screening for ages 65-75 and smoking history       Regular exercise       Healthy diet/nutrition       Vision screening       Hearing screening       Dental care       Fall risk prevention       Colon cancer screening    Estimated body mass index is 31.31 kg/m  as calculated from the following:    Height as of this encounter: 1.727 m (5' 8.01\").    Weight as of this encounter: 93.4 kg (206 lb).    Weight management plan: Discussed healthy diet and exercise guidelines     reports that he quit smoking about 8 years ago. His smoking use included cigarettes. He has never used smokeless tobacco.      Appropriate preventive services were discussed with this patient, including applicable screening as appropriate for cardiovascular disease, diabetes, osteopenia/osteoporosis, and glaucoma.  As appropriate for age/gender, discussed screening for colorectal cancer, prostate cancer, breast cancer, and cervical cancer. Checklist reviewing preventive services available has been given to the patient.    Reviewed patients plan of care and provided an AVS. The Basic Care Plan (routine screening as documented in Health Maintenance) for Collin meets the Care Plan requirement. This Care Plan has been established and reviewed with the Patient.    ICD-10-CM    1. Encounter for Medicare annual wellness exam Z00.00    2. Benign essential hypertension I10 losartan (COZAAR) 50 MG tablet   3. Hyperlipidemia LDL goal <130 E78.5 simvastatin (ZOCOR) 20 MG tablet   4. Hyperhomocysteinemia (H) E72.11 warfarin ANTICOAGULANT (JANTOVEN ANTICOAGULANT) 5 MG tablet "   5. Factor 5 Leiden mutation, heterozygous (H) D68.51 warfarin ANTICOAGULANT (JANTOVEN ANTICOAGULANT) 5 MG tablet       Counseling Resources:  ATP IV Guidelines  Pooled Cohorts Equation Calculator  Breast Cancer Risk Calculator  FRAX Risk Assessment  ICSI Preventive Guidelines  Dietary Guidelines for Americans, 2010  [employer]'s MyPlate  ASA Prophylaxis  Lung CA Screening    Collin Salazar MD  [facility]    Identified Health Risks:

## 2019-12-19 ENCOUNTER — TELEPHONE (OUTPATIENT)
Dept: FAMILY MEDICINE | Facility: CLINIC | Age: 69
End: 2019-12-19

## 2019-12-19 NOTE — TELEPHONE ENCOUNTER
Reason for Call:  Other / Meds mail order    Detailed comments: Patient called and stated he has received several calls from his [facility] mail order pharmacy stating that they have been trying to get in touch with Dr Salazar, however, they have not been able to contact  PCP.  Patient stated he does not know what it is about, but would appreciate his PCP getting in touch with them.    Phone Number Patient can be reached at: Home number on file [phone] (home)    Best Time: ASAP    Can we leave a detailed message on this number? NO    Call taken on 12/19/2019 at 1:45 PM by Georgiana Vargas

## 2019-12-19 NOTE — TELEPHONE ENCOUNTER
Called patient and he stated that after the first of the year [facility] are merging and that [facility] is calling him. He provided the number [phone] nurse attempted to call this number but it was medicare. He provided a different number [phone], nurse attempted that number and it was the automated refill line. Patient stated he has medication and is unsure what this is about. Nurse attempted to call [facility] and they stated there are no prescriptions on file for the patient and disconnected the call. No record of [facility] reaching out to provider. Patient did not want a call back he just wanted to inform us. Nurse closing encounter.    Barbara Benjamin RN

## 2019-12-30 ENCOUNTER — TELEPHONE (OUTPATIENT)
Dept: FAMILY MEDICINE | Facility: CLINIC | Age: 69
End: 2019-12-30

## 2019-12-30 NOTE — TELEPHONE ENCOUNTER
Reason for Call:  Other     Detailed comments: patient has a CT lung cancer screening on 1/8/20 he would like to know when he should have a follow up with PCP    Phone Number Patient can be reached at: Home number on file [phone] (home)    Best Time: any    Can we leave a detailed message on this number? YES    Call taken on 12/30/2019 at 7:59 AM by Katya Mcahado

## 2019-12-30 NOTE — TELEPHONE ENCOUNTER
If follow up is needed after the lung cancer screening CT he will be contacted by his PCP.   Kenyetta Salgado PA-C

## 2020-01-04 ENCOUNTER — APPOINTMENT (OUTPATIENT)
Dept: CT IMAGING | Facility: CLINIC | Age: 70
End: 2020-01-04
Attending: EMERGENCY MEDICINE
Payer: COMMERCIAL

## 2020-01-04 ENCOUNTER — HOSPITAL ENCOUNTER (EMERGENCY)
Facility: CLINIC | Age: 70
Discharge: HOME | End: 2020-01-04
Attending: EMERGENCY MEDICINE | Admitting: EMERGENCY MEDICINE
Payer: COMMERCIAL

## 2020-01-04 ENCOUNTER — APPOINTMENT (OUTPATIENT)
Dept: GENERAL RADIOLOGY | Facility: CLINIC | Age: 70
End: 2020-01-04
Attending: EMERGENCY MEDICINE
Payer: COMMERCIAL

## 2020-01-04 VITALS
DIASTOLIC BLOOD PRESSURE: 76 MMHG | TEMPERATURE: 98.2 F | RESPIRATION RATE: 7 BRPM | WEIGHT: 218.2 LBS | BODY MASS INDEX: 33.07 KG/M2 | HEIGHT: 68 IN | HEART RATE: 98 BPM | OXYGEN SATURATION: 96 % | SYSTOLIC BLOOD PRESSURE: 145 MMHG

## 2020-01-04 DIAGNOSIS — R31.9 HEMATURIA, UNSPECIFIED TYPE: ICD-10-CM

## 2020-01-04 DIAGNOSIS — R07.89 OTHER CHEST PAIN: ICD-10-CM

## 2020-01-04 DIAGNOSIS — R07.9 CHEST PAIN, UNSPECIFIED TYPE: ICD-10-CM

## 2020-01-04 DIAGNOSIS — I10 BENIGN ESSENTIAL HYPERTENSION: ICD-10-CM

## 2020-01-04 LAB
ABO + RH BLD: NORMAL
ABO + RH BLD: NORMAL
ALBUMIN SERPL-MCNC: 3.6 G/DL (ref 3.4–5)
ALBUMIN UR-MCNC: NEGATIVE MG/DL
ALP SERPL-CCNC: 93 U/L (ref 40–150)
ALT SERPL W P-5'-P-CCNC: 43 U/L (ref 0–70)
ANION GAP SERPL CALCULATED.3IONS-SCNC: 5 MMOL/L (ref 3–14)
APPEARANCE UR: CLEAR
APTT PPP: 33 SEC (ref 22–37)
AST SERPL W P-5'-P-CCNC: 29 U/L (ref 0–45)
BASOPHILS # BLD AUTO: 0.1 10E9/L (ref 0–0.2)
BASOPHILS NFR BLD AUTO: 1.2 %
BILIRUB SERPL-MCNC: 1.2 MG/DL (ref 0.2–1.3)
BILIRUB UR QL STRIP: NEGATIVE
BLD GP AB SCN SERPL QL: NORMAL
BLOOD BANK CMNT PATIENT-IMP: NORMAL
BUN SERPL-MCNC: 12 MG/DL (ref 7–30)
CALCIUM SERPL-MCNC: 8.8 MG/DL (ref 8.5–10.1)
CHLORIDE SERPL-SCNC: 108 MMOL/L (ref 94–109)
CO2 SERPL-SCNC: 25 MMOL/L (ref 20–32)
COLOR UR AUTO: ABNORMAL
CREAT BLD-MCNC: 0.9 MG/DL (ref 0.66–1.25)
CREAT SERPL-MCNC: 0.89 MG/DL (ref 0.66–1.25)
DIFFERENTIAL METHOD BLD: NORMAL
EOSINOPHIL # BLD AUTO: 0.1 10E9/L (ref 0–0.7)
EOSINOPHIL NFR BLD AUTO: 1.1 %
ERYTHROCYTE [DISTWIDTH] IN BLOOD BY AUTOMATED COUNT: 12.8 % (ref 10–15)
GFR SERPL CREATININE-BSD FRML MDRD: 83 ML/MIN/{1.73_M2}
GFR SERPL CREATININE-BSD FRML MDRD: 86 ML/MIN/{1.73_M2}
GLUCOSE SERPL-MCNC: 84 MG/DL (ref 70–99)
GLUCOSE UR STRIP-MCNC: NEGATIVE MG/DL
HCT VFR BLD AUTO: 45.7 % (ref 40–53)
HGB BLD-MCNC: 15.1 G/DL (ref 13.3–17.7)
HGB UR QL STRIP: ABNORMAL
IMM GRANULOCYTES # BLD: 0 10E9/L (ref 0–0.4)
IMM GRANULOCYTES NFR BLD: 0.2 %
INR PPP: 2.29 (ref 0.86–1.14)
INTERPRETATION ECG - MUSE: NORMAL
KETONES UR STRIP-MCNC: NEGATIVE MG/DL
LEUKOCYTE ESTERASE UR QL STRIP: NEGATIVE
LIPASE SERPL-CCNC: 67 U/L (ref 73–393)
LYMPHOCYTES # BLD AUTO: 1.6 10E9/L (ref 0.8–5.3)
LYMPHOCYTES NFR BLD AUTO: 24.6 %
MCH RBC QN AUTO: 31.7 PG (ref 26.5–33)
MCHC RBC AUTO-ENTMCNC: 33 G/DL (ref 31.5–36.5)
MCV RBC AUTO: 96 FL (ref 78–100)
MONOCYTES # BLD AUTO: 0.6 10E9/L (ref 0–1.3)
MONOCYTES NFR BLD AUTO: 9.3 %
NEUTROPHILS # BLD AUTO: 4.1 10E9/L (ref 1.6–8.3)
NEUTROPHILS NFR BLD AUTO: 63.6 %
NITRATE UR QL: NEGATIVE
NRBC # BLD AUTO: 0 10*3/UL
NRBC BLD AUTO-RTO: 0 /100
PH UR STRIP: 6 PH (ref 5–7)
PLATELET # BLD AUTO: 229 10E9/L (ref 150–450)
POTASSIUM SERPL-SCNC: 3.6 MMOL/L (ref 3.4–5.3)
PROT SERPL-MCNC: 6.9 G/DL (ref 6.8–8.8)
RBC # BLD AUTO: 4.77 10E12/L (ref 4.4–5.9)
RBC #/AREA URNS AUTO: 7 /HPF (ref 0–2)
SODIUM SERPL-SCNC: 138 MMOL/L (ref 133–144)
SOURCE: ABNORMAL
SP GR UR STRIP: 1.01 (ref 1–1.03)
SPECIMEN EXP DATE BLD: NORMAL
TROPONIN I BLD-MCNC: 0 UG/L (ref 0–0.08)
UROBILINOGEN UR STRIP-MCNC: NORMAL MG/DL (ref 0–2)
WBC # BLD AUTO: 6.4 10E9/L (ref 4–11)
WBC #/AREA URNS AUTO: 1 /HPF (ref 0–5)

## 2020-01-04 PROCEDURE — 86900 BLOOD TYPING SEROLOGIC ABO: CPT | Performed by: EMERGENCY MEDICINE

## 2020-01-04 PROCEDURE — 93005 ELECTROCARDIOGRAM TRACING: CPT

## 2020-01-04 PROCEDURE — 86850 RBC ANTIBODY SCREEN: CPT | Performed by: EMERGENCY MEDICINE

## 2020-01-04 PROCEDURE — 84484 ASSAY OF TROPONIN QUANT: CPT

## 2020-01-04 PROCEDURE — 99285 EMERGENCY DEPT VISIT HI MDM: CPT | Mod: 25 | Performed by: EMERGENCY MEDICINE

## 2020-01-04 PROCEDURE — 99285 EMERGENCY DEPT VISIT HI MDM: CPT | Mod: 25

## 2020-01-04 PROCEDURE — 85610 PROTHROMBIN TIME: CPT | Performed by: EMERGENCY MEDICINE

## 2020-01-04 PROCEDURE — 82565 ASSAY OF CREATININE: CPT | Mod: 91

## 2020-01-04 PROCEDURE — 71275 CT ANGIOGRAPHY CHEST: CPT

## 2020-01-04 PROCEDURE — 80053 COMPREHEN METABOLIC PANEL: CPT | Performed by: EMERGENCY MEDICINE

## 2020-01-04 PROCEDURE — 81001 URINALYSIS AUTO W/SCOPE: CPT | Performed by: EMERGENCY MEDICINE

## 2020-01-04 PROCEDURE — 71046 X-RAY EXAM CHEST 2 VIEWS: CPT

## 2020-01-04 PROCEDURE — 83690 ASSAY OF LIPASE: CPT | Performed by: EMERGENCY MEDICINE

## 2020-01-04 PROCEDURE — 93010 ELECTROCARDIOGRAM REPORT: CPT | Mod: Z6 | Performed by: EMERGENCY MEDICINE

## 2020-01-04 PROCEDURE — 85730 THROMBOPLASTIN TIME PARTIAL: CPT | Performed by: EMERGENCY MEDICINE

## 2020-01-04 PROCEDURE — 86901 BLOOD TYPING SEROLOGIC RH(D): CPT | Performed by: EMERGENCY MEDICINE

## 2020-01-04 PROCEDURE — 25000128 H RX IP 250 OP 636: Performed by: STUDENT IN AN ORGANIZED HEALTH CARE EDUCATION/TRAINING PROGRAM

## 2020-01-04 PROCEDURE — 85025 COMPLETE CBC W/AUTO DIFF WBC: CPT | Performed by: EMERGENCY MEDICINE

## 2020-01-04 RX ORDER — IOPAMIDOL 755 MG/ML
71 INJECTION, SOLUTION INTRAVASCULAR ONCE
Status: COMPLETED | OUTPATIENT
Start: 2020-01-04 | End: 2020-01-04

## 2020-01-04 RX ADMIN — IOPAMIDOL 71 ML: 755 INJECTION, SOLUTION INTRAVENOUS at 15:29

## 2020-01-04 ASSESSMENT — ENCOUNTER SYMPTOMS
COLOR CHANGE: 0
HEADACHES: 0
VOICE CHANGE: 1
DYSURIA: 0
ARTHRALGIAS: 0
COUGH: 1
FEVER: 0
EYE REDNESS: 0
NECK STIFFNESS: 0
NERVOUS/ANXIOUS: 0
SHORTNESS OF BREATH: 0
ABDOMINAL PAIN: 0
FREQUENCY: 1
CONFUSION: 0
DIFFICULTY URINATING: 0

## 2020-01-04 ASSESSMENT — MIFFLIN-ST. JEOR: SCORE: 1719.25

## 2020-01-04 NOTE — ED AVS SNAPSHOT
[facility], [place], Emergency Department  [address] [place] [id number]  Phone:  [phone]                                    Collin Frank   MRN: [id number]    Department:  [facility], Emergency Department   Date of Visit:  1/4/2020           After Visit Summary Signature Page    I have received my discharge instructions, and my questions have been answered. I have discussed any challenges I see with this plan with the nurse or doctor.    ..........................................................................................................................................  Patient/Patient Representative Signature      ..........................................................................................................................................  Patient Representative Print Name and Relationship to Patient    ..................................................               ................................................  Date                                   Time    ..........................................................................................................................................  Reviewed by Signature/Title    ...................................................              ..............................................  Date                                               Time          22EPIC Rev 08/18

## 2020-01-04 NOTE — ED TRIAGE NOTES
"Triage Assessment & Note:    BP (!) 179/99   Pulse 98   Temp 98.2  F (36.8  C) (Oral)   Resp 12   Ht 1.727 m (5' 8\")   Wt 99 kg (218 lb 3.2 oz)   SpO2 97%   BMI 33.18 kg/m        Patient presents with: Pt with hx of factor 5 comes to triage with reports of chest pain for 10 days and hypertension. No reports of fever, cough, or SOB.     Home Treatments/Remedies: Home medications    Febrile / Afebrile: afebrile    Duration of C/o: 10 days    Erum Mercedes RN  January 4, 2020        "

## 2020-01-04 NOTE — ED PROVIDER NOTES
[facility] EMERGENCY DEPARTMENT ([facility])  1/04/20   History     Chief Complaint   Patient presents with     Chest Pain     Hypertension     The history is provided by the patient and medical records.     Collin Frank is a 69 year old male who has a PMH of HTN, HLD, DVT, May Thurners Syndrome, Hyperhomocysteinemia, LE stents, who presents to the Emergency Department for chest pain.  Patient reports history of DVT and factor V.  States he has not had issues with DVTs since 2011.  He states that he takes his anticoagulants at around 5 PM daily and does not miss any doses.  He states that he last checked his INR it was 2.6.    Feels his blood pressure is elevated; has been urinating frequently in conjunction with the chest discomfort.  He describes the chest discomfort as a sore spot in his chest rather than pain (substernal, off to the right).  He states it is present when he is moving and seems to be aggravated positionally but stays in the same area.  It will wake him up and last a few seconds.  He states that there is no pleuritic component and it has not worsened or improved with breathing.  He does report a slight cough and states that his voice may be slightly more hoarse but does not have more difficulty breathing.  Patient also reports that he has been diaphoretic since last night. He denies fevers and body aches.  He denies dysuria.  Patient states that his baseline blood pressure is approximately 135/69 but has been running higher today and he states that he does not feel particularly anxious here in ED.    Patient states that he has a CT of the chest scheduled for a lung cancer screening as he was a distant smoker (2012) and has a family history of such.    I have reviewed the Medications, Allergies, Past Medical and Surgical History, and Social History in the [facility] system.    Past Medical History:   Diagnosis Date     Antiplatelet or antithrombotic long-term use     blood clot in leg     Long  "term current use of anticoagulant 10/16/2012       Past Surgical History:   Procedure Laterality Date     COLONOSCOPY  08    Normal. Repeat in 10 years     COLONOSCOPY WITH CO2 INSUFFLATION N/A 2018    Procedure: COLONOSCOPY WITH CO2 INSUFFLATION;  COLON SCREEN/ ENGELMANN;  Surgeon: Jan Flores MD;  Location: [facility] OR     rt knee ORIF      [facility]       Family History   Problem Relation Age of Onset     Alzheimer Disease Mother      Heart Disease Father      Prostate Cancer Father      Cancer Brother 65        pancreatic        Prostate Cancer Brother        Social History     Tobacco Use     Smoking status: Former Smoker     Types: Cigarettes     Last attempt to quit: 2011     Years since quittin.9     Smokeless tobacco: Never Used   Substance Use Topics     Alcohol use: No       No current facility-administered medications for this encounter.      Current Outpatient Medications   Medication     cholecalciferol (VITAMIN D) 1000 UNIT tablet     losartan (COZAAR) 50 MG tablet     simvastatin (ZOCOR) 20 MG tablet     warfarin ANTICOAGULANT (JANTOVEN ANTICOAGULANT) 5 MG tablet      No Known Allergies     Review of Systems   Constitutional: Positive for diaphoresis (last night). Negative for chills, fatigue and fever.   HENT: Positive for voice change. Negative for congestion.    Eyes: Negative for redness.   Respiratory: Positive for cough. Negative for shortness of breath.    Cardiovascular: Positive for chest pain (\"sore spot\" midsternal).   Gastrointestinal: Negative for abdominal pain.   Genitourinary: Positive for frequency. Negative for difficulty urinating and dysuria.   Musculoskeletal: Negative for arthralgias, myalgias and neck stiffness.   Skin: Negative for color change.   Neurological: Negative for headaches.   Psychiatric/Behavioral: Negative for confusion. The patient is not nervous/anxious.    All other systems reviewed and are negative.      Physical Exam " "  BP: (!) 179/99  Pulse: 98  Temp: 98.2  F (36.8  C)  Resp: 12  Height: 172.7 cm (5' 8\")  Weight: 99 kg (218 lb 3.2 oz)  SpO2: 97 %      Physical Exam  Constitutional:       General: He is not in acute distress.     Appearance: He is well-developed. He is not diaphoretic.   HENT:      Head: Atraumatic.   Eyes:      General: No scleral icterus.     Pupils: Pupils are equal, round, and reactive to light.   Cardiovascular:      Rate and Rhythm: Normal rate and regular rhythm.      Heart sounds: Normal heart sounds.   Pulmonary:      Effort: Pulmonary effort is normal. No respiratory distress.      Breath sounds: Normal breath sounds.   Abdominal:      General: Bowel sounds are normal.      Palpations: Abdomen is soft.      Tenderness: There is no abdominal tenderness.   Musculoskeletal:         General: No tenderness.   Skin:     General: Skin is warm.      Findings: No rash.   Neurological:      Mental Status: He is alert.         ED Course   2:06 PM  The patient was seen and examined by Lisa Mireles MD in Room ED32.       Procedures             EKG Interpretation:      Interpreted by Lisa Mireles MD  Time reviewed: per Epic  Symptoms at time of EKG: none   Rhythm: normal sinus   Rate: normal  Axis: normal  Ectopy: none  Conduction: normal  ST Segments/ T Waves: No ST-T wave changes  Q Waves: none  Comparison to prior: Unchanged    Clinical Impression: normal EKG          Critical Care time:  none             Labs Ordered and Resulted from Time of ED Arrival Up to the Time of Departure from the ED - No data to display         Assessments & Plan (with Medical Decision Making)     69 year old male who has a PMH of HTN, HLD, DVT, May Thurners Syndrome, Hyperhomocysteinemia, LE stents, who presents to the Emergency Department for chest achiness with a positional component of the last several days, urinary frequency elevated blood pressure at home.  Patient placed on cardiac monitor which revealed normal sinus and " initial hypertension with a blood pressure 179/99, however during the course of ED observation patient's blood pressure normalized to 127/84 without any intervention..  IV established, labs drawn sent reviewed document in epic essentially all unremarkable including normal CBC, INR therapeutic at 2.29, negative troponin normal electrolytes, bland UA with a scant amount of hematuria.  EKG obtained which revealed normal sinus rhythm without any acute ischemic changes.  X-ray of the chest obtained which revealed no acute process.  CT chest pulmonary embolism protocol revealed no evidence of PE and no other acute process.  Plan for discharge home with follow-up in primary care provider's office in 2 days to follow-up on the patient's hematuria.    I have reviewed the nursing notes.    I have reviewed the findings, diagnosis, plan and need for follow up with the patient.    New Prescriptions    No medications on file       Final diagnoses:   Chest pain, unspecified type   Hematuria, unspecified type     I, Donaldo Coe, am serving as a trained medical scribe to document services personally performed by Lisa Mireles MD, based on the provider's statements to me.   I, Lisa Mireles MD, was physically present and have reviewed and verified the accuracy of this note documented by Donaldo Coe.     1/4/2020   [facility], EMERGENCY DEPARTMENT     Lisa Mireles MD  01/05/20 [id number]

## 2020-01-04 NOTE — TELEPHONE ENCOUNTER
"Requested Prescriptions   Pending Prescriptions Disp Refills     losartan (COZAAR) 50 MG tablet [Pharmacy Med Name: LOSARTAN POTASSIUM 50 MG TAB] 30 tablet 0     Sig: TAKE 1 TABLET BY MOUTH EVERY DAY         Last Written Prescription Date:  12/13/19  Last Fill Quantity: 90,  # refills: 3   Last Office Visit with [facility], [facility] or [facility] prescribing provider:  12/13/19   Future Office Visit:    Next 5 appointments (look out 90 days)    Mar 02, 2020 10:00 AM CST  [facility] [address] [place] [id number] [phone]             Angiotensin-II Receptors Failed - 1/4/2020  1:20 PM        Failed - Recent (12 mo) or future (30 days) visit within the authorizing provider's specialty     Patient has had an office visit with the authorizing provider or a provider within the authorizing providers department within the previous 12 mos or has a future within next 30 days. See \"Patient Info\" tab in inbasket, or \"Choose Columns\" in Meds & Orders section of the refill encounter.              Passed - Last blood pressure under 140/90 in past 12 months     BP Readings from Last 3 Encounters:   12/13/19 126/78   11/26/19 138/80   09/16/19 129/75                 Passed - Medication is active on med list        Passed - Patient is age 18 or older        Passed - Normal serum creatinine on file in past 12 months     Recent Labs   Lab Test 09/16/19  1052  04/25/17  1024   CR 0.87   < >  --    CREAT  --   --  0.7    < > = values in this interval not displayed.             Passed - Normal serum potassium on file in past 12 months     Recent Labs   Lab Test 09/16/19  1052   POTASSIUM 4.0                          Nasir Faarax  [facility]  "

## 2020-01-05 ENCOUNTER — NURSE TRIAGE (OUTPATIENT)
Dept: NURSING | Facility: CLINIC | Age: 70
End: 2020-01-05

## 2020-01-05 ENCOUNTER — TELEPHONE (OUTPATIENT)
Dept: FAMILY MEDICINE | Facility: CLINIC | Age: 70
End: 2020-01-05

## 2020-01-05 RX ORDER — LOSARTAN POTASSIUM 50 MG/1
TABLET ORAL
Qty: 30 TABLET | Refills: 0 | Status: SHIPPED | OUTPATIENT
Start: 2020-01-05 | End: 2020-01-09

## 2020-01-05 ASSESSMENT — ENCOUNTER SYMPTOMS
FATIGUE: 0
DIAPHORESIS: 1
MYALGIAS: 0
CHILLS: 0

## 2020-01-05 NOTE — TELEPHONE ENCOUNTER
Clinic Action Needed:Yes, Please call patient [phone] (home)     Reason for Call:Patient was seen in the ER on 1/4/20 and had a CT scan done.  Patient has a scheduled CT for 1/8/20.   Stating he was advised by the Emergency to have Dr Salazar decide if it was still needed.    Alma Hammonds RN  [facility]

## 2020-01-05 NOTE — TELEPHONE ENCOUNTER
Clinic Action Needed:Yes, Please call patient [phone] (home)     Reason for Call:Patient was seen in the ER on 1/4/20 and had a CT scan done.  Patient has a scheduled CT for 1/8/20.   Stating he was advised by the Emergency to have Dr Salazar decide if it was still needed.    Alma Hammonds RN  [facility]

## 2020-01-05 NOTE — TELEPHONE ENCOUNTER
Patient calling reporting his mail order has not arrived for Losartan. Patient is requesting refill to [facility].  Refilled as patient has current refills remaining.     Alma Hammonds RN  [facility]

## 2020-01-06 ENCOUNTER — ANTICOAGULATION THERAPY VISIT (OUTPATIENT)
Dept: NURSING | Facility: CLINIC | Age: 70
End: 2020-01-06
Payer: COMMERCIAL

## 2020-01-06 DIAGNOSIS — I82.4Y9 ACUTE VENOUS EMBOLISM AND THROMBOSIS OF DEEP VESSELS OF PROXIMAL LOWER EXTREMITY, UNSPECIFIED LATERALITY (H): ICD-10-CM

## 2020-01-06 DIAGNOSIS — D68.51 FACTOR 5 LEIDEN MUTATION, HETEROZYGOUS (H): ICD-10-CM

## 2020-01-06 LAB — INR POINT OF CARE: 2.7 (ref 0.86–1.14)

## 2020-01-06 PROCEDURE — 99207 ZZC NO CHARGE NURSE ONLY: CPT

## 2020-01-06 PROCEDURE — 36416 COLLJ CAPILLARY BLOOD SPEC: CPT

## 2020-01-06 PROCEDURE — 85610 PROTHROMBIN TIME: CPT | Mod: QW

## 2020-01-06 NOTE — TELEPHONE ENCOUNTER
Attempted again, again rings with no answer, tried daughter Whitney who states patient usually answers when sees [facility] number, Whitney will call patient and have him answer phone. Whitney asks that nurse call him again in 5 minutes.      Lulu Myers RN  [facility]

## 2020-01-06 NOTE — TELEPHONE ENCOUNTER
Attempt # 1  Called patient at home [phone]  Was call answered?  No answer, rang and rang. Will rebold to try again later, no consent to communicate in Epic.          Lulu Myers RN  [facility]

## 2020-01-06 NOTE — PROGRESS NOTES
ANTICOAGULATION FOLLOW-UP CLINIC VISIT    Patient Name:  Collin Frank  Date:  2020  Contact Type:  Face to Face    SUBJECTIVE: same plan of care  Patient Findings     Positives:   Emergency department visit (over the weekend went to ED for minor chest discomfort.  only findings, per pt, trace blood in the urine with h/o kidney stones.  to see PCP this week call INR if changes.)    Comments:   Assessment per above        Clinical Outcomes     Negatives:   Major bleeding event, Thromboembolic event, Anticoagulation-related hospital admission, Anticoagulation-related ED visit, Anticoagulation-related fatality    Comments:   Assessment per above           OBJECTIVE    INR Protime   Date Value Ref Range Status   2020 2.7 (A) 0.86 - 1.14 Final       ASSESSMENT / PLAN  INR assessment THER    Recheck INR In: 4 WEEKS    INR Location Clinic      Anticoagulation Summary  As of 2020    INR goal:   2.0-3.0   TTR:   76.0 % (1 y)   INR used for dosin.7 (2020)   Warfarin maintenance plan:   7.5 mg (5 mg x 1.5) every Mon; 5 mg (5 mg x 1) all other days   Full warfarin instructions:   7.5 mg every Mon; 5 mg all other days   Weekly warfarin total:   37.5 mg   No change documented:   Minal Carrion, RN   Plan last modified:   Minal Carrion RN (2019)   Next INR check:   2020   Priority:   Maintenance   Target end date:   Indefinite    Indications    Factor 5 Leiden mutation  heterozygous (H) [D68.51]  Acute venous embolism and thrombosis of deep vessels of proximal lower extremity (H) [I82.4Y9]             Anticoagulation Episode Summary     INR check location:       Preferred lab:       Send INR reminders to:   [facility]    Comments:         Anticoagulation Care Providers     Provider Role Specialty Phone number    Collin Salazar MD  Internal Medicine [phone]            See the Encounter Report to view Anticoagulation Flowsheet and Dosing Calendar (Go to Encounters tab in  chart review, and find the Anticoagulation Therapy Visit)    Dosage adjustment made based on physician directed care plan.    Minal Carrion RN

## 2020-01-07 NOTE — TELEPHONE ENCOUNTER
Patient returned call to triage line.   RN called patient and relayed provider's message. Patient verbalized understanding.     Flagged for TC, patient states that only clinic can cancel CT scan, he was unable to do so himself.    Yvonne Fountain RN, BSN, PHN  [facility] [place]

## 2020-01-09 ENCOUNTER — OFFICE VISIT (OUTPATIENT)
Dept: FAMILY MEDICINE | Facility: CLINIC | Age: 70
End: 2020-01-09
Payer: COMMERCIAL

## 2020-01-09 VITALS
OXYGEN SATURATION: 95 % | BODY MASS INDEX: 31.67 KG/M2 | DIASTOLIC BLOOD PRESSURE: 73 MMHG | HEIGHT: 68 IN | SYSTOLIC BLOOD PRESSURE: 135 MMHG | HEART RATE: 57 BPM | TEMPERATURE: 97.9 F | WEIGHT: 209 LBS

## 2020-01-09 DIAGNOSIS — E72.11 HYPERHOMOCYSTEINEMIA (H): ICD-10-CM

## 2020-01-09 DIAGNOSIS — D68.51 FACTOR 5 LEIDEN MUTATION, HETEROZYGOUS (H): ICD-10-CM

## 2020-01-09 DIAGNOSIS — E78.5 HYPERLIPIDEMIA LDL GOAL <130: ICD-10-CM

## 2020-01-09 DIAGNOSIS — I10 BENIGN ESSENTIAL HYPERTENSION: ICD-10-CM

## 2020-01-09 DIAGNOSIS — N30.01 ACUTE CYSTITIS WITH HEMATURIA: Primary | ICD-10-CM

## 2020-01-09 LAB
ALBUMIN UR-MCNC: NEGATIVE MG/DL
APPEARANCE UR: CLEAR
BACTERIA #/AREA URNS HPF: ABNORMAL /HPF
BILIRUB UR QL STRIP: NEGATIVE
COLOR UR AUTO: YELLOW
GLUCOSE UR STRIP-MCNC: NEGATIVE MG/DL
HGB UR QL STRIP: ABNORMAL
KETONES UR STRIP-MCNC: NEGATIVE MG/DL
LEUKOCYTE ESTERASE UR QL STRIP: NEGATIVE
MUCOUS THREADS #/AREA URNS LPF: PRESENT /LPF
NITRATE UR QL: NEGATIVE
PH UR STRIP: 6 PH (ref 5–7)
RBC #/AREA URNS AUTO: ABNORMAL /HPF
SOURCE: ABNORMAL
SP GR UR STRIP: 1.02 (ref 1–1.03)
UROBILINOGEN UR STRIP-ACNC: 0.2 EU/DL (ref 0.2–1)
WBC #/AREA URNS AUTO: ABNORMAL /HPF

## 2020-01-09 PROCEDURE — 81001 URINALYSIS AUTO W/SCOPE: CPT | Performed by: INTERNAL MEDICINE

## 2020-01-09 PROCEDURE — 99214 OFFICE O/P EST MOD 30 MIN: CPT | Performed by: INTERNAL MEDICINE

## 2020-01-09 RX ORDER — SIMVASTATIN 20 MG
20 TABLET ORAL AT BEDTIME
Qty: 90 TABLET | Refills: 3 | Status: SHIPPED | OUTPATIENT
Start: 2020-01-09 | End: 2020-11-25

## 2020-01-09 RX ORDER — LOSARTAN POTASSIUM 50 MG/1
50 TABLET ORAL DAILY
Qty: 90 TABLET | Refills: 3 | Status: SHIPPED | OUTPATIENT
Start: 2020-01-09 | End: 2020-01-30

## 2020-01-09 RX ORDER — WARFARIN SODIUM 5 MG/1
TABLET ORAL
Qty: 100 TABLET | Refills: 3 | Status: SHIPPED | OUTPATIENT
Start: 2020-01-09 | End: 2020-06-09

## 2020-01-09 ASSESSMENT — MIFFLIN-ST. JEOR: SCORE: 1677.52

## 2020-01-09 ASSESSMENT — PAIN SCALES - GENERAL: PAINLEVEL: NO PAIN (0)

## 2020-01-09 NOTE — PROGRESS NOTES
Subjective     Collin Frank is a 69 year old male who presents to clinic today for the following health issues:    HPI   ED/UC Followup:    Facility:  [facility]   Date of visit: 20  Reason for visit: chest discomfort and HTN  Current Status: better           Patient Active Problem List   Diagnosis     Hyperhomocysteinemia (H)     Acute venous embolism and thrombosis of deep vessels of proximal lower extremity (H)     CARDIOVASCULAR SCREENING; LDL GOAL LESS THAN 130     Factor 5 Leiden mutation, heterozygous (H)     May-Thurner syndrome     Long term current use of anticoagulant     Vitamin D deficiency     Renal stones     Hepatic hemangioma     FH: prostate cancer     Long-term (current) use of anticoagulants [Z79.01]     Pulmonary embolism with infarction (HCC) [I26.99]     Post-traumatic osteoarthritis of right knee     Benign non-nodular prostatic hyperplasia without lower urinary tract symptoms     Primary osteoarthritis of left knee     Benign essential hypertension     Primary osteoarthritis of both knees     Past Surgical History:   Procedure Laterality Date     COLONOSCOPY  08    Normal. Repeat in 10 years     COLONOSCOPY WITH CO2 INSUFFLATION N/A 2018    Procedure: COLONOSCOPY WITH CO2 INSUFFLATION;  COLON SCREEN/ ENGELMANN;  Surgeon: Jan Flores MD;  Location: [facility] OR     rt knee ORIF      [facility]       Social History     Tobacco Use     Smoking status: Former Smoker     Types: Cigarettes     Last attempt to quit: 2011     Years since quittin.9     Smokeless tobacco: Never Used   Substance Use Topics     Alcohol use: No     Family History   Problem Relation Age of Onset     Alzheimer Disease Mother      Heart Disease Father      Prostate Cancer Father      Cancer Brother 65        pancreatic        Prostate Cancer Brother          Current Outpatient Medications   Medication Sig Dispense Refill     cholecalciferol (VITAMIN D) 1000 UNIT tablet Take 1 tablet  "(1,000 Units) by mouth daily 100 tablet 3     losartan (COZAAR) 50 MG tablet Take 1 tablet (50 mg) by mouth daily 90 tablet 3     simvastatin (ZOCOR) 20 MG tablet Take 1 tablet (20 mg) by mouth At Bedtime 90 tablet 3     warfarin ANTICOAGULANT (JANTOVEN ANTICOAGULANT) 5 MG tablet TAKE 1 TABLET BY MOUTH     EVERY  tablet 3     No Known Allergies  Recent Labs   Lab Test 01/04/20  1414 01/04/20  1413 09/16/19  1052  12/12/18  0919  12/01/15  1056  10/16/12  1038 11/28/11  1051   A1C  --   --   --   --   --   --   --   --  5.5 5.9   LDL  --   --  51  --  132*  --  108*   < > 141* 133*   HDL  --   --  60  --  48  --  53   < > 47 31*   TRIG  --   --  50  --  106  --  122   < > 67 110   ALT  --  43 39  --  36  --   --   --  33 61   CR  --  0.89 0.87   < > 0.95   < > 0.84   < > 0.66 0.74   GFRESTIMATED 83 86 87   < > 79   < > >90  Non  GFR Calc     < > >90 >90   GFRESTBLACK >90 >90 >90   < > >90   < > >90  African American GFR Calc     < > >90 >90   POTASSIUM  --  3.6 4.0   < > 4.0   < > 4.4   < > 4.3 4.3   TSH  --   --  1.43  --   --   --   --   --   --  1.90    < > = values in this interval not displayed.      BP Readings from Last 3 Encounters:   01/09/20 135/73   01/04/20 (!) 145/76   12/13/19 126/78    Wt Readings from Last 3 Encounters:   01/09/20 94.8 kg (209 lb)   01/04/20 99 kg (218 lb 3.2 oz)   12/13/19 93.4 kg (206 lb)                      Reviewed and updated as needed this visit by Provider         Review of Systems   ROS COMP: Constitutional, HEENT, cardiovascular, pulmonary, gi and gu systems are negative, except as otherwise noted.      Objective    /73 (BP Location: Right arm, Patient Position: Chair, Cuff Size: Adult Regular)   Pulse 57   Temp 97.9  F (36.6  C) (Oral)   Ht 1.727 m (5' 8\")   Wt 94.8 kg (209 lb)   SpO2 95%   BMI 31.78 kg/m    Body mass index is 31.78 kg/m .  Physical Exam   GENERAL: healthy, alert and no distress  EYES: Eyes grossly normal to inspection, " PERRL and conjunctivae and sclerae normal  HENT: ear canals and TM's normal, nose and mouth without ulcers or lesions  NECK: no adenopathy, no asymmetry, masses, or scars and thyroid normal to palpation  RESP: lungs clear to auscultation - no rales, rhonchi or wheezes  CV: regular rate and rhythm, normal S1 S2, no S3 or S4, no murmur, click or rub, no peripheral edema and peripheral pulses strong  ABDOMEN: soft, nontender, no hepatosplenomegaly, no masses and bowel sounds normal  MS: no gross musculoskeletal defects noted, no edema  SKIN: no suspicious lesions or rashes  NEURO: Normal strength and tone, mentation intact and speech normal  BACK: no CVA tenderness, no paralumbar tenderness    Diagnostic Test Results:  Labs reviewed in Epic  Results for orders placed or performed in visit on 01/09/20   *UA reflex to Microscopic and Culture ([facility] (except [place] and [place])     Status: Abnormal   Result Value Ref Range    Color Urine Yellow     Appearance Urine Clear     Glucose Urine Negative NEG^Negative mg/dL    Bilirubin Urine Negative NEG^Negative    Ketones Urine Negative NEG^Negative mg/dL    Specific Gravity Urine 1.020 1.003 - 1.035    Blood Urine Large (A) NEG^Negative    pH Urine 6.0 5.0 - 7.0 pH    Protein Albumin Urine Negative NEG^Negative mg/dL    Urobilinogen Urine 0.2 0.2 - 1.0 EU/dL    Nitrite Urine Negative NEG^Negative    Leukocyte Esterase Urine Negative NEG^Negative    Source Midstream Urine    Urine Microscopic     Status: Abnormal   Result Value Ref Range    WBC Urine 0 - 5 OTO5^0 - 5 /HPF    RBC Urine 25-50 (A) OTO2^O - 2 /HPF    Bacteria Urine Few (A) NEG^Negative /HPF    Mucous Urine Present (A) NEG^Negative /LPF           Assessment & Plan       ICD-10-CM    1. Acute cystitis with hematuria N30.01 *UA reflex to Microscopic and Culture ([facility] (except [place] and [place])     Urine Microscopic   2. Hyperhomocysteinemia (H) E72.11 warfarin  ANTICOAGULANT (JANTOVEN ANTICOAGULANT) 5 MG tablet   3. Factor 5 Leiden mutation, heterozygous (H) D68.51 warfarin ANTICOAGULANT (JANTOVEN ANTICOAGULANT) 5 MG tablet   4. Hyperlipidemia LDL goal <130 E78.5 simvastatin (ZOCOR) 20 MG tablet   5. Benign essential hypertension I10 losartan (COZAAR) 50 MG tablet          Regular exercise    Consider urology eval for persistent hematuira      Return in about 6 months (around 7/9/2020).    Collin Salazar MD  [facility]

## 2020-01-09 NOTE — LETTER
[facility]   [address] [place]  [address]  [phone]                                   January 28, 2020    Collin Frank  [address]    [facility] [id number]        Dear Collin Polanco, there is evidence of blood in the urine  I would recommend seeing the urologist for further assessment    Results for orders placed or performed in visit on 01/09/20   *UA reflex to Microscopic and Culture ([facility] (except [place] and [place])     Status: Abnormal   Result Value Ref Range    Color Urine Yellow     Appearance Urine Clear     Glucose Urine Negative NEG^Negative mg/dL    Bilirubin Urine Negative NEG^Negative    Ketones Urine Negative NEG^Negative mg/dL    Specific Gravity Urine 1.020 1.003 - 1.035    Blood Urine Large (A) NEG^Negative    pH Urine 6.0 5.0 - 7.0 pH    Protein Albumin Urine Negative NEG^Negative mg/dL    Urobilinogen Urine 0.2 0.2 - 1.0 EU/dL    Nitrite Urine Negative NEG^Negative    Leukocyte Esterase Urine Negative NEG^Negative    Source Midstream Urine    Urine Microscopic     Status: Abnormal   Result Value Ref Range    WBC Urine 0 - 5 OTO5^0 - 5 /HPF    RBC Urine 25-50 (A) OTO2^O - 2 /HPF    Bacteria Urine Few (A) NEG^Negative /HPF    Mucous Urine Present (A) NEG^Negative /LPF       If you have any questions please call the clinic at [phone]    Sincerely,    Collin Salazar MD  [facility]

## 2020-01-29 DIAGNOSIS — I10 BENIGN ESSENTIAL HYPERTENSION: ICD-10-CM

## 2020-01-29 NOTE — TELEPHONE ENCOUNTER
"Requested Prescriptions   Pending Prescriptions Disp Refills     losartan (COZAAR) 50 MG tablet [Pharmacy Med Name: LOSARTAN POTASSIUM 50 MG TAB] 30 tablet 0     Sig: TAKE 1 TABLET BY MOUTH EVERY DAY   Last Written Prescription Date:  1-92020  Last Fill Quantity: 90,  # refills: 3   Last office visit: 1/9/2020 with prescribing provider:  1-9-2020   Future Office Visit:   Next 5 appointments (look out 90 days)    Mar 02, 2020 10:00 AM CST  SHORT with Collin Salazar MD  [facility] ([facility]) [address] [place] [id number]  [phone]             Angiotensin-II Receptors Passed - 1/29/2020  2:06 AM        Passed - Last blood pressure under 140/90 in past 12 months     BP Readings from Last 3 Encounters:   01/09/20 135/73   01/04/20 (!) 145/76   12/13/19 126/78                 Passed - Recent (12 mo) or future (30 days) visit within the authorizing provider's specialty     Patient has had an office visit with the authorizing provider or a provider within the authorizing providers department within the previous 12 mos or has a future within next 30 days. See \"Patient Info\" tab in inbasket, or \"Choose Columns\" in Meds & Orders section of the refill encounter.              Passed - Medication is active on med list        Passed - Patient is age 18 or older        Passed - Normal serum creatinine on file in past 12 months     Recent Labs   Lab Test 01/04/20  1414 01/04/20  1413   CR  --  0.89   CREAT 0.9  --              Passed - Normal serum potassium on file in past 12 months     Recent Labs   Lab Test 01/04/20  1413   POTASSIUM 3.6                      "

## 2020-01-30 RX ORDER — LOSARTAN POTASSIUM 50 MG/1
TABLET ORAL
Qty: 30 TABLET | Refills: 0 | Status: SHIPPED | OUTPATIENT
Start: 2020-01-30 | End: 2020-11-25

## 2020-01-30 NOTE — TELEPHONE ENCOUNTER
Prescription approved per [facility] Refill Protocol.  Daxa Contreras RN on 1/30/2020 at 3:31 PM

## 2020-02-06 ENCOUNTER — ANTICOAGULATION THERAPY VISIT (OUTPATIENT)
Dept: NURSING | Facility: CLINIC | Age: 70
End: 2020-02-06
Payer: COMMERCIAL

## 2020-02-06 DIAGNOSIS — I82.4Y9 ACUTE VENOUS EMBOLISM AND THROMBOSIS OF DEEP VESSELS OF PROXIMAL LOWER EXTREMITY, UNSPECIFIED LATERALITY (H): ICD-10-CM

## 2020-02-06 DIAGNOSIS — D68.51 FACTOR 5 LEIDEN MUTATION, HETEROZYGOUS (H): ICD-10-CM

## 2020-02-06 LAB — INR POINT OF CARE: 5.5 (ref 0.86–1.14)

## 2020-02-06 PROCEDURE — 36416 COLLJ CAPILLARY BLOOD SPEC: CPT

## 2020-02-06 PROCEDURE — 85610 PROTHROMBIN TIME: CPT | Mod: QW

## 2020-02-06 PROCEDURE — 99207 ZZC NO CHARGE NURSE ONLY: CPT

## 2020-02-06 NOTE — PROGRESS NOTES
ANTICOAGULATION FOLLOW-UP CLINIC VISIT    Patient Name:  Collin Frank  Date:  2020  Contact Type:  Face to Face    SUBJECTIVE: Per protocol fingerstick done x 2 - MD notified per protocol - (5.5 / 5.8) result.  Plan warfarin hold and plant based vitamin k intake with recheck tomorrow.  Patient advised ER / Hospital if bleeding develops, he is injured, or hit's his head.  Patient Findings     Positives:   Change in diet/appetite (pt has not been eating his tab[place]a recently)    Comments:   No symptoms of concern today        Clinical Outcomes     Negatives:   Major bleeding event, Thromboembolic event, Anticoagulation-related hospital admission, Anticoagulation-related ED visit, Anticoagulation-related fatality    Comments:   No symptoms of concern today           OBJECTIVE    INR Protime   Date Value Ref Range Status   2020 5.5 (A) 0.86 - 1.14 Final       ASSESSMENT / PLAN  INR assessment SUPRA    Recheck INR In: 1 DAY    INR Location Clinic      Anticoagulation Summary  As of 2020    INR goal:   2.0-3.0   TTR:   68.4 % (1 y)   INR used for dosin.5! (2020)   Warfarin maintenance plan:   7.5 mg (5 mg x 1.5) every Mon; 5 mg (5 mg x 1) all other days   Full warfarin instructions:   : Hold; Otherwise 7.5 mg every Mon; 5 mg all other days   Weekly warfarin total:   37.5 mg   Plan last modified:   Minal Carrion, RN (2019)   Next INR check:   2020   Priority:   Critical   Target end date:   Indefinite    Indications    Factor 5 Leiden mutation  heterozygous (H) [D68.51]  Acute venous embolism and thrombosis of deep vessels of proximal lower extremity (H) [I82.4Y9]             Anticoagulation Episode Summary     INR check location:       Preferred lab:       Send INR reminders to:   [facility]    Comments:         Anticoagulation Care Providers     Provider Role Specialty Phone number    Collin Salazar MD  Internal Medicine [phone]            See the Encounter  Report to view Anticoagulation Flowsheet and Dosing Calendar (Go to Encounters tab in chart review, and find the Anticoagulation Therapy Visit)    Dosage adjustment made based on physician directed care plan.    Minal Carrion RN

## 2020-02-07 ENCOUNTER — ANTICOAGULATION THERAPY VISIT (OUTPATIENT)
Dept: NURSING | Facility: CLINIC | Age: 70
End: 2020-02-07
Payer: COMMERCIAL

## 2020-02-07 DIAGNOSIS — I82.4Y9 ACUTE VENOUS EMBOLISM AND THROMBOSIS OF DEEP VESSELS OF PROXIMAL LOWER EXTREMITY, UNSPECIFIED LATERALITY (H): ICD-10-CM

## 2020-02-07 DIAGNOSIS — D68.51 FACTOR 5 LEIDEN MUTATION, HETEROZYGOUS (H): ICD-10-CM

## 2020-02-07 LAB — INR POINT OF CARE: 3 (ref 0.86–1.14)

## 2020-02-07 PROCEDURE — 36416 COLLJ CAPILLARY BLOOD SPEC: CPT

## 2020-02-07 PROCEDURE — 99207 ZZC NO CHARGE NURSE ONLY: CPT

## 2020-02-07 PROCEDURE — 85610 PROTHROMBIN TIME: CPT | Mod: QW

## 2020-02-07 NOTE — PROGRESS NOTES
ANTICOAGULATION FOLLOW-UP CLINIC VISIT    Patient Name:  Collin Frank  Date:  2/7/2020  Contact Type:  Face to Face    SUBJECTIVE: zi 7-10 days  Patient Findings     Positives:   Change in medications (changed to mail order pharmacy and his warfarin brand changed)    Comments:   No symptoms  Of concern today        Clinical Outcomes     Negatives:   Major bleeding event, Thromboembolic event, Anticoagulation-related hospital admission, Anticoagulation-related ED visit, Anticoagulation-related fatality    Comments:   No symptoms  Of concern today           OBJECTIVE    INR Protime   Date Value Ref Range Status   02/07/2020 3.0 (A) 0.86 - 1.14 Final       ASSESSMENT / PLAN  INR assessment THER    Recheck INR In: 1 WEEK    INR Location Clinic      Anticoagulation Summary  As of 2/7/2020    INR goal:   2.0-3.0   TTR:   68.1 % (1 y)   INR used for dosing:   3.0 (2/7/2020)   Warfarin maintenance plan:   5 mg (5 mg x 1) every day   Full warfarin instructions:   5 mg every day   Weekly warfarin total:   35 mg   Plan last modified:   Minal Carrion RN (2/7/2020)   Next INR check:   2/18/2020   Priority:   High   Target end date:   Indefinite    Indications    Factor 5 Leiden mutation  heterozygous (H) [D68.51]  Acute venous embolism and thrombosis of deep vessels of proximal lower extremity (H) [I82.4Y9]             Anticoagulation Episode Summary     INR check location:       Preferred lab:       Send INR reminders to:   [facility]    Comments:         Anticoagulation Care Providers     Provider Role Specialty Phone number    Collin Salazar MD  Internal Medicine [phone]            See the Encounter Report to view Anticoagulation Flowsheet and Dosing Calendar (Go to Encounters tab in chart review, and find the Anticoagulation Therapy Visit)    Dosage adjustment made based on physician directed care plan.    Minal Carrion RN

## 2020-02-18 ENCOUNTER — ANTICOAGULATION THERAPY VISIT (OUTPATIENT)
Dept: NURSING | Facility: CLINIC | Age: 70
End: 2020-02-18
Payer: COMMERCIAL

## 2020-02-18 DIAGNOSIS — D68.51 FACTOR 5 LEIDEN MUTATION, HETEROZYGOUS (H): ICD-10-CM

## 2020-02-18 DIAGNOSIS — I82.4Y9 ACUTE VENOUS EMBOLISM AND THROMBOSIS OF DEEP VESSELS OF PROXIMAL LOWER EXTREMITY, UNSPECIFIED LATERALITY (H): ICD-10-CM

## 2020-02-18 LAB — INR POINT OF CARE: 2.5 (ref 0.86–1.14)

## 2020-02-18 PROCEDURE — 99207 ZZC NO CHARGE NURSE ONLY: CPT

## 2020-02-18 PROCEDURE — 85610 PROTHROMBIN TIME: CPT | Mod: QW

## 2020-02-18 PROCEDURE — 36416 COLLJ CAPILLARY BLOOD SPEC: CPT

## 2020-02-18 NOTE — PROGRESS NOTES
ANTICOAGULATION FOLLOW-UP CLINIC VISIT    Patient Name:  Collin Frank  Date:  2020  Contact Type:  Face to Face    SUBJECTIVE: 2 wk zi coordinated with MD visit  Patient Findings     Comments:   No symptoms of concern today        Clinical Outcomes     Negatives:   Major bleeding event, Thromboembolic event, Anticoagulation-related hospital admission, Anticoagulation-related ED visit, Anticoagulation-related fatality    Comments:   No symptoms of concern today           OBJECTIVE    INR Protime   Date Value Ref Range Status   2020 2.5 (A) 0.86 - 1.14 Final       ASSESSMENT / PLAN  INR assessment THER    Recheck INR In: 2 WEEKS    INR Location Clinic      Anticoagulation Summary  As of 2020    INR goal:   2.0-3.0   TTR:   68.7 % (1 y)   INR used for dosin.5 (2020)   Warfarin maintenance plan:   5 mg (5 mg x 1) every day   Full warfarin instructions:   5 mg every day   Weekly warfarin total:   35 mg   No change documented:   Minal Carrion RN   Plan last modified:   Minal Carrion RN (2020)   Next INR check:   3/2/2020   Priority:   High   Target end date:   Indefinite    Indications    Factor 5 Leiden mutation  heterozygous (H) [D68.51]  Acute venous embolism and thrombosis of deep vessels of proximal lower extremity (H) [I82.4Y9]             Anticoagulation Episode Summary     INR check location:       Preferred lab:       Send INR reminders to:   [facility]    Comments:         Anticoagulation Care Providers     Provider Role Specialty Phone number    Collin Salazar MD  Internal Medicine [phone]            See the Encounter Report to view Anticoagulation Flowsheet and Dosing Calendar (Go to Encounters tab in chart review, and find the Anticoagulation Therapy Visit)    Dosage adjustment made based on physician directed care plan.    Minal Carrion RN

## 2020-03-02 ENCOUNTER — ANTICOAGULATION THERAPY VISIT (OUTPATIENT)
Dept: NURSING | Facility: CLINIC | Age: 70
End: 2020-03-02
Payer: COMMERCIAL

## 2020-03-02 ENCOUNTER — OFFICE VISIT (OUTPATIENT)
Dept: FAMILY MEDICINE | Facility: CLINIC | Age: 70
End: 2020-03-02
Payer: COMMERCIAL

## 2020-03-02 DIAGNOSIS — D68.51 FACTOR 5 LEIDEN MUTATION, HETEROZYGOUS (H): Primary | ICD-10-CM

## 2020-03-02 DIAGNOSIS — D68.51 FACTOR 5 LEIDEN MUTATION, HETEROZYGOUS (H): ICD-10-CM

## 2020-03-02 DIAGNOSIS — I10 ESSENTIAL HYPERTENSION: ICD-10-CM

## 2020-03-02 DIAGNOSIS — Z79.01 LONG TERM CURRENT USE OF ANTICOAGULANT THERAPY: ICD-10-CM

## 2020-03-02 DIAGNOSIS — I82.4Y9 ACUTE VENOUS EMBOLISM AND THROMBOSIS OF DEEP VESSELS OF PROXIMAL LOWER EXTREMITY, UNSPECIFIED LATERALITY (H): ICD-10-CM

## 2020-03-02 LAB — INR POINT OF CARE: 2.7 (ref 0.86–1.14)

## 2020-03-02 PROCEDURE — 99214 OFFICE O/P EST MOD 30 MIN: CPT | Performed by: INTERNAL MEDICINE

## 2020-03-02 PROCEDURE — 36416 COLLJ CAPILLARY BLOOD SPEC: CPT

## 2020-03-02 PROCEDURE — 85610 PROTHROMBIN TIME: CPT | Mod: QW

## 2020-03-02 PROCEDURE — 99207 ZZC NO CHARGE NURSE ONLY: CPT

## 2020-03-02 NOTE — PROGRESS NOTES
Subjective     Collin Frank is a 69 year old male who presents to clinic today for the following health issues:    HPI   Hypertension Follow-up      Do you check your blood pressure regularly outside of the clinic? Yes     Are you following a low salt diet? Yes    Are your blood pressures ever more than 140 on the top number (systolic) OR more   than 90 on the bottom number (diastolic), for example 140/90? No      How many servings of fruits and vegetables do you eat daily?  0-1    On average, how many sweetened beverages do you drink each day (Examples: soda, juice, sweet tea, etc.  Do NOT count diet or artificially sweetened beverages)?   0    How many days per week do you exercise enough to make your heart beat faster? 4    How many minutes a day do you exercise enough to make your heart beat faster? 20 - 29    How many days per week do you miss taking your medication? 0    Blister left foot x months getting bigger and redness is occurring          Patient Active Problem List   Diagnosis     Hyperhomocysteinemia (H)     Acute venous embolism and thrombosis of deep vessels of proximal lower extremity (H)     CARDIOVASCULAR SCREENING; LDL GOAL LESS THAN 130     Factor 5 Leiden mutation, heterozygous (H)     May-Thurner syndrome     Long term current use of anticoagulant     Vitamin D deficiency     Renal stones     Hepatic hemangioma     FH: prostate cancer     Long-term (current) use of anticoagulants [Z79.01]     Pulmonary embolism with infarction (HCC) [I26.99]     Post-traumatic osteoarthritis of right knee     Benign non-nodular prostatic hyperplasia without lower urinary tract symptoms     Primary osteoarthritis of left knee     Benign essential hypertension     Primary osteoarthritis of both knees     Essential hypertension     Past Surgical History:   Procedure Laterality Date     COLONOSCOPY  1-17-08    Normal. Repeat in 10 years     COLONOSCOPY WITH CO2 INSUFFLATION N/A 2/1/2018    Procedure:  COLONOSCOPY WITH CO2 INSUFFLATION;  COLON SCREEN/ ENGELMANN;  Surgeon: Jan Flores MD;  Location: [facility] OR     rt knee ORIF      [facility]       Social History     Tobacco Use     Smoking status: Former Smoker     Types: Cigarettes     Last attempt to quit: 2011     Years since quittin.0     Smokeless tobacco: Never Used   Substance Use Topics     Alcohol use: No     Family History   Problem Relation Age of Onset     Alzheimer Disease Mother      Heart Disease Father      Prostate Cancer Father      Cancer Brother 65        pancreatic        Prostate Cancer Brother          Current Outpatient Medications   Medication Sig Dispense Refill     cholecalciferol (VITAMIN D) 1000 UNIT tablet Take 1 tablet (1,000 Units) by mouth daily 100 tablet 3     losartan (COZAAR) 50 MG tablet TAKE 1 TABLET BY MOUTH EVERY DAY 30 tablet 0     simvastatin (ZOCOR) 20 MG tablet Take 1 tablet (20 mg) by mouth At Bedtime 90 tablet 3     warfarin ANTICOAGULANT (JANTOVEN ANTICOAGULANT) 5 MG tablet TAKE 1 TABLET BY MOUTH     EVERY  tablet 3     No Known Allergies  Recent Labs   Lab Test 20  1414 20  1413 19  1052  18  0919  12/01/15  1056  10/16/12  1038   A1C  --   --   --   --   --   --   --   --  5.5   LDL  --   --  51  --  132*  --  108*   < > 141*   HDL  --   --  60  --  48  --  53   < > 47   TRIG  --   --  50  --  106  --  122   < > 67   ALT  --  43 39  --  36  --   --   --  33   CR  --  0.89 0.87   < > 0.95   < > 0.84   < > 0.66   GFRESTIMATED 83 86 87   < > 79   < > >90  Non  GFR Calc     < > >90   GFRESTBLACK >90 >90 >90   < > >90   < > >90  African American GFR Calc     < > >90   POTASSIUM  --  3.6 4.0   < > 4.0   < > 4.4   < > 4.3   TSH  --   --  1.43  --   --   --   --   --   --     < > = values in this interval not displayed.      BP Readings from Last 3 Encounters:   20 138/70   20 135/73   20 (!) 145/76    Wt Readings from Last 3  "Encounters:   03/02/20 98.9 kg (218 lb)   01/09/20 94.8 kg (209 lb)   01/04/20 99 kg (218 lb 3.2 oz)                      Reviewed and updated as needed this visit by Provider  Tobacco  Allergies  Meds  Problems  Med Hx  Surg Hx  Fam Hx         Review of Systems   ROS COMP: Constitutional, HEENT, cardiovascular, pulmonary, gi and gu systems are negative, except as otherwise noted.      Objective    /70   Pulse 56   Wt 98.9 kg (218 lb)   SpO2 99%   BMI 33.15 kg/m    Body mass index is 33.15 kg/m .  Physical Exam   GENERAL: healthy, alert and no distress  EYES: Eyes grossly normal to inspection, PERRL and conjunctivae and sclerae normal  HENT: ear canals and TM's normal, nose and mouth without ulcers or lesions  NECK: no adenopathy, no asymmetry, masses, or scars and thyroid normal to palpation  RESP: lungs clear to auscultation - no rales, rhonchi or wheezes  CV: regular rate and rhythm, normal S1 S2, no S3 or S4, no murmur, click or rub, no peripheral edema and peripheral pulses strong  ABDOMEN: soft, nontender, no hepatosplenomegaly, no masses and bowel sounds normal  MS: no gross musculoskeletal defects noted, no edema  SKIN: no suspicious lesions or rashes  NEURO: Normal strength and tone, mentation intact and speech normal  BACK: no CVA tenderness, no paralumbar tenderness  PSYCH: mentation appears normal, affect normal/bright  LYMPH: no cervical, supraclavicular, axillary, or inguinal adenopathy  Small area \"hot spot\" on interior of foot from rubbing of boot  Applied tegaderm to the area    Diagnostic Test Results:  Labs reviewed in Epic  Results for orders placed or performed in visit on 03/02/20   INR point of care     Status: Abnormal   Result Value Ref Range    INR Protime 2.7 (A) 0.86 - 1.14           Assessment & Plan       ICD-10-CM    1. Factor 5 Leiden mutation, heterozygous (H) D68.51    2. Long-term (current) use of anticoagulants [Z79.01] Z79.01    3. Essential hypertension I10  "          Regular exercise    Return in about 6 months (around 9/2/2020) for follow up of condition, medication management.    Collin Salazar MD  [facility]

## 2020-03-02 NOTE — PROGRESS NOTES
ANTICOAGULATION FOLLOW-UP CLINIC VISIT    Patient Name:  Collin Frank  Date:  3/2/2020  Contact Type:  Face to Face    SUBJECTIVE: same plan of care  Patient Findings     Comments:   No concerns today        Clinical Outcomes     Negatives:   Major bleeding event, Thromboembolic event, Anticoagulation-related hospital admission, Anticoagulation-related ED visit, Anticoagulation-related fatality    Comments:   No concerns today           OBJECTIVE    INR Protime   Date Value Ref Range Status   2020 2.7 (A) 0.86 - 1.14 Final       ASSESSMENT / PLAN  INR assessment THER    Recheck INR In: 4 WEEKS    INR Location Clinic      Anticoagulation Summary  As of 3/2/2020    INR goal:   2.0-3.0   TTR:   70.5 % (1 y)   INR used for dosin.7 (3/2/2020)   Warfarin maintenance plan:   5 mg (5 mg x 1) every day   Full warfarin instructions:   5 mg every day   Weekly warfarin total:   35 mg   No change documented:   Minal Carrion RN   Plan last modified:   Minal Carrion RN (2020)   Next INR check:   2020   Priority:   Maintenance   Target end date:   Indefinite    Indications    Factor 5 Leiden mutation  heterozygous (H) [D68.51]  Acute venous embolism and thrombosis of deep vessels of proximal lower extremity (H) [I82.4Y9]             Anticoagulation Episode Summary     INR check location:       Preferred lab:       Send INR reminders to:   [facility]    Comments:         Anticoagulation Care Providers     Provider Role Specialty Phone number    Collin Salazar MD  Internal Medicine [phone]            See the Encounter Report to view Anticoagulation Flowsheet and Dosing Calendar (Go to Encounters tab in chart review, and find the Anticoagulation Therapy Visit)    Dosage adjustment made based on physician directed care plan.    Minal Carrion RN

## 2020-03-03 VITALS
WEIGHT: 218 LBS | OXYGEN SATURATION: 99 % | BODY MASS INDEX: 33.15 KG/M2 | HEART RATE: 56 BPM | DIASTOLIC BLOOD PRESSURE: 70 MMHG | SYSTOLIC BLOOD PRESSURE: 138 MMHG

## 2020-03-03 PROBLEM — I10 ESSENTIAL HYPERTENSION: Status: ACTIVE | Noted: 2020-03-03

## 2020-04-02 ENCOUNTER — ANTICOAGULATION THERAPY VISIT (OUTPATIENT)
Dept: NURSING | Facility: CLINIC | Age: 70
End: 2020-04-02
Payer: COMMERCIAL

## 2020-04-02 DIAGNOSIS — I82.4Y9 ACUTE VENOUS EMBOLISM AND THROMBOSIS OF DEEP VESSELS OF PROXIMAL LOWER EXTREMITY, UNSPECIFIED LATERALITY (H): ICD-10-CM

## 2020-04-02 DIAGNOSIS — D68.51 FACTOR 5 LEIDEN MUTATION, HETEROZYGOUS (H): ICD-10-CM

## 2020-04-02 LAB
CAPILLARY BLOOD COLLECTION: NORMAL
INR PPP: 3.1 (ref 0.86–1.14)

## 2020-04-02 PROCEDURE — 99207 ZZC NO CHARGE NURSE ONLY: CPT

## 2020-04-02 PROCEDURE — 36415 COLL VENOUS BLD VENIPUNCTURE: CPT | Performed by: INTERNAL MEDICINE

## 2020-04-02 PROCEDURE — 85610 PROTHROMBIN TIME: CPT | Performed by: INTERNAL MEDICINE

## 2020-04-02 NOTE — PROGRESS NOTES
Anticoagulation Management    Unable to reach Collin today.    Today's INR result of 3.1 is supratherapeutic (goal INR of 2.0-3.0).  Result received from: Clinic Lab    Follow up required to confirm warfarin dose taken and assess for changes    No instructions provided. Unable to leave voicemail.      Anticoagulation clinic to follow up    Vicenta Chance RN  Transfer return call to: [phone]  ANTICOAGULATION FOLLOW-UP CLINIC VISIT    Patient Name:  Collin Frank  Date:  4/2/2020  Contact Type:  Telephone    SUBJECTIVE:  Patient Findings     Comments:   No greens lately. The patient was assessed for diet, medication, and activity level changes, missed or extra doses, bruising or bleeding, with no problem findings.          Clinical Outcomes     Negatives:   Major bleeding event, Thromboembolic event, Anticoagulation-related hospital admission, Anticoagulation-related ED visit, Anticoagulation-related fatality    Comments:   No greens lately. The patient was assessed for diet, medication, and activity level changes, missed or extra doses, bruising or bleeding, with no problem findings.             OBJECTIVE    INR   Date Value Ref Range Status   04/02/2020 3.10 (H) 0.86 - 1.14 Final     Comment:     This test is intended for monitoring Coumadin therapy.  Results are not   accurate in patients with prolonged INR due to factor deficiency.         ASSESSMENT / PLAN  No question data found.  Anticoagulation Summary  As of 4/2/2020    INR goal:   2.0-3.0   TTR:   68.9 % (1 y)   INR used for dosing:   3.10! (4/2/2020)   Warfarin maintenance plan:   5 mg (5 mg x 1) every day   Full warfarin instructions:   5 mg every day   Weekly warfarin total:   35 mg   No change documented:   Vicenta Chance RN   Plan last modified:   Mianl Carrion RN (2/7/2020)   Next INR check:   4/30/2020   Priority:   Maintenance   Target end date:   Indefinite    Indications    Factor 5 Leiden mutation  heterozygous (H) [D68.51]  Acute venous  embolism and thrombosis of deep vessels of proximal lower extremity (H) [I82.4Y9]             Anticoagulation Episode Summary     INR check location:       Preferred lab:       Send INR reminders to:   [facility]    Comments:         Anticoagulation Care Providers     Provider Role Specialty Phone number    Collin Salazar MD  Internal Medicine [phone]            See the Encounter Report to view Anticoagulation Flowsheet and Dosing Calendar (Go to Encounters tab in chart review, and find the Anticoagulation Therapy Visit)        Vicenta Chance RN

## 2020-04-30 ENCOUNTER — ANTICOAGULATION THERAPY VISIT (OUTPATIENT)
Dept: FAMILY MEDICINE | Facility: CLINIC | Age: 70
End: 2020-04-30

## 2020-04-30 DIAGNOSIS — D68.51 FACTOR 5 LEIDEN MUTATION, HETEROZYGOUS (H): ICD-10-CM

## 2020-04-30 DIAGNOSIS — I82.4Y9 ACUTE VENOUS EMBOLISM AND THROMBOSIS OF DEEP VESSELS OF PROXIMAL LOWER EXTREMITY, UNSPECIFIED LATERALITY (H): ICD-10-CM

## 2020-04-30 LAB
CAPILLARY BLOOD COLLECTION: NORMAL
INR PPP: 3.4 (ref 0.86–1.14)

## 2020-04-30 PROCEDURE — 85610 PROTHROMBIN TIME: CPT | Performed by: INTERNAL MEDICINE

## 2020-04-30 PROCEDURE — 36416 COLLJ CAPILLARY BLOOD SPEC: CPT | Performed by: INTERNAL MEDICINE

## 2020-04-30 PROCEDURE — 99207 ZZC NO CHARGE NURSE ONLY: CPT | Performed by: INTERNAL MEDICINE

## 2020-04-30 NOTE — PROGRESS NOTES
ANTICOAGULATION FOLLOW-UP TELEPHONE VISIT    Patient Name:  Collin Frank  Date:  4/30/2020  Contact Type:  Telephone/ I called patient in follow up to INR done at lab.  Report per below.  Skip warfarin today and then resume schedule.      SUBJECTIVE: 2 wk zi  Patient Findings     Positives:   Change in activity (not walking as much.  he will try  to get out more.), Change in diet/appetite (not eating his usual routine.  he will get back on more greens.)    Comments:   No symptoms of concern        Clinical Outcomes     Negatives:   Major bleeding event, Thromboembolic event, Anticoagulation-related hospital admission, Anticoagulation-related ED visit, Anticoagulation-related fatality    Comments:   No symptoms of concern           OBJECTIVE    INR   Date Value Ref Range Status   04/30/2020 3.40 (H) 0.86 - 1.14 Final     Comment:     This test is intended for monitoring Coumadin therapy.  Results are not   accurate in patients with prolonged INR due to factor deficiency.         ASSESSMENT / PLAN  INR assessment SUPRA    Recheck INR In: 2 WEEKS    INR Location Outside lab      Anticoagulation Summary  As of 4/30/2020    INR goal:   2.0-3.0   TTR:   62.2 % (1 y)   INR used for dosing:   3.40! (4/30/2020)   Warfarin maintenance plan:   5 mg (5 mg x 1) every day   Full warfarin instructions:   4/30: Hold; Otherwise 5 mg every day   Weekly warfarin total:   35 mg   Plan last modified:   Minal Carrion RN (2/7/2020)   Next INR check:   5/14/2020   Priority:   High   Target end date:   Indefinite    Indications    Factor 5 Leiden mutation  heterozygous (H) [D68.51]  Acute venous embolism and thrombosis of deep vessels of proximal lower extremity (H) [I82.4Y9]             Anticoagulation Episode Summary     INR check location:       Preferred lab:       Send INR reminders to:   [facility]    Comments:         Anticoagulation Care Providers     Provider Role Specialty Phone number    Collin Salazar MD   Internal Medicine [phone]            See the Encounter Report to view Anticoagulation Flowsheet and Dosing Calendar (Go to Encounters tab in chart review, and find the Anticoagulation Therapy Visit)    Dosage adjustment made based on physician directed care plan.    Minal Carrion RN

## 2020-05-04 NOTE — PATIENT INSTRUCTIONS
Please call patient with appointment details. Discharged 5/4/20 needs 1 month follow up. Covid19 patient.    You have had a steroid injection today.  For the first 2 hours there will likely be some numbing in the joint from the lidocaine.  This is a good sign, indicating that the injection is in the right place.  In 2 hours the lidocaine will wear off, and the joint will hurt like you had a shot.  Each day the cortisone makes it feel better.  It reaches peak effect in 2 weeks.  We expect it to last for 3 months.  You may resume regular activity when you feel ready.  If you are diabetic, your glucoses will be quite high for several days.

## 2020-05-06 ENCOUNTER — APPOINTMENT (OUTPATIENT)
Dept: CT IMAGING | Facility: CLINIC | Age: 70
End: 2020-05-06
Attending: NURSE PRACTITIONER
Payer: COMMERCIAL

## 2020-05-06 ENCOUNTER — HOSPITAL ENCOUNTER (EMERGENCY)
Facility: CLINIC | Age: 70
Discharge: HOME | End: 2020-05-06
Attending: EMERGENCY MEDICINE | Admitting: EMERGENCY MEDICINE
Payer: COMMERCIAL

## 2020-05-06 VITALS
HEIGHT: 69 IN | BODY MASS INDEX: 32.29 KG/M2 | TEMPERATURE: 98.2 F | RESPIRATION RATE: 16 BRPM | WEIGHT: 218 LBS | OXYGEN SATURATION: 100 % | HEART RATE: 72 BPM | DIASTOLIC BLOOD PRESSURE: 86 MMHG | SYSTOLIC BLOOD PRESSURE: 160 MMHG

## 2020-05-06 DIAGNOSIS — S09.90XA INJURY OF HEAD, INITIAL ENCOUNTER: ICD-10-CM

## 2020-05-06 LAB
ANION GAP SERPL CALCULATED.3IONS-SCNC: 4 MMOL/L (ref 3–14)
BASOPHILS # BLD AUTO: 0.1 10E9/L (ref 0–0.2)
BASOPHILS NFR BLD AUTO: 1.3 %
BUN SERPL-MCNC: 16 MG/DL (ref 7–30)
CALCIUM SERPL-MCNC: 8.9 MG/DL (ref 8.5–10.1)
CHLORIDE SERPL-SCNC: 106 MMOL/L (ref 94–109)
CO2 SERPL-SCNC: 28 MMOL/L (ref 20–32)
CREAT SERPL-MCNC: 0.88 MG/DL (ref 0.66–1.25)
DIFFERENTIAL METHOD BLD: NORMAL
EOSINOPHIL # BLD AUTO: 0.4 10E9/L (ref 0–0.7)
EOSINOPHIL NFR BLD AUTO: 5.9 %
ERYTHROCYTE [DISTWIDTH] IN BLOOD BY AUTOMATED COUNT: 13.6 % (ref 10–15)
GFR SERPL CREATININE-BSD FRML MDRD: 86 ML/MIN/{1.73_M2}
GLUCOSE SERPL-MCNC: 101 MG/DL (ref 70–99)
HCT VFR BLD AUTO: 47.8 % (ref 40–53)
HGB BLD-MCNC: 15.8 G/DL (ref 13.3–17.7)
IMM GRANULOCYTES # BLD: 0 10E9/L (ref 0–0.4)
IMM GRANULOCYTES NFR BLD: 0.1 %
INR PPP: 2.7 (ref 0.86–1.14)
LYMPHOCYTES # BLD AUTO: 1.9 10E9/L (ref 0.8–5.3)
LYMPHOCYTES NFR BLD AUTO: 27.6 %
MCH RBC QN AUTO: 32.2 PG (ref 26.5–33)
MCHC RBC AUTO-ENTMCNC: 33.1 G/DL (ref 31.5–36.5)
MCV RBC AUTO: 98 FL (ref 78–100)
MONOCYTES # BLD AUTO: 0.8 10E9/L (ref 0–1.3)
MONOCYTES NFR BLD AUTO: 10.8 %
NEUTROPHILS # BLD AUTO: 3.8 10E9/L (ref 1.6–8.3)
NEUTROPHILS NFR BLD AUTO: 54.3 %
NRBC # BLD AUTO: 0 10*3/UL
NRBC BLD AUTO-RTO: 0 /100
PLATELET # BLD AUTO: 220 10E9/L (ref 150–450)
POTASSIUM SERPL-SCNC: 3.9 MMOL/L (ref 3.4–5.3)
RBC # BLD AUTO: 4.9 10E12/L (ref 4.4–5.9)
SODIUM SERPL-SCNC: 138 MMOL/L (ref 133–144)
WBC # BLD AUTO: 7 10E9/L (ref 4–11)

## 2020-05-06 PROCEDURE — 85610 PROTHROMBIN TIME: CPT | Performed by: EMERGENCY MEDICINE

## 2020-05-06 PROCEDURE — 99284 EMERGENCY DEPT VISIT MOD MDM: CPT | Mod: Z6 | Performed by: EMERGENCY MEDICINE

## 2020-05-06 PROCEDURE — 70450 CT HEAD/BRAIN W/O DYE: CPT

## 2020-05-06 PROCEDURE — 80048 BASIC METABOLIC PNL TOTAL CA: CPT | Performed by: EMERGENCY MEDICINE

## 2020-05-06 PROCEDURE — 85025 COMPLETE CBC W/AUTO DIFF WBC: CPT | Performed by: EMERGENCY MEDICINE

## 2020-05-06 PROCEDURE — 99284 EMERGENCY DEPT VISIT MOD MDM: CPT | Mod: 25 | Performed by: EMERGENCY MEDICINE

## 2020-05-06 ASSESSMENT — ENCOUNTER SYMPTOMS
RESPIRATORY NEGATIVE: 1
PSYCHIATRIC NEGATIVE: 1
GASTROINTESTINAL NEGATIVE: 1
HEADACHES: 0
LIGHT-HEADEDNESS: 0
NECK PAIN: 0
FEVER: 0
FACIAL SWELLING: 1
WOUND: 1
PHOTOPHOBIA: 0
DIZZINESS: 0
SPEECH DIFFICULTY: 0

## 2020-05-06 ASSESSMENT — MIFFLIN-ST. JEOR: SCORE: 1734.22

## 2020-05-06 NOTE — ED AVS SNAPSHOT
[facility], [place], Emergency Department  [address] [facility] [address]  Phone:  [phone]                                    Collin Frank   MRN: [id number]    Department:  [facility], Emergency Department   Date of Visit:  5/6/2020           After Visit Summary Signature Page    I have received my discharge instructions, and my questions have been answered. I have discussed any challenges I see with this plan with the nurse or doctor.    ..........................................................................................................................................  Patient/Patient Representative Signature      ..........................................................................................................................................  Patient Representative Print Name and Relationship to Patient    ..................................................               ................................................  Date                                   Time    ..........................................................................................................................................  Reviewed by Signature/Title    ...................................................              ..............................................  Date                                               Time          22EPIC Rev 08/18

## 2020-05-06 NOTE — ED PROVIDER NOTES
ED Provider Note  [facility]      History     Chief Complaint   Patient presents with     Head Injury     HPI  Collin Frank is a 69 year old male with a past medical history of Factor Five Leiden mutation, DVT, PE, May-Thurner Syndrome, and HTN who presents for evaluation of a head injury. The patient was walking in his bathroom while on the phone tonight and turned to do something, hitting his forehead on a towel hook. He denies headache or loss of consciousness. He denies photophobia, nausea, or neck pain. He reports having been anticoagulated since , when he was diagnosed with a DVT and subsequently factor five leiden.      Past Medical History  Past Medical History:   Diagnosis Date     Antiplatelet or antithrombotic long-term use     blood clot in leg     Long term current use of anticoagulant 10/16/2012     Past Surgical History:   Procedure Laterality Date     COLONOSCOPY  08    Normal. Repeat in 10 years     COLONOSCOPY WITH CO2 INSUFFLATION N/A 2018    Procedure: COLONOSCOPY WITH CO2 INSUFFLATION;  COLON SCREEN/ ENGELMANN;  Surgeon: Jan Flores MD;  Location: [facility] OR     rt knee ORIF      [facility]     cholecalciferol (VITAMIN D) 1000 UNIT tablet  losartan (COZAAR) 50 MG tablet  simvastatin (ZOCOR) 20 MG tablet  warfarin ANTICOAGULANT (JANTOVEN ANTICOAGULANT) 5 MG tablet      No Known Allergies  Past medical history, past surgical history, medications, and allergies were reviewed with the patient. Additional pertinent items: None    Family History  Family History   Problem Relation Age of Onset     Alzheimer Disease Mother      Heart Disease Father      Prostate Cancer Father      Cancer Brother 65        pancreatic        Prostate Cancer Brother      Family history was reviewed with the patient. Additional pertinent items: None    Social History  Social History     Tobacco Use     Smoking status: Former Smoker     Types: Cigarettes      "Last attempt to quit: 2011     Years since quittin.2     Smokeless tobacco: Never Used   Substance Use Topics     Alcohol use: No     Drug use: No      Social history was reviewed with the patient. Additional pertinent items: None    Review of Systems   Constitutional: Negative for fever.   HENT: Positive for facial swelling.    Eyes: Negative for photophobia and visual disturbance.   Respiratory: Negative.    Cardiovascular: Negative for chest pain and leg swelling.   Gastrointestinal: Negative.    Musculoskeletal: Negative for neck pain.   Skin: Positive for wound.   Neurological: Negative for dizziness, syncope, speech difficulty, light-headedness and headaches.   Psychiatric/Behavioral: Negative.      A complete review of systems was performed with pertinent positives and negatives noted in the HPI, and all other systems negative.    Physical Exam   BP: (!) 174/97  Pulse: 85  Temp: 98.2  F (36.8  C)  Resp: 16  Height: 175.3 cm (5' 9\")  Weight: 98.9 kg (218 lb)  SpO2: 99 %  Physical Exam  Constitutional:       Appearance: Normal appearance.   HENT:      Head: Normocephalic.      Comments: There is a 4cm laceration to the central forehead      Mouth/Throat:      Mouth: Mucous membranes are moist.   Eyes:      Extraocular Movements: Extraocular movements intact.      Conjunctiva/sclera: Conjunctivae normal.      Pupils: Pupils are equal, round, and reactive to light.   Neck:      Musculoskeletal: Normal range of motion and neck supple.   Cardiovascular:      Rate and Rhythm: Normal rate and regular rhythm.   Pulmonary:      Effort: Pulmonary effort is normal.   Abdominal:      Palpations: Abdomen is soft.   Musculoskeletal: Normal range of motion.   Skin:     General: Skin is warm and dry.   Neurological:      General: No focal deficit present.      Mental Status: He is alert and oriented to person, place, and time.   Psychiatric:         Mood and Affect: Mood normal.         Judgment: Judgment normal. "         ED Course      Procedures        Head wound irrigated and cleaned  Evaluated and examined  Head CT completed  Wound closed with dermabond and steri-strips       Results for orders placed or performed during the hospital encounter of 05/06/20   CBC with platelets differential     Status: None   Result Value Ref Range    WBC 7.0 4.0 - 11.0 10e9/L    RBC Count 4.90 4.4 - 5.9 10e12/L    Hemoglobin 15.8 13.3 - 17.7 g/dL    Hematocrit 47.8 40.0 - 53.0 %    MCV 98 78 - 100 fl    MCH 32.2 26.5 - 33.0 pg    MCHC 33.1 31.5 - 36.5 g/dL    RDW 13.6 10.0 - 15.0 %    Platelet Count 220 150 - 450 10e9/L    Diff Method Automated Method     % Neutrophils 54.3 %    % Lymphocytes 27.6 %    % Monocytes 10.8 %    % Eosinophils 5.9 %    % Basophils 1.3 %    % Immature Granulocytes 0.1 %    Nucleated RBCs 0 0 /100    Absolute Neutrophil 3.8 1.6 - 8.3 10e9/L    Absolute Lymphocytes 1.9 0.8 - 5.3 10e9/L    Absolute Monocytes 0.8 0.0 - 1.3 10e9/L    Absolute Eosinophils 0.4 0.0 - 0.7 10e9/L    Absolute Basophils 0.1 0.0 - 0.2 10e9/L    Abs Immature Granulocytes 0.0 0 - 0.4 10e9/L    Absolute Nucleated RBC 0.0    Basic metabolic panel     Status: Abnormal   Result Value Ref Range    Sodium 138 133 - 144 mmol/L    Potassium 3.9 3.4 - 5.3 mmol/L    Chloride 106 94 - 109 mmol/L    Carbon Dioxide 28 20 - 32 mmol/L    Anion Gap 4 3 - 14 mmol/L    Glucose 101 (H) 70 - 99 mg/dL    Urea Nitrogen 16 7 - 30 mg/dL    Creatinine 0.88 0.66 - 1.25 mg/dL    GFR Estimate 86 >60 mL/min/[1.73_m2]    GFR Estimate If Black >90 >60 mL/min/[1.73_m2]    Calcium 8.9 8.5 - 10.1 mg/dL     Medications - No data to display     Assessments & Plan (with Medical Decision Making)   69yr old male anticoagulated secondary to Factor Five Leiden with history of DVT/PE, and May Thurner who presents with a head injury. He denies headache, photophobia, vomiting, neck pain. Labs show INR 3.4 and otherwise normal BMP and CBC. On exam there are no neuro deficits observed. CT  Head reveals no acute process. Wound cleaned and dressed with dermabond. Patient was offered tetanus shot as medical record shows last shot was 2012 but patient declined. He thinks he has had one more recently than that.     Discussed reasons for return to the Emergency Dept, including further bleeding, vomiting, headache, altered mental status, or purulent drainage. Patient voiced his understanding.     I have reviewed the nursing notes. I have reviewed the findings, diagnosis, plan and need for follow up with the patient.    New Prescriptions    No medications on file       Final diagnoses:   None       --  Myrna Maxwell, CNP   Emergency Medicine   [facility], EMERGENCY DEPARTMENT  5/6/2020     Myrna Maxwell, CNP  05/06/20 0436

## 2020-05-06 NOTE — DISCHARGE INSTRUCTIONS
Please make an appointment to follow up with your anticoagulation clinic and Your Primary Care Provider in 2-3 days. The steri-strips on your wound will fall off on their own in 5-7 days, do NOT pick them off.     Return to the Emergency Dept for recurrent bleeding, headache, vomiting, or altered mental status.

## 2020-05-14 ENCOUNTER — ANTICOAGULATION THERAPY VISIT (OUTPATIENT)
Dept: FAMILY MEDICINE | Facility: CLINIC | Age: 70
End: 2020-05-14

## 2020-05-14 DIAGNOSIS — D68.51 FACTOR 5 LEIDEN MUTATION, HETEROZYGOUS (H): ICD-10-CM

## 2020-05-14 DIAGNOSIS — I82.4Y9 ACUTE VENOUS EMBOLISM AND THROMBOSIS OF DEEP VESSELS OF PROXIMAL LOWER EXTREMITY, UNSPECIFIED LATERALITY (H): ICD-10-CM

## 2020-05-14 LAB
CAPILLARY BLOOD COLLECTION: NORMAL
INR PPP: 2.7 (ref 0.86–1.14)

## 2020-05-14 PROCEDURE — 36416 COLLJ CAPILLARY BLOOD SPEC: CPT | Performed by: INTERNAL MEDICINE

## 2020-05-14 PROCEDURE — 85610 PROTHROMBIN TIME: CPT | Performed by: INTERNAL MEDICINE

## 2020-05-14 PROCEDURE — 99207 ZZC NO CHARGE NURSE ONLY: CPT | Performed by: INTERNAL MEDICINE

## 2020-05-14 NOTE — PROGRESS NOTES
Anticoagulation Management    Unable to reach Collin today.    Today's INR result of 2.7 is therapeutic (goal INR of 2.0-3.0).  Result received from: Clinic Lab    Follow up required to confirm warfarin dose taken and assess for changes    Left message to call [phone]      Anticoagulation clinic to follow up    Vicenta Chance RN  ANTICOAGULATION FOLLOW-UP CLINIC VISIT    Patient Name:  Collin Frank  Date:  2020  Contact Type:  Telephone    SUBJECTIVE:  Patient Findings     Positives:   Emergency department visit    Comments:   In the ED on  for head injury        Clinical Outcomes     Negatives:   Major bleeding event, Thromboembolic event, Anticoagulation-related hospital admission, Anticoagulation-related ED visit, Anticoagulation-related fatality    Comments:   In the ED on  for head injury           OBJECTIVE    Recent labs: (last 7 days)     20  1254   INR 2.70*       ASSESSMENT / PLAN  No question data found.  Anticoagulation Summary  As of 2020    INR goal:   2.0-3.0   TTR:   64.5 % (1 y)   INR used for dosin.70 (2020)   Warfarin maintenance plan:   5 mg (5 mg x 1) every day   Full warfarin instructions:   5 mg every day   Weekly warfarin total:   35 mg   No change documented:   Vicenta Chance RN   Plan last modified:   Minal Carrion RN (2020)   Next INR check:   2020   Priority:   High   Target end date:   Indefinite    Indications    Factor 5 Leiden mutation  heterozygous (H) [D68.51]  Acute venous embolism and thrombosis of deep vessels of proximal lower extremity (H) [I82.4Y9]             Anticoagulation Episode Summary     INR check location:       Preferred lab:       Send INR reminders to:   [facility]    Comments:         Anticoagulation Care Providers     Provider Role Specialty Phone number    Collin Salazar MD  Internal Medicine [phone]            See the Encounter Report to view Anticoagulation Flowsheet and Dosing Calendar  (Go to Encounters tab in chart review, and find the Anticoagulation Therapy Visit)        Vicenta Chance RN

## 2020-06-08 ENCOUNTER — HOSPITAL ENCOUNTER (EMERGENCY)
Facility: CLINIC | Age: 70
Discharge: HOME | End: 2020-06-08
Attending: EMERGENCY MEDICINE | Admitting: EMERGENCY MEDICINE
Payer: COMMERCIAL

## 2020-06-08 ENCOUNTER — ANTICOAGULATION THERAPY VISIT (OUTPATIENT)
Dept: FAMILY MEDICINE | Facility: CLINIC | Age: 70
End: 2020-06-08
Payer: COMMERCIAL

## 2020-06-08 ENCOUNTER — APPOINTMENT (OUTPATIENT)
Dept: ULTRASOUND IMAGING | Facility: CLINIC | Age: 70
End: 2020-06-08
Attending: EMERGENCY MEDICINE
Payer: COMMERCIAL

## 2020-06-08 ENCOUNTER — TELEPHONE (OUTPATIENT)
Dept: FAMILY MEDICINE | Facility: CLINIC | Age: 70
End: 2020-06-08

## 2020-06-08 VITALS
TEMPERATURE: 98.6 F | OXYGEN SATURATION: 98 % | RESPIRATION RATE: 16 BRPM | SYSTOLIC BLOOD PRESSURE: 159 MMHG | BODY MASS INDEX: 32.89 KG/M2 | HEIGHT: 68 IN | HEART RATE: 88 BPM | WEIGHT: 217 LBS | DIASTOLIC BLOOD PRESSURE: 97 MMHG

## 2020-06-08 DIAGNOSIS — I82.4Y9 ACUTE VENOUS EMBOLISM AND THROMBOSIS OF DEEP VESSELS OF PROXIMAL LOWER EXTREMITY, UNSPECIFIED LATERALITY (H): ICD-10-CM

## 2020-06-08 DIAGNOSIS — D68.51 FACTOR 5 LEIDEN MUTATION, HETEROZYGOUS (H): ICD-10-CM

## 2020-06-08 DIAGNOSIS — I82.402 DEEP VEIN THROMBOSIS (DVT) OF LEFT LOWER EXTREMITY, UNSPECIFIED CHRONICITY, UNSPECIFIED VEIN (H): ICD-10-CM

## 2020-06-08 DIAGNOSIS — Z79.01 LONG TERM CURRENT USE OF ANTICOAGULANT: ICD-10-CM

## 2020-06-08 LAB
D DIMER PPP FEU-MCNC: 0.7 UG/ML FEU (ref 0–0.5)
INR PPP: 4.37 (ref 0.86–1.14)

## 2020-06-08 PROCEDURE — 85379 FIBRIN DEGRADATION QUANT: CPT | Performed by: EMERGENCY MEDICINE

## 2020-06-08 PROCEDURE — 85610 PROTHROMBIN TIME: CPT | Performed by: EMERGENCY MEDICINE

## 2020-06-08 PROCEDURE — 99285 EMERGENCY DEPT VISIT HI MDM: CPT | Mod: Z6 | Performed by: EMERGENCY MEDICINE

## 2020-06-08 PROCEDURE — 93971 EXTREMITY STUDY: CPT | Mod: LT

## 2020-06-08 PROCEDURE — 99284 EMERGENCY DEPT VISIT MOD MDM: CPT | Mod: 25 | Performed by: EMERGENCY MEDICINE

## 2020-06-08 PROCEDURE — 99207 ZZC NO CHARGE NURSE ONLY: CPT | Performed by: INTERNAL MEDICINE

## 2020-06-08 ASSESSMENT — ENCOUNTER SYMPTOMS
NECK STIFFNESS: 0
ARTHRALGIAS: 0
ABDOMINAL PAIN: 0
CONFUSION: 0
SHORTNESS OF BREATH: 0
EYE REDNESS: 0
DIFFICULTY URINATING: 0
FEVER: 0
HEADACHES: 0
COLOR CHANGE: 0

## 2020-06-08 ASSESSMENT — MIFFLIN-ST. JEOR: SCORE: 1713.81

## 2020-06-08 NOTE — PROGRESS NOTES
ANTICOAGULATION FOLLOW-UP TELEPHONE VISIT    Patient Name:  Collin Frank  Date:  2020  Contact Type:  Telephone/ See telephone note dated today and report below.    SUBJECTIVE: zi 2 days  Patient Findings     Positives:   Signs/symptoms of thrombosis (+DVT while on coumadin with supra-therapeutic INR of 4.37), Emergency department visit (ED today, see notes.), Change in medications (hold warfarin, zi INR in 2 days and if below 2.0 INR he will start Eliquis.)    Comments:   Assessment per above.  Spoke to patient, see other telephone message.        Clinical Outcomes     Comments:   Assessment per above.  Spoke to patient, see other telephone message.           OBJECTIVE    Recent labs: (last 7 days)     20  0853   INR 4.37*       ASSESSMENT / PLAN  INR assessment SUPRA    Recheck INR In: 2 DAYS    INR Location Outside lab      Anticoagulation Summary  As of 2020    INR goal:   2.0-3.0   TTR:   64.9 % (1 y)   INR used for dosin.37! (2020)   Warfarin maintenance plan:   5 mg (5 mg x 1) every day   Full warfarin instructions:   : Hold; : Hold; Otherwise 5 mg every day   Weekly warfarin total:   35 mg   Plan last modified:   Minal Carrion, RN (2020)   Next INR check:   6/10/2020   Priority:   Critical   Target end date:   Indefinite    Indications    Factor 5 Leiden mutation  heterozygous (H) [D68.51]  Acute venous embolism and thrombosis of deep vessels of proximal lower extremity (H) [I82.4Y9]             Anticoagulation Episode Summary     INR check location:       Preferred lab:       Send INR reminders to:   [facility]    Comments:         Anticoagulation Care Providers     Provider Role Specialty Phone number    Collin Salazar MD  Internal Medicine [phone]            See the Encounter Report to view Anticoagulation Flowsheet and Dosing Calendar (Go to Encounters tab in chart review, and find the Anticoagulation Therapy Visit)    Dosage adjustment made  based on physician directed care plan.    Minal Carrion RN

## 2020-06-08 NOTE — ED PROVIDER NOTES
"    [place] EMERGENCY DEPARTMENT ([facility])  June 8, 2020  History     Chief Complaint   Patient presents with     Leg Pain     L leg     The history is provided by the patient and medical records.     Collin Frank is a 69 year old male with a past medical history significant for Factor Five Leiden mutation(warfarin), DVT, PE, May-Thurner Syndrome and HTN  who presents to the Emergency Department for evaluation of Left lower leg pain.     Patient reports pain started last night. Patient states the pain was an 8/10. Patient notes the dull aching pain went from his ankle and radiated upwards to his upper thigh. Patient reports it feels like a tight muscle \"almost like a talia horse\" Patient notes his stretching last night did not help the pain. Patient notes mild swelling in his leg and that it \"feels heavy.\" Patient denies any trauma to the leg or overdoing any exercise. Patient notes walking to the ED made it feel better today. Patient states his pain today is less than the pain yesterday.     Patient denies lower back pain but notes the pain did radiate to his upper thigh/lower buttock.         PAST MEDICAL HISTORY:   Past Medical History:   Diagnosis Date     Antiplatelet or antithrombotic long-term use     blood clot in leg     Long term current use of anticoagulant 10/16/2012       PAST SURGICAL HISTORY:   Past Surgical History:   Procedure Laterality Date     COLONOSCOPY  1-17-08    Normal. Repeat in 10 years     COLONOSCOPY WITH CO2 INSUFFLATION N/A 2/1/2018    Procedure: COLONOSCOPY WITH CO2 INSUFFLATION;  COLON SCREEN/ ENGELMANN;  Surgeon: Jan Flores MD;  Location: [facility] OR     rt knee ORIF  [address] [place] [address]       Past medical history, past surgical history, medications, and allergies were reviewed with the patient. Additional pertinent items: None    FAMILY HISTORY:   Family History   Problem Relation Age of Onset     Alzheimer Disease Mother      Heart Disease Father      " Prostate Cancer Father      Cancer Brother 65        pancreatic        Prostate Cancer Brother        SOCIAL HISTORY:   Social History     Tobacco Use     Smoking status: Former Smoker     Types: Cigarettes     Last attempt to quit: 2011     Years since quittin.3     Smokeless tobacco: Never Used   Substance Use Topics     Alcohol use: No     Social history was reviewed with the patient. Additional pertinent items: None      Discharge Medication List as of 2020  2:24 PM      START taking these medications    Details   apixaban ANTICOAGULANT (ELIQUIS STARTER PACK) 5 MG tablet Take 2 tablets (10 mg) by mouth 2 times daily for 7 days, THEN 1 tablet (5 mg) 2 times daily for 23 days., Disp-74 tablet,R-0, E-Prescribe         CONTINUE these medications which have NOT CHANGED    Details   cholecalciferol (VITAMIN D) 1000 UNIT tablet Take 1 tablet (1,000 Units) by mouth daily, Disp-100 tablet, R-3, Historical      losartan (COZAAR) 50 MG tablet TAKE 1 TABLET BY MOUTH EVERY DAY, Disp-30 tablet, R-0, E-Prescribe      simvastatin (ZOCOR) 20 MG tablet Take 1 tablet (20 mg) by mouth At Bedtime, Disp-90 tablet, R-3, E-Prescribe      warfarin ANTICOAGULANT (JANTOVEN ANTICOAGULANT) 5 MG tablet TAKE 1 TABLET BY MOUTH     EVERY DAY, Disp-100 tablet, R-3, E-Prescribe              No Known Allergies     Review of Systems   Constitutional: Negative for fever.   HENT: Negative for congestion.    Eyes: Negative for redness.   Respiratory: Negative for shortness of breath.    Cardiovascular: Positive for leg swelling (Positive for subjective mild Left leg swelling. ). Negative for chest pain.   Gastrointestinal: Negative for abdominal pain.   Genitourinary: Negative for difficulty urinating.   Musculoskeletal: Negative for arthralgias, gait problem and neck stiffness.        Positive for pain in his Left lower leg (ankle to his upper thigh)   Skin: Negative for color change.   Neurological: Negative for headaches.  "  Psychiatric/Behavioral: Negative for confusion.     A complete review of systems was performed with pertinent positives and negatives noted in the HPI, and all other systems negative.    Physical Exam   BP: (!) 149/86  Pulse: 103  Heart Rate: 100  Temp: 98.6  F (37  C)  Resp: 16  Height: 172.7 cm (5' 8\")  Weight: 98.4 kg (217 lb)  SpO2: 100 %      Physical Exam  Constitutional:       General: He is not in acute distress.     Appearance: He is not diaphoretic.   HENT:      Head: Atraumatic.   Eyes:      General: No scleral icterus.     Pupils: Pupils are equal, round, and reactive to light.   Cardiovascular:      Heart sounds: Normal heart sounds.   Pulmonary:      Effort: No respiratory distress.      Breath sounds: Normal breath sounds.   Abdominal:      General: Bowel sounds are normal.      Palpations: Abdomen is soft.      Tenderness: There is no abdominal tenderness.   Musculoskeletal:         General: No tenderness.   Skin:     General: Skin is warm.      Findings: No rash.         ED Course   8:42 AM  The patient was seen and examined by Dr. Clark in Room ED25.        Procedures                           Results for orders placed or performed during the hospital encounter of 06/08/20 (from the past 24 hour(s))   INR   Result Value Ref Range    INR 4.37 (H) 0.86 - 1.14   D dimer quantitative   Result Value Ref Range    D Dimer 0.7 (H) 0.0 - 0.50 ug/ml FEU     Medications - No data to display          Assessments & Plan (with Medical Decision Making)   69-year-old male presents for evaluation of left lower extremity pain.  Differential included sciatica, DVT, zoster, ischemia, strain.  Exam revealed intact pulses without rash and symmetrical to the right side.  Ultrasound was obtained revealing DVT and superficial femoral vein extending to popliteal vein.  The case was discussed at length with hematology.  D-dimer was elevated 0.7.  This is a Coumadin failure in light of INR being supratherapeutic at 4.37.  " Patient will stop Coumadin and start Eliquis when INR is below 2.0.  I have recommended repeat INR in 2 days and follow-up with primary physician.  Prescription for Eliquis was sent electronically to patient's pharmacy on [address].    I have reviewed the nursing notes.    I have reviewed the findings, diagnosis, plan and need for follow up with the patient.    Discharge Medication List as of 6/8/2020  2:24 PM      START taking these medications    Details   apixaban ANTICOAGULANT (ELIQUIS STARTER PACK) 5 MG tablet Take 2 tablets (10 mg) by mouth 2 times daily for 7 days, THEN 1 tablet (5 mg) 2 times daily for 23 days., Disp-74 tablet,R-0, E-Prescribe             Final diagnoses:   Deep vein thrombosis (DVT) of left lower extremity, unspecified chronicity, unspecified vein (H)   IBurke, am serving as a trained medical scribe to document services personally performed by Nawaf Clark MD, based on the provider's statements to me.     INawaf MD, was physically present and have reviewed and verified the accuracy of this note documented by Burke Russo.      6/8/2020   [facility], [place], EMERGENCY DEPARTMENT     Nawaf Clark MD  06/08/20 [id number]

## 2020-06-08 NOTE — ED AVS SNAPSHOT
[facility], [place], Emergency Department  [address] [address]  Phone:  [phone]                                    Collin Frank   MRN: [id number]    Department:  [facility], Emergency Department   Date of Visit:  6/8/2020           After Visit Summary Signature Page    I have received my discharge instructions, and my questions have been answered. I have discussed any challenges I see with this plan with the nurse or doctor.    ..........................................................................................................................................  Patient/Patient Representative Signature      ..........................................................................................................................................  Patient Representative Print Name and Relationship to Patient    ..................................................               ................................................  Date                                   Time    ..........................................................................................................................................  Reviewed by Signature/Title    ...................................................              ..............................................  Date                                               Time          22EPIC Rev 08/18

## 2020-06-08 NOTE — TELEPHONE ENCOUNTER
I contacted Collin following [facility] notification of hospital discharge.    Appears he will be discontinuing his warfarin as he experienced DVT, see ED notes.    I reviewed that patient will see his PCP tomorrow and then the plan is to check INR Wednesday and if INR is less than 2.0 he is switching to Eliquis.    Patient verbalized understanding and is agreeable to plan of care.    Minal Humphrey RN  Anticoagulation [place]

## 2020-06-08 NOTE — ED TRIAGE NOTES
Saturday night pt noticed a throbbing pain in his L lower leg.  Last night pt said the pain was so bad that he couldn't sleep.  Pt states pain is better this morning but still feels pain especially with movement.

## 2020-06-09 ENCOUNTER — VIRTUAL VISIT (OUTPATIENT)
Dept: FAMILY MEDICINE | Facility: CLINIC | Age: 70
End: 2020-06-09
Payer: COMMERCIAL

## 2020-06-09 ENCOUNTER — TELEPHONE (OUTPATIENT)
Dept: FAMILY MEDICINE | Facility: CLINIC | Age: 70
End: 2020-06-09

## 2020-06-09 DIAGNOSIS — D68.51 FACTOR 5 LEIDEN MUTATION, HETEROZYGOUS (H): ICD-10-CM

## 2020-06-09 DIAGNOSIS — I82.4Y9 ACUTE VENOUS EMBOLISM AND THROMBOSIS OF DEEP VESSELS OF PROXIMAL LOWER EXTREMITY, UNSPECIFIED LATERALITY (H): Primary | ICD-10-CM

## 2020-06-09 DIAGNOSIS — Z79.01 LONG TERM CURRENT USE OF ANTICOAGULANT THERAPY: ICD-10-CM

## 2020-06-09 LAB — RADIOLOGIST FLAGS: ABNORMAL

## 2020-06-09 PROCEDURE — 99213 OFFICE O/P EST LOW 20 MIN: CPT | Mod: TEL | Performed by: INTERNAL MEDICINE

## 2020-06-09 NOTE — PROGRESS NOTES
"Collin Frank is a 69 year old male who is being evaluated via a billable telephone visit.      The patient has been notified of following:     \"This telephone visit will be conducted via a call between you and your physician/provider. We have found that certain health care needs can be provided without the need for a physical exam.  This service lets us provide the care you need with a short phone conversation.  If a prescription is necessary we can send it directly to your pharmacy.  If lab work is needed we can place an order for that and you can then stop by our lab to have the test done at a later time.    Telephone visits are billed at different rates depending on your insurance coverage. During this emergency period, for some insurers they may be billed the same as an in-person visit.  Please reach out to your insurance provider with any questions.    If during the course of the call the physician/provider feels a telephone visit is not appropriate, you will not be charged for this service.\"    Patient has given verbal consent for Telephone visit?  Yes    What phone number would you like to be contacted at? [phone]    How would you like to obtain your AVS? Mail a copy    Subjective     Collin Frank is a 69 year old male who presents via phone visit today for the following health issues:    HPI  ED/UC Followup:    Facility:  [facility]  Date of visit: 6/8/20  Reason for visit: left leg pain  Current Status: feels uncomfortable at times and not as worse as the day before     Warfarin was stopped and Eliquis was prescribed and he has not picked it up yet.  Pt would also like for Warfarin to be taken off his med list so it is updated.    Drops inr blood Wednesday and Thursday    Feeling good in the meantime  Pulled muscle is dissapated  Not as severe            Patient Active Problem List   Diagnosis     Hyperhomocysteinemia (H)     Acute venous embolism and thrombosis of deep vessels of proximal lower " extremity (H)     CARDIOVASCULAR SCREENING; LDL GOAL LESS THAN 130     Factor 5 Leiden mutation, heterozygous (H)     May-Thurner syndrome     Long term current use of anticoagulant     Vitamin D deficiency     Renal stones     Hepatic hemangioma     FH: prostate cancer     Long-term (current) use of anticoagulants [Z79.01]     Pulmonary embolism with infarction (HCC) [I26.99]     Post-traumatic osteoarthritis of right knee     Benign non-nodular prostatic hyperplasia without lower urinary tract symptoms     Primary osteoarthritis of left knee     Benign essential hypertension     Primary osteoarthritis of both knees     Essential hypertension     Past Surgical History:   Procedure Laterality Date     COLONOSCOPY  08    Normal. Repeat in 10 years     COLONOSCOPY WITH CO2 INSUFFLATION N/A 2018    Procedure: COLONOSCOPY WITH CO2 INSUFFLATION;  COLON SCREEN/ ANNMARIEELMARI;  Surgeon: Jan Flores MD;  Location: [facility] OR     rt knee ORIF      [facility]       Social History     Tobacco Use     Smoking status: Former Smoker     Types: Cigarettes     Last attempt to quit: 2011     Years since quittin.3     Smokeless tobacco: Never Used   Substance Use Topics     Alcohol use: No     Family History   Problem Relation Age of Onset     Alzheimer Disease Mother      Heart Disease Father      Prostate Cancer Father      Cancer Brother 65        pancreatic        Prostate Cancer Brother          Current Outpatient Medications   Medication Sig Dispense Refill     cholecalciferol (VITAMIN D) 1000 UNIT tablet Take 1 tablet (1,000 Units) by mouth daily 100 tablet 3     losartan (COZAAR) 50 MG tablet TAKE 1 TABLET BY MOUTH EVERY DAY 30 tablet 0     simvastatin (ZOCOR) 20 MG tablet Take 1 tablet (20 mg) by mouth At Bedtime 90 tablet 3     apixaban ANTICOAGULANT (ELIQUIS STARTER PACK) 5 MG tablet Take 2 tablets (10 mg) by mouth 2 times daily for 7 days, THEN 1 tablet (5 mg) 2 times daily for 23 days.  (Patient not taking: Reported on 6/9/2020) 74 tablet 0     No Known Allergies  Recent Labs   Lab Test 05/06/20  0218 01/04/20  1414 01/04/20  1413 09/16/19  1052  12/12/18  0919  12/01/15  1056  10/16/12  1038   A1C  --   --   --   --   --   --   --   --   --  5.5   LDL  --   --   --  51  --  132*  --  108*   < > 141*   HDL  --   --   --  60  --  48  --  53   < > 47   TRIG  --   --   --  50  --  106  --  122   < > 67   ALT  --   --  43 39  --  36  --   --   --  33   CR 0.88  --  0.89 0.87   < > 0.95   < > 0.84   < > 0.66   GFRESTIMATED 86 83 86 87   < > 79   < > >90  Non  GFR Calc     < > >90   GFRESTBLACK >90 >90 >90 >90   < > >90   < > >90  African American GFR Calc     < > >90   POTASSIUM 3.9  --  3.6 4.0   < > 4.0   < > 4.4   < > 4.3   TSH  --   --   --  1.43  --   --   --   --   --   --     < > = values in this interval not displayed.        Reviewed and updated as needed this visit by Provider         Review of Systems   Constitutional, HEENT, cardiovascular, pulmonary, gi and gu systems are negative, except as otherwise noted.       Objective   Reported vitals:  There were no vitals taken for this visit.   healthy, alert and no distress  PSYCH: Alert and oriented times 3; coherent speech, normal   rate and volume, able to articulate logical thoughts, able   to abstract reason, no tangential thoughts, no hallucinations   or delusions  His affect is normal  RESP: No cough, no audible wheezing, able to talk in full sentences  Remainder of exam unable to be completed due to telephone visits    Diagnostic Test Results:  Labs reviewed in Epic  No results found for any visits on 06/09/20.        Assessment/Plan:  There are no diagnoses linked to this encounter.    ICD-10-CM    1. Acute venous embolism and thrombosis of deep vessels of proximal lower extremity, unspecified laterality (H)  I82.4Y9    2. Factor 5 Leiden mutation, heterozygous (H)  D68.51    3. Long-term (current) use of anticoagulants  [Z79.01]  Z79.01      Patient with coumadin failure start eliqiis once INR< 2.0  Described differences between eliquis and coumadin and testing and dosing  And risks of bleeding    Patient is a coumadin failure    Should we consider a greenfileds filter with the extent of the mitchel?  And previous PE    Will review with hemaotology recommendations        No follow-ups on file.      Phone call duration:  15 minutes    Collin Salazar MD

## 2020-06-09 NOTE — TELEPHONE ENCOUNTER
Patient in ED 6/8/2020 - requesting US results.      Routing to PCP to review and advise.    Lulu Myers RN  [facility]

## 2020-06-09 NOTE — TELEPHONE ENCOUNTER
Reason for Call:  Other call back    Detailed comments: Hannah would like a call back  To give results on ultrasound on 6/8/2020.    Phone Number Patient can be reached at: Other phone number:  [phone]    Best Time: ASANOY    Can we leave a detailed message on this number? NO    Call taken on 6/9/2020 at 9:07 AM by Aleja Hazel

## 2020-06-10 ENCOUNTER — ANTICOAGULATION THERAPY VISIT (OUTPATIENT)
Dept: NURSING | Facility: CLINIC | Age: 70
End: 2020-06-10

## 2020-06-10 DIAGNOSIS — D68.51 FACTOR 5 LEIDEN MUTATION, HETEROZYGOUS (H): ICD-10-CM

## 2020-06-10 DIAGNOSIS — I82.4Y9 ACUTE VENOUS EMBOLISM AND THROMBOSIS OF DEEP VESSELS OF PROXIMAL LOWER EXTREMITY, UNSPECIFIED LATERALITY (H): ICD-10-CM

## 2020-06-10 LAB
CAPILLARY BLOOD COLLECTION: NORMAL
INR PPP: 1.8 (ref 0.86–1.14)

## 2020-06-10 PROCEDURE — 85610 PROTHROMBIN TIME: CPT | Performed by: INTERNAL MEDICINE

## 2020-06-10 PROCEDURE — 36416 COLLJ CAPILLARY BLOOD SPEC: CPT | Performed by: INTERNAL MEDICINE

## 2020-06-10 NOTE — PROGRESS NOTES
2:06 PM: Patient called [facility] back, we discussed the plan to transition to Eliquis starting tonight. Patient will take the medication every 12 hours. We discussed setting aside the warfarin to ensure he is not taking both anticoagulants. Discussed the Eliquis does not require monitoring so he will no longer be followed by our clinic. He should watch for any signs/symptoms of bleeding and call the clinic or ambulance if he has concerns.       ANTICOAGULATION  MANAGEMENT    Collin Frank is being discharged from the [facility] ([facility]).    Reason for discharge: warfarin replaced by alternate therapy, Eliquis    Anticoagulation episode resolved, [facility] referral closed and INR Standing order discontinued    If patient needs warfarin management in the future, please send a new referral       Dario MARIE RN, CACP

## 2020-06-18 ENCOUNTER — TELEPHONE (OUTPATIENT)
Dept: FAMILY MEDICINE | Facility: CLINIC | Age: 70
End: 2020-06-18

## 2020-06-18 DIAGNOSIS — D68.51 FACTOR 5 LEIDEN MUTATION, HETEROZYGOUS (H): Primary | ICD-10-CM

## 2020-06-18 NOTE — TELEPHONE ENCOUNTER
Nurse does see notes 6/8/2020 regarding possibly switching to Eliquis?    Nurse will call pharmacy to cancel Warfarin with doctor approval?    Routing to PCP to review and advise.    Lulu Myers RN  [facility]

## 2020-06-18 NOTE — TELEPHONE ENCOUNTER
Reason for Call:  Other prescription    Detailed comments: patient is requesting for Dr. Salazar to contact [facility] to discontinue his warfarin. It is due to be refilled on 6/30, but patient has been switched to Eliquis. He is also wondering if Dr. Salazar would like to send his Eliquis to [facility] if he thinks he will be on it long term?    Phone Number Patient can be reached at: Home number on file [phone] (home)    Best Time: anytime    Can we leave a detailed message on this number? NO - no answering machine    Call taken on 6/18/2020 at 11:09 AM by Familia Medina

## 2020-06-22 NOTE — TELEPHONE ENCOUNTER
Please call pharmacy and cancel warfarin and then call patient.  Nataliia GREYN,RN  [facility]

## 2020-06-22 NOTE — TELEPHONE ENCOUNTER
Reason for Call:  Other     Detailed comments: Patient requesting for nurse to call back regarding status of medications. Please advise.     Phone Number Patient can be reached at: Home number on file [phone] (home)    Best Time: Anytime    Can we leave a detailed message on this number? YES    Call taken on 6/22/2020 at 3:12 PM by Sandi Jin

## 2020-06-22 NOTE — TELEPHONE ENCOUNTER
Called pharmacy to cancel Warfarin and then called pt to let him know that it was cancelled and that Eliquis was sent in to [facility] mail order.    Divya See JEREMIAS Deutsch

## 2020-10-05 DIAGNOSIS — D68.51 FACTOR 5 LEIDEN MUTATION, HETEROZYGOUS (H): ICD-10-CM

## 2020-11-25 DIAGNOSIS — I10 BENIGN ESSENTIAL HYPERTENSION: ICD-10-CM

## 2020-11-25 DIAGNOSIS — D68.51 FACTOR 5 LEIDEN MUTATION, HETEROZYGOUS (H): ICD-10-CM

## 2020-11-25 DIAGNOSIS — E78.5 HYPERLIPIDEMIA LDL GOAL <130: ICD-10-CM

## 2020-11-25 RX ORDER — LOSARTAN POTASSIUM 50 MG/1
50 TABLET ORAL DAILY
Qty: 30 TABLET | Refills: 0 | Status: SHIPPED | OUTPATIENT
Start: 2020-11-25 | End: 2021-02-22

## 2020-11-25 RX ORDER — SIMVASTATIN 20 MG
20 TABLET ORAL AT BEDTIME
Qty: 90 TABLET | Refills: 0 | Status: SHIPPED | OUTPATIENT
Start: 2020-11-25 | End: 2021-02-22

## 2020-11-25 NOTE — TELEPHONE ENCOUNTER
Reason for Call:  Medication or medication refill:    Do you use a [facility]?  Name of the pharmacy and phone number for the current request:  [facility]: [address], [place], [place]    Name of the medication requested: apixaban ANTICOAGULANT (ELIQUIS)  / losartan (COZAAR) / simvastatin (ZOCOR)    Other request: Patient stated he is not due for refills until January, however, he switched to [facility] and he needs new prescriptions sent for all of his medications.    Can we leave a detailed message on this number? YES    Phone number patient can be reached at: Home number on file [phone] (home)    Best Time: Anytime    Call taken on 11/25/2020 at 7:51 AM by Mariana Gallardo

## 2020-11-25 NOTE — TELEPHONE ENCOUNTER
"Requested Prescriptions   Pending Prescriptions Disp Refills     apixaban ANTICOAGULANT (ELIQUIS) 5 MG tablet 180 tablet 3     Sig: Take 1 tablet (5 mg) by mouth 2 times daily discontinue warfarin.       Direct Oral Anticoagulant Agents Passed - 11/25/2020  8:43 AM        Passed - Normal Platelets on file in past 12 months     Recent Labs   Lab Test 05/06/20  0218                  Passed - Medication is active on med list        Passed - Patient is 18-79 years of age        Passed - Serum creatinine less than or equal to 1.4 on file in past 12 mos     Recent Labs   Lab Test 05/06/20 0218 01/04/20  1414   CR 0.88  --    CREAT  --  0.9       Ok to refill medication if creatinine is low          Passed - Weight is greater than 60 kg for the past year     Wt Readings from Last 3 Encounters:   06/08/20 98.4 kg (217 lb)   05/06/20 98.9 kg (218 lb)   03/02/20 98.9 kg (218 lb)             Passed - Recent (6 mo) or future (30 days) visit within the authorizing provider's specialty           losartan (COZAAR) 50 MG tablet 30 tablet 0     Sig: Take 1 tablet (50 mg) by mouth daily       Angiotensin-II Receptors Failed - 11/25/2020  8:43 AM        Failed - Last blood pressure under 140/90 in past 12 months     BP Readings from Last 3 Encounters:   06/08/20 (!) 159/97   05/06/20 (!) 160/86   03/02/20 138/70                 Passed - Recent (12 mo) or future (30 days) visit within the authorizing provider's specialty     Patient has had an office visit with the authorizing provider or a provider within the authorizing providers department within the previous 12 mos or has a future within next 30 days. See \"Patient Info\" tab in inbasket, or \"Choose Columns\" in Meds & Orders section of the refill encounter.              Passed - Medication is active on med list        Passed - Patient is age 18 or older        Passed - Normal serum creatinine on file in past 12 months     Recent Labs   Lab Test 05/06/20 0218 01/04/20  1414 " "  CR 0.88  --    CREAT  --  0.9       Ok to refill medication if creatinine is low          Passed - Normal serum potassium on file in past 12 months     Recent Labs   Lab Test 05/06/20  0218   POTASSIUM 3.9                       simvastatin (ZOCOR) 20 MG tablet 90 tablet 3     Sig: Take 1 tablet (20 mg) by mouth At Bedtime       Statins Protocol Failed - 11/25/2020  8:43 AM        Failed - LDL on file in past 12 months     Recent Labs   Lab Test 09/16/19  1052   LDL 51             Passed - No abnormal creatine kinase in past 12 months     No lab results found.             Passed - Recent (12 mo) or future (30 days) visit within the authorizing provider's specialty     Patient has had an office visit with the authorizing provider or a provider within the authorizing providers department within the previous 12 mos or has a future within next 30 days. See \"Patient Info\" tab in inbasket, or \"Choose Columns\" in Meds & Orders section of the refill encounter.              Passed - Medication is active on med list        Passed - Patient is age 18 or older         Prescription approved per [facility] Refill Protocol. Needs new scripts for new pharmacy - sent with no refills - will need to address BP and LDL testing is overdue.        Lulu Myers RN  Triage Nurse  [facility]  Appointment line: [phone]  [facility], 24 hour nurse line, available by calling clinic at [phone] and following prompts.                "

## 2020-12-29 ENCOUNTER — OFFICE VISIT (OUTPATIENT)
Dept: FAMILY MEDICINE | Facility: CLINIC | Age: 70
End: 2020-12-29
Payer: COMMERCIAL

## 2020-12-29 VITALS
HEART RATE: 75 BPM | OXYGEN SATURATION: 97 % | HEIGHT: 68 IN | SYSTOLIC BLOOD PRESSURE: 145 MMHG | DIASTOLIC BLOOD PRESSURE: 82 MMHG | WEIGHT: 225 LBS | BODY MASS INDEX: 34.1 KG/M2 | TEMPERATURE: 97.8 F

## 2020-12-29 DIAGNOSIS — Z00.00 ENCOUNTER FOR MEDICARE ANNUAL WELLNESS EXAM: Primary | ICD-10-CM

## 2020-12-29 LAB
ANION GAP SERPL CALCULATED.3IONS-SCNC: 1 MMOL/L (ref 3–14)
BASOPHILS # BLD AUTO: 0.1 10E9/L (ref 0–0.2)
BASOPHILS NFR BLD AUTO: 0.7 %
BUN SERPL-MCNC: 12 MG/DL (ref 7–30)
CALCIUM SERPL-MCNC: 9.3 MG/DL (ref 8.5–10.1)
CHLORIDE SERPL-SCNC: 105 MMOL/L (ref 94–109)
CHOLEST SERPL-MCNC: 130 MG/DL
CO2 SERPL-SCNC: 32 MMOL/L (ref 20–32)
CREAT SERPL-MCNC: 1.02 MG/DL (ref 0.66–1.25)
DIFFERENTIAL METHOD BLD: NORMAL
EOSINOPHIL # BLD AUTO: 0.2 10E9/L (ref 0–0.7)
EOSINOPHIL NFR BLD AUTO: 2.2 %
ERYTHROCYTE [DISTWIDTH] IN BLOOD BY AUTOMATED COUNT: 13.4 % (ref 10–15)
GFR SERPL CREATININE-BSD FRML MDRD: 73 ML/MIN/{1.73_M2}
GLUCOSE SERPL-MCNC: 88 MG/DL (ref 70–99)
HCT VFR BLD AUTO: 50 % (ref 40–53)
HDLC SERPL-MCNC: 53 MG/DL
HGB BLD-MCNC: 16.3 G/DL (ref 13.3–17.7)
LDLC SERPL CALC-MCNC: 64 MG/DL
LYMPHOCYTES # BLD AUTO: 1.8 10E9/L (ref 0.8–5.3)
LYMPHOCYTES NFR BLD AUTO: 26.1 %
MCH RBC QN AUTO: 31.7 PG (ref 26.5–33)
MCHC RBC AUTO-ENTMCNC: 32.6 G/DL (ref 31.5–36.5)
MCV RBC AUTO: 97 FL (ref 78–100)
MONOCYTES # BLD AUTO: 0.7 10E9/L (ref 0–1.3)
MONOCYTES NFR BLD AUTO: 9.3 %
NEUTROPHILS # BLD AUTO: 4.3 10E9/L (ref 1.6–8.3)
NEUTROPHILS NFR BLD AUTO: 61.7 %
NONHDLC SERPL-MCNC: 77 MG/DL
PLATELET # BLD AUTO: 248 10E9/L (ref 150–450)
POTASSIUM SERPL-SCNC: 4.3 MMOL/L (ref 3.4–5.3)
RBC # BLD AUTO: 5.15 10E12/L (ref 4.4–5.9)
SODIUM SERPL-SCNC: 138 MMOL/L (ref 133–144)
TRIGL SERPL-MCNC: 65 MG/DL
WBC # BLD AUTO: 7 10E9/L (ref 4–11)

## 2020-12-29 PROCEDURE — 80048 BASIC METABOLIC PNL TOTAL CA: CPT | Performed by: INTERNAL MEDICINE

## 2020-12-29 PROCEDURE — 85025 COMPLETE CBC W/AUTO DIFF WBC: CPT | Performed by: INTERNAL MEDICINE

## 2020-12-29 PROCEDURE — 80061 LIPID PANEL: CPT | Performed by: INTERNAL MEDICINE

## 2020-12-29 PROCEDURE — 36415 COLL VENOUS BLD VENIPUNCTURE: CPT | Performed by: INTERNAL MEDICINE

## 2020-12-29 PROCEDURE — 99397 PER PM REEVAL EST PAT 65+ YR: CPT | Performed by: INTERNAL MEDICINE

## 2020-12-29 ASSESSMENT — ACTIVITIES OF DAILY LIVING (ADL): CURRENT_FUNCTION: NO ASSISTANCE NEEDED

## 2020-12-29 ASSESSMENT — MIFFLIN-ST. JEOR: SCORE: 1745.09

## 2020-12-29 NOTE — LETTER
January 5, 2021    Collin Frank  [address]    [place] [facility] [address]          Dear ,    We are writing to inform you of your test results.  Collin, these labs look great.       Resulted Orders   Basic metabolic panel  (Ca, Cl, CO2, Creat, Gluc, K, Na, BUN)   Result Value Ref Range    Sodium 138 133 - 144 mmol/L    Potassium 4.3 3.4 - 5.3 mmol/L    Chloride 105 94 - 109 mmol/L    Carbon Dioxide 32 20 - 32 mmol/L    Anion Gap 1 (L) 3 - 14 mmol/L    Glucose 88 70 - 99 mg/dL      Comment:      Non Fasting    Urea Nitrogen 12 7 - 30 mg/dL    Creatinine 1.02 0.66 - 1.25 mg/dL    GFR Estimate 73 >60 mL/min/[1.73_m2]      Comment:      Non  GFR Calc  Starting 12/18/2018, serum creatinine based estimated GFR (eGFR) will be   calculated using the Chronic Kidney Disease Epidemiology Collaboration   (CKD-EPI) equation.      GFR Estimate If Black 84 >60 mL/min/[1.73_m2]      Comment:       GFR Calc  Starting 12/18/2018, serum creatinine based estimated GFR (eGFR) will be   calculated using the Chronic Kidney Disease Epidemiology Collaboration   (CKD-EPI) equation.      Calcium 9.3 8.5 - 10.1 mg/dL   Lipid panel reflex to direct LDL Fasting   Result Value Ref Range    Cholesterol 130 <200 mg/dL    Triglycerides 65 <150 mg/dL      Comment:      Non Fasting    HDL Cholesterol 53 >39 mg/dL    LDL Cholesterol Calculated 64 <100 mg/dL      Comment:      Desirable:       <100 mg/dl    Non HDL Cholesterol 77 <130 mg/dL   CBC with platelets and differential   Result Value Ref Range    WBC 7.0 4.0 - 11.0 10e9/L    RBC Count 5.15 4.4 - 5.9 10e12/L    Hemoglobin 16.3 13.3 - 17.7 g/dL    Hematocrit 50.0 40.0 - 53.0 %    MCV 97 78 - 100 fl    MCH 31.7 26.5 - 33.0 pg    MCHC 32.6 31.5 - 36.5 g/dL    RDW 13.4 10.0 - 15.0 %    Platelet Count 248 150 - 450 10e9/L    % Neutrophils 61.7 %    % Lymphocytes 26.1 %    % Monocytes 9.3 %    % Eosinophils 2.2 %    % Basophils 0.7 %    Absolute  Neutrophil 4.3 1.6 - 8.3 10e9/L    Absolute Lymphocytes 1.8 0.8 - 5.3 10e9/L    Absolute Monocytes 0.7 0.0 - 1.3 10e9/L    Absolute Eosinophils 0.2 0.0 - 0.7 10e9/L    Absolute Basophils 0.1 0.0 - 0.2 10e9/L    Diff Method Automated Method        If you have any questions or concerns, please call the clinic at the number listed above.       Sincerely,      Collin Salazar MD

## 2020-12-29 NOTE — PATIENT INSTRUCTIONS
Patient Education   Personalized Prevention Plan  You are due for the preventive services outlined below.  Your care team is available to assist you in scheduling these services.  If you have already completed any of these items, please share that information with your care team to update in your medical record.  Health Maintenance Due   Topic Date Due     Flu Vaccine (1) 09/01/2020     FALL RISK ASSESSMENT  12/13/2020     Lung Cancer Screening (CT Scan)  01/04/2021       Exercise for a Healthier Heart     Exercise with a friend. When activity is fun, you're more likely to stick with it.   You may wonder how you can improve the health of your heart. If you re thinking about exercise, you re on the right track. You don t need to become an athlete. But you do need a certain amount of brisk exercise to help strengthen your heart. If you have been diagnosed with a heart condition, your healthcare provider may advise exercise to help stabilize your condition. To help make exercise a habit, choose safe, fun activities.   Before you start  Check with your healthcare provider before starting an exercise program. This is especially important if you have not been active for a while. It's also important if you have a long-term (chronic) health problem such as heart disease, diabetes, or obesity. Or if you are at high risk for having these problems.   Why exercise?  Exercising regularly offers many healthy rewards. It can help you do all of the following:    Improve your blood cholesterol level to help prevent further heart trouble    Lower your blood pressure to help prevent a stroke or heart attack    Control diabetes, or reduce your risk of getting this disease    Improve your heart and lung function    Reach and stay at a healthy weight    Make your muscles stronger so you can stay active    Prevent falls and fractures by slowing the loss of bone mass (osteoporosis)    Manage stress better    Reduce your blood  pressure    Improve your sense of self and your body image  Exercise tips      Ease into your routine. Set small goals. Then build on them. If you are not sure what your activity level should be, talk with your healthcare provider first before starting an exercise routine.    Exercise on most days. Aim for a total of 150 minutes (2 hours and 30 minutes) or more of moderate-intensity aerobic activity each week. Or 75 minutes (1 hour and 15 minutes) or more of vigorous-intensity aerobic activity each week. Or try for a combination of both. Moderate activity means that you breathe heavier and your heart rate increases but you can still talk. Think about doing 40 minutes of moderate exercise, 3 to 4 times a week. For best results, activity should last for about 40 minutes to lower blood pressure and cholesterol. It's OK to work up to the 40-minute period over time. Examples of moderate-intensity activity are walking 1 mile in 15 minutes. Or doing 30 to 45 minutes of yard work.    Step up your daily activity level.  Along with your exercise program, try being more active the whole day. Walk instead of drive. Or park further away so that you take more steps each day. Do more household tasks or yard work. You may not be able to meet the advised mount of physical activity. But doing some moderate- or vigorous-intensity aerobic activity can help reduce your risk for heart disease. Your healthcare provider can help you figure out what is best for you.    Choose 1 or more activities you enjoy.  Walking is one of the easiest things you can do. You can also try swimming, riding a bike, dancing, or taking an exercise class.    When to call your healthcare provider  Call your healthcare provider if you have any of these:     Chest pain or feel dizzy or lightheaded    Burning, tightness, pressure, or heaviness in your chest, neck, shoulders, back, or arms    Abnormal shortness of breath    More joint or muscle pain    A very fast  or irregular heartbeat (palpitations)  [employer] last reviewed this educational content on 7/1/2019 2000-2020 [employer]. [address] [place] [address]. All rights reserved. This information is not intended as a substitute for professional medical care. Always follow your healthcare professional's instructions.          Signs of Hearing Loss      Hearing much better with one ear can be a sign of hearing loss.   Hearing loss is a problem shared by many people. In fact, it is one of the most common health problems, particularly as people age. Most people age 65 and older have some hearing loss. By age 80, almost everyone does. Hearing loss often occurs slowly over the years. So you may not realize your hearing has gotten worse.  Have your hearing checked  Call your healthcare provider if you:    Have to strain to hear normal conversation    Have to watch other people s faces very carefully to follow what they re saying    Need to ask people to repeat what they ve said    Often misunderstand what people are saying    Turn the volume of the television or radio up so high that others complain    Feel that people are mumbling when they re talking to you    Find that the effort to hear leaves you feeling tired and irritated    Notice, when using the phone, that you hear better with one ear than the other  [employer] last reviewed this educational content on 1/1/2020 2000-2020 [employer]. [address] [place] [address]. All rights reserved. This information is not intended as a substitute for professional medical care. Always follow your healthcare professional's instructions.

## 2021-02-19 DIAGNOSIS — D68.51 FACTOR 5 LEIDEN MUTATION, HETEROZYGOUS (H): ICD-10-CM

## 2021-02-19 DIAGNOSIS — I10 BENIGN ESSENTIAL HYPERTENSION: ICD-10-CM

## 2021-02-19 DIAGNOSIS — E78.5 HYPERLIPIDEMIA LDL GOAL <130: ICD-10-CM

## 2021-02-22 RX ORDER — SIMVASTATIN 20 MG
20 TABLET ORAL AT BEDTIME
Qty: 90 TABLET | Refills: 0 | Status: SHIPPED | OUTPATIENT
Start: 2021-02-22 | End: 2021-06-27

## 2021-02-22 RX ORDER — LOSARTAN POTASSIUM 50 MG/1
50 TABLET ORAL DAILY
Qty: 30 TABLET | Refills: 0 | Status: SHIPPED | OUTPATIENT
Start: 2021-02-22 | End: 2021-03-29

## 2021-03-05 ENCOUNTER — OFFICE VISIT (OUTPATIENT)
Dept: FAMILY MEDICINE | Facility: CLINIC | Age: 71
End: 2021-03-05
Payer: COMMERCIAL

## 2021-03-05 VITALS
DIASTOLIC BLOOD PRESSURE: 80 MMHG | BODY MASS INDEX: 35.55 KG/M2 | SYSTOLIC BLOOD PRESSURE: 142 MMHG | TEMPERATURE: 98.6 F | WEIGHT: 233.8 LBS | RESPIRATION RATE: 18 BRPM | OXYGEN SATURATION: 96 % | HEART RATE: 61 BPM

## 2021-03-05 DIAGNOSIS — E66.01 MORBID OBESITY (H): ICD-10-CM

## 2021-03-05 DIAGNOSIS — D68.51 FACTOR 5 LEIDEN MUTATION, HETEROZYGOUS (H): ICD-10-CM

## 2021-03-05 DIAGNOSIS — I26.99 PULMONARY EMBOLISM WITH INFARCTION (H): ICD-10-CM

## 2021-03-05 DIAGNOSIS — L03.012 PARONYCHIA OF LEFT THUMB: Primary | ICD-10-CM

## 2021-03-05 DIAGNOSIS — E72.11 HYPERHOMOCYSTEINEMIA (H): ICD-10-CM

## 2021-03-05 PROCEDURE — 99213 OFFICE O/P EST LOW 20 MIN: CPT | Performed by: NURSE PRACTITIONER

## 2021-03-05 NOTE — PROGRESS NOTES
Assessment & Plan     Paronychia of left thumb  Instructed patient to soak the thumb in warm water and chlorhexidine three times daily for 10-15 minutes.  Following the soak, he should apply bacitracin to the thumb. If redness has not resolved in 2 weeks, he should return to clinic.  Advised him not to perform any further self-lancing procedures at home.  Counseled him to seek immediate care if he develops fevers or if redness starts migrating into his hand.    Morbid obesity (H)  Continue to pursue healthy diet and exercise to manage weight.    Pulmonary embolism with infarction (H)  Well controlled with medications without side effects. On life long anticoagulation.    Hyperhomocysteinemia (H)  Well controlled with medications without side effects.    Factor 5 Leiden mutation, heterozygous (H)  Well controlled with medications without side effects. On life long anticoagulation.    Return in about 2 weeks (around 3/19/2021), or if symptoms worsen or fail to improve.    Sabrina Dorado, DNP/FNP Student, [facility]     I very much appreciated the opportunity to see and assess this patient in the clinic with NP student.  I agree with the above note which summarizes my findings and current recommendations. I have reviewed all diagnostics noted and performed physical exam. Changes were made in the body of the note to achieve one comprehensive document.    Barbara Rogel, HCITRA CNP  [facility] SILVINO Polanco is a 70 year old who presents for the following health issues:  HPI     Skin Lesion  Onset/Duration: He thinks he injured his left thumb on Monday (4 days ago) opening a jar, but he is not entirely clear on whether this was the inciting incident or not.  He noticed pain, swelling, and redness in the thumb starting 2 days ago.  Description  Location: Medial aspect of the left thumb  Color: Red  Border description: red  Character: Drained milky white pus followed by thin  serosanguinous fluid last night after self-lancing with a sewing needle; no drainage today  Itching: no  Bleeding:  no  Intensity:  Moderate pain Wed and Thurs; no pain since lancing the lesion last night  Progression of Symptoms:  improving  Accompanying signs and symptoms:   Bleeding: no  Scaling: no  Excessive sun exposure/tanning: no  Sunscreen used: no  History:           Any previous history of skin cancer: no  Any family history of melanoma: no  Previous episodes of similar lesion: no  Precipitating or alleviating factors: Injury likely caused by pinching the thumb in the cap of a jar he was opening  Therapies tried and outcome: He used a sewing needle to marisel the swollen area at home last night    He is on Eliquis for a history of pulmonary embolism likely secondary to Factor V Leiden    Review of Systems   Constitutional, HEENT, cardiovascular, pulmonary, gi and gu systems are negative, except as otherwise noted.      Objective    BP (!) 142/80   Pulse 61   Temp 98.6  F (37  C) (Oral)   Resp 18   Wt 106.1 kg (233 lb 12.8 oz)   SpO2 96%   BMI 35.55 kg/m    Body mass index is 35.55 kg/m .  Physical Exam   GENERAL: healthy, alert and no distress  RESP: lungs clear to auscultation - no rales, rhonchi or wheezes  CV: regular rate and rhythm, normal S1 S2, no S3 or S4, no murmur, click or rub, no peripheral edema and peripheral pulses strong  MS: no gross musculoskeletal defects noted, no edema  SKIN: medial side of left thumb is erythematous next to the nail bed, extending around the cuticle, erythema ends at the middle of the thumb and extends about 1 mm proximal to the cuticle. Minimal swelling. Skin is intact with the exception of a visible pin-point area on the medial side near the tip of the thumb where he inserted the sewing needle; very scant swelling; no drainage present

## 2021-03-05 NOTE — PATIENT INSTRUCTIONS
Call [phone] for information about scheduling a COVID 19 vaccine.     Patient Education     Paronychia of the Finger or Toe  Paronychia is an infection near a fingernail or toenail. It usually occurs when an opening in the cuticle or an ingrown toenail lets bacteria under the skin.   The infection will need to be drained if pus is present. If the infection has been caught early, you may need only antibiotic treatment. Healing will take about 1 to 2 weeks.   Home care  Follow these guidelines when caring for yourself at home:     Clean and soak the toe or finger. Do this 2 times a day for the first 3 days. To do so:  ? Soak your foot or hand in a tub of warm water for 5 minutes. Or hold your toe or finger under a faucet of warm running water for 5 minutes.  ? Clean any crust away with soap and water using a cotton swab.  ? Put antibiotic ointment on the infected area.    Change the dressing daily or any time it gets dirty.    If you were given antibiotics, take them as directed until they are all gone.    If your infection is on a toe, wear comfortable shoes with a lot of toe room. You can also wear open-toed sandals while your toe heals.    You may use over-the-counter medicine (acetaminophen or ibuprofen) to help with pain, unless another medicine was prescribed. If you have chronic liver or kidney disease, talk with your healthcare provider before using these medicines. Also talk with your provider if you've had a stomach ulcer or gastrointestinal bleeding.  Prevention  The following can prevent paronychia:     Don't cut or play with your cuticles at home. A healthy cuticle maintains a seal between your skin and nail and keeps out infection.    Don't bite your nails.    Don't suck on your thumbs or fingers.  Follow-up care  Follow up with your healthcare provider, or as advised.   When to seek medical advice  Call your healthcare provider right away if any of these occur:     Redness, pain, or swelling of the  finger or toe gets worse    You have trouble moving or bending the finger or toe    Red streaks in the skin leading away from the wound    Pus or fluid draining from the nail area    Fever of 100.4 F (38 C) or higher, or as directed by your provider  Anahi last reviewed this educational content on 7/1/2019 2000-2020 [employer]. All rights reserved. This information is not intended as a substitute for professional medical care. Always follow your healthcare professional's instructions.

## 2021-03-16 ENCOUNTER — IMMUNIZATION (OUTPATIENT)
Dept: NURSING | Facility: CLINIC | Age: 71
End: 2021-03-16
Payer: COMMERCIAL

## 2021-03-16 PROCEDURE — 0001A PR COVID VAC PFIZER DIL RECON 30 MCG/0.3 ML IM: CPT

## 2021-03-16 PROCEDURE — 91300 PR COVID VAC PFIZER DIL RECON 30 MCG/0.3 ML IM: CPT

## 2021-03-29 ENCOUNTER — TELEPHONE (OUTPATIENT)
Dept: FAMILY MEDICINE | Facility: CLINIC | Age: 71
End: 2021-03-29

## 2021-03-29 DIAGNOSIS — I10 BENIGN ESSENTIAL HYPERTENSION: ICD-10-CM

## 2021-03-29 RX ORDER — LOSARTAN POTASSIUM 50 MG/1
50 TABLET ORAL DAILY
Qty: 90 TABLET | Refills: 0 | Status: SHIPPED | OUTPATIENT
Start: 2021-03-29 | End: 2021-06-27

## 2021-03-29 NOTE — TELEPHONE ENCOUNTER
Patient called to request a 90 day refill of his losartan  Prescription approved per [facility] Refill Protocol.    Cassia Nunez RN  [facility]

## 2021-03-31 ENCOUNTER — HOSPITAL ENCOUNTER (OUTPATIENT)
Facility: CLINIC | Age: 71
Setting detail: OBSERVATION
Discharge: HOME | End: 2021-04-01
Attending: EMERGENCY MEDICINE | Admitting: EMERGENCY MEDICINE
Payer: COMMERCIAL

## 2021-03-31 ENCOUNTER — APPOINTMENT (OUTPATIENT)
Dept: CT IMAGING | Facility: CLINIC | Age: 71
End: 2021-03-31
Attending: EMERGENCY MEDICINE
Payer: COMMERCIAL

## 2021-03-31 ENCOUNTER — DOCUMENTATION ONLY (OUTPATIENT)
Dept: OTHER | Facility: CLINIC | Age: 71
End: 2021-03-31

## 2021-03-31 ENCOUNTER — APPOINTMENT (OUTPATIENT)
Dept: GENERAL RADIOLOGY | Facility: CLINIC | Age: 71
End: 2021-03-31
Attending: EMERGENCY MEDICINE
Payer: COMMERCIAL

## 2021-03-31 DIAGNOSIS — R07.9 CHEST PAIN, UNSPECIFIED TYPE: ICD-10-CM

## 2021-03-31 DIAGNOSIS — Z11.52 ENCOUNTER FOR SCREENING LABORATORY TESTING FOR SEVERE ACUTE RESPIRATORY SYNDROME CORONAVIRUS 2 (SARS-COV-2): ICD-10-CM

## 2021-03-31 DIAGNOSIS — R35.0 URINARY FREQUENCY: ICD-10-CM

## 2021-03-31 DIAGNOSIS — R00.2 PALPITATIONS: ICD-10-CM

## 2021-03-31 LAB
ALBUMIN SERPL-MCNC: 3.4 G/DL (ref 3.4–5)
ALBUMIN UR-MCNC: NEGATIVE MG/DL
ALP SERPL-CCNC: 91 U/L (ref 40–150)
ALT SERPL W P-5'-P-CCNC: 31 U/L (ref 0–70)
ANION GAP SERPL CALCULATED.3IONS-SCNC: 8 MMOL/L (ref 3–14)
APPEARANCE UR: CLEAR
AST SERPL W P-5'-P-CCNC: 26 U/L (ref 0–45)
BASOPHILS # BLD AUTO: 0.1 10E9/L (ref 0–0.2)
BASOPHILS NFR BLD AUTO: 0.8 %
BILIRUB SERPL-MCNC: 0.8 MG/DL (ref 0.2–1.3)
BILIRUB UR QL STRIP: NEGATIVE
BUN SERPL-MCNC: 10 MG/DL (ref 7–30)
CALCIUM SERPL-MCNC: 8.9 MG/DL (ref 8.5–10.1)
CHLORIDE SERPL-SCNC: 106 MMOL/L (ref 94–109)
CO2 SERPL-SCNC: 23 MMOL/L (ref 20–32)
COLOR UR AUTO: ABNORMAL
CREAT SERPL-MCNC: 0.98 MG/DL (ref 0.66–1.25)
DIFFERENTIAL METHOD BLD: NORMAL
EOSINOPHIL # BLD AUTO: 0.1 10E9/L (ref 0–0.7)
EOSINOPHIL NFR BLD AUTO: 0.7 %
ERYTHROCYTE [DISTWIDTH] IN BLOOD BY AUTOMATED COUNT: 12.8 % (ref 10–15)
GFR SERPL CREATININE-BSD FRML MDRD: 76 ML/MIN/{1.73_M2}
GLUCOSE SERPL-MCNC: 122 MG/DL (ref 70–99)
GLUCOSE UR STRIP-MCNC: NEGATIVE MG/DL
HCT VFR BLD AUTO: 43.5 % (ref 40–53)
HGB BLD-MCNC: 14.7 G/DL (ref 13.3–17.7)
HGB UR QL STRIP: ABNORMAL
IMM GRANULOCYTES # BLD: 0 10E9/L (ref 0–0.4)
IMM GRANULOCYTES NFR BLD: 0.3 %
INR PPP: 1.48 (ref 0.86–1.14)
INTERPRETATION ECG - MUSE: NORMAL
KETONES UR STRIP-MCNC: NEGATIVE MG/DL
LABORATORY COMMENT REPORT: NORMAL
LEUKOCYTE ESTERASE UR QL STRIP: NEGATIVE
LYMPHOCYTES # BLD AUTO: 1.5 10E9/L (ref 0.8–5.3)
LYMPHOCYTES NFR BLD AUTO: 21.1 %
MAGNESIUM SERPL-MCNC: 2.1 MG/DL (ref 1.6–2.3)
MCH RBC QN AUTO: 32 PG (ref 26.5–33)
MCHC RBC AUTO-ENTMCNC: 33.8 G/DL (ref 31.5–36.5)
MCV RBC AUTO: 95 FL (ref 78–100)
MONOCYTES # BLD AUTO: 0.5 10E9/L (ref 0–1.3)
MONOCYTES NFR BLD AUTO: 7.6 %
NEUTROPHILS # BLD AUTO: 5 10E9/L (ref 1.6–8.3)
NEUTROPHILS NFR BLD AUTO: 69.5 %
NITRATE UR QL: NEGATIVE
NRBC # BLD AUTO: 0 10*3/UL
NRBC BLD AUTO-RTO: 0 /100
PH UR STRIP: 6.5 PH (ref 5–7)
PLATELET # BLD AUTO: 223 10E9/L (ref 150–450)
POTASSIUM SERPL-SCNC: 3.6 MMOL/L (ref 3.4–5.3)
PROT SERPL-MCNC: 6.8 G/DL (ref 6.8–8.8)
RBC # BLD AUTO: 4.6 10E12/L (ref 4.4–5.9)
RBC #/AREA URNS AUTO: 3 /HPF (ref 0–2)
SARS-COV-2 RNA RESP QL NAA+PROBE: NEGATIVE
SODIUM SERPL-SCNC: 138 MMOL/L (ref 133–144)
SOURCE: ABNORMAL
SP GR UR STRIP: 1.01 (ref 1–1.03)
SPECIMEN SOURCE: NORMAL
SPERM #/AREA URNS HPF: PRESENT /HPF
SQUAMOUS #/AREA URNS AUTO: 0 /HPF (ref 0–1)
TROPONIN I SERPL-MCNC: <0.015 UG/L (ref 0–0.04)
TSH SERPL DL<=0.005 MIU/L-ACNC: 1.79 MU/L (ref 0.4–4)
UROBILINOGEN UR STRIP-MCNC: NORMAL MG/DL (ref 0–2)
WBC # BLD AUTO: 7.2 10E9/L (ref 4–11)
WBC #/AREA URNS AUTO: 1 /HPF (ref 0–5)

## 2021-03-31 PROCEDURE — 81001 URINALYSIS AUTO W/SCOPE: CPT | Performed by: NURSE PRACTITIONER

## 2021-03-31 PROCEDURE — 71275 CT ANGIOGRAPHY CHEST: CPT | Mod: 26 | Performed by: RADIOLOGY

## 2021-03-31 PROCEDURE — U0005 INFEC AGEN DETEC AMPLI PROBE: HCPCS | Performed by: EMERGENCY MEDICINE

## 2021-03-31 PROCEDURE — C9803 HOPD COVID-19 SPEC COLLECT: HCPCS | Performed by: EMERGENCY MEDICINE

## 2021-03-31 PROCEDURE — 85025 COMPLETE CBC W/AUTO DIFF WBC: CPT | Performed by: EMERGENCY MEDICINE

## 2021-03-31 PROCEDURE — 71275 CT ANGIOGRAPHY CHEST: CPT

## 2021-03-31 PROCEDURE — 84443 ASSAY THYROID STIM HORMONE: CPT | Performed by: EMERGENCY MEDICINE

## 2021-03-31 PROCEDURE — 71045 X-RAY EXAM CHEST 1 VIEW: CPT

## 2021-03-31 PROCEDURE — G0378 HOSPITAL OBSERVATION PER HR: HCPCS

## 2021-03-31 PROCEDURE — 80053 COMPREHEN METABOLIC PANEL: CPT | Performed by: EMERGENCY MEDICINE

## 2021-03-31 PROCEDURE — 85610 PROTHROMBIN TIME: CPT | Performed by: EMERGENCY MEDICINE

## 2021-03-31 PROCEDURE — 250N000013 HC RX MED GY IP 250 OP 250 PS 637: Performed by: NURSE PRACTITIONER

## 2021-03-31 PROCEDURE — 71045 X-RAY EXAM CHEST 1 VIEW: CPT | Mod: 26 | Performed by: RADIOLOGY

## 2021-03-31 PROCEDURE — 99218 PR INITIAL OBSERVATION CARE,LEVEL I: CPT | Performed by: NURSE PRACTITIONER

## 2021-03-31 PROCEDURE — 99285 EMERGENCY DEPT VISIT HI MDM: CPT | Mod: 25 | Performed by: EMERGENCY MEDICINE

## 2021-03-31 PROCEDURE — 84484 ASSAY OF TROPONIN QUANT: CPT | Mod: 91 | Performed by: NURSE PRACTITIONER

## 2021-03-31 PROCEDURE — 36415 COLL VENOUS BLD VENIPUNCTURE: CPT | Performed by: NURSE PRACTITIONER

## 2021-03-31 PROCEDURE — 93010 ELECTROCARDIOGRAM REPORT: CPT | Performed by: INTERNAL MEDICINE

## 2021-03-31 PROCEDURE — 93005 ELECTROCARDIOGRAM TRACING: CPT | Performed by: EMERGENCY MEDICINE

## 2021-03-31 PROCEDURE — 84484 ASSAY OF TROPONIN QUANT: CPT | Performed by: EMERGENCY MEDICINE

## 2021-03-31 PROCEDURE — U0003 INFECTIOUS AGENT DETECTION BY NUCLEIC ACID (DNA OR RNA); SEVERE ACUTE RESPIRATORY SYNDROME CORONAVIRUS 2 (SARS-COV-2) (CORONAVIRUS DISEASE [COVID-19]), AMPLIFIED PROBE TECHNIQUE, MAKING USE OF HIGH THROUGHPUT TECHNOLOGIES AS DESCRIBED BY CMS-2020-01-R: HCPCS | Performed by: EMERGENCY MEDICINE

## 2021-03-31 PROCEDURE — 93010 ELECTROCARDIOGRAM REPORT: CPT | Performed by: EMERGENCY MEDICINE

## 2021-03-31 PROCEDURE — 83735 ASSAY OF MAGNESIUM: CPT | Performed by: EMERGENCY MEDICINE

## 2021-03-31 PROCEDURE — 250N000011 HC RX IP 250 OP 636: Performed by: EMERGENCY MEDICINE

## 2021-03-31 RX ORDER — SIMVASTATIN 20 MG
20 TABLET ORAL AT BEDTIME
Status: DISCONTINUED | OUTPATIENT
Start: 2021-03-31 | End: 2021-04-01 | Stop reason: HOSPADM

## 2021-03-31 RX ORDER — ASPIRIN 81 MG/1
81 TABLET ORAL DAILY
Status: DISCONTINUED | OUTPATIENT
Start: 2021-04-01 | End: 2021-04-01 | Stop reason: HOSPADM

## 2021-03-31 RX ORDER — LOSARTAN POTASSIUM 25 MG/1
50 TABLET ORAL DAILY
Status: DISCONTINUED | OUTPATIENT
Start: 2021-03-31 | End: 2021-04-01 | Stop reason: HOSPADM

## 2021-03-31 RX ORDER — ACETAMINOPHEN 325 MG/1
650 TABLET ORAL EVERY 4 HOURS PRN
Status: DISCONTINUED | OUTPATIENT
Start: 2021-03-31 | End: 2021-04-01 | Stop reason: HOSPADM

## 2021-03-31 RX ORDER — IOPAMIDOL 755 MG/ML
73 INJECTION, SOLUTION INTRAVASCULAR ONCE
Status: COMPLETED | OUTPATIENT
Start: 2021-03-31 | End: 2021-03-31

## 2021-03-31 RX ORDER — MAGNESIUM HYDROXIDE/ALUMINUM HYDROXICE/SIMETHICONE 120; 1200; 1200 MG/30ML; MG/30ML; MG/30ML
30 SUSPENSION ORAL EVERY 4 HOURS PRN
Status: DISCONTINUED | OUTPATIENT
Start: 2021-03-31 | End: 2021-04-01 | Stop reason: HOSPADM

## 2021-03-31 RX ORDER — NITROGLYCERIN 0.4 MG/1
0.4 TABLET SUBLINGUAL EVERY 5 MIN PRN
Status: DISCONTINUED | OUTPATIENT
Start: 2021-03-31 | End: 2021-04-01 | Stop reason: HOSPADM

## 2021-03-31 RX ORDER — VITAMIN B COMPLEX
1000 TABLET ORAL DAILY
Status: DISCONTINUED | OUTPATIENT
Start: 2021-03-31 | End: 2021-04-01 | Stop reason: HOSPADM

## 2021-03-31 RX ORDER — LIDOCAINE 40 MG/G
CREAM TOPICAL
Status: DISCONTINUED | OUTPATIENT
Start: 2021-03-31 | End: 2021-04-01 | Stop reason: HOSPADM

## 2021-03-31 RX ORDER — ACETAMINOPHEN 650 MG/1
650 SUPPOSITORY RECTAL EVERY 4 HOURS PRN
Status: DISCONTINUED | OUTPATIENT
Start: 2021-03-31 | End: 2021-04-01 | Stop reason: HOSPADM

## 2021-03-31 RX ADMIN — LOSARTAN POTASSIUM 50 MG: 25 TABLET, FILM COATED ORAL at 15:56

## 2021-03-31 RX ADMIN — IOPAMIDOL 73 ML: 755 INJECTION, SOLUTION INTRAVENOUS at 10:17

## 2021-03-31 RX ADMIN — SIMVASTATIN 20 MG: 20 TABLET, FILM COATED ORAL at 22:53

## 2021-03-31 RX ADMIN — APIXABAN 5 MG: 5 TABLET, FILM COATED ORAL at 17:56

## 2021-03-31 RX ADMIN — Medication 1000 UNITS: at 15:56

## 2021-03-31 ASSESSMENT — MIFFLIN-ST. JEOR: SCORE: 1722.41

## 2021-03-31 NOTE — H&P
[facility]    History and Physical - Emergency Department Observation Unit       Date of Admission:  3/31/2021    Assessment & Plan   Collin Frank is a 70 year old male admitted on 3/31/2021. He has a history of May-Thurner syndrome, Factor V Leiden, PE (anticoagulated on Eliquis), DVT,  Hyperhomocysteinemia, LE stents, and essential hypertension who presents to the Emergency Department for evaluation of chest discomfort, left arm numbness and palpitations.     1. Chest pain  2. Palpitations: Patient reports chest pain came on gradually last evening and worsened at 3 am. He noted palpitations and his chest increase. He noted numbness in his left arm and was diaphoretic. He denies any increased exercises or yard work yesterday. He was baking and doing light housecleaning. Denies any URI symptoms. Patient subsequently called EMS who gave the patient 2 ASA and 1 dose of nitroglycerine with improvement in his symptoms. His last stress test was in 2019. Results showed: EKG at baseline with ST-T changes. At peak stress, there are horizontal-downsloping 1-2 mm ST depressions in inferolateral leads.  Screening 2D echocardiogram demonstrated no significant valve disease. Normal sized proximal ascending aorta. Patient developed no chest pain during exercise. Cardiology curbsided and reported the stress test was normal and recommended follow up with PCP. . H/o tobacco use ~ 1ppd x 40 years. Troponins negative x 2. CXR  Normal. EKG today shows: Frequent PVCS, No acute ischemic changes. Non-specific EKG.   CT Chest no PE;  Evaluation of the upper abdomen is limited. In the ED: Vitals:BP:119/80  Pulse: 71 Temp: 98.3 Resp: 20 SP02:94 % Labs: Na 138, K3.6, Cr 0.98, BUN 10, LFTS normal, Troponin negative x 2,  , WBC 7.2, Hgb 14.7, Plt 223, INR 1.48, Medications: En route, patient received 2 ASA and 1 dose of nitrogycerin Consults: none Plan: Admitted to ED observation for ACS  "rule out.  -Gore to ED observation  - Continuous cardiac monitoring  - ASA  - Serial troponin x 1 more  - Exercise stress test  - EKG prn  - add on UA ( patient reports increased urination last night)        3. Urinary frequency: Patient reports he has been urinating frequently in conjunction with the chest discomfort  - UA/UC    4. May-Thurner syndrome  5. Factor V Leiden  6. H/o DVT and PE: INR 1.48  - PTA Eliquis     Chronic Medical Problems:  # HTN: - Continue with PTA Losartan     # HLD: - Continue with PTA Zocor         Diet: Regular diet, no caffeine/NPO at midnight   DVT Prophylaxis: Low Risk/Ambulatory with no VTE prophylaxis indicated  Frias Catheter: not present  Code Status:   Full          Disposition Plan   Expected discharge: Tomorrow, recommended to prior living arrangement once cardiac rule-out complete.  Entered: CHITRA Cannon CNP 03/31/2021, 1:32 PM     The patient's care was discussed with the Attending Physician, Dr. Lott, Bedside Nurse and Patient.    CHITRA Cannon CNP  [facility]  Contact information available via [facility] Paging/Directory      ______________________________________________________________________    Chief Complaint   Chest pain, palpitations     History is obtained from the patient    History of Present Illness   Per ED note, \" Collin Frank is a 70 year old male with a past medical history significant for May-Thurner syndrome, Factor V Leiden, PE (anticoagulated on Eliquis),  Hyperhomocysteinemia, LE stents, DVT, and essential hypertension who presents to the Emergency Department for evaluation of chest discomfort, and palpitations.  Patient reports this particular episode of chest pain began yesterday. Patient reports he woke up this morning with worsening chest pain, palpitations, and noticed he was diaphoretic. Patient also endorsed a strange \"numbness\" radiating down the left arm. Patient subsequently " "called EMS who gave the patient 2 ASA and 1 dose of nitroglycerine with improvement in his symptoms.  No other symptoms noted.\"    Review of Systems    The 10 point Review of Systems is negative other than noted in the HPI or here. Palpitations, chest pain,. Numbness left hand.     Past Medical History    I have reviewed this patient's medical history and updated it with pertinent information if needed.   Past Medical History:   Diagnosis Date     Antiplatelet or antithrombotic long-term use     blood clot in leg     Long term current use of anticoagulant 10/16/2012       Past Surgical History   I have reviewed this patient's surgical history and updated it with pertinent information if needed.  Past Surgical History:   Procedure Laterality Date     COLONOSCOPY  08    Normal. Repeat in 10 years     COLONOSCOPY WITH CO2 INSUFFLATION N/A 2018    Procedure: COLONOSCOPY WITH CO2 INSUFFLATION;  COLON SCREEN/ ANNMARIEELMANN;  Surgeon: Jan Flores MD;  Location: [facility] OR     rt knee ORIF      [facility]       Social History   I have reviewed this patient's social history and updated it with pertinent information if needed.  Social History     Tobacco Use     Smoking status: Former Smoker     Types: Cigarettes     Quit date: 2011     Years since quitting: 10.1     Smokeless tobacco: Never Used   Substance Use Topics     Alcohol use: No     Drug use: No       Family History   I have reviewed this patient's family history and updated it with pertinent information if needed.  Family History   Problem Relation Age of Onset     Alzheimer Disease Mother      Heart Disease Father      Prostate Cancer Father      Cancer Brother 65        pancreatic        Prostate Cancer Brother        Prior to Admission Medications   Prior to Admission Medications   Prescriptions Last Dose Informant Patient Reported? Taking?   apixaban ANTICOAGULANT (ELIQUIS) 5 MG tablet 3/31/2021 at 0600  No Yes   Sig: Take 1 " tablet (5 mg) by mouth 2 times daily discontinue warfarin.   cholecalciferol (VITAMIN D) 1000 UNIT tablet 3/30/2021 at 1200  Yes Yes   Sig: Take 1 tablet (1,000 Units) by mouth daily   losartan (COZAAR) 50 MG tablet 3/30/2021 at 1200  No Yes   Sig: Take 1 tablet (50 mg) by mouth daily   simvastatin (ZOCOR) 20 MG tablet 3/30/2021 at 2000  No Yes   Sig: Take 1 tablet (20 mg) by mouth At Bedtime      Facility-Administered Medications: None     Allergies   No Known Allergies    Physical Exam   Vital Signs: Temp: 98.3  F (36.8  C) Temp src: Oral BP: 119/80 Pulse: 71   Resp: 20 SpO2: 94 % O2 Device: None (Room air)    Weight: 220 lbs 0 oz    Constitutional: healthy, alert and no distress   Head: Normocephalic. No masses, lesions, tenderness or abnormalities   Neck: Neck supple. No adenopathy. Thyroid symmetric, normal size,, Carotids without bruits.   ENT: ENT exam normal, no neck nodes or sinus tenderness   Cardiovascular: RRR. No murmurs, clicks gallops or rub   Respiratory: . Good diaphragmatic excursion. Lungs clear   Gastrointestinal: Abdomen soft,. BS normal. No masses, organomegaly.   : Deferred   Musculoskeletal: extremities normal- no gross deformities noted, gait normal and normal muscle tone   Skin: no suspicious lesions or rashes   Neurologic: Gait normal. Reflexes normal and symmetric. Sensation grossly WNL.   Psychiatric: mentation appears normal and affect normal/bright   Hematologic/Lymphatic/Immunologic: normal ant/post cervical, axillary, supraclavicular and inguinal     Data   Data reviewed today: I reviewed all medications, new labs and imaging results over the last 24 hours. .    Recent Labs   Lab 03/31/21  1314 03/31/21  0821   WBC  --  7.2   HGB  --  14.7   MCV  --  95   PLT  --  223   INR  --  1.48*   NA  --  138   POTASSIUM  --  3.6   CHLORIDE  --  106   CO2  --  23   BUN  --  10   CR  --  0.98   ANIONGAP  --  8   MARTA  --  8.9   GLC  --  122*   ALBUMIN  --  3.4   PROTTOTAL  --  6.8   BILITOTAL   --  0.8   ALKPHOS  --  91   ALT  --  31   AST  --  26   TROPI <0.015 <0.015     Most Recent 3 CBC's:  Recent Labs   Lab Test 03/31/21  0821 12/29/20  1149 05/06/20  0218   WBC 7.2 7.0 7.0   HGB 14.7 16.3 15.8   MCV 95 97 98    248 220     Most Recent 3 BMP's:  Recent Labs   Lab Test 03/31/21  0821 12/29/20  1149 05/06/20  0218    138 138   POTASSIUM 3.6 4.3 3.9   CHLORIDE 106 105 106   CO2 23 32 28   BUN 10 12 16   CR 0.98 1.02 0.88   ANIONGAP 8 1* 4   MARTA 8.9 9.3 8.9   * 88 101*     Most Recent 2 LFT's:  Recent Labs   Lab Test 03/31/21  0821 01/04/20  1413   AST 26 29   ALT 31 43   ALKPHOS 91 93   BILITOTAL 0.8 1.2     Recent Results (from the past 24 hour(s))   XR Chest Port 1 View    Narrative    Chest one view portable    HISTORY: Chest pain    COMPARISON STUDY: 1/4/2020    FINDINGS: Cardiac silhouette is not enlarged. Bibasilar atelectasis..  Lungs are otherwise clear.      Impression    IMPRESSION: No acute airspace disease.    MIKIE ABAD MD   CT Chest Pulmonary Embolism w Contrast    Narrative    CTA pulmonary angiogram    HISTORY: Suspect pulmonary embolism    COMPARISON STUDY: 1/4/2020    FINDINGS: There is no evidence of acute pulmonary embolism. Contrast  bolus is excellent.    Main pulmonary artery and aorta are not enlarged. Advanced coronary  calcifications. No mediastinal, hilar or axillary adenopathy. No  pleural or pericardial effusion.    Lungs are clear.    Evaluation of the upper abdomen is limited.     Bones: Degenerative changes of the spine.      Impression    Impression: No evidence of pulmonary embolism.    MIKIE ABAD MD

## 2021-03-31 NOTE — PHARMACY-ADMISSION MEDICATION HISTORY
Admission Medication History Completed by Pharmacy    See [facility] Admission Navigator for allergy information, preferred outpatient pharmacy, prior to admission medications and immunization status.     Medication History Sources:     Patient    Changes made to PTA medication list (reason):    Added: None    Deleted: None    Changed: None    Additional Information:    None    Prior to Admission medications    Medication Sig Last Dose Taking? Auth Provider   apixaban ANTICOAGULANT (ELIQUIS) 5 MG tablet Take 1 tablet (5 mg) by mouth 2 times daily discontinue warfarin. 3/31/2021 at 0600 Yes Collin Salazar MD   cholecalciferol (VITAMIN D) 1000 UNIT tablet Take 1 tablet (1,000 Units) by mouth daily 3/30/2021 at 1200 Yes Isaac Membreno MD   losartan (COZAAR) 50 MG tablet Take 1 tablet (50 mg) by mouth daily 3/30/2021 at 1200 Yes Collin Salazar MD   simvastatin (ZOCOR) 20 MG tablet Take 1 tablet (20 mg) by mouth At Bedtime 3/30/2021 at 2000 Yes Collin Salazar MD       Date completed: 03/31/21    Medication history completed by: Fabiana Vásquez [facility]

## 2021-03-31 NOTE — ED TRIAGE NOTES
Pt BIBA from home, pt reports he has been having chest pain for the last day, he woke up this morning diaphoretic with  palpitations, he also reports his chest pain was worse than yesterday rated his chest pain 7/10.  EMS gave 324 mg of Asprin and 1 nitroglycerin with relief.

## 2021-03-31 NOTE — ED PROVIDER NOTES
"    [facility] EMERGENCY DEPARTMENT ([facility])  3/31/21  History     Chief Complaint   Patient presents with     Chest Pain     Palpitations     The history is provided by the patient and medical records.     Collin Frank is a 70 year old male with a past medical history significant for May-Thurner syndrome, Factor V Leiden, PE (anticoagulated on Eliquis), DVT, and essential hypertension who presents to the Emergency Department for evaluation of chest discomfort, and palpitations.  Patient reports this particular episode of chest pain began yesterday. Patient reports he woke up this morning with worsening chest pain, palpitations, and noticed he was diaphoretic. Patient also endorsed a strange \"numbness\" radiating down the left arm. Patient subsequently called EMS who gave the patient 2 ASA and 1 dose of nitroglycerine with improvement in his symptoms.  No other symptoms noted.        I have reviewed the Medications, Allergies, Past Medical and Surgical History, and Social History in the [facility] system.  PAST MEDICAL HISTORY:   Past Medical History:   Diagnosis Date     Antiplatelet or antithrombotic long-term use     blood clot in leg     Long term current use of anticoagulant 10/16/2012       PAST SURGICAL HISTORY:   Past Surgical History:   Procedure Laterality Date     COLONOSCOPY  08    Normal. Repeat in 10 years     COLONOSCOPY WITH CO2 INSUFFLATION N/A 2018    Procedure: COLONOSCOPY WITH CO2 INSUFFLATION;  COLON SCREEN/ ENGELMANN;  Surgeon: Jan Flores MD;  Location: [facility] OR     rt knee ORIF  [address] [place] [address]       Past medical history, past surgical history, medications, and allergies were reviewed with the patient. Additional pertinent items: None    FAMILY HISTORY:   Family History   Problem Relation Age of Onset     Alzheimer Disease Mother      Heart Disease Father      Prostate Cancer Father      Cancer Brother 65        pancreatic        Prostate Cancer Brother  " "      SOCIAL HISTORY:   Social History     Tobacco Use     Smoking status: Former Smoker     Types: Cigarettes     Quit date: 2/1/2011     Years since quitting: 10.1     Smokeless tobacco: Never Used   Substance Use Topics     Alcohol use: No     Social history was reviewed with the patient. Additional pertinent items: None      Patient's Medications   New Prescriptions    No medications on file   Previous Medications    APIXABAN ANTICOAGULANT (ELIQUIS) 5 MG TABLET    Take 1 tablet (5 mg) by mouth 2 times daily discontinue warfarin.    CHOLECALCIFEROL (VITAMIN D) 1000 UNIT TABLET    Take 1 tablet (1,000 Units) by mouth daily    LOSARTAN (COZAAR) 50 MG TABLET    Take 1 tablet (50 mg) by mouth daily    SIMVASTATIN (ZOCOR) 20 MG TABLET    Take 1 tablet (20 mg) by mouth At Bedtime   Modified Medications    No medications on file   Discontinued Medications    No medications on file        No Known Allergies     Review of Systems  A complete review of systems was performed with pertinent positives and negatives noted in the HPI, and all other systems negative.    Physical Exam   BP: (!) 145/78  Pulse: 75  Temp: 98.3  F (36.8  C)  Resp: 18  Height: 172.7 cm (5' 8\")  SpO2: 96 %      Physical Exam  Vitals signs and nursing note reviewed.   Constitutional:       General: He is not in acute distress.     Appearance: He is well-developed. He is not diaphoretic.   HENT:      Head: Normocephalic and atraumatic.      Mouth/Throat:      Pharynx: No oropharyngeal exudate.   Eyes:      General: No scleral icterus.        Right eye: No discharge.         Left eye: No discharge.      Pupils: Pupils are equal, round, and reactive to light.   Neck:      Musculoskeletal: Normal range of motion and neck supple.   Cardiovascular:      Rate and Rhythm: Normal rate and regular rhythm.      Heart sounds: Normal heart sounds. No murmur. No friction rub. No gallop.    Pulmonary:      Effort: Pulmonary effort is normal. No respiratory distress. "      Breath sounds: Normal breath sounds. No wheezing.   Chest:      Chest wall: No tenderness.   Abdominal:      General: Bowel sounds are normal. There is no distension.      Palpations: Abdomen is soft.      Tenderness: There is no abdominal tenderness.   Musculoskeletal: Normal range of motion.         General: No tenderness or deformity.   Skin:     General: Skin is warm and dry.      Coloration: Skin is not pale.      Findings: No erythema or rash.   Neurological:      Mental Status: He is alert and oriented to person, place, and time.      Cranial Nerves: No cranial nerve deficit.         ED Course        Procedures             EKG Interpretation:      Interpreted by Jose Lott DO  Time reviewed: 0821  Symptoms at time of EKG: chest pain, palpitations   Rhythm: normal sinus   Rate: 73  Axis: Left Axis Deviation  Ectopy: premature ventricular contractions (frequent)  Conduction: normal  ST Segments/ T Waves: No acute ischemic changes  Q Waves: none  Comparison to prior: 1/4/2020.  Previous was normal sinus without the frequent PVCs.    Clinical Impression: non-specific EKG                              No results found for this or any previous visit (from the past 24 hour(s)).  Medications - No data to display          Assessments & Plan (with Medical Decision Making)   This is a 70-year-old male with a history of factor V with previous DVT/PE who presents with chest pain/palpitations.  This began yesterday.  No known precipitating factors.  He has had similar occurrences in the past.  Differential is broad and includes but is not limited to ACS, PE, atrial fibrillation, aortic etiology, or other causes.  Dario demonstrates no acute abnormalities.  Vital signs are within normal limits.  ECG shows frequent PVCs.  Chest x-ray shows acute abnormalities.  Lab work shows no acute abnormalities.  Bone is not elevated due to patient's history of clotting we obtained a CT PE study which shows acute abnormalities.   I discussed all results with patient. Patient was admitted to Observation for chest pain rule out.    I have reviewed the nursing notes.    I have reviewed the findings, diagnosis, plan and need for follow up with the patient.    New Prescriptions    No medications on file       Final diagnoses:   None   I, Gilberto Sanchez, am serving as a trained medical scribe to document services personally performed by Jose Lott DO, based on the provider's statements to me.      Jose AREVALO DO, was physically present and have reviewed and verified the accuracy of this note documented by Gilberto Sanchez.    3/31/2021   [facility] EMERGENCY DEPARTMENT     Jose Lott DO  03/31/21 [id number]

## 2021-04-01 ENCOUNTER — APPOINTMENT (OUTPATIENT)
Dept: CARDIOLOGY | Facility: CLINIC | Age: 71
End: 2021-04-01
Attending: NURSE PRACTITIONER
Payer: COMMERCIAL

## 2021-04-01 VITALS
HEART RATE: 96 BPM | RESPIRATION RATE: 17 BRPM | OXYGEN SATURATION: 97 % | DIASTOLIC BLOOD PRESSURE: 84 MMHG | WEIGHT: 220 LBS | BODY MASS INDEX: 33.34 KG/M2 | HEIGHT: 68 IN | SYSTOLIC BLOOD PRESSURE: 126 MMHG | TEMPERATURE: 97.6 F

## 2021-04-01 LAB
ANION GAP SERPL CALCULATED.3IONS-SCNC: 7 MMOL/L (ref 3–14)
BUN SERPL-MCNC: 15 MG/DL (ref 7–30)
CALCIUM SERPL-MCNC: 8.8 MG/DL (ref 8.5–10.1)
CHLORIDE SERPL-SCNC: 108 MMOL/L (ref 94–109)
CO2 SERPL-SCNC: 25 MMOL/L (ref 20–32)
CREAT SERPL-MCNC: 0.93 MG/DL (ref 0.66–1.25)
GFR SERPL CREATININE-BSD FRML MDRD: 81 ML/MIN/{1.73_M2}
GLUCOSE SERPL-MCNC: 90 MG/DL (ref 70–99)
MAGNESIUM SERPL-MCNC: 2.2 MG/DL (ref 1.6–2.3)
PHOSPHATE SERPL-MCNC: 2.8 MG/DL (ref 2.5–4.5)
POTASSIUM SERPL-SCNC: 3.9 MMOL/L (ref 3.4–5.3)
SODIUM SERPL-SCNC: 140 MMOL/L (ref 133–144)

## 2021-04-01 PROCEDURE — 93246 EXT ECG>7D<15D RECORDING: CPT

## 2021-04-01 PROCEDURE — 36415 COLL VENOUS BLD VENIPUNCTURE: CPT | Performed by: PHYSICIAN ASSISTANT

## 2021-04-01 PROCEDURE — 93350 STRESS TTE ONLY: CPT | Mod: 26 | Performed by: INTERNAL MEDICINE

## 2021-04-01 PROCEDURE — 84100 ASSAY OF PHOSPHORUS: CPT | Performed by: PHYSICIAN ASSISTANT

## 2021-04-01 PROCEDURE — 80048 BASIC METABOLIC PNL TOTAL CA: CPT | Performed by: PHYSICIAN ASSISTANT

## 2021-04-01 PROCEDURE — 93016 CV STRESS TEST SUPVJ ONLY: CPT | Performed by: INTERNAL MEDICINE

## 2021-04-01 PROCEDURE — 93018 CV STRESS TEST I&R ONLY: CPT | Performed by: INTERNAL MEDICINE

## 2021-04-01 PROCEDURE — 999N000208 ECHO STRESS ECHOCARDIOGRAM

## 2021-04-01 PROCEDURE — 93248 EXT ECG>7D<15D REV&INTERPJ: CPT | Performed by: INTERNAL MEDICINE

## 2021-04-01 PROCEDURE — 250N000013 HC RX MED GY IP 250 OP 250 PS 637: Performed by: NURSE PRACTITIONER

## 2021-04-01 PROCEDURE — 93325 DOPPLER ECHO COLOR FLOW MAPG: CPT | Mod: 26 | Performed by: INTERNAL MEDICINE

## 2021-04-01 PROCEDURE — 83735 ASSAY OF MAGNESIUM: CPT | Performed by: PHYSICIAN ASSISTANT

## 2021-04-01 PROCEDURE — 255N000002 HC RX 255 OP 636: Performed by: INTERNAL MEDICINE

## 2021-04-01 PROCEDURE — 93321 DOPPLER ECHO F-UP/LMTD STD: CPT | Mod: 26 | Performed by: INTERNAL MEDICINE

## 2021-04-01 PROCEDURE — G0378 HOSPITAL OBSERVATION PER HR: HCPCS

## 2021-04-01 PROCEDURE — 99217 PR OBSERVATION CARE DISCHARGE: CPT | Performed by: NURSE PRACTITIONER

## 2021-04-01 RX ADMIN — PERFLUTREN 5 ML: 6.52 INJECTION, SUSPENSION INTRAVENOUS at 09:39

## 2021-04-01 RX ADMIN — Medication 1000 UNITS: at 09:26

## 2021-04-01 RX ADMIN — APIXABAN 5 MG: 5 TABLET, FILM COATED ORAL at 08:21

## 2021-04-01 RX ADMIN — LOSARTAN POTASSIUM 50 MG: 25 TABLET, FILM COATED ORAL at 09:26

## 2021-04-01 RX ADMIN — ASPIRIN 81 MG: 81 TABLET, COATED ORAL at 09:26

## 2021-04-01 NOTE — PLAN OF CARE
"BP (!) 164/83 (BP Location: Right arm)   Pulse 74   Temp 97.7  F (36.5  C) (Oral)   Resp 20   Ht 1.727 m (5' 8\")   Wt 99.8 kg (220 lb)   SpO2 98%   BMI 33.45 kg/m      - Serial troponins and stress test complete. - not met   - Seen and cleared by consultant if applicable - in progress  - Adequate pain control on oral analgesia - pt denies pain  - Vital signs normal or at patient baseline - pt hypertensive   - Safe disposition plan has been identified - not met   - Nurse to notify provider when observation goals have been met and patient is ready for discharge  "

## 2021-04-01 NOTE — DISCHARGE SUMMARY
Discharge Summary    Collin Frank MRN# [id number]   YOB: 1950 Age: 70 year old     Date of Admission:  3/31/2021  Date of Discharge:  4/1/2021  Admitting Physician:  Nawaf Clark MD  Discharge Physician:  Angelito Fuller  Discharging Service:  Emergency Medicine     Primary Provider: Collin Salazar          Discharge Diagnosis:     Palpitations    Chest pain, unspecified type    * No resolved hospital problems. *               Discharge Disposition:   Discharged to home           Condition on Discharge:   Discharge condition: Stable   Code status on discharge: Full Code           Procedures:   No procedures performed during this admission          Discharge Medications:     Current Discharge Medication List      CONTINUE these medications which have NOT CHANGED    Details   apixaban ANTICOAGULANT (ELIQUIS) 5 MG tablet Take 1 tablet (5 mg) by mouth 2 times daily discontinue warfarin.  Qty: 180 tablet, Refills: 0    Associated Diagnoses: Factor 5 Leiden mutation, heterozygous (H)      cholecalciferol (VITAMIN D) 1000 UNIT tablet Take 1 tablet (1,000 Units) by mouth daily  Qty: 100 tablet, Refills: 3    Associated Diagnoses: Unspecified vitamin D deficiency      losartan (COZAAR) 50 MG tablet Take 1 tablet (50 mg) by mouth daily  Qty: 90 tablet, Refills: 0    Associated Diagnoses: Benign essential hypertension      simvastatin (ZOCOR) 20 MG tablet Take 1 tablet (20 mg) by mouth At Bedtime  Qty: 90 tablet, Refills: 0    Associated Diagnoses: Hyperlipidemia LDL goal <130                   Consultations:   Eriberto consult from cardiology             Brief History of Illness:   Please see detailed H&P from 3/31/2021, in brief: Collin Frank is a 70 year old male admitted on 3/31/2021. He has a history of May-Thurner syndrome, Factor V Leiden, PE (anticoagulated on Eliquis), DVT,  Hyperhomocysteinemia, LE stents, and essential hypertension who presents to the Emergency Department for  evaluation of chest discomfort, left arm numbness and palpitations.           Hospital Course:   1. Chest pain  2. Palpitations: Patient reports chest pain came on gradually last evening and worsened at 3 am. He noted palpitations and his chest increase. He noted numbness in his left arm and was diaphoretic. He denies any increased exercises or yard work yesterday. He was baking and doing light housecleaning. Denies any URI symptoms. Patient subsequently called EMS who gave the patient 2 ASA and 1 dose of nitroglycerine with improvement in his symptoms. His last stress test was in 2019. Results showed: EKG at baseline with ST-T changes. At peak stress, there are horizontal-downsloping 1-2 mm ST depressions in inferolateral leads.  Screening 2D echocardiogram demonstrated no significant valve disease. Normal sized proximal ascending aorta. Patient developed no chest pain during exercise. Cardiology curbsided and reported the stress test was normal and recommended follow up with PCP. . H/o tobacco use ~ 1ppd x 40 years. Troponins negative x 2. CXR  Normal. EKG today shows: Frequent PVCS, No acute ischemic changes. Non-specific EKG.   CT Chest no PE;  Evaluation of the upper abdomen is limited. In the ED: Vitals:BP:119/80  Pulse: 71 Temp: 98.3 Resp: 20 SP02:94 % Labs: Na 138, K3.6, Cr 0.98, BUN 10, LFTS normal, Troponin negative x 2,  , WBC 7.2, Hgb 14.7, Plt 223, INR 1.48, Medications: En route, patient received 2 ASA and 1 dose of nitrogycerin Consults: none Plan: Admitted to ED observation for ACS rule out.  While in the observation unit the patient's vital signs remained stable, afebrile.  Serial troponins negative X 3, no ectopy on telemetry.  The patient underwent a stress test and this showed:   Equivocal exercise echocardiogram. No regional wall motion abnormalities at  rest or with exercise, but the LV function does not augment with exercise.  Consider anatomic assessment if clinical suspicion for CAD is  "high.     The target heart rate was achieved. Normal heart rate and BP response to  exercise.  Normal biventricular size, thickness, and global systolic function at  baseline, LVEF=60-65%.  With exercise, LVEF remained 60-65%; LV cavity size did not decrease.  No regional wall motion abnormalities are present at rest or with exercise.  No angina was elicited.  No ECG evidence of ischemia.  Functional capacity is reduced for age.  No significant valvular abnormalities are noted on screening Doppler exam.  The aortic root and visualized ascending aorta are normal.    Discussed with cardiology, they recommended outpatient coronary CTA, this was ordered.  Patient had no further chest pain.  Reported some palpitations during stress test.  He had 3 beats of SVT.  He was placed on zio patch for 14 days, follow up with PCP for results.  Patient was given all results and instructed to follow up with his PCP in one week.  Return if the patient has new or worsening chest pain, SOB, nausea, or lightheadedness. Patient was in agreement with the plan and was discharged to home in good condition.          3. Urinary frequency: Patient reports he has been urinating frequently in conjunction with the chest discomfort, UA negative for infection.        4. May-Thurner syndrome  5. Factor V Leiden  6. H/o DVT and PE: INR 1.48  - PTA Eliquis     Chronic Medical Problems:  # HTN: - Continue with PTA Losartan     # HLD: - Continue with PTA Zocor                Final Day of Progress before Discharge:       Physical Exam:  Blood pressure 126/84, pulse 96, temperature 97.6  F (36.4  C), temperature source Oral, resp. rate 17, height 1.727 m (5' 8\"), weight 99.8 kg (220 lb), SpO2 97 %.    EXAM:  Exam:  Constitutional: healthy, alert and no distress  Cardiovascular: No lifts, heaves, or thrills. RRR. No murmurs, clicks gallops or rub  Respiratory: Good diaphragmatic excursion. Lungs clear  Gastrointestinal: Abdomen soft, non-tender. BS normal. " "No masses, organomegaly  Musculoskeletal: extremities normal- no gross deformities noted, gait normal and normal muscle tone  Skin: no suspicious lesions or rashes  Neurologic: Gait normal. Alert and oriented.   Psychiatric: mentation appears normal and affect normal/bright    /84 (BP Location: Right arm)   Pulse 96   Temp 97.6  F (36.4  C) (Oral)   Resp 17   Ht 1.727 m (5' 8\")   Wt 99.8 kg (220 lb)   SpO2 97%   BMI 33.45 kg/m               Data:  All laboratory data reviewed             Significant Results:     Results for orders placed or performed during the hospital encounter of 03/31/21   XR Chest Port 1 View     Status: None    Narrative    Chest one view portable    HISTORY: Chest pain    COMPARISON STUDY: 1/4/2020    FINDINGS: Cardiac silhouette is not enlarged. Bibasilar atelectasis..  Lungs are otherwise clear.      Impression    IMPRESSION: No acute airspace disease.    MIKIE ABAD MD   CT Chest Pulmonary Embolism w Contrast     Status: None    Narrative    CTA pulmonary angiogram    HISTORY: Suspect pulmonary embolism    COMPARISON STUDY: 1/4/2020    FINDINGS: There is no evidence of acute pulmonary embolism. Contrast  bolus is excellent.    Main pulmonary artery and aorta are not enlarged. Advanced coronary  calcifications. No mediastinal, hilar or axillary adenopathy. No  pleural or pericardial effusion.    Lungs are clear.    Evaluation of the upper abdomen is limited.     Bones: Degenerative changes of the spine.      Impression    Impression: No evidence of pulmonary embolism.    MIKIE ABAD MD   CBC with platelets differential     Status: None   Result Value Ref Range    WBC 7.2 4.0 - 11.0 10e9/L    RBC Count 4.60 4.4 - 5.9 10e12/L    Hemoglobin 14.7 13.3 - 17.7 g/dL    Hematocrit 43.5 40.0 - 53.0 %    MCV 95 78 - 100 fl    MCH 32.0 26.5 - 33.0 pg    MCHC 33.8 31.5 - 36.5 g/dL    RDW 12.8 10.0 - 15.0 %    Platelet Count 223 150 - 450 10e9/L    Diff Method Automated Method     % " Neutrophils 69.5 %    % Lymphocytes 21.1 %    % Monocytes 7.6 %    % Eosinophils 0.7 %    % Basophils 0.8 %    % Immature Granulocytes 0.3 %    Nucleated RBCs 0 0 /100    Absolute Neutrophil 5.0 1.6 - 8.3 10e9/L    Absolute Lymphocytes 1.5 0.8 - 5.3 10e9/L    Absolute Monocytes 0.5 0.0 - 1.3 10e9/L    Absolute Eosinophils 0.1 0.0 - 0.7 10e9/L    Absolute Basophils 0.1 0.0 - 0.2 10e9/L    Abs Immature Granulocytes 0.0 0 - 0.4 10e9/L    Absolute Nucleated RBC 0.0    Comprehensive metabolic panel     Status: Abnormal   Result Value Ref Range    Sodium 138 133 - 144 mmol/L    Potassium 3.6 3.4 - 5.3 mmol/L    Chloride 106 94 - 109 mmol/L    Carbon Dioxide 23 20 - 32 mmol/L    Anion Gap 8 3 - 14 mmol/L    Glucose 122 (H) 70 - 99 mg/dL    Urea Nitrogen 10 7 - 30 mg/dL    Creatinine 0.98 0.66 - 1.25 mg/dL    GFR Estimate 76 >60 mL/min/[1.73_m2]    GFR Estimate If Black 88 >60 mL/min/[1.73_m2]    Calcium 8.9 8.5 - 10.1 mg/dL    Bilirubin Total 0.8 0.2 - 1.3 mg/dL    Albumin 3.4 3.4 - 5.0 g/dL    Protein Total 6.8 6.8 - 8.8 g/dL    Alkaline Phosphatase 91 40 - 150 U/L    ALT 31 0 - 70 U/L    AST 26 0 - 45 U/L   Troponin I     Status: None   Result Value Ref Range    Troponin I ES <0.015 0.000 - 0.045 ug/L   INR     Status: Abnormal   Result Value Ref Range    INR 1.48 (H) 0.86 - 1.14   Magnesium     Status: None   Result Value Ref Range    Magnesium 2.1 1.6 - 2.3 mg/dL   TSH     Status: None   Result Value Ref Range    TSH 1.79 0.40 - 4.00 mU/L   Asymptomatic SARS-CoV-2 COVID-19 Virus (Coronavirus) by PCR     Status: None    Specimen: Nasopharyngeal   Result Value Ref Range    SARS-CoV-2 Virus Specimen Source Nasopharyngeal     SARS-CoV-2 PCR Result NEGATIVE     SARS-CoV-2 PCR Comment       Testing was performed using the Xpert Xpress SARS-CoV-2 Assay on the Cepheid Gene-Xpert   Instrument Systems. Additional information about this Emergency Use Authorization (EUA)   assay can be found via the Lab Guide.     NAHUM with  Microscopic reflex to Culture     Status: Abnormal    Specimen: Urine clean catch; Midstream Urine   Result Value Ref Range    Color Urine Straw     Appearance Urine Clear     Glucose Urine Negative NEG^Negative mg/dL    Bilirubin Urine Negative NEG^Negative    Ketones Urine Negative NEG^Negative mg/dL    Specific Gravity Urine 1.014 1.003 - 1.035    Blood Urine Small (A) NEG^Negative    pH Urine 6.5 5.0 - 7.0 pH    Protein Albumin Urine Negative NEG^Negative mg/dL    Urobilinogen mg/dL Normal 0.0 - 2.0 mg/dL    Nitrite Urine Negative NEG^Negative    Leukocyte Esterase Urine Negative NEG^Negative    Source Midstream Urine     WBC Urine 1 0 - 5 /HPF    RBC Urine 3 (H) 0 - 2 /HPF    Squamous Epithelial /HPF Urine 0 0 - 1 /HPF    sperm Present (A) NEG^Negative /HPF   Troponin I     Status: None   Result Value Ref Range    Troponin I ES <0.015 0.000 - 0.045 ug/L   Troponin I     Status: None   Result Value Ref Range    Troponin I ES <0.015 0.000 - 0.045 ug/L   Basic metabolic panel     Status: None   Result Value Ref Range    Sodium 140 133 - 144 mmol/L    Potassium 3.9 3.4 - 5.3 mmol/L    Chloride 108 94 - 109 mmol/L    Carbon Dioxide 25 20 - 32 mmol/L    Anion Gap 7 3 - 14 mmol/L    Glucose 90 70 - 99 mg/dL    Urea Nitrogen 15 7 - 30 mg/dL    Creatinine 0.93 0.66 - 1.25 mg/dL    GFR Estimate 81 >60 mL/min/[1.73_m2]    GFR Estimate If Black >90 >60 mL/min/[1.73_m2]    Calcium 8.8 8.5 - 10.1 mg/dL   Magnesium     Status: None   Result Value Ref Range    Magnesium 2.2 1.6 - 2.3 mg/dL   Phosphorus     Status: None   Result Value Ref Range    Phosphorus 2.8 2.5 - 4.5 mg/dL   EKG 12-lead, tracing only     Status: None   Result Value Ref Range    Interpretation ECG Click View Image link to view waveform and result    EKG 12-lead, complete     Status: None (Preliminary result)   Result Value Ref Range    Interpretation ECG Click View Image link to view waveform and result    Echo Stress Echocardiogram     Status: None     [facility]    [id number]  [id number]  [id number]  [id number]^NEERAJ^ROXY^YON     [facility]  Echocardiography Laboratory  [address] [place] [address]  Name: WAYLON CERVANTES  MRN: [id number]  : 1950  Study Date: 2021 09:35 AM  Age: 70 yrs  Gender: Male  Patient Location: [facility]  Reason For Study: Chest Discomfort  Ordering Physician: ROXY PICKARD  Performed By: Kylee Barth RDCS     BSA: 2.1 m2  Height: 68 in  Weight: 220 lb  HR: 76  BP: 136/63 mmHg  ______________________________________________________________________________  Procedure  Stress Echo Bike with two dimensional, color and spectral Doppler performed.  Contrast Definity.  ______________________________________________________________________________  Interpretation Summary  Equivocal exercise echocardiogram. No regional wall motion abnormalities at  rest or with exercise, but the LV function does not augment with exercise.  Consider anatomic assessment if clinical suspicion for CAD is high.     The target heart rate was achieved. Normal heart rate and BP response to  exercise.  Normal biventricular size, thickness, and global systolic function at  baseline, LVEF=60-65%.  With exercise, LVEF remained 60-65%; LV cavity size did not decrease.  No regional wall motion abnormalities are present at rest or with exercise.  No angina was elicited.  No ECG evidence of ischemia.  Functional capacity is reduced for age.  No significant valvular abnormalities are noted on screening Doppler exam.  The aortic root and visualized ascending aorta are normal.  ______________________________________________________________________________  Stress  There was no new ST segment depression.  3 beats of SVT noted during exercise.  Limiting Symptom: None.  Target Heart Rate was achieved.  The patient did not exhibit any symptoms during exercise.  Exercise was stopped due to fatigue.  Peak MVO2 12.0 ml/kg/min .  Percent  predicted MVO2 52 %.  RPP 58566.  Maximum workload 100 vasquez.     Rest  Baseline ECG: normal sinus rhythm with ventricular bigeminy; septal infarct.     Stress Results                                       Maximum Predicted HR:   148 bpm             Target HR: 126 bpm        % Maximum Predicted HR: 89 %                             Stage DurationHeart Rate   BP                                 (mm:ss)   (bpm)                        Baseline  0:00      76     136/63                          Peak    4:14      132   178/100                          Stress Duration:   4:14 mm:ss                       Maximum Stress HR: 132 bpm     Contrast  Definity (NDC #32142-114-82) given intravenously. Patient was given 5ml  mixture of 1.5ml Definity and 8.5ml saline. 5 ml wasted. Definity Expiration  11/01/21 . Definity Lot # [id number] . Definity used for endocardial definition and  detailed wall motion analysis.     ______________________________________________________________________________  Report approved by: Yonis Contreras 04/01/2021 10:35 AM     ______________________________________________________________________________         Recent Results (from the past 48 hour(s))   XR Chest Port 1 View    Narrative    Chest one view portable    HISTORY: Chest pain    COMPARISON STUDY: 1/4/2020    FINDINGS: Cardiac silhouette is not enlarged. Bibasilar atelectasis..  Lungs are otherwise clear.      Impression    IMPRESSION: No acute airspace disease.    MIKIE ABAD MD   CT Chest Pulmonary Embolism w Contrast    Narrative    CTA pulmonary angiogram    HISTORY: Suspect pulmonary embolism    COMPARISON STUDY: 1/4/2020    FINDINGS: There is no evidence of acute pulmonary embolism. Contrast  bolus is excellent.    Main pulmonary artery and aorta are not enlarged. Advanced coronary  calcifications. No mediastinal, hilar or axillary adenopathy. No  pleural or pericardial effusion.    Lungs are clear.    Evaluation of the upper abdomen  is limited.     Bones: Degenerative changes of the spine.      Impression    Impression: No evidence of pulmonary embolism.    MIKIE ABAD MD   Echo Stress Echocardiogram    Narrative    [id number]  [id number]  [id number]  [id number]^NEERAJ^ROXY^YON     [facility],[place]  Echocardiography Laboratory  [address] [place] [id number]  Name: WAYLON CERVANTES  MRN: [id number]  : 1950  Study Date: 2021 09:35 AM  Age: 70 yrs  Gender: Male  Patient Location: [facility]  Reason For Study: Chest Discomfort  Ordering Physician: ROXY PICKARD  Performed By: Kylee Barth RDCS     BSA: 2.1 m2  Height: 68 in  Weight: 220 lb  HR: 76  BP: 136/63 mmHg  ______________________________________________________________________________  Procedure  Stress Echo Bike with two dimensional, color and spectral Doppler performed.  Contrast Definity.  ______________________________________________________________________________  Interpretation Summary  Equivocal exercise echocardiogram. No regional wall motion abnormalities at  rest or with exercise, but the LV function does not augment with exercise.  Consider anatomic assessment if clinical suspicion for CAD is high.     The target heart rate was achieved. Normal heart rate and BP response to  exercise.  Normal biventricular size, thickness, and global systolic function at  baseline, LVEF=60-65%.  With exercise, LVEF remained 60-65%; LV cavity size did not decrease.  No regional wall motion abnormalities are present at rest or with exercise.  No angina was elicited.  No ECG evidence of ischemia.  Functional capacity is reduced for age.  No significant valvular abnormalities are noted on screening Doppler exam.  The aortic root and visualized ascending aorta are normal.  ______________________________________________________________________________  Stress  There was no new ST segment depression.  3 beats of SVT noted during exercise.  Limiting Symptom:  None.  Target Heart Rate was achieved.  The patient did not exhibit any symptoms during exercise.  Exercise was stopped due to fatigue.  Peak MVO2 12.0 ml/kg/min .  Percent predicted MVO2 52 %.  RPP 29960.  Maximum workload 100 vasquez.     Rest  Baseline ECG: normal sinus rhythm with ventricular bigeminy; septal infarct.     Stress Results                                       Maximum Predicted HR:   148 bpm             Target HR: 126 bpm        % Maximum Predicted HR: 89 %                             Stage DurationHeart Rate   BP                                 (mm:ss)   (bpm)                        Baseline  0:00      76     136/63                          Peak    4:14      132   178/100                          Stress Duration:   4:14 mm:ss                       Maximum Stress HR: 132 bpm     Contrast  Definity (NDC #74842-308-27) given intravenously. Patient was given 5ml  mixture of 1.5ml Definity and 8.5ml saline. 5 ml wasted. Definity Expiration  11/01/21 . Definity Lot # [id number] . Definity used for endocardial definition and  detailed wall motion analysis.     ______________________________________________________________________________  Report approved by: Yonis Contreras 04/01/2021 10:35 AM     ______________________________________________________________________________                   Pending Results:   Unresulted Labs Ordered in the Past 30 Days of this Admission     No orders found for last 31 day(s).                  Discharge Instructions and Follow-Up:     Discharge Procedure Orders   CTA Angiogram coronary artery   Standing Status: Future Standing Exp. Date: 04/01/22     Order Specific Question Answer Comments   Priority Routine    Do you want to order FFR CT if clinically indicated by the reading cardiologist? Yes    Does the patient have known severe aortic stenosis? No    Does the patient have known severe pulmonary hypertension? No      Reason for your hospital stay   Order Comments: You  were admitted to the observation unit for evaluation of your chest pain.  You had no EKG changes, labs checking for heart damage were negative, your chest CT scan was normal and negative for a blood clot.  You underwent a stress test and this did not show evidence of an acute heart attack, but your left ventricle (left side of heart) did not respond normally to exercise.   Discussed with cardiology who recommended an outpatient coronary CT angiogram.  You were also sent home with a portable heart monitor to evaluate the palpitations, please follow up with your PCP to discuss these results.     Adult [facility] Follow-up and recommended labs and tests   Order Comments: Follow up with primary care provider, Collin Salazar, within 7 days for hospital follow- up.  The following labs/tests are recommended: outpatient coronary CTA.      Appointments on [place] and/or [facility] (with [facility] or [facility] provider or service). Call [phone] if you haven't heard regarding these appointments within 7 days of discharge.     Activity   Order Comments: Your activity upon discharge: activity as tolerated     Order Specific Question Answer Comments   Is discharge order? Yes      When to contact your care team   Order Comments: Return to the ER if you have new or worsening chest pain, shortness of breath, jaw or arm pain, or fainting.     Diet   Order Comments: Follow this diet upon discharge: regular     Order Specific Question Answer Comments   Is discharge order? Yes           Attestation:  CHITRA Lindsay CNP.          This patient was discussed with the Care Team in the OBS Unit.  The patient's chart was reviewed and the patient was also seen and evaluated by me.  The plan of care was discussed and reviewed with the Care Team.  The above documentation reflects the evaluation, medical decision making and plan under my supervision.    Angelito Fuller MD, FACEP  [facility] Staff Emergency Physician

## 2021-04-01 NOTE — PROGRESS NOTES
- Serial troponins and stress test complete. - Stress test this morning, Troponin negative x 2.  - Seen and cleared by consultant if applicable - in progress  - Adequate pain control on oral analgesia - pt denies pain  - Vital signs normal or at patient baseline - met, VSS  - Safe disposition plan has been identified - not met

## 2021-04-02 ENCOUNTER — TELEPHONE (OUTPATIENT)
Dept: FAMILY MEDICINE | Facility: CLINIC | Age: 71
End: 2021-04-02

## 2021-04-02 LAB — INTERPRETATION ECG - MUSE: NORMAL

## 2021-04-02 NOTE — TELEPHONE ENCOUNTER
"  ED for acute condition Discharge Protocol    \"Hi, my name is Jenni Saldivar RN, a registered nurse, and I am calling from [facility].  I am calling to follow up and see how things are going for you after your recent emergency visit.\"    Tell me how you are doing now that you are home?\"   Pt response: Things are going well, no problems. Discharged yesterday at about 1330.      Discharge Instructions    \"Let's review your discharge instructions.  What is/are the follow-up recommendations?  Pt. Response: Heart monitor- 14 days    \"Has an appointment with your primary care provider been scheduled?\"  Yes. (confirm and remind to bring meds). PCP scheduled for 04/13/2021     Medications    \"Tell me what changed about your medicines when you discharged?\"    No changes    \"What questions do you have about your medications?\"   None        Call Summary    \"What questions or concerns do you have about your recent visit and your follow-up care?\"     none    \"If you have questions or things don't continue to improve, we encourage you contact us through the main clinic number (give number).  Even if the clinic is not open, triage nurses are available 24/7 to help you.     We would like you to know that our clinic has extended hours (provide information).  We also have urgent care (provide details on closest location and hours/contact info)\"    \"Thank you for your time and take care!\"      DANDY Freeman RN  [facility], [place]    "

## 2021-04-02 NOTE — TELEPHONE ENCOUNTER
This patient was discharged from [facility] on 4/1/2021    Discharge Diagnosis: Chest Pain, Unspecified Type    Has a follow-up visit has been scheduled? Yes, 4/13/2021    Please follow-up with patient    Cassia Nunez RN  [facility]

## 2021-04-06 ENCOUNTER — IMMUNIZATION (OUTPATIENT)
Dept: NURSING | Facility: CLINIC | Age: 71
End: 2021-04-06
Attending: FAMILY MEDICINE
Payer: COMMERCIAL

## 2021-04-06 PROCEDURE — 0002A PR COVID VAC PFIZER DIL RECON 30 MCG/0.3 ML IM: CPT

## 2021-04-06 PROCEDURE — 91300 PR COVID VAC PFIZER DIL RECON 30 MCG/0.3 ML IM: CPT

## 2021-04-13 ENCOUNTER — OFFICE VISIT (OUTPATIENT)
Dept: FAMILY MEDICINE | Facility: CLINIC | Age: 71
End: 2021-04-13
Payer: COMMERCIAL

## 2021-04-13 VITALS
DIASTOLIC BLOOD PRESSURE: 76 MMHG | HEART RATE: 82 BPM | SYSTOLIC BLOOD PRESSURE: 136 MMHG | TEMPERATURE: 98.4 F | WEIGHT: 217 LBS | OXYGEN SATURATION: 98 % | BODY MASS INDEX: 32.99 KG/M2

## 2021-04-13 DIAGNOSIS — Z79.01 LONG TERM CURRENT USE OF ANTICOAGULANT THERAPY: ICD-10-CM

## 2021-04-13 DIAGNOSIS — I10 ESSENTIAL HYPERTENSION: Primary | ICD-10-CM

## 2021-04-13 DIAGNOSIS — D68.51 FACTOR 5 LEIDEN MUTATION, HETEROZYGOUS (H): ICD-10-CM

## 2021-04-13 PROCEDURE — 99214 OFFICE O/P EST MOD 30 MIN: CPT | Performed by: INTERNAL MEDICINE

## 2021-04-13 NOTE — PROGRESS NOTES
Assessment & Plan   Problem List Items Addressed This Visit     Essential hypertension - Primary    Factor 5 Leiden mutation, heterozygous (H)    Long-term (current) use of anticoagulants [Z79.01]       Patient's blood pressure is stable   Continues on eliqiis and losartan and simvastatin        [id number]}     Regular exercise    No follow-ups on file.    Collin Salazar MD  [facility] SILVINO Polanco is a 70 year old who presents for the following health issues     HPI       Hospital Follow-up Visit:    Hospital/Nursing Home/ Rehab Facility: [facility]  Date of Admission: 3/31/2021  Date of Discharge: 4/1/2021  Reason(s) for Admission: Palpations/Chest pain      Was your hospitalization related to COVID-19? No   Problems taking medications regularly:  None  Medication changes since discharge: None  Problems adhering to non-medication therapy:  None  Had the covid vaccine recently  Did not go out -   Walking more   No symptoms   Itching around the zio patch     Summary of hospitalization:  [facility] discharge summary reviewed  Diagnostic Tests/Treatments reviewed.  Follow up needed: none  Other Healthcare Providers Involved in Patient s Care:         None  Update since discharge: improved.       Post Discharge Medication Reconciliation: discharge medications reconciled and changed, per note/orders.  Plan of care communicated with patient                  Review of Systems   Constitutional, HEENT, cardiovascular, pulmonary, gi and gu systems are negative, except as otherwise noted.      Objective    /76 (BP Location: Right arm, Patient Position: Chair, Cuff Size: Adult Large)   Pulse 82   Temp 98.4  F (36.9  C)   Wt 98.4 kg (217 lb)   SpO2 98%   BMI 32.99 kg/m    Body mass index is 32.99 kg/m .  Physical Exam   GENERAL: healthy, alert and no distress  EYES: Eyes grossly normal to inspection, PERRL and conjunctivae and sclerae  normal  HENT: ear canals and TM's normal, nose and mouth without ulcers or lesions  NECK: no adenopathy, no asymmetry, masses, or scars and thyroid normal to palpation  RESP: lungs clear to auscultation - no rales, rhonchi or wheezes  CV: regular rate and rhythm, normal S1 S2, no S3 or S4, no murmur, click or rub, no peripheral edema and peripheral pulses strong  ABDOMEN: soft, nontender, no hepatosplenomegaly, no masses and bowel sounds normal  MS: no gross musculoskeletal defects noted, no edema  SKIN: no suspicious lesions or rashes  NEURO: Normal strength and tone, mentation intact and speech normal  BACK: no CVA tenderness, no paralumbar tenderness  PSYCH: mentation appears normal, affect normal/bright  LYMPH: no cervical, supraclavicular, axillary, or inguinal adenopathy    No results found for any visits on 04/13/21.

## 2021-06-16 NOTE — PROGRESS NOTES
"SUBJECTIVE:   Collin Frank is a 70 year old male who presents for Preventive Visit.  Patient has been advised of split billing requirements and indicates understanding: Yes   Are you in the first 12 months of your Medicare coverage?  No    Healthy Habits:    In general, how would you rate your overall health?  Good    Frequency of exercise:  1 day/week    Duration of exercise:  15-30 minutes    Do you usually eat at least 4 servings of fruit and vegetables a day, include whole grains    & fiber and avoid regularly eating high fat or \"junk\" foods?  Yes    Taking medications regularly:  Yes    Barriers to taking medications:  None    Medication side effects:  None    Ability to successfully perform activities of daily living:  No assistance needed    Home Safety:  No safety concerns identified    Hearing Impairment:  Need to ask people to speak up or repeat themselves    In the past 6 months, have you been bothered by leaking of urine?  No    In general, how would you rate your overall mental or emotional health?  Good      PHQ-2 Total Score:    Additional concerns today:  No    Do you feel safe in your environment? Yes    Have you ever done Advance Care Planning? (For example, a Health Directive, POLST, or a discussion with a medical provider or your loved ones about your wishes): No, advance care planning information given to patient to review.  Patient plans to discuss their wishes with loved ones or provider.     140-150/ 80-85  No alcohol or salt  Keeping in touch with family but on his own        Fall risk  Fallen 2 or more times in the past year?: No  Any fall with injury in the past year?: No    Cognitive Screening   1) Repeat 3 items (Leader, Season, Table)    2) Clock draw: NORMAL  3) 3 item recall: Recalls 3 objects  Results: 3 items recalled: COGNITIVE IMPAIRMENT LESS LIKELY    Mini-CogTM Copyright S Samuel. Licensed by the author for use in [facility]; reprinted with permission " (davon@[facility]). All rights reserved.      Do you have sleep apnea, excessive snoring or daytime drowsiness?: no    Reviewed and updated as needed this visit by clinical staff  Tobacco  Allergies  Meds   Med Hx  Surg Hx  Fam Hx  Soc Hx        Reviewed and updated as needed this visit by Provider                Social History     Tobacco Use     Smoking status: Former Smoker     Types: Cigarettes     Quit date: 2011     Years since quittin.9     Smokeless tobacco: Never Used   Substance Use Topics     Alcohol use: No     If you drink alcohol do you typically have >3 drinks per day or >7 drinks per week? No    Alcohol Use 2019   Prescreen: >3 drinks/day or >7 drinks/week? Not Applicable   Prescreen: >3 drinks/day or >7 drinks/week? -   No flowsheet data found.        Current providers sharing in care for this patient include:   Patient Care Team:  Collin Salazar MD as PCP - General (Internal Medicine)  Collin Salazar MD as Assigned PCP  Hernesto Love MD as Assigned Musculoskeletal Provider    The following health maintenance items are reviewed in Epic and correct as of today:  Health Maintenance   Topic Date Due     INFLUENZA VACCINE (1) 2020     FALL RISK ASSESSMENT  2020     MEDICARE ANNUAL WELLNESS VISIT  2020     LUNG CANCER SCREENING ANNUAL  2021     DTAP/TDAP/TD IMMUNIZATION (2 - Td) 10/16/2022     LIPID  2024     ADVANCE CARE PLANNING  2024     COLORECTAL CANCER SCREENING  2028     HEPATITIS C SCREENING  Completed     PHQ-2  Completed     Pneumococcal Vaccine: 65+ Years  Completed     ZOSTER IMMUNIZATION  Completed     AORTIC ANEURYSM SCREENING (SYSTEM ASSIGNED)  Completed     Pneumococcal Vaccine: Pediatrics (0 to 5 Years) and At-Risk Patients (6 to 64 Years)  Aged Out     IPV IMMUNIZATION  Aged Out     MENINGITIS IMMUNIZATION  Aged Out     HEPATITIS B IMMUNIZATION  Aged Out     Lab work is in process  Labs reviewed in [employer]  BP Readings  from Last 3 Encounters:   20 (!) 145/82   20 (!) 159/97   20 (!) 160/86    Wt Readings from Last 3 Encounters:   20 102.1 kg (225 lb)   20 98.4 kg (217 lb)   20 98.9 kg (218 lb)                  Patient Active Problem List   Diagnosis     Hyperhomocysteinemia (H)     Acute venous embolism and thrombosis of deep vessels of proximal lower extremity (H)     CARDIOVASCULAR SCREENING; LDL GOAL LESS THAN 130     Factor 5 Leiden mutation, heterozygous (H)     May-Thurner syndrome     Long term current use of anticoagulant     Vitamin D deficiency     Renal stones     Hepatic hemangioma     FH: prostate cancer     Long-term (current) use of anticoagulants [Z79.01]     Pulmonary embolism with infarction (HCC) [I26.99]     Post-traumatic osteoarthritis of right knee     Benign non-nodular prostatic hyperplasia without lower urinary tract symptoms     Primary osteoarthritis of left knee     Benign essential hypertension     Primary osteoarthritis of both knees     Essential hypertension     Past Surgical History:   Procedure Laterality Date     COLONOSCOPY  08    Normal. Repeat in 10 years     COLONOSCOPY WITH CO2 INSUFFLATION N/A 2018    Procedure: COLONOSCOPY WITH CO2 INSUFFLATION;  COLON SCREEN/ ENGELMANN;  Surgeon: Jan Flores MD;  Location: [facility] OR     rt knee ORIF      [facility]       Social History     Tobacco Use     Smoking status: Former Smoker     Types: Cigarettes     Quit date: 2011     Years since quittin.9     Smokeless tobacco: Never Used   Substance Use Topics     Alcohol use: No     Family History   Problem Relation Age of Onset     Alzheimer Disease Mother      Heart Disease Father      Prostate Cancer Father      Cancer Brother 65        pancreatic        Prostate Cancer Brother          Current Outpatient Medications   Medication Sig Dispense Refill     apixaban ANTICOAGULANT (ELIQUIS) 5 MG tablet Take 1 tablet (5 mg) by mouth  "2 times daily discontinue warfarin. 180 tablet 0     cholecalciferol (VITAMIN D) 1000 UNIT tablet Take 1 tablet (1,000 Units) by mouth daily 100 tablet 3     losartan (COZAAR) 50 MG tablet Take 1 tablet (50 mg) by mouth daily 30 tablet 0     simvastatin (ZOCOR) 20 MG tablet Take 1 tablet (20 mg) by mouth At Bedtime 90 tablet 0         Review of Systems  Constitutional, HEENT, cardiovascular, pulmonary, gi and gu systems are negative, except as otherwise noted.    OBJECTIVE:   There were no vitals taken for this visit. Estimated body mass index is 32.99 kg/m  as calculated from the following:    Height as of 6/8/20: 1.727 m (5' 8\").    Weight as of 6/8/20: 98.4 kg (217 lb).  Physical Exam  GENERAL: healthy, alert and no distress  EYES: Eyes grossly normal to inspection, PERRL and conjunctivae and sclerae normal  HENT: ear canals and TM's normal, nose and mouth without ulcers or lesions  NECK: no adenopathy, no asymmetry, masses, or scars and thyroid normal to palpation  RESP: lungs clear to auscultation - no rales, rhonchi or wheezes  CV: regular rate and rhythm, normal S1 S2, no S3 or S4, no murmur, click or rub, no peripheral edema and peripheral pulses strong  ABDOMEN: soft, nontender, no hepatosplenomegaly, no masses and bowel sounds normal  MS: no gross musculoskeletal defects noted, no edema  SKIN: no suspicious lesions or rashes  NEURO: Normal strength and tone, mentation intact and speech normal  BACK: no CVA tenderness, no paralumbar tenderness  PSYCH: mentation appears normal, affect normal/bright  LYMPH: no cervical, supraclavicular, axillary, or inguinal adenopathy    Diagnostic Test Results:  Labs reviewed in Epic  Results for orders placed or performed in visit on 12/29/20   Basic metabolic panel  (Ca, Cl, CO2, Creat, Gluc, K, Na, BUN)     Status: Abnormal   Result Value Ref Range    Sodium 138 133 - 144 mmol/L    Potassium 4.3 3.4 - 5.3 mmol/L    Chloride 105 94 - 109 mmol/L    Carbon Dioxide 32 20 - " 32 mmol/L    Anion Gap 1 (L) 3 - 14 mmol/L    Glucose 88 70 - 99 mg/dL    Urea Nitrogen 12 7 - 30 mg/dL    Creatinine 1.02 0.66 - 1.25 mg/dL    GFR Estimate 73 >60 mL/min/[1.73_m2]    GFR Estimate If Black 84 >60 mL/min/[1.73_m2]    Calcium 9.3 8.5 - 10.1 mg/dL   Lipid panel reflex to direct LDL Fasting     Status: None   Result Value Ref Range    Cholesterol 130 <200 mg/dL    Triglycerides 65 <150 mg/dL    HDL Cholesterol 53 >39 mg/dL    LDL Cholesterol Calculated 64 <100 mg/dL    Non HDL Cholesterol 77 <130 mg/dL   CBC with platelets and differential     Status: None   Result Value Ref Range    WBC 7.0 4.0 - 11.0 10e9/L    RBC Count 5.15 4.4 - 5.9 10e12/L    Hemoglobin 16.3 13.3 - 17.7 g/dL    Hematocrit 50.0 40.0 - 53.0 %    MCV 97 78 - 100 fl    MCH 31.7 26.5 - 33.0 pg    MCHC 32.6 31.5 - 36.5 g/dL    RDW 13.4 10.0 - 15.0 %    Platelet Count 248 150 - 450 10e9/L    % Neutrophils 61.7 %    % Lymphocytes 26.1 %    % Monocytes 9.3 %    % Eosinophils 2.2 %    % Basophils 0.7 %    Absolute Neutrophil 4.3 1.6 - 8.3 10e9/L    Absolute Lymphocytes 1.8 0.8 - 5.3 10e9/L    Absolute Monocytes 0.7 0.0 - 1.3 10e9/L    Absolute Eosinophils 0.2 0.0 - 0.7 10e9/L    Absolute Basophils 0.1 0.0 - 0.2 10e9/L    Diff Method Automated Method        ASSESSMENT / PLAN:   Collin was seen today for physical.    Diagnoses and all orders for this visit:    Encounter for Medicare annual wellness exam  -     Basic metabolic panel  (Ca, Cl, CO2, Creat, Gluc, K, Na, BUN)  -     Lipid panel reflex to direct LDL Fasting  -     CBC with platelets and differential        Patient has been advised of split billing requirements and indicates understanding: Yes  COUNSELING:  Reviewed preventive health counseling, as reflected in patient instructions       Regular exercise       Healthy diet/nutrition       Vision screening       Hearing screening       Dental care       Bladder control       Hepatitis C screening       Colon cancer  "screening    Estimated body mass index is 32.99 kg/m  as calculated from the following:    Height as of 6/8/20: 1.727 m (5' 8\").    Weight as of 6/8/20: 98.4 kg (217 lb).    ICD-10-CM    1. Encounter for Medicare annual wellness exam  Z00.00 Basic metabolic panel  (Ca, Cl, CO2, Creat, Gluc, K, Na, BUN)     Lipid panel reflex to direct LDL Fasting     CBC with platelets and differential       Weight management plan: Discussed healthy diet and exercise guidelines    He reports that he quit smoking about 9 years ago. His smoking use included cigarettes. He has never used smokeless tobacco.      Appropriate preventive services were discussed with this patient, including applicable screening as appropriate for cardiovascular disease, diabetes, osteopenia/osteoporosis, and glaucoma.  As appropriate for age/gender, discussed screening for colorectal cancer, prostate cancer, breast cancer, and cervical cancer. Checklist reviewing preventive services available has been given to the patient.    Reviewed patients plan of care and provided an AVS. The Basic Care Plan (routine screening as documented in Health Maintenance) for Collin meets the Care Plan requirement. This Care Plan has been established and reviewed with the Patient.    Counseling Resources:  ATP IV Guidelines  Pooled Cohorts Equation Calculator  Breast Cancer Risk Calculator  Breast Cancer: Medication to Reduce Risk  FRAX Risk Assessment  ICSI Preventive Guidelines  Dietary Guidelines for Americans, 2010  USDA's MyPlate  ASA Prophylaxis  Lung CA Screening    Collin Salazar MD  [facility]    Identified Health Risks:    He is at risk for lack of exercise and has been provided with information to increase physical activity for the benefit of his well-being.  The patient was provided with written information regarding signs of hearing loss.  " Erythromycin Counseling:  I discussed with the patient the risks of erythromycin including but not limited to GI upset, allergic reaction, drug rash, diarrhea, increase in liver enzymes, and yeast infections.

## 2021-06-25 DIAGNOSIS — I10 BENIGN ESSENTIAL HYPERTENSION: ICD-10-CM

## 2021-06-25 DIAGNOSIS — E78.5 HYPERLIPIDEMIA LDL GOAL <130: ICD-10-CM

## 2021-06-25 DIAGNOSIS — D68.51 FACTOR 5 LEIDEN MUTATION, HETEROZYGOUS (H): ICD-10-CM

## 2021-06-25 NOTE — TELEPHONE ENCOUNTER
Reason for call:  Medication   If this is a refill request, has the caller requested the refill from the pharmacy already? N/A  Will the patient be using a [facility]? No  Name of the pharmacy and phone number for the current request:    [email or web address] - [address]      Name of the medication requested: simvastatin (ZOCOR) 20 MG tablet, losartan (COZAAR) 50 MG tablet, and apixaban ANTICOAGULANT (ELIQUIS) 5 MG tablet    Other request: Pt calling asking for refills    Phone number to reach patient:  Home number on file [phone] (home)    Best Time:  anytime    Can we leave a detailed message on this number?  NO    Travel screening: Not Applicable

## 2021-06-27 RX ORDER — SIMVASTATIN 20 MG
20 TABLET ORAL AT BEDTIME
Qty: 90 TABLET | Refills: 0 | Status: SHIPPED | OUTPATIENT
Start: 2021-06-27 | End: 2021-09-29

## 2021-06-27 RX ORDER — LOSARTAN POTASSIUM 50 MG/1
50 TABLET ORAL DAILY
Qty: 90 TABLET | Refills: 0 | Status: SHIPPED | OUTPATIENT
Start: 2021-06-27 | End: 2021-09-29

## 2021-09-07 NOTE — PATIENT INSTRUCTIONS
See Dr. Crisostomo or Wanda Tapia for further care.    Quality 130: Documentation Of Current Medications In The Medical Record: Current Medications Documented Detail Level: Detailed

## 2021-09-27 ENCOUNTER — TELEPHONE (OUTPATIENT)
Dept: FAMILY MEDICINE | Facility: CLINIC | Age: 71
End: 2021-09-27

## 2021-09-27 DIAGNOSIS — I10 BENIGN ESSENTIAL HYPERTENSION: ICD-10-CM

## 2021-09-27 DIAGNOSIS — E78.5 HYPERLIPIDEMIA LDL GOAL <130: ICD-10-CM

## 2021-09-27 DIAGNOSIS — D68.51 FACTOR 5 LEIDEN MUTATION, HETEROZYGOUS (H): ICD-10-CM

## 2021-09-27 NOTE — TELEPHONE ENCOUNTER
Reason for Call:  Medication or medication refill:    Do you use a [facility]?  Name [facility] the [facility]    Name of the medication requested: losartan simvastatin eliquis    Other request: patient requesting refill for a 90 day supply    Can we leave a detailed message on this number? YES    Phone number patient can be reached at: Home number on file [phone] (home)    Best Time: any      Call taken on 9/27/2021 at 7:58 AM by Shawna Melgoza

## 2021-09-29 RX ORDER — SIMVASTATIN 20 MG
20 TABLET ORAL AT BEDTIME
Qty: 90 TABLET | Refills: 0 | Status: SHIPPED | OUTPATIENT
Start: 2021-09-29 | End: 2021-12-10

## 2021-09-29 RX ORDER — LOSARTAN POTASSIUM 50 MG/1
50 TABLET ORAL DAILY
Qty: 90 TABLET | Refills: 0 | Status: SHIPPED | OUTPATIENT
Start: 2021-09-29 | End: 2021-12-10

## 2021-09-29 NOTE — TELEPHONE ENCOUNTER
Called and spoke with Collin.    He is scheduled for his wellness exam.    Reviewed his chart with the MA. Patient can get his Pfizer booster after 10/6/2021. He understood.     Sera Guan,

## 2021-09-29 NOTE — TELEPHONE ENCOUNTER
Prescription approved per [facility] [place] Protocol. Please notify patient.   Yamilet Monroe RN

## 2021-11-15 NOTE — MR AVS SNAPSHOT
Collin Frank   10/24/2017 9:20 AM   Anticoagulation Therapy Visit    Description:  67 year old male   Provider:  CLAUDIA ANTI COAG   Department:  [facility]           INR as of 10/24/2017     Today's INR 3.3!      Anticoagulation Summary as of 10/24/2017     INR goal 2.0-3.0   Today's INR 3.3!   Full instructions 10/24: 2.5 mg; Otherwise 7.5 mg on Mon; 5 mg all other days   Next INR check 11/7/2017    Indications   Long-term (current) use of anticoagulants [Z79.01] [Z79.01]  Pulmonary embolism with infarction (HCC) [I26.99] [I26.99]  Acute venous embolism and thrombosis of deep vessels of proximal lower extremity (H) [I82.4Y9]  Factor 5 Leiden mutation  heterozygous (H) [D68.51]         Your next Anticoagulation Clinic appointment(s)     Nov 07, 2017  3:00 PM CST   Anticoagulation [facility] with [facility]   [facility]    [address] [address] [phone]              Contact Numbers     [facility]  Please call [phone] or [phone]  to cancel and/or reschedule your appointment.   Please call [phone] with any problems or questions regarding your therapy          October 2017 Details    Sun Mon Tue Wed Thu Fri Sat     1               2               3               4               5               6               7                 8               9               10               11               12               13               14                 15               16               17               18               19               20               21                 22               23               24      2.5 mg   See details      25      5 mg         26      5 mg         27      5 mg         28      5 mg           29      5 mg         30      7.5 mg         31      5 mg              Date Details   10/24 This INR check               How to take your warfarin dose     To take:  2.5 mg Take 0.5  Let her know xray spine show severe spondylosis. If pain worsens, she could see pain management   of a 5 mg tablet.    To take:  5 mg Take 1 of the 5 mg tablets.    To take:  7.5 mg Take 1.5 of the 5 mg tablets.           November 2017 Details    Sun Mon Tue Wed Thu Fri Sat        1      5 mg         2      5 mg         3      5 mg         4      5 mg           5      5 mg         6      7.5 mg         7            8               9               10               11                 12               13               14               15               16               17               18                 19               20               21               22               23               24               25                 26               27               28               29               30                  Date Details   No additional details    Date of next INR:  11/7/2017         How to take your warfarin dose     To take:  5 mg Take 1 of the 5 mg tablets.    To take:  7.5 mg Take 1.5 of the 5 mg tablets.

## 2021-12-10 ENCOUNTER — OFFICE VISIT (OUTPATIENT)
Dept: FAMILY MEDICINE | Facility: CLINIC | Age: 71
End: 2021-12-10
Payer: COMMERCIAL

## 2021-12-10 VITALS
HEART RATE: 68 BPM | OXYGEN SATURATION: 94 % | SYSTOLIC BLOOD PRESSURE: 138 MMHG | WEIGHT: 219 LBS | DIASTOLIC BLOOD PRESSURE: 76 MMHG | BODY MASS INDEX: 33.3 KG/M2

## 2021-12-10 DIAGNOSIS — E55.9 VITAMIN D DEFICIENCY: Primary | ICD-10-CM

## 2021-12-10 DIAGNOSIS — E78.5 HYPERLIPIDEMIA LDL GOAL <130: ICD-10-CM

## 2021-12-10 DIAGNOSIS — I10 BENIGN ESSENTIAL HYPERTENSION: ICD-10-CM

## 2021-12-10 DIAGNOSIS — D68.51 FACTOR 5 LEIDEN MUTATION, HETEROZYGOUS (H): ICD-10-CM

## 2021-12-10 LAB
ANION GAP SERPL CALCULATED.3IONS-SCNC: 5 MMOL/L (ref 3–14)
BASOPHILS # BLD AUTO: 0.1 10E3/UL (ref 0–0.2)
BASOPHILS NFR BLD AUTO: 1 %
BUN SERPL-MCNC: 10 MG/DL (ref 7–30)
CALCIUM SERPL-MCNC: 9.1 MG/DL (ref 8.5–10.1)
CHLORIDE BLD-SCNC: 109 MMOL/L (ref 94–109)
CHOLEST SERPL-MCNC: 109 MG/DL
CO2 SERPL-SCNC: 27 MMOL/L (ref 20–32)
CREAT SERPL-MCNC: 0.87 MG/DL (ref 0.66–1.25)
DEPRECATED CALCIDIOL+CALCIFEROL SERPL-MC: 31 UG/L (ref 20–75)
EOSINOPHIL # BLD AUTO: 0.1 10E3/UL (ref 0–0.7)
EOSINOPHIL NFR BLD AUTO: 2 %
ERYTHROCYTE [DISTWIDTH] IN BLOOD BY AUTOMATED COUNT: 13.2 % (ref 10–15)
FASTING STATUS PATIENT QL REPORTED: NORMAL
GFR SERPL CREATININE-BSD FRML MDRD: 86 ML/MIN/1.73M2
GLUCOSE BLD-MCNC: 96 MG/DL (ref 70–99)
HCT VFR BLD AUTO: 44.5 % (ref 40–53)
HDLC SERPL-MCNC: 54 MG/DL
HGB BLD-MCNC: 15 G/DL (ref 13.3–17.7)
LDLC SERPL CALC-MCNC: 44 MG/DL
LYMPHOCYTES # BLD AUTO: 1.9 10E3/UL (ref 0.8–5.3)
LYMPHOCYTES NFR BLD AUTO: 29 %
MCH RBC QN AUTO: 32.8 PG (ref 26.5–33)
MCHC RBC AUTO-ENTMCNC: 33.7 G/DL (ref 31.5–36.5)
MCV RBC AUTO: 97 FL (ref 78–100)
MONOCYTES # BLD AUTO: 0.7 10E3/UL (ref 0–1.3)
MONOCYTES NFR BLD AUTO: 11 %
NEUTROPHILS # BLD AUTO: 3.8 10E3/UL (ref 1.6–8.3)
NEUTROPHILS NFR BLD AUTO: 58 %
NONHDLC SERPL-MCNC: 55 MG/DL
PLATELET # BLD AUTO: 227 10E3/UL (ref 150–450)
POTASSIUM BLD-SCNC: 3.7 MMOL/L (ref 3.4–5.3)
RBC # BLD AUTO: 4.57 10E6/UL (ref 4.4–5.9)
SODIUM SERPL-SCNC: 141 MMOL/L (ref 133–144)
TRIGL SERPL-MCNC: 53 MG/DL
WBC # BLD AUTO: 6.6 10E3/UL (ref 4–11)

## 2021-12-10 PROCEDURE — 36415 COLL VENOUS BLD VENIPUNCTURE: CPT | Performed by: INTERNAL MEDICINE

## 2021-12-10 PROCEDURE — 85025 COMPLETE CBC W/AUTO DIFF WBC: CPT | Performed by: INTERNAL MEDICINE

## 2021-12-10 PROCEDURE — 80048 BASIC METABOLIC PNL TOTAL CA: CPT | Performed by: INTERNAL MEDICINE

## 2021-12-10 PROCEDURE — 99397 PER PM REEVAL EST PAT 65+ YR: CPT | Performed by: INTERNAL MEDICINE

## 2021-12-10 PROCEDURE — 80061 LIPID PANEL: CPT | Performed by: INTERNAL MEDICINE

## 2021-12-10 PROCEDURE — 82306 VITAMIN D 25 HYDROXY: CPT | Performed by: INTERNAL MEDICINE

## 2021-12-10 RX ORDER — LOSARTAN POTASSIUM 50 MG/1
50 TABLET ORAL DAILY
Qty: 90 TABLET | Refills: 3 | Status: SHIPPED | OUTPATIENT
Start: 2021-12-10 | End: 2022-03-15

## 2021-12-10 RX ORDER — SIMVASTATIN 20 MG
20 TABLET ORAL AT BEDTIME
Qty: 90 TABLET | Refills: 3 | Status: SHIPPED | OUTPATIENT
Start: 2021-12-10 | End: 2022-03-15

## 2021-12-10 ASSESSMENT — ACTIVITIES OF DAILY LIVING (ADL): CURRENT_FUNCTION: NO ASSISTANCE NEEDED

## 2021-12-10 NOTE — LETTER
December 21, 2021    Collin Frank  [address]    [facility] [id number]          Dear ,    We are writing to inform you of your test results.    Your results are normal.       Resulted Orders   Vitamin D Deficiency   Result Value Ref Range    Vitamin D, Total (25-Hydroxy) 31 20 - 75 ug/L    Narrative    Season, race, dietary intake, and treatment affect the concentration of 25-hydroxy-Vitamin D. Values may decrease during winter months and increase during summer months. Values 20-29 ug/L may indicate Vitamin D insufficiency and values <20 ug/L may indicate Vitamin D deficiency.    Vitamin D determination is routinely performed by an immunoassay specific for 25 hydroxyvitamin D3.  If an individual is on vitamin D2(ergocalciferol) supplementation, please specify 25 OH vitamin D2 and D3 level determination by LCMSMS test [id number].     Lipid panel reflex to direct LDL Fasting   Result Value Ref Range    Cholesterol 109 <200 mg/dL    Triglycerides 53 <150 mg/dL    Direct Measure HDL 54 >=40 mg/dL    LDL Cholesterol Calculated 44 <=100 mg/dL    Non HDL Cholesterol 55 <130 mg/dL    Patient Fasting > 8hrs? Unknown     Narrative    Cholesterol  Desirable:  <200 mg/dL    Triglycerides  Normal:  Less than 150 mg/dL  Borderline High:  150-199 mg/dL  High:  200-499 mg/dL  Very High:  Greater than or equal to 500 mg/dL    Direct Measure HDL  Female:  Greater than or equal to 50 mg/dL   Male:  Greater than or equal to 40 mg/dL    LDL Cholesterol  Desirable:  <100mg/dL  Above Desirable:  100-129 mg/dL   Borderline High:  130-159 mg/dL   High:  160-189 mg/dL   Very High:  >= 190 mg/dL    Non HDL Cholesterol  Desirable:  130 mg/dL  Above Desirable:  130-159 mg/dL  Borderline High:  160-189 mg/dL  High:  190-219 mg/dL  Very High:  Greater than or equal to 220 mg/dL   Basic metabolic panel  (Ca, Cl, CO2, Creat, Gluc, K, Na, BUN)   Result Value Ref Range    Sodium 141 133 - 144 mmol/L    Potassium 3.7 3.4 - 5.3  mmol/L    Chloride 109 94 - 109 mmol/L    Carbon Dioxide (CO2) 27 20 - 32 mmol/L    Anion Gap 5 3 - 14 mmol/L    Urea Nitrogen 10 7 - 30 mg/dL    Creatinine 0.87 0.66 - 1.25 mg/dL    Calcium 9.1 8.5 - 10.1 mg/dL    Glucose 96 70 - 99 mg/dL    GFR Estimate 86 >60 mL/min/1.73m2      Comment:      As of July 11, 2021, eGFR is calculated by the CKD-EPI creatinine equation, without race adjustment. eGFR can be influenced by muscle mass, exercise, and diet. The reported eGFR is an estimation only and is only applicable if the renal function is stable.   CBC with platelets and differential   Result Value Ref Range    WBC Count 6.6 4.0 - 11.0 10e3/uL    RBC Count 4.57 4.40 - 5.90 10e6/uL    Hemoglobin 15.0 13.3 - 17.7 g/dL    Hematocrit 44.5 40.0 - 53.0 %    MCV 97 78 - 100 fL    MCH 32.8 26.5 - 33.0 pg    MCHC 33.7 31.5 - 36.5 g/dL    RDW 13.2 10.0 - 15.0 %    Platelet Count 227 150 - 450 10e3/uL    % Neutrophils 58 %    % Lymphocytes 29 %    % Monocytes 11 %    % Eosinophils 2 %    % Basophils 1 %    Absolute Neutrophils 3.8 1.6 - 8.3 10e3/uL    Absolute Lymphocytes 1.9 0.8 - 5.3 10e3/uL    Absolute Monocytes 0.7 0.0 - 1.3 10e3/uL    Absolute Eosinophils 0.1 0.0 - 0.7 10e3/uL    Absolute Basophils 0.1 0.0 - 0.2 10e3/uL       If you have any questions or concerns, please call the clinic at the number listed above.       Sincerely,      Rebecca Larios MD

## 2021-12-10 NOTE — PROGRESS NOTES
"SUBJECTIVE:   Collin Frank is a 71 year old male who presents for Preventive Visit.    Patient has been advised of split billing requirements and indicates understanding: Yes   Are you in the first 12 months of your Medicare coverage?  No    Healthy Habits:    In general, how would you rate your overall health?  Good    Frequency of exercise:  None    Do you usually eat at least 4 servings of fruit and vegetables a day, include whole grains    & fiber and avoid regularly eating high fat or \"junk\" foods?  No    Taking medications regularly:  Yes    Barriers to taking medications:  None    Medication side effects:  None    Ability to successfully perform activities of daily living:  No assistance needed    Home Safety:  No safety concerns identified    Hearing Impairment:  No hearing concerns    In the past 6 months, have you been bothered by leaking of urine?  No    In general, how would you rate your overall mental or emotional health?  Very good      PHQ-2 Total Score:    Additional concerns today:  No    Do you feel safe in your environment? Yes    Have you ever done Advance Care Planning? (For example, a Health Directive, POLST, or a discussion with a medical provider or your loved ones about your wishes): No, advance care planning information given to patient to review.  Patient declined advance care planning discussion at this time.         Fall risk  Fallen 2 or more times in the past year?: No  Any fall with injury in the past year?: No    Cognitive Screening   1) Repeat 3 items (Leader, Season, Table)    2) Clock draw: NORMAL  3) 3 item recall: Recalls 3 objects  Results: 3 items recalled: COGNITIVE IMPAIRMENT LESS LIKELY    Mini-CogTM Copyright MARYANN Baker. Licensed by the author for use in [facility]; reprinted with permission (davon@.[facility]). All rights reserved.      Do you have sleep apnea, excessive snoring or daytime drowsiness?: no    Reviewed and updated as needed this visit by clinical " staff   Allergies  Meds             Reviewed and updated as needed this visit by Provider               Social History     Tobacco Use     Smoking status: Former Smoker     Types: Cigarettes     Quit date: 2/1/2011     Years since quitting: 10.8     Smokeless tobacco: Never Used   Substance Use Topics     Alcohol use: No     If you drink alcohol do you typically have >3 drinks per day or >7 drinks per week? No    No flowsheet data found.        Current providers sharing in care for this patient include:   Patient Care Team:  Collin Salazar MD as PCP - General (Internal Medicine)  Collin Salazar MD as Assigned PCP    The following health maintenance items are reviewed in Epic and correct as of today:  Health Maintenance Due   Topic Date Due     FALL RISK ASSESSMENT  12/29/2021     Lab work is in process  Labs reviewed in EPIC  BP Readings from Last 3 Encounters:   12/10/21 138/76   04/13/21 136/76   04/01/21 126/84    Wt Readings from Last 3 Encounters:   12/10/21 99.3 kg (219 lb)   04/13/21 98.4 kg (217 lb)   03/31/21 99.8 kg (220 lb)                  Patient Active Problem List   Diagnosis     Hyperhomocysteinemia (H)     Acute venous embolism and thrombosis of deep vessels of proximal lower extremity (H)     CARDIOVASCULAR SCREENING; LDL GOAL LESS THAN 130     Factor 5 Leiden mutation, heterozygous (H)     May-Thurner syndrome     Long term current use of anticoagulant     Vitamin D deficiency     Renal stones     Hepatic hemangioma     FH: prostate cancer     Long-term (current) use of anticoagulants [Z79.01]     Pulmonary embolism with infarction (HCC) [I26.99]     Post-traumatic osteoarthritis of right knee     Benign non-nodular prostatic hyperplasia without lower urinary tract symptoms     Primary osteoarthritis of left knee     Benign essential hypertension     Primary osteoarthritis of both knees     Essential hypertension     Morbid obesity (H)     Palpitations     Chest pain, unspecified type      "Past Surgical History:   Procedure Laterality Date     COLONOSCOPY  08    Normal. Repeat in 10 years     COLONOSCOPY WITH CO2 INSUFFLATION N/A 2018    Procedure: COLONOSCOPY WITH CO2 INSUFFLATION;  COLON SCREEN/ ENGELMANN;  Surgeon: Jan Flores MD;  Location: [facility] OR     rt knee ORIF      [facility]       Social History     Tobacco Use     Smoking status: Former Smoker     Types: Cigarettes     Quit date: 2011     Years since quitting: 10.8     Smokeless tobacco: Never Used   Substance Use Topics     Alcohol use: No     Family History   Problem Relation Age of Onset     Alzheimer Disease Mother      Heart Disease Father      Prostate Cancer Father      Cancer Brother 65        pancreatic        Prostate Cancer Brother          Current Outpatient Medications   Medication Sig Dispense Refill     apixaban ANTICOAGULANT (ELIQUIS) 5 MG tablet Take 1 tablet (5 mg) by mouth 2 times daily 180 tablet 3     cholecalciferol (VITAMIN D) 1000 UNIT tablet Take 1 tablet (1,000 Units) by mouth daily 100 tablet 3     losartan (COZAAR) 50 MG tablet Take 1 tablet (50 mg) by mouth daily 90 tablet 3     simvastatin (ZOCOR) 20 MG tablet Take 1 tablet (20 mg) by mouth At Bedtime 90 tablet 3     No Known Allergies          Review of Systems  Constitutional, HEENT, cardiovascular, pulmonary, gi and gu systems are negative, except as otherwise noted.    OBJECTIVE:   /76   Pulse 68   Wt 99.3 kg (219 lb)   SpO2 94%   BMI 33.30 kg/m   Estimated body mass index is 33.3 kg/m  as calculated from the following:    Height as of 3/31/21: 1.727 m (5' 8\").    Weight as of this encounter: 99.3 kg (219 lb).  Physical Exam  GENERAL: healthy, alert and no distress  EYES: Eyes grossly normal to inspection, PERRL and conjunctivae and sclerae normal  HENT: ear canals and TM's normal, nose and mouth without ulcers or lesions  NECK: no adenopathy, no asymmetry, masses, or scars and thyroid normal to " palpation  RESP: lungs clear to auscultation - no rales, rhonchi or wheezes  CV: regular rate and rhythm, normal S1 S2, no S3 or S4, no murmur, click or rub, no peripheral edema and peripheral pulses strong  ABDOMEN: soft, nontender, no hepatosplenomegaly, no masses and bowel sounds normal  MS: no gross musculoskeletal defects noted, no edema  SKIN: no suspicious lesions or rashes  NEURO: Normal strength and tone, mentation intact and speech normal  BACK: no CVA tenderness, no paralumbar tenderness  PSYCH: mentation appears normal, affect normal/bright  LYMPH: no cervical, supraclavicular, axillary, or inguinal adenopathy    Diagnostic Test Results:  Labs reviewed in Epic  Results for orders placed or performed in visit on 12/10/21   Vitamin D Deficiency     Status: Normal   Result Value Ref Range    Vitamin D, Total (25-Hydroxy) 31 20 - 75 ug/L    Narrative    Season, race, dietary intake, and treatment affect the concentration of 25-hydroxy-Vitamin D. Values may decrease during winter months and increase during summer months. Values 20-29 ug/L may indicate Vitamin D insufficiency and values <20 ug/L may indicate Vitamin D deficiency.    Vitamin D determination is routinely performed by an immunoassay specific for 25 hydroxyvitamin D3.  If an individual is on vitamin D2(ergocalciferol) supplementation, please specify 25 OH vitamin D2 and D3 level determination by LCMSMS test [id number].     Lipid panel reflex to direct LDL Fasting     Status: None   Result Value Ref Range    Cholesterol 109 <200 mg/dL    Triglycerides 53 <150 mg/dL    Direct Measure HDL 54 >=40 mg/dL    LDL Cholesterol Calculated 44 <=100 mg/dL    Non HDL Cholesterol 55 <130 mg/dL    Patient Fasting > 8hrs? Unknown     Narrative    Cholesterol  Desirable:  <200 mg/dL    Triglycerides  Normal:  Less than 150 mg/dL  Borderline High:  150-199 mg/dL  High:  200-499 mg/dL  Very High:  Greater than or equal to 500 mg/dL    Direct Measure HDL  Female:   Greater than or equal to 50 mg/dL   Male:  Greater than or equal to 40 mg/dL    LDL Cholesterol  Desirable:  <100mg/dL  Above Desirable:  100-129 mg/dL   Borderline High:  130-159 mg/dL   High:  160-189 mg/dL   Very High:  >= 190 mg/dL    Non HDL Cholesterol  Desirable:  130 mg/dL  Above Desirable:  130-159 mg/dL  Borderline High:  160-189 mg/dL  High:  190-219 mg/dL  Very High:  Greater than or equal to 220 mg/dL   Basic metabolic panel  (Ca, Cl, CO2, Creat, Gluc, K, Na, BUN)     Status: Normal   Result Value Ref Range    Sodium 141 133 - 144 mmol/L    Potassium 3.7 3.4 - 5.3 mmol/L    Chloride 109 94 - 109 mmol/L    Carbon Dioxide (CO2) 27 20 - 32 mmol/L    Anion Gap 5 3 - 14 mmol/L    Urea Nitrogen 10 7 - 30 mg/dL    Creatinine 0.87 0.66 - 1.25 mg/dL    Calcium 9.1 8.5 - 10.1 mg/dL    Glucose 96 70 - 99 mg/dL    GFR Estimate 86 >60 mL/min/1.73m2   CBC with platelets and differential     Status: None   Result Value Ref Range    WBC Count 6.6 4.0 - 11.0 10e3/uL    RBC Count 4.57 4.40 - 5.90 10e6/uL    Hemoglobin 15.0 13.3 - 17.7 g/dL    Hematocrit 44.5 40.0 - 53.0 %    MCV 97 78 - 100 fL    MCH 32.8 26.5 - 33.0 pg    MCHC 33.7 31.5 - 36.5 g/dL    RDW 13.2 10.0 - 15.0 %    Platelet Count 227 150 - 450 10e3/uL    % Neutrophils 58 %    % Lymphocytes 29 %    % Monocytes 11 %    % Eosinophils 2 %    % Basophils 1 %    Absolute Neutrophils 3.8 1.6 - 8.3 10e3/uL    Absolute Lymphocytes 1.9 0.8 - 5.3 10e3/uL    Absolute Monocytes 0.7 0.0 - 1.3 10e3/uL    Absolute Eosinophils 0.1 0.0 - 0.7 10e3/uL    Absolute Basophils 0.1 0.0 - 0.2 10e3/uL   CBC with platelets and differential     Status: None    Narrative    The following orders were created for panel order CBC with platelets and differential.  Procedure                               Abnormality         Status                     ---------                               -----------         ------                     CBC with platelets and d...[[id number]]                     "  Final result                 Please view results for these tests on the individual orders.       ASSESSMENT / PLAN:   Collin was seen today for physical.    Diagnoses and all orders for this visit:    Vitamin D deficiency  -     Vitamin D Deficiency; Future  -     Vitamin D Deficiency    Factor 5 Leiden mutation, heterozygous (H)  -     apixaban ANTICOAGULANT (ELIQUIS) 5 MG tablet; Take 1 tablet (5 mg) by mouth 2 times daily  -     CBC with platelets and differential; Future  -     CBC with platelets and differential    Benign essential hypertension  -     losartan (COZAAR) 50 MG tablet; Take 1 tablet (50 mg) by mouth daily  -     Basic metabolic panel  (Ca, Cl, CO2, Creat, Gluc, K, Na, BUN); Future  -     Basic metabolic panel  (Ca, Cl, CO2, Creat, Gluc, K, Na, BUN)    Hyperlipidemia LDL goal <130  -     simvastatin (ZOCOR) 20 MG tablet; Take 1 tablet (20 mg) by mouth At Bedtime  -     Lipid panel reflex to direct LDL Fasting; Future  -     Lipid panel reflex to direct LDL Fasting    Other orders  -     REVIEW OF HEALTH MAINTENANCE PROTOCOL ORDERS        Patient has been advised of split billing requirements and indicates understanding: Yes  COUNSELING:  Reviewed preventive health counseling, as reflected in patient instructions       Regular exercise       Healthy diet/nutrition       Vision screening       Hearing screening       Bladder control       Colon cancer screening    Estimated body mass index is 33.3 kg/m  as calculated from the following:    Height as of 3/31/21: 1.727 m (5' 8\").    Weight as of this encounter: 99.3 kg (219 lb).    Weight management plan: Discussed healthy diet and exercise guidelines    He reports that he quit smoking about 10 years ago. His smoking use included cigarettes. He has never used smokeless tobacco.      Appropriate preventive services were discussed with this patient, including applicable screening as appropriate for cardiovascular disease, diabetes, " osteopenia/osteoporosis, and glaucoma.  As appropriate for age/gender, discussed screening for colorectal cancer, prostate cancer, breast cancer, and cervical cancer. Checklist reviewing preventive services available has been given to the patient.    Reviewed patients plan of care and provided an AVS. The Basic Care Plan (routine screening as documented in Health Maintenance) for Collin meets the Care Plan requirement. This Care Plan has been established and reviewed with the Patient.    Counseling Resources:  ATP IV Guidelines  Pooled Cohorts Equation Calculator  Breast Cancer Risk Calculator  Breast Cancer: Medication to Reduce Risk  FRAX Risk Assessment  ICSI Preventive Guidelines  Dietary Guidelines for Americans, 2010  USDA's MyPlate  ASA Prophylaxis  Lung CA Screening    Collin Salazar MD  [facility]    Identified Health Risks:

## 2021-12-22 ENCOUNTER — TELEPHONE (OUTPATIENT)
Dept: FAMILY MEDICINE | Facility: CLINIC | Age: 71
End: 2021-12-22
Payer: COMMERCIAL

## 2021-12-22 NOTE — TELEPHONE ENCOUNTER
Reason for Call:  Other    Detailed comments: Whitney called on behalf of her brother Collin who is wondering if the clinic has mailed out the results from his blood work that was done last week.     Phone Number Patient can be reached at:   [phone]    Best Time: any time    Can we leave a detailed message on this number? YES    Call taken on 12/22/2021 at 3:57 PM by Tsering Nuno

## 2022-03-14 DIAGNOSIS — E78.5 HYPERLIPIDEMIA LDL GOAL <130: ICD-10-CM

## 2022-03-14 DIAGNOSIS — D68.51 FACTOR 5 LEIDEN MUTATION, HETEROZYGOUS (H): ICD-10-CM

## 2022-03-14 DIAGNOSIS — I10 BENIGN ESSENTIAL HYPERTENSION: ICD-10-CM

## 2022-03-14 NOTE — TELEPHONE ENCOUNTER
Reason for call:  Medication   If this is a refill request, has the caller requested the refill from the pharmacy already? No  Will the patient be using a [facility]? No  Name of the pharmacy and phone number for the current request:  [facility] - [place], [place] - [address] [place] [address]\    Name of the medication requested: simvastatin (ZOCOR) 20 MG tablet, losartan (COZAAR) 50 MG tablet, and apixaban ANTICOAGULANT (ELIQUIS) 5 MG tablet    Other request: Pt calling requesting refills on medications.    Phone number to reach patient:  Home number on file [phone] (home)    Best Time:  anytime    Can we leave a detailed message on this number?  NO    Travel screening: Not Applicable

## 2022-03-15 RX ORDER — SIMVASTATIN 20 MG
20 TABLET ORAL AT BEDTIME
Qty: 90 TABLET | Refills: 2 | Status: SHIPPED | OUTPATIENT
Start: 2022-03-15 | End: 2022-06-28

## 2022-03-15 RX ORDER — LOSARTAN POTASSIUM 50 MG/1
50 TABLET ORAL DAILY
Qty: 90 TABLET | Refills: 2 | Status: SHIPPED | OUTPATIENT
Start: 2022-03-15 | End: 2022-06-28

## 2022-03-16 NOTE — TELEPHONE ENCOUNTER
I called and spoke with the patient and let him know that his prescription refills has been sent to [facility].     Quita Izaguirre [facility]

## 2022-05-03 NOTE — NURSING NOTE
Screening Questionnaire for Adult Immunization    Are you sick today?   No   Do you have allergies to medications, food, a vaccine component or latex?   No   Have you ever had a serious reaction after receiving a vaccination?   No   Do you have a long-term health problem with heart disease, lung disease, asthma, kidney disease, metabolic disease (e.g. diabetes), anemia, or other blood disorder?   No   Do you have cancer, leukemia, HIV/AIDS, or any other immune system problem?   No   In the past 3 months, have you taken medications that affect  your immune system, such as prednisone, other steroids, or anticancer drugs; drugs for the treatment of rheumatoid arthritis, Crohn s disease, or psoriasis; or have you had radiation treatments?   No   Have you had a seizure, or a brain or other nervous system problem?   No   During the past year, have you received a transfusion of blood or blood     products, or been given immune (gamma) globulin or antiviral drug?   No   For women: Are you pregnant or is there a chance you could become        pregnant during the next month?   No   Have you received any vaccinations in the past 4 weeks?   No     Immunization questionnaire answers were all negative.        Per orders of Dr. Engelmann, injection of Second Shingrix Vaccine given by Jethro Aguirre. Patient instructed to remain in clinic for 15 minutes afterwards, and to report any adverse reaction to me immediately.       Screening performed by Jethro Aguirre on 4/1/2019 at 10:12 AM.  Prior to injection, verified patient identity using patient's name and date of birth.  Due to injection administration, patient instructed to remain in clinic for 15 minutes  afterwards, and to report any adverse reaction to me immediately.    Second Shingrix vaccines    Drug Amount Wasted:  None.  Vial/Syringe: Single dose vial  Expiration Date:  04/25/2020  Jethro Aguirre MA on 4/1/2019 at 10:20 AM       NEGATIVE

## 2022-05-05 ENCOUNTER — OFFICE VISIT (OUTPATIENT)
Dept: FAMILY MEDICINE | Facility: CLINIC | Age: 72
End: 2022-05-05
Payer: COMMERCIAL

## 2022-05-05 VITALS
WEIGHT: 225.6 LBS | TEMPERATURE: 98.5 F | BODY MASS INDEX: 34.3 KG/M2 | SYSTOLIC BLOOD PRESSURE: 170 MMHG | DIASTOLIC BLOOD PRESSURE: 90 MMHG | HEART RATE: 80 BPM

## 2022-05-05 DIAGNOSIS — I10 BENIGN ESSENTIAL HYPERTENSION: Primary | ICD-10-CM

## 2022-05-05 DIAGNOSIS — D68.51 FACTOR 5 LEIDEN MUTATION, HETEROZYGOUS (H): ICD-10-CM

## 2022-05-05 DIAGNOSIS — L30.9 DERMATITIS: ICD-10-CM

## 2022-05-05 PROBLEM — E66.01 MORBID OBESITY (H): Status: RESOLVED | Noted: 2021-03-05 | Resolved: 2022-05-05

## 2022-05-05 PROCEDURE — 99214 OFFICE O/P EST MOD 30 MIN: CPT | Performed by: PHYSICIAN ASSISTANT

## 2022-05-05 RX ORDER — TRIAMCINOLONE ACETONIDE 1 MG/G
CREAM TOPICAL 2 TIMES DAILY
Qty: 30 G | Refills: 0 | Status: SHIPPED | OUTPATIENT
Start: 2022-05-05 | End: 2022-05-12

## 2022-05-05 NOTE — PROGRESS NOTES
Assessment & Plan     Benign essential hypertension  Recheck at home. Call clinic if staying elevated. Wear compression stockings.     Dermatitis  Trial of steroid cream. If not improving or worsening call clinic.   - triamcinolone (KENALOG) 0.1 % external cream; Apply topically 2 times daily for 7 days                 No follow-ups on file.    SMITH Omalley [facility] SILVINO Polanco is a 71 year old who presents for the following health issues     HPI     Rash  Onset/Duration: x3-4 weeks  Description  Location: left leg  Character: blotchy, red  Itching: no  Intensity:  moderate  Progression of Symptoms:  worsening  Accompanying signs and symptoms:   Fever: no  Body aches or joint pain: no  Sore throat symptoms: no  Recent cold symptoms: no  History:           Previous episodes of similar rash: None  New exposures:  None  Recent travel: no  Exposure to similar rash: no  Precipitating or alleviating factors:   Therapies tried and outcome: Pt used Aloe     Started 3-4 weeks ago. One area and was prickly. Seemed to spread this morning.   Has compression socks.   Not outside for yard work.   No increase in swelling.     Just took blood pressure meds prior to coming.     Review of Systems   Constitutional, HEENT, cardiovascular, pulmonary, gi and gu systems are negative, except as otherwise noted.      Objective    BP (!) 188/98 (BP Location: Right arm, Patient Position: Chair, Cuff Size: Adult Regular)   Pulse 80   Temp 98.5  F (36.9  C) (Oral)   Wt 102.3 kg (225 lb 9.6 oz)   BMI 34.30 kg/m    Body mass index is 34.3 kg/m .  Physical Exam   Bilateral legs with edema. Left 1+ pitting and right 1+ non pitting. The left leg with a small area of rough red skin on the mid shin.

## 2022-05-05 NOTE — PATIENT INSTRUCTIONS
Check your blood pressure at home.   If higher than 160/90 then call and speak to the nurses.

## 2022-05-15 ENCOUNTER — HOSPITAL ENCOUNTER (EMERGENCY)
Facility: CLINIC | Age: 72
Discharge: HOME | End: 2022-05-15
Attending: INTERNAL MEDICINE | Admitting: INTERNAL MEDICINE
Payer: COMMERCIAL

## 2022-05-15 ENCOUNTER — APPOINTMENT (OUTPATIENT)
Dept: ULTRASOUND IMAGING | Facility: CLINIC | Age: 72
End: 2022-05-15
Attending: NURSE PRACTITIONER
Payer: COMMERCIAL

## 2022-05-15 VITALS
TEMPERATURE: 98 F | DIASTOLIC BLOOD PRESSURE: 100 MMHG | RESPIRATION RATE: 18 BRPM | HEART RATE: 92 BPM | SYSTOLIC BLOOD PRESSURE: 169 MMHG | OXYGEN SATURATION: 96 %

## 2022-05-15 DIAGNOSIS — I82.4Y2 DEEP VEIN THROMBOSIS (DVT) OF PROXIMAL VEIN OF LEFT LOWER EXTREMITY, UNSPECIFIED CHRONICITY (H): ICD-10-CM

## 2022-05-15 LAB
ALBUMIN SERPL-MCNC: 3.8 G/DL (ref 3.4–5)
ALP SERPL-CCNC: 106 U/L (ref 40–150)
ALT SERPL W P-5'-P-CCNC: 29 U/L (ref 0–70)
ANION GAP SERPL CALCULATED.3IONS-SCNC: 7 MMOL/L (ref 3–14)
APTT PPP: 31 SECONDS (ref 22–38)
AST SERPL W P-5'-P-CCNC: 22 U/L (ref 0–45)
BASOPHILS # BLD AUTO: 0.1 10E3/UL (ref 0–0.2)
BASOPHILS NFR BLD AUTO: 1 %
BILIRUB SERPL-MCNC: 1.2 MG/DL (ref 0.2–1.3)
BUN SERPL-MCNC: 10 MG/DL (ref 7–30)
CALCIUM SERPL-MCNC: 9.3 MG/DL (ref 8.5–10.1)
CHLORIDE BLD-SCNC: 109 MMOL/L (ref 94–109)
CO2 SERPL-SCNC: 24 MMOL/L (ref 20–32)
CREAT SERPL-MCNC: 0.89 MG/DL (ref 0.66–1.25)
EOSINOPHIL # BLD AUTO: 0.1 10E3/UL (ref 0–0.7)
EOSINOPHIL NFR BLD AUTO: 1 %
ERYTHROCYTE [DISTWIDTH] IN BLOOD BY AUTOMATED COUNT: 12.8 % (ref 10–15)
GFR SERPL CREATININE-BSD FRML MDRD: 90 ML/MIN/1.73M2
GLUCOSE BLD-MCNC: 102 MG/DL (ref 70–99)
HCT VFR BLD AUTO: 46 % (ref 40–53)
HGB BLD-MCNC: 15.8 G/DL (ref 13.3–17.7)
IMM GRANULOCYTES # BLD: 0 10E3/UL
IMM GRANULOCYTES NFR BLD: 0 %
INR PPP: 1.31 (ref 0.85–1.15)
LYMPHOCYTES # BLD AUTO: 1.5 10E3/UL (ref 0.8–5.3)
LYMPHOCYTES NFR BLD AUTO: 19 %
MCH RBC QN AUTO: 33.1 PG (ref 26.5–33)
MCHC RBC AUTO-ENTMCNC: 34.3 G/DL (ref 31.5–36.5)
MCV RBC AUTO: 96 FL (ref 78–100)
MONOCYTES # BLD AUTO: 0.7 10E3/UL (ref 0–1.3)
MONOCYTES NFR BLD AUTO: 9 %
NEUTROPHILS # BLD AUTO: 5.6 10E3/UL (ref 1.6–8.3)
NEUTROPHILS NFR BLD AUTO: 70 %
NRBC # BLD AUTO: 0 10E3/UL
NRBC BLD AUTO-RTO: 0 /100
PLATELET # BLD AUTO: 267 10E3/UL (ref 150–450)
POTASSIUM BLD-SCNC: 3.7 MMOL/L (ref 3.4–5.3)
PROT SERPL-MCNC: 7.7 G/DL (ref 6.8–8.8)
RADIOLOGIST FLAGS: ABNORMAL
RBC # BLD AUTO: 4.77 10E6/UL (ref 4.4–5.9)
SODIUM SERPL-SCNC: 140 MMOL/L (ref 133–144)
WBC # BLD AUTO: 8 10E3/UL (ref 4–11)

## 2022-05-15 PROCEDURE — 85610 PROTHROMBIN TIME: CPT | Performed by: NURSE PRACTITIONER

## 2022-05-15 PROCEDURE — 93978 VASCULAR STUDY: CPT

## 2022-05-15 PROCEDURE — 85730 THROMBOPLASTIN TIME PARTIAL: CPT | Performed by: NURSE PRACTITIONER

## 2022-05-15 PROCEDURE — 80053 COMPREHEN METABOLIC PANEL: CPT | Performed by: NURSE PRACTITIONER

## 2022-05-15 PROCEDURE — 93971 EXTREMITY STUDY: CPT | Mod: LT

## 2022-05-15 PROCEDURE — 99284 EMERGENCY DEPT VISIT MOD MDM: CPT | Performed by: INTERNAL MEDICINE

## 2022-05-15 PROCEDURE — 36415 COLL VENOUS BLD VENIPUNCTURE: CPT | Performed by: NURSE PRACTITIONER

## 2022-05-15 PROCEDURE — 85025 COMPLETE CBC W/AUTO DIFF WBC: CPT | Performed by: NURSE PRACTITIONER

## 2022-05-15 PROCEDURE — 93978 VASCULAR STUDY: CPT | Mod: 26 | Performed by: RADIOLOGY

## 2022-05-15 PROCEDURE — 93971 EXTREMITY STUDY: CPT | Mod: 26 | Performed by: RADIOLOGY

## 2022-05-15 PROCEDURE — 99284 EMERGENCY DEPT VISIT MOD MDM: CPT | Mod: 25 | Performed by: INTERNAL MEDICINE

## 2022-05-15 ASSESSMENT — ENCOUNTER SYMPTOMS
ABDOMINAL PAIN: 0
COLOR CHANGE: 0
CONFUSION: 0
NECK STIFFNESS: 0
HEADACHES: 0
ARTHRALGIAS: 0
EYE REDNESS: 0
FEVER: 0
DIFFICULTY URINATING: 0
SHORTNESS OF BREATH: 0

## 2022-05-15 NOTE — ED NOTES
Pt discharged, education completed, pt is A/Ox4, ambulatory and wants to wait in the lobby for his sister to pick him up.

## 2022-05-15 NOTE — ED TRIAGE NOTES
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 Swelling and pain started at 1000 today after patient completed a walk. Initially pain was in left calf, now also present behind left thigh. Patient states he had a stent placed behind his left knee in 2011. Patient walks regularly. Denies tripping or stumbling on walk, no recent falls. Denies chest pain or shortness of breath.  Anticoagulated on Eliquis, denies missing any doses.    He has a mild rash on bilateral shins and bilateral forearms for which he was seen in clinic earlier this week.     Review of Systems:  Skin: positive for rash; negative, bruising or errythema  Respiratory: No shortness of breath, dyspnea on exertion, cough, or hemoptysis  Cardiovascular: negative, irregular heart beat, chest pain, exertional chest pain or pressure and dyspnea on exertion  Musculoskeletal: positive for left calf edema and pain  Hematologic/Lymphatic/Immunologic: anticoagulated on Eliquis      Exam:  BP (!) 190/110   Pulse 103   Temp 98  F (36.7  C) (Oral)   Resp 18   SpO2 98%   General: No acute distress. Appears stated age.   Cardio: Regular rate, extremities well perfused  Resp: Normal work of breathing, grossly normal respiratory rate  Neuro: Alert. CN II-XII grossly intact. Grossly intact strength.   Musc: pain to palpation left calf and behind left thigh  Skin: no erythema or ecchymosis. Rash present bilateral forearms and shins    Procedure note:      Phlebotomy  Performed by CHITRA Johnson CNP  Patient Identity confirmed: Yes  Risks, benefits and alternatives were discussed  Consent given by: Patient  Indication for Exam: labs  Vein/Location: right  AC  Catheter Size: 21g butterfly needle  Number of Attempts: 1  Successful: yes, labs collected and sent for anlysis  Complications: none      Medical Decision Making:  Patient arriving to the ED with problem as above. A medical screening exam was performed. CBC, CMP, INR, PTT, U/S LLE orders initiated from Triage. The patient is appropriate to wait in triage.      CHITRA Johnson CNP on 5/15/2022 at 1:52 PM

## 2022-05-15 NOTE — ED PROVIDER NOTES
ED Provider Note  [facility]      History     Chief Complaint   Patient presents with     Leg Swelling     The history is provided by the patient and medical records.     Collin Frank is a 71 year old male with a past medical history significant for May Thurner syndrome, factor V Leiden, PE (on Eliquis), DVT, hyperhomocystinemia, LE stents, and essential hypertension who presents to the ED for evaluation of left lower extremity swelling.  Patient reports that he went out for a walk this morning, came back home and had a sudden onset of left lower leg swelling with pain.  Patient states throughout the day swelling has increased and radiated up his left leg.  He states that current pain is a 9/10.  Patient states the pain has been constant all day.  Patient reports he last ate last night and had some water this morning.  Patient reports a history of a DVT 2 years ago with stent placement in his lower extremity.  Patient states that he is anticoagulated on Eliquis and takes it regularly, he last took it this morning.  Patient reports that he has had no flareups or problems with this left leg since his last DVT 2 years ago until today.  Patient reports he has never had a positive COVID test and has had all 4 COVID vaccines with his last one on 5/12/2022.  Patient reports having a rash on his left lower extremity on 5/12/2022 and was given a steroid cream and has since been resolved.  Patient denies chest pain or trouble breathing.    LEFT LOWER EXTREMITY DUPLEX VENOUS ULTRASOUND 6/8/2020  IMPRESSION:  1. Occlusive deep venous thrombus in the stent in the medial femoral  vein in the upper thigh. Occlusive deep venous thrombus continues  through the duplicated medial femoral vein.     2. Nonocclusive deep venous thrombosis extends through the single left  femoral vein in the mid thigh to the popliteal vein.     3. Collection in the left popliteal fossa is thought to represent a  complex  Baker's cyst. No internal flow or hyperemia demonstrated by  color Doppler imaging.    Past Medical History  Past Medical History:   Diagnosis Date     Antiplatelet or antithrombotic long-term use     blood clot in leg     Long term current use of anticoagulant 10/16/2012     Past Surgical History:   Procedure Laterality Date     COLONOSCOPY  08    Normal. Repeat in 10 years     COLONOSCOPY WITH CO2 INSUFFLATION N/A 2018    Procedure: COLONOSCOPY WITH CO2 INSUFFLATION;  COLON SCREEN/ ENGELMANN;  Surgeon: Jan Flores MD;  Location: [facility] OR     rt knee ORIF      [facility]     Rivaroxaban ANTICOAGULANT 15 & 20 MG TBPK  cholecalciferol (VITAMIN D) 1000 UNIT tablet  losartan (COZAAR) 50 MG tablet  simvastatin (ZOCOR) 20 MG tablet      No Known Allergies  Family History  Family History   Problem Relation Age of Onset     Alzheimer Disease Mother      Heart Disease Father      Prostate Cancer Father      Cancer Brother 65        pancreatic        Prostate Cancer Brother      Social History   Social History     Tobacco Use     Smoking status: Former Smoker     Types: Cigarettes     Quit date: 2011     Years since quittin.2     Smokeless tobacco: Never Used   Substance Use Topics     Alcohol use: No     Drug use: No      Past medical history, past surgical history, medications, allergies, family history, and social history were reviewed with the patient. No additional pertinent items.       Review of Systems   Constitutional: Negative for fever.   HENT: Negative for congestion.    Eyes: Negative for redness.   Respiratory: Negative for shortness of breath.    Cardiovascular: Positive for leg swelling (Left Leg). Negative for chest pain.   Gastrointestinal: Negative for abdominal pain.   Genitourinary: Negative for difficulty urinating.   Musculoskeletal: Negative for arthralgias and neck stiffness.   Skin: Negative for color change.   Neurological: Negative for headaches.    Psychiatric/Behavioral: Negative for confusion.     A complete review of systems was performed with pertinent positives and negatives noted in the HPI, and all other systems negative.    Physical Exam   BP: (!) 190/110  Pulse: 103  Temp: 98  F (36.7  C)  Resp: 18  SpO2: 98 %  Physical Exam  Constitutional:       General: He is not in acute distress.     Appearance: He is not diaphoretic.   HENT:      Head: Atraumatic.   Eyes:      General: No scleral icterus.     Pupils: Pupils are equal, round, and reactive to light.   Cardiovascular:      Rate and Rhythm: Regular rhythm. Tachycardia present.      Heart sounds: Normal heart sounds. No murmur heard.    No friction rub. No gallop.   Pulmonary:      Effort: Pulmonary effort is normal. No respiratory distress.      Breath sounds: Normal breath sounds. No stridor. No wheezing, rhonchi or rales.   Chest:      Chest wall: No tenderness.   Abdominal:      General: Abdomen is flat. Bowel sounds are normal. There is no distension.      Palpations: Abdomen is soft. There is no mass.      Tenderness: There is no abdominal tenderness. There is no right CVA tenderness, left CVA tenderness, guarding or rebound.      Hernia: No hernia is present.   Musculoskeletal:         General: Swelling (left leg) and tenderness present.      Cervical back: Neck supple.   Skin:     General: Skin is warm.      Findings: No rash.   Neurological:      General: No focal deficit present.      Cranial Nerves: No cranial nerve deficit.         ED Course   4:40 PM  The patient was seen and examined by Tyler Esteban MD in Room ED27.   ED Course as of 05/15/22 1837   Sun May 15, 2022   1335 Patient examined in [facility]     Procedures                     Results for orders placed or performed during the hospital encounter of 05/15/22   US Lower Extremity Venous Duplex Left     Status: Abnormal   Result Value Ref Range    Radiologist flags Occlusive thrombus in the left femoral vein, age (Urgent)      Narrative    EXAMINATION: DOPPLER VENOUS ULTRASOUND OF THE LEFT LOWER EXTREMITY,  5/15/2022 3:35 PM     COMPARISON: US 6/8/2020    HISTORY:   pain and swelling left calf, hx DVTs and stent in LE    TECHNIQUE:  Gray-scale evaluation with compression, spectral flow, and  color Doppler assessment of the deep venous system of the left leg  from groin to knee, and then at the ankle.    FINDINGS:  In the left lower extremity, there is occlusive thrombus in the  femoral vein immediately distal to the stented common femoral vein  bifurcation. There is nonocclusive thrombus extending distally through  the popliteal vein. The common femoral and posterior tibial veins  demonstrate normal compressibility and blood flow.      Impression    IMPRESSION:  1.  Age indeterminate occlusive thrombus in the left femoral vein  immediately distal to the stented common femoral vein bifurcation,  extending distally through the popliteal vein. This is similar in  appearance from 6/8/2020, correlate with clinical presentation to  determinate chronicity.  2.  No additional DVT in the left lower extremity.    [Urgent Result: Occlusive thrombus in the left femoral vein, age  indeterminate]    Finding was identified on 5/15/2022 3:41 PM.     Dr. Esteban was contacted by Dr. Sailaja Lacey at 5/15/2022 3:50 PM  and verbalized understanding of the urgent finding.     I have personally reviewed the examination and initial interpretation  and I agree with the findings.    GERRY HUSSEIN MD         SYSTEM ID:  [id number]   US Aorta/Ivc/Iliac Duplex Complete Port     Status: None    Narrative    EXAMINATION: US AORTA/IVC/ILIAC DUPLEX COMPLETE PORTABLE, 5/15/2022  3:35 PM     COMPARISON: CT abdomen 6/29/2017    HISTORY: h/o ARIANE stents, with lle swelling    TECHNIQUE: Grayscale, color-flow and spectral Doppler analysis of left  common iliac venous stent were performed.    Findings:  Sonographic images of the left common iliac stent without evidence  of  stent clotting.    Left common iliac vein stent (cm/sec):  Proximal to stent:  40.4 cm/sec  Proximal stent: 25.4 cm/sec  Mid stent: 25.6 cm/sec  Distal stent: 45.7 cm/sec  Distal to stent. : 22.9 cm/sec        Impression    Impression:   1.  Patent left common iliac vein stent.    I have personally reviewed the examination and initial interpretation  and I agree with the findings.    GERRY HUSSEIN MD         SYSTEM ID:  [id number]   Comprehensive metabolic panel     Status: Abnormal   Result Value Ref Range    Sodium 140 133 - 144 mmol/L    Potassium 3.7 3.4 - 5.3 mmol/L    Chloride 109 94 - 109 mmol/L    Carbon Dioxide (CO2) 24 20 - 32 mmol/L    Anion Gap 7 3 - 14 mmol/L    Urea Nitrogen 10 7 - 30 mg/dL    Creatinine 0.89 0.66 - 1.25 mg/dL    Calcium 9.3 8.5 - 10.1 mg/dL    Glucose 102 (H) 70 - 99 mg/dL    Alkaline Phosphatase 106 40 - 150 U/L    AST 22 0 - 45 U/L    ALT 29 0 - 70 U/L    Protein Total 7.7 6.8 - 8.8 g/dL    Albumin 3.8 3.4 - 5.0 g/dL    Bilirubin Total 1.2 0.2 - 1.3 mg/dL    GFR Estimate 90 >60 mL/min/1.73m2   INR     Status: Abnormal   Result Value Ref Range    INR 1.31 (H) 0.85 - 1.15   Partial thromboplastin time     Status: Normal   Result Value Ref Range    aPTT 31 22 - 38 Seconds   CBC with platelets and differential     Status: Abnormal   Result Value Ref Range    WBC Count 8.0 4.0 - 11.0 10e3/uL    RBC Count 4.77 4.40 - 5.90 10e6/uL    Hemoglobin 15.8 13.3 - 17.7 g/dL    Hematocrit 46.0 40.0 - 53.0 %    MCV 96 78 - 100 fL    MCH 33.1 (H) 26.5 - 33.0 pg    MCHC 34.3 31.5 - 36.5 g/dL    RDW 12.8 10.0 - 15.0 %    Platelet Count 267 150 - 450 10e3/uL    % Neutrophils 70 %    % Lymphocytes 19 %    % Monocytes 9 %    % Eosinophils 1 %    % Basophils 1 %    % Immature Granulocytes 0 %    NRBCs per 100 WBC 0 <1 /100    Absolute Neutrophils 5.6 1.6 - 8.3 10e3/uL    Absolute Lymphocytes 1.5 0.8 - 5.3 10e3/uL    Absolute Monocytes 0.7 0.0 - 1.3 10e3/uL    Absolute Eosinophils 0.1 0.0 - 0.7 10e3/uL     Absolute Basophils 0.1 0.0 - 0.2 10e3/uL    Absolute Immature Granulocytes 0.0 <=0.4 10e3/uL    Absolute NRBCs 0.0 10e3/uL   CBC with platelets differential     Status: Abnormal    Narrative    The following orders were created for panel order CBC with platelets differential.  Procedure                               Abnormality         Status                     ---------                               -----------         ------                     CBC with platelets and d...[[id number]]  Abnormal            Final result                 Please view results for these tests on the individual orders.     Medications - No data to display     Assessments & Plan (with Medical Decision Making)  Left leg swelling pain in the pt with Factor 5 Leiden complicated with left DVT s/p stent, failed therapeutic coumadine 2020 and switched to eloquis per Heme, no chest symptoms to suggest PE, labs ok, US still DVT-not clear acute as above per Radiology, D/W Heme-recommended to switch to xarelto, this was discussed with the pt and agreed to proceed and follow up with his PMD in one week.       I have reviewed the nursing notes. I have reviewed the findings, diagnosis, plan and need for follow up with the patient.    Discharge Medication List as of 5/15/2022  5:31 PM      START taking these medications    Details   Rivaroxaban ANTICOAGULANT 15 & 20 MG TBPK Take 15 mg by mouth 2 times daily for 21 days Then 20 mg po q day disp 30, Disp-42 each, R-0, Local Print             Final diagnoses:   Deep vein thrombosis (DVT) of proximal vein of left lower extremity, unspecified chronicity (H)       --  I, Radhika Bronson, am serving as a trained medical scribe to document services personally performed by Tyler Esteban MD, based on the provider's statements to me.     I, Tyler Esteban MD, was physically present and have reviewed and verified the accuracy of this note documented by Radhika Bronson.    Tyler Esteban MD  [facility]  EMERGENCY DEPARTMENT  5/15/2022     Tyler Esteban MD  05/15/22 5221

## 2022-05-15 NOTE — ED TRIAGE NOTES
Pts left leg started swelling about 10 a.m. today. Pt states calf is painful. Rates 6/10. Pt had blood clot in the left leg in 2011. Pt has stent in left leg that was placed in 2011. Pt currently takes eliquis.

## 2022-06-01 ENCOUNTER — HOSPITAL ENCOUNTER (EMERGENCY)
Facility: CLINIC | Age: 72
Discharge: HOME | End: 2022-06-01
Attending: EMERGENCY MEDICINE | Admitting: EMERGENCY MEDICINE
Payer: COMMERCIAL

## 2022-06-01 VITALS
DIASTOLIC BLOOD PRESSURE: 86 MMHG | OXYGEN SATURATION: 97 % | TEMPERATURE: 98.1 F | HEIGHT: 68 IN | SYSTOLIC BLOOD PRESSURE: 146 MMHG | HEART RATE: 71 BPM | WEIGHT: 230 LBS | RESPIRATION RATE: 20 BRPM | BODY MASS INDEX: 34.86 KG/M2

## 2022-06-01 DIAGNOSIS — R07.9 CHEST PAIN, UNSPECIFIED TYPE: ICD-10-CM

## 2022-06-01 DIAGNOSIS — R07.89 OTHER CHEST PAIN: ICD-10-CM

## 2022-06-01 LAB
ANION GAP SERPL CALCULATED.3IONS-SCNC: 7 MMOL/L (ref 3–14)
ATRIAL RATE - MUSE: 87 BPM
BASOPHILS # BLD AUTO: 0.1 10E3/UL (ref 0–0.2)
BASOPHILS NFR BLD AUTO: 1 %
BUN SERPL-MCNC: 14 MG/DL (ref 7–30)
CALCIUM SERPL-MCNC: 9 MG/DL (ref 8.5–10.1)
CHLORIDE BLD-SCNC: 106 MMOL/L (ref 94–109)
CO2 SERPL-SCNC: 25 MMOL/L (ref 20–32)
CREAT SERPL-MCNC: 0.9 MG/DL (ref 0.66–1.25)
DIASTOLIC BLOOD PRESSURE - MUSE: NORMAL MMHG
EOSINOPHIL # BLD AUTO: 0.1 10E3/UL (ref 0–0.7)
EOSINOPHIL NFR BLD AUTO: 1 %
ERYTHROCYTE [DISTWIDTH] IN BLOOD BY AUTOMATED COUNT: 12.8 % (ref 10–15)
GFR SERPL CREATININE-BSD FRML MDRD: 90 ML/MIN/1.73M2
GLUCOSE BLD-MCNC: 117 MG/DL (ref 70–99)
HCT VFR BLD AUTO: 44.6 % (ref 40–53)
HGB BLD-MCNC: 14.9 G/DL (ref 13.3–17.7)
HOLD SPECIMEN: NORMAL
IMM GRANULOCYTES # BLD: 0 10E3/UL
IMM GRANULOCYTES NFR BLD: 0 %
INTERPRETATION ECG - MUSE: NORMAL
LYMPHOCYTES # BLD AUTO: 1.4 10E3/UL (ref 0.8–5.3)
LYMPHOCYTES NFR BLD AUTO: 16 %
MCH RBC QN AUTO: 32.2 PG (ref 26.5–33)
MCHC RBC AUTO-ENTMCNC: 33.4 G/DL (ref 31.5–36.5)
MCV RBC AUTO: 96 FL (ref 78–100)
MONOCYTES # BLD AUTO: 0.5 10E3/UL (ref 0–1.3)
MONOCYTES NFR BLD AUTO: 6 %
NEUTROPHILS # BLD AUTO: 6.5 10E3/UL (ref 1.6–8.3)
NEUTROPHILS NFR BLD AUTO: 76 %
NRBC # BLD AUTO: 0 10E3/UL
NRBC BLD AUTO-RTO: 0 /100
P AXIS - MUSE: 51 DEGREES
PLATELET # BLD AUTO: 269 10E3/UL (ref 150–450)
POTASSIUM BLD-SCNC: 3.9 MMOL/L (ref 3.4–5.3)
PR INTERVAL - MUSE: 162 MS
QRS DURATION - MUSE: 78 MS
QT - MUSE: 346 MS
QTC - MUSE: 416 MS
R AXIS - MUSE: -32 DEGREES
RBC # BLD AUTO: 4.63 10E6/UL (ref 4.4–5.9)
SODIUM SERPL-SCNC: 138 MMOL/L (ref 133–144)
SYSTOLIC BLOOD PRESSURE - MUSE: NORMAL MMHG
T AXIS - MUSE: 48 DEGREES
TROPONIN I SERPL HS-MCNC: 19 NG/L
TROPONIN I SERPL HS-MCNC: 21 NG/L
VENTRICULAR RATE- MUSE: 87 BPM
WBC # BLD AUTO: 8.6 10E3/UL (ref 4–11)

## 2022-06-01 PROCEDURE — 99284 EMERGENCY DEPT VISIT MOD MDM: CPT

## 2022-06-01 PROCEDURE — 99284 EMERGENCY DEPT VISIT MOD MDM: CPT | Mod: 25 | Performed by: EMERGENCY MEDICINE

## 2022-06-01 PROCEDURE — 80048 BASIC METABOLIC PNL TOTAL CA: CPT | Performed by: EMERGENCY MEDICINE

## 2022-06-01 PROCEDURE — 84484 ASSAY OF TROPONIN QUANT: CPT | Mod: 91 | Performed by: EMERGENCY MEDICINE

## 2022-06-01 PROCEDURE — 36415 COLL VENOUS BLD VENIPUNCTURE: CPT | Performed by: EMERGENCY MEDICINE

## 2022-06-01 PROCEDURE — 85014 HEMATOCRIT: CPT | Performed by: EMERGENCY MEDICINE

## 2022-06-01 PROCEDURE — 93005 ELECTROCARDIOGRAM TRACING: CPT

## 2022-06-01 PROCEDURE — 84484 ASSAY OF TROPONIN QUANT: CPT | Performed by: EMERGENCY MEDICINE

## 2022-06-01 PROCEDURE — 93010 ELECTROCARDIOGRAM REPORT: CPT | Performed by: EMERGENCY MEDICINE

## 2022-06-01 ASSESSMENT — ENCOUNTER SYMPTOMS
SHORTNESS OF BREATH: 0
ABDOMINAL PAIN: 0
FEVER: 0

## 2022-06-01 NOTE — ED TRIAGE NOTES
"Patient BIBA from home for chest pain that has been intermittent throughout the day. Patient describes pain as a \"sharp chest pain over his heart.\" Patient on anticoagulations. /100, received 324mg of aspirin. 3 nitros given, BP recheck 150/70, had little relief. Patient tachycardic.       "

## 2022-06-01 NOTE — ED PROVIDER NOTES
[place] EMERGENCY DEPARTMENT ([facility])  22    History     Chief Complaint   Patient presents with     Chest Pain     HPI  Collin Frank is a 71 year old male with PMH significant for May Thurner syndrome, factor V Leiden, PE (on Xarelto), DVT, hyperhomocystinemia, LE stents, and essential hypertension who presents to the ED for evaluation of chest pain.  Patient reports that he has a few for bursts of chest pain approximately an hour apart (8 and 9 PM).  He states that these episodes of chest pain only lasted a few seconds each and then resolved completely.  He denies any current chest pain.  Patient is currently on Xarelto for history of PE/DVT.  He denies prior cardiac stent or blockage.  Patient is on losartan and simvastatin with his last dose of losartan being earlier today.    Past Medical History  Past Medical History:   Diagnosis Date     Antiplatelet or antithrombotic long-term use     blood clot in leg     Long term current use of anticoagulant 10/16/2012     Past Surgical History:   Procedure Laterality Date     COLONOSCOPY  08    Normal. Repeat in 10 years     COLONOSCOPY WITH CO2 INSUFFLATION N/A 2018    Procedure: COLONOSCOPY WITH CO2 INSUFFLATION;  COLON SCREEN/ ANNMARIEELMANN;  Surgeon: Jan Flores MD;  Location: [facility] OR     rt knee ORIF      [facility]     cholecalciferol (VITAMIN D) 1000 UNIT tablet  losartan (COZAAR) 50 MG tablet  Rivaroxaban ANTICOAGULANT 15 & 20 MG TBPK  simvastatin (ZOCOR) 20 MG tablet      No Known Allergies  Family History  Family History   Problem Relation Age of Onset     Alzheimer Disease Mother      Heart Disease Father      Prostate Cancer Father      Cancer Brother 65        pancreatic        Prostate Cancer Brother      Social History   Social History     Tobacco Use     Smoking status: Former Smoker     Types: Cigarettes     Quit date: 2011     Years since quittin.3     Smokeless tobacco: Never Used  "  Substance Use Topics     Alcohol use: No     Drug use: No      Past medical history, past surgical history, medications, allergies, family history, and social history were reviewed with the patient. No additional pertinent items.       Review of Systems   Constitutional: Negative for fever.   Respiratory: Negative for shortness of breath.    Cardiovascular: Positive for chest pain.   Gastrointestinal: Negative for abdominal pain.   All other systems reviewed and are negative.    A complete review of systems was performed with pertinent positives and negatives noted in the HPI, and all other systems negative.    Physical Exam   BP: (!) 171/99  Pulse: 108  Temp: 98.1  F (36.7  C)  Resp: 20  Height: 172.7 cm (5' 8\")  Weight: 104.3 kg (230 lb)  SpO2: 93 %  Physical Exam  Vitals reviewed.   Constitutional:       General: He is not in acute distress.     Appearance: He is not diaphoretic.   HENT:      Head: Normocephalic and atraumatic.      Right Ear: External ear normal.      Left Ear: External ear normal.      Nose: Nose normal. No rhinorrhea.      Mouth/Throat:      Mouth: Mucous membranes are moist.   Eyes:      General: No scleral icterus.     Extraocular Movements: Extraocular movements intact.      Conjunctiva/sclera: Conjunctivae normal.   Cardiovascular:      Rate and Rhythm: Normal rate and regular rhythm.      Heart sounds: Normal heart sounds.   Pulmonary:      Effort: No respiratory distress.      Breath sounds: Normal breath sounds.   Abdominal:      General: Bowel sounds are normal.      Palpations: Abdomen is soft.      Tenderness: There is no abdominal tenderness.   Musculoskeletal:         General: No deformity. Normal range of motion.      Cervical back: Normal range of motion and neck supple.   Skin:     General: Skin is warm.      Findings: No rash.   Neurological:      Mental Status: Mental status is at baseline.   Psychiatric:         Mood and Affect: Mood normal.         Behavior: Behavior " normal.         ED Course      Procedures   1:48 AM  The patient was seen and examined by Obed Hayes DO in Room ED18.             EKG Interpretation:      Interpreted by Obed Hayes DO  Time reviewed: 227  Symptoms at time of EKG: Chest pain rhythm: normal sinus   Rate: normal  Axis: Left ectopy: none  Conduction: normal  ST Segments/ T Waves: No ST-T wave changes  Q Waves: none      Clinical Impression: abnormal EKG                    Results for orders placed or performed during the hospital encounter of 06/01/22   [place] Draw     Status: None    Narrative    The following orders were created for panel order [place] Draw.  Procedure                               Abnormality         Status                     ---------                               -----------         ------                     Extra Blue Top Tube[[id number]]                              Final result               Extra Red Top Tube[[id number]]                               Final result               Extra Green Top (Lithium...[[id number]]                      Final result               Extra Purple Top Tube[[id number]]                            Final result                 Please view results for these tests on the individual orders.   Extra Blue Top Tube     Status: None   Result Value Ref Range    Hold Specimen JIC    Extra Red Top Tube     Status: None   Result Value Ref Range    Hold Specimen JIC    Extra Green Top (Lithium Heparin) Tube     Status: None   Result Value Ref Range    Hold Specimen JIC    Extra Purple Top Tube     Status: None   Result Value Ref Range    Hold Specimen JIC    Basic metabolic panel     Status: Abnormal   Result Value Ref Range    Sodium 138 133 - 144 mmol/L    Potassium 3.9 3.4 - 5.3 mmol/L    Chloride 106 94 - 109 mmol/L    Carbon Dioxide (CO2) 25 20 - 32 mmol/L    Anion Gap 7 3 - 14 mmol/L    Urea Nitrogen 14 7 - 30 mg/dL    Creatinine 0.90 0.66 - 1.25 mg/dL    Calcium 9.0 8.5 - 10.1 mg/dL     Glucose 117 (H) 70 - 99 mg/dL    GFR Estimate 90 >60 mL/min/1.73m2   Troponin I     Status: Normal   Result Value Ref Range    Troponin I High Sensitivity 19 <79 ng/L   CBC with platelets and differential     Status: None   Result Value Ref Range    WBC Count 8.6 4.0 - 11.0 10e3/uL    RBC Count 4.63 4.40 - 5.90 10e6/uL    Hemoglobin 14.9 13.3 - 17.7 g/dL    Hematocrit 44.6 40.0 - 53.0 %    MCV 96 78 - 100 fL    MCH 32.2 26.5 - 33.0 pg    MCHC 33.4 31.5 - 36.5 g/dL    RDW 12.8 10.0 - 15.0 %    Platelet Count 269 150 - 450 10e3/uL    % Neutrophils 76 %    % Lymphocytes 16 %    % Monocytes 6 %    % Eosinophils 1 %    % Basophils 1 %    % Immature Granulocytes 0 %    NRBCs per 100 WBC 0 <1 /100    Absolute Neutrophils 6.5 1.6 - 8.3 10e3/uL    Absolute Lymphocytes 1.4 0.8 - 5.3 10e3/uL    Absolute Monocytes 0.5 0.0 - 1.3 10e3/uL    Absolute Eosinophils 0.1 0.0 - 0.7 10e3/uL    Absolute Basophils 0.1 0.0 - 0.2 10e3/uL    Absolute Immature Granulocytes 0.0 <=0.4 10e3/uL    Absolute NRBCs 0.0 10e3/uL   EKG 12-lead, tracing only     Status: None (Preliminary result)   Result Value Ref Range    Systolic Blood Pressure  mmHg    Diastolic Blood Pressure  mmHg    Ventricular Rate 87 BPM    Atrial Rate 87 BPM    CO Interval 162 ms    QRS Duration 78 ms     ms    QTc 416 ms    P Axis 51 degrees    R AXIS -32 degrees    T Axis 48 degrees    Interpretation ECG Sinus rhythm  Left axis deviation  Abnormal ECG      CBC with platelets differential     Status: None    Narrative    The following orders were created for panel order CBC with platelets differential.  Procedure                               Abnormality         Status                     ---------                               -----------         ------                     CBC with platelets and d...[[id number]]                      Final result                 Please view results for these tests on the individual orders.     Medications - No data to display      Assessments & Plan (with Medical Decision Making)   71-year-old male presents to us with a chief complaint of 2 brief episodes of chest pain lasting only a second or so.  Previous records were reviewed.  Vital signs today show hypertension but are otherwise unremarkable.  EKG was interpreted and shows no acute changes.  Labs were interpreted and show normal electrolytes, kidney function, CBC, troponin.  A repeat 2-hour troponin was unremarkable as well.  Patient has had no recurrence in symptoms during his time in the emergency department.  At this point recommend the patient follow-up with primary care and return to us as needed    I have reviewed the nursing notes. I have reviewed the findings, diagnosis, plan and need for follow up with the patient.    New Prescriptions    No medications on file       Final diagnoses:   Chest pain, unspecified type     I, Azam Gill, am serving as a trained medical scribe to document services personally performed by Obed Hayes DO, based on the provider's statements to me.     I, Obed Hayes DO, was physically present and have reviewed and verified the accuracy of this note documented by Azam Gill.    --  Obed Hayes DO  [facility] EMERGENCY DEPARTMENT  6/1/2022     Obed Hayes DO  06/01/22 0409

## 2022-06-02 ENCOUNTER — PATIENT OUTREACH (OUTPATIENT)
Dept: CARE COORDINATION | Facility: CLINIC | Age: 72
End: 2022-06-02
Payer: COMMERCIAL

## 2022-06-02 DIAGNOSIS — Z71.89 OTHER SPECIFIED COUNSELING: ICD-10-CM

## 2022-06-02 NOTE — PROGRESS NOTES
Clinic Care Coordination Contact  Community Health Worker Initial Outreach    Patient accepts CC:No, Pt declined needs for extra support.     Clinic Care Coordination Contact  [facility]: Post-Discharge Note  SITUATION                                                      Admission:    Admission Date: 06/01/22   Reason for Admission: Chest Pain  Discharge:   Discharge Date: 06/01/22  Discharge Diagnosis: Chest Pain    BACKGROUND                                                      Per hospital discharge summary and inpatient provider notes:    Collin Frank is a 71 year old male with PMH significant for May Thurner syndrome, factor V Leiden, PE (on Xarelto), DVT, hyperhomocystinemia, LE stents, and essential hypertension who presents to the ED for evaluation of chest pain.     ASSESSMENT      Enrollment  Primary Care Care Coordination Status: Declined    Discharge Assessment  How are you doing now that you are home?: doing well  How are your symptoms? (Red Flag symptoms escalate to triage hotline per guidelines): Improved  Do you feel your condition is stable enough to be safe at home until your provider visit?: Yes  Does the patient have their discharge instructions? : Yes  Does the patient have questions regarding their discharge instructions? : No  Were you started on any new medications or were there changes to any of your previous medications? : No  Does the patient have all of their medications?: Yes  Do you have questions regarding any of your medications? : No  Do you have all of your needed medical supplies or equipment (DME)?  (i.e. oxygen tank, CPAP, cane, etc.): Yes  Discharge follow-up appointment scheduled within 14 calendar days? : Yes  Discharge Follow Up Appointment Date: 06/06/22  Discharge Follow Up Appointment Scheduled with?: Specialty Care Provider    Post-op ([facility] Only)  If the patient had a surgery or procedure, do they have any questions for a nurse?: No         PLAN                                                       Outpatient Plan:        Follow-up with your primary care provider.    Future Appointments   Date Time Provider Department Center   6/6/2022  8:00 AM Collin Salazar MD [facility]         For any urgent concerns, please contact our 24 hour nurse triage line: [phone] ([phone])       LISET Nieves  [phone]  [facility]

## 2022-06-06 ENCOUNTER — OFFICE VISIT (OUTPATIENT)
Dept: FAMILY MEDICINE | Facility: CLINIC | Age: 72
End: 2022-06-06
Payer: COMMERCIAL

## 2022-06-06 VITALS
OXYGEN SATURATION: 97 % | WEIGHT: 218 LBS | SYSTOLIC BLOOD PRESSURE: 132 MMHG | HEART RATE: 68 BPM | HEIGHT: 68 IN | BODY MASS INDEX: 33.04 KG/M2 | TEMPERATURE: 99 F | DIASTOLIC BLOOD PRESSURE: 72 MMHG

## 2022-06-06 DIAGNOSIS — I82.532 CHRONIC DEEP VEIN THROMBOSIS (DVT) OF POPLITEAL VEIN OF LEFT LOWER EXTREMITY (H): Primary | ICD-10-CM

## 2022-06-06 DIAGNOSIS — I26.99 PULMONARY EMBOLISM WITH INFARCTION (H): ICD-10-CM

## 2022-06-06 PROCEDURE — 99495 TRANSJ CARE MGMT MOD F2F 14D: CPT | Performed by: INTERNAL MEDICINE

## 2022-06-06 NOTE — PROGRESS NOTES
"  Assessment & Plan   Problem List Items Addressed This Visit     Deep vein thrombosis of left lower extremity (H) - Primary    Relevant Medications    rivaroxaban ANTICOAGULANT (XARELTO ANTICOAGULANT) 20 MG TABS tablet           Patient with second clot - no sign of PE   Will transition to maintence dosing of the rivaroxaban   Lifetime treatment   Vitals and labs reviewed and course of treatment reviewed           BMI:   Estimated body mass index is 33.15 kg/m  as calculated from the following:    Height as of this encounter: 1.727 m (5' 8\").    Weight as of this encounter: 98.9 kg (218 lb).   Weight management plan: Discussed healthy diet and exercise guidelines    Work on weight loss  Regular exercise    Return in about 2 months (around 8/6/2022) for follow up of condition, medication management.    Clolin Salazar MD  [facility] Center[facility] Polanco is a 71 year old who presents for the following health issues  accompanied by his self.    HPI     Post Discharge Outreach 6/2/2022   Admission Date 6/1/2022   Reason for Admission Chest Pain   Discharge Date 6/1/2022   Discharge Diagnosis Chest Pain   How are you doing now that you are home? doing well   How are your symptoms? (Red Flag symptoms escalate to triage hotline per guidelines) Improved   Do you feel your condition is stable enough to be safe at home until your provider visit? Yes   Does the patient have their discharge instructions?  Yes   Does the patient have questions regarding their discharge instructions?  No   Were you started on any new medications or were there changes to any of your previous medications?  No   Does the patient have all of their medications? Yes   Do you have questions regarding any of your medications?  No   Do you have all of your needed medical supplies or equipment (DME)?  (i.e. oxygen tank, CPAP, cane, etc.) Yes   Discharge follow-up appointment scheduled within 14 calendar days?  Yes   Discharge " "Follow Up Appointment Date 6/6/2022   Discharge Follow Up Appointment Scheduled with? Specialty Care Provider     ER Follow-up Visit:    Hospital/Nursing Home/IP Rehab Facility: [facility]  Date of Visits: 05/15/2022 and 06/01/2022  Reason(s) for Visit: Leg Pain and Chest Pain      Was your hospitalization related to COVID-19? No   Problems taking medications regularly:  None  Medication changes since discharge: See List  Problems adhering to non-medication therapy:  None    Summary of hospitalization:  [facility] discharge summary reviewed  Diagnostic Tests/Treatments reviewed.  Follow up needed: none  Other Healthcare Providers Involved in Patient s Care:         None  Update since discharge: improved. Post Discharge Medication Reconciliation: discharge medications reconciled and changed, per note/orders.  Plan of care communicated with patient          After DVT and then chst pain   Could feel bloddo    2011 had one and stent in the back of the knee     Review of Systems   Constitutional, HEENT, cardiovascular, pulmonary, gi and gu systems are negative, except as otherwise noted.      Objective    /72 (BP Location: Left arm, Patient Position: Sitting, Cuff Size: Adult Large)   Pulse 68   Temp 99  F (37.2  C) (Oral)   Ht 1.727 m (5' 8\")   Wt 98.9 kg (218 lb)   SpO2 97%   BMI 33.15 kg/m    Body mass index is 33.15 kg/m .  Physical Exam   GENERAL: healthy, alert and no distress  EYES: Eyes grossly normal to inspection, PERRL and conjunctivae and sclerae normal  HENT: ear canals and TM's normal, nose and mouth without ulcers or lesions  NECK: no adenopathy, no asymmetry, masses, or scars and thyroid normal to palpation  RESP: lungs clear to auscultation - no rales, rhonchi or wheezes  CV: regular rate and rhythm, normal S1 S2, no S3 or S4, no murmur, click or rub, no peripheral edema and peripheral pulses strong  ABDOMEN: soft, nontender, no " hepatosplenomegaly, no masses and bowel sounds normal  MS: no gross musculoskeletal defects noted, no edema  SKIN: no suspicious lesions or rashes  NEURO: Normal strength and tone, mentation intact and speech normal  BACK: no CVA tenderness, no paralumbar tenderness  PSYCH: mentation appears normal, affect normal/bright  LYMPH: no cervical, supraclavicular, axillary, or inguinal adenopathy    No results found for any visits on 06/06/22.

## 2022-06-27 DIAGNOSIS — E78.5 HYPERLIPIDEMIA LDL GOAL <130: ICD-10-CM

## 2022-06-27 DIAGNOSIS — I10 BENIGN ESSENTIAL HYPERTENSION: ICD-10-CM

## 2022-06-28 RX ORDER — SIMVASTATIN 20 MG
20 TABLET ORAL AT BEDTIME
Qty: 90 TABLET | Refills: 0 | Status: SHIPPED | OUTPATIENT
Start: 2022-06-28 | End: 2022-10-10

## 2022-06-28 RX ORDER — LOSARTAN POTASSIUM 50 MG/1
50 TABLET ORAL DAILY
Qty: 90 TABLET | Refills: 0 | Status: SHIPPED | OUTPATIENT
Start: 2022-06-28 | End: 2022-09-19

## 2022-07-06 ENCOUNTER — TELEPHONE (OUTPATIENT)
Dept: FAMILY MEDICINE | Facility: CLINIC | Age: 72
End: 2022-07-06

## 2022-07-06 NOTE — TELEPHONE ENCOUNTER
Patient notified of Provider's message as written.  Patient verbalized understanding.    Cassia Nunez RN  [facility]

## 2022-07-06 NOTE — TELEPHONE ENCOUNTER
Dr. Salazar,    I do not see a past order or possible dx to support need for a prophylactic antibiotic. Patient is on xarelto but he doesn't;t have to stop use for a dental filling does he? Please advise.     Thanks,  TIFFANIE Maldonado  [facility]

## 2022-07-06 NOTE — TELEPHONE ENCOUNTER
Patient does not need to stop his xarelto unless requested by the dentist .  If they request stopping, he can safely stop 1 day prior to the procedure  And restart the next day

## 2022-07-06 NOTE — TELEPHONE ENCOUNTER
Patient called has a dentist appt on Monday for a filling wondering if he needs any medications or if he needs to stop any for that appt. The dentist office is [facility] Dr. Nunn there number [phone].  Tatiana Maldonado,

## 2022-08-01 ENCOUNTER — OFFICE VISIT (OUTPATIENT)
Dept: FAMILY MEDICINE | Facility: CLINIC | Age: 72
End: 2022-08-01
Payer: COMMERCIAL

## 2022-08-01 VITALS
SYSTOLIC BLOOD PRESSURE: 132 MMHG | WEIGHT: 224 LBS | BODY MASS INDEX: 33.95 KG/M2 | DIASTOLIC BLOOD PRESSURE: 78 MMHG | HEIGHT: 68 IN | OXYGEN SATURATION: 95 % | HEART RATE: 77 BPM | TEMPERATURE: 98.4 F

## 2022-08-01 DIAGNOSIS — I82.5Y2 CHRONIC DEEP VEIN THROMBOSIS (DVT) OF PROXIMAL VEIN OF LEFT LOWER EXTREMITY (H): Primary | ICD-10-CM

## 2022-08-01 LAB
ANION GAP SERPL CALCULATED.3IONS-SCNC: 6 MMOL/L (ref 3–14)
BASOPHILS # BLD AUTO: 0.1 10E3/UL (ref 0–0.2)
BASOPHILS NFR BLD AUTO: 1 %
BUN SERPL-MCNC: 20 MG/DL (ref 7–30)
CALCIUM SERPL-MCNC: 9.4 MG/DL (ref 8.5–10.1)
CHLORIDE BLD-SCNC: 106 MMOL/L (ref 94–109)
CO2 SERPL-SCNC: 28 MMOL/L (ref 20–32)
CREAT SERPL-MCNC: 1.03 MG/DL (ref 0.66–1.25)
EOSINOPHIL # BLD AUTO: 0.2 10E3/UL (ref 0–0.7)
EOSINOPHIL NFR BLD AUTO: 3 %
ERYTHROCYTE [DISTWIDTH] IN BLOOD BY AUTOMATED COUNT: 13.3 % (ref 10–15)
GFR SERPL CREATININE-BSD FRML MDRD: 76 ML/MIN/1.73M2
GLUCOSE BLD-MCNC: 79 MG/DL (ref 70–99)
HCT VFR BLD AUTO: 48.4 % (ref 40–53)
HGB BLD-MCNC: 16.1 G/DL (ref 13.3–17.7)
LYMPHOCYTES # BLD AUTO: 2.2 10E3/UL (ref 0.8–5.3)
LYMPHOCYTES NFR BLD AUTO: 32 %
MCH RBC QN AUTO: 32.9 PG (ref 26.5–33)
MCHC RBC AUTO-ENTMCNC: 33.3 G/DL (ref 31.5–36.5)
MCV RBC AUTO: 99 FL (ref 78–100)
MONOCYTES # BLD AUTO: 0.7 10E3/UL (ref 0–1.3)
MONOCYTES NFR BLD AUTO: 10 %
NEUTROPHILS # BLD AUTO: 3.9 10E3/UL (ref 1.6–8.3)
NEUTROPHILS NFR BLD AUTO: 55 %
PLATELET # BLD AUTO: 266 10E3/UL (ref 150–450)
POTASSIUM BLD-SCNC: 4.3 MMOL/L (ref 3.4–5.3)
RBC # BLD AUTO: 4.9 10E6/UL (ref 4.4–5.9)
SODIUM SERPL-SCNC: 140 MMOL/L (ref 133–144)
WBC # BLD AUTO: 7.1 10E3/UL (ref 4–11)

## 2022-08-01 PROCEDURE — 36415 COLL VENOUS BLD VENIPUNCTURE: CPT | Performed by: INTERNAL MEDICINE

## 2022-08-01 PROCEDURE — 99213 OFFICE O/P EST LOW 20 MIN: CPT | Performed by: INTERNAL MEDICINE

## 2022-08-01 PROCEDURE — 85025 COMPLETE CBC W/AUTO DIFF WBC: CPT | Performed by: INTERNAL MEDICINE

## 2022-08-01 PROCEDURE — 80048 BASIC METABOLIC PNL TOTAL CA: CPT | Performed by: INTERNAL MEDICINE

## 2022-08-01 NOTE — PROGRESS NOTES
"  Assessment & Plan   Problem List Items Addressed This Visit     Deep vein thrombosis of left lower extremity (H) - Primary    Relevant Orders    CBC with platelets and differential (Completed)    Basic metabolic panel  (Ca, Cl, CO2, Creat, Gluc, K, Na, BUN) (Completed)           Patient with good response to the medication   Lifelong treatment with second episode        Work on weight loss  Regular exercise    No follow-ups on file.    MD MARY Luna [facility] SILVINO Polanco is a 72 year old accompanied by his self, presenting for the following health issues:    Medication Therapy Management    HPI         Review of Systems   Constitutional, HEENT, cardiovascular, pulmonary, gi and gu systems are negative, except as otherwise noted.      Objective    /78 (BP Location: Right arm, Patient Position: Chair, Cuff Size: Adult Large)   Pulse 77   Temp 98.4  F (36.9  C) (Oral)   Ht 1.727 m (5' 8\")   Wt 101.6 kg (224 lb)   SpO2 95%   BMI 34.06 kg/m    Body mass index is 34.06 kg/m .  Physical Exam   GENERAL: healthy, alert and no distress  EYES: Eyes grossly normal to inspection, PERRL and conjunctivae and sclerae normal  HENT: ear canals and TM's normal, nose and mouth without ulcers or lesions  NECK: no adenopathy, no asymmetry, masses, or scars and thyroid normal to palpation  RESP: lungs clear to auscultation - no rales, rhonchi or wheezes  CV: regular rate and rhythm, normal S1 S2, no S3 or S4, no murmur, click or rub, no peripheral edema and peripheral pulses strong  ABDOMEN: soft, nontender, no hepatosplenomegaly, no masses and bowel sounds normal  MS: no gross musculoskeletal defects noted, no edema  SKIN: no suspicious lesions or rashes  NEURO: Normal strength and tone, mentation intact and speech normal  BACK: no CVA tenderness, no paralumbar tenderness  PSYCH: mentation appears normal, affect normal/bright  LYMPH: no cervical, supraclavicular, axillary, or inguinal " adenopathy    Results for orders placed or performed in visit on 08/01/22   Basic metabolic panel  (Ca, Cl, CO2, Creat, Gluc, K, Na, BUN)     Status: Normal   Result Value Ref Range    Sodium 140 133 - 144 mmol/L    Potassium 4.3 3.4 - 5.3 mmol/L    Chloride 106 94 - 109 mmol/L    Carbon Dioxide (CO2) 28 20 - 32 mmol/L    Anion Gap 6 3 - 14 mmol/L    Urea Nitrogen 20 7 - 30 mg/dL    Creatinine 1.03 0.66 - 1.25 mg/dL    Calcium 9.4 8.5 - 10.1 mg/dL    Glucose 79 70 - 99 mg/dL    GFR Estimate 76 >60 mL/min/1.73m2   CBC with platelets and differential     Status: None   Result Value Ref Range    WBC Count 7.1 4.0 - 11.0 10e3/uL    RBC Count 4.90 4.40 - 5.90 10e6/uL    Hemoglobin 16.1 13.3 - 17.7 g/dL    Hematocrit 48.4 40.0 - 53.0 %    MCV 99 78 - 100 fL    MCH 32.9 26.5 - 33.0 pg    MCHC 33.3 31.5 - 36.5 g/dL    RDW 13.3 10.0 - 15.0 %    Platelet Count 266 150 - 450 10e3/uL    % Neutrophils 55 %    % Lymphocytes 32 %    % Monocytes 10 %    % Eosinophils 3 %    % Basophils 1 %    Absolute Neutrophils 3.9 1.6 - 8.3 10e3/uL    Absolute Lymphocytes 2.2 0.8 - 5.3 10e3/uL    Absolute Monocytes 0.7 0.0 - 1.3 10e3/uL    Absolute Eosinophils 0.2 0.0 - 0.7 10e3/uL    Absolute Basophils 0.1 0.0 - 0.2 10e3/uL   CBC with platelets and differential     Status: None    Narrative    The following orders were created for panel order CBC with platelets and differential.  Procedure                               Abnormality         Status                     ---------                               -----------         ------                     CBC with platelets and d...[[id number]]                      Final result                 Please view results for these tests on the individual orders.                   .  ..

## 2022-08-01 NOTE — LETTER
August 5, 2022    Collin MARVIN Monika  [address]    [facility] [address]          Dear ,    We are writing to inform you of your test results.    Collin, these labs look very good.  Keep the medications at the same doses.      Resulted Orders   Basic metabolic panel  (Ca, Cl, CO2, Creat, Gluc, K, Na, BUN)   Result Value Ref Range    Sodium 140 133 - 144 mmol/L    Potassium 4.3 3.4 - 5.3 mmol/L    Chloride 106 94 - 109 mmol/L    Carbon Dioxide (CO2) 28 20 - 32 mmol/L    Anion Gap 6 3 - 14 mmol/L    Urea Nitrogen 20 7 - 30 mg/dL    Creatinine 1.03 0.66 - 1.25 mg/dL    Calcium 9.4 8.5 - 10.1 mg/dL    Glucose 79 70 - 99 mg/dL    GFR Estimate 76 >60 mL/min/1.73m2      Comment:      Effective December 21, 2021 eGFRcr in adults is calculated using the 2021 CKD-EPI creatinine equation which includes age and gender (Reuben et al., [facility]), DOI: 10.1056/OVBVyf8449051)   CBC with platelets and differential   Result Value Ref Range    WBC Count 7.1 4.0 - 11.0 10e3/uL    RBC Count 4.90 4.40 - 5.90 10e6/uL    Hemoglobin 16.1 13.3 - 17.7 g/dL    Hematocrit 48.4 40.0 - 53.0 %    MCV 99 78 - 100 fL    MCH 32.9 26.5 - 33.0 pg    MCHC 33.3 31.5 - 36.5 g/dL    RDW 13.3 10.0 - 15.0 %    Platelet Count 266 150 - 450 10e3/uL    % Neutrophils 55 %    % Lymphocytes 32 %    % Monocytes 10 %    % Eosinophils 3 %    % Basophils 1 %    Absolute Neutrophils 3.9 1.6 - 8.3 10e3/uL    Absolute Lymphocytes 2.2 0.8 - 5.3 10e3/uL    Absolute Monocytes 0.7 0.0 - 1.3 10e3/uL    Absolute Eosinophils 0.2 0.0 - 0.7 10e3/uL    Absolute Basophils 0.1 0.0 - 0.2 10e3/uL       If you have any questions or concerns, please call the clinic at the number listed above.       Sincerely,      Collin Salazar MD

## 2022-08-22 DIAGNOSIS — I82.532 CHRONIC DEEP VEIN THROMBOSIS (DVT) OF POPLITEAL VEIN OF LEFT LOWER EXTREMITY (H): ICD-10-CM

## 2022-08-22 NOTE — TELEPHONE ENCOUNTER
Reason for Call:  Medication or medication refill:    Do you use a [facility]?  Name of the pharmacy and phone number for the current request:    [facility] - [place], [place] - [address] [place] [address]    Name of the medication requested: rivaroxaban ANTICOAGULANT (XARELTO ANTICOAGULANT) 20 MG TABS tablet    Other request: conctact [facility] to have it mailed out    Can we leave a detailed message on this number? YES    Phone number patient can be reached at: Home number on file [phone] (home)    Best Time: anytime    Call taken on 8/22/2022 at 7:19 AM by Yesi Newsome

## 2022-08-22 NOTE — TELEPHONE ENCOUNTER
Routing refill request to provider for review/approval because:  Failed Protocol    Annalisa Felix RN BSN  [facility]      Patient has refills on file at a local pharmacy, asking for Mail order prescription.

## 2022-09-19 DIAGNOSIS — I10 BENIGN ESSENTIAL HYPERTENSION: ICD-10-CM

## 2022-09-19 RX ORDER — LOSARTAN POTASSIUM 50 MG/1
50 TABLET ORAL DAILY
Qty: 90 TABLET | Refills: 0 | Status: SHIPPED | OUTPATIENT
Start: 2022-09-19 | End: 2022-12-12

## 2022-09-26 ENCOUNTER — HOSPITAL ENCOUNTER (EMERGENCY)
Facility: CLINIC | Age: 72
Discharge: HOME | End: 2022-09-26
Attending: EMERGENCY MEDICINE | Admitting: EMERGENCY MEDICINE
Payer: COMMERCIAL

## 2022-09-26 ENCOUNTER — APPOINTMENT (OUTPATIENT)
Dept: GENERAL RADIOLOGY | Facility: CLINIC | Age: 72
End: 2022-09-26
Attending: EMERGENCY MEDICINE
Payer: COMMERCIAL

## 2022-09-26 VITALS
HEART RATE: 60 BPM | RESPIRATION RATE: 16 BRPM | HEIGHT: 68 IN | WEIGHT: 220 LBS | OXYGEN SATURATION: 99 % | BODY MASS INDEX: 33.34 KG/M2 | SYSTOLIC BLOOD PRESSURE: 170 MMHG | DIASTOLIC BLOOD PRESSURE: 89 MMHG | TEMPERATURE: 98 F

## 2022-09-26 DIAGNOSIS — M79.674 PAIN OF TOE OF RIGHT FOOT: ICD-10-CM

## 2022-09-26 PROCEDURE — 73630 X-RAY EXAM OF FOOT: CPT | Mod: RT

## 2022-09-26 PROCEDURE — 73630 X-RAY EXAM OF FOOT: CPT | Mod: 26 | Performed by: RADIOLOGY

## 2022-09-26 PROCEDURE — 99284 EMERGENCY DEPT VISIT MOD MDM: CPT | Performed by: EMERGENCY MEDICINE

## 2022-09-26 PROCEDURE — 99283 EMERGENCY DEPT VISIT LOW MDM: CPT | Performed by: EMERGENCY MEDICINE

## 2022-09-26 RX ORDER — PREDNISONE 20 MG/1
TABLET ORAL
Qty: 10 TABLET | Refills: 0 | Status: SHIPPED | OUTPATIENT
Start: 2022-09-26 | End: 2022-12-12

## 2022-09-26 ASSESSMENT — ENCOUNTER SYMPTOMS
NAUSEA: 0
ARTHRALGIAS: 0
VOMITING: 0
MYALGIAS: 0
WOUND: 0
COLOR CHANGE: 0
CHILLS: 0
FEVER: 0

## 2022-09-26 ASSESSMENT — ACTIVITIES OF DAILY LIVING (ADL): ADLS_ACUITY_SCORE: 35

## 2022-09-26 NOTE — ED PROVIDER NOTES
ED Provider Note  [facility]      History     Chief Complaint   Patient presents with     Toe Pain     HPI  Collin Frank is a 72 year old male who arrives today to the emergency room for evaluation of right great toe pain x2 days.  The patient reports pain at the base of his right great toe.  No similar symptoms in the past.  He reports no trauma.  Pain currently rated at moderate in intensity.  Exacerbated by weightbearing.  He is nondiabetic.  Reports no redness.  No streaking of discoloration of his foot.  He reports no involvement of the joint or other joints.  No history of gout.  Patient reports no distal change in strength or sensation.  He has not yet taken thing for pain.    Past Medical History  Past Medical History:   Diagnosis Date     Antiplatelet or antithrombotic long-term use     blood clot in leg     Long term current use of anticoagulant 10/16/2012     Past Surgical History:   Procedure Laterality Date     COLONOSCOPY  08    Normal. Repeat in 10 years     COLONOSCOPY WITH CO2 INSUFFLATION N/A 2018    Procedure: COLONOSCOPY WITH CO2 INSUFFLATION;  COLON SCREEN/ ENGELMANN;  Surgeon: Jan Flores MD;  Location: [facility] OR     rt knee ORIF      [facility]     predniSONE (DELTASONE) 20 MG tablet  cholecalciferol (VITAMIN D) 1000 UNIT tablet  losartan (COZAAR) 50 MG tablet  rivaroxaban ANTICOAGULANT (XARELTO ANTICOAGULANT) 20 MG TABS tablet  simvastatin (ZOCOR) 20 MG tablet      No Known Allergies  Family History  Family History   Problem Relation Age of Onset     Alzheimer Disease Mother      Heart Disease Father      Prostate Cancer Father      Cancer Brother 65        pancreatic        Prostate Cancer Brother         2 stents 70% and 90% blockage     Heart Disease Sister      Social History   Social History     Tobacco Use     Smoking status: Former Smoker     Types: Cigarettes     Quit date: 2011     Years since quittin.6      "Smokeless tobacco: Never Used   Substance Use Topics     Alcohol use: No     Drug use: No      Past medical history, past surgical history, medications, allergies, family history, and social history were reviewed with the patient. No additional pertinent items.       Review of Systems   Constitutional: Negative for chills and fever.   Gastrointestinal: Negative for nausea and vomiting.   Musculoskeletal: Negative for arthralgias and myalgias.   Skin: Negative for color change, rash and wound.       Physical Exam   BP: (!) 176/74  Pulse: 80  Temp: 98  F (36.7  C)  Resp: 16  Height: 172.7 cm (5' 8\")  Weight: 99.8 kg (220 lb)  SpO2: 96 %  Physical Exam  Vitals and nursing note reviewed.   Constitutional:       General: He is not in acute distress.     Appearance: He is not ill-appearing.   Cardiovascular:      Pulses: Normal pulses.   Musculoskeletal:         General: Normal range of motion.      Left foot: Normal range of motion and normal capillary refill. No tenderness, bony tenderness or crepitus. Normal pulse.      Comments: Minor pain at the base of his right great toe without fluctuance, induration, erythema.  No deformity.  Distal cap refill 2 seconds.   Skin:     General: Skin is warm and dry.      Coloration: Skin is not pale.      Findings: No erythema or rash.   Neurological:      Mental Status: He is alert.         ED Course      Procedures               Results for orders placed or performed during the hospital encounter of 09/26/22   Foot  XR, G/E 3 views, right     Status: None    Narrative    3 views right foot radiographs 9/26/2022 10:37 AM    History: R great toe pain    Additional History from EMR: Atraumatic.    Comparison: None available    Findings:    Nonweightbearing AP, oblique, and lateral  views of the right foot  were obtained.     No acute osseous abnormality.      Lisfranc articulation alignment is congruent on these non-weight  bearing images.    Mild degenerative changes of first " metatarsophalangeal joint. Soft  tissue mineralization adjacent to the medial aspect of the first MTP,  which may represent calcified tophus. Soft tissues are otherwise  unremarkable. Achilles tendon insertional and plantar calcaneal  enthesopathy.     Scattered vascular calcifications.      Impression    Impression:  1. No acute osseous abnormality.  2. 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  3. Mild degenerative changes most prominent at the first MTP.    I have personally reviewed the examination and initial interpretation  and I agree with the findings.    MONSERRAT BURGER MD (Joe)         SYSTEM ID:  [id number]     Medications - No data to display     Assessments & Plan (with Medical Decision Making)     Collin Frank is a 72 year old male who arrives today to the emergency room for evaluation of right great toe pain x2 days.  On arrival patient noted to be alert.  Is presently afebrile.  He seated upright in a chair.  External evaluation the foot demonstrates no gross deformity or evidence of dislocation.  Distal capillary fill is normal.  He is perfusing adequately.  He has minor pain with palpation at the base of his right great toe.  I would favor gout as likely cause.  No evidence of septic joint, cellulitis or need for antibiotics at this time.  I did obtain a plain film demonstrate no sign of occult fracture.  Patient is on anticoagulation limiting anti-inflammatory medication.  I discussed a very short course of steroids as well as dietary change.  We will have him follow-up with his primary care physician for reevaluation the next 2 to 3 days or call or return emergently with change or worsening symptoms.    I have reviewed the nursing notes. I have reviewed the findings, diagnosis, plan and need for follow up with the patient.    New Prescriptions    PREDNISONE (DELTASONE) 20 MG TABLET    Take two tablets (= 40mg) each day for 5 (five)  days       Final diagnoses:   Pain of toe of right foot       --  Tera Nath  [facility] EMERGENCY DEPARTMENT  9/26/2022     Tera Nath MD  09/26/22 1117

## 2022-09-26 NOTE — DISCHARGE INSTRUCTIONS
As we discussed in the emergency department overall your foot is normal in appearance.  We did obtain x-rays demonstrate no sign of fracture or problems with the bones.  Most likely at this location this is consistent with gout.  I have printed information for change in diet and certain restrictions to minimize symptoms.  We will place you on a very short course of steroids.  Secondary to your long-term anticoagulation use this limits some medication use.  I would plan for follow-up with your primary care physician should symptoms persist otherwise we are certainly happy to receive in the emergency department she have any increase in pain, worsening of redness, redness streaking up your foot, increased swelling, fever chills or other concern.

## 2022-09-26 NOTE — ED TRIAGE NOTES
"Triage Assessment & Note:    BP (!) 176/74   Pulse 80   Temp 98  F (36.7  C) (Temporal)   Resp 16   Ht 1.727 m (5' 8\")   Wt 99.8 kg (220 lb)   SpO2 96%   BMI 33.45 kg/m      Patient presents with: PT c/o discomfort to his right great toe x 1 week.     Home Treatments/Remedies: None    Febrile / Afebrile? Afebrile     Duration of C/o:1 week     Narinder Canas RN  September 26, 2022         Triage Assessment     Row Name 09/26/22 0829       Triage Assessment (Adult)    Airway WDL WDL       Respiratory WDL    Respiratory WDL WDL       Cardiac WDL    Cardiac WDL WDL              "

## 2022-10-10 ENCOUNTER — OFFICE VISIT (OUTPATIENT)
Dept: FAMILY MEDICINE | Facility: CLINIC | Age: 72
End: 2022-10-10
Payer: COMMERCIAL

## 2022-10-10 VITALS
BODY MASS INDEX: 34.82 KG/M2 | SYSTOLIC BLOOD PRESSURE: 146 MMHG | OXYGEN SATURATION: 95 % | TEMPERATURE: 97.6 F | DIASTOLIC BLOOD PRESSURE: 74 MMHG | WEIGHT: 229 LBS | HEART RATE: 75 BPM

## 2022-10-10 DIAGNOSIS — E78.5 HYPERLIPIDEMIA LDL GOAL <130: ICD-10-CM

## 2022-10-10 DIAGNOSIS — Z23 NEED FOR PROPHYLACTIC VACCINATION AND INOCULATION AGAINST INFLUENZA: Primary | ICD-10-CM

## 2022-10-10 PROCEDURE — G0008 ADMIN INFLUENZA VIRUS VAC: HCPCS | Performed by: INTERNAL MEDICINE

## 2022-10-10 PROCEDURE — 90662 IIV NO PRSV INCREASED AG IM: CPT | Performed by: INTERNAL MEDICINE

## 2022-10-10 PROCEDURE — 99213 OFFICE O/P EST LOW 20 MIN: CPT | Mod: 25 | Performed by: INTERNAL MEDICINE

## 2022-10-10 RX ORDER — SIMVASTATIN 20 MG
20 TABLET ORAL AT BEDTIME
Qty: 90 TABLET | Refills: 4 | Status: SHIPPED | OUTPATIENT
Start: 2022-10-10 | End: 2023-12-26

## 2022-10-10 NOTE — PROGRESS NOTES
Assessment & Plan   Problem List Items Addressed This Visit    None  Visit Diagnoses     Need for prophylactic vaccination and inoculation against influenza    -  Primary    Relevant Orders    INFLUENZA, QUAD, HIGH DOSE, PF, 65YR + (FLUZONE HD) (Completed)    Hyperlipidemia LDL goal <130        Relevant Medications    simvastatin (ZOCOR) 20 MG tablet           Likely gout - described use of NSAIDS, prednisone or allopurinol and the relative indications           Work on weight loss  Regular exercise    Return in about 6 months (around 4/10/2023) for follow up of condition, medication management.    Collin Salazar MD  [facility] SILVINO Polanco is a 72 year old, presenting for the following health issues:  ER F/U and Imm/Inj (Flu Shot)      HPI     ED/UC Followup:    Facility:  [facility]  Date of visit: 9/26/22  Reason for visit: Toe Pain  Current Status: Much better, no pain        Review of Systems   Constitutional, HEENT, cardiovascular, pulmonary, gi and gu systems are negative, except as otherwise noted.      Objective    BP (!) 146/74 (BP Location: Left arm, Patient Position: Chair, Cuff Size: Adult Regular)   Pulse 75   Temp 97.6  F (36.4  C)   Wt 103.9 kg (229 lb)   SpO2 95%   BMI 34.82 kg/m    Body mass index is 34.82 kg/m .  Physical Exam   GENERAL: healthy, alert and no distress  EYES: Eyes grossly normal to inspection, PERRL and conjunctivae and sclerae normal  HENT: ear canals and TM's normal, nose and mouth without ulcers or lesions  NECK: no adenopathy, no asymmetry, masses, or scars and thyroid normal to palpation  RESP: lungs clear to auscultation - no rales, rhonchi or wheezes  CV: regular rate and rhythm, normal S1 S2, no S3 or S4, no murmur, click or rub, no peripheral edema and peripheral pulses strong  ABDOMEN: soft, nontender, no hepatosplenomegaly, no masses and bowel sounds normal  MS: no gross musculoskeletal defects noted, no edema  SKIN: no suspicious  lesions or rashes  NEURO: Normal strength and tone, mentation intact and speech normal  BACK: no CVA tenderness, no paralumbar tenderness  PSYCH: mentation appears normal, affect normal/bright  LYMPH: no cervical, supraclavicular, axillary, or inguinal adenopathy    No results found for any visits on 10/10/22.

## 2022-12-12 ENCOUNTER — OFFICE VISIT (OUTPATIENT)
Dept: FAMILY MEDICINE | Facility: CLINIC | Age: 72
End: 2022-12-12
Payer: COMMERCIAL

## 2022-12-12 VITALS
DIASTOLIC BLOOD PRESSURE: 83 MMHG | BODY MASS INDEX: 34.43 KG/M2 | HEIGHT: 68 IN | SYSTOLIC BLOOD PRESSURE: 142 MMHG | HEART RATE: 74 BPM | WEIGHT: 227.2 LBS | TEMPERATURE: 97.8 F | OXYGEN SATURATION: 96 %

## 2022-12-12 DIAGNOSIS — I10 BENIGN ESSENTIAL HYPERTENSION: ICD-10-CM

## 2022-12-12 DIAGNOSIS — Z13.6 CARDIOVASCULAR SCREENING; LDL GOAL LESS THAN 130: Primary | ICD-10-CM

## 2022-12-12 LAB
ANION GAP SERPL CALCULATED.3IONS-SCNC: 4 MMOL/L (ref 3–14)
BUN SERPL-MCNC: 15 MG/DL (ref 7–30)
CALCIUM SERPL-MCNC: 9.7 MG/DL (ref 8.5–10.1)
CHLORIDE BLD-SCNC: 109 MMOL/L (ref 94–109)
CHOLEST SERPL-MCNC: 125 MG/DL
CO2 SERPL-SCNC: 27 MMOL/L (ref 20–32)
CREAT SERPL-MCNC: 0.95 MG/DL (ref 0.66–1.25)
FASTING STATUS PATIENT QL REPORTED: YES
GFR SERPL CREATININE-BSD FRML MDRD: 84 ML/MIN/1.73M2
GLUCOSE BLD-MCNC: 89 MG/DL (ref 70–99)
HDLC SERPL-MCNC: 51 MG/DL
LDLC SERPL CALC-MCNC: 62 MG/DL
NONHDLC SERPL-MCNC: 74 MG/DL
POTASSIUM BLD-SCNC: 4.1 MMOL/L (ref 3.4–5.3)
SODIUM SERPL-SCNC: 140 MMOL/L (ref 133–144)
TRIGL SERPL-MCNC: 58 MG/DL

## 2022-12-12 PROCEDURE — G0439 PPPS, SUBSEQ VISIT: HCPCS | Performed by: INTERNAL MEDICINE

## 2022-12-12 PROCEDURE — 36415 COLL VENOUS BLD VENIPUNCTURE: CPT | Performed by: INTERNAL MEDICINE

## 2022-12-12 PROCEDURE — 80048 BASIC METABOLIC PNL TOTAL CA: CPT | Performed by: INTERNAL MEDICINE

## 2022-12-12 PROCEDURE — 80061 LIPID PANEL: CPT | Performed by: INTERNAL MEDICINE

## 2022-12-12 PROCEDURE — 0124A COVID-19 VACCINE BIVALENT BOOSTER 12+ (PFIZER): CPT | Performed by: INTERNAL MEDICINE

## 2022-12-12 PROCEDURE — 91312 COVID-19 VACCINE BIVALENT BOOSTER 12+ (PFIZER): CPT | Performed by: INTERNAL MEDICINE

## 2022-12-12 RX ORDER — LOSARTAN POTASSIUM 50 MG/1
50 TABLET ORAL DAILY
Qty: 90 TABLET | Refills: 4 | Status: SHIPPED | OUTPATIENT
Start: 2022-12-12 | End: 2023-09-07

## 2022-12-12 ASSESSMENT — ENCOUNTER SYMPTOMS
JOINT SWELLING: 0
HEARTBURN: 0
HEMATOCHEZIA: 0
CONSTIPATION: 0
DIZZINESS: 0
PARESTHESIAS: 0
SHORTNESS OF BREATH: 0
DIARRHEA: 0
FREQUENCY: 1
ARTHRALGIAS: 0
COUGH: 0
FEVER: 0
CHILLS: 0
NAUSEA: 0
EYE PAIN: 0
HEADACHES: 0
HEMATURIA: 0
WEAKNESS: 0
MYALGIAS: 0
NERVOUS/ANXIOUS: 0
DYSURIA: 0
ABDOMINAL PAIN: 0
PALPITATIONS: 0
SORE THROAT: 0

## 2022-12-12 ASSESSMENT — ACTIVITIES OF DAILY LIVING (ADL): CURRENT_FUNCTION: NO ASSISTANCE NEEDED

## 2022-12-12 NOTE — PROGRESS NOTES
"SUBJECTIVE:   Collin is a 72 year old who presents for Preventive Visit.    Patient has been advised of split billing requirements and indicates understanding: Yes  Are you in the first 12 months of your Medicare coverage?  No    Healthy Habits:     In general, how would you rate your overall health?  Fair    Frequency of exercise:  1 day/week    Duration of exercise:  Less than 15 minutes    Do you usually eat at least 4 servings of fruit and vegetables a day, include whole grains    & fiber and avoid regularly eating high fat or \"junk\" foods?  Yes    Taking medications regularly:  Yes    Barriers to taking medications:  None    Medication side effects:  None    Ability to successfully perform activities of daily living:  No assistance needed    Home Safety:  No safety concerns identified    Hearing Impairment:  Difficulty following a conversation in a noisy restaurant or crowded room, need to ask people to speak up or repeat themselves and difficulty understanding soft or whispered speech    In the past 6 months, have you been bothered by leaking of urine?  No    In general, how would you rate your overall mental or emotional health?  Good      PHQ-2 Total Score: 0    Additional concerns today:  Yes (cold feet and sweaty at night off and on)    Live by self, built for seniors    Have you ever done Advance Care Planning? (For example, a Health Directive, POLST, or a discussion with a medical provider or your loved ones about your wishes): Yes, advance care planning is on file.       Fall risk  Fallen 2 or more times in the past year?: No  Any fall with injury in the past year?: No    Cognitive Screening   1) Repeat 3 items (Leader, Season, Table)    2) Clock draw: NORMAL  3) 3 item recall: Recalls 3 objects  Results: 3 items recalled: COGNITIVE IMPAIRMENT LESS LIKELY    Mini-CogTM Copyright MARYANN Baker. Licensed by the author for use in [facility]; reprinted with permission (davon@.[facility]). All rights " reserved.      Do you have sleep apnea, excessive snoring or daytime drowsiness?: no    Reviewed and updated as needed this visit by clinical staff    Allergies  Meds              Reviewed and updated as needed this visit by Provider                 Social History     Tobacco Use     Smoking status: Former     Types: Cigarettes     Quit date: 2011     Years since quittin.8     Smokeless tobacco: Never   Substance Use Topics     Alcohol use: No         Alcohol Use 2022   Prescreen: >3 drinks/day or >7 drinks/week? No   Prescreen: >3 drinks/day or >7 drinks/week? -           -------------------------------------    Current providers sharing in care for this patient include:   Patient Care Team:  Collin Salazar MD as PCP - General (Internal Medicine)  Collin Salazar MD as Assigned PCP    The following health maintenance items are reviewed in Epic and correct as of today:  Health Maintenance   Topic Date Due     LUNG CANCER SCREENING  2022     DTAP/TDAP/TD IMMUNIZATION (2 - Td or Tdap) 10/16/2022     ANNUAL REVIEW OF HM ORDERS  12/10/2022     MEDICARE ANNUAL WELLNESS VISIT  12/10/2022     FALL RISK ASSESSMENT  2023     LIPID  2027     ADVANCE CARE PLANNING  2027     COLORECTAL CANCER SCREENING  2028     HEPATITIS C SCREENING  Completed     PHQ-2 (once per calendar year)  Completed     INFLUENZA VACCINE  Completed     Pneumococcal Vaccine: 65+ Years  Completed     ZOSTER IMMUNIZATION  Completed     AORTIC ANEURYSM SCREENING (SYSTEM ASSIGNED)  Completed     COVID-19 Vaccine  Completed     IPV IMMUNIZATION  Aged Out     MENINGITIS IMMUNIZATION  Aged Out     Lab work is in process  Labs reviewed in EPIC  BP Readings from Last 3 Encounters:   22 (!) 142/83   10/10/22 (!) 146/74   22 (!) 170/89    Wt Readings from Last 3 Encounters:   22 103.1 kg (227 lb 3.2 oz)   10/10/22 103.9 kg (229 lb)   22 99.8 kg (220 lb)                  Patient Active  Problem List   Diagnosis     Acute venous embolism and thrombosis of deep vessels of proximal lower extremity (H)     CARDIOVASCULAR SCREENING; LDL GOAL LESS THAN 130     Factor 5 Leiden mutation, heterozygous (H)     May-Thurner syndrome     Long term current use of anticoagulant     Vitamin D deficiency     Renal stones     Hepatic hemangioma     FH: prostate cancer     Long-term (current) use of anticoagulants [Z79.01]     Pulmonary embolism with infarction (HCC) [I26.99]     Post-traumatic osteoarthritis of right knee     Benign non-nodular prostatic hyperplasia without lower urinary tract symptoms     Primary osteoarthritis of left knee     Benign essential hypertension     Primary osteoarthritis of both knees     Essential hypertension     Palpitations     Chest pain, unspecified type     Deep vein thrombosis of left lower extremity (H)     Past Surgical History:   Procedure Laterality Date     COLONOSCOPY  08    Normal. Repeat in 10 years     COLONOSCOPY WITH CO2 INSUFFLATION N/A 2018    Procedure: COLONOSCOPY WITH CO2 INSUFFLATION;  COLON SCREEN/ ENGELMANN;  Surgeon: Jan Flores MD;  Location: [facility] OR     rt knee ORIF      [facility]       Social History     Tobacco Use     Smoking status: Former     Types: Cigarettes     Quit date: 2011     Years since quittin.8     Smokeless tobacco: Never   Substance Use Topics     Alcohol use: No     Family History   Problem Relation Age of Onset     Alzheimer Disease Mother      Heart Disease Father      Prostate Cancer Father      Cancer Brother 65        pancreatic        Prostate Cancer Brother         2 stents 70% and 90% blockage     Heart Disease Sister          Current Outpatient Medications   Medication Sig Dispense Refill     cholecalciferol (VITAMIN D) 1000 UNIT tablet Take 1 tablet (1,000 Units) by mouth daily 100 tablet 3     losartan (COZAAR) 50 MG tablet Take 1 tablet (50 mg) by mouth daily 90 tablet 4      "rivaroxaban ANTICOAGULANT (XARELTO ANTICOAGULANT) 20 MG TABS tablet Take 1 tablet (20 mg) by mouth daily (with dinner) 90 tablet 4     simvastatin (ZOCOR) 20 MG tablet Take 1 tablet (20 mg) by mouth At Bedtime 90 tablet 4     No Known Allergies          Review of Systems   Constitutional: Negative for chills and fever.   HENT: Positive for hearing loss. Negative for congestion, ear pain and sore throat.    Eyes: Negative for pain and visual disturbance.   Respiratory: Negative for cough and shortness of breath.    Cardiovascular: Negative for chest pain, palpitations and peripheral edema.   Gastrointestinal: Negative for abdominal pain, constipation, diarrhea, heartburn, hematochezia and nausea.   Genitourinary: Positive for frequency. Negative for dysuria, genital sores, hematuria, impotence, penile discharge and urgency.   Musculoskeletal: Negative for arthralgias, joint swelling and myalgias.   Skin: Negative for rash.   Neurological: Negative for dizziness, weakness, headaches and paresthesias.   Psychiatric/Behavioral: Negative for mood changes. The patient is not nervous/anxious.      Constitutional, HEENT, cardiovascular, pulmonary, gi and gu systems are negative, except as otherwise noted.    OBJECTIVE:   BP (!) 142/83   Pulse 74   Temp 97.8  F (36.6  C) (Oral)   Ht 1.727 m (5' 7.99\")   Wt 103.1 kg (227 lb 3.2 oz)   SpO2 96%   BMI 34.55 kg/m   Estimated body mass index is 34.55 kg/m  as calculated from the following:    Height as of this encounter: 1.727 m (5' 7.99\").    Weight as of this encounter: 103.1 kg (227 lb 3.2 oz).  Physical Exam  GENERAL: healthy, alert and no distress  EYES: Eyes grossly normal to inspection, PERRL and conjunctivae and sclerae normal  HENT: ear canals and TM's normal, nose and mouth without ulcers or lesions  NECK: no adenopathy, no asymmetry, masses, or scars and thyroid normal to palpation  RESP: lungs clear to auscultation - no rales, rhonchi or wheezes  CV: regular rate " and rhythm, normal S1 S2, no S3 or S4, no murmur, click or rub, no peripheral edema and peripheral pulses strong  ABDOMEN: soft, nontender, no hepatosplenomegaly, no masses and bowel sounds normal  MS: no gross musculoskeletal defects noted, no edema  SKIN: no suspicious lesions or rashes  NEURO: Normal strength and tone, mentation intact and speech normal  BACK: no CVA tenderness, no paralumbar tenderness  PSYCH: mentation appears normal, affect normal/bright  LYMPH: no cervical, supraclavicular, axillary, or inguinal adenopathy    Diagnostic Test Results:  Labs reviewed in Epic  Results for orders placed or performed in visit on 12/12/22   Basic metabolic panel  (Ca, Cl, CO2, Creat, Gluc, K, Na, BUN)     Status: Normal   Result Value Ref Range    Sodium 140 133 - 144 mmol/L    Potassium 4.1 3.4 - 5.3 mmol/L    Chloride 109 94 - 109 mmol/L    Carbon Dioxide (CO2) 27 20 - 32 mmol/L    Anion Gap 4 3 - 14 mmol/L    Urea Nitrogen 15 7 - 30 mg/dL    Creatinine 0.95 0.66 - 1.25 mg/dL    Calcium 9.7 8.5 - 10.1 mg/dL    Glucose 89 70 - 99 mg/dL    GFR Estimate 84 >60 mL/min/1.73m2   Lipid panel reflex to direct LDL Fasting     Status: None   Result Value Ref Range    Cholesterol 125 <200 mg/dL    Triglycerides 58 <150 mg/dL    Direct Measure HDL 51 >=40 mg/dL    LDL Cholesterol Calculated 62 <=100 mg/dL    Non HDL Cholesterol 74 <130 mg/dL    Patient Fasting > 8hrs? Yes     Narrative    Cholesterol  Desirable:  <200 mg/dL    Triglycerides  Normal:  Less than 150 mg/dL  Borderline High:  150-199 mg/dL  High:  200-499 mg/dL  Very High:  Greater than or equal to 500 mg/dL    Direct Measure HDL  Female:  Greater than or equal to 50 mg/dL   Male:  Greater than or equal to 40 mg/dL    LDL Cholesterol  Desirable:  <100mg/dL  Above Desirable:  100-129 mg/dL   Borderline High:  130-159 mg/dL   High:  160-189 mg/dL   Very High:  >= 190 mg/dL    Non HDL Cholesterol  Desirable:  130 mg/dL  Above Desirable:  130-159 mg/dL  Borderline  "High:  160-189 mg/dL  High:  190-219 mg/dL  Very High:  Greater than or equal to 220 mg/dL       ASSESSMENT / PLAN:   Collin was seen today for wellness visit.    Diagnoses and all orders for this visit:    CARDIOVASCULAR SCREENING; LDL GOAL LESS THAN 130  -     Lipid panel reflex to direct LDL Fasting; Future  -     Lipid panel reflex to direct LDL Fasting    Benign essential hypertension  -     losartan (COZAAR) 50 MG tablet; Take 1 tablet (50 mg) by mouth daily  -     Basic metabolic panel  (Ca, Cl, CO2, Creat, Gluc, K, Na, BUN); Future  -     Basic metabolic panel  (Ca, Cl, CO2, Creat, Gluc, K, Na, BUN)    Other orders  -     COVID-19 VACCINE BIVALENT BOOSTER 12+ (PFIZER)              COUNSELING:  Reviewed preventive health counseling, as reflected in patient instructions       Regular exercise       Healthy diet/nutrition       Vision screening       Hearing screening       Dental care       Colon cancer screening      BMI:   Estimated body mass index is 34.55 kg/m  as calculated from the following:    Height as of this encounter: 1.727 m (5' 7.99\").    Weight as of this encounter: 103.1 kg (227 lb 3.2 oz).   Weight management plan: Discussed healthy diet and exercise guidelines      He reports that he quit smoking about 11 years ago. His smoking use included cigarettes. He has never used smokeless tobacco.      Appropriate preventive services were discussed with this patient, including applicable screening as appropriate for cardiovascular disease, diabetes, osteopenia/osteoporosis, and glaucoma.  As appropriate for age/gender, discussed screening for colorectal cancer, prostate cancer, breast cancer, and cervical cancer. Checklist reviewing preventive services available has been given to the patient.    Reviewed patients plan of care and provided an AVS. The Basic Care Plan (routine screening as documented in Health Maintenance) for Collin meets the Care Plan requirement. This Care Plan has been established and " reviewed with the Patient.          Collin Salazar MD  [facility]    Identified Health Risks:

## 2022-12-12 NOTE — LETTER
December 13, 2022    Collin YON Frank  [address]    [facility] [address]          Dear ,    We are writing to inform you of your test results.    Labs look great!       Resulted Orders   Basic metabolic panel  (Ca, Cl, CO2, Creat, Gluc, K, Na, BUN)   Result Value Ref Range    Sodium 140 133 - 144 mmol/L    Potassium 4.1 3.4 - 5.3 mmol/L    Chloride 109 94 - 109 mmol/L    Carbon Dioxide (CO2) 27 20 - 32 mmol/L    Anion Gap 4 3 - 14 mmol/L    Urea Nitrogen 15 7 - 30 mg/dL    Creatinine 0.95 0.66 - 1.25 mg/dL    Calcium 9.7 8.5 - 10.1 mg/dL    Glucose 89 70 - 99 mg/dL    GFR Estimate 84 >60 mL/min/1.73m2      Comment:      Effective December 21, 2021 eGFRcr in adults is calculated using the 2021 CKD-EPI creatinine equation which includes age and gender (Reuben et al., NEJ, DOI: 10.1056/CFIIec6858658)   Lipid panel reflex to direct LDL Fasting   Result Value Ref Range    Cholesterol 125 <200 mg/dL    Triglycerides 58 <150 mg/dL    Direct Measure HDL 51 >=40 mg/dL    LDL Cholesterol Calculated 62 <=100 mg/dL    Non HDL Cholesterol 74 <130 mg/dL    Patient Fasting > 8hrs? Yes     Narrative    Cholesterol  Desirable:  <200 mg/dL    Triglycerides  Normal:  Less than 150 mg/dL  Borderline High:  150-199 mg/dL  High:  200-499 mg/dL  Very High:  Greater than or equal to 500 mg/dL    Direct Measure HDL  Female:  Greater than or equal to 50 mg/dL   Male:  Greater than or equal to 40 mg/dL    LDL Cholesterol  Desirable:  <100mg/dL  Above Desirable:  100-129 mg/dL   Borderline High:  130-159 mg/dL   High:  160-189 mg/dL   Very High:  >= 190 mg/dL    Non HDL Cholesterol  Desirable:  130 mg/dL  Above Desirable:  130-159 mg/dL  Borderline High:  160-189 mg/dL  High:  190-219 mg/dL  Very High:  Greater than or equal to 220 mg/dL       If you have any questions or concerns, please call the clinic at the number listed above.       Sincerely,      Collin Salazar MD

## 2022-12-20 ENCOUNTER — TELEPHONE (OUTPATIENT)
Dept: FAMILY MEDICINE | Facility: CLINIC | Age: 72
End: 2022-12-20

## 2022-12-20 ENCOUNTER — OFFICE VISIT (OUTPATIENT)
Dept: FAMILY MEDICINE | Facility: CLINIC | Age: 72
End: 2022-12-20
Payer: COMMERCIAL

## 2022-12-20 VITALS
TEMPERATURE: 97.4 F | DIASTOLIC BLOOD PRESSURE: 86 MMHG | HEART RATE: 96 BPM | OXYGEN SATURATION: 96 % | WEIGHT: 230 LBS | SYSTOLIC BLOOD PRESSURE: 144 MMHG | BODY MASS INDEX: 34.98 KG/M2

## 2022-12-20 DIAGNOSIS — B34.9 VIRAL SYNDROME: Primary | ICD-10-CM

## 2022-12-20 LAB
FLUAV AG SPEC QL IA: NEGATIVE
FLUBV AG SPEC QL IA: NEGATIVE
SARS-COV-2 RNA RESP QL NAA+PROBE: NEGATIVE

## 2022-12-20 PROCEDURE — U0005 INFEC AGEN DETEC AMPLI PROBE: HCPCS | Performed by: FAMILY MEDICINE

## 2022-12-20 PROCEDURE — 87804 INFLUENZA ASSAY W/OPTIC: CPT | Performed by: FAMILY MEDICINE

## 2022-12-20 PROCEDURE — 99214 OFFICE O/P EST MOD 30 MIN: CPT | Performed by: FAMILY MEDICINE

## 2022-12-20 PROCEDURE — U0003 INFECTIOUS AGENT DETECTION BY NUCLEIC ACID (DNA OR RNA); SEVERE ACUTE RESPIRATORY SYNDROME CORONAVIRUS 2 (SARS-COV-2) (CORONAVIRUS DISEASE [COVID-19]), AMPLIFIED PROBE TECHNIQUE, MAKING USE OF HIGH THROUGHPUT TECHNOLOGIES AS DESCRIBED BY CMS-2020-01-R: HCPCS | Performed by: FAMILY MEDICINE

## 2022-12-20 NOTE — PROGRESS NOTES
Assessment & Plan       ICD-10-CM    1. Viral syndrome  B34.9 Influenza A & B Antigen     Symptomatic COVID-19 Virus (Coronavirus) by PCR Nose     CANCELED: Symptomatic COVID-19 Virus (Coronavirus) by PCR Nose     CANCELED: Influenza A & B Antigen            Review of external notes as documented elsewhere in note         There are no Patient Instructions on file for this visit.    No follow-ups on file.    Delfin Lobato MD  [facility] SILVINO Polanco is a 72 year old, presenting for the following health issues:  Patient Request (Cold feet and sweaty head)      HPI     Chief Complaint   Patient presents with     Patient Request     Cold feet and sweaty head     X4 days  Happened about 5 years and was diagnosed with walking pneumonia         Review of Systems   Constitutional, HEENT, cardiovascular, pulmonary, gi and gu systems are negative, except as otherwise noted.      Objective    BP (!) 178/95   Pulse 96   Temp 97.4  F (36.3  C) (Oral)   Wt 104.3 kg (230 lb)   SpO2 96%   BMI 34.98 kg/m    Body mass index is 34.98 kg/m .  Physical Exam  Constitutional:       General: He is not in acute distress.     Appearance: Normal appearance. He is well-developed. He is not ill-appearing.   HENT:      Head: Normocephalic and atraumatic.      Right Ear: External ear normal.      Left Ear: External ear normal.      Nose: Nose normal.   Eyes:      General: No scleral icterus.     Extraocular Movements: Extraocular movements intact.      Conjunctiva/sclera: Conjunctivae normal.   Cardiovascular:      Rate and Rhythm: Normal rate.   Pulmonary:      Effort: Pulmonary effort is normal.   Musculoskeletal:      Cervical back: Normal range of motion and neck supple.   Skin:     General: Skin is warm and dry.   Neurological:      Mental Status: He is alert and oriented to person, place, and time.   Psychiatric:         Behavior: Behavior normal.         Thought Content: Thought content normal.          Judgment: Judgment normal.

## 2022-12-20 NOTE — ED TRIAGE NOTES
BIBA from home c/o head injury after hitting his head on a towel hook on a bathroom door. Denies LOC. Patient states he is on warfarin after having a DVT. Patient has a 4 cm laceration on his forehead between his eyebrows.   T/c from pt -- states her shoulder is still hurting and is asking if Dr Steve Mccarthy would send in prednisone for her       Please advise

## 2022-12-20 NOTE — TELEPHONE ENCOUNTER
Reason for Call:  Appointment Request    Patient requesting this type of appt:  sweaty head cold feet x one week hard time sleeping    Requested provider: any    Reason patient unable to be scheduled: Not within requested timeframe    When does patient want to be seen/preferred time: Same day    Comments: is wondering if he could be worked in today f2f    Okay to leave a detailed message?: yes sister Whitney [phone]     Call taken on 12/20/2022 at 8:34 AM by Shawna Melgoza

## 2022-12-20 NOTE — LETTER
December 22, 2022    Collin Frank  [address]    [facility] [address]          Dear ,    We are writing to inform you of your test results.    Viral testing was negative.        Resulted Orders   Influenza A & B Antigen   Result Value Ref Range    Influenza A antigen Negative Negative    Influenza B antigen Negative Negative    Narrative    Test results must be correlated with clinical data. If necessary, results should be confirmed by a molecular assay or viral culture.   Symptomatic COVID-19 Virus (Coronavirus) by PCR Nose   Result Value Ref Range    SARS CoV2 PCR Negative Negative      Comment:      NEGATIVE: SARS-CoV-2 (COVID-19) RNA not detected, presumed negative.    Narrative    Testing was performed using the Aptima SARS-CoV-2 Assay on the  [employer] Instrument System. Additional information about this  Emergency Use Authorization (EUA) assay can be found via the Lab  Guide. This test should be ordered for the detection of SARS-CoV-2 in  individuals who meet SARS-CoV-2 clinical and/or epidemiological  criteria. Test performance is unknown in asymptomatic patients. This  test is for in vitro diagnostic use under the FDA EUA for  laboratories certified under CLIA to perform high complexity testing.  This test has not been FDA cleared or approved. A negative result  does not rule out the presence of PCR inhibitors in the specimen or  target RNA in concentration below the limit of detection for the  assay. The possibility of a false negative should be considered if  the patient's recent exposure or clinical presentation suggests  COVID-19. This test was validated by the [facility]. This laboratory is certified under  the Clinical Laboratory Improvement Amendments of 1988 (CLIA-88) as  qualified to perform high complexity laboratory testing.       If you have any questions or concerns, please call the clinic at the number listed above.        Sincerely,      Delfin Hernandez MD

## 2022-12-20 NOTE — TELEPHONE ENCOUNTER
:    Discharge Planning Assessment Post Partum    Reason for Referral: NICU-34 weeks  Address: [address] [place] [address]  Phone: [phone]  Type of Living Situation: living with FOB and children  Mom Diagnosis: Pregnancy  Baby Diagnosis: Prematurity  Primary Language: English    Name of Baby: Edna Vergara (: 18)  Father of the Baby: Santiago Pretty  Involved in baby’s care? Yes  Contact Information: [phone]    Prenatal Care: Yes in [place]  Mom's PCP: None  PCP for new baby: Pediatrician list provided    Support System: FOB  Coping/Bonding between mother & baby: Yes  Source of Feeding: Breast feeding  Supplies for Infant: Has some supplies.  Resources for additional baby supplies provided to MOB    Mom's Insurance: Completed application for HPN the day before she delivered  Baby Covered on Insurance: Yes, once approved  Mother Employed/School: NO  Other children in the home/names & ages: 3 children; ages 15, 8, and 6    Financial Hardship/Income: Pending insurance   Mom's Mental status: A&Ox4    CPS History: denies   Psychiatric History: denies  Domestic Violence History: denies  Drug/ETOH History: denies    Resources Provided: Pediatrician list, children and family resource list  Referrals Made: diaper bank referral provided     Clearance for Discharge: Infant is cleared to discharge home with MOB once medically stable.    Ongoing Plan: Met with SIMRAN who stated she, , and 3 children moved to [place] from [place] three weeks ago.  FOB got a job at [employer].  MOB stated her family support is back in [place] (2 brothers) and her parents live in [place].  MOB stated she just completed the paperwork for [employer] [place] prior to delivery.  Confirmed MOB has transportation to visit infant in the NICU.  Resources provided to SIMRAN.  HARRISON will continue to follow and assist as needed.     Spoke with patient's sister. Patient not with patient's sister, unable to triage.    Patient has been experiencing symptoms of sweaty head and cold feet for the past week, and has been having difficulty sleeping. Patient also has been experiencing congestion.     Patient's sister is concerned patient has potential pneumonia, as he has a history of it. Patient does not have fever, cough, or shortness of breath.     Has not had a COVID test, however had COVID booster in December.    Assisted with booking appointment today as requested at 11:00 am.    Jonathan Canchola RN  [facility]

## 2023-03-25 ENCOUNTER — APPOINTMENT (OUTPATIENT)
Dept: ULTRASOUND IMAGING | Facility: CLINIC | Age: 73
End: 2023-03-25
Attending: EMERGENCY MEDICINE
Payer: COMMERCIAL

## 2023-03-25 ENCOUNTER — HOSPITAL ENCOUNTER (EMERGENCY)
Facility: CLINIC | Age: 73
Discharge: HOME | End: 2023-03-25
Attending: EMERGENCY MEDICINE | Admitting: EMERGENCY MEDICINE
Payer: COMMERCIAL

## 2023-03-25 VITALS
OXYGEN SATURATION: 97 % | RESPIRATION RATE: 18 BRPM | HEART RATE: 65 BPM | BODY MASS INDEX: 35.46 KG/M2 | HEIGHT: 68 IN | WEIGHT: 234 LBS | TEMPERATURE: 97.5 F | DIASTOLIC BLOOD PRESSURE: 74 MMHG | SYSTOLIC BLOOD PRESSURE: 121 MMHG

## 2023-03-25 DIAGNOSIS — S86.811A STRAIN OF CALF MUSCLE, RIGHT, INITIAL ENCOUNTER: ICD-10-CM

## 2023-03-25 PROCEDURE — 99284 EMERGENCY DEPT VISIT MOD MDM: CPT | Mod: 25 | Performed by: EMERGENCY MEDICINE

## 2023-03-25 PROCEDURE — 93971 EXTREMITY STUDY: CPT | Mod: 26 | Performed by: STUDENT IN AN ORGANIZED HEALTH CARE EDUCATION/TRAINING PROGRAM

## 2023-03-25 PROCEDURE — 99283 EMERGENCY DEPT VISIT LOW MDM: CPT | Performed by: EMERGENCY MEDICINE

## 2023-03-25 PROCEDURE — 93971 EXTREMITY STUDY: CPT | Mod: RT

## 2023-03-25 ASSESSMENT — ACTIVITIES OF DAILY LIVING (ADL)
ADLS_ACUITY_SCORE: 35
ADLS_ACUITY_SCORE: 35

## 2023-03-25 NOTE — DISCHARGE INSTRUCTIONS
Thank you for coming to the [facility] Emergency Department.     Ultrasound shows no blood clots in your right leg.  This seems to be a muscle injury or strain.   Apply ice to the sore area.   Elevate the leg if it seems swollen.   Continue use of your compression stockings.     Follow up with your primary care clinic in 1 week if worsening.   Continue your medications, including blood thinner, without change.   OK to use over the counter tylenol according to package instructions if needed for pain.

## 2023-03-25 NOTE — ED PROVIDER NOTES
[facility]ERGENCY DEPARTMENT ([facility])    3/25/23       ED PROVIDER NOTE   ED 6      History     Chief Complaint   Patient presents with     Leg Swelling     The history is provided by the patient and medical records.     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  He comes in today because last night around 8 PM he noted a feeling of discomfort in the right calf. It is on the medial side of the calf and feels like a tightness and a small area of swelling. He is wondering if maybe he has a DVT in that leg.  He takes Xarelto and does not think that he missed or skipped any doses recently.  No trauma to the leg, no color change. Duane does have a history of gout that affected the first toe on that leg a few months ago but does not currently feel as though his gout is flared.      This part of the document was transcribed by Caitlin Campos, Medical Scribe.      Past Medical History  Past Medical History:   Diagnosis Date     Antiplatelet or antithrombotic long-term use     blood clot in leg     Long term current use of anticoagulant 10/16/2012     Past Surgical History:   Procedure Laterality Date     COLONOSCOPY  08    Normal. Repeat in 10 years     COLONOSCOPY WITH CO2 INSUFFLATION N/A 2018    Procedure: COLONOSCOPY WITH CO2 INSUFFLATION;  COLON SCREEN/ ENGELMANN;  Surgeon: Jan Flores MD;  Location: [facility] OR     rt knee ORIF      [facility]     rivaroxaban ANTICOAGULANT (XARELTO ANTICOAGULANT) 20 MG TABS tablet  cholecalciferol (VITAMIN D) 1000 UNIT tablet  losartan (COZAAR) 50 MG tablet  simvastatin (ZOCOR) 20 MG tablet      No Known Allergies  Family History  Family History   Problem Relation Age of Onset     Alzheimer Disease Mother      Heart Disease Father      Prostate Cancer Father      Cancer Brother 65        pancreatic        Prostate Cancer Brother          "2 stents 70% and 90% blockage     Heart Disease Sister      Social History   Social History     Tobacco Use     Smoking status: Former     Types: Cigarettes     Quit date: 2011     Years since quittin.1     Smokeless tobacco: Never   Substance Use Topics     Alcohol use: No     Drug use: No         A medically appropriate review of systems was performed with pertinent positives and negatives noted in the HPI, and all other systems negative.    Physical Exam   BP: (!) 158/88  Pulse: 81  Temp: 97.5  F (36.4  C)  Resp: 18  Height: 172.7 cm (5' 8\")  Weight: 106.1 kg (234 lb)  SpO2: 98 %  Physical Exam  Gen:A&Ox3, no acute distress  CV:RRR without murmurs  PULM:Clear to auscultation bilaterally  Abd:soft, nontender, nondistended. Bowel sounds present and normal  UE:No traumatic injuries, skin normal  LE: + DP and PT pulses bilaterally. Khadijah foot perfusion. Right lower leg with mild tenderness of the medial calf muscle. Lower leg does not appear swollen. No rash or wounds.   Neuro:CN II-XII intact, strength 5/5 throughout, gait stable.     ED Course, Procedures, & Data      Procedures       Results for orders placed or performed during the hospital encounter of 23   US Lower Extremity Venous Duplex Right     Status: None    Narrative    EXAMINATION: US LOWER EXTREMITY VENOUS DUPLEX RIGHT  3/25/2023 9:00 AM       CLINICAL HISTORY:  right calf swelling and pain, hx of prior DVT      COMPARISON: No direct comparisons are available        PROCEDURE COMMENTS: Ultrasound was performed of the deep venous system  of the right lower extremity using grayscale, color, and spectral  Doppler.    FINDINGS:  The common femoral, greater saphenous origin, femoral, popliteal, and  deep calf veins are visualized and are patent. Venous waveforms are  normal. There is normal response to compression.      Impression    IMPRESSION:.  No deep vein thrombosis in the right lower extremity.    I have personally reviewed the " examination and initial interpretation  and I agree with the findings.    MYRA MUNIZ MD         SYSTEM ID:  [id number]     Medications - No data to display  Labs Ordered and Resulted from Time of ED Arrival to Time of ED Departure - No data to display  US Lower Extremity Venous Duplex Right   Final Result   IMPRESSION:.   No deep vein thrombosis in the right lower extremity.      I have personally reviewed the examination and initial interpretation   and I agree with the findings.      MYRA MUNIZ MD            SYSTEM ID:  [id number]             Critical care was not performed.     Medical Decision Making  The patient's presentation was of low complexity (an acute and uncomplicated illness or injury).    The patient's evaluation involved:  history and exam without other MDM data elements    The patient's management necessitated only low risk treatment.      Assessment & Plan    72-year-old male pw right calf discomfort in the setting of hx prior DVTs and anticoagulation.    Vitals are stable other than mild HTN with a /88.    Exam is without signs of peripheral artery disease with normal perfusion to the leg. There is mild focal tenderness of the medial side of the calf   DDx: DVT versus muscular strain or charley horse.    Sent for right lower extremity DVT ultrasound and this showed no acute DVT or other findings in the right calf.   Exam is without signs of superficial thrombophlebitis. Symptoms are most likely calf muscle strain or sprain.   Will treat supportively with ice, elevation and tylenol PRN.   Discharged home. Follow up with primary care if not improving.     This part of the document was transcribed by Caitlin Campos, Medical Scribe.      I have reviewed the nursing notes. I have reviewed the findings, diagnosis, plan and need for follow up with the patient.    Discharge Medication List as of 3/25/2023 12:02 PM          Final diagnoses:   Strain of calf muscle, right, initial encounter        Kenya Shaffer MD  [facility] EMERGENCY DEPARTMENT  3/25/2023     Kenya Shaffer MD  03/27/23 1537

## 2023-03-25 NOTE — ED TRIAGE NOTES
Presents with right calf swelling, tenderness starting last night around 2000.   Hx: DVT in left leg in 2011, on xarelto.      Triage Assessment     Row Name 03/25/23 0719       Triage Assessment (Adult)    Airway WDL WDL       Respiratory WDL    Respiratory WDL WDL       Skin Circulation/Temperature WDL    Skin Circulation/Temperature WDL WDL       Cardiac WDL    Cardiac WDL WDL       Peripheral/Neurovascular WDL    Peripheral Neurovascular WDL WDL       Cognitive/Neuro/Behavioral WDL    Cognitive/Neuro/Behavioral WDL WDL

## 2023-05-10 ENCOUNTER — APPOINTMENT (OUTPATIENT)
Dept: GENERAL RADIOLOGY | Facility: CLINIC | Age: 73
End: 2023-05-10
Attending: STUDENT IN AN ORGANIZED HEALTH CARE EDUCATION/TRAINING PROGRAM
Payer: COMMERCIAL

## 2023-05-10 ENCOUNTER — HOSPITAL ENCOUNTER (EMERGENCY)
Facility: CLINIC | Age: 73
Discharge: HOME | End: 2023-05-10
Attending: STUDENT IN AN ORGANIZED HEALTH CARE EDUCATION/TRAINING PROGRAM | Admitting: STUDENT IN AN ORGANIZED HEALTH CARE EDUCATION/TRAINING PROGRAM
Payer: COMMERCIAL

## 2023-05-10 VITALS
HEIGHT: 68 IN | SYSTOLIC BLOOD PRESSURE: 151 MMHG | TEMPERATURE: 97.4 F | RESPIRATION RATE: 18 BRPM | OXYGEN SATURATION: 98 % | HEART RATE: 62 BPM | DIASTOLIC BLOOD PRESSURE: 75 MMHG | WEIGHT: 218 LBS | BODY MASS INDEX: 33.04 KG/M2

## 2023-05-10 DIAGNOSIS — R07.9 CHEST PAIN, UNSPECIFIED TYPE: ICD-10-CM

## 2023-05-10 DIAGNOSIS — R07.89 OTHER CHEST PAIN: ICD-10-CM

## 2023-05-10 DIAGNOSIS — Z79.01 LONG TERM (CURRENT) USE OF ANTICOAGULANTS: ICD-10-CM

## 2023-05-10 LAB
ALBUMIN SERPL BCG-MCNC: 4.2 G/DL (ref 3.5–5.2)
ALP SERPL-CCNC: 96 U/L (ref 40–129)
ALT SERPL W P-5'-P-CCNC: 36 U/L (ref 10–50)
ANION GAP SERPL CALCULATED.3IONS-SCNC: 13 MMOL/L (ref 7–15)
AST SERPL W P-5'-P-CCNC: 32 U/L (ref 10–50)
ATRIAL RATE - MUSE: 72 BPM
BASOPHILS # BLD AUTO: 0.1 10E3/UL (ref 0–0.2)
BASOPHILS NFR BLD AUTO: 1 %
BILIRUB SERPL-MCNC: 1.1 MG/DL
BUN SERPL-MCNC: 12.1 MG/DL (ref 8–23)
CALCIUM SERPL-MCNC: 9.2 MG/DL (ref 8.8–10.2)
CHLORIDE SERPL-SCNC: 106 MMOL/L (ref 98–107)
CREAT SERPL-MCNC: 0.9 MG/DL (ref 0.67–1.17)
DEPRECATED HCO3 PLAS-SCNC: 22 MMOL/L (ref 22–29)
DIASTOLIC BLOOD PRESSURE - MUSE: NORMAL MMHG
EOSINOPHIL # BLD AUTO: 0.2 10E3/UL (ref 0–0.7)
EOSINOPHIL NFR BLD AUTO: 3 %
ERYTHROCYTE [DISTWIDTH] IN BLOOD BY AUTOMATED COUNT: 13 % (ref 10–15)
GFR SERPL CREATININE-BSD FRML MDRD: 90 ML/MIN/1.73M2
GLUCOSE SERPL-MCNC: 100 MG/DL (ref 70–99)
HCT VFR BLD AUTO: 46.6 % (ref 40–53)
HGB BLD-MCNC: 15.6 G/DL (ref 13.3–17.7)
IMM GRANULOCYTES # BLD: 0 10E3/UL
IMM GRANULOCYTES NFR BLD: 0 %
INR PPP: 1.49 (ref 0.85–1.15)
INTERPRETATION ECG - MUSE: NORMAL
LYMPHOCYTES # BLD AUTO: 1.3 10E3/UL (ref 0.8–5.3)
LYMPHOCYTES NFR BLD AUTO: 22 %
MCH RBC QN AUTO: 32.8 PG (ref 26.5–33)
MCHC RBC AUTO-ENTMCNC: 33.5 G/DL (ref 31.5–36.5)
MCV RBC AUTO: 98 FL (ref 78–100)
MONOCYTES # BLD AUTO: 0.5 10E3/UL (ref 0–1.3)
MONOCYTES NFR BLD AUTO: 9 %
NEUTROPHILS # BLD AUTO: 3.8 10E3/UL (ref 1.6–8.3)
NEUTROPHILS NFR BLD AUTO: 65 %
NRBC # BLD AUTO: 0 10E3/UL
NRBC BLD AUTO-RTO: 0 /100
P AXIS - MUSE: 59 DEGREES
PLATELET # BLD AUTO: 246 10E3/UL (ref 150–450)
POTASSIUM SERPL-SCNC: 3.6 MMOL/L (ref 3.4–5.3)
PR INTERVAL - MUSE: 166 MS
PROT SERPL-MCNC: 7 G/DL (ref 6.4–8.3)
QRS DURATION - MUSE: 84 MS
QT - MUSE: 386 MS
QTC - MUSE: 422 MS
R AXIS - MUSE: -9 DEGREES
RBC # BLD AUTO: 4.75 10E6/UL (ref 4.4–5.9)
SODIUM SERPL-SCNC: 141 MMOL/L (ref 136–145)
SYSTOLIC BLOOD PRESSURE - MUSE: NORMAL MMHG
T AXIS - MUSE: 56 DEGREES
TROPONIN T SERPL HS-MCNC: 19 NG/L
TROPONIN T SERPL HS-MCNC: 20 NG/L
VENTRICULAR RATE- MUSE: 72 BPM
WBC # BLD AUTO: 6 10E3/UL (ref 4–11)

## 2023-05-10 PROCEDURE — 36415 COLL VENOUS BLD VENIPUNCTURE: CPT | Performed by: STUDENT IN AN ORGANIZED HEALTH CARE EDUCATION/TRAINING PROGRAM

## 2023-05-10 PROCEDURE — 99285 EMERGENCY DEPT VISIT HI MDM: CPT | Mod: 25

## 2023-05-10 PROCEDURE — 99285 EMERGENCY DEPT VISIT HI MDM: CPT | Mod: 25 | Performed by: STUDENT IN AN ORGANIZED HEALTH CARE EDUCATION/TRAINING PROGRAM

## 2023-05-10 PROCEDURE — 71046 X-RAY EXAM CHEST 2 VIEWS: CPT | Mod: 26 | Performed by: RADIOLOGY

## 2023-05-10 PROCEDURE — 36415 COLL VENOUS BLD VENIPUNCTURE: CPT | Performed by: EMERGENCY MEDICINE

## 2023-05-10 PROCEDURE — 85610 PROTHROMBIN TIME: CPT | Performed by: EMERGENCY MEDICINE

## 2023-05-10 PROCEDURE — 85025 COMPLETE CBC W/AUTO DIFF WBC: CPT | Performed by: EMERGENCY MEDICINE

## 2023-05-10 PROCEDURE — 71046 X-RAY EXAM CHEST 2 VIEWS: CPT

## 2023-05-10 PROCEDURE — 93010 ELECTROCARDIOGRAM REPORT: CPT | Performed by: STUDENT IN AN ORGANIZED HEALTH CARE EDUCATION/TRAINING PROGRAM

## 2023-05-10 PROCEDURE — 80053 COMPREHEN METABOLIC PANEL: CPT | Performed by: EMERGENCY MEDICINE

## 2023-05-10 PROCEDURE — 250N000013 HC RX MED GY IP 250 OP 250 PS 637: Performed by: STUDENT IN AN ORGANIZED HEALTH CARE EDUCATION/TRAINING PROGRAM

## 2023-05-10 PROCEDURE — 93005 ELECTROCARDIOGRAM TRACING: CPT

## 2023-05-10 PROCEDURE — 84484 ASSAY OF TROPONIN QUANT: CPT | Mod: 91 | Performed by: STUDENT IN AN ORGANIZED HEALTH CARE EDUCATION/TRAINING PROGRAM

## 2023-05-10 PROCEDURE — 84484 ASSAY OF TROPONIN QUANT: CPT | Performed by: STUDENT IN AN ORGANIZED HEALTH CARE EDUCATION/TRAINING PROGRAM

## 2023-05-10 PROCEDURE — 84484 ASSAY OF TROPONIN QUANT: CPT | Performed by: EMERGENCY MEDICINE

## 2023-05-10 RX ORDER — ACETAMINOPHEN 325 MG/1
650 TABLET ORAL ONCE
Status: COMPLETED | OUTPATIENT
Start: 2023-05-10 | End: 2023-05-10

## 2023-05-10 RX ORDER — LIDOCAINE 4 G/G
1 PATCH TOPICAL ONCE
Status: DISCONTINUED | OUTPATIENT
Start: 2023-05-10 | End: 2023-05-10 | Stop reason: HOSPADM

## 2023-05-10 RX ADMIN — ACETAMINOPHEN 650 MG: 325 TABLET ORAL at 14:12

## 2023-05-10 RX ADMIN — LIDOCAINE PATCH 4% 1 PATCH: 40 PATCH TOPICAL at 14:09

## 2023-05-10 ASSESSMENT — ACTIVITIES OF DAILY LIVING (ADL): ADLS_ACUITY_SCORE: 35

## 2023-05-10 NOTE — DISCHARGE INSTRUCTIONS
You were seen today for chest pain.  Your lab work all looks normal today.  Please continue to take your medications as prescribed.  Follow-up with your primary doctor in a few days.  Return to the emergency department with new or worsening symptoms including chest pain and difficulty breathing.

## 2023-05-10 NOTE — ED TRIAGE NOTES
Collin came by care from home for Dull and throbbing  Left chest pain that radiates to left  Ribs off and on for 3 days. Denied SOB, dizziness N V, fever and chill. On blood thinner     Triage Assessment     Row Name 05/10/23 1342       Triage Assessment (Adult)    Airway WDL WDL       Respiratory WDL    Respiratory WDL WDL       Skin Circulation/Temperature WDL    Skin Circulation/Temperature WDL WDL       Cardiac WDL    Cardiac WDL X   CP       Peripheral/Neurovascular WDL    Peripheral Neurovascular WDL WDL       Cognitive/Neuro/Behavioral WDL    Cognitive/Neuro/Behavioral WDL WDL

## 2023-05-10 NOTE — ED PROVIDER NOTES
ED Provider Note  [facility]      History     Chief Complaint   Patient presents with     Chest Pain     HPI  Collin Frank is a 72 year old male with PMH notable for factor V Leiden mutation w/ chronic LLE DVT as well as PE anticoagulated on Xarelto who presents to the ED with chest pain.  Patient reports a dull pain in his left chest below ribs that has been ongoing for 3 days.  It is exacerbated by certain movements and is somewhat tender to palpation.  He is anticoagulated.  No shortness of breath or lightheadedness.  Denies nausea, vomiting, fevers, chills or other infectious symptoms.  No other exacerbating or alleviating symptoms reported.    Past Medical History  Past Medical History:   Diagnosis Date     Antiplatelet or antithrombotic long-term use     blood clot in leg     Long term current use of anticoagulant 10/16/2012     Past Surgical History:   Procedure Laterality Date     COLONOSCOPY  08    Normal. Repeat in 10 years     COLONOSCOPY WITH CO2 INSUFFLATION N/A 2018    Procedure: COLONOSCOPY WITH CO2 INSUFFLATION;  COLON SCREEN/ ENGELMANN;  Surgeon: Jan Flores MD;  Location: [facility] OR     rt knee ORIF      [facility]     cholecalciferol (VITAMIN D) 1000 UNIT tablet  losartan (COZAAR) 50 MG tablet  rivaroxaban ANTICOAGULANT (XARELTO ANTICOAGULANT) 20 MG TABS tablet  simvastatin (ZOCOR) 20 MG tablet      No Known Allergies  Family History  Family History   Problem Relation Age of Onset     Alzheimer Disease Mother      Heart Disease Father      Prostate Cancer Father      Cancer Brother 65        pancreatic        Prostate Cancer Brother         2 stents 70% and 90% blockage     Heart Disease Sister      Social History   Social History     Tobacco Use     Smoking status: Former     Types: Cigarettes     Quit date: 2011     Years since quittin.2     Smokeless tobacco: Never   Substance Use Topics     Alcohol use: No     Drug use: No  "        A medically appropriate review of systems was performed with pertinent positives and negatives noted in the HPI, and all other systems negative.    Physical Exam   BP: (!) 151/75  Pulse: 62  Temp: 97.4  F (36.3  C)  Resp: 18  Height: 172.7 cm (5' 8\")  Weight: 98.9 kg (218 lb)  SpO2: 98 %  Physical Exam  General: no acute distress. Appears stated age.   HENT: MMM, no oropharyngeal lesions  Eyes: PERRL, normal sclerae  Neck: non-tender, supple  Cardio: Regular rate. Regular rhythm. Extremities well perfused  Resp: Normal work of breathing, normal respiratory rate.  Chest/Back: no visual signs of trauma, no CVA tenderness  Abdomen: no tenderness, non-distended, no rebound, no guarding  Neuro: alert and fully oriented.   MSK: no deformities. Grossly normal ROM in extremities.   Integumentary/Skin: no rash visualized, normal color  Psych: normal affect, normal behavior      ED Course, Procedures, & Data      Procedures       ED Course Selections:        EKG Interpretation:      Interpreted by Alma Paz MD  Time reviewed: 13:52  Symptoms at time of EKG: chest pain   Rhythm: normal sinus   Rate: normal  Axis: normal  Ectopy: none  Conduction: normal  ST Segments/ T Waves: No ST-T wave changes  Q Waves: none  Comparison to prior: no acute ischemic changes    Clinical Impression: normal EKG                     Results for orders placed or performed during the hospital encounter of 05/10/23   Chest XR,  PA & LAT     Status: None    Narrative    EXAM: XR CHEST 2 VIEWS  5/10/2023 2:39 PM     HISTORY:  chest pain       COMPARISON:  CT chest 3/31/2021    FINDINGS:     Portable upright view of the chest. Trachea is midline.  Cardiomediastinal silhouette and pulmonary vasculature are within  normal limits. No focal airspace opacity, pleural effusion or  appreciable pneumothorax. Mild streaky bibasilar peripheral opacities.    Degenerative changes of the right shoulder. No acute osseous  abnormality. Visualized upper " abdomen is unremarkable.        Impression    IMPRESSION: Mild streaky basilar opacities, favoring atelectasis. No  acute abnormality in the chest to explain patient's symptoms.    I have personally reviewed the examination and initial interpretation  and I agree with the findings.    JIMI CASH MD         SYSTEM ID:  [id number]   Comprehensive metabolic panel     Status: Abnormal   Result Value Ref Range    Sodium 141 136 - 145 mmol/L    Potassium 3.6 3.4 - 5.3 mmol/L    Chloride 106 98 - 107 mmol/L    Carbon Dioxide (CO2) 22 22 - 29 mmol/L    Anion Gap 13 7 - 15 mmol/L    Urea Nitrogen 12.1 8.0 - 23.0 mg/dL    Creatinine 0.90 0.67 - 1.17 mg/dL    Calcium 9.2 8.8 - 10.2 mg/dL    Glucose 100 (H) 70 - 99 mg/dL    Alkaline Phosphatase 96 40 - 129 U/L    AST 32 10 - 50 U/L    ALT 36 10 - 50 U/L    Protein Total 7.0 6.4 - 8.3 g/dL    Albumin 4.2 3.5 - 5.2 g/dL    Bilirubin Total 1.1 <=1.2 mg/dL    GFR Estimate 90 >60 mL/min/1.73m2   Troponin T, High Sensitivity     Status: Normal   Result Value Ref Range    Troponin T, High Sensitivity 20 <=22 ng/L   INR     Status: Abnormal   Result Value Ref Range    INR 1.49 (H) 0.85 - 1.15   CBC with platelets and differential     Status: None   Result Value Ref Range    WBC Count 6.0 4.0 - 11.0 10e3/uL    RBC Count 4.75 4.40 - 5.90 10e6/uL    Hemoglobin 15.6 13.3 - 17.7 g/dL    Hematocrit 46.6 40.0 - 53.0 %    MCV 98 78 - 100 fL    MCH 32.8 26.5 - 33.0 pg    MCHC 33.5 31.5 - 36.5 g/dL    RDW 13.0 10.0 - 15.0 %    Platelet Count 246 150 - 450 10e3/uL    % Neutrophils 65 %    % Lymphocytes 22 %    % Monocytes 9 %    % Eosinophils 3 %    % Basophils 1 %    % Immature Granulocytes 0 %    NRBCs per 100 WBC 0 <1 /100    Absolute Neutrophils 3.8 1.6 - 8.3 10e3/uL    Absolute Lymphocytes 1.3 0.8 - 5.3 10e3/uL    Absolute Monocytes 0.5 0.0 - 1.3 10e3/uL    Absolute Eosinophils 0.2 0.0 - 0.7 10e3/uL    Absolute Basophils 0.1 0.0 - 0.2 10e3/uL    Absolute Immature Granulocytes 0.0  <=0.4 10e3/uL    Absolute NRBCs 0.0 10e3/uL   Troponin T, High Sensitivity     Status: Normal   Result Value Ref Range    Troponin T, High Sensitivity 19 <=22 ng/L   EKG 12-lead, tracing only     Status: None   Result Value Ref Range    Systolic Blood Pressure  mmHg    Diastolic Blood Pressure  mmHg    Ventricular Rate 72 BPM    Atrial Rate 72 BPM    SD Interval 166 ms    QRS Duration 84 ms     ms    QTc 422 ms    P Axis 59 degrees    R AXIS -9 degrees    T Axis 56 degrees    Interpretation ECG       Sinus rhythm  Normal ECG  Unconfirmed report - interpretation of this ECG is computer generated - see medical record for final interpretation  Confirmed by - EMERGENCY ROOM, PHYSICIAN ([id number]),  RAYA MORENO ([id number]) on 5/10/2023 2:29:35 PM     CBC with platelets differential     Status: None    Narrative    The following orders were created for panel order CBC with platelets differential.  Procedure                               Abnormality         Status                     ---------                               -----------         ------                     CBC with platelets and d...[[id number]]                      Final result                 Please view results for these tests on the individual orders.     Medications   acetaminophen (TYLENOL) tablet 650 mg (650 mg Oral $Given 5/10/23 1412)     Labs Ordered and Resulted from Time of ED Arrival to Time of ED Departure   COMPREHENSIVE METABOLIC PANEL - Abnormal       Result Value    Sodium 141      Potassium 3.6      Chloride 106      Carbon Dioxide (CO2) 22      Anion Gap 13      Urea Nitrogen 12.1      Creatinine 0.90      Calcium 9.2      Glucose 100 (*)     Alkaline Phosphatase 96      AST 32      ALT 36      Protein Total 7.0      Albumin 4.2      Bilirubin Total 1.1      GFR Estimate 90     INR - Abnormal    INR 1.49 (*)    TROPONIN T, HIGH SENSITIVITY - Normal    Troponin T, High Sensitivity 20     TROPONIN T, HIGH SENSITIVITY - Normal     Troponin T, High Sensitivity 19     CBC WITH PLATELETS AND DIFFERENTIAL    WBC Count 6.0      RBC Count 4.75      Hemoglobin 15.6      Hematocrit 46.6      MCV 98      MCH 32.8      MCHC 33.5      RDW 13.0      Platelet Count 246      % Neutrophils 65      % Lymphocytes 22      % Monocytes 9      % Eosinophils 3      % Basophils 1      % Immature Granulocytes 0      NRBCs per 100 WBC 0      Absolute Neutrophils 3.8      Absolute Lymphocytes 1.3      Absolute Monocytes 0.5      Absolute Eosinophils 0.2      Absolute Basophils 0.1      Absolute Immature Granulocytes 0.0      Absolute NRBCs 0.0       Chest XR,  PA & LAT   Final Result   IMPRESSION: Mild streaky basilar opacities, favoring atelectasis. No   acute abnormality in the chest to explain patient's symptoms.      I have personally reviewed the examination and initial interpretation   and I agree with the findings.      JIMI CASH MD            SYSTEM ID:  [id number]             Critical care was not performed.     Medical Decision Making  The patient's presentation was of high complexity (an acute health issue posing potential threat to life or bodily function).    The patient's evaluation involved:  review of external note(s) from 1 sources (see separate area of note for details)  ordering and/or review of 3+ test(s) in this encounter (see separate area of note for details)  review of 2 test result(s) ordered prior to this encounter (see separate area of note for details)  independent interpretation of testing performed by another health professional (see separate area of note for details)    The patient's management necessitated moderate risk (prescription drug management including medications given in the ED) and high risk (a decision regarding hospitalization).      Assessment & Plan    Collin Frank is a 72 year old male with history of factor V leiden on xarelto presenting with chest pain.  Worsens with certain movements.  Arrives mildly  hypertensive but otherwise vital signs within normal limits.  Chest pain workup to evaluate for ACS, pneumonia, pneumothorax initiated.    EKG is non ischemic.  Troponin 20 --> 19.  Electrolytes within normal limits.  CBC unremarkable.  Symptomatically improved with tylenol and lidocaine patch. CXR clear. Symptoms possible musculoskeletal Plan for close follow up with PCP.Continue to take medications as prescribed.  Use tylenol and lidocaine patches for pain.  Return to ED with new or worsening symptoms.    I have reviewed the nursing notes. I have reviewed the findings, diagnosis, plan and need for follow up with the patient.    Discharge Medication List as of 5/10/2023  4:39 PM          Final diagnoses:   Chest pain, unspecified type       Alma Paz MD  [facility] EMERGENCY DEPARTMENT  5/10/2023     Alma Paz MD  05/11/23 2016

## 2023-07-21 ENCOUNTER — APPOINTMENT (OUTPATIENT)
Dept: CARDIOLOGY | Facility: CLINIC | Age: 73
End: 2023-07-21
Attending: FAMILY MEDICINE
Payer: COMMERCIAL

## 2023-07-21 ENCOUNTER — APPOINTMENT (OUTPATIENT)
Dept: GENERAL RADIOLOGY | Facility: CLINIC | Age: 73
End: 2023-07-21
Attending: FAMILY MEDICINE
Payer: COMMERCIAL

## 2023-07-21 ENCOUNTER — HOSPITAL ENCOUNTER (EMERGENCY)
Facility: CLINIC | Age: 73
Discharge: HOME | End: 2023-07-21
Attending: FAMILY MEDICINE | Admitting: FAMILY MEDICINE
Payer: COMMERCIAL

## 2023-07-21 ENCOUNTER — APPOINTMENT (OUTPATIENT)
Dept: CT IMAGING | Facility: CLINIC | Age: 73
End: 2023-07-21
Attending: FAMILY MEDICINE
Payer: COMMERCIAL

## 2023-07-21 VITALS
RESPIRATION RATE: 21 BRPM | TEMPERATURE: 98.3 F | HEART RATE: 58 BPM | SYSTOLIC BLOOD PRESSURE: 172 MMHG | DIASTOLIC BLOOD PRESSURE: 96 MMHG | OXYGEN SATURATION: 96 %

## 2023-07-21 DIAGNOSIS — R07.89 ATYPICAL CHEST PAIN: ICD-10-CM

## 2023-07-21 DIAGNOSIS — Z79.01 LONG TERM (CURRENT) USE OF ANTICOAGULANTS: ICD-10-CM

## 2023-07-21 LAB
ALBUMIN SERPL BCG-MCNC: 4 G/DL (ref 3.5–5.2)
ALP SERPL-CCNC: 94 U/L (ref 40–129)
ALT SERPL W P-5'-P-CCNC: 24 U/L (ref 0–70)
ANION GAP SERPL CALCULATED.3IONS-SCNC: 11 MMOL/L (ref 7–15)
APTT PPP: 28 SECONDS (ref 22–38)
AST SERPL W P-5'-P-CCNC: 28 U/L (ref 0–45)
ATRIAL RATE - MUSE: 68 BPM
BASOPHILS # BLD AUTO: 0.1 10E3/UL (ref 0–0.2)
BASOPHILS NFR BLD AUTO: 1 %
BILIRUB SERPL-MCNC: 1 MG/DL
BUN SERPL-MCNC: 12.6 MG/DL (ref 8–23)
CALCIUM SERPL-MCNC: 9 MG/DL (ref 8.8–10.2)
CHLORIDE SERPL-SCNC: 104 MMOL/L (ref 98–107)
CREAT SERPL-MCNC: 0.91 MG/DL (ref 0.67–1.17)
D DIMER PPP FEU-MCNC: 0.94 UG/ML FEU (ref 0–0.5)
DEPRECATED HCO3 PLAS-SCNC: 22 MMOL/L (ref 22–29)
DIASTOLIC BLOOD PRESSURE - MUSE: NORMAL MMHG
EOSINOPHIL # BLD AUTO: 0.1 10E3/UL (ref 0–0.7)
EOSINOPHIL NFR BLD AUTO: 1 %
ERYTHROCYTE [DISTWIDTH] IN BLOOD BY AUTOMATED COUNT: 12.7 % (ref 10–15)
GFR SERPL CREATININE-BSD FRML MDRD: 88 ML/MIN/1.73M2
GLUCOSE SERPL-MCNC: 117 MG/DL (ref 70–99)
HCT VFR BLD AUTO: 45.7 % (ref 40–53)
HGB BLD-MCNC: 15.5 G/DL (ref 13.3–17.7)
IMM GRANULOCYTES # BLD: 0 10E3/UL
IMM GRANULOCYTES NFR BLD: 0 %
INR PPP: 1.42 (ref 0.85–1.15)
INTERPRETATION ECG - MUSE: NORMAL
LIPASE SERPL-CCNC: 19 U/L (ref 13–60)
LYMPHOCYTES # BLD AUTO: 1.5 10E3/UL (ref 0.8–5.3)
LYMPHOCYTES NFR BLD AUTO: 20 %
MCH RBC QN AUTO: 33.1 PG (ref 26.5–33)
MCHC RBC AUTO-ENTMCNC: 33.9 G/DL (ref 31.5–36.5)
MCV RBC AUTO: 98 FL (ref 78–100)
MONOCYTES # BLD AUTO: 0.6 10E3/UL (ref 0–1.3)
MONOCYTES NFR BLD AUTO: 8 %
NEUTROPHILS # BLD AUTO: 5.5 10E3/UL (ref 1.6–8.3)
NEUTROPHILS NFR BLD AUTO: 70 %
NRBC # BLD AUTO: 0 10E3/UL
NRBC BLD AUTO-RTO: 0 /100
NT-PROBNP SERPL-MCNC: 154 PG/ML (ref 0–900)
P AXIS - MUSE: 43 DEGREES
PLATELET # BLD AUTO: 251 10E3/UL (ref 150–450)
POTASSIUM SERPL-SCNC: 3.7 MMOL/L (ref 3.4–5.3)
PR INTERVAL - MUSE: 166 MS
PROT SERPL-MCNC: 6.7 G/DL (ref 6.4–8.3)
QRS DURATION - MUSE: 82 MS
QT - MUSE: 352 MS
QTC - MUSE: 374 MS
R AXIS - MUSE: -23 DEGREES
RADIOLOGIST FLAGS: ABNORMAL
RBC # BLD AUTO: 4.68 10E6/UL (ref 4.4–5.9)
SODIUM SERPL-SCNC: 137 MMOL/L (ref 136–145)
SYSTOLIC BLOOD PRESSURE - MUSE: NORMAL MMHG
T AXIS - MUSE: 61 DEGREES
TROPONIN T SERPL HS-MCNC: 18 NG/L
TROPONIN T SERPL HS-MCNC: 19 NG/L
VENTRICULAR RATE- MUSE: 68 BPM
WBC # BLD AUTO: 7.9 10E3/UL (ref 4–11)

## 2023-07-21 PROCEDURE — 96375 TX/PRO/DX INJ NEW DRUG ADDON: CPT | Mod: 59 | Performed by: FAMILY MEDICINE

## 2023-07-21 PROCEDURE — 93005 ELECTROCARDIOGRAM TRACING: CPT | Mod: XU | Performed by: FAMILY MEDICINE

## 2023-07-21 PROCEDURE — 250N000011 HC RX IP 250 OP 636: Mod: JZ | Performed by: FAMILY MEDICINE

## 2023-07-21 PROCEDURE — 258N000003 HC RX IP 258 OP 636: Performed by: INTERNAL MEDICINE

## 2023-07-21 PROCEDURE — 85379 FIBRIN DEGRADATION QUANT: CPT | Performed by: FAMILY MEDICINE

## 2023-07-21 PROCEDURE — 83690 ASSAY OF LIPASE: CPT | Performed by: FAMILY MEDICINE

## 2023-07-21 PROCEDURE — 93016 CV STRESS TEST SUPVJ ONLY: CPT | Performed by: INTERNAL MEDICINE

## 2023-07-21 PROCEDURE — 85610 PROTHROMBIN TIME: CPT | Performed by: FAMILY MEDICINE

## 2023-07-21 PROCEDURE — 93010 ELECTROCARDIOGRAM REPORT: CPT | Performed by: FAMILY MEDICINE

## 2023-07-21 PROCEDURE — 71046 X-RAY EXAM CHEST 2 VIEWS: CPT

## 2023-07-21 PROCEDURE — 99285 EMERGENCY DEPT VISIT HI MDM: CPT | Mod: 25 | Performed by: FAMILY MEDICINE

## 2023-07-21 PROCEDURE — 250N000011 HC RX IP 250 OP 636: Performed by: INTERNAL MEDICINE

## 2023-07-21 PROCEDURE — 71275 CT ANGIOGRAPHY CHEST: CPT | Mod: 26 | Performed by: RADIOLOGY

## 2023-07-21 PROCEDURE — 93321 DOPPLER ECHO F-UP/LMTD STD: CPT | Mod: 26 | Performed by: INTERNAL MEDICINE

## 2023-07-21 PROCEDURE — 255N000002 HC RX 255 OP 636: Performed by: INTERNAL MEDICINE

## 2023-07-21 PROCEDURE — 80053 COMPREHEN METABOLIC PANEL: CPT | Performed by: FAMILY MEDICINE

## 2023-07-21 PROCEDURE — 83880 ASSAY OF NATRIURETIC PEPTIDE: CPT | Performed by: FAMILY MEDICINE

## 2023-07-21 PROCEDURE — 85025 COMPLETE CBC W/AUTO DIFF WBC: CPT | Performed by: FAMILY MEDICINE

## 2023-07-21 PROCEDURE — 93321 DOPPLER ECHO F-UP/LMTD STD: CPT | Mod: TC

## 2023-07-21 PROCEDURE — 85730 THROMBOPLASTIN TIME PARTIAL: CPT | Performed by: FAMILY MEDICINE

## 2023-07-21 PROCEDURE — 71045 X-RAY EXAM CHEST 1 VIEW: CPT

## 2023-07-21 PROCEDURE — 71275 CT ANGIOGRAPHY CHEST: CPT

## 2023-07-21 PROCEDURE — 93018 CV STRESS TEST I&R ONLY: CPT | Performed by: INTERNAL MEDICINE

## 2023-07-21 PROCEDURE — 71046 X-RAY EXAM CHEST 2 VIEWS: CPT | Mod: 26 | Performed by: RADIOLOGY

## 2023-07-21 PROCEDURE — 999N000208 ECHO STRESS ECHOCARDIOGRAM

## 2023-07-21 PROCEDURE — 96365 THER/PROPH/DIAG IV INF INIT: CPT | Performed by: FAMILY MEDICINE

## 2023-07-21 PROCEDURE — 36415 COLL VENOUS BLD VENIPUNCTURE: CPT | Performed by: FAMILY MEDICINE

## 2023-07-21 PROCEDURE — 250N000011 HC RX IP 250 OP 636: Performed by: FAMILY MEDICINE

## 2023-07-21 PROCEDURE — 84484 ASSAY OF TROPONIN QUANT: CPT | Mod: 91 | Performed by: FAMILY MEDICINE

## 2023-07-21 PROCEDURE — 250N000009 HC RX 250: Performed by: INTERNAL MEDICINE

## 2023-07-21 PROCEDURE — 71045 X-RAY EXAM CHEST 1 VIEW: CPT | Mod: 26 | Performed by: RADIOLOGY

## 2023-07-21 PROCEDURE — 250N000013 HC RX MED GY IP 250 OP 250 PS 637: Performed by: FAMILY MEDICINE

## 2023-07-21 PROCEDURE — 93325 DOPPLER ECHO COLOR FLOW MAPG: CPT | Mod: 26 | Performed by: INTERNAL MEDICINE

## 2023-07-21 PROCEDURE — 93350 STRESS TTE ONLY: CPT | Mod: 26 | Performed by: INTERNAL MEDICINE

## 2023-07-21 RX ORDER — METOPROLOL TARTRATE 1 MG/ML
1-20 INJECTION, SOLUTION INTRAVENOUS
Status: ACTIVE | OUTPATIENT
Start: 2023-07-21 | End: 2023-07-21

## 2023-07-21 RX ORDER — SODIUM CHLORIDE 9 MG/ML
INJECTION, SOLUTION INTRAVENOUS CONTINUOUS
Status: ACTIVE | OUTPATIENT
Start: 2023-07-21 | End: 2023-07-21

## 2023-07-21 RX ORDER — IOPAMIDOL 755 MG/ML
71 INJECTION, SOLUTION INTRAVASCULAR ONCE
Status: COMPLETED | OUTPATIENT
Start: 2023-07-21 | End: 2023-07-21

## 2023-07-21 RX ORDER — DOBUTAMINE HYDROCHLORIDE 200 MG/100ML
10-50 INJECTION INTRAVENOUS CONTINUOUS
Status: ACTIVE | OUTPATIENT
Start: 2023-07-21 | End: 2023-07-21

## 2023-07-21 RX ORDER — LIDOCAINE 40 MG/G
CREAM TOPICAL
Status: DISCONTINUED | OUTPATIENT
Start: 2023-07-21 | End: 2023-07-21 | Stop reason: HOSPADM

## 2023-07-21 RX ORDER — MAGNESIUM HYDROXIDE/ALUMINUM HYDROXICE/SIMETHICONE 120; 1200; 1200 MG/30ML; MG/30ML; MG/30ML
30 SUSPENSION ORAL ONCE
Status: COMPLETED | OUTPATIENT
Start: 2023-07-21 | End: 2023-07-21

## 2023-07-21 RX ORDER — ATROPINE SULFATE 0.4 MG/ML
.2-2 AMPUL (ML) INJECTION
Status: DISCONTINUED | OUTPATIENT
Start: 2023-07-21 | End: 2023-07-21 | Stop reason: HOSPADM

## 2023-07-21 RX ADMIN — METOPROLOL TARTRATE 3 MG: 5 INJECTION INTRAVENOUS at 15:20

## 2023-07-21 RX ADMIN — DOBUTAMINE 10 MCG/KG/MIN: 12.5 INJECTION, SOLUTION, CONCENTRATE INTRAVENOUS at 15:08

## 2023-07-21 RX ADMIN — ALUMINUM HYDROXIDE, MAGNESIUM HYDROXIDE, AND SIMETHICONE 30 ML: 200; 200; 20 SUSPENSION ORAL at 09:16

## 2023-07-21 RX ADMIN — FAMOTIDINE 20 MG: 10 INJECTION, SOLUTION INTRAVENOUS at 09:15

## 2023-07-21 RX ADMIN — IOPAMIDOL 71 ML: 755 INJECTION, SOLUTION INTRAVENOUS at 13:52

## 2023-07-21 RX ADMIN — PERFLUTREN 9 ML: 6.52 INJECTION, SUSPENSION INTRAVENOUS at 15:21

## 2023-07-21 ASSESSMENT — ACTIVITIES OF DAILY LIVING (ADL)
ADLS_ACUITY_SCORE: 35

## 2023-07-21 NOTE — ED PROVIDER NOTES
"    [place] EMERGENCY DEPARTMENT ([facility])    7/21/23       ED PROVIDER NOTE    History     Chief Complaint   Patient presents with     Chest Pain     HPI  Collin Frank is a 73 year old male with past medical history significant for factor V Leiden mutation w/ chronic LLE, DVT, and PE anticoagulated on Xarelto, who was brought in by ambulance for evaluation of chest pain. Patient reports that the pain began this at 1 AM . He describes the pain as a \"ping\" that he feels every 5-10 minutes. He states that the pain is not sharp. Rates it 3/10. He reports that this pain kept him up at night. He denies any cough, chills or fevers. No shortness of breath reported.  Patient treated with nitro and aspirin which seemed to improve symptoms.  Still having just occasional symptoms no shortness of breath no coughing etc.  No complaints of new leg pain leg swelling had history of blood clot several years ago.  Currently on Xarelto been compliant with this.  No known cardiac disease per patient history.  Other history negative.  No rash no trauma no abdominal pain no nausea vomiting.  No back pain or shoulder pain otherwise.  No peptic ulcer disease etc. reflux pancreatitis.    Past Medical History  Past Medical History:   Diagnosis Date     Antiplatelet or antithrombotic long-term use     blood clot in leg     Long term current use of anticoagulant 10/16/2012     Past Surgical History:   Procedure Laterality Date     COLONOSCOPY  1-17-08    Normal. Repeat in 10 years     COLONOSCOPY WITH CO2 INSUFFLATION N/A 2/1/2018    Procedure: COLONOSCOPY WITH CO2 INSUFFLATION;  COLON SCREEN/ ENGELMANN;  Surgeon: Jan Flores MD;  Location: [facility] OR     rt knee ORIF  1975    [facility]     cholecalciferol (VITAMIN D) 1000 UNIT tablet  losartan (COZAAR) 50 MG tablet  rivaroxaban ANTICOAGULANT (XARELTO ANTICOAGULANT) 20 MG TABS tablet  simvastatin (ZOCOR) 20 MG tablet      No Known Allergies  Family History  Family " History   Problem Relation Age of Onset     Alzheimer Disease Mother      Heart Disease Father      Prostate Cancer Father      Cancer Brother 65        pancreatic        Prostate Cancer Brother         2 stents 70% and 90% blockage     Heart Disease Sister      Social History   Social History     Tobacco Use     Smoking status: Former     Types: Cigarettes     Quit date: 2011     Years since quittin.4     Smokeless tobacco: Never   Substance Use Topics     Alcohol use: No     Drug use: No      Past medical history, past surgical history, medications, allergies, family history, and social history were reviewed with the patient. No additional pertinent items.      A medically appropriate review of systems was performed with pertinent positives and negatives noted in the HPI, and all other systems negative.    Physical Exam   BP: (!) 157/88  Pulse: 83  Temp: 98.3  F (36.8  C)  Resp: 17  SpO2: 96 %  Physical Exam  Vitals and nursing note reviewed.   Constitutional:       General: He is in acute distress.      Appearance: He is well-developed. He is not toxic-appearing or diaphoretic.      Comments: Patient nontoxic here in the ER.  Up ambulated to the bathroom without distress   HENT:      Head: Normocephalic and atraumatic.      Nose: Nose normal. No congestion.      Mouth/Throat:      Mouth: Mucous membranes are moist.      Pharynx: Oropharynx is clear.   Eyes:      General: No scleral icterus.     Extraocular Movements: Extraocular movements intact.      Conjunctiva/sclera: Conjunctivae normal.      Pupils: Pupils are equal, round, and reactive to light.   Cardiovascular:      Rate and Rhythm: Normal rate and regular rhythm.   Pulmonary:      Effort: No respiratory distress.   Chest:      Chest wall: No tenderness.   Abdominal:      General: There is no distension.      Palpations: There is no mass.      Tenderness: There is no abdominal tenderness. There is no right CVA tenderness, left CVA  tenderness, guarding or rebound.      Hernia: No hernia is present.   Musculoskeletal:         General: No tenderness.      Cervical back: Normal range of motion and neck supple. No rigidity.      Right lower leg: Edema present.      Left lower leg: Edema present.   Skin:     General: Skin is warm and dry.      Capillary Refill: Capillary refill takes less than 2 seconds.      Findings: No erythema or rash.   Neurological:      General: No focal deficit present.      Mental Status: He is alert and oriented to person, place, and time. Mental status is at baseline.   Psychiatric:      Comments: Here in the ER           ED Course, Procedures, & Data       Records reviewed in epic.  Patient had been seen in March of this year strain of calf muscle with ultrasound revealing no DVT at that point.  Other ER visits noted also last year patient seen for chest pain and sent home June 1, 2022.    Patient valuated here in the ER EKG showed sinus rhythm occasional PAC no hyperacute changes.  Patient received aspirin and nitro prior to arrival.  Portable chest x-ray done did not show any significant findings maybe some streaky atelectasis but no acute effusion infiltrate I personally reviewed this.  There is some artifact I did do a PA and lateral this resolved on the images therefore was artifactual.  Laboratory evaluation D-dimer was elevated 0.94.  Troponin was 18x2 BNP was 154.  Sodium 137 potassium 3.7.  Creatinine 0.91.  Glucose 117 liver function test within normal limits.  White count 7.9 hemoglobin 15.5 platelets noted to be 251.    Patient given IV fluids here in the ER.  Otherwise has been feeling relatively well we did do a CT scan of the chest with elevated D-dimer with factor V Leyden etc. and it was negative for PE etc.  At this point now planning for dobutamine stress echo asked to repeat troponins with normal just to rule out any other etiology if this is normal would then recommend patient able to go home with  follow-up with MD patient appears very comfortable at this point.  Sign out to pm MD for follow up on stress results.      Procedures       ED Course Selections:        EKG Interpretation:      Interpreted by Alex Cancino MD  Time reviewed: 834  Symptoms at time of EKG: chest pain   Rhythm: normal sinus   Rate: normal  Axis: normal  Ectopy: occ pac  Conduction: normal  ST Segments/ T Waves: No ST-T wave changes  Q Waves: none  Comparison to prior: No old EKG available    Clinical Impression:nsr with occ pac's              EKG: Sinus rhythm with premature atrial complexes, otherwise normal EKG.         Results for orders placed or performed during the hospital encounter of 07/21/23   XR Chest Port 1 View     Status: Abnormal   Result Value Ref Range    Radiologist flags radiodensity specks in the mid chest (Urgent)     Narrative    EXAM: XR CHEST PORT 1 VIEW  7/21/2023 8:52 AM      HISTORY: cp    COMPARISON: Radiograph 5/10/2023, 3/31/2021    FINDINGS: Single view of the chest. Mild rightward tracheal deviation.  Stable cardiomediastinal silhouette. No pleural effusion. No  discernible pneumothorax. Streaky bibasilar opacities. No definite  consolidation. Severe degenerative changes at the right glenohumeral  joint. Chronic right-sided rib deformities. No acute osseous  abnormality. Tiny specks of radiopacity projecting over the mid chest  may represent artifact.      Impression    IMPRESSION:   1. Streaky bibasilar opacities favored to represent atelectasis. No  acute cardiopulmonary findings..   2. Tiny specks of radiopacity projecting over the mid chest may  represent artifact. Consider repeat with PA and lateral if concern for  foreign body.    [Access Center: radiodensity specks in the mid chest]    This report will be copied to the [facility] to ensure a  provider acknowledges the finding. Access Center is available Monday  through Friday 8am-3:30 pm.     I have personally reviewed the  examination and initial interpretation  and I agree with the findings.    MIKIE ABAD MD         SYSTEM ID:  [id number]   XR Chest 2 Views     Status: None    Narrative    EXAM: XR CHEST 2 VIEWS  7/21/2023 10:52 AM      HISTORY: cp with some radiodensity seen on portable    COMPARISON: Same day radiograph    FINDINGS: Two views of the chest. Mild rightward tracheal deviation.  Stable cardiomediastinal silhouette. No pleural effusion. No  discernible pneumothorax. Streaky bibasilar opacities. No  consolidation. No acute osseous abnormality. The previously seen  radiopaque densities are no longer visualized.      Impression    IMPRESSION: The previously noted radiopaque density projecting over  the mid chest is no longer visualized. Findings were likely  artifactual.    I have personally reviewed the examination and initial interpretation  and I agree with the findings.    MIKIE ABAD MD         SYSTEM ID:  [id number]   CT Chest Pulmonary Embolism w Contrast     Status: None    Narrative    CT chest pulmonary angiogram with contrast    INDICATION: Chest pain. History of DVT. Elevated d-dimer.    COMPARISON: 3/31/2021    CONTRAST: 71 mL intravenous Isovue-370    FINDINGS: Nonenlarged mediastinal and right hilar lymph nodes. Heart  size normal. No pleural or pericardial effusion. No ventricular  enlargement. Mild contrast reflux into the IVC and hepatic veins.  There is fatty atrophy of the pancreas. No adrenal nodule. No pleural  or pericardial effusion.  Bone detail shows osteopenia and degenerative spurring throughout the  included spine especially the mid to lower thoracic and upper lumbar  levels.  Detail of the lungs shows lingular subsegmental atelectasis. No  suspicious pulmonary nodule or consolidation left lower lobe  subsegmental atelectasis also present. Major airways are nicely  patent.  Main pulmonary artery caliber normal at 2.7 cm. No hypodense filling  defect in the pulmonary arteries. There is  coronary artery  calcification.      Impression    IMPRESSION: No CT evidence of pulmonary embolus. Coronary artery  calcium in the LAD. Fatty atrophy of pancreas. No suspicious pulmonary  findings other than subsegmental atelectasis.    JIMI CASH MD         SYSTEM ID:  [id number]   [place] Draw     Status: None ()    Narrative    The following orders were created for panel order [place] Draw.  Procedure                               Abnormality         Status                     ---------                               -----------         ------                     Extra Blue Top Tube[[id number]]                                                         Extra Red Top Tube[[id number]]                                                          Extra Green Top (Lithium...[[id number]]                                                 Extra Purple Top Tube[[id number]]                                                         Please view results for these tests on the individual orders.   Partial thromboplastin time     Status: Normal   Result Value Ref Range    aPTT 28 22 - 38 Seconds   INR     Status: Abnormal   Result Value Ref Range    INR 1.42 (H) 0.85 - 1.15   Comprehensive metabolic panel     Status: Abnormal   Result Value Ref Range    Sodium 137 136 - 145 mmol/L    Potassium 3.7 3.4 - 5.3 mmol/L    Chloride 104 98 - 107 mmol/L    Carbon Dioxide (CO2) 22 22 - 29 mmol/L    Anion Gap 11 7 - 15 mmol/L    Urea Nitrogen 12.6 8.0 - 23.0 mg/dL    Creatinine 0.91 0.67 - 1.17 mg/dL    Calcium 9.0 8.8 - 10.2 mg/dL    Glucose 117 (H) 70 - 99 mg/dL    Alkaline Phosphatase 94 40 - 129 U/L    AST 28 0 - 45 U/L    ALT 24 0 - 70 U/L    Protein Total 6.7 6.4 - 8.3 g/dL    Albumin 4.0 3.5 - 5.2 g/dL    Bilirubin Total 1.0 <=1.2 mg/dL    GFR Estimate 88 >60 mL/min/1.73m2   Lipase     Status: Normal   Result Value Ref Range    Lipase 19 13 - 60 U/L   Troponin T, High Sensitivity     Status: Normal   Result Value Ref Range    Troponin T,  High Sensitivity 19 <=22 ng/L   Nt probnp inpatient (BNP)     Status: Normal   Result Value Ref Range    N terminal Pro BNP Inpatient 154 0 - 900 pg/mL   CBC with platelets and differential     Status: Abnormal   Result Value Ref Range    WBC Count 7.9 4.0 - 11.0 10e3/uL    RBC Count 4.68 4.40 - 5.90 10e6/uL    Hemoglobin 15.5 13.3 - 17.7 g/dL    Hematocrit 45.7 40.0 - 53.0 %    MCV 98 78 - 100 fL    MCH 33.1 (H) 26.5 - 33.0 pg    MCHC 33.9 31.5 - 36.5 g/dL    RDW 12.7 10.0 - 15.0 %    Platelet Count 251 150 - 450 10e3/uL    % Neutrophils 70 %    % Lymphocytes 20 %    % Monocytes 8 %    % Eosinophils 1 %    % Basophils 1 %    % Immature Granulocytes 0 %    NRBCs per 100 WBC 0 <1 /100    Absolute Neutrophils 5.5 1.6 - 8.3 10e3/uL    Absolute Lymphocytes 1.5 0.8 - 5.3 10e3/uL    Absolute Monocytes 0.6 0.0 - 1.3 10e3/uL    Absolute Eosinophils 0.1 0.0 - 0.7 10e3/uL    Absolute Basophils 0.1 0.0 - 0.2 10e3/uL    Absolute Immature Granulocytes 0.0 <=0.4 10e3/uL    Absolute NRBCs 0.0 10e3/uL   D dimer quantitative     Status: Abnormal   Result Value Ref Range    D-Dimer Quantitative 0.94 (H) 0.00 - 0.50 ug/mL FEU    Narrative    This D-dimer assay is intended for use in conjunction with a clinical pretest probability assessment model to exclude pulmonary embolism (PE) and deep venous thrombosis (DVT) in outpatients suspected of PE or DVT. The cut-off value is 0.50 ug/mL FEU.   Troponin T, High Sensitivity     Status: Normal   Result Value Ref Range    Troponin T, High Sensitivity 18 <=22 ng/L   EKG 12-lead, tracing only     Status: None   Result Value Ref Range    Systolic Blood Pressure  mmHg    Diastolic Blood Pressure  mmHg    Ventricular Rate 68 BPM    Atrial Rate 68 BPM    VT Interval 166 ms    QRS Duration 82 ms     ms    QTc 374 ms    P Axis 43 degrees    R AXIS -23 degrees    T Axis 61 degrees    Interpretation ECG       Sinus rhythm with Premature atrial complexes  Otherwise normal ECG  Unconfirmed report -  interpretation of this ECG is computer generated - see medical record for final interpretation  Confirmed by - EMERGENCY ROOM, PHYSICIAN ([id number]),  RAYA MORENO ([id number]) on 2023 3:34:45 PM     Echo Dobutamine Stress     Status: None    Narrative    [id number]  [facility]  [id number]  [id number]^ANKITA^INOCENCIA^KHLOE     [facility],[place]  Echocardiography Laboratory  [address] [place] [address]     Name: WAYLON CERVANTES  MRN: [id number]  : 1950  Study Date: 2023 02:57 PM  Age: 73 yrs  Gender: Male  Patient Location: [facility]  Reason For Study: Chest Pain  Ordering Physician: INOCENCIA LUCIANO  Performed By: Hernesto Poe     ______________________________________________________________________________  ______________________________________________________________________________  Interpretation Summary  Normal Dobutamine stress echocardiogram at target heart rate.  No resting or stress induced regional wallmotion abnormalities.  Normal resting and stress EKGs.  Normal heart rate and BP response to dobutamine. No symptoms during test.  Normal baseline screening echocardiogram with no significant valvular heart  disease and normal size of visualized aorta.  ______________________________________________________________________________  Stress  The maximum dose of dobutamine was 30mcg/kg/min.  The maximum dose of atropine was 0mg.  The maximum dose of metoprolol was 3mg.  The drug infusion was stopped due to target heart rate achieved.  Definity (NDC #26763-321-44) given intravenously.  Patient was given 9ml mixture of 1.5ml Definity and 8.5ml saline.  1 ml wasted.  Definity Lot # [id number] .  Definity Expiration 2024 .  The EKG portion of this stress test was negative for inducible ischemia (see  echo results below).  This was a normal stress echocardiogram with no evidence of stress-induced  ischemia.  Normal left ventricular function and wall motion at rest and  post-stress.  The visual ejection fraction is >70%.     Baseline  Normal baseline electrocardiogram.  Normal left ventricular function and wall motion at rest.  The visual ejection fraction is estimated at 55-60%.     Stress Results                                       Maximum Predicted HR:   145 bpm             Target HR: 123 bpm        % Maximum Predicted HR: 90 %                           Stage DurationHeart Rate  BP   Dose                               (mm:ss)   (bpm)                      Baseline  0:00      59    152/84 0.00                        Peak    8:07      130   167/8030.00                          Stress Duration:   8:07 mm:ss                       Maximum Stress HR: 130 bpm     Left Ventricle  Normal Dobutamine stress echocardiogram at target heart rate.  No resting or stress induced regional wallmotion abnormalities.  Normal resting and stress EKGs.  Normal heart rate and BP response to dobutamine. No symptoms during test.  Normal baseline screening echocardiogram with no significant valvular heart  disease and normal size of visualized aorta.     Vessels  Normal size aorta.     Procedure  Dobutamine stress echo with two dimensional color and spectral Doppler  performed.     ______________________________________________________________________________  MMode/2D Measurements & Calculations  IVSd: 1.0 cm  LVIDd: 5.0 cm  LVIDs: 3.3 cm  LVPWd: 1.1 cm  FS: 34.7 %  RWT: 0.44     ______________________________________________________________________________  Report approved by: Yonis Jimenez 07/21/2023 03:50 PM         CBC with platelets differential     Status: Abnormal    Narrative    The following orders were created for panel order CBC with platelets differential.  Procedure                               Abnormality         Status                     ---------                               -----------         ------                     CBC with platelets and d...[[id number]]  Abnormal             Final result                 Please view results for these tests on the individual orders.     Medications   lidocaine 1 % 0.1-1 mL (has no administration in time range)   lidocaine (LMX4) cream (has no administration in time range)   sodium chloride (PF) 0.9% PF flush 3 mL (3 mLs Intracatheter $Given 7/21/23 0846)   sodium chloride (PF) 0.9% PF flush 3 mL (has no administration in time range)   sodium chloride 0.9% infusion ( Intravenous Not Given 7/21/23 1522)   atropine injection 0.2-2 mg (has no administration in time range)   metoprolol (LOPRESSOR) injection 1-20 mg (3 mg Intravenous $Given 7/21/23 1520)   sodium chloride (PF) 0.9% PF flush 5-10 mL (has no administration in time range)   DOBUTamine (DOBUTREX) 500 mg in D5W 250 mL infusion (adult std conc) (0 mcg/kg/min × 98.9 kg Intravenous Stopped 7/21/23 1516)   famotidine (PEPCID) injection 20 mg (20 mg Intravenous $Given 7/21/23 0915)   alum & mag hydroxide-simethicone (MAALOX) suspension 30 mL (30 mLs Oral $Given 7/21/23 0916)   iopamidol (ISOVUE-370) solution 71 mL (71 mLs Intravenous $Given 7/21/23 1352)   sodium chloride (PF) 0.9% PF flush 100 mL (100 mLs Intravenous $Given 7/21/23 1354)   perflutren diluted in saline (DEFINITY) injection 10 mL (9 mLs Intravenous $Given 7/21/23 1521)     Labs Ordered and Resulted from Time of ED Arrival to Time of ED Departure   INR - Abnormal       Result Value    INR 1.42 (*)    COMPREHENSIVE METABOLIC PANEL - Abnormal    Sodium 137      Potassium 3.7      Chloride 104      Carbon Dioxide (CO2) 22      Anion Gap 11      Urea Nitrogen 12.6      Creatinine 0.91      Calcium 9.0      Glucose 117 (*)     Alkaline Phosphatase 94      AST 28      ALT 24      Protein Total 6.7      Albumin 4.0      Bilirubin Total 1.0      GFR Estimate 88     CBC WITH PLATELETS AND DIFFERENTIAL - Abnormal    WBC Count 7.9      RBC Count 4.68      Hemoglobin 15.5      Hematocrit 45.7      MCV 98      MCH 33.1 (*)     MCHC 33.9      RDW 12.7       Platelet Count 251      % Neutrophils 70      % Lymphocytes 20      % Monocytes 8      % Eosinophils 1      % Basophils 1      % Immature Granulocytes 0      NRBCs per 100 WBC 0      Absolute Neutrophils 5.5      Absolute Lymphocytes 1.5      Absolute Monocytes 0.6      Absolute Eosinophils 0.1      Absolute Basophils 0.1      Absolute Immature Granulocytes 0.0      Absolute NRBCs 0.0     D DIMER QUANTITATIVE - Abnormal    D-Dimer Quantitative 0.94 (*)    PARTIAL THROMBOPLASTIN TIME - Normal    aPTT 28     LIPASE - Normal    Lipase 19     TROPONIN T, HIGH SENSITIVITY - Normal    Troponin T, High Sensitivity 19     NT PROBNP INPATIENT - Normal    N terminal Pro BNP Inpatient 154     TROPONIN T, HIGH SENSITIVITY - Normal    Troponin T, High Sensitivity 18       Echo Dobutamine Stress   Final Result      CT Chest Pulmonary Embolism w Contrast   Final Result   IMPRESSION: No CT evidence of pulmonary embolus. Coronary artery   calcium in the LAD. Fatty atrophy of pancreas. No suspicious pulmonary   findings other than subsegmental atelectasis.      JIMI CASH MD            SYSTEM ID:  [id number]      XR Chest 2 Views   Final Result   IMPRESSION: The previously noted radiopaque density projecting over   the mid chest is no longer visualized. Findings were likely   artifactual.      I have personally reviewed the examination and initial interpretation   and I agree with the findings.      MIKIE ABAD MD            SYSTEM ID:  [id number]      XR Chest Port 1 View   Final Result   Abnormal   IMPRESSION:    1. Streaky bibasilar opacities favored to represent atelectasis. No   acute cardiopulmonary findings..    2. Tiny specks of radiopacity projecting over the mid chest may   represent artifact. Consider repeat with PA and lateral if concern for   foreign body.      [Access Center: radiodensity specks in the mid chest]      This report will be copied to the [facility] to ensure a   provider acknowledges  the finding. Access Center is available Monday   through Friday 8am-3:30 pm.       I have personally reviewed the examination and initial interpretation   and I agree with the findings.      MIKIE ABAD MD            SYSTEM ID:  [id number]             Critical care was not performed.     Medical Decision Making  The patient's presentation was of high complexity (an acute health issue posing potential threat to life or bodily function).    The patient's evaluation involved:  review of external note(s) from 3+ sources (see separate area of note for details)  ordering and/or review of 3+ test(s) in this encounter (see separate area of note for details)  review of 3+ test result(s) ordered prior to this encounter (see separate area of note for details)  discussion of management or test interpretation with another health professional (see separate area of note for details)    The patient's management necessitated high risk (a decision regarding hospitalization).      Assessment & Plan   68-year-old male history of factor V Leyden deficiency had history of blood clots is on long-term Xarelto no history of PE no known cardiac disease.  Patient presented with some slight intermittent left chest pain starting last night today no shortness of breath otherwise no fevers chills no chest wall tenderness EKG with this appeared normal sinus rhythm occasional PAC otherwise troponin x2 no change noted both at about 18 D-dimer elevated 0.94 but a CT scan of the chest was negative for PE.  Patient's symptoms to be somewhat resolved.  Seems very stable here in the ER.  At this point with a negative troponins we will do a dobutamine stress test right now as patient agrees.  If this is negative do feel noncardiac and can go home possibly Mylanta or Maalox following up with MD and return if worsening symptoms.  Signed out to evening physician.         I have reviewed the nursing notes. I have reviewed the findings, diagnosis, plan and  need for follow up with the patient.    Discharge Medication List as of 7/21/2023  5:07 PM          Final diagnoses:   Atypical chest pain   Long term (current) use of anticoagulants     I, Debbie Olivera, am serving as a trained medical scribe to document services personally performed by Alex Cancino MD based on the provider's statements to me on July 21, 2023.  This document has been checked and approved by the attending provider.    IAlex MD, was physically present and have reviewed and verified the accuracy of this note documented by Debbie Olivera, medical scribe.      Alex Cancino MD  [facility] [facility] EMERGENCY DEPARTMENT  7/21/2023    This note was created at least in part by the use of dragon voice dictation system. Inadvertent typographical errors may still exist.  Alex Cancino MD.    Patient evaluated in the emergency department during the COVID-19 pandemic period. Careful attention to patients safety was addressed throughout the evaluation. Evaluation and treatment management was initiated with disposition made efficiently and appropriate as possible to minimize any risk of potential exposure to patient during this evaluation.       Alex Cancino MD  07/21/23 1732

## 2023-07-21 NOTE — DISCHARGE INSTRUCTIONS
Home.  Your labs appear stable.  Your heart tests were normal.  Your CT of chest did not show any sign of blood clot or infection.  You also had a stress that was interpreted also.  Can try liquid mylanta or maalox for symptoms, follow up with MD, and return if worse or concerning symptoms.

## 2023-07-21 NOTE — PROGRESS NOTES
Pt here for dobutamine stress test. Test, meds and side effects reviewed with patient. Achieved target HR at 30 mcg Dobutamine. Gave a total of 3 mg IV Metoprolol to bring HR back to baseline. Post monitoring complete and VSS. Pt transferred back to ED via transport. Report given to ED TIFFANIE Carrizales.    Floridalma Kellogg RN

## 2023-07-21 NOTE — ED TRIAGE NOTES
"Patient BIBA from home for evaluation of chest pain. Patient states he is experiencing chest pain starting this morning, he describes as a \"bump\" 3/10 pain. Received 325 mg ASA and 1 nitroglycerin spray which brought SBP down from 215 to 150. On thinner for previous DVT.     PIV 20 R hand       "

## 2023-09-07 ENCOUNTER — OFFICE VISIT (OUTPATIENT)
Dept: FAMILY MEDICINE | Facility: CLINIC | Age: 73
End: 2023-09-07
Payer: COMMERCIAL

## 2023-09-07 VITALS
WEIGHT: 229.6 LBS | OXYGEN SATURATION: 97 % | HEART RATE: 62 BPM | DIASTOLIC BLOOD PRESSURE: 75 MMHG | BODY MASS INDEX: 34.91 KG/M2 | SYSTOLIC BLOOD PRESSURE: 162 MMHG | TEMPERATURE: 97.6 F

## 2023-09-07 DIAGNOSIS — Z23 NEED FOR VIRAL IMMUNIZATION: ICD-10-CM

## 2023-09-07 DIAGNOSIS — R07.89 ATYPICAL CHEST PAIN: Primary | ICD-10-CM

## 2023-09-07 DIAGNOSIS — Z23 NEED FOR TDAP VACCINATION: ICD-10-CM

## 2023-09-07 DIAGNOSIS — Z23 NEED FOR PROPHYLACTIC VACCINATION AND INOCULATION AGAINST INFLUENZA: ICD-10-CM

## 2023-09-07 DIAGNOSIS — I10 BENIGN ESSENTIAL HYPERTENSION: ICD-10-CM

## 2023-09-07 PROCEDURE — 90662 IIV NO PRSV INCREASED AG IM: CPT | Performed by: FAMILY MEDICINE

## 2023-09-07 PROCEDURE — G0008 ADMIN INFLUENZA VIRUS VAC: HCPCS | Performed by: FAMILY MEDICINE

## 2023-09-07 PROCEDURE — 99213 OFFICE O/P EST LOW 20 MIN: CPT | Mod: 25 | Performed by: FAMILY MEDICINE

## 2023-09-07 RX ORDER — LOSARTAN POTASSIUM 50 MG/1
50 TABLET ORAL DAILY
Qty: 90 TABLET | Refills: 0 | Status: SHIPPED | OUTPATIENT
Start: 2023-09-07 | End: 2024-02-15

## 2023-09-07 RX ORDER — LOSARTAN POTASSIUM 100 MG/1
100 TABLET ORAL DAILY
Qty: 90 TABLET | Refills: 1 | Status: SHIPPED | OUTPATIENT
Start: 2023-09-07 | End: 2023-09-07

## 2023-09-07 NOTE — PROGRESS NOTES
"  Assessment & Plan       ICD-10-CM    1. Atypical chest pain  R07.89 Adult Cardiology Eval  Referral      2. Benign essential hypertension  I10 losartan (COZAAR) 50 MG tablet     DISCONTINUED: losartan (COZAAR) 100 MG tablet      3. Need for Tdap vaccination  Z23       4. Need for prophylactic vaccination and inoculation against influenza  Z23       5. Need for viral immunization  Z23 INFLUENZA VACCINE 65+ (FLUZONE HD)            Review of external notes as documented elsewhere in note  No LOS data to display   Time spent by me doing chart review, history and exam, documentation and further activities per the note     MED REC REQUIRED  Post Medication Reconciliation Status:     BMI:   Estimated body mass index is 34.91 kg/m  as calculated from the following:    Height as of 5/10/23: 1.727 m (5' 8\").    Weight as of this encounter: 104.1 kg (229 lb 9.6 oz).   Weight management plan: Discussed healthy diet and exercise guidelines    Patient Instructions   Collin    It was a pleasure seeing you in clinic today.  Here's the plan:    Hypertension - Check blood pressure twice daily for 7-10 days and report back in about 2 weeks with bp log.  This will help determine whether you need a dose increase of your blood pressure medication  Chest pain - referral ordered for you to see a cardiologist  Flu shot today    Let me know if you have questions.    MD Delfin Vásquez MD  [facility] is a 73 year old, presenting for the following health issues:  Hospital F/U      9/7/2023     9:45 AM   Additional Questions   Roomed by Aylin   Accompanied by none         9/7/2023     9:45 AM   Patient Reported Additional Medications   Patient reports taking the following new medications none       HPI     ED/UC Followup:    Facility:  [facility]  Date of visit: 7/21/2023  Reason for visit: Chest pain  Current Status: Better since ER visit      BP Readings from Last 6 " Encounters:   09/07/23 (!) 162/75   07/21/23 (!) 172/96   05/10/23 (!) 151/75   03/25/23 121/74   12/20/22 (!) 144/86   12/12/22 (!) 142/83       Review of Systems   Constitutional, HEENT, cardiovascular, pulmonary, gi and gu systems are negative, except as otherwise noted.      Objective    BP (!) 162/75   Pulse 62   Temp 97.6  F (36.4  C) (Oral)   Wt 104.1 kg (229 lb 9.6 oz)   SpO2 97%   BMI 34.91 kg/m    Body mass index is 34.91 kg/m .  Physical Exam  Constitutional:       General: He is not in acute distress.     Appearance: Normal appearance. He is well-developed. He is not ill-appearing.   HENT:      Head: Normocephalic and atraumatic.      Right Ear: External ear normal.      Left Ear: External ear normal.      Nose: Nose normal.   Eyes:      General: No scleral icterus.     Extraocular Movements: Extraocular movements intact.      Conjunctiva/sclera: Conjunctivae normal.   Cardiovascular:      Rate and Rhythm: Normal rate.   Pulmonary:      Effort: Pulmonary effort is normal.   Musculoskeletal:      Cervical back: Normal range of motion and neck supple.   Skin:     General: Skin is warm and dry.   Neurological:      Mental Status: He is alert and oriented to person, place, and time.   Psychiatric:         Behavior: Behavior normal.         Thought Content: Thought content normal.         Judgment: Judgment normal.

## 2023-09-07 NOTE — PATIENT INSTRUCTIONS
Collin    It was a pleasure seeing you in clinic today.  Here's the plan:    Hypertension - Check blood pressure twice daily for 7-10 days and report back in about 2 weeks with bp log.  This will help determine whether you need a dose increase of your blood pressure medication  Chest pain - referral ordered for you to see a cardiologist  Flu shot today    Let me know if you have questions.    Delfin Lobato MD

## 2023-09-11 ENCOUNTER — OFFICE VISIT (OUTPATIENT)
Dept: CARDIOLOGY | Facility: CLINIC | Age: 73
End: 2023-09-11
Attending: FAMILY MEDICINE
Payer: COMMERCIAL

## 2023-09-11 VITALS
BODY MASS INDEX: 35.15 KG/M2 | WEIGHT: 231.2 LBS | SYSTOLIC BLOOD PRESSURE: 146 MMHG | OXYGEN SATURATION: 97 % | HEART RATE: 62 BPM | DIASTOLIC BLOOD PRESSURE: 86 MMHG

## 2023-09-11 DIAGNOSIS — E66.01 CLASS 2 SEVERE OBESITY DUE TO EXCESS CALORIES WITH SERIOUS COMORBIDITY AND BODY MASS INDEX (BMI) OF 35.0 TO 35.9 IN ADULT (H): ICD-10-CM

## 2023-09-11 DIAGNOSIS — E66.812 CLASS 2 SEVERE OBESITY DUE TO EXCESS CALORIES WITH SERIOUS COMORBIDITY AND BODY MASS INDEX (BMI) OF 35.0 TO 35.9 IN ADULT (H): ICD-10-CM

## 2023-09-11 DIAGNOSIS — R07.89 ATYPICAL CHEST PAIN: Primary | ICD-10-CM

## 2023-09-11 DIAGNOSIS — E78.2 MIXED HYPERLIPIDEMIA: ICD-10-CM

## 2023-09-11 DIAGNOSIS — I10 ESSENTIAL HYPERTENSION: ICD-10-CM

## 2023-09-11 DIAGNOSIS — I25.10 CORONARY ARTERY CALCIFICATION SEEN ON CAT SCAN: ICD-10-CM

## 2023-09-11 PROCEDURE — 99204 OFFICE O/P NEW MOD 45 MIN: CPT | Performed by: INTERNAL MEDICINE

## 2023-09-11 NOTE — PROGRESS NOTES
[facility]  Advanced Heart Failure/cardiology clinic    Patient Name: Collin Frank   : 1950   Date of Visit: 2023    Primary Care Physician: Collin Salazar MD     Referring Physician: Dr. Hernandez   Reason for Visit: Chest pain    Dear Dr. Hernandez, and Dr. Salazar,     I had the pleasure of seeing Collin Frank today in the [facility] Advanced Heart Failure/cardiology clinic. As you know Collin Frank is a pleasant 73 year old year old male, who presents today for further evaluation of chest pain.      Collin presented to the emergency room at [facility] in 2023 with chest pain.  He was ruled out for an ACS with serial negative troponins.  His EKG did not show ischemic changes.  His D-dimer was elevated and he subsequently underwent a CT PE study that was negative for PE.  The CT scan did show coronary artery calcium in the LAD.  He then underwent a dobutamine stress echo that was negative.    I also see that he has had 2 previous stress echocardiograms in  (reported equal vocal for ischemia due to lack of augmentation of heart function with exercise) and  (reported as normal).     He also wore a cardiac monitor in  that demonstrated frequent PVCs (29.6%), runs of SVT (probably atrial tachycardia) and runs of nonsustained VT that were perhaps thought to be SVT with aberrancy. I don't see he had any cardiology follow up for this.    His other medical history includes hypertension, hyperlipidemia, factor V Leiden mutation with chronic LLE DVT, and PE anticoagulated on Xarelto     He uses an exercise bike daily at home having started back using it since early Sept. He rides it for about 10-15 minutes, without cardiac symptoms. Since that ER visit, he denies chest pain, shortness of breath, PND/orthopnea, palpitations, lightheadedness, syncope, leg swelling.  Compliant with medications and notes no problems taking them.    I  asked him multiple times about his cardiac symptoms and he absolutely denies any.  He states he is able to exercise and his exercise tolerance is improving.    Home BPs - 140s/80s, started checking BPs couple of days ago    ROS:  A complete 10-point ROS was negative except as above.    PAST MEDICAL HISTORY:  Past Medical History:   Diagnosis Date    Antiplatelet or antithrombotic long-term use     blood clot in leg    Long term current use of anticoagulant 10/16/2012       PAST SURGICAL HISTORY:  Past Surgical History:   Procedure Laterality Date    COLONOSCOPY  08    Normal. Repeat in 10 years    COLONOSCOPY WITH CO2 INSUFFLATION N/A 2018    Procedure: COLONOSCOPY WITH CO2 INSUFFLATION;  COLON SCREEN/ ENGELMANN;  Surgeon: Jan Flores MD;  Location: [facility] OR    rt knee ORIF      [facility]       FAMILY HISTORY:  Family History   Problem Relation Age of Onset    Alzheimer Disease Mother     Heart Disease Father     Prostate Cancer Father     Cancer Brother 65        pancreatic       Prostate Cancer Brother         2 stents 70% and 90% blockage    Heart Disease Sister        SOCIAL HISTORY:  Social History     Socioeconomic History    Marital status: Single   Tobacco Use    Smoking status: Former     Types: Cigarettes     Quit date: 2011     Years since quittin.6    Smokeless tobacco: Never   Substance and Sexual Activity    Alcohol use: No    Drug use: No    Sexual activity: Never   Other Topics Concern    Parent/sibling w/ CABG, MI or angioplasty before 65F 55M? No       MEDICATIONS:  Current Outpatient Medications   Medication Sig    cholecalciferol (VITAMIN D) 1000 UNIT tablet Take 1 tablet (1,000 Units) by mouth daily    losartan (COZAAR) 50 MG tablet Take 1 tablet (50 mg) by mouth daily    rivaroxaban ANTICOAGULANT (XARELTO ANTICOAGULANT) 20 MG TABS tablet Take 1 tablet (20 mg) by mouth daily (with dinner)    simvastatin (ZOCOR) 20 MG tablet Take 1 tablet (20 mg) by  mouth At Bedtime     No current facility-administered medications for this visit.        ALLERGIES:   No Known Allergies    PHYSICAL EXAM:  BP (!) 146/86   Pulse 62   Wt 104.9 kg (231 lb 3.2 oz)   SpO2 97%   BMI 35.15 kg/m    Gen: alert, interactive, NAD  HEENT: atraumatic, EOMI, MMM  Neck: supple, no JVD  CV: RRR, no m/r/g  Chest: CTAB, no wheezes or crackles  Abd: soft, NT, ND, +BS  Ext: no LE edema, 2+ peripheral pulses  Skin: warm and dry, no rashes on exposed surfaces  Neuro: alert and oriented, no focal deficits    LABS:    CMP  Last Comprehensive Metabolic Panel:  Sodium   Date Value Ref Range Status   07/21/2023 137 136 - 145 mmol/L Final   04/01/2021 140 133 - 144 mmol/L Final     Potassium   Date Value Ref Range Status   07/21/2023 3.7 3.4 - 5.3 mmol/L Final   12/12/2022 4.1 3.4 - 5.3 mmol/L Final   04/01/2021 3.9 3.4 - 5.3 mmol/L Final     Chloride   Date Value Ref Range Status   07/21/2023 104 98 - 107 mmol/L Final   12/12/2022 109 94 - 109 mmol/L Final   04/01/2021 108 94 - 109 mmol/L Final     Carbon Dioxide   Date Value Ref Range Status   04/01/2021 25 20 - 32 mmol/L Final     Carbon Dioxide (CO2)   Date Value Ref Range Status   07/21/2023 22 22 - 29 mmol/L Final   12/12/2022 27 20 - 32 mmol/L Final     Anion Gap   Date Value Ref Range Status   07/21/2023 11 7 - 15 mmol/L Final   12/12/2022 4 3 - 14 mmol/L Final   04/01/2021 7 3 - 14 mmol/L Final     Glucose   Date Value Ref Range Status   07/21/2023 117 (H) 70 - 99 mg/dL Final   12/12/2022 89 70 - 99 mg/dL Final   04/01/2021 90 70 - 99 mg/dL Final     Urea Nitrogen   Date Value Ref Range Status   07/21/2023 12.6 8.0 - 23.0 mg/dL Final   12/12/2022 15 7 - 30 mg/dL Final   04/01/2021 15 7 - 30 mg/dL Final     Creatinine   Date Value Ref Range Status   07/21/2023 0.91 0.67 - 1.17 mg/dL Final   04/01/2021 0.93 0.66 - 1.25 mg/dL Final     GFR Estimate   Date Value Ref Range Status   07/21/2023 88 >60 mL/min/1.73m2 Final   04/01/2021 81 >60  mL/min/[1.73_m2] Final     Comment:     Non  GFR Calc  Starting 12/18/2018, serum creatinine based estimated GFR (eGFR) will be   calculated using the Chronic Kidney Disease Epidemiology Collaboration   (CKD-EPI) equation.       Calcium   Date Value Ref Range Status   07/21/2023 9.0 8.8 - 10.2 mg/dL Final   04/01/2021 8.8 8.5 - 10.1 mg/dL Final     Bilirubin Total   Date Value Ref Range Status   07/21/2023 1.0 <=1.2 mg/dL Final   03/31/2021 0.8 0.2 - 1.3 mg/dL Final     Alkaline Phosphatase   Date Value Ref Range Status   07/21/2023 94 40 - 129 U/L Final   03/31/2021 91 40 - 150 U/L Final     ALT   Date Value Ref Range Status   07/21/2023 24 0 - 70 U/L Final     Comment:     Reference intervals for this test were updated on 6/12/2023 to more accurately reflect our healthy population. There may be differences in the flagging of prior results with similar values performed with this method. Interpretation of those prior results can be made in the context of the updated reference intervals.     03/31/2021 31 0 - 70 U/L Final     AST   Date Value Ref Range Status   07/21/2023 28 0 - 45 U/L Final     Comment:     Reference intervals for this test were updated on 6/12/2023 to more accurately reflect our healthy population. There may be differences in the flagging of prior results with similar values performed with this method. Interpretation of those prior results can be made in the context of the updated reference intervals.   03/31/2021 26 0 - 45 U/L Final       NT-proBNP   7/21/2023 - 154    TROP  Lab Results   Component Value Date    TROPI <0.015 03/31/2021    TROPI <0.015 03/31/2021    TROPI <0.015 03/31/2021    TROPI <0.015 09/15/2019    TROPI <0.015 09/15/2019    TROPONIN 0.00 01/04/2020       CBC  CBC RESULTS:   Recent Labs   Lab Test 07/21/23  0844   WBC 7.9   RBC 4.68   HGB 15.5   HCT 45.7   MCV 98   MCH 33.1*   MCHC 33.9   RDW 12.7          LIPIDS  Recent Labs   Lab Test 12/12/22  1156  12/10/21  1045   CHOL 125 109   HDL 51 54   LDL 62 44   TRIG 58 53       TSH  TSH   Date Value Ref Range Status   2021 1.79 0.40 - 4.00 mU/L Final       HBA1C  Lab Results   Component Value Date    A1C 5.5 10/16/2012    A1C 5.9 2011         CARDIAC DATA:    EK2023: Heart rate 68 bpm, sinus rhythm with PACs, otherwise normal ECG    Echo: None available    Stress test:  Exercise stress echocardiogram:   Conclusion: Exercise stress echocardiogram negative for ischemia at a  diagnostic level of peak stress (93% MPHR) however EKG findings demonstrated  inferolateral ST depressions.    Exercise stress echocardiogram   Equivocal exercise echocardiogram. No regional wall motion abnormalities at rest or with exercise, but the LV function does not augment with exercise.  Consider anatomic assessment if clinical suspicion for CAD is high.     The target heart rate was achieved. Normal heart rate and BP response to exercise.  Normal biventricular size, thickness, and global systolic function at baseline, LVEF=60-65%.  With exercise, LVEF remained 60-65%; LV cavity size did not decrease.  No regional wall motion abnormalities are present at rest or with exercise.  No angina was elicited.  No ECG evidence of ischemia.  Functional capacity is reduced for age.  No significant valvular abnormalities are noted on screening Doppler exam.  The aortic root and visualized ascending aorta are normal.    Dobutamine stress echocardiogram: 2023:  Normal Dobutamine stress echocardiogram at target heart rate.  No resting or stress induced regional wallmotion abnormalities.  Normal resting and stress EKGs.  Normal heart rate and BP response to dobutamine. No symptoms during test.  Normal baseline screening echocardiogram with no significant valvular heart disease and normal size of visualized aorta.    Cardiac MRI: None available      Cardiac Catheterization: None available    Zio patch:    Worn for 12  days  Patient had a minimum heart rate of 43 bpm, maximum heart rate of 2 3 bpm and average heart rate of 69 bpm.  Predominant underlying rhythm was sinus rhythm.  3 VT runs occurred the run with the fastest interval lasting 4 beats with a maximum rate of 193 bpm (average 144 bpm); the run with the fastest interval was also the longest.  Episodes of VT may be SVT with possible aberrancy.  197 SVT runs occurred, the run with the fastest interval lasting 6 beats with a max rate of 2 3 bpm, the longest lasting 41 seconds, with an average rate of 98 bpm.  Some episodes of SVT may be possible atrial tachycardia with variable block.  Idioventricular rhythm was present.  Isolated SVE's were rare (less than 1%), SVE couplets and SVE triplets were rare.  Isolated ventricular ectopies were frequent (29.6%), VE couplets and VE triplets were rare.  Ventricular bigeminy and trigeminy were present.  Some VE's may be SVE's with possible aberrancy.  Final interpretation  Frequent PVCs (29.6%)  Nonsustained ventricular runs without associated events  Atrial runs also without associated events      ASSESSMENT/PLAN:  In summary, Collin MARVIN Donovandavid is here today with the following -    1.  Chest pain, no recurrence since ER visit  2.  Hypertension, needs better control  3.  Hyperlipidemia, last LDL 62 (December 2022)  4. Coronary artery calcification in LAD noted on CTA PE recently (not on aspirin, due to being on xarelto. He is on simvastatin with good lipid control)    Plans-  Chest pain -he has noted coronary artery calcification in the LAD noted on recent CT PE but he had negative dobutamine stress echocardiogram at that time.  He tells me he has not had any further chest pain since.  I think any further evaluation for this should be guided by symptoms and if he has recurrent chest pain perhaps can consider CTA coronaries.  In the setting of this coronary artery calcification ideally he would be on aspirin and high intensity statin.   He is not on a statin from being on Xarelto to reduce his risk of bleeding considering his age.  He is only on simvastatin (not a high intensity statin) but has good lipid control on this with most recent LDL of 62 from December 2022.  Hypertension-he would benefit from better blood pressure control and can continue follow-up with his primary care physician.  Hyperlipidemia -as above he has good control with LDL of 62 on simvastatin.  Ideally he would be on a high intensity statin but with his good lipid panel and his preference not to change medications we will continue this.  Cardiac monitor in 2021 noted high burden of PVCs, SVT, nonsustained VT -he did not have any cardiology follow-up at that time.  He tells me he does not have any symptoms currently of palpitations, lightheadedness, shortness of breath.  I have asked him to reach out to us where he to develop any symptoms and in that case would repeat a cardiac monitor to quantify his burden of arrhythmias.    Follow-up as needed with general cardiology.     I spent 46 minutes in care of the patient today including obtaining recent medical history, personally reviewing recent cardiac testing and/or lab results, today's examination, discussion of testing results and care recommendations with patient.       Thank you for the opportunity to participate in this patient's care. Please feel free to reach out with any questions or concerns.      Sujey Dutton MD, [facility]  Associate Professor of Medicine  Advanced Heart Failure, LVAD & Cardiac Transplant  Director, Cardio-Obstetrics  Cardio-Oncology  [facility]

## 2023-09-11 NOTE — PATIENT INSTRUCTIONS
Thank you for coming to the [facility] at [place]; please note the following instructions:    1. Follow up with cardiology on an as needed basis        If you have any questions regarding your visit, please contact your care team:     CARDIOLOGY  TELEPHONE NUMBER   Eliane HAWKINSRosa, Registered Nurse  Yumi PADILLA, Registered Nurse  Daxa HALE, Registered Medical Assistant  Kenyetta GILBERT, Certified Medical Assistant  Cristin LUA, Visit Facilitator [phone] (select option 1)    *After hours: [phone]   For Scheduling Appts:     [phone] (select option 1)    *After hours: [phone]   For the Device Clinic (Pacemakers and ICD's)  Annalisa TALAVERA, Registered Nurse   During business hours: [phone]    *After business hours:  [phone] (select option 4)      Normal test result notifications will be released via [employer] or mailed within 7 business days.  All other test results, will be communicated via telephone once reviewed by your cardiologist.    If you need a medication refill, please contact your pharmacy.  Please allow 3 business days for your refill to be completed.    As always, thank you for trusting us with your health care needs!

## 2023-09-11 NOTE — LETTER
2023      RE: Collin Frank  [address]  [address]  [facility] [address]       Dear Colleague,    Thank you for the opportunity to participate in the care of your patient, Collin Frank, at the [facility]. Please see a copy of my visit note below.    [facility]  Advanced Heart Failure/cardiology clinic    Patient Name: Collin Frank   : 1950   Date of Visit: 2023    Primary Care Physician: Collin Salazar MD     Referring Physician: Dr. Hernandez   Reason for Visit: Chest pain    Dear Dr. Hernandez, and Dr. Salazar,     I had the pleasure of seeing Collin Frank today in the [facility] Advanced Heart Failure/cardiology clinic. As you know Collin Frank is a pleasant 73 year old year old male, who presents today for further evaluation of chest pain.      Collin presented to the emergency room at [facility] in 2023 with chest pain.  He was ruled out for an ACS with serial negative troponins.  His EKG did not show ischemic changes.  His D-dimer was elevated and he subsequently underwent a CT PE study that was negative for PE.  The CT scan did show coronary artery calcium in the LAD.  He then underwent a dobutamine stress echo that was negative.    I also see that he has had 2 previous stress echocardiograms in  (reported equal vocal for ischemia due to lack of augmentation of heart function with exercise) and  (reported as normal).     He also wore a cardiac monitor in  that demonstrated frequent PVCs (29.6%), runs of SVT (probably atrial tachycardia) and runs of nonsustained VT that were perhaps thought to be SVT with aberrancy. I don't see he had any cardiology follow up for this.    His other medical history includes hypertension, hyperlipidemia, factor V Leiden mutation with chronic LLE DVT, and PE anticoagulated on Xarelto      He uses an exercise bike daily at home having started back using it since early Sept. He rides it for about 10-15 minutes, without cardiac symptoms. Since that ER visit, he denies chest pain, shortness of breath, PND/orthopnea, palpitations, lightheadedness, syncope, leg swelling.  Compliant with medications and notes no problems taking them.    I asked him multiple times about his cardiac symptoms and he absolutely denies any.  He states he is able to exercise and his exercise tolerance is improving.    Home BPs - 140s/80s, started checking BPs couple of days ago    ROS:  A complete 10-point ROS was negative except as above.    PAST MEDICAL HISTORY:  Past Medical History:   Diagnosis Date    Antiplatelet or antithrombotic long-term use     blood clot in leg    Long term current use of anticoagulant 10/16/2012       PAST SURGICAL HISTORY:  Past Surgical History:   Procedure Laterality Date    COLONOSCOPY  08    Normal. Repeat in 10 years    COLONOSCOPY WITH CO2 INSUFFLATION N/A 2018    Procedure: COLONOSCOPY WITH CO2 INSUFFLATION;  COLON SCREEN/ ENGELMANN;  Surgeon: Jan Flores MD;  Location: [facility] OR    rt knee ORIF      [facility]       FAMILY HISTORY:  Family History   Problem Relation Age of Onset    Alzheimer Disease Mother     Heart Disease Father     Prostate Cancer Father     Cancer Brother 65        pancreatic       Prostate Cancer Brother         2 stents 70% and 90% blockage    Heart Disease Sister        SOCIAL HISTORY:  Social History     Socioeconomic History    Marital status: Single   Tobacco Use    Smoking status: Former     Types: Cigarettes     Quit date: 2011     Years since quittin.6    Smokeless tobacco: Never   Substance and Sexual Activity    Alcohol use: No    Drug use: No    Sexual activity: Never   Other Topics Concern    Parent/sibling w/ CABG, MI or angioplasty before 65F 55M? No       MEDICATIONS:  Current Outpatient Medications    Medication Sig    cholecalciferol (VITAMIN D) 1000 UNIT tablet Take 1 tablet (1,000 Units) by mouth daily    losartan (COZAAR) 50 MG tablet Take 1 tablet (50 mg) by mouth daily    rivaroxaban ANTICOAGULANT (XARELTO ANTICOAGULANT) 20 MG TABS tablet Take 1 tablet (20 mg) by mouth daily (with dinner)    simvastatin (ZOCOR) 20 MG tablet Take 1 tablet (20 mg) by mouth At Bedtime     No current facility-administered medications for this visit.        ALLERGIES:   No Known Allergies    PHYSICAL EXAM:  BP (!) 146/86   Pulse 62   Wt 104.9 kg (231 lb 3.2 oz)   SpO2 97%   BMI 35.15 kg/m    Gen: alert, interactive, NAD  HEENT: atraumatic, EOMI, MMM  Neck: supple, no JVD  CV: RRR, no m/r/g  Chest: CTAB, no wheezes or crackles  Abd: soft, NT, ND, +BS  Ext: no LE edema, 2+ peripheral pulses  Skin: warm and dry, no rashes on exposed surfaces  Neuro: alert and oriented, no focal deficits    LABS:    CMP  Last Comprehensive Metabolic Panel:  Sodium   Date Value Ref Range Status   07/21/2023 137 136 - 145 mmol/L Final   04/01/2021 140 133 - 144 mmol/L Final     Potassium   Date Value Ref Range Status   07/21/2023 3.7 3.4 - 5.3 mmol/L Final   12/12/2022 4.1 3.4 - 5.3 mmol/L Final   04/01/2021 3.9 3.4 - 5.3 mmol/L Final     Chloride   Date Value Ref Range Status   07/21/2023 104 98 - 107 mmol/L Final   12/12/2022 109 94 - 109 mmol/L Final   04/01/2021 108 94 - 109 mmol/L Final     Carbon Dioxide   Date Value Ref Range Status   04/01/2021 25 20 - 32 mmol/L Final     Carbon Dioxide (CO2)   Date Value Ref Range Status   07/21/2023 22 22 - 29 mmol/L Final   12/12/2022 27 20 - 32 mmol/L Final     Anion Gap   Date Value Ref Range Status   07/21/2023 11 7 - 15 mmol/L Final   12/12/2022 4 3 - 14 mmol/L Final   04/01/2021 7 3 - 14 mmol/L Final     Glucose   Date Value Ref Range Status   07/21/2023 117 (H) 70 - 99 mg/dL Final   12/12/2022 89 70 - 99 mg/dL Final   04/01/2021 90 70 - 99 mg/dL Final     Urea Nitrogen   Date Value Ref Range  Status   07/21/2023 12.6 8.0 - 23.0 mg/dL Final   12/12/2022 15 7 - 30 mg/dL Final   04/01/2021 15 7 - 30 mg/dL Final     Creatinine   Date Value Ref Range Status   07/21/2023 0.91 0.67 - 1.17 mg/dL Final   04/01/2021 0.93 0.66 - 1.25 mg/dL Final     GFR Estimate   Date Value Ref Range Status   07/21/2023 88 >60 mL/min/1.73m2 Final   04/01/2021 81 >60 mL/min/[1.73_m2] Final     Comment:     Non  GFR Calc  Starting 12/18/2018, serum creatinine based estimated GFR (eGFR) will be   calculated using the Chronic Kidney Disease Epidemiology Collaboration   (CKD-EPI) equation.       Calcium   Date Value Ref Range Status   07/21/2023 9.0 8.8 - 10.2 mg/dL Final   04/01/2021 8.8 8.5 - 10.1 mg/dL Final     Bilirubin Total   Date Value Ref Range Status   07/21/2023 1.0 <=1.2 mg/dL Final   03/31/2021 0.8 0.2 - 1.3 mg/dL Final     Alkaline Phosphatase   Date Value Ref Range Status   07/21/2023 94 40 - 129 U/L Final   03/31/2021 91 40 - 150 U/L Final     ALT   Date Value Ref Range Status   07/21/2023 24 0 - 70 U/L Final     Comment:     Reference intervals for this test were updated on 6/12/2023 to more accurately reflect our healthy population. There may be differences in the flagging of prior results with similar values performed with this method. Interpretation of those prior results can be made in the context of the updated reference intervals.     03/31/2021 31 0 - 70 U/L Final     AST   Date Value Ref Range Status   07/21/2023 28 0 - 45 U/L Final     Comment:     Reference intervals for this test were updated on 6/12/2023 to more accurately reflect our healthy population. There may be differences in the flagging of prior results with similar values performed with this method. Interpretation of those prior results can be made in the context of the updated reference intervals.   03/31/2021 26 0 - 45 U/L Final       NT-proBNP   7/21/2023 - 154    TROP  Lab Results   Component Value Date    TROPI <0.015  2021    TROPI <0.015 2021    TROPI <0.015 2021    TROPI <0.015 09/15/2019    TROPI <0.015 09/15/2019    TROPONIN 0.00 2020       CBC  CBC RESULTS:   Recent Labs   Lab Test 23  0844   WBC 7.9   RBC 4.68   HGB 15.5   HCT 45.7   MCV 98   MCH 33.1*   MCHC 33.9   RDW 12.7          LIPIDS  Recent Labs   Lab Test 22  1158 12/10/21  1045   CHOL 125 109   HDL 51 54   LDL 62 44   TRIG 58 53       TSH  TSH   Date Value Ref Range Status   2021 1.79 0.40 - 4.00 mU/L Final       HBA1C  Lab Results   Component Value Date    A1C 5.5 10/16/2012    A1C 5.9 2011         CARDIAC DATA:    EK2023: Heart rate 68 bpm, sinus rhythm with PACs, otherwise normal ECG    Echo: None available    Stress test:  Exercise stress echocardiogram:   Conclusion: Exercise stress echocardiogram negative for ischemia at a  diagnostic level of peak stress (93% MPHR) however EKG findings demonstrated  inferolateral ST depressions.    Exercise stress echocardiogram   Equivocal exercise echocardiogram. No regional wall motion abnormalities at rest or with exercise, but the LV function does not augment with exercise.  Consider anatomic assessment if clinical suspicion for CAD is high.     The target heart rate was achieved. Normal heart rate and BP response to exercise.  Normal biventricular size, thickness, and global systolic function at baseline, LVEF=60-65%.  With exercise, LVEF remained 60-65%; LV cavity size did not decrease.  No regional wall motion abnormalities are present at rest or with exercise.  No angina was elicited.  No ECG evidence of ischemia.  Functional capacity is reduced for age.  No significant valvular abnormalities are noted on screening Doppler exam.  The aortic root and visualized ascending aorta are normal.    Dobutamine stress echocardiogram: 2023:  Normal Dobutamine stress echocardiogram at target heart rate.  No resting or stress induced regional  wallmotion abnormalities.  Normal resting and stress EKGs.  Normal heart rate and BP response to dobutamine. No symptoms during test.  Normal baseline screening echocardiogram with no significant valvular heart disease and normal size of visualized aorta.    Cardiac MRI: None available      Cardiac Catheterization: None available    Zio patch:  2021  Worn for 12 days  Patient had a minimum heart rate of 43 bpm, maximum heart rate of 2 3 bpm and average heart rate of 69 bpm.  Predominant underlying rhythm was sinus rhythm.  3 VT runs occurred the run with the fastest interval lasting 4 beats with a maximum rate of 193 bpm (average 144 bpm); the run with the fastest interval was also the longest.  Episodes of VT may be SVT with possible aberrancy.  197 SVT runs occurred, the run with the fastest interval lasting 6 beats with a max rate of 2 3 bpm, the longest lasting 41 seconds, with an average rate of 98 bpm.  Some episodes of SVT may be possible atrial tachycardia with variable block.  Idioventricular rhythm was present.  Isolated SVE's were rare (less than 1%), SVE couplets and SVE triplets were rare.  Isolated ventricular ectopies were frequent (29.6%), VE couplets and VE triplets were rare.  Ventricular bigeminy and trigeminy were present.  Some VE's may be SVE's with possible aberrancy.  Final interpretation  Frequent PVCs (29.6%)  Nonsustained ventricular runs without associated events  Atrial runs also without associated events      ASSESSMENT/PLAN:  In summary, Collin Mooreraodavid is here today with the following -    1.  Chest pain, no recurrence since ER visit  2.  Hypertension, needs better control  3.  Hyperlipidemia, last LDL 62 (December 2022)  4. Coronary artery calcification in LAD noted on CTA PE recently (not on aspirin, due to being on xarelto. He is on simvastatin with good lipid control)    Plans-  Chest pain -he has noted coronary artery calcification in the LAD noted on recent CT PE but he had  negative dobutamine stress echocardiogram at that time.  He tells me he has not had any further chest pain since.  I think any further evaluation for this should be guided by symptoms and if he has recurrent chest pain perhaps can consider CTA coronaries.  In the setting of this coronary artery calcification ideally he would be on aspirin and high intensity statin.  He is not on a statin from being on Xarelto to reduce his risk of bleeding considering his age.  He is only on simvastatin (not a high intensity statin) but has good lipid control on this with most recent LDL of 62 from December 2022.  Hypertension-he would benefit from better blood pressure control and can continue follow-up with his primary care physician.  Hyperlipidemia -as above he has good control with LDL of 62 on simvastatin.  Ideally he would be on a high intensity statin but with his good lipid panel and his preference not to change medications we will continue this.  Cardiac monitor in 2021 noted high burden of PVCs, SVT, nonsustained VT -he did not have any cardiology follow-up at that time.  He tells me he does not have any symptoms currently of palpitations, lightheadedness, shortness of breath.  I have asked him to reach out to us where he to develop any symptoms and in that case would repeat a cardiac monitor to quantify his burden of arrhythmias.    Follow-up as needed with general cardiology.     I spent 46 minutes in care of the patient today including obtaining recent medical history, personally reviewing recent cardiac testing and/or lab results, today's examination, discussion of testing results and care recommendations with patient.       Thank you for the opportunity to participate in this patient's care. Please feel free to reach out with any questions or concerns.      Sujey Dutton MD, [facility]  Associate Professor of Medicine  Advanced Heart Failure, LVAD & Cardiac Transplant  Director,  Cardio-Obstetrics  Cardio-Oncology  [facility]

## 2023-09-11 NOTE — NURSING NOTE
"Chief Complaint   Patient presents with    New Patient    Chest Pain    Cardiology Problem     Establish care heart check up       Initial BP (!) 146/86   Pulse 62   Wt 104.9 kg (231 lb 3.2 oz)   SpO2 97%   BMI 35.15 kg/m   Estimated body mass index is 35.15 kg/m  as calculated from the following:    Height as of 5/10/23: 1.727 m (5' 8\").    Weight as of this encounter: 104.9 kg (231 lb 3.2 oz)..  BP completed using cuff size: noemy Edwards, FILIPE      "

## 2023-09-29 ENCOUNTER — MEDICAL CORRESPONDENCE (OUTPATIENT)
Dept: HEALTH INFORMATION MANAGEMENT | Facility: CLINIC | Age: 73
End: 2023-09-29
Payer: COMMERCIAL

## 2023-10-18 ENCOUNTER — TELEPHONE (OUTPATIENT)
Dept: FAMILY MEDICINE | Facility: CLINIC | Age: 73
End: 2023-10-18
Payer: COMMERCIAL

## 2023-10-18 NOTE — TELEPHONE ENCOUNTER
Reason for Call:  Form, our goal is to have forms completed with 72 hours, however, some forms may require a visit or additional information.    Type of letter, form or note:   Blood Pressure records for chart    Who is the form from?: Patient    Where did the form come from: Patient or family brought in       What clinic location was the form placed at?: [facility]    Where the form was placed:  Provider box    What number is listed as a contact on the form?: [phone]       Additional comments: Pt dropped off attached BP records to be added to chart.    Call taken on 10/18/2023 at 10:42 AM by Aziza Del Rio

## 2023-11-06 DIAGNOSIS — I82.532 CHRONIC DEEP VEIN THROMBOSIS (DVT) OF POPLITEAL VEIN OF LEFT LOWER EXTREMITY (H): ICD-10-CM

## 2023-11-06 RX ORDER — RIVAROXABAN 20 MG/1
20 TABLET, FILM COATED ORAL
Qty: 90 TABLET | Refills: 3 | Status: SHIPPED | OUTPATIENT
Start: 2023-11-06

## 2023-12-11 ENCOUNTER — APPOINTMENT (OUTPATIENT)
Dept: CARDIOLOGY | Facility: CLINIC | Age: 73
End: 2023-12-11
Payer: COMMERCIAL

## 2023-12-11 ENCOUNTER — ANESTHESIA (OUTPATIENT)
Dept: SURGERY | Facility: CLINIC | Age: 73
End: 2023-12-11
Payer: COMMERCIAL

## 2023-12-11 ENCOUNTER — HOSPITAL ENCOUNTER (OUTPATIENT)
Facility: CLINIC | Age: 73
Setting detail: OBSERVATION
Discharge: HOME | End: 2023-12-11
Attending: EMERGENCY MEDICINE | Admitting: STUDENT IN AN ORGANIZED HEALTH CARE EDUCATION/TRAINING PROGRAM
Payer: COMMERCIAL

## 2023-12-11 ENCOUNTER — APPOINTMENT (OUTPATIENT)
Dept: GENERAL RADIOLOGY | Facility: CLINIC | Age: 73
End: 2023-12-11
Attending: EMERGENCY MEDICINE
Payer: COMMERCIAL

## 2023-12-11 ENCOUNTER — ANESTHESIA EVENT (OUTPATIENT)
Dept: SURGERY | Facility: CLINIC | Age: 73
End: 2023-12-11
Payer: COMMERCIAL

## 2023-12-11 ENCOUNTER — APPOINTMENT (OUTPATIENT)
Dept: CARDIOLOGY | Facility: CLINIC | Age: 73
End: 2023-12-11
Attending: NURSE PRACTITIONER
Payer: COMMERCIAL

## 2023-12-11 ENCOUNTER — HOSPITAL ENCOUNTER (OUTPATIENT)
Facility: CLINIC | Age: 73
End: 2023-12-11
Payer: COMMERCIAL

## 2023-12-11 VITALS
HEART RATE: 87 BPM | DIASTOLIC BLOOD PRESSURE: 86 MMHG | OXYGEN SATURATION: 97 % | BODY MASS INDEX: 33.62 KG/M2 | HEIGHT: 69 IN | SYSTOLIC BLOOD PRESSURE: 141 MMHG | WEIGHT: 227 LBS | RESPIRATION RATE: 11 BRPM | TEMPERATURE: 98.5 F

## 2023-12-11 DIAGNOSIS — I48.91 ATRIAL FIBRILLATION WITH RAPID VENTRICULAR RESPONSE (H): ICD-10-CM

## 2023-12-11 DIAGNOSIS — I48.91 ATRIAL FIBRILLATION WITH RVR (H): Primary | ICD-10-CM

## 2023-12-11 LAB
ALBUMIN SERPL BCG-MCNC: 3.7 G/DL (ref 3.5–5.2)
ALP SERPL-CCNC: 76 U/L (ref 40–150)
ALT SERPL W P-5'-P-CCNC: 21 U/L (ref 0–70)
ANION GAP SERPL CALCULATED.3IONS-SCNC: 12 MMOL/L (ref 7–15)
AST SERPL W P-5'-P-CCNC: 25 U/L (ref 0–45)
ATRIAL RATE - MUSE: 111 BPM
ATRIAL RATE - MUSE: 136 BPM
ATRIAL RATE - MUSE: 82 BPM
BASOPHILS # BLD AUTO: 0.1 10E3/UL (ref 0–0.2)
BASOPHILS NFR BLD AUTO: 1 %
BILIRUB SERPL-MCNC: 1.1 MG/DL
BUN SERPL-MCNC: 15.9 MG/DL (ref 8–23)
CALCIUM SERPL-MCNC: 9 MG/DL (ref 8.8–10.2)
CHLORIDE SERPL-SCNC: 106 MMOL/L (ref 98–107)
CREAT SERPL-MCNC: 1.16 MG/DL (ref 0.67–1.17)
CRP SERPL-MCNC: <3 MG/L
D DIMER PPP FEU-MCNC: 0.79 UG/ML FEU (ref 0–0.5)
DEPRECATED HCO3 PLAS-SCNC: 20 MMOL/L (ref 22–29)
DIASTOLIC BLOOD PRESSURE - MUSE: NORMAL MMHG
EGFRCR SERPLBLD CKD-EPI 2021: 66 ML/MIN/1.73M2
EOSINOPHIL # BLD AUTO: 0.1 10E3/UL (ref 0–0.7)
EOSINOPHIL NFR BLD AUTO: 1 %
ERYTHROCYTE [DISTWIDTH] IN BLOOD BY AUTOMATED COUNT: 13.2 % (ref 10–15)
ERYTHROCYTE [SEDIMENTATION RATE] IN BLOOD BY WESTERGREN METHOD: 7 MM/HR (ref 0–20)
GLUCOSE SERPL-MCNC: 116 MG/DL (ref 70–99)
HCT VFR BLD AUTO: 46.2 % (ref 40–53)
HGB BLD-MCNC: 15.7 G/DL (ref 13.3–17.7)
HOLD SPECIMEN: NORMAL
IMM GRANULOCYTES # BLD: 0 10E3/UL
IMM GRANULOCYTES NFR BLD: 1 %
INTERPRETATION ECG - MUSE: NORMAL
LVEF ECHO: NORMAL
LYMPHOCYTES # BLD AUTO: 1.8 10E3/UL (ref 0.8–5.3)
LYMPHOCYTES NFR BLD AUTO: 22 %
MAGNESIUM SERPL-MCNC: 2 MG/DL (ref 1.7–2.3)
MCH RBC QN AUTO: 33.1 PG (ref 26.5–33)
MCHC RBC AUTO-ENTMCNC: 34 G/DL (ref 31.5–36.5)
MCV RBC AUTO: 97 FL (ref 78–100)
MONOCYTES # BLD AUTO: 0.7 10E3/UL (ref 0–1.3)
MONOCYTES NFR BLD AUTO: 8 %
NEUTROPHILS # BLD AUTO: 5.4 10E3/UL (ref 1.6–8.3)
NEUTROPHILS NFR BLD AUTO: 67 %
NRBC # BLD AUTO: 0 10E3/UL
NRBC BLD AUTO-RTO: 0 /100
P AXIS - MUSE: 55 DEGREES
P AXIS - MUSE: NORMAL DEGREES
P AXIS - MUSE: NORMAL DEGREES
PLATELET # BLD AUTO: 244 10E3/UL (ref 150–450)
POTASSIUM SERPL-SCNC: 3.7 MMOL/L (ref 3.4–5.3)
PR INTERVAL - MUSE: 156 MS
PR INTERVAL - MUSE: NORMAL MS
PR INTERVAL - MUSE: NORMAL MS
PROT SERPL-MCNC: 6.4 G/DL (ref 6.4–8.3)
QRS DURATION - MUSE: 76 MS
QRS DURATION - MUSE: 80 MS
QRS DURATION - MUSE: 82 MS
QT - MUSE: 296 MS
QT - MUSE: 334 MS
QT - MUSE: 370 MS
QTC - MUSE: 396 MS
QTC - MUSE: 432 MS
QTC - MUSE: 468 MS
R AXIS - MUSE: -21 DEGREES
R AXIS - MUSE: -28 DEGREES
R AXIS - MUSE: -40 DEGREES
RBC # BLD AUTO: 4.75 10E6/UL (ref 4.4–5.9)
SODIUM SERPL-SCNC: 138 MMOL/L (ref 135–145)
SYSTOLIC BLOOD PRESSURE - MUSE: NORMAL MMHG
T AXIS - MUSE: 119 DEGREES
T AXIS - MUSE: 34 DEGREES
T AXIS - MUSE: 61 DEGREES
TROPONIN T SERPL HS-MCNC: 26 NG/L
TROPONIN T SERPL HS-MCNC: 26 NG/L
TSH SERPL DL<=0.005 MIU/L-ACNC: 2.42 UIU/ML (ref 0.3–4.2)
VENTRICULAR RATE- MUSE: 108 BPM
VENTRICULAR RATE- MUSE: 118 BPM
VENTRICULAR RATE- MUSE: 82 BPM
WBC # BLD AUTO: 8 10E3/UL (ref 4–11)

## 2023-12-11 PROCEDURE — 93246 EXT ECG>7D<15D RECORDING: CPT

## 2023-12-11 PROCEDURE — 93010 ELECTROCARDIOGRAM REPORT: CPT | Performed by: EMERGENCY MEDICINE

## 2023-12-11 PROCEDURE — 92960 CARDIOVERSION ELECTRIC EXT: CPT

## 2023-12-11 PROCEDURE — 71045 X-RAY EXAM CHEST 1 VIEW: CPT

## 2023-12-11 PROCEDURE — 99285 EMERGENCY DEPT VISIT HI MDM: CPT | Mod: 25 | Performed by: EMERGENCY MEDICINE

## 2023-12-11 PROCEDURE — 84484 ASSAY OF TROPONIN QUANT: CPT | Performed by: EMERGENCY MEDICINE

## 2023-12-11 PROCEDURE — 250N000009 HC RX 250: Performed by: NURSE ANESTHETIST, CERTIFIED REGISTERED

## 2023-12-11 PROCEDURE — 93005 ELECTROCARDIOGRAM TRACING: CPT | Performed by: EMERGENCY MEDICINE

## 2023-12-11 PROCEDURE — 36415 COLL VENOUS BLD VENIPUNCTURE: CPT | Performed by: EMERGENCY MEDICINE

## 2023-12-11 PROCEDURE — 83735 ASSAY OF MAGNESIUM: CPT | Performed by: EMERGENCY MEDICINE

## 2023-12-11 PROCEDURE — 93320 DOPPLER ECHO COMPLETE: CPT | Mod: 26 | Performed by: INTERNAL MEDICINE

## 2023-12-11 PROCEDURE — 85014 HEMATOCRIT: CPT | Performed by: EMERGENCY MEDICINE

## 2023-12-11 PROCEDURE — 92960 CARDIOVERSION ELECTRIC EXT: CPT | Performed by: NURSE PRACTITIONER

## 2023-12-11 PROCEDURE — 250N000013 HC RX MED GY IP 250 OP 250 PS 637

## 2023-12-11 PROCEDURE — 86140 C-REACTIVE PROTEIN: CPT | Performed by: EMERGENCY MEDICINE

## 2023-12-11 PROCEDURE — 71045 X-RAY EXAM CHEST 1 VIEW: CPT | Mod: 26 | Performed by: RADIOLOGY

## 2023-12-11 PROCEDURE — 85379 FIBRIN DEGRADATION QUANT: CPT | Performed by: EMERGENCY MEDICINE

## 2023-12-11 PROCEDURE — G0378 HOSPITAL OBSERVATION PER HR: HCPCS

## 2023-12-11 PROCEDURE — 80053 COMPREHEN METABOLIC PANEL: CPT | Performed by: EMERGENCY MEDICINE

## 2023-12-11 PROCEDURE — 370N000017 HC ANESTHESIA TECHNICAL FEE, PER MIN

## 2023-12-11 PROCEDURE — 84443 ASSAY THYROID STIM HORMONE: CPT | Performed by: EMERGENCY MEDICINE

## 2023-12-11 PROCEDURE — 250N000011 HC RX IP 250 OP 636: Performed by: INTERNAL MEDICINE

## 2023-12-11 PROCEDURE — 250N000009 HC RX 250: Performed by: INTERNAL MEDICINE

## 2023-12-11 PROCEDURE — 258N000003 HC RX IP 258 OP 636: Performed by: INTERNAL MEDICINE

## 2023-12-11 PROCEDURE — 93312 ECHO TRANSESOPHAGEAL: CPT | Mod: 26 | Performed by: INTERNAL MEDICINE

## 2023-12-11 PROCEDURE — 93325 DOPPLER ECHO COLOR FLOW MAPG: CPT | Mod: 26 | Performed by: INTERNAL MEDICINE

## 2023-12-11 PROCEDURE — 93325 DOPPLER ECHO COLOR FLOW MAPG: CPT

## 2023-12-11 PROCEDURE — 99207 PR NO BILLABLE SERVICE THIS VISIT: CPT | Performed by: INTERNAL MEDICINE

## 2023-12-11 PROCEDURE — 96360 HYDRATION IV INFUSION INIT: CPT | Mod: 59

## 2023-12-11 PROCEDURE — 85652 RBC SED RATE AUTOMATED: CPT | Performed by: EMERGENCY MEDICINE

## 2023-12-11 PROCEDURE — 99236 HOSP IP/OBS SAME DATE HI 85: CPT | Mod: 25

## 2023-12-11 RX ORDER — METOPROLOL SUCCINATE 25 MG/1
25 TABLET, EXTENDED RELEASE ORAL DAILY
Qty: 90 TABLET | Refills: 3 | Status: SHIPPED | OUTPATIENT
Start: 2023-12-11 | End: 2024-03-13

## 2023-12-11 RX ORDER — ACETAMINOPHEN 650 MG/1
650 SUPPOSITORY RECTAL EVERY 4 HOURS PRN
Status: DISCONTINUED | OUTPATIENT
Start: 2023-12-11 | End: 2023-12-11 | Stop reason: HOSPADM

## 2023-12-11 RX ORDER — NALOXONE HYDROCHLORIDE 0.4 MG/ML
0.4 INJECTION, SOLUTION INTRAMUSCULAR; INTRAVENOUS; SUBCUTANEOUS
Status: DISCONTINUED | OUTPATIENT
Start: 2023-12-11 | End: 2023-12-11

## 2023-12-11 RX ORDER — NITROGLYCERIN 0.4 MG/1
0.4 TABLET SUBLINGUAL EVERY 5 MIN PRN
Status: DISCONTINUED | OUTPATIENT
Start: 2023-12-11 | End: 2023-12-11 | Stop reason: HOSPADM

## 2023-12-11 RX ORDER — NALOXONE HYDROCHLORIDE 0.4 MG/ML
0.2 INJECTION, SOLUTION INTRAMUSCULAR; INTRAVENOUS; SUBCUTANEOUS
Status: DISCONTINUED | OUTPATIENT
Start: 2023-12-11 | End: 2023-12-11

## 2023-12-11 RX ORDER — POTASSIUM CHLORIDE 750 MG/1
20 TABLET, EXTENDED RELEASE ORAL
Status: CANCELLED | OUTPATIENT
Start: 2023-12-11

## 2023-12-11 RX ORDER — VITAMIN B COMPLEX
1000 TABLET ORAL DAILY
Status: DISCONTINUED | OUTPATIENT
Start: 2023-12-12 | End: 2023-12-11 | Stop reason: HOSPADM

## 2023-12-11 RX ORDER — LIDOCAINE 40 MG/G
CREAM TOPICAL
Status: DISCONTINUED | OUTPATIENT
Start: 2023-12-11 | End: 2023-12-11 | Stop reason: HOSPADM

## 2023-12-11 RX ORDER — LIDOCAINE HYDROCHLORIDE 20 MG/ML
15 SOLUTION OROPHARYNGEAL ONCE
Status: COMPLETED | OUTPATIENT
Start: 2023-12-11 | End: 2023-12-11

## 2023-12-11 RX ORDER — LIDOCAINE 40 MG/G
CREAM TOPICAL
Status: DISCONTINUED | OUTPATIENT
Start: 2023-12-11 | End: 2023-12-11

## 2023-12-11 RX ORDER — SIMVASTATIN 20 MG
20 TABLET ORAL AT BEDTIME
Status: DISCONTINUED | OUTPATIENT
Start: 2023-12-11 | End: 2023-12-11 | Stop reason: HOSPADM

## 2023-12-11 RX ORDER — ACETAMINOPHEN 325 MG/1
650 TABLET ORAL EVERY 4 HOURS PRN
Status: DISCONTINUED | OUTPATIENT
Start: 2023-12-11 | End: 2023-12-11 | Stop reason: HOSPADM

## 2023-12-11 RX ORDER — ACYCLOVIR 200 MG/1
9.5 CAPSULE ORAL
Status: DISCONTINUED | OUTPATIENT
Start: 2023-12-11 | End: 2023-12-11

## 2023-12-11 RX ORDER — AMOXICILLIN 250 MG
2 CAPSULE ORAL 2 TIMES DAILY PRN
Status: DISCONTINUED | OUTPATIENT
Start: 2023-12-11 | End: 2023-12-11 | Stop reason: HOSPADM

## 2023-12-11 RX ORDER — BISACODYL 10 MG
10 SUPPOSITORY, RECTAL RECTAL DAILY PRN
Status: DISCONTINUED | OUTPATIENT
Start: 2023-12-11 | End: 2023-12-11 | Stop reason: HOSPADM

## 2023-12-11 RX ORDER — POLYETHYLENE GLYCOL 3350 17 G/17G
17 POWDER, FOR SOLUTION ORAL DAILY PRN
Status: DISCONTINUED | OUTPATIENT
Start: 2023-12-11 | End: 2023-12-11 | Stop reason: HOSPADM

## 2023-12-11 RX ORDER — FENTANYL CITRATE 50 UG/ML
25 INJECTION, SOLUTION INTRAMUSCULAR; INTRAVENOUS
Status: DISCONTINUED | OUTPATIENT
Start: 2023-12-11 | End: 2023-12-11

## 2023-12-11 RX ORDER — ONDANSETRON 2 MG/ML
4 INJECTION INTRAMUSCULAR; INTRAVENOUS EVERY 6 HOURS PRN
Status: DISCONTINUED | OUTPATIENT
Start: 2023-12-11 | End: 2023-12-11 | Stop reason: HOSPADM

## 2023-12-11 RX ORDER — MAGNESIUM SULFATE HEPTAHYDRATE 40 MG/ML
2 INJECTION, SOLUTION INTRAVENOUS
Status: CANCELLED | OUTPATIENT
Start: 2023-12-11

## 2023-12-11 RX ORDER — SODIUM CHLORIDE 9 MG/ML
INJECTION, SOLUTION INTRAVENOUS CONTINUOUS PRN
Status: DISCONTINUED | OUTPATIENT
Start: 2023-12-11 | End: 2023-12-11

## 2023-12-11 RX ORDER — FLUMAZENIL 0.1 MG/ML
0.2 INJECTION, SOLUTION INTRAVENOUS
Status: DISCONTINUED | OUTPATIENT
Start: 2023-12-11 | End: 2023-12-11

## 2023-12-11 RX ORDER — METOPROLOL TARTRATE 25 MG/1
12.5 TABLET, FILM COATED ORAL 2 TIMES DAILY
Qty: 90 TABLET | Refills: 3 | Status: SHIPPED | OUTPATIENT
Start: 2023-12-11 | End: 2023-12-11

## 2023-12-11 RX ORDER — MAGNESIUM HYDROXIDE/ALUMINUM HYDROXICE/SIMETHICONE 120; 1200; 1200 MG/30ML; MG/30ML; MG/30ML
30 SUSPENSION ORAL EVERY 4 HOURS PRN
Status: DISCONTINUED | OUTPATIENT
Start: 2023-12-11 | End: 2023-12-11 | Stop reason: HOSPADM

## 2023-12-11 RX ORDER — ONDANSETRON 4 MG/1
4 TABLET, ORALLY DISINTEGRATING ORAL EVERY 6 HOURS PRN
Status: DISCONTINUED | OUTPATIENT
Start: 2023-12-11 | End: 2023-12-11 | Stop reason: HOSPADM

## 2023-12-11 RX ORDER — POTASSIUM CHLORIDE 750 MG/1
40 TABLET, EXTENDED RELEASE ORAL
Status: CANCELLED | OUTPATIENT
Start: 2023-12-11

## 2023-12-11 RX ORDER — AMOXICILLIN 250 MG
1 CAPSULE ORAL 2 TIMES DAILY PRN
Status: DISCONTINUED | OUTPATIENT
Start: 2023-12-11 | End: 2023-12-11 | Stop reason: HOSPADM

## 2023-12-11 RX ORDER — LOSARTAN POTASSIUM 50 MG/1
50 TABLET ORAL DAILY
Status: DISCONTINUED | OUTPATIENT
Start: 2023-12-11 | End: 2023-12-11 | Stop reason: HOSPADM

## 2023-12-11 RX ADMIN — TOPICAL ANESTHETIC 0.5 ML: 200 SPRAY DENTAL; PERIODONTAL at 14:53

## 2023-12-11 RX ADMIN — LIDOCAINE HYDROCHLORIDE 30 ML: 20 SOLUTION ORAL; TOPICAL at 14:48

## 2023-12-11 RX ADMIN — METHOHEXITAL SODIUM 30 MG: 500 INJECTION, POWDER, LYOPHILIZED, FOR SOLUTION INTRAMUSCULAR; INTRAVENOUS; RECTAL at 15:26

## 2023-12-11 RX ADMIN — SODIUM CHLORIDE 250 ML/HR: 9 INJECTION, SOLUTION INTRAVENOUS at 15:43

## 2023-12-11 RX ADMIN — MIDAZOLAM 1 MG: 1 INJECTION INTRAMUSCULAR; INTRAVENOUS at 14:58

## 2023-12-11 RX ADMIN — RIVAROXABAN 20 MG: 20 TABLET, FILM COATED ORAL at 17:21

## 2023-12-11 RX ADMIN — FENTANYL CITRATE 50 MCG: 50 INJECTION, SOLUTION INTRAMUSCULAR; INTRAVENOUS at 14:58

## 2023-12-11 ASSESSMENT — ACTIVITIES OF DAILY LIVING (ADL)
ADLS_ACUITY_SCORE: 35

## 2023-12-11 ASSESSMENT — LIFESTYLE VARIABLES: TOBACCO_USE: 1

## 2023-12-11 NOTE — ANESTHESIA PREPROCEDURE EVALUATION
Anesthesia Pre-Procedure Evaluation    Patient: Collin Frank   MRN: [id number] : 1950        Procedure : Procedure(s):  Anesthesia cardioversion          Collin Frank is a 73 year old male with past medical history of factor V Leiden mutation, chronic left lower extremity DVT and PE on Xarelto, HTN, prostate cancer who presents to the ER via ambulance for evaluation of generalized weakness, increasing shortness of breath for the past 3 days, episode of fatigue and diaphoresis this morning. Found to be in Afib RVR. Was NPO since 12/10, went for ALON/DCCV.     Past Medical History:   Diagnosis Date    Antiplatelet or antithrombotic long-term use     blood clot in leg    Long term current use of anticoagulant 10/16/2012      Past Surgical History:   Procedure Laterality Date    COLONOSCOPY  08    Normal. Repeat in 10 years    COLONOSCOPY WITH CO2 INSUFFLATION N/A 2018    Procedure: COLONOSCOPY WITH CO2 INSUFFLATION;  COLON SCREEN/ ENGELMANN;  Surgeon: Jan Flores MD;  Location: [facility] OR    rt knee ORIF      [facility]      No Known Allergies   Social History     Tobacco Use    Smoking status: Former     Types: Cigarettes     Quit date: 2011     Years since quittin.8    Smokeless tobacco: Never   Substance Use Topics    Alcohol use: No      Wt Readings from Last 1 Encounters:   23 103 kg (227 lb)        Anesthesia Evaluation            ROS/MED HX  ENT/Pulmonary:     (+)                tobacco use, Past use,                      Neurologic:       Cardiovascular:     (+)  hypertension- -   -  - -   Taking blood thinners                                   METS/Exercise Tolerance:     Hematologic:     (+) History of blood clots,    pt is anticoagulated,           Musculoskeletal:       GI/Hepatic:     (+)             liver disease,       Renal/Genitourinary:     (+) renal disease,             Endo:     (+)               Obesity,       Psychiatric/Substance Use:      "  Infectious Disease:       Malignancy:       Other:            Physical Exam    Airway        Mallampati: III   TM distance: > 3 FB   Neck ROM: full   Mouth opening: > 3 cm    Respiratory Devices and Support         Dental       (+) Modest Abnormalities - crowns, retainers, 1 or 2 missing teeth      Cardiovascular          Rhythm and rate: irregular   (+) weak pulses       Pulmonary                   OUTSIDE LABS:  CBC:   Lab Results   Component Value Date    WBC 8.0 12/11/2023    WBC 7.9 07/21/2023    HGB 15.7 12/11/2023    HGB 15.5 07/21/2023    HCT 46.2 12/11/2023    HCT 45.7 07/21/2023     12/11/2023     07/21/2023     BMP:   Lab Results   Component Value Date     12/11/2023     07/21/2023    POTASSIUM 3.7 12/11/2023    POTASSIUM 3.7 07/21/2023    CHLORIDE 106 12/11/2023    CHLORIDE 104 07/21/2023    CO2 20 (L) 12/11/2023    CO2 22 07/21/2023    BUN 15.9 12/11/2023    BUN 12.6 07/21/2023    CR 1.16 12/11/2023    CR 0.91 07/21/2023     (H) 12/11/2023     (H) 07/21/2023     COAGS:   Lab Results   Component Value Date    PTT 28 07/21/2023    INR 1.42 (H) 07/21/2023     POC: No results found for: \"BGM\", \"HCG\", \"HCGS\"  HEPATIC:   Lab Results   Component Value Date    ALBUMIN 3.7 12/11/2023    PROTTOTAL 6.4 12/11/2023    ALT 21 12/11/2023    AST 25 12/11/2023    ALKPHOS 76 12/11/2023    BILITOTAL 1.1 12/11/2023     OTHER:   Lab Results   Component Value Date    A1C 5.5 10/16/2012    MARTA 9.0 12/11/2023    PHOS 2.8 04/01/2021    MAG 2.0 12/11/2023    LIPASE 19 07/21/2023    TSH 2.42 12/11/2023    SED 7 12/11/2023       Anesthesia Plan    ASA Status:  3, emergent    NPO Status:  NPO Appropriate    Anesthesia Type: MAC.     - Reason for MAC: chronic cardiopulmonary disease, immobility needed, straight local not clinically adequate              Consents    Anesthesia Plan(s) and associated risks, benefits, and realistic alternatives discussed. Questions answered and " "patient/representative(s) expressed understanding.     - Discussed: Risks, Benefits and Alternatives for BOTH SEDATION and the PROCEDURE were discussed     - Discussed with:  Patient      - Extended Intubation/Ventilatory Support Discussed: No.      - Patient is DNR/DNI Status: No     Use of blood products discussed: No .     Postoperative Care    Pain management: IV analgesics.   PONV prophylaxis: Ondansetron (or other 5HT-3), Dexamethasone or Solumedrol     Comments:           H&P reviewed: Unable to attach H&P to encounter due to EHR limitations. H&P Update: appropriate H&P reviewed, patient examined. No interval changes since H&P (within 30 days).        Kosta Alas MD    I have reviewed the pertinent notes and labs in the chart from the past 30 days and (re)examined the patient.  Any updates or changes from those notes are reflected in this note.            # Drug Induced Coagulation Defect: home medication list includes an anticoagulant medication   # Obesity: Estimated body mass index is 33.62 kg/m  as calculated from the following:    Height as of this encounter: 1.75 m (5' 8.9\").    Weight as of this encounter: 103 kg (227 lb).      "

## 2023-12-11 NOTE — PRE-PROCEDURE
GENERAL PRE-PROCEDURE:   Date/Time:  12/11/2023 3:16 PM    Verbal consent obtained?: Yes    Written consent obtained?: Yes    Risks and benefits: Risks, benefits and alternatives were discussed    Consent given by:  Patient  Patient states understanding of procedure being performed: Yes    Patient's understanding of procedure matches consent: Yes    Procedure consent matches procedure scheduled: Yes    Expected level of sedation:  Moderate  Appropriately NPO:  Yes  ASA Class:  2  Mallampati  :  Grade 2- soft palate, base of uvula, tonsillar pillars, and portion of posterior pharyngeal wall visible  Lungs:  Lungs clear with good breath sounds bilaterally  Heart:  A-fib  History & Physical reviewed:  History and physical reviewed and no updates needed  Statement of review:  I have reviewed the lab findings, diagnostic data, medications, and the plan for sedation    Narinder Laureano MD  Cardiology Fellow

## 2023-12-11 NOTE — MEDICATION SCRIBE - ADMISSION MEDICATION HISTORY
Medication Scribe Admission Medication History    Admission medication history is complete. The information provided in this note is only as accurate as the sources available at the time of the update.    Information Source(s): Patient and CareEverywhere/SureScripts via in-person    Pertinent Information: None    Changes made to PTA medication list:  Added: None  Deleted: None  Changed: None    Medication Affordability:  Not including over the counter (OTC) medications, was there a time in the past 3 months when you did not take your medications as prescribed because of cost?: No    Allergies reviewed with patient and updates made in EHR: yes    Medication History Completed By: Love Chawla 12/11/2023 2:33 PM    Prior to Admission medications    Medication Sig Last Dose Taking? Auth Provider Long Term End Date   cholecalciferol (VITAMIN D) 1000 UNIT tablet Take 1 tablet (1,000 Units) by mouth daily 12/11/2023 at AM Yes Isaac Membreno MD     losartan (COZAAR) 50 MG tablet Take 1 tablet (50 mg) by mouth daily 12/11/2023 at AM Yes Delfin Hernandez MD Yes    simvastatin (ZOCOR) 20 MG tablet Take 1 tablet (20 mg) by mouth At Bedtime 12/10/2023 at PM Yes Collin Salazar MD Yes    XARELTO ANTICOAGULANT 20 MG TABS tablet TAKE 1 TABLET DAILY WITH DINNER 12/10/2023 at PM Yes Collin Salazar MD Yes

## 2023-12-11 NOTE — DISCHARGE SUMMARY
"    [address] [address]  p: [phone]    Discharge Summary: Cardiology Service    Collin Frank MRN# [id number]   YOB: 1950 Age: 73 year old       Admission Date: 12/11/2023  Discharge Date: 12/11/2023    Discharge Diagnoses:  # Paroxysmal Atrial Fibrillation w/ Rapid Ventricular Rate  # Factor V Leiden Mutation  # Drug Induced Coagulation Defect  # Hypertension  # Obesity  # Hyperlipidemia      Brief HPI:  Collin Frank is a 73 year old male with past medical history of factor V Leiden mutation, chronic left lower extremity DVT and PE on Xarelto, HTN, prostate cancer who presents to the ER via ambulance for evaluation of generalized weakness, increasing shortness of breath for the past 3 days, episode of fatigue and diaphoresis this morning. Found to be in Afib RVR. Was NPO since 12/10, went for ALON/DCCV. EF 55-60% and free of thrombus. Cardioverted to NSR.      Hospital Course by Diagnosis:  # Paroxysmal Atrial Fibrillation w/ Rapid Ventricular Rate  History of coagulation disorder, DVT, PE. Patient also had a wearable cardiac monitor study in 2021 that demonstrated frequent PVCs (29.6%), runs of SVT (probably atrial tachycardia) and runs of nonsustained VT that were perhaps thought to be SVT with aberrancy.  - TSH 2.42  - AC: CHADSVASC 5 = 7.2% annual stroke risk, continue PTA Xarelto  - rate control: start metoprolol succinate 25 mg daily  - rhythm control: ALON/DCCV --> converted to NSR  - discharge with [facility]      # Factor V Leiden Mutation  # Drug Induced Coagulation Defect   - continue PTA Xarelto     # Hypertension    - continue PTA Losartan 50 mg daily     # Obesity  Estimated body mass index is 33.62 kg/m  as calculated from the following:    Height as of this encounter: 1.75 m (5' 8.9\").    Weight as of this encounter: 103 kg (227 lb).            # Hyperlipidemia  - continue PTA simvastatin      Pertinent " Procedures:  1. ALON/DCCV    Consults:  none    Medication Changes:  Start metoprolol succinate 25 mg daily    Discharge medications:   Current Discharge Medication List        START taking these medications    Details   metoprolol tartrate (LOPRESSOR) 25 MG tablet Take 0.5 tablets (12.5 mg) by mouth 2 times daily  Qty: 90 tablet, Refills: 3    Associated Diagnoses: Atrial fibrillation with RVR (H)           CONTINUE these medications which have NOT CHANGED    Details   cholecalciferol (VITAMIN D) 1000 UNIT tablet Take 1 tablet (1,000 Units) by mouth daily  Qty: 100 tablet, Refills: 3    Associated Diagnoses: Unspecified vitamin D deficiency      losartan (COZAAR) 50 MG tablet Take 1 tablet (50 mg) by mouth daily  Qty: 90 tablet, Refills: 0    Associated Diagnoses: Benign essential hypertension      simvastatin (ZOCOR) 20 MG tablet Take 1 tablet (20 mg) by mouth At Bedtime  Qty: 90 tablet, Refills: 4    Associated Diagnoses: Hyperlipidemia LDL goal <130      XARELTO ANTICOAGULANT 20 MG TABS tablet TAKE 1 TABLET DAILY WITH DINNER  Qty: 90 tablet, Refills: 3    Associated Diagnoses: Chronic deep vein thrombosis (DVT) of popliteal vein of left lower extremity (H)             Follow-up:  Follow up with Primary Provider in 7-10 days for post-hospital visit.  Follow up with EP in 4-6 weeks.    Labs or imaging requiring follow-up after discharge:  EKG per EP provider    Code status:  Full    Condition on discharge  Temp:  [98.5  F (36.9  C)] 98.5  F (36.9  C)  Pulse:  [] 120  Resp:  [13-22] 18  BP: (104-166)/() 125/86  SpO2:  [95 %-98 %] 98 %    General: Alert, interactive, NAD  Eyes: sclera anicteric, EOMI  Cardiovascular: regular rate and rhythm, normal S1 and S2, no murmurs, gallops, or rubs  Resp: clear to auscultation bilaterally, no rales, wheezes, or rhonchi  GI: Soft, nontender, rounded. +BS.  No HSM or masses, no rebound or guarding.  Extremities: No edema, no cyanosis or clubbing, dorsalis pedis and  posterior tibialis pulses 2+ bilaterally  Skin: Warm and dry, no jaundice or rash  Neuro: moves all extremities equally, alert & oriented x 3    Imaging with results:  ALON 12/11/23:  Interpretation Summary  Global and regional left ventricular function is normal with an EF of 55-60%.  The right ventricle is normal size. Global right ventricular function is  normal.  The left atrial appendage is normal. It is free of spontaneous echo contrast  and thrombus.  No significant valvular abnormalities.  No pericardial effusion is present.  Previous study not available for comparison.  ______________________________________________________________________________  Left Ventricle  No regional wall motion abnormalities are seen. Global and regional left  ventricular function is normal with an EF of 55-60%. Left ventricular size is  normal.     Right Ventricle  The right ventricle is normal size. Global right ventricular function is  normal.     Atria  The left atrial appendage is normal. It is free of spontaneous echo contrast  and thrombus. The left atrial appendage Doppler velocities are normal. The  right atria appears normal. Moderate left atrial enlargement is present. The  atrial septum is intact as assessed by color Doppler .     Mitral Valve  The mitral valve is normal. Trace mitral insufficiency is present.     Aortic Valve  The aortic valve is tricuspid. No aortic regurgitation is present.     Tricuspid Valve  The tricuspid valve is normal. Trace tricuspid insufficiency is present.     Pulmonic Valve  The pulmonic valve is normal.     Vessels  The aorta root is normal. The pulmonary artery and bifurcation cannot be  assessed. Mild atheroma of the aorta are present in the descending thoracic  aorta.     Pericardium  No pericardial effusion is present.     Compared to Previous Study  Previous study not available for comparison.      DCC 12/11/23:  Rhythm:  Atrial fibrillation  Attempt one:     Cardioversion mode:   Synchronous    Waveform:  Biphasic    Shock (Joules):  150    Shock outcome:  Conversion to normal sinus rhythm      EKG 12/11/23 pre-DCCV:          CXR 12/11/23:  Findings:  Portable AP view of the chest. Trachea is midline. Cardiomediastinal  silhouette is within normal limits. Mild streaky bibasilar  atelectasis. No focal airspace opacities. There is no pneumothorax or  pleural effusion. The upper abdomen is unremarkable. Degenerative  changes of the right shoulder.                                                               Impression:   Mild streaky bibasilar atelectasis. No focal airspace opacities.        Patient Care Team:  Collin Salazar MD as PCP - General (Internal Medicine)  Collin Salazar MD as Assigned PCP  Sujey Dutton MD as MD (Advanced Heart Failure and Transplant Cardiology)  Sujey Dutton MD as Assigned Heart and Vascular Provider    Nellie BUENROSTRO CNP  [facility] Cardiology  Patient discussed with staff cardiologist, Dr. Chappell, who agrees with the above documentation and plan. Documentation represents joint decision making.     Time Spent on this Encounter   I, CHITRA Kirkpatrick CNP, personally saw the patient today and spent greater than 30 minutes discharging this patient.

## 2023-12-11 NOTE — PROCEDURES
[facility]    EP Cardioversion External    Date/Time: 12/11/2023 3:31 PM    Performed by: Malina Kelley APRN CNP  Authorized by: Jeanette Manuel APRN CNP    Risks, benefits and alternatives discussed.      UNIVERSAL PROTOCOL   Site Marked: NA  Prior Images Obtained and Reviewed:  NA  Required items: Required blood products, implants, devices and special equipment available    Patient identity confirmed:  Verbally with patient and arm band  Patient was reevaluated immediately before administering moderate or deep sedation or anesthesia  Confirmation Checklist:  Patient's identity using two indicators, relevant allergies and procedure was appropriate and matched the consent or emergent situation  Universal Protocol: the Joint Commission Universal Protocol was followed      PRE-PROCEDURE DETAILS:     Rhythm:  Atrial fibrillation  Attempt one:     Cardioversion mode:  Synchronous    Waveform:  Biphasic    Shock (Joules):  150    Shock outcome:  Conversion to normal sinus rhythm      PROCEDURE    Patient Tolerance:  Patient tolerated the procedure well with no immediate complications      CHITRA Mejias CNP  Electrophysiology Consult Service  Pager: Text Page

## 2023-12-11 NOTE — H&P
"    [facility]   Cardiology Service  History and Physical      Collin Frank MRN# [id number]   YOB: 1950 Age: 73 year old       Admission Date: 12/11/2023      Assessment and Plan:   Collin Frank is a 73 year old male with past medical history of factor V Leiden mutation, chronic left lower extremity DVT and PE on Xarelto, HTN, prostate cancer who presents to the ER via ambulance for evaluation of generalized weakness, increasing shortness of breath for the past 3 days, episode of fatigue and diaphoresis this morning. Found to be in Afib RVR.      # Paroxysmal Atrial Fibrillation w/ Rapid Ventricular Rate  History of coagulation disorder, DVT, PE, Patient also had a wearable cardiac monitor study in 2021 that demonstrated frequent PVCs (29.6%), runs of SVT (probably atrial tachycardia) and runs of nonsustained VT that were perhaps thought to be SVT with aberrancy.  - AC: CHADSVASC 5 = 7.2% annual stroke risk, continue PTA Xarelto  - rate control: start low dose lopressor 12.5 mg BID  - rhythm control: plan for ALON/DCCV tonight vs. tomorrow pending lab availability, defer AAT at this time d/t new onset and pending DCCV results  - telemetry  - BMP  - TSH  - electrolyte protocols      Clinically Significant Risk Factors Present on Admission     # Factor V Leiden Mutation  # Drug Induced Coagulation Defect: home medication list includes an anticoagulant medication    - continue PTA Xarelto    # Hypertension: Noted on problem list      - continue PTA Losartan 50 mg daily    # Obesity: Estimated body mass index is 33.62 kg/m  as calculated from the following:    Height as of this encounter: 1.75 m (5' 8.9\").    Weight as of this encounter: 103 kg (227 lb).           # Hyperlipidemia  - continue PTA simvastatin      Thrombocytopenia Including Purpuric, HIT, & Other Platelet Defects: Coagulation defect, unspecified - Factor V Leiden mutation        FEN: NPO for ALON/DCCV  Code " Status: Full  DVT Prophylaxis: Xarelto  Isolation: None  Disposition: pending ALON/DCCV results    Patient discussed with Dr. Chappell.    Nellie Mcmahon, CHITRA, CNP  [facility] Cardiology      Chief Complaint:   Shortness of Breath    HPI:   Patient states that he felt a little weak over the past 3 days. This morning he woke up feeling fine when, started making breakfast and felt that he was fatigued and started sweating. He went to [facility] for these symptoms, had prehospital 12 lead EKG showing A-fib with heart rate in the 100-130s, tachycardia. [place] the fire house he denied chest pain, fever, diarrhea or cough. He was brought here via ambulance. Here patient notes a mild twinge in his chest which lasted a few seconds. Patient notes he was told he has a problem with his valve. Patient states he this feeling did not last long, as he started to rest. No other symptoms, denies any breathing issues, abdominal pain, nausea, diarrhea.      Prior ED visit on 7/21/2023 for fleeting episodes of chest pain. CT scan showed coronary artery calcium in the LAD, dobutamine stress echo that was negative. He did have follow-up with cardiology on 9/1/2023, they noted 2 previous stress echocardiograms in 2021 (reported equivocal for ischemia due to lack of augmentation of heart function with exercise) and 2019 (reported as normal).  Dr. Dutton who recommended better control of his blood pressure and that he did not require a statin.    Of note: pt NPO since 12/10 evening. Will try to get patient in for ALON/DCCV tonight, if unable to, will plan for tomorrow.      Past Medical History:   Diagnosis Date    Antiplatelet or antithrombotic long-term use     blood clot in leg    Long term current use of anticoagulant 10/16/2012       Past Surgical History:   Procedure Laterality Date    COLONOSCOPY  1-17-08    Normal. Repeat in 10 years    COLONOSCOPY WITH CO2 INSUFFLATION N/A 2/1/2018    Procedure: COLONOSCOPY WITH CO2 INSUFFLATION;  COLON  "SIDNEY/ ENGELMANN;  Surgeon: Jan Flores MD;  Location: [facility] OR    rt knee ORIF  [address] [place] [address]       No current facility-administered medications on file prior to encounter.  cholecalciferol (VITAMIN D) 1000 UNIT tablet, Take 1 tablet (1,000 Units) by mouth daily  losartan (COZAAR) 50 MG tablet, Take 1 tablet (50 mg) by mouth daily  simvastatin (ZOCOR) 20 MG tablet, Take 1 tablet (20 mg) by mouth At Bedtime  XARELTO ANTICOAGULANT 20 MG TABS tablet, TAKE 1 TABLET DAILY WITH DINNER        Family History   Problem Relation Age of Onset    Alzheimer Disease Mother     Heart Disease Father     Prostate Cancer Father     Cancer Brother 65        pancreatic       Prostate Cancer Brother         2 stents 70% and 90% blockage    Heart Disease Sister        Social History     Tobacco Use    Smoking status: Former     Types: Cigarettes     Quit date: 2011     Years since quittin.8    Smokeless tobacco: Never   Substance Use Topics    Alcohol use: No       No Known Allergies      ROS:   CONSTITUTIONAL: No report of fevers or chills  RESPIRATORY: No cough, wheezing, SOB, or hemoptysis  CARDIOVASCULAR: see HPI  MUSCULO-SKELETAL: No joint pain/swelling, no muscle pain  NEURO: No paresthesias, syncope, pre-syncope, lightheadness, dizziness or vertigo  ENDOCRINE: No temperature intolerance, no skin/hair changes  PSYCHIATRIC: No change in mood, feeling down/anxious, no change in sleep or appetite  GI: No melena or hematochezia, no change in bowel habits  : No hematuria or dysuria, no hesitancy, dribbling or incontinence  HEME: No easy bruising or bleeding, no history of anemia  SKIN: No rashes or sores, no unusual itching      Physical Examination:  Vitals: /87   Pulse 99   Temp 98.5  F (36.9  C) (Oral)   Resp 16   Ht 1.75 m (5' 8.9\")   Wt 103 kg (227 lb)   SpO2 96%   BMI 33.62 kg/m    BMI= Body mass index is 33.62 kg/m .    GENERAL APPEARANCE: healthy, alert and no distress  HEENT: " no icterus, no xanthelasmas, normal pupil size and reaction, mucosa moist  CHEST: lungs clear to auscultation without rales, rhonchi or wheezes, no use of accessory muscles, no retractions  CARDIOVASCULAR: irregular rhythm, S1 and S2, no S3 or S4 and no murmur, click or rub.  ABDOMEN: soft, rounded, non tender, without hepatosplenomegaly, no masses palpable, bowel sounds normal  EXTREMITIES: warm, no edema, DP/PT pulses 2+ bilaterally, no clubbing or cyanosis   NEURO: alert and oriented to person/place/time, normal speech, moves all extremities  SKIN: no ecchymoses, no rashes      Laboratory:  CMP  Recent Labs   Lab 12/11/23  1035      POTASSIUM 3.7   CHLORIDE 106   CO2 20*   ANIONGAP 12   *   BUN 15.9   CR 1.16   GFRESTIMATED 66   MARTA 9.0   MAG 2.0   PROTTOTAL 6.4   ALBUMIN 3.7   BILITOTAL 1.1   ALKPHOS 76   AST 25   ALT 21     CBC  Recent Labs   Lab 12/11/23  1035   WBC 8.0   RBC 4.75   HGB 15.7   HCT 46.2   MCV 97   MCH 33.1*   MCHC 34.0   RDW 13.2          EKG 12/11/23:        CXR 12/11/23:  Findings:  Portable AP view of the chest. Trachea is midline. Cardiomediastinal  silhouette is within normal limits. Mild streaky bibasilar  atelectasis. No focal airspace opacities. There is no pneumothorax or  pleural effusion. The upper abdomen is unremarkable. Degenerative  changes of the right shoulder.                                                               Impression:   Mild streaky bibasilar atelectasis. No focal airspace opacities.      Stress Echo 7/21/23:  Interpretation Summary  Normal Dobutamine stress echocardiogram at target heart rate.  No resting or stress induced regional wallmotion abnormalities.  Normal resting and stress EKGs.  Normal heart rate and BP response to dobutamine. No symptoms during test.  Normal baseline screening echocardiogram with no significant valvular heart  disease and normal size of visualized  aorta.  ______________________________________________________________________________  Stress  The maximum dose of dobutamine was 30mcg/kg/min.  The maximum dose of atropine was 0mg.  The maximum dose of metoprolol was 3mg.  The drug infusion was stopped due to target heart rate achieved.  Definity (NDC #67667-820-19) given intravenously.  Patient was given 9ml mixture of 1.5ml Definity and 8.5ml saline.  1 ml wasted.  Definity Lot # 6328 .  Definity Expiration 08/01/2024 .  The EKG portion of this stress test was negative for inducible ischemia (see  echo results below).  This was a normal stress echocardiogram with no evidence of stress-induced  ischemia.  Normal left ventricular function and wall motion at rest and post-stress.  The visual ejection fraction is >70%.     Baseline  Normal baseline electrocardiogram.  Normal left ventricular function and wall motion at rest.  The visual ejection fraction is estimated at 55-60%.     Stress Results                                       Maximum Predicted HR:   145 bpm             Target HR: 123 bpm        % Maximum Predicted HR: 90 %                           Stage DurationHeart Rate  BP   Dose                               (mm:ss)   (bpm)                      Baseline  0:00      59    152/84 0.00                        Peak    8:07      130   167/8030.00                          Stress Duration:   8:07 mm:ss                       Maximum Stress HR: 130 bpm     Left Ventricle  Normal Dobutamine stress echocardiogram at target heart rate.  No resting or stress induced regional wallmotion abnormalities.  Normal resting and stress EKGs.  Normal heart rate and BP response to dobutamine. No symptoms during test.  Normal baseline screening echocardiogram with no significant valvular heart  disease and normal size of visualized aorta.     Vessels  Normal size aorta.     Procedure  Dobutamine stress echo with two dimensional color and spectral  Doppler  performed.        Medical Decision Making   75 MINUTES SPENT BY ME on the date of service doing chart review, history, exam, documentation & further activities per the note.

## 2023-12-11 NOTE — ED TRIAGE NOTES
"Collin Frank 73 years old male came by AdVolume station 11 due increasing SOB for the last 3 days . 12 lead showed A-fib  100-130s Tachy. Denied chest pain, fever ,diarrhea or cough.   /82 (BP Location: Right arm, Patient Position: Fowlers, Cuff Size: Adult Regular)   Pulse 111   Temp 98.5  F (36.9  C) (Oral)   Resp 16   Ht 1.75 m (5' 8.9\")   Wt 103 kg (227 lb)   SpO2 97%   BMI 33.62 kg/m         "

## 2023-12-11 NOTE — ANESTHESIA POSTPROCEDURE EVALUATION
Patient: Collin Frank    Procedure: Procedure(s):  Anesthesia cardioversion       Anesthesia Type:  MAC    Note:  Disposition: Outpatient   Postop Pain Control: Uneventful            Sign Out: Well controlled pain   PONV: No   Neuro/Psych: Uneventful            Sign Out: Acceptable/Baseline neuro status   Airway/Respiratory: Uneventful            Sign Out: Acceptable/Baseline resp. status   CV/Hemodynamics: Uneventful            Sign Out: Acceptable CV status; No obvious hypovolemia; No obvious fluid overload   Other NRE:    DID A NON-ROUTINE EVENT OCCUR? No           Last vitals:  Vitals:    12/11/23 1530 12/11/23 1535 12/11/23 1541   BP: 127/84 122/81 110/84   Pulse: 80 72 76   Resp: 20 16 13   Temp:      SpO2: 96% 98% 98%       Electronically Signed By: Obed Arredondo MD  December 11, 2023  4:05 PM

## 2023-12-11 NOTE — SEDATION DOCUMENTATION
Pt arrived in ECHO department from ED for scheduled ALON and cardioversion.  Procedure explained, questions answered and consent signed. Discharge instructions discussed with patient.  Pt's throat sprayed at 1450, therefore pt will not be able to eat or drink until 2 hours after at 1650. Informed pt of this time and encouraged to start with warm fluids and soft foods.    Pt tolerated procedure well, and was given a total of 50 mcg IV fentanyl and 1 mg IV versed for conscious sedation. Pt denied throat or chest pain after ALON complete.   ALON probe 61 used for procedure.  No clots visualized on ALON so proceeded with DCCV. Anesthesia gave pt 30 mg IV brevitol for sedation and pt was DCCV at 150 Joules to a SR.  Pt denied chest or throat pain after procedure and was transported via stretcher back to ED after awake and VSS.   Report given to SHIREEN Mattson RN.

## 2023-12-11 NOTE — PROGRESS NOTES
Patient discharged to home at 5:53 PM via ambulation. Accompanied by staff. Discharge instructions reviewed with patient, opportunity offered to ask questions. Prescriptions filled and sent with patient upon discharge. All belongings sent with patient.

## 2023-12-11 NOTE — ANESTHESIA CARE TRANSFER NOTE
Patient: Collin Frank    Procedure: Procedure(s):  Anesthesia cardioversion       Diagnosis: Atrial fibrillation (H) [I48.91]  Diagnosis Additional Information: No value filed.    Anesthesia Type:   MAC     Note:    Oropharynx: oropharynx clear of all foreign objects and spontaneously breathing  Level of Consciousness: drowsy  Oxygen Supplementation: nasal cannula  Level of Supplemental Oxygen (L/min / FiO2): 2  Independent Airway: airway patency satisfactory and stable  Dentition: dentition unchanged  Vital Signs Stable: post-procedure vital signs reviewed and stable  Report to RN Given: handoff report given  Destination: EKG/Echo lab.  Comments: Report to RN at bedside. Pt awakens easily, VSS.        Vitals:  Vitals Value Taken Time   /86 12/11/23 1522   Temp     Pulse 120 12/11/23 1522   Resp 18 12/11/23 1522   SpO2 98 % 12/11/23 1522       Electronically Signed By: CHITRA Stein CRNA  December 11, 2023  3:32 PM

## 2023-12-13 ENCOUNTER — TELEPHONE (OUTPATIENT)
Dept: CARDIOLOGY | Facility: CLINIC | Age: 73
End: 2023-12-13
Payer: COMMERCIAL

## 2023-12-13 NOTE — TELEPHONE ENCOUNTER
Please schedule patient with Dr. Hernandez or Dr. Maya for follow-up s/p cardioversion, EKG can be done at time of visit.    Eliane Luo, RN  Cardiology Care Coordinator  [facility]  [phone] option 1

## 2023-12-13 NOTE — TELEPHONE ENCOUNTER
[facility] Call Center    Phone Message    May a detailed message be left on voicemail: no     Reason for Call: Other: Per discharge orders: Follow up with EP provider in 4-6 weeks for cardioversion follow-up. Tests: EKG     No EKG orders in system.  Please place orders and  call pt to schedule follow up.    Action Taken: Other: cardio    Travel Screening: Not Applicable

## 2023-12-14 ENCOUNTER — OFFICE VISIT (OUTPATIENT)
Dept: FAMILY MEDICINE | Facility: CLINIC | Age: 73
End: 2023-12-14
Payer: COMMERCIAL

## 2023-12-14 VITALS
WEIGHT: 224.2 LBS | OXYGEN SATURATION: 97 % | HEART RATE: 62 BPM | RESPIRATION RATE: 16 BRPM | SYSTOLIC BLOOD PRESSURE: 141 MMHG | TEMPERATURE: 97.5 F | DIASTOLIC BLOOD PRESSURE: 78 MMHG | HEIGHT: 68 IN | BODY MASS INDEX: 33.98 KG/M2

## 2023-12-14 DIAGNOSIS — E66.01 CLASS 2 SEVERE OBESITY DUE TO EXCESS CALORIES WITH SERIOUS COMORBIDITY AND BODY MASS INDEX (BMI) OF 35.0 TO 35.9 IN ADULT (H): ICD-10-CM

## 2023-12-14 DIAGNOSIS — I10 BENIGN ESSENTIAL HYPERTENSION: ICD-10-CM

## 2023-12-14 DIAGNOSIS — E78.5 HYPERLIPIDEMIA LDL GOAL <130: ICD-10-CM

## 2023-12-14 DIAGNOSIS — I26.99 PULMONARY EMBOLISM WITH INFARCTION (H): ICD-10-CM

## 2023-12-14 DIAGNOSIS — E66.812 CLASS 2 SEVERE OBESITY DUE TO EXCESS CALORIES WITH SERIOUS COMORBIDITY AND BODY MASS INDEX (BMI) OF 35.0 TO 35.9 IN ADULT (H): ICD-10-CM

## 2023-12-14 DIAGNOSIS — Z00.00 ENCOUNTER FOR MEDICARE ANNUAL WELLNESS EXAM: Primary | ICD-10-CM

## 2023-12-14 DIAGNOSIS — Z13.220 ENCOUNTER FOR LIPID SCREENING FOR CARDIOVASCULAR DISEASE: ICD-10-CM

## 2023-12-14 DIAGNOSIS — Z13.6 ENCOUNTER FOR LIPID SCREENING FOR CARDIOVASCULAR DISEASE: ICD-10-CM

## 2023-12-14 DIAGNOSIS — I48.91 ATRIAL FIBRILLATION WITH RVR (H): ICD-10-CM

## 2023-12-14 DIAGNOSIS — Z23 HIGH PRIORITY FOR 2019-NCOV VACCINE: ICD-10-CM

## 2023-12-14 DIAGNOSIS — Z12.5 SCREENING FOR PROSTATE CANCER: ICD-10-CM

## 2023-12-14 DIAGNOSIS — D68.51 FACTOR 5 LEIDEN MUTATION, HETEROZYGOUS (H): ICD-10-CM

## 2023-12-14 LAB
CHOLEST SERPL-MCNC: 117 MG/DL
FASTING STATUS PATIENT QL REPORTED: YES
HBA1C MFR BLD: 5.8 % (ref 0–5.6)
HDLC SERPL-MCNC: 47 MG/DL
LDLC SERPL CALC-MCNC: 58 MG/DL
NONHDLC SERPL-MCNC: 70 MG/DL
TRIGL SERPL-MCNC: 61 MG/DL

## 2023-12-14 PROCEDURE — 36415 COLL VENOUS BLD VENIPUNCTURE: CPT | Performed by: FAMILY MEDICINE

## 2023-12-14 PROCEDURE — 91320 SARSCV2 VAC 30MCG TRS-SUC IM: CPT | Performed by: FAMILY MEDICINE

## 2023-12-14 PROCEDURE — 80061 LIPID PANEL: CPT | Performed by: FAMILY MEDICINE

## 2023-12-14 PROCEDURE — 90480 ADMN SARSCOV2 VAC 1/ONLY CMP: CPT | Performed by: FAMILY MEDICINE

## 2023-12-14 PROCEDURE — 99397 PER PM REEVAL EST PAT 65+ YR: CPT | Mod: 25 | Performed by: FAMILY MEDICINE

## 2023-12-14 PROCEDURE — 83036 HEMOGLOBIN GLYCOSYLATED A1C: CPT | Performed by: FAMILY MEDICINE

## 2023-12-14 RX ORDER — LOSARTAN POTASSIUM 50 MG/1
50 TABLET ORAL DAILY
Qty: 90 TABLET | Refills: 0 | Status: CANCELLED | OUTPATIENT
Start: 2023-12-14

## 2023-12-14 RX ORDER — SIMVASTATIN 20 MG
20 TABLET ORAL AT BEDTIME
Qty: 90 TABLET | Refills: 4 | Status: CANCELLED | OUTPATIENT
Start: 2023-12-14

## 2023-12-14 ASSESSMENT — ACTIVITIES OF DAILY LIVING (ADL): CURRENT_FUNCTION: NO ASSISTANCE NEEDED

## 2023-12-14 NOTE — LETTER
December 18, 2023    Collin MARVIN Monika  [address]    [facility] [address]          Dear ,    We are writing to inform you of your test results.    Your A1c is just within the prediabetic range.  Labs otherwise not concerning.       Resulted Orders   Hemoglobin A1c   Result Value Ref Range    Hemoglobin A1C 5.8 (H) 0.0 - 5.6 %      Comment:      Normal <5.7%   Prediabetes 5.7-6.4%    Diabetes 6.5% or higher     Note: Adopted from ADA consensus guidelines.   Lipid panel reflex to direct LDL Fasting   Result Value Ref Range    Cholesterol 117 <200 mg/dL    Triglycerides 61 <150 mg/dL    Direct Measure HDL 47 >=40 mg/dL    LDL Cholesterol Calculated 58 <=100 mg/dL    Non HDL Cholesterol 70 <130 mg/dL    Patient Fasting > 8hrs? Yes     Narrative    Cholesterol  Desirable:  <200 mg/dL    Triglycerides  Normal:  Less than 150 mg/dL  Borderline High:  150-199 mg/dL  High:  200-499 mg/dL  Very High:  Greater than or equal to 500 mg/dL    Direct Measure HDL  Female:  Greater than or equal to 50 mg/dL   Male:  Greater than or equal to 40 mg/dL    LDL Cholesterol  Desirable:  <100mg/dL  Above Desirable:  100-129 mg/dL   Borderline High:  130-159 mg/dL   High:  160-189 mg/dL   Very High:  >= 190 mg/dL    Non HDL Cholesterol  Desirable:  130 mg/dL  Above Desirable:  130-159 mg/dL  Borderline High:  160-189 mg/dL  High:  190-219 mg/dL  Very High:  Greater than or equal to 220 mg/dL       If you have any questions or concerns, please call the clinic at the number listed above.       Sincerely,      Delfin Hernandez MD

## 2023-12-14 NOTE — PROGRESS NOTES
"SUBJECTIVE:   Collin is a 73 year old, presenting for the following:  Physical        12/14/2023     8:05 AM   Additional Questions   Roomed by Aylin   Accompanied by none       Are you in the first 12 months of your Medicare coverage?  No    Healthy Habits:     In general, how would you rate your overall health?  Fair    Frequency of exercise:  2-3 days/week    Duration of exercise:  Less than 15 minutes    Do you usually eat at least 4 servings of fruit and vegetables a day, include whole grains    & fiber and avoid regularly eating high fat or \"junk\" foods?  No    Taking medications regularly:  Yes    Medication side effects:  None    Ability to successfully perform activities of daily living:  No assistance needed    Home Safety:  No safety concerns identified    Hearing Impairment:  Difficulty following a conversation in a noisy restaurant or crowded room, difficult to understand a speaker at a public meeting or Judaism service, need to ask people to speak up or repeat themselves, find that men's voices are easier to understand than woman's, difficulty understanding soft or whispered speech and difficulty understanding speech on the telephone    In the past 6 months, have you been bothered by leaking of urine?  No    In general, how would you rate your overall mental or emotional health?  Good    Additional concerns today:  No    Recent hospital visit for fatigue  Afib --> cardioversion  Zio patch placed  Needs cardiology follow-up 4-6 weeks after cardioversion    Today's PHQ-2 Score:       12/14/2023     8:11 AM   PHQ-2 ( 1999 [employer])   Q1: Little interest or pleasure in doing things 0   Q2: Feeling down, depressed or hopeless 0   PHQ-2 Score 0   Q1: Little interest or pleasure in doing things Not at all   Q2: Feeling down, depressed or hopeless Not at all   PHQ-2 Score 0           Have you ever done Advance Care Planning? (For example, a Health Directive, POLST, or a discussion with a medical provider or " your loved ones about your wishes): Yes, advance care planning is on file.       Fall risk  Fallen 2 or more times in the past year?: No  Any fall with injury in the past year?: No    Cognitive Screening   1) Repeat 3 items (Leader, Season, Table)    2) Clock draw: NORMAL  3) 3 item recall: Recalls 3 objects  Results: 3 items recalled: COGNITIVE IMPAIRMENT LESS LIKELY    Mini-CogTM Copyright MARYANN Baker. Licensed by the author for use in [facility]; reprinted with permission ([email or web address]). All rights reserved.      Do you have sleep apnea, excessive snoring or daytime drowsiness? : no    Reviewed and updated as needed this visit by clinical staff   Tobacco  Allergies  Meds              Reviewed and updated as needed this visit by Provider                 Social History     Tobacco Use    Smoking status: Former     Types: Cigarettes     Quit date: 2011     Years since quittin.8    Smokeless tobacco: Never   Substance Use Topics    Alcohol use: No             2023     8:11 AM   Alcohol Use   Prescreen: >3 drinks/day or >7 drinks/week? No          No data to display              Do you have a current opioid prescription? No  Do you use any other controlled substances or medications that are not prescribed by a provider? None              Current providers sharing in care for this patient include:   Patient Care Team:  Collin Salazar MD as PCP - General (Internal Medicine)  Collin Salazar MD as Assigned PCP  Sujey Dutton MD as MD (Advanced Heart Failure and Transplant Cardiology)  Sujey Dutton MD as Assigned Heart and Vascular Provider    The following health maintenance items are reviewed in Epic and correct as of today:  Health Maintenance   Topic Date Due    RSV VACCINE (Pregnancy & 60+) (1 - 1-dose 60+ series) Never done    DTAP/TDAP/TD IMMUNIZATION (2 - Td or Tdap) 10/16/2022    COVID-19 Vaccine (2023-24 season) 2023    MEDICARE ANNUAL WELLNESS VISIT   "12/12/2023    LUNG CANCER SCREENING  07/21/2024    ANNUAL REVIEW OF HM ORDERS  09/07/2024    FALL RISK ASSESSMENT  12/14/2024    LIPID  12/12/2027    ADVANCE CARE PLANNING  12/13/2027    COLORECTAL CANCER SCREENING  02/01/2028    HEPATITIS C SCREENING  Completed    PHQ-2 (once per calendar year)  Completed    INFLUENZA VACCINE  Completed    Pneumococcal Vaccine: 65+ Years  Completed    ZOSTER IMMUNIZATION  Completed    AORTIC ANEURYSM SCREENING (SYSTEM ASSIGNED)  Completed    IPV IMMUNIZATION  Aged Out    HPV IMMUNIZATION  Aged Out    MENINGITIS IMMUNIZATION  Aged Out    RSV MONOCLONAL ANTIBODY  Aged Out     BP Readings from Last 3 Encounters:   12/14/23 (!) 141/78   12/11/23 (!) 141/86   09/11/23 (!) 146/86    Wt Readings from Last 3 Encounters:   12/14/23 101.7 kg (224 lb 3.2 oz)   12/11/23 103 kg (227 lb)   09/11/23 104.9 kg (231 lb 3.2 oz)                          Review of Systems  Constitutional, HEENT, cardiovascular, pulmonary, gi and gu systems are negative, except as otherwise noted.    OBJECTIVE:   BP (!) 141/78   Pulse 62   Temp 97.5  F (36.4  C) (Oral)   Resp 16   Ht 1.725 m (5' 7.9\")   Wt 101.7 kg (224 lb 3.2 oz)   SpO2 97%   BMI 34.19 kg/m   Estimated body mass index is 34.19 kg/m  as calculated from the following:    Height as of this encounter: 1.725 m (5' 7.9\").    Weight as of this encounter: 101.7 kg (224 lb 3.2 oz).  Physical Exam  Vitals reviewed.   Constitutional:       Appearance: Normal appearance. He is not ill-appearing.   HENT:      Head: Normocephalic.      Right Ear: Tympanic membrane and ear canal normal.      Left Ear: Tympanic membrane and ear canal normal.      Nose: Nose normal.   Eyes:      Extraocular Movements: Extraocular movements intact.   Cardiovascular:      Rate and Rhythm: Normal rate and regular rhythm.      Heart sounds: Normal heart sounds. No murmur heard.  Pulmonary:      Effort: Pulmonary effort is normal. No respiratory distress.      Breath sounds: Normal " breath sounds. No wheezing or rales.   Abdominal:      Palpations: Abdomen is soft.   Musculoskeletal:         General: Normal range of motion.      Cervical back: Normal range of motion and neck supple.   Skin:     General: Skin is warm and dry.      Findings: No lesion.   Neurological:      Mental Status: He is alert and oriented to person, place, and time.   Psychiatric:         Mood and Affect: Mood normal.         Behavior: Behavior normal.         Thought Content: Thought content normal.         Judgment: Judgment normal.               ASSESSMENT / PLAN:       ICD-10-CM    1. Encounter for Medicare annual wellness exam  Z00.00 Hemoglobin A1c     Hemoglobin A1c      2. Benign essential hypertension  I10       3. Hyperlipidemia LDL goal <130  E78.5       4. High priority for 2019-nCoV vaccine  Z23       5. Encounter for lipid screening for cardiovascular disease  Z13.220 Lipid panel reflex to direct LDL Fasting    Z13.6 Lipid panel reflex to direct LDL Fasting      6. Screening for prostate cancer  Z12.5 CANCELED: Prostate Specific Antigen Screen      7. Pulmonary embolism with infarction (H)  I26.99       8. Atrial fibrillation with RVR (H)  I48.91       9. Class 2 severe obesity due to excess calories with serious comorbidity and body mass index (BMI) of 35.0 to 35.9 in adult (H)  E66.01     Z68.35       10. Factor 5 Leiden mutation, heterozygous (H24)  D68.51           Patient has been advised of split billing requirements and indicates understanding: Yes  MED REC REQUIRED  Post Medication Reconciliation Status:       COUNSELING:  Reviewed preventive health counseling, as reflected in patient instructions       Regular exercise       Healthy diet/nutrition        He reports that he quit smoking about 12 years ago. His smoking use included cigarettes. He has never used smokeless tobacco.      Appropriate preventive services were discussed with this patient, including applicable screening as appropriate for  fall prevention, nutrition, physical activity, Tobacco-use cessation, weight loss and cognition.  Checklist reviewing preventive services available has been given to the patient.    Reviewed patients plan of care and provided an AVS. The Basic Care Plan (routine screening as documented in Health Maintenance) for Collin meets the Care Plan requirement. This Care Plan has been established and reviewed with the Patient.          Delfin Lobato MD  [facility]    Identified Health Risks:  I have reviewed Opioid Use Disorder and Substance Use Disorder risk factors and made any needed referrals.

## 2023-12-14 NOTE — PATIENT INSTRUCTIONS
Collin    It was a pleasure seeing you in clinic today.  Here's the plan:    Vaccines - COVID booster given today, TDAP and RSV vaccines are covered at any pharmacy  12/11 ER visit in which cardioversion was done for A-fib.  They wanted you to follow-up with cardiology 4-6 weeks after your ER visit.  Scheduling information is below.  The  may actually call you, however they have yet to do so.    Please schedule patient with Dr. Hernandez or Dr. Maya for follow-up s/p cardioversion, EKG can be done at time of visit.     Eliane Luo RN  Cardiology Care Coordinator  [facility]  [phone] option 1          Let me know if you have questions.    Delfin Lobato MD        Patient Education   Personalized Prevention Plan  You are due for the preventive services outlined below.  Your care team is available to assist you in scheduling these services.  If you have already completed any of these items, please share that information with your care team to update in your medical record.  Health Maintenance Due   Topic Date Due    RSV VACCINE (Pregnancy & 60+) (1 - 1-dose 60+ series) Never done    Diptheria Tetanus Pertussis (DTAP/TDAP/TD) Vaccine (2 - Td or Tdap) 10/16/2022    COVID-19 Vaccine (6 - 2023-24 season) 09/01/2023    Annual Wellness Visit  12/12/2023     Your Health Risk Assessment indicates you feel you are not in good health    A healthy lifestyle helps keep the body fit and the mind alert. It helps protect you from disease, helps you fight disease, and helps prevent chronic disease (disease that doesn't go away) from getting worse. This is important as you get older and begin to notice twinges in muscles and joints and a decline in the strength and stamina you once took for granted. A healthy lifestyle includes good healthcare, good nutrition, weight control, recreation, and regular exercise. Avoid harmful substances and do what you can to keep safe. Another part of a healthy  lifestyle is stay mentally active and socially involved.    Good healthcare   Have a wellness visit every year.   If you have new symptoms, let us know right away. Don't wait until the next checkup.   Take medicines exactly as prescribed and keep your medicines in a safe place. Tell us if your medicine causes problems.   Healthy diet and weight control   Eat 3 or 4 small, nutritious, low-fat, high-fiber meals a day. Include a variety of fruits, vegetables, and whole-grain foods.   Make sure you get enough calcium in your diet. Calcium, vitamin D, and exercise help prevent osteoporosis (bone thinning).   If you live alone, try eating with others when you can. That way you get a good meal and have company while you eat it.   Try to keep a healthy weight. If you eat more calories than your body uses for energy, it will be stored as fat and you will gain weight.     Recreation   Recreation is not limited to sports and team events. It includes any activity that provides relaxation, interest, enjoyment, and exercise. Recreation provides an outlet for physical, mental, and social energy. It can give a sense of worth and achievement. It can help you stay healthy.    Mental Exercise and Social Involvement  Mental and emotional health is as important as physical health. Keep in touch with friends and family. Stay as active as possible. Continue to learn and challenge yourself.   Things you can do to stay mentally active are:  Learn something new, like a foreign language or musical instrument.   Play SCRABBLE or do crossword puzzles. If you cannot find people to play these games with you at home, you can play them with others on your computer through the Internet.   Join a games club--anything from card games to chess or checkers or lawn bowling.   Start a new hobby.   Go back to school.   Volunteer.   Read.   Keep up with world events.  Learning About Dietary Guidelines  What are the Dietary Guidelines for Americans?      Dietary Guidelines for Americans provide tips for eating well and staying healthy. This helps reduce the risk for long-term (chronic) diseases.  These guidelines recommend that you:  Eat and drink the right amount for you. The U.[place] government's food guide is called MyPlate. It can help you make your own well-balanced eating plan.  Try to balance your eating with your activity. This helps you stay at a healthy weight.  Drink alcohol in moderation, if at all.  Limit foods high in salt, saturated fat, trans fat, and added sugar.  These guidelines are from the U.S. Department of Agriculture and the U.S. Department of Health and Human Services. They are updated every 5 years.  What is MyPlate?  MyPlate is the U.[place] government's food guide. It can help you make your own well-balanced eating plan. A balanced eating plan means that you eat enough, but not too much, and that your food gives you the nutrients you need to stay healthy.  MyPlate focuses on eating plenty of whole grains, fruits, and vegetables, and on limiting fat and sugar. It is available online at www.ChooseMyPlate.gov.  How can you get started?  If you're trying to eat healthier, you can slowly change your eating habits over time. You don't have to make big changes all at once. Start by adding one or two healthy foods to your meals each day.  Grains  Choose whole-grain breads and cereals and whole-wheat pasta and whole-grain crackers.  Vegetables  Eat a variety of vegetables every day. They have lots of nutrients and are part of a heart-healthy diet.  Fruits  Eat a variety of fruits every day. Fruits contain lots of nutrients. Choose fresh fruit instead of fruit juice.  Protein foods  Choose fish and lean poultry more often. Eat red meat and fried meats less often. Dried beans, tofu, and nuts are also good sources of protein.  Dairy  Choose low-fat or fat-free products from this food group. If you have problems digesting milk, try eating cheese or yogurt  "instead.  Fats and oils  Limit fats and oils if you're trying to cut calories. Choose healthy fats when you cook. These include canola oil and olive oil.  Where can you learn more?  Go to https://www.[employer].net/patiented  Enter D676 in the search box to learn more about \"Learning About Dietary Guidelines.\"  Current as of: February 28, 2023               Content Version: 13.8    3181-0015 [email or web address].   Care instructions adapted under license by your healthcare professional. If you have questions about a medical condition or this instruction, always ask your healthcare professional. [email or web address] disclaims any warranty or liability for your use of this information.      Hearing Loss: Care Instructions  Overview     Hearing loss is a sudden or slow decrease in how well you hear. It can range from slight to profound. Permanent hearing loss can occur with aging. It also can happen when you are exposed long-term to loud noise. Examples include listening to loud music, riding motorcycles, or being around other loud machines.  Hearing loss can affect your work and home life. It can make you feel lonely or depressed. You may feel that you have lost your independence. But hearing aids and other devices can help you hear better and feel connected to others.  Follow-up care is a key part of your treatment and safety. Be sure to make and go to all appointments, and call your doctor if you are having problems. It's also a good idea to know your test results and keep a list of the medicines you take.  How can you care for yourself at home?  Avoid loud noises whenever possible. This helps keep your hearing from getting worse.  Always wear hearing protection around loud noises.  Wear a hearing aid as directed.  A professional can help you pick a hearing aid that will work best for you.  You can also get hearing aids over the counter for mild to moderate hearing loss.  Have hearing tests as your doctor " "suggests. They can show whether your hearing has changed. Your hearing aid may need to be adjusted.  Use other devices as needed. These may include:  Telephone amplifiers and hearing aids that can connect to a television, stereo, radio, or microphone.  Devices that use lights or vibrations. These alert you to the doorbell, a ringing telephone, or a baby monitor.  Television closed-captioning. This shows the words at the bottom of the screen. Most new TVs can do this.  TTY (text telephone). This lets you type messages back and forth on the telephone instead of talking or listening. These devices are also called TDD. When messages are typed on the keyboard, they are sent over the phone line to a receiving TTY. The message is shown on a monitor.  Use text messaging, social media, and email if it is hard for you to communicate by telephone.  Try to learn a listening technique called speechreading. It is not lipreading. You pay attention to people's gestures, expressions, posture, and tone of voice. These clues can help you understand what a person is saying. Face the person you are talking to, and have them face you. Make sure the lighting is good. You need to see the other person's face clearly.  Think about counseling if you need help to adjust to your hearing loss.  When should you call for help?  Watch closely for changes in your health, and be sure to contact your doctor if:    You think your hearing is getting worse.     You have new symptoms, such as dizziness or nausea.   Where can you learn more?  Go to https://www.[employer].net/patiented  Enter R798 in the search box to learn more about \"Hearing Loss: Care Instructions.\"  Current as of: February 28, 2023               Content Version: 13.8    3581-8854 [employer].   Care instructions adapted under license by your healthcare professional. If you have questions about a medical condition or this instruction, always ask your healthcare professional. " [employer] disclaims any warranty or liability for your use of this information.

## 2023-12-14 NOTE — TELEPHONE ENCOUNTER
Called and spoke to patient. Scheduled patient for follow-up with Dr. Maya s/p cardioversion on 12/26/23.    MAREN Tabor

## 2023-12-14 NOTE — PROGRESS NOTES
The patient was provided with suggestions to help him develop a healthy physical lifestyle.  The patient was counseled and encouraged to consider modifying their diet and eating habits. He was provided with information on recommended healthy diet options.  The patient was provided with written information regarding signs of hearing loss.

## 2023-12-25 NOTE — PROGRESS NOTES
[facility]iology [facility]      HPI: Mr. Collin Frank is a 73 year old  male with PMH significant for    -Factor V Leiden mutation  -Chronic left lower extremity DVT and PE on Xarelto  -Hypertension, UNCONTROLLED  -Prostate cancer  -Obesity    Patient presented emergency room on 12/11/2023 for weakness and shortness of breath for 3 days.  He tells me that he was sweating excessively.  EKG showed A-fib with RVR (heart rate 100-130's).  Underwent ALON guided DC cardioversion which converted patient to sinus rhythm.  ALON showed normal baseline echocardiogram.  He is following up with me today after this event.    Currently asymptomatic.  Denies chest pain, dizziness, palpitations, shortness of breath.  He denies prior history of atrial fibrillation.    Patient presented to emergency room in July of this year for fleeting chest pain and underwent CT scan which showed coronary artery calcium in the LAD.  Subsequently underwent dobutamine stress test which was negative.  He had a follow-up with cardiology on 9/1 and he was recommended better control of blood pressure.    No alcohol abuse.  No history of CVA.  No history of sleep apnea.  Current medications:  Losartan 50 mg daily  Metoprolol 25 mg daily  Xarelto 20 mg daily  Simvastatin 20 mg daily    Medications, personal, family, and social history reviewed with patient and revised.    PAST MEDICAL HISTORY:  Past Medical History:   Diagnosis Date    Antiplatelet or antithrombotic long-term use     blood clot in leg    Long term current use of anticoagulant 10/16/2012       CURRENT MEDICATIONS:  Current Outpatient Medications   Medication Sig Dispense Refill    cholecalciferol (VITAMIN D) 1000 UNIT tablet Take 1 tablet (1,000 Units) by mouth daily 100 tablet 3    losartan (COZAAR) 50 MG tablet Take 1 tablet (50 mg) by mouth daily 90 tablet 0    metoprolol succinate ER (TOPROL  "XL) 25 MG 24 hr tablet Take 1 tablet (25 mg) by mouth daily 90 tablet 3    simvastatin (ZOCOR) 20 MG tablet Take 1 tablet (20 mg) by mouth At Bedtime 90 tablet 4    XARELTO ANTICOAGULANT 20 MG TABS tablet TAKE 1 TABLET DAILY WITH DINNER 90 tablet 3       PAST SURGICAL HISTORY:  Past Surgical History:   Procedure Laterality Date    ANESTHESIA CARDIOVERSION N/A 2023    Procedure: Anesthesia cardioversion;  Surgeon: GENERIC ANESTHESIA PROVIDER;  Location: [facility] OR    COLONOSCOPY  08    Normal. Repeat in 10 years    COLONOSCOPY WITH CO2 INSUFFLATION N/A 2018    Procedure: COLONOSCOPY WITH CO2 INSUFFLATION;  COLON SCREEN/ ENGELMANN;  Surgeon: Jan Flores MD;  Location: [facility] OR    rt knee ORIF      [facility]       ALLERGIES:   No Known Allergies    FAMILY HISTORY:  Family History   Problem Relation Age of Onset    Alzheimer Disease Mother     Heart Disease Father     Prostate Cancer Father     Cancer Brother 65        pancreatic       Prostate Cancer Brother         2 stents 70% and 90% blockage    Heart Disease Sister          SOCIAL HISTORY:  Social History     Tobacco Use    Smoking status: Former     Types: Cigarettes     Quit date: 2011     Years since quittin.9    Smokeless tobacco: Never   Substance Use Topics    Alcohol use: No    Drug use: No       ROS:   Constitutional: No fever, chills, or sweats. Weight stable.   Cardiovascular: As per HPI.       Exam:  BP (!) 158/79 (BP Location: Right arm, Patient Position: Chair, Cuff Size: Adult Regular)   Pulse 56   Ht 1.626 m (5' 4\")   Wt 105.8 kg (233 lb 3.2 oz)   SpO2 97%   BMI 40.03 kg/m    GENERAL APPEARANCE: alert and no distress  HEENT: no icterus, no central cyanosis  LYMPH/NECK: no adenopathy, no asymmetry, JVP not elevated  RESPIRATORY: lungs clear to auscultation - no rales, rhonchi or wheezes, no use of accessory muscles, no retractions, respirations are unlabored, normal respiratory rate  CARDIOVASCULAR: " regular rhythm, normal S1, S2, no S3 or S4 and no murmur, click or rub, precordium quiet with normal PMI.  GI: soft, non tender  EXTREMITIES: no edema  NEURO: alert, normal speech,and affect  SKIN: no ecchymoses, no rashes     I have reviewed the labs and personally reviewed the imaging below and made my comment in the assessment and plan.    Labs:  CBC RESULTS:   Lab Results   Component Value Date    WBC 8.0 12/11/2023    WBC 7.2 03/31/2021    RBC 4.75 12/11/2023    RBC 4.60 03/31/2021    HGB 15.7 12/11/2023    HGB 14.7 03/31/2021    HCT 46.2 12/11/2023    HCT 43.5 03/31/2021    MCV 97 12/11/2023    MCV 95 03/31/2021    MCH 33.1 (H) 12/11/2023    MCH 32.0 03/31/2021    MCHC 34.0 12/11/2023    MCHC 33.8 03/31/2021    RDW 13.2 12/11/2023    RDW 12.8 03/31/2021     12/11/2023     03/31/2021       BMP RESULTS:  Lab Results   Component Value Date     12/11/2023     04/01/2021    POTASSIUM 3.7 12/11/2023    POTASSIUM 4.1 12/12/2022    POTASSIUM 3.9 04/01/2021    CHLORIDE 106 12/11/2023    CHLORIDE 109 12/12/2022    CHLORIDE 108 04/01/2021    CO2 20 (L) 12/11/2023    CO2 27 12/12/2022    CO2 25 04/01/2021    ANIONGAP 12 12/11/2023    ANIONGAP 4 12/12/2022    ANIONGAP 7 04/01/2021     (H) 12/11/2023    GLC 89 12/12/2022    GLC 90 04/01/2021    BUN 15.9 12/11/2023    BUN 15 12/12/2022    BUN 15 04/01/2021    CR 1.16 12/11/2023    CR 0.93 04/01/2021    GFRESTIMATED 66 12/11/2023    GFRESTIMATED 81 04/01/2021    GFRESTBLACK >90 04/01/2021    MARTA 9.0 12/11/2023    MARTA 8.8 04/01/2021        ALON cardioversion 12/11/2023:   Global and regional left ventricular function is normal with an EF of 55-60%.  The right ventricle is normal size. Global right ventricular function is  normal.  The left atrial appendage is normal. It is free of spontaneous echo contrast  and thrombus.  No significant valvular abnormalities.  No pericardial effusion is present.  Previous study not available for  comparison    DC cardioversion successful 12/11/2023    Dobutamine stress test 7/1/2023: Normal    Assessment :     # Persistent atrial fibrillation status post DC cardioversion 12/11/2023  # Hypertension uncontrolled  # Obesity  -Reviewed EKG in clinic today which shows sinus rhythm.  Patient reports feeling well since cardioversion.  A-fib is a new diagnosis for the patient.  Currently feeling well and asymptomatic.  Blood pressure is not well-controlled.  He is currently on losartan 50 mg, metoprolol 25, Xarelto 20 mg ([id number]-[id number] score is 3 including age 73 and hypertension) and simvastatin 20 mg.    Plan:  -Lose weight  -Sleep study to rule out sleep apnea  -Start AMLODIPIN 5 MG/DAY to improve blood pressure control  -Come back to clinic in a month to recheck blood pressure control  -He needs to be on lifelong oral anticoagulation given [id number] score is 3 and annual risk of stroke is more than 2%.  -Continue metoprolol 25 mg daily  -Continue Xarelto 20 mg daily  -Continue losartan 50 mg daily  -Stop simvastatin (there is interaction with amlodipine)  -Start atorvastatin 10 mg daily    Return to clinic 6 months    Total time spent today for this visit is 30 minutes including precharting, face-to-face clinic visit, review of labs/imaging and medical documentation.    Jimy STEWART MD  [facility] Division of Cardiology  Pager [phone]

## 2023-12-26 ENCOUNTER — OFFICE VISIT (OUTPATIENT)
Dept: CARDIOLOGY | Facility: CLINIC | Age: 73
End: 2023-12-26
Payer: COMMERCIAL

## 2023-12-26 DIAGNOSIS — I10 PRIMARY HYPERTENSION: ICD-10-CM

## 2023-12-26 DIAGNOSIS — E66.01 MORBID OBESITY (H): ICD-10-CM

## 2023-12-26 DIAGNOSIS — I10 HYPERTENSION, UNCONTROLLED: ICD-10-CM

## 2023-12-26 DIAGNOSIS — I48.91 ATRIAL FIBRILLATION STATUS POST CARDIOVERSION (H): Primary | ICD-10-CM

## 2023-12-26 PROCEDURE — 93000 ELECTROCARDIOGRAM COMPLETE: CPT | Performed by: INTERNAL MEDICINE

## 2023-12-26 PROCEDURE — 99214 OFFICE O/P EST MOD 30 MIN: CPT | Performed by: INTERNAL MEDICINE

## 2023-12-26 RX ORDER — AMLODIPINE BESYLATE 5 MG/1
5 TABLET ORAL DAILY
Qty: 90 TABLET | Refills: 3 | Status: SHIPPED | OUTPATIENT
Start: 2023-12-26 | End: 2024-03-19

## 2023-12-26 RX ORDER — ATORVASTATIN CALCIUM 10 MG/1
10 TABLET, FILM COATED ORAL DAILY
Qty: 90 TABLET | Refills: 3 | Status: SHIPPED | OUTPATIENT
Start: 2023-12-26 | End: 2024-03-20

## 2023-12-26 NOTE — NURSING NOTE
"Chief Complaint   Patient presents with    Follow Up     Return General Cardiology for S/P DCCV/ALON       Initial BP (!) 158/79 (BP Location: Right arm, Patient Position: Chair, Cuff Size: Adult Regular)   Pulse 56   Ht 1.626 m (5' 4\")   Wt 105.8 kg (233 lb 3.2 oz)   SpO2 97%   BMI 40.03 kg/m   Estimated body mass index is 40.03 kg/m  as calculated from the following:    Height as of this encounter: 1.626 m (5' 4\").    Weight as of this encounter: 105.8 kg (233 lb 3.2 oz)..  BP completed using cuff size: regular    Mickie P., VF    "

## 2023-12-26 NOTE — PATIENT INSTRUCTIONS
Thank you for coming to the [facility] at [place]; please note the following instructions:    1. EKG today.    2. A referral has been placed to sleep medicine for you. They should be in contact with you to schedule in the next few days.     3. Follow up with Dr. Maya in 6 months.     4. Start new BP med amlodipine    5. Nurse visit 1 month BP check     6. Stop simvastatin and start Atorvastatin 10 mg daily      If you have any questions regarding your visit, please contact your care team:     CARDIOLOGY  TELEPHONE NUMBER   Eliane HAWKINS., Registered Nurse  Yumi PADILLA, Registered Nurse  Ceci BAINS, Registered Nurse  Daxa HALE, Registered Medical Assistant  Kenyetta GILBERT, Certified Medical Assistant  Cristin LUA, Visit Facilitator [phone] (select option 1)    *After hours: [phone]   For Scheduling Appts:     [phone] (select option 1)    *After hours: [phone]   For the Device Clinic (Pacemakers and ICD's)  Annalisa TALAVERA, Registered Nurse   During business hours: [phone]    *After business hours:  [phone] (select option 4)      Normal test result notifications will be released via [employer] or mailed within 7 business days.  All other test results, will be communicated via telephone once reviewed by your cardiologist.    If you need a medication refill, please contact your pharmacy.  Please allow 3 business days for your refill to be completed.    As always, thank you for trusting us with your health care needs!

## 2023-12-26 NOTE — LETTER
12/26/2023      RE: Collin Frank  [address]  [address]  [place] [address]       Dear Colleague,    Thank you for the opportunity to participate in the care of your patient, Collin Frank, at the [facility]. Please see a copy of my visit note below.                                                                                              General Cardiology Clinic-[place]      HPI: Mr. Collin Frank is a 73 year old  male with PMH significant for    -Factor V Leiden mutation  -Chronic left lower extremity DVT and PE on Xarelto  -Hypertension, UNCONTROLLED  -Prostate cancer  -Obesity    Patient presented emergency room on 12/11/2023 for weakness and shortness of breath for 3 days.  He tells me that he was sweating excessively.  EKG showed A-fib with RVR (heart rate 100-130's).  Underwent ALON guided DC cardioversion which converted patient to sinus rhythm.  ALON showed normal baseline echocardiogram.  He is following up with me today after this event.    Currently asymptomatic.  Denies chest pain, dizziness, palpitations, shortness of breath.  He denies prior history of atrial fibrillation.    Patient presented to emergency room in July of this year for fleeting chest pain and underwent CT scan which showed coronary artery calcium in the LAD.  Subsequently underwent dobutamine stress test which was negative.  He had a follow-up with cardiology on 9/1 and he was recommended better control of blood pressure.    No alcohol abuse.  No history of CVA.  No history of sleep apnea.  Current medications:  Losartan 50 mg daily  Metoprolol 25 mg daily  Xarelto 20 mg daily  Simvastatin 20 mg daily    Medications, personal, family, and social history reviewed with patient and revised.    PAST MEDICAL HISTORY:  Past Medical History:   Diagnosis Date     Antiplatelet or antithrombotic long-term use     blood clot in leg     Long  "term current use of anticoagulant 10/16/2012       CURRENT MEDICATIONS:  Current Outpatient Medications   Medication Sig Dispense Refill     cholecalciferol (VITAMIN D) 1000 UNIT tablet Take 1 tablet (1,000 Units) by mouth daily 100 tablet 3     losartan (COZAAR) 50 MG tablet Take 1 tablet (50 mg) by mouth daily 90 tablet 0     metoprolol succinate ER (TOPROL XL) 25 MG 24 hr tablet Take 1 tablet (25 mg) by mouth daily 90 tablet 3     simvastatin (ZOCOR) 20 MG tablet Take 1 tablet (20 mg) by mouth At Bedtime 90 tablet 4     XARELTO ANTICOAGULANT 20 MG TABS tablet TAKE 1 TABLET DAILY WITH DINNER 90 tablet 3       PAST SURGICAL HISTORY:  Past Surgical History:   Procedure Laterality Date     ANESTHESIA CARDIOVERSION N/A 2023    Procedure: Anesthesia cardioversion;  Surgeon: GENERIC ANESTHESIA PROVIDER;  Location: [facility] OR     COLONOSCOPY  08    Normal. Repeat in 10 years     COLONOSCOPY WITH CO2 INSUFFLATION N/A 2018    Procedure: COLONOSCOPY WITH CO2 INSUFFLATION;  COLON SCREEN/ ANNMARIEELMANN;  Surgeon: Jan Flores MD;  Location: [facility] OR     rt knee ORIF      [facility]       ALLERGIES:   No Known Allergies    FAMILY HISTORY:  Family History   Problem Relation Age of Onset     Alzheimer Disease Mother      Heart Disease Father      Prostate Cancer Father      Cancer Brother 65        pancreatic        Prostate Cancer Brother         2 stents 70% and 90% blockage     Heart Disease Sister          SOCIAL HISTORY:  Social History     Tobacco Use     Smoking status: Former     Types: Cigarettes     Quit date: 2011     Years since quittin.9     Smokeless tobacco: Never   Substance Use Topics     Alcohol use: No     Drug use: No       ROS:   Constitutional: No fever, chills, or sweats. Weight stable.   Cardiovascular: As per HPI.       Exam:  BP (!) 158/79 (BP Location: Right arm, Patient Position: Chair, Cuff Size: Adult Regular)   Pulse 56   Ht 1.626 m (5' 4\")   Wt 105.8 kg " (233 lb 3.2 oz)   SpO2 97%   BMI 40.03 kg/m    GENERAL APPEARANCE: alert and no distress  HEENT: no icterus, no central cyanosis  LYMPH/NECK: no adenopathy, no asymmetry, JVP not elevated  RESPIRATORY: lungs clear to auscultation - no rales, rhonchi or wheezes, no use of accessory muscles, no retractions, respirations are unlabored, normal respiratory rate  CARDIOVASCULAR: regular rhythm, normal S1, S2, no S3 or S4 and no murmur, click or rub, precordium quiet with normal PMI.  GI: soft, non tender  EXTREMITIES: no edema  NEURO: alert, normal speech,and affect  SKIN: no ecchymoses, no rashes     I have reviewed the labs and personally reviewed the imaging below and made my comment in the assessment and plan.    Labs:  CBC RESULTS:   Lab Results   Component Value Date    WBC 8.0 12/11/2023    WBC 7.2 03/31/2021    RBC 4.75 12/11/2023    RBC 4.60 03/31/2021    HGB 15.7 12/11/2023    HGB 14.7 03/31/2021    HCT 46.2 12/11/2023    HCT 43.5 03/31/2021    MCV 97 12/11/2023    MCV 95 03/31/2021    MCH 33.1 (H) 12/11/2023    MCH 32.0 03/31/2021    MCHC 34.0 12/11/2023    MCHC 33.8 03/31/2021    RDW 13.2 12/11/2023    RDW 12.8 03/31/2021     12/11/2023     03/31/2021       BMP RESULTS:  Lab Results   Component Value Date     12/11/2023     04/01/2021    POTASSIUM 3.7 12/11/2023    POTASSIUM 4.1 12/12/2022    POTASSIUM 3.9 04/01/2021    CHLORIDE 106 12/11/2023    CHLORIDE 109 12/12/2022    CHLORIDE 108 04/01/2021    CO2 20 (L) 12/11/2023    CO2 27 12/12/2022    CO2 25 04/01/2021    ANIONGAP 12 12/11/2023    ANIONGAP 4 12/12/2022    ANIONGAP 7 04/01/2021     (H) 12/11/2023    GLC 89 12/12/2022    GLC 90 04/01/2021    BUN 15.9 12/11/2023    BUN 15 12/12/2022    BUN 15 04/01/2021    CR 1.16 12/11/2023    CR 0.93 04/01/2021    GFRESTIMATED 66 12/11/2023    GFRESTIMATED 81 04/01/2021    GFRESTBLACK >90 04/01/2021    MARTA 9.0 12/11/2023    MARTA 8.8 04/01/2021        ALON cardioversion 12/11/2023:    Global and regional left ventricular function is normal with an EF of 55-60%.  The right ventricle is normal size. Global right ventricular function is  normal.  The left atrial appendage is normal. It is free of spontaneous echo contrast  and thrombus.  No significant valvular abnormalities.  No pericardial effusion is present.  Previous study not available for comparison    DC cardioversion successful 12/11/2023    Dobutamine stress test 7/1/2023: Normal    Assessment :     # Persistent atrial fibrillation status post DC cardioversion 12/11/2023  # Hypertension uncontrolled  # Obesity  -Reviewed EKG in clinic today which shows sinus rhythm.  Patient reports feeling well since cardioversion.  A-fib is a new diagnosis for the patient.  Currently feeling well and asymptomatic.  Blood pressure is not well-controlled.  He is currently on losartan 50 mg, metoprolol 25, Xarelto 20 mg ([id number]-RJEx score is 3 including age 73 and hypertension) and simvastatin 20 mg.    Plan:  -Lose weight  -Sleep study to rule out sleep apnea  -Start AMLODIPIN 5 MG/DAY to improve blood pressure control  -Come back to clinic in a month to recheck blood pressure control  -He needs to be on lifelong oral anticoagulation given [id number]-SDHq score is 3 and annual risk of stroke is more than 2%.  -Continue metoprolol 25 mg daily  -Continue Xarelto 20 mg daily  -Continue losartan 50 mg daily  -Stop simvastatin (there is interaction with amlodipine)  -Start atorvastatin 10 mg daily    Return to clinic 6 months    Total time spent today for this visit is 30 minutes including precharting, face-to-face clinic visit, review of labs/imaging and medical documentation.    Jimy STEWART MD  [facility] Division of Cardiology  Pager [phone]       Please do not hesitate to contact me if you have any questions/concerns.     Sincerely,     Jimy Stewart MD

## 2023-12-27 LAB
ATRIAL RATE - MUSE: 53 BPM
DIASTOLIC BLOOD PRESSURE - MUSE: NORMAL MMHG
INTERPRETATION ECG - MUSE: NORMAL
P AXIS - MUSE: 28 DEGREES
PR INTERVAL - MUSE: 168 MS
QRS DURATION - MUSE: 82 MS
QT - MUSE: 400 MS
QTC - MUSE: 375 MS
R AXIS - MUSE: -25 DEGREES
SYSTOLIC BLOOD PRESSURE - MUSE: NORMAL MMHG
T AXIS - MUSE: 50 DEGREES
VENTRICULAR RATE- MUSE: 53 BPM

## 2023-12-28 ENCOUNTER — TELEPHONE (OUTPATIENT)
Dept: CARDIOLOGY | Facility: CLINIC | Age: 73
End: 2023-12-28
Payer: COMMERCIAL

## 2023-12-28 VITALS
DIASTOLIC BLOOD PRESSURE: 77 MMHG | OXYGEN SATURATION: 97 % | WEIGHT: 233.2 LBS | SYSTOLIC BLOOD PRESSURE: 174 MMHG | HEIGHT: 68 IN | BODY MASS INDEX: 35.34 KG/M2 | HEART RATE: 65 BPM

## 2023-12-28 NOTE — TELEPHONE ENCOUNTER
"[facility] Call Center    Phone Message    May a detailed message be left on voicemail: yes     Reason for Call: Other: Patient called to update his care team about his height. He states they put the wrong height down, and he is 5'8\". Please update this in his records.    Action Taken: Message routed to:  Other: Cardiology    Travel Screening: Not Applicable    Thank you!  [facility]                                                                    "

## 2023-12-28 NOTE — TELEPHONE ENCOUNTER
Problem: VTE, Risk for  Goal: # No s/s of VTE  Outcome: Outcome Not Met, Continue to Monitor  Goal: # Verbalizes understanding of VTE risk factors and prevention  Description: Document education using the patient education activity.   Outcome: Outcome Not Met, Continue to Monitor  Goal: Demonstrates ability to administer injectable anticoagulants if ordered for d/c  Description: Document education using the patient education activity.  Outcome: Outcome Not Met, Continue to Monitor     Problem: VTE (Actual)  Goal: # Verbalizes understanding of VTE and treatment plan  Description: Document education using the patient education activity.   Outcome: Outcome Not Met, Continue to Monitor     Problem: Activity Intolerance  Goal: # Functional status is maintained or returned to baseline  Outcome: Outcome Not Met, Continue to Monitor  Goal: # Tolerates activity for d/c setting with no clinical problems  Outcome: Outcome Not Met, Continue to Monitor     Problem: Pressure Injury, Risk for  Goal: # Skin remains intact  Outcome: Outcome Not Met, Continue to Monitor  Goal: No new pressure injury (PI) development  Outcome: Outcome Not Met, Continue to Monitor  Goal: # Verbalizes understanding of PI risk factors and prevention strategies  Description: Document education using the patient education activity.   Outcome: Outcome Not Met, Continue to Monitor  Goal: Comfort optimized with pressure injury prevention strategies guided by patient/family preference. (Hospice)  Outcome: Outcome Not Met, Continue to Monitor     Problem: Pressure Injury Actual  Goal: # No deterioration in pressure injury (PI)  Outcome: Outcome Not Met, Continue to Monitor  Goal: # Verbalizes pressure injury management  Description: Document education using the patient education activity.  Outcome: Outcome Not Met, Continue to Monitor  Goal: Wound care provided to promote comfort needs (Hospice)  Outcome: Outcome Not Met, Continue to Monitor     Problem:  Patient's height has been updated.    Daxa Cartwright, FILIPE     Non-Pressure Injury Wound  Goal: # No deterioration in wound  Outcome: Outcome Not Met, Continue to Monitor  Goal: # Verbalizes understanding of wound and wound care  Description: If abnormality is a skin tear, avoid using tape on skin including transparent dressings. Document education using the patient education activity.  Outcome: Outcome Not Met, Continue to Monitor  Goal: Participates in wound care activities  Outcome: Outcome Not Met, Continue to Monitor  Goal: Wound care provided to promote comfort needs (Hospice)  Outcome: Outcome Not Met, Continue to Monitor     Problem: Cellulitis  Goal: Cellulitis improved as evidenced by decreased redness, swelling and pain  Description: Girth measurement may be used to evaluate edema.  Outcome: Outcome Not Met, Continue to Monitor  Goal: Care provided to promote comfort needs (Hospice)  Outcome: Outcome Not Met, Continue to Monitor     Problem: Impaired Physical Mobility  Goal: # Bed mobility, ambulation, and ADLs are maintained or returned to baseline during hospitalization  Outcome: Outcome Not Met, Continue to Monitor     Problem: At Risk for Falls  Goal: # Patient does not fall  Outcome: Outcome Not Met, Continue to Monitor  Goal: # Takes action to control fall-related risks  Outcome: Outcome Not Met, Continue to Monitor  Goal: # Verbalizes understanding of fall risk/precautions  Description: Document education using the patient education activity  Outcome: Outcome Not Met, Continue to Monitor     Problem: At Risk for Injury Due to Fall  Goal: # Patient does not fall  Outcome: Outcome Not Met, Continue to Monitor  Goal: # Takes action to control condition specific risks  Outcome: Outcome Not Met, Continue to Monitor  Goal: # Verbalizes understanding of fall-related injury personal risks  Description: Document education using the patient education activity  Outcome: Outcome Not Met, Continue to Monitor     Problem: Pain  Goal: #Acceptable pain level  achieved/maintained at rest using NRS/Faces  Description: This goal is used for patients who can self-report.  Acceptable means the level is at or below the identified comfort/function goal.  Outcome: Outcome Not Met, Continue to Monitor  Goal: # Acceptable pain level achieved/maintained at rest using NRS/Faces without oversedation (opioid naive or PCA/Epidural infusion)  Description: This goal is used if Opioid-naïve or on PCA/Epidural Infusion.  Outcome: Outcome Not Met, Continue to Monitor  Goal: # Acceptable pain level achieved/maintained with activity using NRS/Faces  Description: This goal is used for patients who can self-report and are not achieving acceptable pain control during activity.  Outcome: Outcome Not Met, Continue to Monitor  Goal: Acceptable pain/comfort level is achieved/maintained at rest (based on Pain Behaviors Scale)  Description: This goal is used for patients who are not able to self-report pain and are assessed for pain using the Pain Behaviors Scale  Outcome: Outcome Not Met, Continue to Monitor  Goal: Acceptable pain/comfort level is achieved/maintained at rest based on PAINAID scale (Dementia)  Description: This goal is used for patients who are not able to self-report pain, have dementia, and assessed using the PAINAD scale.  Outcome: Outcome Not Met, Continue to Monitor  Goal: Acceptable pain/comfort level is achieved/maintained at rest (based on pediatric behavior tool: NIPS, NPASS, or FLACC)  Description: This goal is used for pediatric patients who are not able to self report pain.  Outcome: Outcome Not Met, Continue to Monitor  Goal: # Verbalizes understanding of pain management  Description: Documented in Patient Education Activity  Outcome: Outcome Not Met, Continue to Monitor  Goal: Verbalizes understanding and effective use of Patient Controlled Analgesia (PCA)  Description: Documented in Patient Education Activity  This goal is used for patients with PCA  Outcome: Outcome  Not Met, Continue to Monitor  Goal: Maximum comfort achieved/maintained at end of life (Hospice)  Outcome: Outcome Not Met, Continue to Monitor

## 2024-01-08 ENCOUNTER — TELEPHONE (OUTPATIENT)
Dept: CARDIOLOGY | Facility: CLINIC | Age: 74
End: 2024-01-08
Payer: COMMERCIAL

## 2024-01-08 NOTE — TELEPHONE ENCOUNTER
[facility] Call Center    Phone Message    May a detailed message be left on voicemail: yes     Reason for Call: Other: Pt would like a call back to discuss his medication and go over list in detail as well as when he is to take these meds     Action Taken: Other: Cardio    Travel Screening: Not Applicable

## 2024-01-10 NOTE — TELEPHONE ENCOUNTER
Called and reviewed medications. Discuss frequency and when taking medications. Questions answered.    Yumi Jacome, RN, BSN  Cardiology RN Care Coordinator   [place]   Phone: [phone]  Fax: [phone] ([place]) [phone] ([place])

## 2024-01-23 ENCOUNTER — ALLIED HEALTH/NURSE VISIT (OUTPATIENT)
Dept: CARDIOLOGY | Facility: CLINIC | Age: 74
End: 2024-01-23
Payer: COMMERCIAL

## 2024-01-23 VITALS — BODY MASS INDEX: 35.56 KG/M2 | SYSTOLIC BLOOD PRESSURE: 127 MMHG | DIASTOLIC BLOOD PRESSURE: 74 MMHG | HEIGHT: 68 IN

## 2024-01-23 DIAGNOSIS — I48.91 ATRIAL FIBRILLATION STATUS POST CARDIOVERSION (H): Primary | ICD-10-CM

## 2024-01-23 DIAGNOSIS — I48.91 ATRIAL FIBRILLATION (H): ICD-10-CM

## 2024-01-23 PROCEDURE — 93246 EXT ECG>7D<15D RECORDING: CPT | Performed by: INTERNAL MEDICINE

## 2024-01-23 NOTE — PROGRESS NOTES
Pt here for nurse visit blood pressure check. Today, after resting 15 minutes, blood pressure is 127/74.     He brings with him a report as well of this last month, reported below.     Dr Maya reviewed blood pressures look good, would like to keep medication plan as-is and see him back in 6 months    Of note, when pt was hospitalized 12/11 they did place a zio monitor on and he did wear and returned back. He was notified that the zio box made it to the destination, however the heart monitor had slipped out of the box and was lost in the [place] postal service.     Per Dr Maya, 14 day zio should be placed today for afib.      Pt agreeable to plan and zio ordered and placed today    133/77  117/81  132/84  113/75  118/70  121/73  119/83  129/81  114/76  118/73  107/65  126/78  124/76  126/77  128/76  123/75  11/70  116/74  110/75  118/76  116/78  112/73  123/76  130/74    Ceci Ramirez RN

## 2024-01-23 NOTE — PROGRESS NOTES
Collin Frank arrived here on 1/23/2024 1:12 PM for 8-14 Days  Zio monitor placement per ordering provider Dr. Maya for the diagnosis Atrial fibrillation with RVR .  Patient s skin was prepped per protocol.  Zio monitor was placed.  Instructions were reviewed with and given to the patient.  Patient verbalized understanding of wear, troubleshooting and monitor return instructions.  MAREN Tabor

## 2024-02-13 ENCOUNTER — TELEPHONE (OUTPATIENT)
Dept: CARDIOLOGY | Facility: CLINIC | Age: 74
End: 2024-02-13
Payer: COMMERCIAL

## 2024-02-13 NOTE — TELEPHONE ENCOUNTER
[facility]    Phone Message    May a detailed message be left on voicemail: yes     Reason for Call: Other: PT would like to speak w/Yumi again.  He thought of another question when he got off the phone w/her a few moments ago.   called [facility] was advised Yumi is in [place] today.     Action Taken: Message routed to:  [facility] cardio    Travel Screening: Not Applicable    Thank you!  [facility]

## 2024-02-13 NOTE — TELEPHONE ENCOUNTER
Pt wanting to know if the sleep eval can be done at the sleep clinic in [facility].  Writer explained yes it is.  Patient verbalized understanding.      Eliane Luo, RN  Cardiology Care Coordinator  [facility]  [phone] option 1

## 2024-02-13 NOTE — TELEPHONE ENCOUNTER
[facility] Call Center    Phone Message    May a detailed message be left on voicemail: yes     Reason for Call: Other: Collin called requesting to speak with his care team, stating he needs some information but did not specify further. Please reach out to Collin to discuss. Thank you!     Action Taken: Other: Cardiology    Travel Screening: Not Applicable      Thank you!  [facility]

## 2024-02-13 NOTE — TELEPHONE ENCOUNTER
----- Message -----   From: Jimy Stewart MD   Sent: 2/13/2024  10:39 AM CST   To: [facility]     Collin,     I looked at your heart monitor.  There is no atrial fibrillation.  You had multiple episodes of SVT which is fluttering from the top chamber of the heart.  You did not feel them.  Longest was 48 seconds.  You are on good medications at this time and I do not recommend any changes.     Dr STEWART       Called and relayed information. Answered patient's questions. No further questions at this time.    Yumi Jacome, RN, BSN  Cardiology RN Care Coordinator   [place]   Phone: [phone]  Fax: [phone] ([place]) [phone] ([place])

## 2024-02-13 NOTE — TELEPHONE ENCOUNTER
Called and spoke with patient. Answered patient's questions. No further questions at this time.    Yumi Jacome, RN, BSN  Cardiology RN Care Coordinator   [place]   Phone: [phone]  Fax: [phone] ([place]) [phone] ([place])

## 2024-02-14 DIAGNOSIS — I10 BENIGN ESSENTIAL HYPERTENSION: ICD-10-CM

## 2024-02-15 RX ORDER — LOSARTAN POTASSIUM 50 MG/1
50 TABLET ORAL DAILY
Qty: 90 TABLET | Refills: 1 | Status: SHIPPED | OUTPATIENT
Start: 2024-02-15 | End: 2024-08-13

## 2024-02-28 ENCOUNTER — HOSPITAL ENCOUNTER (EMERGENCY)
Facility: CLINIC | Age: 74
Discharge: HOME | End: 2024-02-28
Attending: EMERGENCY MEDICINE | Admitting: EMERGENCY MEDICINE
Payer: COMMERCIAL

## 2024-02-28 ENCOUNTER — APPOINTMENT (OUTPATIENT)
Dept: GENERAL RADIOLOGY | Facility: CLINIC | Age: 74
End: 2024-02-28
Attending: EMERGENCY MEDICINE
Payer: COMMERCIAL

## 2024-02-28 VITALS
RESPIRATION RATE: 15 BRPM | DIASTOLIC BLOOD PRESSURE: 77 MMHG | TEMPERATURE: 97.3 F | SYSTOLIC BLOOD PRESSURE: 115 MMHG | OXYGEN SATURATION: 96 % | HEART RATE: 87 BPM

## 2024-02-28 DIAGNOSIS — R07.89 ATYPICAL CHEST PAIN: ICD-10-CM

## 2024-02-28 LAB
ALBUMIN SERPL BCG-MCNC: 4 G/DL (ref 3.5–5.2)
ALP SERPL-CCNC: 96 U/L (ref 40–150)
ALT SERPL W P-5'-P-CCNC: 56 U/L (ref 0–70)
ANION GAP SERPL CALCULATED.3IONS-SCNC: 11 MMOL/L (ref 7–15)
APTT PPP: 30 SECONDS (ref 22–38)
AST SERPL W P-5'-P-CCNC: 37 U/L (ref 0–45)
ATRIAL RATE - MUSE: 187 BPM
BASOPHILS # BLD AUTO: 0.1 10E3/UL (ref 0–0.2)
BASOPHILS NFR BLD AUTO: 1 %
BILIRUB SERPL-MCNC: 1.1 MG/DL
BUN SERPL-MCNC: 18.2 MG/DL (ref 8–23)
CALCIUM SERPL-MCNC: 9.1 MG/DL (ref 8.8–10.2)
CHLORIDE SERPL-SCNC: 104 MMOL/L (ref 98–107)
CREAT SERPL-MCNC: 1.06 MG/DL (ref 0.67–1.17)
DEPRECATED HCO3 PLAS-SCNC: 22 MMOL/L (ref 22–29)
DIASTOLIC BLOOD PRESSURE - MUSE: NORMAL MMHG
EGFRCR SERPLBLD CKD-EPI 2021: 73 ML/MIN/1.73M2
EOSINOPHIL # BLD AUTO: 0.2 10E3/UL (ref 0–0.7)
EOSINOPHIL NFR BLD AUTO: 3 %
ERYTHROCYTE [DISTWIDTH] IN BLOOD BY AUTOMATED COUNT: 12.6 % (ref 10–15)
FLUAV RNA SPEC QL NAA+PROBE: NEGATIVE
FLUBV RNA RESP QL NAA+PROBE: NEGATIVE
GLUCOSE SERPL-MCNC: 121 MG/DL (ref 70–99)
HCT VFR BLD AUTO: 45.4 % (ref 40–53)
HGB BLD-MCNC: 15.6 G/DL (ref 13.3–17.7)
IMM GRANULOCYTES # BLD: 0 10E3/UL
IMM GRANULOCYTES NFR BLD: 0 %
INR PPP: 1.74 (ref 0.85–1.15)
INTERPRETATION ECG - MUSE: NORMAL
LYMPHOCYTES # BLD AUTO: 2.1 10E3/UL (ref 0.8–5.3)
LYMPHOCYTES NFR BLD AUTO: 26 %
MAGNESIUM SERPL-MCNC: 2 MG/DL (ref 1.7–2.3)
MCH RBC QN AUTO: 32.6 PG (ref 26.5–33)
MCHC RBC AUTO-ENTMCNC: 34.4 G/DL (ref 31.5–36.5)
MCV RBC AUTO: 95 FL (ref 78–100)
MONOCYTES # BLD AUTO: 0.7 10E3/UL (ref 0–1.3)
MONOCYTES NFR BLD AUTO: 8 %
NEUTROPHILS # BLD AUTO: 5 10E3/UL (ref 1.6–8.3)
NEUTROPHILS NFR BLD AUTO: 62 %
NRBC # BLD AUTO: 0 10E3/UL
NRBC BLD AUTO-RTO: 0 /100
P AXIS - MUSE: NORMAL DEGREES
PLATELET # BLD AUTO: 271 10E3/UL (ref 150–450)
POTASSIUM SERPL-SCNC: 3.9 MMOL/L (ref 3.4–5.3)
PR INTERVAL - MUSE: NORMAL MS
PROT SERPL-MCNC: 6.7 G/DL (ref 6.4–8.3)
QRS DURATION - MUSE: 80 MS
QT - MUSE: 294 MS
QTC - MUSE: 383 MS
R AXIS - MUSE: -38 DEGREES
RBC # BLD AUTO: 4.79 10E6/UL (ref 4.4–5.9)
RSV RNA SPEC NAA+PROBE: NEGATIVE
SARS-COV-2 RNA RESP QL NAA+PROBE: NEGATIVE
SODIUM SERPL-SCNC: 137 MMOL/L (ref 135–145)
SYSTOLIC BLOOD PRESSURE - MUSE: NORMAL MMHG
T AXIS - MUSE: 71 DEGREES
TROPONIN T SERPL HS-MCNC: 15 NG/L
TROPONIN T SERPL HS-MCNC: 18 NG/L
VENTRICULAR RATE- MUSE: 102 BPM
WBC # BLD AUTO: 8.1 10E3/UL (ref 4–11)

## 2024-02-28 PROCEDURE — 36415 COLL VENOUS BLD VENIPUNCTURE: CPT | Performed by: EMERGENCY MEDICINE

## 2024-02-28 PROCEDURE — 87637 SARSCOV2&INF A&B&RSV AMP PRB: CPT | Performed by: EMERGENCY MEDICINE

## 2024-02-28 PROCEDURE — 93005 ELECTROCARDIOGRAM TRACING: CPT | Performed by: EMERGENCY MEDICINE

## 2024-02-28 PROCEDURE — 85610 PROTHROMBIN TIME: CPT | Performed by: EMERGENCY MEDICINE

## 2024-02-28 PROCEDURE — 71046 X-RAY EXAM CHEST 2 VIEWS: CPT

## 2024-02-28 PROCEDURE — 84484 ASSAY OF TROPONIN QUANT: CPT | Performed by: EMERGENCY MEDICINE

## 2024-02-28 PROCEDURE — 80053 COMPREHEN METABOLIC PANEL: CPT | Performed by: EMERGENCY MEDICINE

## 2024-02-28 PROCEDURE — 99285 EMERGENCY DEPT VISIT HI MDM: CPT | Mod: 25 | Performed by: EMERGENCY MEDICINE

## 2024-02-28 PROCEDURE — 93010 ELECTROCARDIOGRAM REPORT: CPT | Performed by: EMERGENCY MEDICINE

## 2024-02-28 PROCEDURE — 93005 ELECTROCARDIOGRAM TRACING: CPT | Mod: RTG

## 2024-02-28 PROCEDURE — 99284 EMERGENCY DEPT VISIT MOD MDM: CPT | Mod: 25 | Performed by: EMERGENCY MEDICINE

## 2024-02-28 PROCEDURE — 71046 X-RAY EXAM CHEST 2 VIEWS: CPT | Mod: 26 | Performed by: RADIOLOGY

## 2024-02-28 PROCEDURE — 85730 THROMBOPLASTIN TIME PARTIAL: CPT | Performed by: EMERGENCY MEDICINE

## 2024-02-28 PROCEDURE — 85025 COMPLETE CBC W/AUTO DIFF WBC: CPT | Performed by: EMERGENCY MEDICINE

## 2024-02-28 PROCEDURE — 83735 ASSAY OF MAGNESIUM: CPT | Performed by: EMERGENCY MEDICINE

## 2024-02-28 ASSESSMENT — ACTIVITIES OF DAILY LIVING (ADL)
ADLS_ACUITY_SCORE: 35
ADLS_ACUITY_SCORE: 33
ADLS_ACUITY_SCORE: 33

## 2024-02-28 ASSESSMENT — COLUMBIA-SUICIDE SEVERITY RATING SCALE - C-SSRS
1. IN THE PAST MONTH, HAVE YOU WISHED YOU WERE DEAD OR WISHED YOU COULD GO TO SLEEP AND NOT WAKE UP?: NO
6. HAVE YOU EVER DONE ANYTHING, STARTED TO DO ANYTHING, OR PREPARED TO DO ANYTHING TO END YOUR LIFE?: NO
2. HAVE YOU ACTUALLY HAD ANY THOUGHTS OF KILLING YOURSELF IN THE PAST MONTH?: NO

## 2024-02-28 ASSESSMENT — LIFESTYLE VARIABLES
AUDIT-C TOTAL SCORE: 0
SKIP TO QUESTIONS 9-10: 1

## 2024-02-28 NOTE — PROGRESS NOTES
[facility]  Community Health Worker Initial Assessment  Social Determinants of Health     Food Insecurity: Low Risk  (2/28/2024)    Food Insecurity     Within the past 12 months, did you worry that your food would run out before you got money to buy more?: No     Within the past 12 months, did the food you bought just not last and you didn t have money to get more?: No   Depression: Not at risk (12/14/2023)    PHQ-2     PHQ-2 Score: 0   Housing Stability: Low Risk  (2/28/2024)    Housing Stability     Do you have housing? : Yes     Are you worried about losing your housing?: No   Tobacco Use: Medium Risk (2/28/2024)    Patient History     Smoking Tobacco Use: Former     Smokeless Tobacco Use: Never     Passive Exposure: Not on file   Financial Resource Strain: Low Risk  (2/28/2024)    Financial Resource Strain     Within the past 12 months, have you or your family members you live with been unable to get utilities (heat, electricity) when it was really needed?: No   Alcohol Use: Not At Risk (2/28/2024)    AUDIT-C     Frequency of Alcohol Consumption: Never     Average Number of Drinks: Patient does not drink     Frequency of Binge Drinking: Never   Transportation Needs: Low Risk  (2/28/2024)    Transportation Needs     Within the past 12 months, has lack of transportation kept you from medical appointments, getting your medicines, non-medical meetings or appointments, work, or from getting things that you need?: No   Physical Activity: Insufficiently Active (2/28/2024)    Exercise Vital Sign     Days of Exercise per Week: 7 days     Minutes of Exercise per Session: 20 min   Interpersonal Safety: Low Risk  (2/28/2024)    Interpersonal Safety     Do you feel physically and emotionally safe where you currently live?: Yes     Within the past 12 months, have you been hit, slapped, kicked or otherwise physically hurt by someone?: No     Within the past 12 months, have you been humiliated or  emotionally abused in other ways by your partner or ex-partner?: No   Stress: No Stress Concern Present (2/28/2024)    [employer] [place] [employer] - Occupational Stress Questionnaire     Feeling of Stress : Not at all   Social Connections: Moderately Isolated (2/28/2024)    Social Connection and Isolation Panel [NHANES]     Frequency of Communication with Friends and Family: More than three times a week     Frequency of Social Gatherings with Friends and Family: Twice a week     Attends [employer]: More than 4 times per year     Active Member of Clubs or Organizations: No     Attends Club or Organization Meetings: Never     Marital Status: Never         ED follow up appointment with PCP scheduled? No    Does patient have transportation to PCP appointment? No    Resources provided today? No    Patient accepted [facility] Transitions Program? Declined    If accepted, date of next follow-up call      Kaylin Fraser  Community Health Worker   [facility] Transitions Program  [facility]  [email or web address]  Office: [phone]

## 2024-02-28 NOTE — ED PROVIDER NOTES
"ED Provider Note  [facility]      History     Chief Complaint   Patient presents with    Chest Pain     HPI  Collin Frank is a 73 year old male PMHx significant for Factor V Leiden mutation, chronic left lower extremity DVT and PE on Xarelto, HTN, Prostrate cancer, A-fib with RVR who presents today with chest pain.     Patient states that he has had chest pain for 2 days which has prevented him from sleeping at night which has prompted him to seek medical care today. He describes the pain as \"pins,\" non-radiating, intermittent every couple of hours with severity 1/10.     Denies N/V, SOB, abdominal pain, diarrhea/constipation, headache. Admits only to chest pain and denies other symptoms.     Patient admits to taking Xarelto ever day and pain doesn't feel like past episodes of PE. Patient takes Metoprolol around 2PM every day.     Prior ED visit 12/11/2023 for A-fib with RVR, had external cardioversion.     Patient initially tachycardic (), repeat vitals showing (HR (87).    Past Medical History  Past Medical History:   Diagnosis Date    Antiplatelet or antithrombotic long-term use     blood clot in leg    Long term current use of anticoagulant 10/16/2012     Past Surgical History:   Procedure Laterality Date    ANESTHESIA CARDIOVERSION N/A 12/11/2023    Procedure: Anesthesia cardioversion;  Surgeon: GENERIC ANESTHESIA PROVIDER;  Location: [facility] OR    COLONOSCOPY  1-17-08    Normal. Repeat in 10 years    COLONOSCOPY WITH CO2 INSUFFLATION N/A 2/1/2018    Procedure: COLONOSCOPY WITH CO2 INSUFFLATION;  COLON SCREEN/ ENGELMANN;  Surgeon: Jan Flores MD;  Location: [facility] OR    rt knee ORIF  1975    [facility]     amLODIPine (NORVASC) 5 MG tablet  atorvastatin (LIPITOR) 10 MG tablet  cholecalciferol (VITAMIN D) 1000 UNIT tablet  losartan (COZAAR) 50 MG tablet  metoprolol succinate ER (TOPROL XL) 25 MG 24 hr tablet  XARELTO ANTICOAGULANT 20 MG TABS tablet      No Known " Allergies  Social History   Social History     Tobacco Use    Smoking status: Former     Types: Cigarettes     Quit date: 2011     Years since quittin.0    Smokeless tobacco: Never   Substance Use Topics    Alcohol use: No    Drug use: No      Past medical history and social history were reviewed with the patient. Additional pertinent items: None     A medically appropriate review of systems was performed with pertinent positives and negatives noted in the HPI, and all other systems negative.    Physical Exam   BP: 124/83  Pulse: 106  Temp: 97.7  F (36.5  C)  Resp: 16  SpO2: 97 %  Physical Exam  Exam:  Constitutional: healthy, alert, and mild distress  Head: Normocephalic. No masses, lesions, or abnormalities  Neck: Neck supple. No apparent adenopathy. Thyroid appears symmetric, normal size,  ENT: no apparent neck nodes   Cardiovascular: Irregular rhythm, tachycardic. No obvious murmurs, clicks gallops or rub.  Respiratory: negative, Lungs clear, no increased WOB  Gastrointestinal: Abdomen soft, non-tender.No masses, organomegaly  Musculoskeletal: extremities normal- no gross deformities noted and apparent normal muscle tone  Skin: no obvious suspicious lesions or rashes  Neurologic: Alert.Sensation grossly WNL.  Psychiatric: mentation appears normal and affect normal/bright  Hematologic/Lymphatic/Immunologic: Normal apparent cervical lymph nodes      ED Course, Procedures, & Data      Procedures           Results for orders placed or performed during the hospital encounter of 24   EKG 12 lead     Status: None (Preliminary result)   Result Value Ref Range    Systolic Blood Pressure  mmHg    Diastolic Blood Pressure  mmHg    Ventricular Rate 102 BPM    Atrial Rate 187 BPM    IN Interval  ms    QRS Duration 80 ms     ms    QTc 383 ms    P Axis  degrees    R AXIS -38 degrees    T Axis 71 degrees    Interpretation ECG       Atrial fibrillation with rapid ventricular response  Left axis deviation  ST  & T wave abnormality, consider lateral ischemia  Abnormal ECG       Medications - No data to display  Labs Ordered and Resulted from Time of ED Arrival to Time of ED Departure - No data to display  No orders to display            Assessment & Plan    Collin Frank is a 73 year old male PMHx significant for Factor V Leiden mutation, chronic left lower extremity DVT and PE on Xarelto, HTN, Prostrate cancer, A-fib with RVR who presents today with chest pain concerning for cardiac and pulmonary etiologies. Patient is tachycardic and in minimal distress upon initial evaluation.     Initial differential diagnosis included A.fib with RVR, Pneumonia, Acute HF, PE.     Unlikely to be HF due to having previous normal echo and doesn't appear to be volume overload. Unlikely to be PE due to history, exam findings, patient is anticoagulated.  ACS unlikely given no ST segment elevation in contiguous leads with reciprocal ST segment depression seen, troponins negative. WBC 8.1 with negative viral panel and clear lungs making pneumonia unlikely. Also CXR not showing any evidence of pneumonia, only stable left lung base atelectasis unchanged from prior CXR. Most likely symptoms could have been due to A-fib with RVR d/t history and EKG today significant for A-fib with RVR. Initially tachycardic, repeat vitals showing normal HR and stabilization of patient, patient is adequately rate controlled on Metoprolol and appropriate for discharge.    I have reviewed the nursing notes. I have reviewed the findings, diagnosis, plan and need for follow up with the patient.    New Prescriptions    No medications on file       Final diagnoses:   None     Gabriel De on 2/28/2024 at 7:44 AM    Kosta Brower MD  [facility] EMERGENCY DEPARTMENT  2/28/2024    --    ED Attending Physician Attestation    I Kosta Brower MD, cared for this patient with the Medical Student. I performed, or re-performed, the physical exam and  medical decision-making. I have verified the accuracy of all the medical student findings and documentation above, and have edited as necessary.    Summary of HPI, PE, ED Course   Patient is a 73 year old male evaluated in the emergency department for chest discomfort. Exam and ED course notable for atrial fibrillation, HR low 100s initially, improved without intervention, patient is on blood thinners and rate control medications at home already. After the completion of care in the emergency department, the patient was discharged.    Critical Care & Procedures  Not applicable.        Medical Decision Making  The patient's presentation was of moderate complexity (an undiagnosed new problem with uncertain diagnosis).    The patient's evaluation involved:  review of external note(s) from 1 sources (see separate area of note for details)  review of 3+ test result(s) ordered prior to this encounter (see separate area of note for details)  ordering and/or review of 3+ test(s) in this encounter (see separate area of note for details)    The patient's management necessitated only low risk treatment.          Kosta Brower MD  Emergency Medicine        Kosta Brower MD  03/12/24 0050

## 2024-02-28 NOTE — ED TRIAGE NOTES
Patient arrives to ED with CC of intermittent Pins and needles sensation in chest x couple days. Hx of afib +SOB with exertion   Patient currently on xarelto for blood clots in legs.   Hx of afib

## 2024-02-29 ENCOUNTER — PATIENT OUTREACH (OUTPATIENT)
Dept: CARE COORDINATION | Facility: CLINIC | Age: 74
End: 2024-02-29
Payer: COMMERCIAL

## 2024-02-29 NOTE — PROGRESS NOTES
Clinic Care Coordination Contact  Community Health Worker Initial Outreach    CHW Initial Information Gathering:  Referral Source: ED Follow-Up  CHW Additional Questions  [facility] active?: No    Patient accepts CC: No, patient declined at this time. Unable to send Care Coordination introduction letter as [facility] is not active. Care coordination services remain available via referral.      Deyanira Fountain  Community Health Worker  Connected Care Resource Center, [facility]    *Connected Care Resource Team does NOT follow patient ongoing. Referrals are identified based on internal discharge reports and the outreach is to ensure patient has an understanding of their discharge instructions.

## 2024-03-12 DIAGNOSIS — I48.91 ATRIAL FIBRILLATION WITH RVR (H): ICD-10-CM

## 2024-03-12 NOTE — TELEPHONE ENCOUNTER
[facility] Call [facility]    Phone Message    May a detailed message be left on voicemail: yes     Reason for Call: Medication Refill Request    Has the patient contacted the pharmacy for the refill? Yes   Name of medication being requested: metoprolol succinate ER (TOPROL XL) 25 MG 24 hr tablet   Provider who prescribed the medication: Can  Pharmacy:   [facility] in [facility]  Date medication is needed: 3/12/24    Action Taken: Message routed to:  Other: Cardiology    Travel Screening: Not Applicable    Thank you!  [facility]

## 2024-03-13 RX ORDER — METOPROLOL SUCCINATE 25 MG/1
25 TABLET, EXTENDED RELEASE ORAL DAILY
Qty: 90 TABLET | Refills: 3 | Status: SHIPPED | OUTPATIENT
Start: 2024-03-13 | End: 2024-09-07

## 2024-03-13 NOTE — TELEPHONE ENCOUNTER
metoprolol succinate ER (TOPROL XL) 25 MG 24 hr tablet          Sig: Take 1 tablet (25 mg) by mouth daily    Disp: 90 tablet    Refills: 3    Start: 3/13/2024    Class: E-Prescribe    For: Atrial fibrillation with RVR (H)    Last ordered: 3 months ago (12/11/2023) by CHITRA Kirkpatrick CNP    Beta-Blockers Protocol [id number]/13/2024 12:30 PM   Protocol Details Blood pressure under 140/90 in past 12 months    Patient is age 6 or older    Medication is active on med list    Medication indicated for associated diagnosis    Recent (12 mo) or future (90 days) visit within the authorizing provider's specialty      To be filled at: [facility] [address] [place] [address]       Patient saw Dr. Maya on 12/26/23. Patient was to continue metoprolol 25 mg daily. A year supply was sent to pharmacy. Patient states he did not call pharmacy but requested that prescription be sent to the [facility] in [place]. Prescription sent to requested pharmacy. Informed patient to notify clinic if patient is unable to get prescription.    Yumi Jacome, RN, BSN  Cardiology RN Care Coordinator   [place]   Phone: [phone]  Fax: [phone] ([place]) [phone] ([place])     Sepsis due to pneumonia

## 2024-03-13 NOTE — TELEPHONE ENCOUNTER
Pending Prescriptions:                       Disp   Refills    metoprolol succinate ER (TOPROL XL) 25 MG*90 tab*3            Sig: Take 1 tablet (25 mg) by mouth daily

## 2024-03-14 ENCOUNTER — APPOINTMENT (OUTPATIENT)
Dept: GENERAL RADIOLOGY | Facility: CLINIC | Age: 74
End: 2024-03-14
Attending: EMERGENCY MEDICINE
Payer: COMMERCIAL

## 2024-03-14 ENCOUNTER — HOSPITAL ENCOUNTER (EMERGENCY)
Facility: CLINIC | Age: 74
Discharge: HOME | End: 2024-03-14
Attending: EMERGENCY MEDICINE | Admitting: EMERGENCY MEDICINE
Payer: COMMERCIAL

## 2024-03-14 VITALS
BODY MASS INDEX: 35.08 KG/M2 | RESPIRATION RATE: 20 BRPM | TEMPERATURE: 97.9 F | DIASTOLIC BLOOD PRESSURE: 85 MMHG | WEIGHT: 231.48 LBS | HEART RATE: 86 BPM | HEIGHT: 68 IN | SYSTOLIC BLOOD PRESSURE: 124 MMHG | OXYGEN SATURATION: 95 %

## 2024-03-14 DIAGNOSIS — I48.91 ATRIAL FIBRILLATION WITH RVR (H): ICD-10-CM

## 2024-03-14 LAB
ALBUMIN SERPL BCG-MCNC: 3.8 G/DL (ref 3.5–5.2)
ALP SERPL-CCNC: 98 U/L (ref 40–150)
ALT SERPL W P-5'-P-CCNC: 24 U/L (ref 0–70)
ANION GAP SERPL CALCULATED.3IONS-SCNC: 10 MMOL/L (ref 7–15)
AST SERPL W P-5'-P-CCNC: 26 U/L (ref 0–45)
ATRIAL RATE - MUSE: 57 BPM
BASOPHILS # BLD AUTO: 0.1 10E3/UL (ref 0–0.2)
BASOPHILS NFR BLD AUTO: 1 %
BILIRUB SERPL-MCNC: 1.1 MG/DL
BUN SERPL-MCNC: 14.1 MG/DL (ref 8–23)
CALCIUM SERPL-MCNC: 9.1 MG/DL (ref 8.8–10.2)
CHLORIDE SERPL-SCNC: 107 MMOL/L (ref 98–107)
CREAT SERPL-MCNC: 1.15 MG/DL (ref 0.67–1.17)
DEPRECATED HCO3 PLAS-SCNC: 22 MMOL/L (ref 22–29)
DIASTOLIC BLOOD PRESSURE - MUSE: NORMAL MMHG
EGFRCR SERPLBLD CKD-EPI 2021: 66 ML/MIN/1.73M2
EOSINOPHIL # BLD AUTO: 0.1 10E3/UL (ref 0–0.7)
EOSINOPHIL NFR BLD AUTO: 1 %
ERYTHROCYTE [DISTWIDTH] IN BLOOD BY AUTOMATED COUNT: 12.7 % (ref 10–15)
GLUCOSE SERPL-MCNC: 128 MG/DL (ref 70–99)
HCT VFR BLD AUTO: 45.2 % (ref 40–53)
HGB BLD-MCNC: 15.4 G/DL (ref 13.3–17.7)
IMM GRANULOCYTES # BLD: 0 10E3/UL
IMM GRANULOCYTES NFR BLD: 0 %
INTERPRETATION ECG - MUSE: NORMAL
LYMPHOCYTES # BLD AUTO: 1.4 10E3/UL (ref 0.8–5.3)
LYMPHOCYTES NFR BLD AUTO: 20 %
MAGNESIUM SERPL-MCNC: 2.1 MG/DL (ref 1.7–2.3)
MCH RBC QN AUTO: 32.6 PG (ref 26.5–33)
MCHC RBC AUTO-ENTMCNC: 34.1 G/DL (ref 31.5–36.5)
MCV RBC AUTO: 96 FL (ref 78–100)
MONOCYTES # BLD AUTO: 0.6 10E3/UL (ref 0–1.3)
MONOCYTES NFR BLD AUTO: 8 %
NEUTROPHILS # BLD AUTO: 4.6 10E3/UL (ref 1.6–8.3)
NEUTROPHILS NFR BLD AUTO: 70 %
NRBC # BLD AUTO: 0 10E3/UL
NRBC BLD AUTO-RTO: 0 /100
NT-PROBNP SERPL-MCNC: 317 PG/ML (ref 0–900)
P AXIS - MUSE: NORMAL DEGREES
PHOSPHATE SERPL-MCNC: 2.9 MG/DL (ref 2.5–4.5)
PLATELET # BLD AUTO: 239 10E3/UL (ref 150–450)
POTASSIUM SERPL-SCNC: 4.2 MMOL/L (ref 3.4–5.3)
PR INTERVAL - MUSE: NORMAL MS
PROT SERPL-MCNC: 6.6 G/DL (ref 6.4–8.3)
QRS DURATION - MUSE: 80 MS
QT - MUSE: 328 MS
QTC - MUSE: 412 MS
R AXIS - MUSE: -39 DEGREES
RBC # BLD AUTO: 4.72 10E6/UL (ref 4.4–5.9)
SODIUM SERPL-SCNC: 139 MMOL/L (ref 135–145)
SYSTOLIC BLOOD PRESSURE - MUSE: NORMAL MMHG
T AXIS - MUSE: 79 DEGREES
TROPONIN T SERPL HS-MCNC: 20 NG/L
VENTRICULAR RATE- MUSE: 95 BPM
WBC # BLD AUTO: 6.7 10E3/UL (ref 4–11)

## 2024-03-14 PROCEDURE — 80053 COMPREHEN METABOLIC PANEL: CPT | Performed by: EMERGENCY MEDICINE

## 2024-03-14 PROCEDURE — 93005 ELECTROCARDIOGRAM TRACING: CPT | Performed by: EMERGENCY MEDICINE

## 2024-03-14 PROCEDURE — 71046 X-RAY EXAM CHEST 2 VIEWS: CPT | Mod: 26 | Performed by: RADIOLOGY

## 2024-03-14 PROCEDURE — 85025 COMPLETE CBC W/AUTO DIFF WBC: CPT | Performed by: EMERGENCY MEDICINE

## 2024-03-14 PROCEDURE — 84484 ASSAY OF TROPONIN QUANT: CPT | Performed by: EMERGENCY MEDICINE

## 2024-03-14 PROCEDURE — 71046 X-RAY EXAM CHEST 2 VIEWS: CPT

## 2024-03-14 PROCEDURE — 99285 EMERGENCY DEPT VISIT HI MDM: CPT | Mod: 25 | Performed by: EMERGENCY MEDICINE

## 2024-03-14 PROCEDURE — 84100 ASSAY OF PHOSPHORUS: CPT | Performed by: EMERGENCY MEDICINE

## 2024-03-14 PROCEDURE — 83735 ASSAY OF MAGNESIUM: CPT | Performed by: EMERGENCY MEDICINE

## 2024-03-14 PROCEDURE — 36415 COLL VENOUS BLD VENIPUNCTURE: CPT | Performed by: EMERGENCY MEDICINE

## 2024-03-14 PROCEDURE — 83880 ASSAY OF NATRIURETIC PEPTIDE: CPT | Performed by: EMERGENCY MEDICINE

## 2024-03-14 PROCEDURE — 93010 ELECTROCARDIOGRAM REPORT: CPT | Performed by: EMERGENCY MEDICINE

## 2024-03-14 ASSESSMENT — COLUMBIA-SUICIDE SEVERITY RATING SCALE - C-SSRS
6. HAVE YOU EVER DONE ANYTHING, STARTED TO DO ANYTHING, OR PREPARED TO DO ANYTHING TO END YOUR LIFE?: NO
2. HAVE YOU ACTUALLY HAD ANY THOUGHTS OF KILLING YOURSELF IN THE PAST MONTH?: NO
1. IN THE PAST MONTH, HAVE YOU WISHED YOU WERE DEAD OR WISHED YOU COULD GO TO SLEEP AND NOT WAKE UP?: NO

## 2024-03-14 ASSESSMENT — ACTIVITIES OF DAILY LIVING (ADL)
ADLS_ACUITY_SCORE: 35

## 2024-03-14 NOTE — ED NOTES
Bed: ED11  Expected date:   Expected time:   Means of arrival:   Comments:  H449 73m, rapid heart rate

## 2024-03-14 NOTE — ED PROVIDER NOTES
ED Provider Note  [facility]      History     Chief Complaint   Patient presents with    Tachycardia     HPI  Collin Frank is a 73 year old male with history of factor V Leiden mutation, chronic left lower extremity DVT and PE on Xarelto, hypertension, prostate cancer, A-fib with RVR, presents today with palpitations.  He noted some chest pressure earlier in the day that has since resolved.  He then felt some palpitations and thought that he should call EMS to make sure that everything was okay.  On EMS arrival, was noted to be in A-fib with RVR with a rate of 100.  He states that he thinks he has been in A-fib with RVR with rates of  for the past month.  He currently is asymptomatic.  Specifically denies chest pain, shortness of breath, fever/chills, leg swelling, or other symptoms.  Has been taking his Xarelto and metoprolol as prescribed.    Past Medical History  Past Medical History:   Diagnosis Date    Antiplatelet or antithrombotic long-term use     blood clot in leg    Long term current use of anticoagulant 10/16/2012     Past Surgical History:   Procedure Laterality Date    ANESTHESIA CARDIOVERSION N/A 12/11/2023    Procedure: Anesthesia cardioversion;  Surgeon: GENERIC ANESTHESIA PROVIDER;  Location: [facility] OR    COLONOSCOPY  1-17-08    Normal. Repeat in 10 years    COLONOSCOPY WITH CO2 INSUFFLATION N/A 2/1/2018    Procedure: COLONOSCOPY WITH CO2 INSUFFLATION;  COLON SCREEN/ ENGELMANN;  Surgeon: Jan Flores MD;  Location: [facility] OR    rt knee ORIF  1975    [facility]     amLODIPine (NORVASC) 5 MG tablet  atorvastatin (LIPITOR) 10 MG tablet  cholecalciferol (VITAMIN D) 1000 UNIT tablet  losartan (COZAAR) 50 MG tablet  metoprolol succinate ER (TOPROL XL) 25 MG 24 hr tablet  XARELTO ANTICOAGULANT 20 MG TABS tablet      No Known Allergies  Family History  Family History   Problem Relation Age of Onset    Alzheimer Disease Mother     Heart Disease Father     Prostate  "Cancer Father     Cancer Brother 65        pancreatic       Prostate Cancer Brother         2 stents 70% and 90% blockage    Heart Disease Sister      Social History   Social History     Tobacco Use    Smoking status: Former     Types: Cigarettes     Quit date: 2011     Years since quittin.1    Smokeless tobacco: Never   Substance Use Topics    Alcohol use: No    Drug use: No         A medically appropriate review of systems was performed with pertinent positives and negatives noted in the HPI, and all other systems negative.    Physical Exam   BP: (!) 145/91  Pulse: (!) 138  Temp: 98.1  F (36.7  C)  Resp: 20  Height: 171.5 cm (5' 7.5\")  Weight: 105 kg (231 lb 7.7 oz)  SpO2: 94 %  Physical Exam  Vitals and nursing note reviewed.   Constitutional:       General: He is not in acute distress.     Appearance: Normal appearance.   HENT:      Head: Normocephalic.      Nose: Nose normal.   Eyes:      Pupils: Pupils are equal, round, and reactive to light.   Cardiovascular:      Rate and Rhythm: Tachycardia present. Rhythm irregular.   Pulmonary:      Effort: Pulmonary effort is normal.   Abdominal:      General: There is no distension.   Musculoskeletal:         General: No deformity. Normal range of motion.      Cervical back: Normal range of motion.   Skin:     General: Skin is warm.   Neurological:      Mental Status: He is alert and oriented to person, place, and time.   Psychiatric:         Mood and Affect: Mood normal.           ED Course, Procedures, & Data     ED Course as of 24 0712   Thu Mar 14, 2024   0331      EKG Interpretation:     Interpreted by Tera Freire DO  Time reviewed:033   Symptoms at time of EKG: resolved chest pressure   Rhythm: afib  Rate: normal  Axis: LEFT  Ectopy: none  Conduction: normal  ST Segments/ T Waves: No ST-T wave changes  Q Waves: none  Comparison to prior: Unchanged from 24    Clinical Impression: unchanged afib, o/w normal EKG           Results for " orders placed or performed during the hospital encounter of 03/14/24   XR Chest 2 Views     Status: None    Narrative    EXAM: XR CHEST 2 VIEWS  LOCATION: [facility]  DATE: 3/14/2024    INDICATION: central chest pressure  COMPARISON: 02/28/2024      Impression    IMPRESSION: Mild linear scarring again seen in the left lung base. No consolidation, pleural effusion or pneumothorax. Heart size and pulmonary vascularity are normal.   Comprehensive metabolic panel     Status: Abnormal   Result Value Ref Range    Sodium 139 135 - 145 mmol/L    Potassium 4.2 3.4 - 5.3 mmol/L    Carbon Dioxide (CO2) 22 22 - 29 mmol/L    Anion Gap 10 7 - 15 mmol/L    Urea Nitrogen 14.1 8.0 - 23.0 mg/dL    Creatinine 1.15 0.67 - 1.17 mg/dL    GFR Estimate 66 >60 mL/min/1.73m2    Calcium 9.1 8.8 - 10.2 mg/dL    Chloride 107 98 - 107 mmol/L    Glucose 128 (H) 70 - 99 mg/dL    Alkaline Phosphatase 98 40 - 150 U/L    AST 26 0 - 45 U/L    ALT 24 0 - 70 U/L    Protein Total 6.6 6.4 - 8.3 g/dL    Albumin 3.8 3.5 - 5.2 g/dL    Bilirubin Total 1.1 <=1.2 mg/dL   Magnesium     Status: Normal   Result Value Ref Range    Magnesium 2.1 1.7 - 2.3 mg/dL   Phosphorus     Status: Normal   Result Value Ref Range    Phosphorus 2.9 2.5 - 4.5 mg/dL   Troponin T, High Sensitivity     Status: Normal   Result Value Ref Range    Troponin T, High Sensitivity 20 <=22 ng/L   Nt probnp inpatient (BNP)     Status: Normal   Result Value Ref Range    N terminal Pro BNP Inpatient 317 0 - 900 pg/mL   CBC with platelets and differential     Status: None   Result Value Ref Range    WBC Count 6.7 4.0 - 11.0 10e3/uL    RBC Count 4.72 4.40 - 5.90 10e6/uL    Hemoglobin 15.4 13.3 - 17.7 g/dL    Hematocrit 45.2 40.0 - 53.0 %    MCV 96 78 - 100 fL    MCH 32.6 26.5 - 33.0 pg    MCHC 34.1 31.5 - 36.5 g/dL    RDW 12.7 10.0 - 15.0 %    Platelet Count 239 150 - 450 10e3/uL    % Neutrophils 70 %    % Lymphocytes 20 %    % Monocytes 8 %    %  Eosinophils 1 %    % Basophils 1 %    % Immature Granulocytes 0 %    NRBCs per 100 WBC 0 <1 /100    Absolute Neutrophils 4.6 1.6 - 8.3 10e3/uL    Absolute Lymphocytes 1.4 0.8 - 5.3 10e3/uL    Absolute Monocytes 0.6 0.0 - 1.3 10e3/uL    Absolute Eosinophils 0.1 0.0 - 0.7 10e3/uL    Absolute Basophils 0.1 0.0 - 0.2 10e3/uL    Absolute Immature Granulocytes 0.0 <=0.4 10e3/uL    Absolute NRBCs 0.0 10e3/uL   CBC with platelets differential     Status: None    Narrative    The following orders were created for panel order CBC with platelets differential.  Procedure                               Abnormality         Status                     ---------                               -----------         ------                     CBC with platelets and d...[[id number]]                      Final result                 Please view results for these tests on the individual orders.     Medications - No data to display  Labs Ordered and Resulted from Time of ED Arrival to Time of ED Departure   COMPREHENSIVE METABOLIC PANEL - Abnormal       Result Value    Sodium 139      Potassium 4.2      Carbon Dioxide (CO2) 22      Anion Gap 10      Urea Nitrogen 14.1      Creatinine 1.15      GFR Estimate 66      Calcium 9.1      Chloride 107      Glucose 128 (*)     Alkaline Phosphatase 98      AST 26      ALT 24      Protein Total 6.6      Albumin 3.8      Bilirubin Total 1.1     MAGNESIUM - Normal    Magnesium 2.1     PHOSPHORUS - Normal    Phosphorus 2.9     TROPONIN T, HIGH SENSITIVITY - Normal    Troponin T, High Sensitivity 20     NT PROBNP INPATIENT - Normal    N terminal Pro BNP Inpatient 317     CBC WITH PLATELETS AND DIFFERENTIAL    WBC Count 6.7      RBC Count 4.72      Hemoglobin 15.4      Hematocrit 45.2      MCV 96      MCH 32.6      MCHC 34.1      RDW 12.7      Platelet Count 239      % Neutrophils 70      % Lymphocytes 20      % Monocytes 8      % Eosinophils 1      % Basophils 1      % Immature Granulocytes 0      NRBCs per  100 WBC 0      Absolute Neutrophils 4.6      Absolute Lymphocytes 1.4      Absolute Monocytes 0.6      Absolute Eosinophils 0.1      Absolute Basophils 0.1      Absolute Immature Granulocytes 0.0      Absolute NRBCs 0.0       XR Chest 2 Views   Final Result   IMPRESSION: Mild linear scarring again seen in the left lung base. No consolidation, pleural effusion or pneumothorax. Heart size and pulmonary vascularity are normal.             Critical care was not performed.     Medical Decision Making  The patient's presentation was of high complexity (an acute health issue posing potential threat to life or bodily function).    The patient's evaluation involved:  ordering and/or review of 3+ test(s) in this encounter (see separate area of note for details)    The patient's management necessitated high risk (a decision regarding hospitalization).  Ultimately deferred due to improvement of symptoms and negative workup.    Assessment & Plan    EKG on arrival shows A-fib with a rate of 95.  This is unchanged from his previous EKGs.  His blood pressure is normal at 124/85.  He states that he has been in A-fib with RVR with a rate of 95-1 05 over the past month.  No indication for emergent cardioversion.    CBC without leukocytosis or anemia.  Metabolic panel shows normal electrolytes normal renal function.  proBNP normal at 317.  High sensitive troponin is negative x 1.  Patient had some chest pain earlier in the day but has been symptom-free for several hours.  No indication for enzyme trend or emergent provocative testing    Independent review of chest x-ray negative for pneumothorax, pleural effusion, cardiomegaly, or other significant pathology.  Radiology interpretation reviewed as well.  No report of dyspnea and has been taking his Xarelto, low suspicion for worsening PE burden.  Exam/history not consistent with Boerhaave's, pericardial fusion/tamponade, or aortic dissection    Symptoms consistent with persistent A-fib  with RVR which is otherwise stable.  Recommend he discuss this with his primary cardiology as he may need an increased dose of his metoprolol or secondary agent.  States he will call them tomorrow.  Educated reasons to return to the ED.      I have reviewed the nursing notes. I have reviewed the findings, diagnosis, plan and need for follow up with the patient.    Discharge Medication List as of 3/14/2024  5:03 AM          Final diagnoses:   Atrial fibrillation with RVR (H)       Tera Freire DO  [facility] EMERGENCY DEPARTMENT  3/14/2024     Tera Freire DO  03/16/24 0609

## 2024-03-14 NOTE — ED TRIAGE NOTES
Pt brought in per EMS for c/o fast heart rate. Pt states has a history of Afib. Pt denies pain or feeling SOB      Triage Assessment (Adult)       Row Name 03/14/24 0253          Triage Assessment    Airway WDL WDL        Respiratory WDL    Respiratory WDL WDL        Skin Circulation/Temperature WDL    Skin Circulation/Temperature WDL WDL        Cardiac WDL    Cardiac WDL X     Cardiac Rhythm Atrial fibrillation        Peripheral/Neurovascular WDL    Peripheral Neurovascular WDL WDL        Cognitive/Neuro/Behavioral WDL    Cognitive/Neuro/Behavioral WDL WDL

## 2024-03-14 NOTE — DISCHARGE INSTRUCTIONS
Your workup in the emergency department is reassuring.  Given that your symptoms have resolved, you were discharged home.      You reported that your heart rates have been elevated for the past month.  I recommend you communicate with your cardiologist as soon as possible to inquire whether you need a higher dose of your metoprolol or additional rate control medications.  Return to the emergency department if you develop any chest pain, shortness of breath, or other symptoms that are concerning to you.

## 2024-03-19 DIAGNOSIS — I10 PRIMARY HYPERTENSION: ICD-10-CM

## 2024-03-19 NOTE — TELEPHONE ENCOUNTER
Pending Prescriptions:                       Disp   Refills    amLODIPine (NORVASC) 5 MG tablet          90 tab*3            Sig: Take 1 tablet (5 mg) by mouth daily

## 2024-03-19 NOTE — TELEPHONE ENCOUNTER
amLODIPine (NORVASC) 5 MG tablet          Sig: Take 1 tablet (5 mg) by mouth daily    Disp: 90 tablet    Refills: 3    Start: 3/19/2024    Class: E-Prescribe    For: Primary hypertension    Last ordered: 2 months ago (12/26/2023) by Jimy Maya MD    Calcium Channel Blockers Protocol  [id number]/19/2024 04:54 PM   Protocol Details Blood pressure under 140/90 in past 12 months    Recent (12 mo) or future (30 days) visit within the authorizing provider's specialty    Medication is active on med list    Patient is age 18 or older    Normal serum creatinine on file in past 12 months      To be filled at: None     Received fax for refill request from [facility]. Prescription passed refill protocol. Patient seen on 12/26/23 and was to start amlodipine 5 mg daily. Plan to call patient to confirm patient is still taking dose.    Yumi Jacome, RN, BSN  Cardiology RN Care Coordinator   [place]   Phone: [phone]  Fax: [phone] ([place]) [phone] ([place])

## 2024-03-20 ENCOUNTER — TELEPHONE (OUTPATIENT)
Dept: CARDIOLOGY | Facility: CLINIC | Age: 74
End: 2024-03-20
Payer: COMMERCIAL

## 2024-03-20 DIAGNOSIS — I48.91 ATRIAL FIBRILLATION STATUS POST CARDIOVERSION (H): ICD-10-CM

## 2024-03-20 RX ORDER — AMLODIPINE BESYLATE 5 MG/1
5 TABLET ORAL DAILY
Qty: 90 TABLET | Refills: 3 | Status: SHIPPED | OUTPATIENT
Start: 2024-03-20 | End: 2024-09-07

## 2024-03-20 RX ORDER — ATORVASTATIN CALCIUM 10 MG/1
10 TABLET, FILM COATED ORAL DAILY
Qty: 90 TABLET | Refills: 3 | Status: SHIPPED | OUTPATIENT
Start: 2024-03-20 | End: 2024-09-07

## 2024-03-20 NOTE — TELEPHONE ENCOUNTER
Signed Prescriptions:                        Disp   Refills    atorvastatin (LIPITOR) 10 MG tablet        90 tab*3        Sig: Take 1 tablet (10 mg) by mouth daily  Authorizing Provider: TRACE STEWART  Ordering User: ELIANE LUO    Rx resent to requested pharmacy.    Eliane Luo, RN  Cardiology Care Coordinator  [facility]  [phone] option 1

## 2024-03-20 NOTE — TELEPHONE ENCOUNTER
[facility] Call Center    Phone Message    May a detailed message be left on voicemail: yes     Reason for Call: Medication Refill Request    Has the patient contacted the pharmacy for the refill? Yes   Name of medication being requested: Atorvastatin 10mg  Provider who prescribed the medication: Can,Ilknur  Pharmacy: [facility]  Date medication is needed: 03/20/2024       Action Taken: Other: Cardio    Travel Screening: Not Applicable

## 2024-03-20 NOTE — TELEPHONE ENCOUNTER
Called and spoke with patient. Patient states he is getting low on prescription but requested that prescription be sent to [facility] in [place]. Prescription sent to pharmacy. Patient to call if any trouble getting prescription.    Yumi Jacome RN, BSN  Cardiology RN Care Coordinator   [place]   Phone: [phone]  Fax: [phone] ([place]) [phone] ([place])

## 2024-04-23 ENCOUNTER — PATIENT OUTREACH (OUTPATIENT)
Dept: CARE COORDINATION | Facility: CLINIC | Age: 74
End: 2024-04-23
Payer: COMMERCIAL

## 2024-04-23 NOTE — PROGRESS NOTES
Clinical Product Navigator reviewed chart; patient on payer product coverage.  Review results:   CPN Initial Information Gathering  Referral Source: Health Plan  Referrals Places: Care Coordination    Patient identified by health plan for care management support with high probability risk of admission (84.1 %) due to admission history, chronic medical conditions and provider/specialty utilization. Per chart review, patient would benefit from care management support for chronic disease management.     See problem list and EMR for potential [employer] SSBCI benefit.     Patient Active Problem List   Diagnosis    Acute venous embolism and thrombosis of deep vessels of proximal lower extremity (H)    CARDIOVASCULAR SCREENING; LDL GOAL LESS THAN 130    Factor 5 Leiden mutation, heterozygous (H24)    May-Thurner syndrome    Long term current use of anticoagulant    Vitamin D deficiency    Renal stones    Hepatic hemangioma    FH: prostate cancer    Long-term (current) use of anticoagulants [Z79.01]    Pulmonary embolism with infarction (HCC) [I26.99]    Post-traumatic osteoarthritis of right knee    Benign non-nodular prostatic hyperplasia without lower urinary tract symptoms    Primary osteoarthritis of left knee    Benign essential hypertension    Primary osteoarthritis of both knees    Essential hypertension    Palpitations    Chest pain, unspecified type    Deep vein thrombosis of left lower extremity (H)    Class 2 severe obesity due to excess calories with serious comorbidity in adult (H)    Atrial fibrillation (H)    Atrial fibrillation with RVR (H)       Met referral criteria for Care Coordinator; referral to be sent.    JOHN PAUL Ramirez  Social Work Care Coordinator   Clinical Product Navigation

## 2024-04-24 ENCOUNTER — PATIENT OUTREACH (OUTPATIENT)
Dept: CARE COORDINATION | Facility: CLINIC | Age: 74
End: 2024-04-24
Payer: COMMERCIAL

## 2024-04-24 NOTE — LETTER
[facility] CARE COORDINATION  [address]  [place] [address]    April 24, 2024    Collin Frank  [address]    [place] [address]      Dear Collin,    I am a clinic community health worker who works with Collin Salazar MD with the [facility]. I wanted to thank you for spending the time to talk with me.  Below is a description of clinic care coordination and how we can further assist you.       The clinic care coordination team is made up of a registered nurse, , financial resource worker and community health worker who understand the health care system. The goal of clinic care coordination is to help you manage your health and improve access to the health care system. Our team works alongside your provider to assist you in determining your health and social needs. We can help you obtain health care and community resources, providing you with necessary information and education. We can work with you through any barriers and develop a care plan that helps coordinate and strengthen the communication between you and your care team.  Our services are voluntary and are offered without charge to you personally.    Please feel free to contact Alma Delia at [phone] with any questions or concerns regarding care coordination and what we can offer.      We are focused on providing you with the highest-quality healthcare experience possible.    Sincerely,     LISET Bishop  Connected Care Resource Center  [facility]

## 2024-04-24 NOTE — PROGRESS NOTES
"Clinic Care Coordination Contact  Community Health Worker Initial Outreach    CHW Initial Information Gathering:  Preferred Hospital: [facility]  [phone]  Preferred Urgent Care: Other ([facility])  No PCP office visit in Past Year: No       Patient accepts CC: No, Patient declined and CHW attempted to schedule the appointment with the CCC RN, but the patient stated no \"I think that I am good and I don't need to talk to the RN. I am scheduled with my Cardiologist and I have follow up appointments\". At this time due to the multiple attempts and the patient declining, the CHW was not able to get an appointment scheduled with the CCC RN. Patient will be sent Care Coordination introduction letter for future reference.     LISET Bishop  [phone]  Connected Care Resource [facility]    "

## 2024-05-22 ENCOUNTER — PATIENT OUTREACH (OUTPATIENT)
Dept: CARE COORDINATION | Facility: CLINIC | Age: 74
End: 2024-05-22
Payer: COMMERCIAL

## 2024-05-22 NOTE — PROGRESS NOTES
Clinical Product Navigator reviewed chart; patient on payer product coverage.  Review results:   CPN Initial Information Gathering  Referral Source: Health Plan    Patient identified by health plan for care management support with high probability risk of admission. Per chart review, patient has high risk score for hospital admission or ED visit of 76.7%. Identified on April [employer] list. Declined Care Coordination on 4/24/24. Patient may be eligible for [employer] SSBCI benefit.     Referral to CPN would be beneficial to monitor utilization and potential intervention.     JOHN PAUL Ramirez  Social Work Care Coordinator   Clinical Product Navigation

## 2024-06-04 ENCOUNTER — TELEPHONE (OUTPATIENT)
Dept: CARDIOLOGY | Facility: CLINIC | Age: 74
End: 2024-06-04
Payer: COMMERCIAL

## 2024-06-04 NOTE — TELEPHONE ENCOUNTER
Centralized Medication Refill Team note:  Contacted patient: remote access code requested when contacted via [phone].   Left message, may have cut off> refills are available at [facility] [place] [facility] to request refill @ [phone]        amLODIPine (NORVASC) 5 MG tablet    Last Written Prescription Date:  3/20/24  Last Fill Quantity: 90,   # refills: 3     atorvastatin (LIPITOR) 10 MG tablet   Last Written Prescription Date:  3/20/24  Last Fill Quantity: 90,   # refills: 3   metoprolol succinate ER (TOPROL XL) 25 MG 24 hr tablet    Last Written Prescription Date:  3/13/24  Last Fill Quantity: 90,   # refills: 3     [facility] - [address]     [phone]

## 2024-06-04 NOTE — TELEPHONE ENCOUNTER
[facility] Call Center    Phone Message    May a detailed message be left on voicemail: yes     Reason for Call: Medication Refill Request    Has the patient contacted the pharmacy for the refill? Yes   Name of medication being requested: amLODIPine (NORVASC) 5 MG tablet  atorvastatin (LIPITOR) 10 MG tablet  metoprolol succinate ER (TOPROL XL) 25 MG 24 hr tablet  Provider who prescribed the medication: Dr. Jimy Maya   Pharmacy[facility]    [facility] [place] [facility] [address]    Date medication is needed: ASA   Patient requesting if refill orders can be sent to pharmacy as soon as possible, so they are able to get refills after their appt with Dr. Maya tomorrow at 3:45pm. Please review and send orders as needed. Thank you!    Action Taken: Other: Cardiology    Travel Screening: Not Applicable     Thank you!  [facility]

## 2024-06-05 ENCOUNTER — OFFICE VISIT (OUTPATIENT)
Dept: CARDIOLOGY | Facility: CLINIC | Age: 74
End: 2024-06-05
Payer: COMMERCIAL

## 2024-06-05 VITALS
WEIGHT: 223.4 LBS | SYSTOLIC BLOOD PRESSURE: 135 MMHG | HEART RATE: 111 BPM | BODY MASS INDEX: 34.47 KG/M2 | OXYGEN SATURATION: 96 % | DIASTOLIC BLOOD PRESSURE: 81 MMHG

## 2024-06-05 DIAGNOSIS — I48.91 ATRIAL FIBRILLATION STATUS POST CARDIOVERSION (H): ICD-10-CM

## 2024-06-05 DIAGNOSIS — I48.19 PERSISTENT ATRIAL FIBRILLATION (H): Primary | ICD-10-CM

## 2024-06-05 DIAGNOSIS — R31.9 HEMATURIA, UNSPECIFIED TYPE: ICD-10-CM

## 2024-06-05 PROCEDURE — 99215 OFFICE O/P EST HI 40 MIN: CPT | Performed by: INTERNAL MEDICINE

## 2024-06-05 PROCEDURE — 93000 ELECTROCARDIOGRAM COMPLETE: CPT | Performed by: INTERNAL MEDICINE

## 2024-06-05 RX ORDER — LIDOCAINE 40 MG/G
CREAM TOPICAL
Status: CANCELLED | OUTPATIENT
Start: 2024-06-05

## 2024-06-05 RX ORDER — FLECAINIDE ACETATE 50 MG/1
50 TABLET ORAL 2 TIMES DAILY
Qty: 180 TABLET | Refills: 3 | Status: SHIPPED | OUTPATIENT
Start: 2024-06-05

## 2024-06-05 NOTE — NURSING NOTE
"Chief Complaint   Patient presents with    Follow Up     Reason for visit: Return general cardiology for 6 month follow-up       Initial /81 (BP Location: Left arm, Patient Position: Sitting, Cuff Size: Adult Regular)   Pulse 111   Wt 101.3 kg (223 lb 6.4 oz)   SpO2 96%   BMI 34.47 kg/m   Estimated body mass index is 34.47 kg/m  as calculated from the following:    Height as of 3/14/24: 1.715 m (5' 7.5\").    Weight as of this encounter: 101.3 kg (223 lb 6.4 oz)..  BP completed using cuff size: regular    MAREN Tabor    "

## 2024-06-05 NOTE — NURSING NOTE
Cardiac Testing: Patient given instructions regarding  echocardiogram . Discussed purpose, preparation, procedure and when to expect results reported back to the patient. Patient demonstrated understanding of this information and agreed to call with further questions or concerns.    Med Reconcile: Reviewed and verified all current medications with the patient. The updated medication list was printed and given to the patient.    New Medication: Patient was educated regarding newly prescribed medication, including discussion of  the indication, administration, side effects, and when to report to MD or RN. Patient demonstrated understanding of this information and agreed to call with further questions or concerns. Patient to start flecainide 50 mg twice a day.    Return Appointment: Patient given instructions regarding scheduling next clinic visit. Patient demonstrated understanding of this information and agreed to call with further questions or concerns. Patient to follow up with Dr. Maya after cardioversion.    EP Procedure: Patient given instructions regarding  cardioversion Discussed purpose, preparation, and procedure with the patient. Patient demonstrated understanding of this information and agreed to call with further questions or concerns. Patient to have cardioversion. Below instructions reviewed with patient.    Patient stated he understood all health information given and agreed to call with further questions or concerns.      You are scheduled for a Cardioversion, at [facility]. The hospital is located at [address] [place] [address] on [place] bank of the [place].  If you need to cancel this procedure, please call [phone].       Visitor Policy: Two visitors.    Date:_TBD  Time: TBD To the [facility] Waiting Room at the [facility]  Cardioversion    Please do not eat anything for 8 hours prior to your procedure. You may have sips of water up until 2 hours prior to your  arrival.  2. Medications to continue:  - Anticoagulant (Example: Eliquis, Xarelto, Pradaxa, Warfarin)  - Take all meds as prescribed, except for those noted below.  3. Medications to hold:    - Morning of: diuretic, oral diabetic meds, [ertugliflozin (Steglatro), canaglilozin (Invokana), dapagliflozin (Farxiga), empagliflozin (Jardiance)], short acting insulin, mixed insulin: take 66% of NPH.  - Day prior: Take 80% of long acting insulin.  - Day prior & day of, if daily or BID dosing: [liraglutide (Victoza, Saxenda), exenatide (Byetta), insulin glargine/lixisenatide (Soliqua)].   - 1 week prior, if weekly dosing: [semaglutide (Rybelsus, Ozempic, Wegovy), dulaglutide (Trulicity), exenatide ER (Bydureon), tirzepatide (Mounjaro)].  4. You will discharge same day. You will need a .    If you have further questions, please utilize Pagido to contact us.   If your question concerns the above instructions, contact:  Nurse Coordinator.  [phone]    If your question concerns the schedule/appointment times, contact:  SARAH [facility] Procedure   [phone]     Yumi Jacome, RN, BSN  Cardiology RN Care Coordinator   [place]   Phone: [phone]  Fax: [phone] ([place]) [phone] ([place])

## 2024-06-05 NOTE — LETTER
6/5/2024      RE: Collin Frank  [address]  [address]  [facility] [address]       Dear Colleague,    Thank you for the opportunity to participate in the care of your patient, Collin Frank, at the [facility]. Please see a copy of my visit note below.                                                                                              General Cardiology Clinic-[place]      HPI: Mr. Collin Frank is a 73 year old  male with PMH significant for    -Factor V Leiden mutation  -Chronic left lower extremity DVT and PE on Xarelto  -Hypertension  -Obesity    Patient presented emergency room on 12/11/2023 for weakness and shortness of breath for 3 days.  He tells me that he was sweating excessively.  EKG showed A-fib with RVR (heart rate 100-130's).  Underwent ALON guided DC cardioversion which converted patient to sinus rhythm.  ALON showed normal baseline echocardiogram.  He is following up with me today after this event.    Currently asymptomatic.  Denies chest pain, dizziness, palpitations, shortness of breath.  He denies prior history of atrial fibrillation.    Patient presented to emergency room in July of this year for fleeting chest pain and underwent CT scan which showed coronary artery calcium in the LAD.  Subsequently underwent dobutamine stress test which was negative.  He had a follow-up with cardiology on 9/1 and he was recommended better control of blood pressure.    No alcohol abuse.  No history of CVA.  No history of sleep apnea.  Current medications:  Losartan 50 mg daily  Metoprolol 25 mg daily  Xarelto 20 mg daily  Simvastatin 20 mg daily    Medications, personal, family, and social history reviewed with patient and revised.    Interval history 6/5/2024:  Patient is being seen today for follow-up.  I saw him last time 6 months ago.  At that time he was status post cardioversion.  In the interim patient  presented to emergency room in February and March Both times he was found to be in A-fib with RVR.  Patient tells me that he was not aware that he was back into A-fib.  He feels lethargic and tired over the last few months.  At times he is winded with activity.  No chest pain.  He is not physically very active.  He spends a lot of time at home reading books.  He is very hard to hearing.    EKG in clinic today shows A-fib with RVR at  109 bpm at rest.    PAST MEDICAL HISTORY:  Past Medical History:   Diagnosis Date    Antiplatelet or antithrombotic long-term use     blood clot in leg    Long term current use of anticoagulant 10/16/2012       CURRENT MEDICATIONS:  Current Outpatient Medications   Medication Sig Dispense Refill    amLODIPine (NORVASC) 5 MG tablet Take 1 tablet (5 mg) by mouth daily 90 tablet 3    atorvastatin (LIPITOR) 10 MG tablet Take 1 tablet (10 mg) by mouth daily 90 tablet 3    cholecalciferol (VITAMIN D) 1000 UNIT tablet Take 1 tablet (1,000 Units) by mouth daily 100 tablet 3    losartan (COZAAR) 50 MG tablet TAKE 1 TABLET DAILY 90 tablet 1    metoprolol succinate ER (TOPROL XL) 25 MG 24 hr tablet Take 1 tablet (25 mg) by mouth daily 90 tablet 3    XARELTO ANTICOAGULANT 20 MG TABS tablet TAKE 1 TABLET DAILY WITH DINNER 90 tablet 3       PAST SURGICAL HISTORY:  Past Surgical History:   Procedure Laterality Date    ANESTHESIA CARDIOVERSION N/A 12/11/2023    Procedure: Anesthesia cardioversion;  Surgeon: GENERIC ANESTHESIA PROVIDER;  Location: [facility] OR    COLONOSCOPY  1-17-08    Normal. Repeat in 10 years    COLONOSCOPY WITH CO2 INSUFFLATION N/A 2/1/2018    Procedure: COLONOSCOPY WITH CO2 INSUFFLATION;  COLON SCREEN/ ENGELMANN;  Surgeon: Jan Flores MD;  Location: [facility] OR    rt knee ORIF  1975    [facility]       ALLERGIES:   No Known Allergies    FAMILY HISTORY:  Family History   Problem Relation Age of Onset    Alzheimer Disease Mother     Heart Disease Father     Prostate Cancer Father      Cancer Brother 65        pancreatic       Prostate Cancer Brother         2 stents 70% and 90% blockage    Heart Disease Sister          SOCIAL HISTORY:  Social History     Tobacco Use    Smoking status: Former     Current packs/day: 0.00     Types: Cigarettes     Quit date: 2011     Years since quittin.3    Smokeless tobacco: Never   Substance Use Topics    Alcohol use: No    Drug use: No       ROS:   Constitutional: No fever, chills, or sweats. Weight stable.   Cardiovascular: As per HPI.       Exam:  /81 (BP Location: Left arm, Patient Position: Sitting, Cuff Size: Adult Regular)   Pulse 111   Wt 101.3 kg (223 lb 6.4 oz)   SpO2 96%   BMI 34.47 kg/m    GENERAL APPEARANCE: alert and no distress  HEENT: no icterus, no central cyanosis  LYMPH/NECK: no adenopathy, no asymmetry, JVP not elevated  CARDIOVASCULAR: Irregular rhythm, normal S1, S2, no S3 or S4 and no murmur, click or rub, precordium quiet with normal PMI.  EXTREMITIES: no edema  NEURO: alert, normal speech,and affect  SKIN: no ecchymoses, no rashes     I have reviewed the labs and personally reviewed the imaging below and made my comment in the assessment and plan.    Labs:  CBC RESULTS:   Lab Results   Component Value Date    WBC 6.7 2024    WBC 7.2 2021    RBC 4.72 2024    RBC 4.60 2021    HGB 15.4 2024    HGB 14.7 2021    HCT 45.2 2024    HCT 43.5 2021    MCV 96 2024    MCV 95 2021    MCH 32.6 2024    MCH 32.0 2021    MCHC 34.1 2024    MCHC 33.8 2021    RDW 12.7 2024    RDW 12.8 2021     2024     2021       BMP RESULTS:  Lab Results   Component Value Date     2024     2021    POTASSIUM 4.2 2024    POTASSIUM 4.1 2022    POTASSIUM 3.9 2021    CHLORIDE 107 2024    CHLORIDE 109 2022    CHLORIDE 108 2021    CO2 22 2024    CO2 27 2022     CO2 25 04/01/2021    ANIONGAP 10 03/14/2024    ANIONGAP 4 12/12/2022    ANIONGAP 7 04/01/2021     (H) 03/14/2024    GLC 89 12/12/2022    GLC 90 04/01/2021    BUN 14.1 03/14/2024    BUN 15 12/12/2022    BUN 15 04/01/2021    CR 1.15 03/14/2024    CR 0.93 04/01/2021    GFRESTIMATED 66 03/14/2024    GFRESTIMATED 81 04/01/2021    GFRESTBLACK >90 04/01/2021    MARTA 9.1 03/14/2024    MARTA 8.8 04/01/2021      EKG 2/28/2024 shows A-fib.    ALON cardioversion 12/11/2023:   Global and regional left ventricular function is normal with an EF of 55-60%.  The right ventricle is normal size. Global right ventricular function is  normal.  The left atrial appendage is normal. It is free of spontaneous echo contrast  and thrombus.  No significant valvular abnormalities.  No pericardial effusion is present.  Previous study not available for comparison    DC cardioversion successful 12/11/2023    Dobutamine stress test 7/1/2023: Normal    Assessment :     # Persistent atrial fibrillation status post DC cardioversion 12/11/2023  # Patient in persistent A-fib since 2/28/2024  -Patient appears to be in atrial fibrillation since February of this year.  He presented twice to ER with chest discomfort and found to be in A-fib with RVR.  Workup in the ER was unremarkable except recurrent A-fib.  He was discharged on both occasions without any medication changes.  Patient tells me that he feels lethargic and tired over the last several months.  EKG in clinic confirms persistent A-fib with heart rate at 109 bpm.  He is euvolemic on exam.  Denies chest pain.  He was feeling well when he had cardioversion 6 months ago.  I am highly suspecting his symptoms (tiredness and feeling lethargic) are from A-fib with RVR.  Discussed rhythm management.  He is agreeable to proceed.    Plan:  -Start flecainide 50 mg twice daily  -Schedule cardioversion without ALON since he has not missed any Xarelto  -Continue metoprolol  -Limited echocardiogram to recheck  LVEF  -Already scheduled for sleep study    # Hypertension  -Continue losartan 50 mg  -Continue amlodipine 5 mg    # Hematuria?  -Patient tells me that he has seen that his urine is pink twice.  Last episode 2 weeks ago.  Will refer him to urology.    Return to clinic after cardioversion in a month.    Total time spent today for this visit is 40 minutes including precharting, face-to-face clinic visit, review of labs/imaging and medical documentation.    Jimy STEWART MD  [facility] Division of Cardiology  Pager [phone]

## 2024-06-05 NOTE — PROGRESS NOTES
General Cardiology Clinic-[place]      HPI: Mr. Collin Frank is a 73 year old  male with PMH significant for    -Factor V Leiden mutation  -Chronic left lower extremity DVT and PE on Xarelto  -Hypertension  -Obesity    Patient presented emergency room on 12/11/2023 for weakness and shortness of breath for 3 days.  He tells me that he was sweating excessively.  EKG showed A-fib with RVR (heart rate 100-130's).  Underwent ALON guided DC cardioversion which converted patient to sinus rhythm.  ALON showed normal baseline echocardiogram.  He is following up with me today after this event.    Currently asymptomatic.  Denies chest pain, dizziness, palpitations, shortness of breath.  He denies prior history of atrial fibrillation.    Patient presented to emergency room in July of this year for fleeting chest pain and underwent CT scan which showed coronary artery calcium in the LAD.  Subsequently underwent dobutamine stress test which was negative.  He had a follow-up with cardiology on 9/1 and he was recommended better control of blood pressure.    No alcohol abuse.  No history of CVA.  No history of sleep apnea.  Current medications:  Losartan 50 mg daily  Metoprolol 25 mg daily  Xarelto 20 mg daily  Simvastatin 20 mg daily    Medications, personal, family, and social history reviewed with patient and revised.    Interval history 6/5/2024:  Patient is being seen today for follow-up.  I saw him last time 6 months ago.  At that time he was status post cardioversion.  In the interim patient presented to emergency room in February and March Both times he was found to be in A-fib with RVR.  Patient tells me that he was not aware that he was back into A-fib.  He feels lethargic and tired over the last few months.  At times he is winded with activity.  No chest pain.  He is not physically very active.  He spends a lot of time at home  reading books.  He is very hard to hearing.    EKG in clinic today shows A-fib with RVR at  109 bpm at rest.    PAST MEDICAL HISTORY:  Past Medical History:   Diagnosis Date    Antiplatelet or antithrombotic long-term use     blood clot in leg    Long term current use of anticoagulant 10/16/2012       CURRENT MEDICATIONS:  Current Outpatient Medications   Medication Sig Dispense Refill    amLODIPine (NORVASC) 5 MG tablet Take 1 tablet (5 mg) by mouth daily 90 tablet 3    atorvastatin (LIPITOR) 10 MG tablet Take 1 tablet (10 mg) by mouth daily 90 tablet 3    cholecalciferol (VITAMIN D) 1000 UNIT tablet Take 1 tablet (1,000 Units) by mouth daily 100 tablet 3    losartan (COZAAR) 50 MG tablet TAKE 1 TABLET DAILY 90 tablet 1    metoprolol succinate ER (TOPROL XL) 25 MG 24 hr tablet Take 1 tablet (25 mg) by mouth daily 90 tablet 3    XARELTO ANTICOAGULANT 20 MG TABS tablet TAKE 1 TABLET DAILY WITH DINNER 90 tablet 3       PAST SURGICAL HISTORY:  Past Surgical History:   Procedure Laterality Date    ANESTHESIA CARDIOVERSION N/A 2023    Procedure: Anesthesia cardioversion;  Surgeon: GENERIC ANESTHESIA PROVIDER;  Location: [facility] OR    COLONOSCOPY  08    Normal. Repeat in 10 years    COLONOSCOPY WITH CO2 INSUFFLATION N/A 2018    Procedure: COLONOSCOPY WITH CO2 INSUFFLATION;  COLON SCREEN/ ENGELMANN;  Surgeon: Jan Flores MD;  Location: [facility] OR    rt knee ORIF      [facility]       ALLERGIES:   No Known Allergies    FAMILY HISTORY:  Family History   Problem Relation Age of Onset    Alzheimer Disease Mother     Heart Disease Father     Prostate Cancer Father     Cancer Brother 65        pancreatic       Prostate Cancer Brother         2 stents 70% and 90% blockage    Heart Disease Sister          SOCIAL HISTORY:  Social History     Tobacco Use    Smoking status: Former     Current packs/day: 0.00     Types: Cigarettes     Quit date: 2011     Years since quittin.3    Smokeless  tobacco: Never   Substance Use Topics    Alcohol use: No    Drug use: No       ROS:   Constitutional: No fever, chills, or sweats. Weight stable.   Cardiovascular: As per HPI.       Exam:  /81 (BP Location: Left arm, Patient Position: Sitting, Cuff Size: Adult Regular)   Pulse 111   Wt 101.3 kg (223 lb 6.4 oz)   SpO2 96%   BMI 34.47 kg/m    GENERAL APPEARANCE: alert and no distress  HEENT: no icterus, no central cyanosis  LYMPH/NECK: no adenopathy, no asymmetry, JVP not elevated  CARDIOVASCULAR: Irregular rhythm, normal S1, S2, no S3 or S4 and no murmur, click or rub, precordium quiet with normal PMI.  EXTREMITIES: no edema  NEURO: alert, normal speech,and affect  SKIN: no ecchymoses, no rashes     I have reviewed the labs and personally reviewed the imaging below and made my comment in the assessment and plan.    Labs:  CBC RESULTS:   Lab Results   Component Value Date    WBC 6.7 03/14/2024    WBC 7.2 03/31/2021    RBC 4.72 03/14/2024    RBC 4.60 03/31/2021    HGB 15.4 03/14/2024    HGB 14.7 03/31/2021    HCT 45.2 03/14/2024    HCT 43.5 03/31/2021    MCV 96 03/14/2024    MCV 95 03/31/2021    MCH 32.6 03/14/2024    MCH 32.0 03/31/2021    MCHC 34.1 03/14/2024    MCHC 33.8 03/31/2021    RDW 12.7 03/14/2024    RDW 12.8 03/31/2021     03/14/2024     03/31/2021       BMP RESULTS:  Lab Results   Component Value Date     03/14/2024     04/01/2021    POTASSIUM 4.2 03/14/2024    POTASSIUM 4.1 12/12/2022    POTASSIUM 3.9 04/01/2021    CHLORIDE 107 03/14/2024    CHLORIDE 109 12/12/2022    CHLORIDE 108 04/01/2021    CO2 22 03/14/2024    CO2 27 12/12/2022    CO2 25 04/01/2021    ANIONGAP 10 03/14/2024    ANIONGAP 4 12/12/2022    ANIONGAP 7 04/01/2021     (H) 03/14/2024    GLC 89 12/12/2022    GLC 90 04/01/2021    BUN 14.1 03/14/2024    BUN 15 12/12/2022    BUN 15 04/01/2021    CR 1.15 03/14/2024    CR 0.93 04/01/2021    GFRESTIMATED 66 03/14/2024    GFRESTIMATED 81 04/01/2021     GFRESTBLACK >90 04/01/2021    MARTA 9.1 03/14/2024    MARTA 8.8 04/01/2021      EKG 2/28/2024 shows A-fib.    ALON cardioversion 12/11/2023:   Global and regional left ventricular function is normal with an EF of 55-60%.  The right ventricle is normal size. Global right ventricular function is  normal.  The left atrial appendage is normal. It is free of spontaneous echo contrast  and thrombus.  No significant valvular abnormalities.  No pericardial effusion is present.  Previous study not available for comparison    DC cardioversion successful 12/11/2023    Dobutamine stress test 7/1/2023: Normal    Assessment :     # Persistent atrial fibrillation status post DC cardioversion 12/11/2023  # Patient in persistent A-fib since 2/28/2024  -Patient appears to be in atrial fibrillation since February of this year.  He presented twice to ER with chest discomfort and found to be in A-fib with RVR.  Workup in the ER was unremarkable except recurrent A-fib.  He was discharged on both occasions without any medication changes.  Patient tells me that he feels lethargic and tired over the last several months.  EKG in clinic confirms persistent A-fib with heart rate at 109 bpm.  He is euvolemic on exam.  Denies chest pain.  He was feeling well when he had cardioversion 6 months ago.  I am highly suspecting his symptoms (tiredness and feeling lethargic) are from A-fib with RVR.  Discussed rhythm management.  He is agreeable to proceed.    Plan:  -Start flecainide 50 mg twice daily  -Schedule cardioversion without ALON since he has not missed any Xarelto  -Continue metoprolol  -Limited echocardiogram to recheck LVEF  -Already scheduled for sleep study    # Hypertension  -Continue losartan 50 mg  -Continue amlodipine 5 mg    # Hematuria?  -Patient tells me that he has seen that his urine is pink twice.  Last episode 2 weeks ago.  Will refer him to urology.    Return to clinic after cardioversion in a month.    Total time spent today for  this visit is 40 minutes including precharting, face-to-face clinic visit, review of labs/imaging and medical documentation.    Jimy STEWART MD  [facility] Division of Cardiology  Pager [phone]

## 2024-06-05 NOTE — PATIENT INSTRUCTIONS
Thank you for coming to the [facility] at [place]; please note the following instructions:    1. Start Flecainide 50 mg Twice A Day    2. Cardioversion - EP  will be calling to get this setup      You are scheduled for a Cardioversion, at [facility] [place]. The hospital is located at [address] [place] [address] on the [place] bank of the campus.  If you need to cancel this procedure, please call [phone].       Visitor Policy: Two visitors.    Date:_TBD  Time: _TBD_To the [facility] Waiting Room at the [facility]  Cardioversion    Please do not eat anything for 8 hours prior to your procedure. You may have sips of water up until 2 hours prior to your arrival.  2. Medications to continue:  - Anticoagulant (Example: Eliquis, Xarelto, Pradaxa, Warfarin)  - Take all meds as prescribed, except for those noted below.  3. Medications to hold:    - Morning of: diuretic, oral diabetic meds, [ertugliflozin (Steglatro), canaglilozin (Invokana), dapagliflozin (Farxiga), empagliflozin (Jardiance)], short acting insulin, mixed insulin: take 66% of NPH.  - Day prior: Take 80% of long acting insulin.  - Day prior & day of, if daily or BID dosing: [liraglutide (Victoza, Saxenda), exenatide (Byetta), insulin glargine/lixisenatide (Soliqua)].   - 1 week prior, if weekly dosing: [semaglutide (Rybelsus, Ozempic, Wegovy), dulaglutide (Trulicity), exenatide ER (Bydureon), tirzepatide (Mounjaro)].  4. You will discharge same day. You will need a .    If you have further questions, please utilize [employer] Print System to contact us.   If your question concerns the above instructions, contact:  [phone]    If your question concerns the schedule/appointment times, contact:  Matilde Garcia,  EP Procedure   [phone]     3.  Follow up with Dr. Maya after cardioversion - Currently Scheduled for August 4. Urology Referral - They will call you    5. Limited Echo  Pre-procedure instructions  - Echocardiogram  How do I prepare for my exam?    You may eat, drink and take your normal medicines.  Please do not apply perfumes or lotions on the day of your exam.    What Should I wear?  Please bring or wear a comfortable two-piece outfit.     How long does the Exam Take? Most tests take up to 60 minutes.        Do I need a ? No      What is this test An echocardiogram uses sound waves to produce images of your heart. This common test allows your doctor to see your heart beating and pumping blood. Your doctor can use the images from an echocardiogram to identify heart disease.     - Limited Echo on 6/21/24 at 10 am  - Cardioversion - to be scheduled (Matilde will be calling  - Follow up with Dr. Maya on 8/12/24      If you have any questions regarding your visit, please contact your care team:     CARDIOLOGY  TELEPHONE NUMBER   Eliane HAWKINSRosa, Registered Nurse  Yumi PADILLA, Registered Nurse  Daxa HALE, Registered Medical Assistant  Kenyetta GILBERT, Certified Medical Assistant  Cristin LUA, Clinic Assistant [phone] (select option 1)    *After hours: [phone]   For Scheduling Appts:     [phone] (select option 1)    *After hours: [phone]   For the Device Clinic (Pacemakers and ICD's)  Annalisa TALAVERA, Registered Nurse   During business hours: [phone]    *After business hours:  [phone] (select option 4)      Normal test result notifications will be released via [employer]ius or mailed within 7 business days.  All other test results, will be communicated via telephone once reviewed by your cardiologist.    If you need a medication refill, please contact your pharmacy.  Please allow 3 business days for your refill to be completed.    As always, thank you for trusting us with your health care needs!

## 2024-06-07 LAB
ATRIAL RATE - MUSE: 197 BPM
DIASTOLIC BLOOD PRESSURE - MUSE: NORMAL MMHG
INTERPRETATION ECG - MUSE: NORMAL
P AXIS - MUSE: NORMAL DEGREES
PR INTERVAL - MUSE: NORMAL MS
QRS DURATION - MUSE: 82 MS
QT - MUSE: 332 MS
QTC - MUSE: 447 MS
R AXIS - MUSE: -31 DEGREES
SYSTOLIC BLOOD PRESSURE - MUSE: NORMAL MMHG
T AXIS - MUSE: 50 DEGREES
VENTRICULAR RATE- MUSE: 109 BPM

## 2024-06-13 ENCOUNTER — TELEPHONE (OUTPATIENT)
Dept: CARDIOLOGY | Facility: CLINIC | Age: 74
End: 2024-06-13
Payer: COMMERCIAL

## 2024-06-13 NOTE — TELEPHONE ENCOUNTER
Called and spoke with patient. Answered questions regarding echo. Discussed flecainide prescription with patient. Informed patient that he is to take flecainide twice a day. Reinforced patient that medication can be taken with other medications. Patient requested a medication list be completed with if they are morning and evening medications. Informed patient that clinic would work on a letter with a list of his medications for him. He would like to pick it up when he comes in for his echo on 6/21.    Yumi Jacome RN, BSN  Cardiology RN Care Coordinator   [place]   Phone: [phone]  Fax: [phone] ([place]) [phone] ([place])

## 2024-06-13 NOTE — TELEPHONE ENCOUNTER
[facility] Call Center    Phone Message    May a detailed message be left on voicemail: yes     Reason for Call: Other: Collin called requesting to speak with his care team about his new medication that was just prescribed to him. Collin has some questions about how to take his medication and would like clarification on the directions. Collin also had some questions about his echo on 6/21 but would not specify what his questions were. Please reach out to Collin to discuss. Thank you!     Action Taken: Other: Cardiology    Travel Screening: Not Applicable    Thank you!  [facility]       Date of Service:

## 2024-06-18 NOTE — TELEPHONE ENCOUNTER
No provider in clinic on 6/21/24 when patient comes in for echo. Letter created with medication list. Attempted to call patient to see if he would like letter of his medication list mailed to him instead. Unable to reach patient.     Yumi Jacome, RN, BSN  Cardiology RN Care Coordinator   [place]   Phone: [phone]  Fax: [phone] ([place]) [phone] ([place])

## 2024-06-19 ENCOUNTER — PATIENT OUTREACH (OUTPATIENT)
Dept: CARE COORDINATION | Facility: CLINIC | Age: 74
End: 2024-06-19
Payer: COMMERCIAL

## 2024-06-19 NOTE — PROGRESS NOTES
S-(situation): RN Clinical Product Navigator chart review.     B-(background): Patient identified by health plan for care management support due to high probability risk of admission.  RN Clinical Product Navigator monitoring for utilization and potential intervention.     A-(assessment): Patient has had no utilization since last review.     R-(recommendations/plan): RN Clinical Product Navigator will continue to monitor for utilization and potential intervention every 4-6 weeks.    Anna Chan RN   Clinical Product Navigator   [email or web address]   Office: [phone]

## 2024-06-19 NOTE — TELEPHONE ENCOUNTER
Letter printed and to be mailed to patient.    Yumi Jacome, RN, BSN  Cardiology RN Care Coordinator   [place]   Phone: [phone]  Fax: [phone] ([place]) [phone] ([place])

## 2024-06-19 NOTE — TELEPHONE ENCOUNTER
Called and spoke with patient. Discussed mailing letter with medication list to patient. Patient agreeable with having letter mailed to him.    Yumi Jacome RN, BSN  Cardiology RN Care Coordinator   [place]   Phone: [phone]  Fax: [phone] ([place]) [phone] ([place])

## 2024-06-21 ENCOUNTER — ANCILLARY PROCEDURE (OUTPATIENT)
Dept: CARDIOLOGY | Facility: CLINIC | Age: 74
End: 2024-06-21
Attending: INTERNAL MEDICINE
Payer: COMMERCIAL

## 2024-06-21 DIAGNOSIS — I48.19 PERSISTENT ATRIAL FIBRILLATION (H): ICD-10-CM

## 2024-06-21 LAB
BI-PLANE LVEF ECHO: NORMAL
LVEF ECHO: NORMAL

## 2024-06-21 PROCEDURE — 93325 DOPPLER ECHO COLOR FLOW MAPG: CPT | Performed by: INTERNAL MEDICINE

## 2024-06-21 PROCEDURE — 93308 TTE F-UP OR LMTD: CPT | Performed by: INTERNAL MEDICINE

## 2024-06-21 PROCEDURE — 93321 DOPPLER ECHO F-UP/LMTD STD: CPT | Performed by: INTERNAL MEDICINE

## 2024-06-21 NOTE — TELEPHONE ENCOUNTER
Letter was mailed to patient on 6/19/24.    Yumi Jacome, RN, BSN  Cardiology RN Care Coordinator   [place]   Phone: [phone]  Fax: [phone] ([place]) [phone] ([place])

## 2024-06-25 ENCOUNTER — TELEPHONE (OUTPATIENT)
Dept: CARDIOLOGY | Facility: CLINIC | Age: 74
End: 2024-06-25
Payer: COMMERCIAL

## 2024-06-25 NOTE — TELEPHONE ENCOUNTER
Patient scheduled for cardioversion on 7/2/24. Instructions reviewed when patient was in clinic.       You are scheduled for a Cardioversion, at [facility] [place]. The hospital is located at [address] [place] [address] on the [place] the [place].  If you need to cancel this procedure, please call [phone].       Visitor Policy: Two visitors.    Date:_7/2/24  Time: __11:30______To the Gold Waiting Room at the [facility]  Cardioversion    Please do not eat anything for 8 hours prior to your procedure. You may have sips of water up until 2 hours prior to your arrival.  2. Medications to continue:  - Anticoagulant (Example: Eliquis, Xarelto, Pradaxa, Warfarin)  - Take all meds as prescribed, except for those noted below.  3. Medications to hold:    - Morning of: diuretic, oral diabetic meds, [ertugliflozin (Steglatro), canaglilozin (Invokana), dapagliflozin (Farxiga), empagliflozin (Jardiance)], short acting insulin, mixed insulin: take 66% of NPH.  - Day prior: Take 80% of long acting insulin.  - Day prior & day of, if daily or BID dosing: [liraglutide (Victoza, Saxenda), exenatide (Byetta), insulin glargine/lixisenatide (Soliqua)].   - 1 week prior, if weekly dosing: [semaglutide (Rybelsus, Ozempic, Wegovy), dulaglutide (Trulicity), exenatide ER (Bydureon), tirzepatide (Mounjaro)].  4. You will discharge same day. You will need a .    If you have further questions, please utilize Mavin to contact us.   If your question concerns the above instructions, contact:  [phone]    If your question concerns the schedule/appointment times, contact:  [facility] Procedure   [phone]     Yumi Jacome, RN, BSN  Cardiology RN Care Coordinator   [place]   Phone: [phone]  Fax: [phone] ([place]) [phone] ([place])

## 2024-06-26 PROBLEM — R00.2 PALPITATIONS: Status: RESOLVED | Noted: 2021-03-31 | Resolved: 2024-06-26

## 2024-06-26 PROBLEM — I10 BENIGN ESSENTIAL HYPERTENSION: Chronic | Status: ACTIVE | Noted: 2018-11-29

## 2024-06-26 PROBLEM — I48.91 ATRIAL FIBRILLATION (H): Chronic | Status: ACTIVE | Noted: 2023-12-11

## 2024-06-26 PROBLEM — E66.812 CLASS 2 SEVERE OBESITY DUE TO EXCESS CALORIES WITH SERIOUS COMORBIDITY AND BODY MASS INDEX (BMI) OF 35.0 TO 35.9 IN ADULT (H): Chronic | Status: ACTIVE | Noted: 2023-09-11

## 2024-06-26 PROBLEM — I48.91 ATRIAL FIBRILLATION WITH RVR (H): Status: RESOLVED | Noted: 2023-12-11 | Resolved: 2024-06-26

## 2024-06-26 PROBLEM — E66.01 CLASS 2 SEVERE OBESITY DUE TO EXCESS CALORIES WITH SERIOUS COMORBIDITY AND BODY MASS INDEX (BMI) OF 35.0 TO 35.9 IN ADULT (H): Chronic | Status: ACTIVE | Noted: 2023-09-11

## 2024-06-26 PROBLEM — I48.91 ATRIAL FIBRILLATION (H): Status: ACTIVE | Noted: 2023-12-11

## 2024-06-26 PROBLEM — E78.5 HYPERLIPIDEMIA: Chronic | Status: ACTIVE | Noted: 2024-06-26

## 2024-06-26 PROBLEM — R07.9 CHEST PAIN, UNSPECIFIED TYPE: Status: RESOLVED | Noted: 2021-03-31 | Resolved: 2024-06-26

## 2024-06-26 NOTE — TELEPHONE ENCOUNTER
Called and reviewed cardioversion instructions with patient. Reviewed date and time with patient. Patient with no questions at this time.    Yumi Jacome, RN, BSN  Cardiology RN Care Coordinator   [place]   Phone: [phone]  Fax: [phone] ([place]) [phone] ([place])

## 2024-06-27 ENCOUNTER — OFFICE VISIT (OUTPATIENT)
Dept: SLEEP MEDICINE | Facility: CLINIC | Age: 74
End: 2024-06-27
Attending: INTERNAL MEDICINE
Payer: COMMERCIAL

## 2024-06-27 VITALS
SYSTOLIC BLOOD PRESSURE: 115 MMHG | DIASTOLIC BLOOD PRESSURE: 80 MMHG | HEIGHT: 68 IN | HEART RATE: 79 BPM | RESPIRATION RATE: 16 BRPM | BODY MASS INDEX: 33.77 KG/M2 | WEIGHT: 222.8 LBS | OXYGEN SATURATION: 96 %

## 2024-06-27 DIAGNOSIS — E66.812 CLASS 2 SEVERE OBESITY DUE TO EXCESS CALORIES WITH SERIOUS COMORBIDITY AND BODY MASS INDEX (BMI) OF 35.0 TO 35.9 IN ADULT (H): Chronic | ICD-10-CM

## 2024-06-27 DIAGNOSIS — E66.01 CLASS 2 SEVERE OBESITY DUE TO EXCESS CALORIES WITH SERIOUS COMORBIDITY AND BODY MASS INDEX (BMI) OF 35.0 TO 35.9 IN ADULT (H): Chronic | ICD-10-CM

## 2024-06-27 DIAGNOSIS — R53.81 MALAISE AND FATIGUE: ICD-10-CM

## 2024-06-27 DIAGNOSIS — I10 BENIGN ESSENTIAL HYPERTENSION: Chronic | ICD-10-CM

## 2024-06-27 DIAGNOSIS — R53.83 MALAISE AND FATIGUE: ICD-10-CM

## 2024-06-27 DIAGNOSIS — I48.91 ATRIAL FIBRILLATION STATUS POST CARDIOVERSION (H): Primary | ICD-10-CM

## 2024-06-27 PROCEDURE — 99204 OFFICE O/P NEW MOD 45 MIN: CPT | Performed by: INTERNAL MEDICINE

## 2024-06-27 ASSESSMENT — SLEEP AND FATIGUE QUESTIONNAIRES
HOW LIKELY ARE YOU TO NOD OFF OR FALL ASLEEP WHILE LYING DOWN TO REST IN THE AFTERNOON WHEN CIRCUMSTANCES PERMIT: WOULD NEVER DOZE
HOW LIKELY ARE YOU TO NOD OFF OR FALL ASLEEP WHEN YOU ARE A PASSENGER IN A CAR FOR AN HOUR WITHOUT A BREAK: WOULD NEVER DOZE
HOW LIKELY ARE YOU TO NOD OFF OR FALL ASLEEP WHILE SITTING QUIETLY AFTER LUNCH WITHOUT ALCOHOL: WOULD NEVER DOZE
HOW LIKELY ARE YOU TO NOD OFF OR FALL ASLEEP WHILE SITTING AND TALKING TO SOMEONE: WOULD NEVER DOZE
HOW LIKELY ARE YOU TO NOD OFF OR FALL ASLEEP IN A CAR, WHILE STOPPED FOR A FEW MINUTES IN TRAFFIC: WOULD NEVER DOZE
HOW LIKELY ARE YOU TO NOD OFF OR FALL ASLEEP WHILE SITTING INACTIVE IN A PUBLIC PLACE: WOULD NEVER DOZE
HOW LIKELY ARE YOU TO NOD OFF OR FALL ASLEEP WHILE SITTING AND READING: WOULD NEVER DOZE
HOW LIKELY ARE YOU TO NOD OFF OR FALL ASLEEP WHILE WATCHING TV: WOULD NEVER DOZE

## 2024-06-27 NOTE — NURSING NOTE
PSG and follow-up appointment with provider have both been scheduled. PSG hand-out given to and and reviewed with patient at clinic visit.  Vicenta Palmer, CMA

## 2024-06-27 NOTE — PATIENT INSTRUCTIONS
BMI Readings from Last 3 Encounters:   06/05/24 34.47 kg/m    03/14/24 35.72 kg/m    01/23/24 35.56 kg/m      What is BMI?  Body mass index (BMI) is one way to tell whether you are at a healthy weight, overweight, or obese. It measures your weight in relation to your height.  A BMI of 18.5 to 24.9 is in the healthy range. A person with a BMI of 25 to 29.9 is considered overweight, and someone with a BMI of 30 or greater is considered obese.  Another way to find out if you are at risk for health problems caused by overweight and obesity is to measure your waist. If you are a woman and your waist is more than 35 inches, or if you are a man and your waist is more than 40 inches, your risk of disease may be higher.  More than two-thirds of American adults are considered overweight or obese. Being overweight or obese increases the risk for further weight gain.  Excess weight may lead to heart disease and diabetes. Creating and following plans for healthy eating and physical activity may help you improve your health.    Methods for maintaining or losing weight.  Weight control is part of healthy lifestyle and includes exercise, emotional health, and healthy eating habits.  Careful eating habits lifelong is the mainstay of weight control.  Though there are significant health benefits from weight loss, long-term weight loss with diet alone may be very difficult to achieve- studies show long-term success with dietary management in less than 10% of people. Attaining a healthy weight may be especially difficult to achieve in those with severe obesity. In some cases, medications, devices and surgical management might be considered.    What can you do?  If you are overweight or obese and are interested in methods for weight loss, you should discuss this with your provider. In addition, we recommend that you review healthy life styles and methods for weight loss available through the National Institutes of Health patient  information sites:   http://win.niddk.nih.gov/publications/index.htm

## 2024-06-27 NOTE — NURSING NOTE
"Chief Complaint   Patient presents with    Sleep Problem     Patient presents to clinic for A-Fib       Initial /80   Pulse 79   Resp 16   Ht 1.715 m (5' 7.5\")   Wt 101.1 kg (222 lb 12.8 oz)   SpO2 96%   BMI 34.38 kg/m   Estimated body mass index is 34.38 kg/m  as calculated from the following:    Height as of this encounter: 1.715 m (5' 7.5\").    Weight as of this encounter: 101.1 kg (222 lb 12.8 oz).    Medication Reconciliation: complete  ESS: 0  Neck circumference: 16.25 inches / 41 centimeters.  DME: N/A  Vicenta Palmer CMA      "

## 2024-06-27 NOTE — PROGRESS NOTES
Outpatient Sleep Medicine Consultation:      Name: Collin Frank MRN# [id number]   Age: 74 year old YOB: 1950     Date of Consultation: June 27, 2024  Consultation is requested by: Jimy Maya MD  [address] [place] [id number] Jimy Maya  Primary care provider: Collin Salazar       Reason for Sleep Consult:     Collin Frank is sent by Jimy Maya for a sleep consultation regarding atrial fibrillation .    Patient s Reason for visit  Collin Frank main reason for visit: Consult-A Fib  Patient states problem(s) started: 3/2024  Collin Frank's goals for this visit: Consult           Assessment and Plan:     Atrial fibrillation, hypertension, bruxism  Fatigue (ESS 0), few symptoms of obstructive sleep apnea, no known snoring or apneas, but no bed partner  He appears hesitant concerning the whole process  - Patient prefers a Polysomnogram. Patient is a poor candidate for Home Sleep Testing due to not high pre-test probability of FRANCK  (STOPBANG 3) and patient expresses he expects difficulty using HST equipment (cognitive, mobility, dexterity).  -  If HSAT normal would recommend attended Polysomnogram. If mild obstructive sleep apnea would want mandibular advancement device. If moderate-severe would recommend CPAP     Summary Counseling:    Sleep Testing Reviewed  Obstructive Sleep Apnea Reviewed  Complications of Untreated Sleep Apnea Reviewed  Treatment options for obstructive sleep apnea reviewed       I spent 20 minutes with patient including counseling, and 15 minutes with chart review, and documentation     CC: Jimy Maya          History of Present Illness:         SLEEP-WAKE SCHEDULE:     Work/School Days: Patient goes to school/work: No   Usually gets into bed at 11pm  Takes patient about no problems to fall asleep  Has trouble falling asleep n\a nights per week  Wakes up in the middle of the night 0 to 1 times.  Wakes up due to External stimuli (bed partner, pets, noise, etc);Use  the bathroom  He has trouble falling back asleep  n\a times a week.   It usually takes n\a to get back to sleep  Patient is usually up at 630am  Uses alarm: No    Weekends/Non-work Days/All Other Days:  Usually gets into bed at 11pm   Takes patient about not a problem to fall asleep  Patient is usually up at 630am  Uses alarm: No    Sleep Need  Patient gets  7 hour sleep on average   Patient thinks he needs about 7 hours sleep    Collin Frank prefers to sleep in this position(s): Back;Side;Head Elevated   Patient states they do the following activities in bed:      Naps  Patient takes a purposeful nap 1 times a week and naps are usually 1 hour in duration  He feels better after a nap: No  He dozes off unintentionally 1 days per week  Patient has had a driving accident or near-miss due to sleepiness/drowsiness: No      SLEEP DISRUPTIONS:    Breathing/Snoring  Patient snores:No, no bed partner  Other people complain about his snoring: No  Patient has been told he stops breathing in his sleep:No  He has issues with the following: Morning mouth dryness    Movement:  Patient gets pain, discomfort, with an urge to move:  Yes   He deanne\F2es restless leg syndrome symptoms   It happens when he is resting:  Yes  It happens more at night:  Yes  Patient has been told he kicks his legs at night:  No     Behaviors in Sleep:  Collin Frank has experienced the following behaviors while sleeping: Teeth grinding  He has experienced sudden muscle weakness during the day: No      Is there anything else you would like your sleep provider to know:          CAFFEINE AND OTHER SUBSTANCES:    Patient consumes caffeinated beverages per day:  None  Last caffeine use is usually: n\a  List of any prescribed or over the counter stimulants that patient takes: None  List of any prescribed or over the counter sleep medication patient takes: None  List of previous sleep medications that patient has tried: N\a  Patient drinks alcohol to help them  sleep: No  Patient drinks alcohol near bedtime: No    Family History:  Patient has a family member been diagnosed with a sleep disorder: Yes  Brother had restless legs         SCALES:    EPWORTH SLEEPINESS SCALE         6/27/2024     1:00 PM    Durant Sleepiness Scale ( CIERA Miller  4420-9377<br>ESS - [place]/English - Final version - 21 Nov 07 - [facility] [place].)   Durant Score (Sleep) 0       INSOMNIA SEVERITY INDEX (KARIS)          6/27/2024     1:00 PM   Insomnia Severity Index (KRAIS)   Difficulty falling asleep 0   Difficulty staying asleep 0   Problems waking up too early 0   How SATISFIED/DISSATISFIED are you with your CURRENT sleep pattern? 1   How NOTICEABLE to others do you think your sleep problem is in terms of impairing the quality of your life? 0   How WORRIED/DISTRESSED are you about your current sleep problem? 0   To what extent do you consider your sleep problem to INTERFERE with your daily functioning (e.g. daytime fatigue, mood, ability to function at work/daily chores, concentration, memory, mood, etc.) CURRENTLY? 0   KARIS Total Score 1       Guidelines for Scoring/Interpretation:  Total score categories:  0-7 = No clinically significant insomnia   8-14 = Subthreshold insomnia   15-21 = Clinical insomnia (moderate severity)  22-28 = Clinical insomnia (severe)  Used via courtesy of www.[place]ze[place].va.gov with permission from Soham Vega PhD., [facility]        Allergies:    No Known Allergies    Medications:    Current Outpatient Medications   Medication Sig Dispense Refill    amLODIPine (NORVASC) 5 MG tablet Take 1 tablet (5 mg) by mouth daily 90 tablet 3    atorvastatin (LIPITOR) 10 MG tablet Take 1 tablet (10 mg) by mouth daily 90 tablet 3    cholecalciferol (VITAMIN D) 1000 UNIT tablet Take 1 tablet (1,000 Units) by mouth daily 100 tablet 3    flecainide (TAMBOCOR) 50 MG tablet Take 1 tablet (50 mg) by mouth 2 times daily 180 tablet 3    losartan (COZAAR) 50 MG tablet TAKE 1 TABLET  DAILY 90 tablet 1    metoprolol succinate ER (TOPROL XL) 25 MG 24 hr tablet Take 1 tablet (25 mg) by mouth daily 90 tablet 3    XARELTO ANTICOAGULANT 20 MG TABS tablet TAKE 1 TABLET DAILY WITH DINNER 90 tablet 3       Problem List:  Patient Active Problem List    Diagnosis Date Noted    Hyperlipidemia 06/26/2024     Priority: Medium    Atrial fibrillation (H) 12/11/2023     Priority: Medium     Presented emergency room 12/11/2023 for weakness and shortness of breath for 3 days. EKG showed A-fib with RVR (heart rate 100-130's). Underwent ALON guided DC cardioversion which converted patient to sinus rhythm.       Benign essential hypertension 11/29/2018     Priority: Medium    Long term current use of anticoagulant 10/16/2012     Priority: Medium    History of deep venous thrombosis 11/21/2011     Priority: Medium     Admit 11/2011- acute and extensive LLE DVTs, s/p thrombolysis by IR       Class 2 severe obesity due to excess calories with serious comorbidity and body mass index (BMI) of 35.0 to 35.9 in adult (H) 09/11/2023     Priority: Low    Primary osteoarthritis of both knees 07/03/2019     Priority: Low    Benign non-nodular prostatic hyperplasia without lower urinary tract symptoms 12/06/2016     Priority: Low    FH: prostate cancer 10/10/2013     Priority: Low    Hepatic hemangioma 03/21/2013     Priority: Low     1.5 x 1.4 x 1 cm  [place]  3/17/13      Vitamin D deficiency 10/22/2012     Priority: Low    Factor 5 Leiden mutation, heterozygous (H24) 11/30/2011     Priority: Low     Homozygous Mutant 2 copies Leiden mutation   Significant risk iof thrombolembolic events  [facility] 11-28-11  Candidate for lifelong anticoagulation         May-Thurner syndrome 11/30/2011     Priority: Low     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 [place] 11-11          Past Medical/Surgical History:  Past Medical History:   Diagnosis Date    Acute deep vein thrombosis (DVT) (H) 11/21/2011 11/2011- acute and extensive LLE  DVTs, he underwent thrombolysis by IR       Atrial fibrillation with RVR (H) 2023    Chest pain, unspecified type 2021    Renal stones 2013    2 mm size non obstructing  2 right, 1 left==CT  [place] 3/13       Past Surgical History:   Procedure Laterality Date    ANESTHESIA CARDIOVERSION N/A 2023    Procedure: Anesthesia cardioversion;  Surgeon: GENERIC ANESTHESIA PROVIDER;  Location: [facility] OR    COLONOSCOPY  2008    Normal. Repeat in 10 years    COLONOSCOPY WITH CO2 INSUFFLATION N/A 2018    Procedure: COLONOSCOPY WITH CO2 INSUFFLATION;  COLON SCREEN/ ANNMARIEELMANN;  Surgeon: Jan Flores MD;  Location: [facility] OR    ORTHOPEDIC SURGERY      rt knee ORIF       Social History:  Social History     Socioeconomic History    Marital status: Single     Spouse name: Not on file    Number of children: Not on file    Years of education: Not on file    Highest education level: Not on file   Occupational History    Not on file   Tobacco Use    Smoking status: Former     Current packs/day: 0.00     Types: Cigarettes     Quit date: 2011     Years since quittin.4     Passive exposure: Never    Smokeless tobacco: Never   Vaping Use    Vaping status: Never Used   Substance and Sexual Activity    Alcohol use: No    Drug use: No    Sexual activity: Never   Other Topics Concern    Parent/sibling w/ CABG, MI or angioplasty before 65F 55M? No   Social History Narrative    Not on file     Social Determinants of Health     Financial Resource Strain: Low Risk  (2024)    Financial Resource Strain     Within the past 12 months, have you or your family members you live with been unable to get utilities (heat, electricity) when it was really needed?: No   Food Insecurity: Low Risk  (2024)    Food Insecurity     Within the past 12 months, did you worry that your food would run out before you got money to buy more?: No     Within the past 12 months, did the food you bought just not last and  you didn t have money to get more?: No   Transportation Needs: Low Risk  (2024)    Transportation Needs     Within the past 12 months, has lack of transportation kept you from medical appointments, getting your medicines, non-medical meetings or appointments, work, or from getting things that you need?: No   Physical Activity: Insufficiently Active (2024)    Exercise Vital Sign     Days of Exercise per Week: 7 days     Minutes of Exercise per Session: 20 min   Stress: No Stress Concern Present (2024)    [employer] - Occupational Stress Questionnaire     Feeling of Stress : Not at all   Social Connections: Moderately Isolated (2024)    Social Connection and Isolation Panel [NHANES]     Frequency of Communication with Friends and Family: More than three times a week     Frequency of Social Gatherings with Friends and Family: Twice a week     Attends Worship Services: More than 4 times per year     Active Member of Clubs or Organizations: No     Attends Club or Organization Meetings: Never     Marital Status: Never    Interpersonal Safety: Low Risk  (2024)    Interpersonal Safety     Do you feel physically and emotionally safe where you currently live?: Yes     Within the past 12 months, have you been hit, slapped, kicked or otherwise physically hurt by someone?: No     Within the past 12 months, have you been humiliated or emotionally abused in other ways by your partner or ex-partner?: No   Housing Stability: Low Risk  (2024)    Housing Stability     Do you have housing? : Yes     Are you worried about losing your housing?: No       Family History:  Family History   Problem Relation Age of Onset    Alzheimer Disease Mother     Heart Disease Father     Prostate Cancer Father     Cancer Brother 65        pancreatic       Prostate Cancer Brother         2 stents 70% and 90% blockage    Heart Disease Sister        Review of Systems:  A complete  "review of systems reviewed by me is negative with the exeption of what has been mentioned in the history of present illness.  In the last TWO WEEKS have you experienced any of the following symptoms?  Fevers: No  Night Sweats: No  Weight Gain: No  Pain at Night: No  Double Vision: No  Changes in Vision: No  Difficulty Breathing through Nose: No  Sore Throat in Morning: No  Dry Mouth in the Morning: Yes  Shortness of Breath Lying Flat: No  Shortness of Breath With Activity: No  Awakening with Shortness of Breath: No  Increased Cough: No  Heart Racing at Night: No  Swelling in Feet or Legs: No  Diarrhea at Night: No  Heartburn at Night: No  Urinating More than Once at Night: No  Losing Control of Urine at Night: No  Joint Pains at Night: No  Headaches in Morning: No  Weakness in Arms or Legs: No  Depressed Mood: No  Anxiety: No     Physical Examination:  Vitals: /80   Pulse 79   Resp 16   Ht 1.715 m (5' 7.5\")   Wt 101.1 kg (222 lb 12.8 oz)   SpO2 96%   BMI 34.38 kg/m    BMI= Body mass index is 34.38 kg/m .    Neck Cir (cm): 41 cm      SpO2 Readings from Last 4 Encounters:   06/27/24 96%   06/05/24 96%   03/14/24 95%   02/28/24 96%       GENERAL APPEARANCE: alert and no distress  EYES: Eyes grossly normal to inspection  HENT: mouth without ulcers or lesions  NECK: somewhat generous size  LUNGS: no shortness of breath , cough  NEURO: mentation intact, speech normal and cranial nerves 2-12 appear intact  PSYCH: affect normal/bright            Data: All pertinent previous laboratory data reviewed     Recent Labs   Lab Test 03/14/24 0348 02/28/24  0812    137   POTASSIUM 4.2 3.9   CHLORIDE 107 104   CO2 22 22   ANIONGAP 10 11   * 121*   BUN 14.1 18.2   CR 1.15 1.06   MARTA 9.1 9.1       Recent Labs   Lab Test 03/14/24  0348   WBC 6.7   RBC 4.72   HGB 15.4   HCT 45.2   MCV 96   MCH 32.6   MCHC 34.1   RDW 12.7          Recent Labs   Lab Test 03/14/24  0348   PROTTOTAL 6.6   ALBUMIN 3.8 "   BILITOTAL 1.1   ALKPHOS 98   AST 26   ALT 24       TSH   Date Value   12/11/2023 2.42 uIU/mL   03/31/2021 1.79 mU/L   09/16/2019 1.43 mU/L         Chest x-ray:   XR Chest 2 Views 03/14/2024    Narrative  EXAM: XR CHEST 2 VIEWS  LOCATION: [facility]  DATE: 3/14/2024    INDICATION: central chest pressure  COMPARISON: 02/28/2024    Impression  IMPRESSION: Mild linear scarring again seen in the left lung base. No consolidation, pleural effusion or pneumothorax. Heart size and pulmonary vascularity are normal.      Chest CT:   CT Chest Pulmonary Embolism w Contrast 07/21/2023    Narrative  CT chest pulmonary angiogram with contrast    INDICATION: Chest pain. History of DVT. Elevated d-dimer.    COMPARISON: 3/31/2021    CONTRAST: 71 mL intravenous [id number]    FINDINGS: Nonenlarged mediastinal and right hilar lymph nodes. Heart  size normal. No pleural or pericardial effusion. No ventricular  enlargement. Mild contrast reflux into the IVC and hepatic veins.  There is fatty atrophy of the pancreas. No adrenal nodule. No pleural  or pericardial effusion.  Bone detail shows osteopenia and degenerative spurring throughout the  included spine especially the mid to lower thoracic and upper lumbar  levels.  Detail of the lungs shows lingular subsegmental atelectasis. No  suspicious pulmonary nodule or consolidation left lower lobe  subsegmental atelectasis also present. Major airways are nicely  patent.  Main pulmonary artery caliber normal at 2.7 cm. No hypodense filling  defect in the pulmonary arteries. There is coronary artery  calcification.    Impression  IMPRESSION: No CT evidence of pulmonary embolus. Coronary artery  calcium in the LAD. Fatty atrophy of pancreas. No suspicious pulmonary  findings other than subsegmental atelectasis.             Jeramy Blanca MD 6/27/2024

## 2024-06-29 NOTE — TELEPHONE ENCOUNTER
MEDICAL RECORDS REQUEST   [facility]  Urology Clinic  [address] [place] [address]  PHONE: [phone]  Fax: [phone]        FUTURE VISIT INFORMATION                                                   Collin Frank, : 1950 scheduled for future visit at [facility]    APPOINTMENT INFORMATION:  Date: 2024  Provider:  Ray Ji PA-C  Reason for Visit/Diagnosis: Hematuria    REFERRAL INFORMATION:  Referring provider:  Jimy Maya MD in [facility]      RECORDS REQUESTED FOR VISIT                                                     NOTES  STATUS/DETAILS   OFFICE NOTE from referring provider  yes, 2024 -- Jimy Maya MD in [facility]   MEDICATION LIST  yes     PRE-VISIT CHECKLIST      Joint diagnostic appointment coordinated correctly          (ensure right order & amount of time) Yes   RECORD COLLECTION COMPLETE Yes

## 2024-07-01 ENCOUNTER — TELEPHONE (OUTPATIENT)
Dept: CARDIOLOGY | Facility: CLINIC | Age: 74
End: 2024-07-01
Payer: COMMERCIAL

## 2024-07-01 NOTE — TELEPHONE ENCOUNTER
[facility] Call Center    Phone Message    May a detailed message be left on voicemail: yes     Reason for Call: Other: Patient is wondering if he should take his normal medications prior to procedure 7/2/24. Please call the patient back to discuss     Action Taken: Other: cardiology    Travel Screening: Not Applicable  Thank you!  [facility]       Date of Service:

## 2024-07-02 ENCOUNTER — HOSPITAL ENCOUNTER (OUTPATIENT)
Facility: CLINIC | Age: 74
Discharge: HOME | End: 2024-07-05
Attending: INTERNAL MEDICINE | Admitting: NURSE PRACTITIONER
Payer: COMMERCIAL

## 2024-07-02 ENCOUNTER — LAB (OUTPATIENT)
Dept: LAB | Facility: CLINIC | Age: 74
End: 2024-07-02
Attending: INTERNAL MEDICINE
Payer: COMMERCIAL

## 2024-07-02 ENCOUNTER — HOSPITAL ENCOUNTER (OUTPATIENT)
Dept: CARDIOLOGY | Facility: CLINIC | Age: 74
Discharge: HOME | End: 2024-07-02
Attending: INTERNAL MEDICINE
Payer: COMMERCIAL

## 2024-07-02 ENCOUNTER — ANESTHESIA EVENT (OUTPATIENT)
Dept: SURGERY | Facility: CLINIC | Age: 74
End: 2024-07-02
Payer: COMMERCIAL

## 2024-07-02 ENCOUNTER — ANESTHESIA (OUTPATIENT)
Dept: SURGERY | Facility: CLINIC | Age: 74
End: 2024-07-02
Payer: COMMERCIAL

## 2024-07-02 ENCOUNTER — APPOINTMENT (OUTPATIENT)
Dept: MEDSURG UNIT | Facility: CLINIC | Age: 74
End: 2024-07-02
Attending: INTERNAL MEDICINE
Payer: COMMERCIAL

## 2024-07-02 VITALS
SYSTOLIC BLOOD PRESSURE: 113 MMHG | HEART RATE: 57 BPM | OXYGEN SATURATION: 98 % | RESPIRATION RATE: 14 BRPM | DIASTOLIC BLOOD PRESSURE: 87 MMHG

## 2024-07-02 LAB
HOLD SPECIMEN: NORMAL
MAGNESIUM SERPL-MCNC: 2 MG/DL (ref 1.7–2.3)
POTASSIUM SERPL-SCNC: 4.2 MMOL/L (ref 3.4–5.3)

## 2024-07-02 PROCEDURE — 250N000009 HC RX 250: Performed by: NURSE ANESTHETIST, CERTIFIED REGISTERED

## 2024-07-02 PROCEDURE — 93010 ELECTROCARDIOGRAM REPORT: CPT | Mod: XU | Performed by: INTERNAL MEDICINE

## 2024-07-02 PROCEDURE — 92960 CARDIOVERSION ELECTRIC EXT: CPT | Performed by: NURSE ANESTHETIST, CERTIFIED REGISTERED

## 2024-07-02 PROCEDURE — 92960 CARDIOVERSION ELECTRIC EXT: CPT

## 2024-07-02 PROCEDURE — 99100 ANES PT EXTEME AGE<1 YR&>70: CPT | Performed by: NURSE ANESTHETIST, CERTIFIED REGISTERED

## 2024-07-02 PROCEDURE — 92960 CARDIOVERSION ELECTRIC EXT: CPT | Performed by: NURSE PRACTITIONER

## 2024-07-02 PROCEDURE — 83735 ASSAY OF MAGNESIUM: CPT | Performed by: INTERNAL MEDICINE

## 2024-07-02 PROCEDURE — 84132 ASSAY OF SERUM POTASSIUM: CPT | Performed by: INTERNAL MEDICINE

## 2024-07-02 PROCEDURE — 92960 CARDIOVERSION ELECTRIC EXT: CPT | Performed by: ANESTHESIOLOGY

## 2024-07-02 PROCEDURE — 999N000054 HC STATISTIC EKG NON-CHARGEABLE

## 2024-07-02 PROCEDURE — 99140 ANES COMP EMERGENCY COND: CPT | Performed by: ANESTHESIOLOGY

## 2024-07-02 PROCEDURE — 93005 ELECTROCARDIOGRAM TRACING: CPT

## 2024-07-02 PROCEDURE — 99100 ANES PT EXTEME AGE<1 YR&>70: CPT | Performed by: ANESTHESIOLOGY

## 2024-07-02 PROCEDURE — 99140 ANES COMP EMERGENCY COND: CPT | Performed by: NURSE ANESTHETIST, CERTIFIED REGISTERED

## 2024-07-02 PROCEDURE — 370N000017 HC ANESTHESIA TECHNICAL FEE, PER MIN

## 2024-07-02 RX ORDER — METHOHEXITAL IN WATER/PF 100MG/10ML
SYRINGE (ML) INTRAVENOUS PRN
Status: DISCONTINUED | OUTPATIENT
Start: 2024-07-02 | End: 2024-07-02

## 2024-07-02 RX ORDER — LIDOCAINE 40 MG/G
CREAM TOPICAL
Status: DISCONTINUED | OUTPATIENT
Start: 2024-07-02 | End: 2024-07-03 | Stop reason: HOSPADM

## 2024-07-02 RX ADMIN — Medication 50 MG: at 12:44

## 2024-07-02 ASSESSMENT — ACTIVITIES OF DAILY LIVING (ADL)
ADLS_ACUITY_SCORE: 35

## 2024-07-02 NOTE — DISCHARGE INSTRUCTIONS
GOING HOME AFTER YOUR CARDIOVERSION    FOR NEXT 24 HOURS:  An adult should stay with you.  Relax and take it easy.  DO NOT make any important legal decisions.  DO NOT drive or operate machines at home or at work.  DO NOT consume alcohol.   Resume your regular diet and drink plenty of fluids.  If you have any redness/skin sorness where the patches were placed, you may use aloe vera gel or 1% hydrocortisone cream to the skin (sold at drug stores)  Take Tylenol (Acetaminophen) per your provider's recommendations as needed to help relieve local pain.     CALL YOUR HEALTHCARE PROVIDER IF:  You develop nausea or vomiting  You develop hives or a rash or any unexplained itching  Have irregular heartbeat or fast pulse  Feel faint, dizzy, or lightheaded  Have chest pain with increased activity  Have bleeding issues from blood-thinning medicines    CALL 911 RIGHT AWAY IF YOU HAVE:  Pain in your chest, arm, shoulder, neck, or upper back  Shortness of breath  Loss of vision, speech, or strength or coordination in any body part  Weakness in your arm or leg  Uncontrolled bleeding  Feel unstable in any way     FOLLOW UP APPOINTMENT:    Follow up as scheduled with EP/Cardiology Provider in 4 weeks    ADDITIONAL INFORMATION:  Cardiovascular Clinic:   [address] [place] [address]. [place] [address]  Your Care Team:  EP Cardiology   Telephone Number     Nataliia Dias RN [phone]    After business hours: [phone], select option 4, ask for EP Fellow on call to be paged.     For scheduling appts or procedures:    Matilde Garcia   [phone]   For the Device Clinic (Pacemakers and ICD's)   RN's :   Annalisa Fontaine  During business hours: [phone]     After business hours:   [phone]- select option 4 and ask for job code [id number].       As always, Thank you for trusting us with your health care needs!

## 2024-07-02 NOTE — PROGRESS NOTES
Pt arrived in ECHO department for scheduled Cardioversion.   Procedure explained, questions answered and consent signed. Discharge instructions discussed with patient and given written copy.  Verified no missed doses of anticoagulation for the last 4 weeks.   Anesthesia gave pt 50 mg IV brevital for sedation and pt was DCCV X2 at 150 and 200Joules to a SR. Post EKG completed.   Pt denied chest pain or any other discomfort after procedure and was D/C home after awake and VSS. Escorted out to front lobby by staff in w/c to meet pt's ride home.

## 2024-07-02 NOTE — TELEPHONE ENCOUNTER
Called and spoke with patient. Reviewed cardioversion instructions. Patient with no further questions.    Yumi Jacome, RN, BSN  Cardiology RN Care Coordinator   [place]   Phone: [phone]  Fax: [phone] ([place]) [phone] ([place])

## 2024-07-03 LAB
ATRIAL RATE - MUSE: 49 BPM
ATRIAL RATE - MUSE: NORMAL BPM
DIASTOLIC BLOOD PRESSURE - MUSE: NORMAL MMHG
DIASTOLIC BLOOD PRESSURE - MUSE: NORMAL MMHG
INTERPRETATION ECG - MUSE: NORMAL
INTERPRETATION ECG - MUSE: NORMAL
P AXIS - MUSE: 40 DEGREES
P AXIS - MUSE: NORMAL DEGREES
PR INTERVAL - MUSE: 196 MS
PR INTERVAL - MUSE: NORMAL MS
QRS DURATION - MUSE: 84 MS
QRS DURATION - MUSE: 94 MS
QT - MUSE: 360 MS
QT - MUSE: 426 MS
QTC - MUSE: 384 MS
QTC - MUSE: 425 MS
R AXIS - MUSE: -35 DEGREES
R AXIS - MUSE: -43 DEGREES
SYSTOLIC BLOOD PRESSURE - MUSE: NORMAL MMHG
SYSTOLIC BLOOD PRESSURE - MUSE: NORMAL MMHG
T AXIS - MUSE: 42 DEGREES
T AXIS - MUSE: 58 DEGREES
VENTRICULAR RATE- MUSE: 49 BPM
VENTRICULAR RATE- MUSE: 84 BPM

## 2024-07-03 ASSESSMENT — LIFESTYLE VARIABLES: TOBACCO_USE: 1

## 2024-07-03 NOTE — PROCEDURES
[facility]    Procedure: Cardioversion    Date/Time: 7/2/2024 1:00 PM    Performed by: Malina Kelley APRN CNP  Authorized by: Jimy Maya MD      UNIVERSAL PROTOCOL   Site Marked: NA  Prior Images Obtained and Reviewed:  NA  Required items: Required blood products, implants, devices and special equipment available    Patient identity confirmed:  Verbally with patient and arm band  Patient was reevaluated immediately before administering moderate or deep sedation or anesthesia  Confirmation Checklist:  Patient's identity using two indicators, procedure was appropriate and matched the consent or emergent situation, relevant allergies and correct equipment/implants were available  Universal Protocol: the Joint Commission Universal Protocol was followed       ANESTHESIA    Anesthesia was administered and monitored by anesthesiology.  See anesthesia documentation for details.    PROCEDURE DETAILS  Pre-procedure rhythm: atrial fibrillation  Patient position: patient was placed in a supine position  Chest area: chest area exposed  Electrodes: pads  Electrodes placed: anterior-posterior  Number of attempts: 2    Details of Attempts:  Patient reported taking anticoagulation for at least 1 month uninterrupted prior. 150J biphasic synchronized shock delivered and patient remained in AF. 200J biphasic synchronized shock delivered with successful conversion to sinus.   Post-procedure rhythm: normal sinus rhythm  Complications: no complications      PROCEDURE    Patient Tolerance:  Patient tolerated the procedure well with no immediate complications        CHITRA Mejias CNP  Electrophysiology Consult Service  Securely message with [employer] ([employer])   Text page via [facility] Paging/Directory

## 2024-07-03 NOTE — ANESTHESIA PREPROCEDURE EVALUATION
Anesthesia Pre-Procedure Evaluation    Patient: Collin Frank   MRN: [id number] : 1950        Procedure : Procedure(s):  Anesthesia cardioversion          Collin Frank is a 73 year old male with past medical history of factor V Leiden mutation, chronic left lower extremity DVT and PE on Xarelto, HTN, prostate cancer who presents to the ER via ambulance for evaluation of generalized weakness, increasing shortness of breath for the past 3 days, episode of fatigue and diaphoresis this morning. Found to be in Afib RVR. Was NPO since 12/10, went for ALON/DCCV.     Past Medical History:   Diagnosis Date    Acute deep vein thrombosis (DVT) (H) 2011- acute and extensive LLE DVTs, he underwent thrombolysis by IR       Atrial fibrillation with RVR (H) 2023    Chest pain, unspecified type 2021    Renal stones 2013    2 mm size non obstructing  2 right, 1 left==CT  [place] 3/13        Past Surgical History:   Procedure Laterality Date    ANESTHESIA CARDIOVERSION N/A 2023    Procedure: Anesthesia cardioversion;  Surgeon: GENERIC ANESTHESIA PROVIDER;  Location: [facility] OR    ANESTHESIA CARDIOVERSION N/A 2024    Procedure: Anesthesia cardioversion@1330;  Surgeon: GENERIC ANESTHESIA PROVIDER;  Location: [facility] OR    COLONOSCOPY  2008    Normal. Repeat in 10 years    COLONOSCOPY WITH CO2 INSUFFLATION N/A 2018    Procedure: COLONOSCOPY WITH CO2 INSUFFLATION;  COLON SCREEN/ ENGELMANN;  Surgeon: Jan Flores MD;  Location: [facility] OR    ORTHOPEDIC SURGERY      rt knee ORIF      No Known Allergies   Social History     Tobacco Use    Smoking status: Former     Current packs/day: 0.00     Types: Cigarettes     Quit date: 2011     Years since quittin.4     Passive exposure: Never    Smokeless tobacco: Never   Substance Use Topics    Alcohol use: No      Wt Readings from Last 1 Encounters:   24 101.1 kg (222 lb 12.8 oz)        Anesthesia Evaluation   Pt has had  "prior anesthetic. Type: MAC and General.        ROS/MED HX  ENT/Pulmonary:     (+)                tobacco use, Past use,                       Neurologic:       Cardiovascular:     (+)  hypertension- -   -  - -   Taking blood thinners                                   METS/Exercise Tolerance:     Hematologic:     (+) History of blood clots,    pt is anticoagulated,           Musculoskeletal:       GI/Hepatic:     (+)             liver disease,       Renal/Genitourinary:     (+) renal disease,             Endo:     (+)               Obesity,       Psychiatric/Substance Use:       Infectious Disease:       Malignancy:       Other:            Physical Exam    Airway  airway exam normal      Mallampati: III   TM distance: > 3 FB   Neck ROM: full   Mouth opening: > 3 cm    Respiratory Devices and Support         Dental       (+) Modest Abnormalities - crowns, retainers, 1 or 2 missing teeth      Cardiovascular          Rhythm and rate: irregular   (+) weak pulses       Pulmonary   pulmonary exam normal        breath sounds clear to auscultation           OUTSIDE LABS:  CBC:   Lab Results   Component Value Date    WBC 6.7 03/14/2024    WBC 8.1 02/28/2024    HGB 15.4 03/14/2024    HGB 15.6 02/28/2024    HCT 45.2 03/14/2024    HCT 45.4 02/28/2024     03/14/2024     02/28/2024     BMP:   Lab Results   Component Value Date     03/14/2024     02/28/2024    POTASSIUM 4.2 07/02/2024    POTASSIUM 4.2 03/14/2024    CHLORIDE 107 03/14/2024    CHLORIDE 104 02/28/2024    CO2 22 03/14/2024    CO2 22 02/28/2024    BUN 14.1 03/14/2024    BUN 18.2 02/28/2024    CR 1.15 03/14/2024    CR 1.06 02/28/2024     (H) 03/14/2024     (H) 02/28/2024     COAGS:   Lab Results   Component Value Date    PTT 30 02/28/2024    INR 1.74 (H) 02/28/2024     POC: No results found for: \"BGM\", \"HCG\", \"HCGS\"  HEPATIC:   Lab Results   Component Value Date    ALBUMIN 3.8 03/14/2024    PROTTOTAL 6.6 03/14/2024    ALT 24 " "03/14/2024    AST 26 03/14/2024    ALKPHOS 98 03/14/2024    BILITOTAL 1.1 03/14/2024     OTHER:   Lab Results   Component Value Date    A1C 5.8 (H) 12/14/2023    MARTA 9.1 03/14/2024    PHOS 2.9 03/14/2024    MAG 2.0 07/02/2024    LIPASE 19 07/21/2023    TSH 2.42 12/11/2023    SED 7 12/11/2023       Anesthesia Plan    ASA Status:  3, emergent    NPO Status:  NPO Appropriate    Anesthesia Type: MAC.     - Reason for MAC: chronic cardiopulmonary disease, immobility needed, straight local not clinically adequate              Consents    Anesthesia Plan(s) and associated risks, benefits, and realistic alternatives discussed. Questions answered and patient/representative(s) expressed understanding.     - Discussed: Risks, Benefits and Alternatives for BOTH SEDATION and the PROCEDURE were discussed     - Discussed with:  Patient      - Extended Intubation/Ventilatory Support Discussed: No.      - Patient is DNR/DNI Status: No     Use of blood products discussed: No .     Postoperative Care    Pain management: IV analgesics.   PONV prophylaxis: Ondansetron (or other 5HT-3), Dexamethasone or Solumedrol     Comments:    Other Comments: The material risks, benefits and alternatives were discussed in detail.  The patient agrees to proceed.  The patient has no other complaints at this time.       H&P reviewed: Unable to attach H&P to encounter due to EHR limitations. H&P Update: appropriate H&P reviewed, patient examined. No interval changes since H&P (within 30 days).        Alex Hahn MD    I have reviewed the pertinent notes and labs in the chart from the past 30 days and (re)examined the patient.  Any updates or changes from those notes are reflected in this note.            # Drug Induced Coagulation Defect: home medication list includes an anticoagulant medication   # Obesity: Estimated body mass index is 34.38 kg/m  as calculated from the following:    Height as of 6/27/24: 1.715 m (5' 7.5\").    Weight as of " 6/27/24: 101.1 kg (222 lb 12.8 oz).

## 2024-07-03 NOTE — ANESTHESIA CARE TRANSFER NOTE
Patient: Collin Frank    Procedure: Procedure(s):  Anesthesia cardioversion@1330       Diagnosis: Persistent atrial fibrillation (H) [I48.19]  Diagnosis Additional Information: No value filed.    Anesthesia Type:   General     Note:      Level of Consciousness: awake      Independent Airway: airway patency satisfactory and stable        Patient transferred to: PACU  Comments: No complications.  Handoff Report: Identifed the Patient, Identified the Reponsible Provider, Reviewed the pertinent medical history, Discussed the surgical course, Reviewed Intra-OP anesthesia mangement and issues during anesthesia, Set expectations for post-procedure period and Allowed opportunity for questions and acknowledgement of understanding      Vitals:  Vitals Value Taken Time   BP     Temp     Pulse     Resp     SpO2         Electronically Signed By: Alex Hahn MD  July 3, 2024  2:39 PM

## 2024-07-03 NOTE — ANESTHESIA POSTPROCEDURE EVALUATION
Patient: Collin Frank    Procedure: Procedure(s):  Anesthesia cardioversion@1330       Anesthesia Type:  General    Note:  Disposition: Outpatient   Postop Pain Control: Uneventful            Sign Out: Well controlled pain   PONV: No   Neuro/Psych: Uneventful            Sign Out: Acceptable/Baseline neuro status   Airway/Respiratory: Uneventful            Sign Out: Acceptable/Baseline resp. status   CV/Hemodynamics: Uneventful            Sign Out: Acceptable CV status; No obvious hypovolemia; No obvious fluid overload   Other NRE: NONE   DID A NON-ROUTINE EVENT OCCUR? No    Event details/Postop Comments:  No complications.           Last vitals:  There were no vitals filed for this visit.    Electronically Signed By: Alex Hahn MD  July 3, 2024  2:38 PM

## 2024-07-05 ASSESSMENT — ACTIVITIES OF DAILY LIVING (ADL)
ADLS_ACUITY_SCORE: 35

## 2024-07-23 ENCOUNTER — PRE VISIT (OUTPATIENT)
Dept: UROLOGY | Facility: CLINIC | Age: 74
End: 2024-07-23
Payer: COMMERCIAL

## 2024-07-23 ENCOUNTER — PATIENT OUTREACH (OUTPATIENT)
Dept: CARE COORDINATION | Facility: CLINIC | Age: 74
End: 2024-07-23
Payer: COMMERCIAL

## 2024-07-23 NOTE — PROGRESS NOTES
S-(situation): RN Clinical Product Navigator chart review.     B-(background): Patient identified by health plan for care management support due to high probability risk of admission.  RN Clinical Product Navigator monitoring for utilization and potential intervention.     A-(assessment): Patient has had no unplanned utilization since last review.     R-(recommendations/plan): RN Clinical Product Navigator will close CPN program at this time.     Anna Chan RN   Clinical Product Navigator   [email or web address]   Office: [phone]

## 2024-07-23 NOTE — TELEPHONE ENCOUNTER
Reason for visit: Hematuria     Relevant information: 5 year hx of calculus of ureter, microscopic hematuria    Records/imaging/labs/orders: all records available    Pt called: no need for a call    At Rooming:  Standard rooming      Jose Enrique Levi  7/23/2024  3:07 PM

## 2024-08-13 DIAGNOSIS — I10 BENIGN ESSENTIAL HYPERTENSION: ICD-10-CM

## 2024-08-13 RX ORDER — LOSARTAN POTASSIUM 50 MG/1
50 TABLET ORAL DAILY
Qty: 90 TABLET | Refills: 3 | Status: SHIPPED | OUTPATIENT
Start: 2024-08-13

## 2024-08-16 NOTE — PROGRESS NOTES
Subjective     REQUESTING PROVIDER  Jimy Maya MD    REASON FOR CONSULT  Recurrent and persistent gross hematuria    HISTORY OF PRESENT ILLNESS   is a pleasant 74 year old male with a past medical history significant for nephrolithiasis, hyperlipidemia, hypertension, A-fib on Xarelto, BPH, and factor V Leiden mutation with history of DVT who presents today for further evaluation and management of his recurrent gross hematuria.  Seen previously in our department in 2017 for hematuria found to have kidney stones.  I personally reviewed the cardiology visit note from 6/5/2024 in preparation for today's visit.    According to that note, Collin was endorsing recurrent hematuria in the form of pink urine so was referred back to urology for further discussion and evaluation.    Today:  No change to urination amongst all of this  Just the one episode of discoloration  No history of changes to prevent stones  No history of UTIs or prostatitis     REVIEW OF SYSTEMS  Review of Systems   Constitutional:  Negative for activity change and unexpected weight change.   HENT:  Negative for ear pain and tinnitus.    Eyes:  Negative for visual disturbance.   Respiratory:  Negative for shortness of breath.    Cardiovascular:  Negative for chest pain.   Gastrointestinal:  Negative for abdominal pain.   Genitourinary:  Negative for hematuria.   Musculoskeletal:  Positive for back pain (Chronic, low grade, [place] [employer]).   Neurological:  Negative for dizziness and light-headedness.   Hematological:  Negative for adenopathy.   Psychiatric/Behavioral:  Negative for sleep disturbance.      SOCIAL HISTORY  Denies any known family history of urologic malignancy     FAMILY HISTORY  Father and brother with prostate cancer     Objective     PHYSICAL EXAM  Physical Exam  Constitutional:       Appearance: Normal appearance.   HENT:      Head: Normocephalic and atraumatic.      Nose: No congestion.      Mouth/Throat:      Mouth:  Mucous membranes are moist.   Eyes:      General:         Right eye: No discharge.         Left eye: No discharge.   Pulmonary:      Effort: No respiratory distress.   Abdominal:      General: There is no distension.   Musculoskeletal:         General: No deformity.   Skin:     General: Skin is warm and dry.   Neurological:      Mental Status: He is alert and oriented to person, place, and time.   Psychiatric:         Mood and Affect: Mood normal.         Behavior: Behavior normal.       LABORATORY  No up-to-date urine testing    Lab Results   Component Value Date    PSA 1.66 12/12/2018    PSA 1.11 12/11/2017    PSA 2.72 12/06/2016    PSA 1.07 12/01/2015    PSA 1.08 10/30/2014    PSA 1.36 10/10/2013    PSA 1.17 10/16/2012    PSA 1.10 11/28/2011      IMAGING  No up-to-date upper tract imaging    Assessment & Plan   Recurrent and persistent gross hematuria    It was my pleasure to meet with Mr. Frank in the clinic today regarding his recently discovered gross hematuria. We discussed possible etiologies of gross hematuria including both benign and malignant causes. We discussed the American Urological Association's recommendation for workup of gross and microscopic hematuria (visible blood vs. 3 or more red blood cells on urinalysis) which would include a CT urogram, cystoscopy, and in the case of gross hematuria, a urine cytology. I described these 2-3 tests to the Mr. Frank as well as the relative risks and benefits of each.    After reviewing the above information, Mr. Frank expressed understanding and agreement with moving forward with the CT Urogram and cystoscopy.     If the hematuria evaluation is negative, the patient should undergo a repeat urinalysis in one year. If this repeat urinalysis is also negative for microscopic hematuria, no further workup is needed. If there is persistent microscopic hematuria, we will need to enter into shared decision making regarding repeat evaluation with CT Urogram and  cystoscopy vs. Observation.     Mr. Frank expressed understanding and agreement to the above discussion and plan and all of his questions were answered to his satisfaction.      PLAN  First available CT Urogram and cystoscopy with me to be scheduled on the same day, CT urogram first    Signed by:      Ray Ji PA-C

## 2024-08-19 ENCOUNTER — PRE VISIT (OUTPATIENT)
Dept: UROLOGY | Facility: CLINIC | Age: 74
End: 2024-08-19

## 2024-08-19 ENCOUNTER — OFFICE VISIT (OUTPATIENT)
Dept: UROLOGY | Facility: CLINIC | Age: 74
End: 2024-08-19
Payer: COMMERCIAL

## 2024-08-19 VITALS
HEIGHT: 68 IN | OXYGEN SATURATION: 96 % | SYSTOLIC BLOOD PRESSURE: 129 MMHG | BODY MASS INDEX: 34.38 KG/M2 | DIASTOLIC BLOOD PRESSURE: 84 MMHG | HEART RATE: 78 BPM

## 2024-08-19 DIAGNOSIS — R31.0 GROSS HEMATURIA: Primary | ICD-10-CM

## 2024-08-19 PROCEDURE — 99204 OFFICE O/P NEW MOD 45 MIN: CPT | Performed by: STUDENT IN AN ORGANIZED HEALTH CARE EDUCATION/TRAINING PROGRAM

## 2024-08-19 ASSESSMENT — ENCOUNTER SYMPTOMS
UNEXPECTED WEIGHT CHANGE: 0
LIGHT-HEADEDNESS: 0
SLEEP DISTURBANCE: 0
DIZZINESS: 0
ABDOMINAL PAIN: 0
ADENOPATHY: 0
SHORTNESS OF BREATH: 0
HEMATURIA: 0
BACK PAIN: 1
ACTIVITY CHANGE: 0

## 2024-08-19 ASSESSMENT — PAIN SCALES - GENERAL: PAINLEVEL: NO PAIN (0)

## 2024-08-19 NOTE — NURSING NOTE
"Chief Complaint   Patient presents with    Hematuria     Patient had beets 2-3 months ago and saw blood in the urine, says that could have been why.  Microscopic hematuria.       Blood pressure 129/84, pulse 78, height 1.715 m (5' 7.5\"), SpO2 96%. Body mass index is 34.38 kg/m .    Patient Active Problem List   Diagnosis    Factor 5 Leiden mutation, heterozygous (H24)    May-Thurner syndrome    Long term current use of anticoagulant    Vitamin D deficiency    Hepatic hemangioma    FH: prostate cancer    Benign non-nodular prostatic hyperplasia without lower urinary tract symptoms    Benign essential hypertension    Primary osteoarthritis of both knees    History of deep venous thrombosis    Class 2 severe obesity due to excess calories with serious comorbidity and body mass index (BMI) of 35.0 to 35.9 in adult (H)    Atrial fibrillation (H)    Hyperlipidemia       No Known Allergies    Current Outpatient Medications   Medication Sig Dispense Refill    amLODIPine (NORVASC) 5 MG tablet Take 1 tablet (5 mg) by mouth daily 90 tablet 3    atorvastatin (LIPITOR) 10 MG tablet Take 1 tablet (10 mg) by mouth daily 90 tablet 3    cholecalciferol (VITAMIN D) 1000 UNIT tablet Take 1 tablet (1,000 Units) by mouth daily 100 tablet 3    flecainide (TAMBOCOR) 50 MG tablet Take 1 tablet (50 mg) by mouth 2 times daily 180 tablet 3    losartan (COZAAR) 50 MG tablet TAKE 1 TABLET DAILY 90 tablet 3    metoprolol succinate ER (TOPROL XL) 25 MG 24 hr tablet Take 1 tablet (25 mg) by mouth daily 90 tablet 3    XARELTO ANTICOAGULANT 20 MG TABS tablet TAKE 1 TABLET DAILY WITH DINNER 90 tablet 3       Social History     Tobacco Use    Smoking status: Former     Current packs/day: 0.00     Types: Cigarettes     Quit date: 2011     Years since quittin.5     Passive exposure: Never    Smokeless tobacco: Never   Vaping Use    Vaping status: Never Used   Substance Use Topics    Alcohol use: No    Drug use: No       Hope Tavo, " LPN  8/19/2024  1:32 PM

## 2024-08-19 NOTE — LETTER
8/19/2024       RE: Collin Frank  [address]  [facility] [address]     Dear Colleague,    Thank you for referring your patient, Collin Frank, to the [facility] [place] at [facility]. Please see a copy of my visit note below.    Subjective    REQUESTING PROVIDER  Jimy Maya MD    REASON FOR CONSULT  Recurrent and persistent gross hematuria    HISTORY OF PRESENT ILLNESS   is a pleasant 74 year old male with a past medical history significant for nephrolithiasis, hyperlipidemia, hypertension, A-fib on Xarelto, BPH, and factor V Leiden mutation with history of DVT who presents today for further evaluation and management of his recurrent gross hematuria.  Seen previously in our department in 2017 for hematuria found to have kidney stones.  I personally reviewed the cardiology visit note from 6/5/2024 in preparation for today's visit.    According to that note, Collin was endorsing recurrent hematuria in the form of pink urine so was referred back to urology for further discussion and evaluation.    Today:  No change to urination amongst all of this  Just the one episode of discoloration  No history of changes to prevent stones  No history of UTIs or prostatitis     REVIEW OF SYSTEMS  Review of Systems   Constitutional:  Negative for activity change and unexpected weight change.   HENT:  Negative for ear pain and tinnitus.    Eyes:  Negative for visual disturbance.   Respiratory:  Negative for shortness of breath.    Cardiovascular:  Negative for chest pain.   Gastrointestinal:  Negative for abdominal pain.   Genitourinary:  Negative for hematuria.   Musculoskeletal:  Positive for back pain (Chronic, low grade, [place] years [place] [employer]   Neurological:  Negative for dizziness and light-headedness.   Hematological:  Negative for adenopathy.   Psychiatric/Behavioral:  Negative for sleep disturbance.      SOCIAL  HISTORY  Denies any known family history of urologic malignancy     FAMILY HISTORY  Father and brother with prostate cancer     Objective    PHYSICAL EXAM  Physical Exam  Constitutional:       Appearance: Normal appearance.   HENT:      Head: Normocephalic and atraumatic.      Nose: No congestion.      Mouth/Throat:      Mouth: Mucous membranes are moist.   Eyes:      General:         Right eye: No discharge.         Left eye: No discharge.   Pulmonary:      Effort: No respiratory distress.   Abdominal:      General: There is no distension.   Musculoskeletal:         General: No deformity.   Skin:     General: Skin is warm and dry.   Neurological:      Mental Status: He is alert and oriented to person, place, and time.   Psychiatric:         Mood and Affect: Mood normal.         Behavior: Behavior normal.       LABORATORY  No up-to-date urine testing    Lab Results   Component Value Date    PSA 1.66 12/12/2018    PSA 1.11 12/11/2017    PSA 2.72 12/06/2016    PSA 1.07 12/01/2015    PSA 1.08 10/30/2014    PSA 1.36 10/10/2013    PSA 1.17 10/16/2012    PSA 1.10 11/28/2011      IMAGING  No up-to-date upper tract imaging    Assessment & Plan  Recurrent and persistent gross hematuria    It was my pleasure to meet with Mr. Frank in the clinic today regarding his recently discovered gross hematuria. We discussed possible etiologies of gross hematuria including both benign and malignant causes. We discussed the American Urological Association's recommendation for workup of gross and microscopic hematuria (visible blood vs. 3 or more red blood cells on urinalysis) which would include a CT urogram, cystoscopy, and in the case of gross hematuria, a urine cytology. I described these 2-3 tests to the Mr. Frank as well as the relative risks and benefits of each.    After reviewing the above information, Mr. Frank expressed understanding and agreement with moving forward with the CT Urogram and cystoscopy.     If the hematuria  evaluation is negative, the patient should undergo a repeat urinalysis in one year. If this repeat urinalysis is also negative for microscopic hematuria, no further workup is needed. If there is persistent microscopic hematuria, we will need to enter into shared decision making regarding repeat evaluation with CT Urogram and cystoscopy vs. Observation.     Mr. Frank expressed understanding and agreement to the above discussion and plan and all of his questions were answered to his satisfaction.      PLAN  First available CT Urogram and cystoscopy with me to be scheduled on the same day, CT urogram first    Signed by:      Ray Ji PA-C      Again, thank you for allowing me to participate in the care of your patient.      Sincerely,    Ray Ji PA-C

## 2024-08-21 ENCOUNTER — TELEPHONE (OUTPATIENT)
Dept: UROLOGY | Facility: CLINIC | Age: 74
End: 2024-08-21
Payer: COMMERCIAL

## 2024-08-21 NOTE — TELEPHONE ENCOUNTER
[facility] Call Center    Phone Message    May a detailed message be left on voicemail: no     Reason for Call: Other: Patient states that he has had blood in his urine the last couple of days. Patient would like to speak with someone on his care team about this.     Action Taken: Message routed to:  [facility] ([facility] Urology    Travel Screening: Not Applicable

## 2024-08-21 NOTE — TELEPHONE ENCOUNTER
Per message received reschedule CT prior to Cysto with Chris Ji. Spoke with the patient and warm transferred to an imaging coordinator to reschedule CT

## 2024-08-30 ENCOUNTER — ANCILLARY PROCEDURE (OUTPATIENT)
Dept: CT IMAGING | Facility: CLINIC | Age: 74
End: 2024-08-30
Attending: STUDENT IN AN ORGANIZED HEALTH CARE EDUCATION/TRAINING PROGRAM
Payer: COMMERCIAL

## 2024-08-30 DIAGNOSIS — R31.0 GROSS HEMATURIA: ICD-10-CM

## 2024-08-30 LAB
CREAT BLD-MCNC: 1 MG/DL (ref 0.7–1.3)
EGFRCR SERPLBLD CKD-EPI 2021: >60 ML/MIN/1.73M2

## 2024-08-30 PROCEDURE — 82565 ASSAY OF CREATININE: CPT | Performed by: PATHOLOGY

## 2024-08-30 PROCEDURE — 74178 CT ABD&PLV WO CNTR FLWD CNTR: CPT | Mod: GC | Performed by: STUDENT IN AN ORGANIZED HEALTH CARE EDUCATION/TRAINING PROGRAM

## 2024-08-30 RX ORDER — IOPAMIDOL 755 MG/ML
109 INJECTION, SOLUTION INTRAVASCULAR ONCE
Status: COMPLETED | OUTPATIENT
Start: 2024-08-30 | End: 2024-08-30

## 2024-08-30 RX ADMIN — IOPAMIDOL 109 ML: 755 INJECTION, SOLUTION INTRAVASCULAR at 08:11

## 2024-08-30 NOTE — DISCHARGE INSTRUCTIONS

## 2024-09-04 ENCOUNTER — TELEPHONE (OUTPATIENT)
Dept: UROLOGY | Facility: CLINIC | Age: 74
End: 2024-09-04
Payer: COMMERCIAL

## 2024-09-04 NOTE — TELEPHONE ENCOUNTER
Contacted Collin in regards to his recent CT urogram, specifically with the results showing a 1 cm stone in the left proximal ureter luckily without any hydronephrosis.  After reviewing this finding and expressing the potential dangers it poses, we discussed my proposition of canceling her cystoscopy for next week and instead moving forward with a first available ureteroscopic stone extraction with one of my surgeons.  We discussed this procedure in detail including the risks of anesthesia, bleeding, infection, damage to surrounding structures, and the stents that will be left in place regardless of whether or not it is a staged or completed procedure.  We also discussed the details of a staged procedure.    After reviewing this, Collin expresses understanding and agreement and is ready to move forward with this procedure whenever it is necessary.  I also encouraged him to go to the emergency department should he start to experience severe flank pain, fevers, chills, or symptoms of a UTI.    Clinic coordinators, would you please assist me in canceling the cystoscopy scheduled with me on 9/9/2024?    Dr. Kimball, Camilla, and Lucius, wanted to make you aware of this patient who I just found a 1 cm proximal left stone in the setting of a recurrent gross hematuria workup.  Contacted him and he is willing to do ureteroscopic stone extraction first available.  He does take Xarelto.  Hoping 1 of you would have time to get him in sooner rather than later.    Thank you!

## 2024-09-05 ENCOUNTER — HOSPITAL ENCOUNTER (EMERGENCY)
Facility: CLINIC | Age: 74
Discharge: HOME | End: 2024-09-05
Attending: EMERGENCY MEDICINE | Admitting: EMERGENCY MEDICINE
Payer: COMMERCIAL

## 2024-09-05 VITALS
DIASTOLIC BLOOD PRESSURE: 71 MMHG | WEIGHT: 234 LBS | HEIGHT: 67 IN | RESPIRATION RATE: 19 BRPM | SYSTOLIC BLOOD PRESSURE: 125 MMHG | TEMPERATURE: 97.6 F | BODY MASS INDEX: 36.73 KG/M2 | OXYGEN SATURATION: 97 % | HEART RATE: 56 BPM

## 2024-09-05 DIAGNOSIS — R73.9 HYPERGLYCEMIA: ICD-10-CM

## 2024-09-05 DIAGNOSIS — R73.03 PREDIABETES: ICD-10-CM

## 2024-09-05 LAB
ALBUMIN SERPL BCG-MCNC: 3.9 G/DL (ref 3.5–5.2)
ALBUMIN UR-MCNC: NEGATIVE MG/DL
ALP SERPL-CCNC: 99 U/L (ref 40–150)
ALT SERPL W P-5'-P-CCNC: 20 U/L (ref 0–70)
ANION GAP SERPL CALCULATED.3IONS-SCNC: 11 MMOL/L (ref 7–15)
APPEARANCE UR: CLEAR
AST SERPL W P-5'-P-CCNC: 27 U/L (ref 0–45)
B-OH-BUTYR SERPL-SCNC: <0.18 MMOL/L
BASOPHILS # BLD AUTO: 0.1 10E3/UL (ref 0–0.2)
BASOPHILS NFR BLD AUTO: 1 %
BILIRUB SERPL-MCNC: 1.2 MG/DL
BILIRUB UR QL STRIP: NEGATIVE
BUN SERPL-MCNC: 18.7 MG/DL (ref 8–23)
CALCIUM SERPL-MCNC: 9.3 MG/DL (ref 8.8–10.4)
CHLORIDE SERPL-SCNC: 101 MMOL/L (ref 98–107)
COLOR UR AUTO: YELLOW
CREAT SERPL-MCNC: 1.12 MG/DL (ref 0.67–1.17)
EGFRCR SERPLBLD CKD-EPI 2021: 68 ML/MIN/1.73M2
EOSINOPHIL # BLD AUTO: 0.1 10E3/UL (ref 0–0.7)
EOSINOPHIL NFR BLD AUTO: 1 %
ERYTHROCYTE [DISTWIDTH] IN BLOOD BY AUTOMATED COUNT: 12.7 % (ref 10–15)
GLUCOSE SERPL-MCNC: 242 MG/DL (ref 70–99)
GLUCOSE UR STRIP-MCNC: 30 MG/DL
HBA1C MFR BLD: 6.1 %
HCO3 SERPL-SCNC: 23 MMOL/L (ref 22–29)
HCT VFR BLD AUTO: 47.5 % (ref 40–53)
HGB BLD-MCNC: 15.6 G/DL (ref 13.3–17.7)
HGB UR QL STRIP: ABNORMAL
HOLD SPECIMEN: NORMAL
HOLD SPECIMEN: NORMAL
IMM GRANULOCYTES # BLD: 0 10E3/UL
IMM GRANULOCYTES NFR BLD: 0 %
KETONES UR STRIP-MCNC: NEGATIVE MG/DL
LEUKOCYTE ESTERASE UR QL STRIP: NEGATIVE
LYMPHOCYTES # BLD AUTO: 0.8 10E3/UL (ref 0.8–5.3)
LYMPHOCYTES NFR BLD AUTO: 10 %
MAGNESIUM SERPL-MCNC: 3.9 MG/DL (ref 1.7–2.3)
MCH RBC QN AUTO: 32.4 PG (ref 26.5–33)
MCHC RBC AUTO-ENTMCNC: 32.8 G/DL (ref 31.5–36.5)
MCV RBC AUTO: 99 FL (ref 78–100)
MONOCYTES # BLD AUTO: 0.6 10E3/UL (ref 0–1.3)
MONOCYTES NFR BLD AUTO: 7 %
MUCOUS THREADS #/AREA URNS LPF: PRESENT /LPF
NEUTROPHILS # BLD AUTO: 6.4 10E3/UL (ref 1.6–8.3)
NEUTROPHILS NFR BLD AUTO: 81 %
NITRATE UR QL: NEGATIVE
NRBC # BLD AUTO: 0 10E3/UL
NRBC BLD AUTO-RTO: 0 /100
PH UR STRIP: 5.5 [PH] (ref 5–7)
PLATELET # BLD AUTO: 270 10E3/UL (ref 150–450)
POTASSIUM SERPL-SCNC: 4.3 MMOL/L (ref 3.4–5.3)
PROT SERPL-MCNC: 6.8 G/DL (ref 6.4–8.3)
RBC # BLD AUTO: 4.82 10E6/UL (ref 4.4–5.9)
RBC URINE: 18 /HPF
SODIUM SERPL-SCNC: 135 MMOL/L (ref 135–145)
SP GR UR STRIP: 1.02 (ref 1–1.03)
TROPONIN T SERPL HS-MCNC: 21 NG/L
TROPONIN T SERPL HS-MCNC: 22 NG/L
TSH SERPL DL<=0.005 MIU/L-ACNC: 3.92 UIU/ML (ref 0.3–4.2)
UROBILINOGEN UR STRIP-MCNC: NORMAL MG/DL
WBC # BLD AUTO: 8 10E3/UL (ref 4–11)
WBC URINE: 3 /HPF

## 2024-09-05 PROCEDURE — 83735 ASSAY OF MAGNESIUM: CPT | Performed by: EMERGENCY MEDICINE

## 2024-09-05 PROCEDURE — 82010 KETONE BODYS QUAN: CPT | Performed by: EMERGENCY MEDICINE

## 2024-09-05 PROCEDURE — 96360 HYDRATION IV INFUSION INIT: CPT | Performed by: EMERGENCY MEDICINE

## 2024-09-05 PROCEDURE — 258N000003 HC RX IP 258 OP 636: Performed by: EMERGENCY MEDICINE

## 2024-09-05 PROCEDURE — 81001 URINALYSIS AUTO W/SCOPE: CPT | Performed by: EMERGENCY MEDICINE

## 2024-09-05 PROCEDURE — 99285 EMERGENCY DEPT VISIT HI MDM: CPT | Performed by: EMERGENCY MEDICINE

## 2024-09-05 PROCEDURE — 36415 COLL VENOUS BLD VENIPUNCTURE: CPT | Performed by: EMERGENCY MEDICINE

## 2024-09-05 PROCEDURE — 84484 ASSAY OF TROPONIN QUANT: CPT | Performed by: EMERGENCY MEDICINE

## 2024-09-05 PROCEDURE — 83036 HEMOGLOBIN GLYCOSYLATED A1C: CPT | Performed by: EMERGENCY MEDICINE

## 2024-09-05 PROCEDURE — 99284 EMERGENCY DEPT VISIT MOD MDM: CPT | Mod: 25 | Performed by: EMERGENCY MEDICINE

## 2024-09-05 PROCEDURE — 93005 ELECTROCARDIOGRAM TRACING: CPT | Performed by: EMERGENCY MEDICINE

## 2024-09-05 PROCEDURE — 85025 COMPLETE CBC W/AUTO DIFF WBC: CPT | Performed by: EMERGENCY MEDICINE

## 2024-09-05 PROCEDURE — 80053 COMPREHEN METABOLIC PANEL: CPT | Performed by: EMERGENCY MEDICINE

## 2024-09-05 PROCEDURE — 93010 ELECTROCARDIOGRAM REPORT: CPT | Performed by: EMERGENCY MEDICINE

## 2024-09-05 PROCEDURE — 84443 ASSAY THYROID STIM HORMONE: CPT | Performed by: EMERGENCY MEDICINE

## 2024-09-05 RX ADMIN — SODIUM CHLORIDE 1000 ML: 9 INJECTION, SOLUTION INTRAVENOUS at 20:17

## 2024-09-05 ASSESSMENT — COLUMBIA-SUICIDE SEVERITY RATING SCALE - C-SSRS
2. HAVE YOU ACTUALLY HAD ANY THOUGHTS OF KILLING YOURSELF IN THE PAST MONTH?: NO
1. IN THE PAST MONTH, HAVE YOU WISHED YOU WERE DEAD OR WISHED YOU COULD GO TO SLEEP AND NOT WAKE UP?: NO
6. HAVE YOU EVER DONE ANYTHING, STARTED TO DO ANYTHING, OR PREPARED TO DO ANYTHING TO END YOUR LIFE?: NO

## 2024-09-05 ASSESSMENT — ACTIVITIES OF DAILY LIVING (ADL)
ADLS_ACUITY_SCORE: 35

## 2024-09-05 NOTE — ED TRIAGE NOTES
"BIBA from home for \"feeling funny\" history of a-fib with an ablation. Denies chest pain. Currently has kidney stones. Increase thirst and urination.  with no hx of diabetes. EKG NSR per EMS    Sulema Diallo RN on 9/5/2024 at 5:53 PM          "

## 2024-09-05 NOTE — ED NOTES
Bed: ED10  Expected date:   Expected time:   Means of arrival:   Comments:  [facility] [place] [facility] 74M not feeling right a-fib hyperglycemia

## 2024-09-06 ENCOUNTER — PATIENT OUTREACH (OUTPATIENT)
Dept: FAMILY MEDICINE | Facility: CLINIC | Age: 74
End: 2024-09-06
Payer: COMMERCIAL

## 2024-09-06 LAB
ATRIAL RATE - MUSE: 58 BPM
DIASTOLIC BLOOD PRESSURE - MUSE: NORMAL MMHG
INTERPRETATION ECG - MUSE: NORMAL
P AXIS - MUSE: 84 DEGREES
PR INTERVAL - MUSE: 194 MS
QRS DURATION - MUSE: 86 MS
QT - MUSE: 422 MS
QTC - MUSE: 414 MS
R AXIS - MUSE: -43 DEGREES
SYSTOLIC BLOOD PRESSURE - MUSE: NORMAL MMHG
T AXIS - MUSE: 74 DEGREES
VENTRICULAR RATE- MUSE: 58 BPM

## 2024-09-06 NOTE — ED PROVIDER NOTES
"ED Provider Note  [facility]      History     Chief Complaint   Patient presents with    Chest Pain     HPI  Collin Frank is a 74 year old male who has a PMHx significant for Factor V Leiden mutation, chronic left lower extremity DVT and PE on Xarelto, HTN, Prostate cancer, paroxysmal afib on metoprolol for rate control and xarelto for a/c, who presents today with palpitations and feeling \"generally unwell\". He denies specific chest pain or pressure but does note that he had brief palpitations earlier and wonders if he was in atrial fibrillation at the time. Those symptoms have since resolved however he wanted to come in to be evaluated to make sure everything is ok in light of his symptoms and past medical history. Patient states he is compliant with his medications including xarelto. He denies focal weakness, numbness, fevers, chills, cough, headaches, neck pain or stiffness, shortness of breath, abdominal pain, nausea, vomiting, diarrhea, dysuria, or other acute complaints. He is not on any new medications per his report.      Past Medical History  Past Medical History:   Diagnosis Date    Acute deep vein thrombosis (DVT) (H) 11/21/2011 11/2011- acute and extensive LLE DVTs, he underwent thrombolysis by IR       Atrial fibrillation with RVR (H) 12/11/2023    Chest pain, unspecified type 03/31/2021    Renal stones 03/21/2013    2 mm size non obstructing  2 right, 1 left==CT  [place] 3/13       Past Surgical History:   Procedure Laterality Date    ANESTHESIA CARDIOVERSION N/A 12/11/2023    Procedure: Anesthesia cardioversion;  Surgeon: GENERIC ANESTHESIA PROVIDER;  Location: [facility] OR    ANESTHESIA CARDIOVERSION N/A 7/2/2024    Procedure: Anesthesia cardioversion@1330;  Surgeon: GENERIC ANESTHESIA PROVIDER;  Location: [facility] OR    COLONOSCOPY  01/17/2008    Normal. Repeat in 10 years    COLONOSCOPY WITH CO2 INSUFFLATION N/A 02/01/2018    Procedure: COLONOSCOPY WITH CO2 INSUFFLATION;  COLON " "SIDNEY/ ENGELMANN;  Surgeon: Jan Floers MD;  Location: [facility] OR    ORTHOPEDIC SURGERY  1975    rt knee ORIF     amLODIPine (NORVASC) 5 MG tablet  atorvastatin (LIPITOR) 10 MG tablet  cholecalciferol (VITAMIN D) 1000 UNIT tablet  flecainide (TAMBOCOR) 50 MG tablet  losartan (COZAAR) 50 MG tablet  metoprolol succinate ER (TOPROL XL) 25 MG 24 hr tablet  XARELTO ANTICOAGULANT 20 MG TABS tablet      No Known Allergies  Family History  Family History   Problem Relation Age of Onset    Alzheimer Disease Mother     Heart Disease Father     Prostate Cancer Father     Cancer Brother 65        pancreatic       Prostate Cancer Brother         2 stents 70% and 90% blockage    Heart Disease Sister      Social History   Social History     Tobacco Use    Smoking status: Former     Current packs/day: 0.00     Types: Cigarettes     Quit date: 2011     Years since quittin.6     Passive exposure: Never    Smokeless tobacco: Never   Vaping Use    Vaping status: Never Used   Substance Use Topics    Alcohol use: No    Drug use: No      A medically appropriate review of systems was performed with pertinent positives and negatives noted in the HPI, and all other systems negative.    Physical Exam   BP: 132/78  Pulse: 59  Temp: 97.6  F (36.4  C)  Resp: 18  Height: 170.2 cm (5' 7\")  Weight: 106.1 kg (234 lb)  SpO2: 96 %  Physical Exam  Vitals and nursing note reviewed.   Constitutional:       General: He is not in acute distress.     Appearance: Normal appearance. He is not toxic-appearing.   HENT:      Head: Atraumatic.   Eyes:      General: No scleral icterus.     Extraocular Movements: Extraocular movements intact.      Conjunctiva/sclera: Conjunctivae normal.   Cardiovascular:      Rate and Rhythm: Normal rate and regular rhythm.      Heart sounds: Normal heart sounds.   Pulmonary:      Effort: Pulmonary effort is normal. No respiratory distress.      Breath sounds: Normal breath sounds.   Abdominal:      " Palpations: Abdomen is soft.      Tenderness: There is no abdominal tenderness.   Musculoskeletal:         General: No deformity.      Cervical back: Neck supple.   Skin:     General: Skin is warm and dry.   Neurological:      Mental Status: He is alert.      Comments: Answering questions appropriately, moving all extremities   Psychiatric:         Mood and Affect: Mood normal.         Behavior: Behavior normal.           ED Course, Procedures, & Data      Procedures            EKG Interpretation:      Interpreted by Kosta Brower MD  Time reviewed: 18:11  Symptoms at time of EKG: chest pain equivalent (generalized weakness, palpitations)  Rhythm: normal sinus   Rate: bradycardic (rate 56)  Axis: leftward  Ectopy: none  Conduction: normal  ST Segments/ T Waves: No ST-T wave changes  Q Waves: none  Comparison to prior: Unchanged from 7/2/24    Clinical Impression: normal EKG           Results for orders placed or performed during the hospital encounter of 09/05/24   Comprehensive metabolic panel     Status: Abnormal   Result Value Ref Range    Sodium 135 135 - 145 mmol/L    Potassium 4.3 3.4 - 5.3 mmol/L    Carbon Dioxide (CO2) 23 22 - 29 mmol/L    Anion Gap 11 7 - 15 mmol/L    Urea Nitrogen 18.7 8.0 - 23.0 mg/dL    Creatinine 1.12 0.67 - 1.17 mg/dL    GFR Estimate 68 >60 mL/min/1.73m2    Calcium 9.3 8.8 - 10.4 mg/dL    Chloride 101 98 - 107 mmol/L    Glucose 242 (H) 70 - 99 mg/dL    Alkaline Phosphatase 99 40 - 150 U/L    AST 27 0 - 45 U/L    ALT 20 0 - 70 U/L    Protein Total 6.8 6.4 - 8.3 g/dL    Albumin 3.9 3.5 - 5.2 g/dL    Bilirubin Total 1.2 <=1.2 mg/dL   Hemoglobin A1c     Status: Abnormal   Result Value Ref Range    Hemoglobin A1C 6.1 (H) <5.7 %   Magnesium     Status: Abnormal   Result Value Ref Range    Magnesium 3.9 (H) 1.7 - 2.3 mg/dL   Ketone Beta-Hydroxybutyrate Quantitative     Status: Normal   Result Value Ref Range    Ketone (Beta-Hydroxybutyrate) Quantitative <0.18 <=0.30 mmol/L   UA with  Microscopic reflex to Culture     Status: Abnormal    Specimen: Urine, Clean Catch   Result Value Ref Range    Color Urine Yellow Colorless, Straw, Light Yellow, Yellow    Appearance Urine Clear Clear    Glucose Urine 30 (A) Negative mg/dL    Bilirubin Urine Negative Negative    Ketones Urine Negative Negative mg/dL    Specific Gravity Urine 1.017 1.003 - 1.035    Blood Urine Moderate (A) Negative    pH Urine 5.5 5.0 - 7.0    Protein Albumin Urine Negative Negative mg/dL    Urobilinogen Urine Normal Normal, 2.0 mg/dL    Nitrite Urine Negative Negative    Leukocyte Esterase Urine Negative Negative    Mucus Urine Present (A) None Seen /LPF    RBC Urine 18 (H) <=2 /HPF    WBC Urine 3 <=5 /HPF    Narrative    Urine Culture not indicated   Troponin T, High Sensitivity     Status: Normal   Result Value Ref Range    Troponin T, High Sensitivity 22 <=22 ng/L   TSH with free T4 reflex     Status: Normal   Result Value Ref Range    TSH 3.92 0.30 - 4.20 uIU/mL   CBC with platelets and differential     Status: None   Result Value Ref Range    WBC Count 8.0 4.0 - 11.0 10e3/uL    RBC Count 4.82 4.40 - 5.90 10e6/uL    Hemoglobin 15.6 13.3 - 17.7 g/dL    Hematocrit 47.5 40.0 - 53.0 %    MCV 99 78 - 100 fL    MCH 32.4 26.5 - 33.0 pg    MCHC 32.8 31.5 - 36.5 g/dL    RDW 12.7 10.0 - 15.0 %    Platelet Count 270 150 - 450 10e3/uL    % Neutrophils 81 %    % Lymphocytes 10 %    % Monocytes 7 %    % Eosinophils 1 %    % Basophils 1 %    % Immature Granulocytes 0 %    NRBCs per 100 WBC 0 <1 /100    Absolute Neutrophils 6.4 1.6 - 8.3 10e3/uL    Absolute Lymphocytes 0.8 0.8 - 5.3 10e3/uL    Absolute Monocytes 0.6 0.0 - 1.3 10e3/uL    Absolute Eosinophils 0.1 0.0 - 0.7 10e3/uL    Absolute Basophils 0.1 0.0 - 0.2 10e3/uL    Absolute Immature Granulocytes 0.0 <=0.4 10e3/uL    Absolute NRBCs 0.0 10e3/uL   [place] Draw     Status: None    Narrative    The following orders were created for panel order [place] Draw.  Procedure                                Abnormality         Status                     ---------                               -----------         ------                     Extra Blue Top Tube[[id number]]                              Final result               Extra Red Top Tube[[id number]]                               Final result                 Please view results for these tests on the individual orders.   Extra Blue Top Tube     Status: None   Result Value Ref Range    Hold Specimen JIC    Extra Red Top Tube     Status: None   Result Value Ref Range    Hold Specimen JIC    Troponin T, High Sensitivity     Status: Normal   Result Value Ref Range    Troponin T, High Sensitivity 21 <=22 ng/L   EKG 12 lead     Status: None (Preliminary result)   Result Value Ref Range    Systolic Blood Pressure  mmHg    Diastolic Blood Pressure  mmHg    Ventricular Rate 58 BPM    Atrial Rate 58 BPM    IN Interval 194 ms    QRS Duration 86 ms     ms    QTc 414 ms    P Axis 84 degrees    R AXIS -43 degrees    T Axis 74 degrees    Interpretation ECG       Sinus bradycardia  Left axis deviation  Abnormal ECG     CBC with Platelets & Differential     Status: None    Narrative    The following orders were created for panel order CBC with Platelets & Differential.  Procedure                               Abnormality         Status                     ---------                               -----------         ------                     CBC with platelets and d...[[id number]]                      Final result                 Please view results for these tests on the individual orders.     Medications   sodium chloride 0.9% BOLUS 1,000 mL (0 mLs Intravenous Stopped 9/5/24 2117)     Labs Ordered and Resulted from Time of ED Arrival to Time of ED Departure   COMPREHENSIVE METABOLIC PANEL - Abnormal       Result Value    Sodium 135      Potassium 4.3      Carbon Dioxide (CO2) 23      Anion Gap 11      Urea Nitrogen 18.7      Creatinine 1.12      GFR Estimate 68       Calcium 9.3      Chloride 101      Glucose 242 (*)     Alkaline Phosphatase 99      AST 27      ALT 20      Protein Total 6.8      Albumin 3.9      Bilirubin Total 1.2     HEMOGLOBIN A1C - Abnormal    Hemoglobin A1C 6.1 (*)    MAGNESIUM - Abnormal    Magnesium 3.9 (*)    ROUTINE UA WITH MICROSCOPIC REFLEX TO CULTURE - Abnormal    Color Urine Yellow      Appearance Urine Clear      Glucose Urine 30 (*)     Bilirubin Urine Negative      Ketones Urine Negative      Specific Gravity Urine 1.017      Blood Urine Moderate (*)     pH Urine 5.5      Protein Albumin Urine Negative      Urobilinogen Urine Normal      Nitrite Urine Negative      Leukocyte Esterase Urine Negative      Mucus Urine Present (*)     RBC Urine 18 (*)     WBC Urine 3     KETONE BETA-HYDROXYBUTYRATE QUANTITATIVE, RAPID - Normal    Ketone (Beta-Hydroxybutyrate) Quantitative <0.18     TROPONIN T, HIGH SENSITIVITY - Normal    Troponin T, High Sensitivity 22     TSH WITH FREE T4 REFLEX - Normal    TSH 3.92     TROPONIN T, HIGH SENSITIVITY - Normal    Troponin T, High Sensitivity 21     CBC WITH PLATELETS AND DIFFERENTIAL    WBC Count 8.0      RBC Count 4.82      Hemoglobin 15.6      Hematocrit 47.5      MCV 99      MCH 32.4      MCHC 32.8      RDW 12.7      Platelet Count 270      % Neutrophils 81      % Lymphocytes 10      % Monocytes 7      % Eosinophils 1      % Basophils 1      % Immature Granulocytes 0      NRBCs per 100 WBC 0      Absolute Neutrophils 6.4      Absolute Lymphocytes 0.8      Absolute Monocytes 0.6      Absolute Eosinophils 0.1      Absolute Basophils 0.1      Absolute Immature Granulocytes 0.0      Absolute NRBCs 0.0       No orders to display          Critical care was not performed.     Medical Decision Making  The patient's presentation was of moderate complexity (an undiagnosed new problem with uncertain prognosis).    The patient's evaluation involved:  review of external note(s) from 1 sources (see separate area of note for  "details)  review of 3+ test result(s) ordered prior to this encounter (see separate area of note for details)  ordering and/or review of 3+ test(s) in this encounter (see separate area of note for details)    The patient's management necessitated moderate risk (consideration of obs admission for new onset hyperglycemia, consideration of initiation of medication such as metformin for new onset diabetes).    Assessment & Plan    Collin Frank is a 74 year old male who has a PMHx significant for Factor V Leiden mutation, chronic left lower extremity DVT and PE on Xarelto, HTN, Prostate cancer, paroxysmal afib on metoprolol for rate control and xarelto for a/c, who presents today with palpitations and feeling \"generally unwell\".  Patient is nontoxic-appearing on exam, his vital signs within normal limits with exception of the heart rate mildly bradycardic with a rate of 56.  Rhythm is regular.  EKG shows sinus bradycardia with a rate of 58, left axis deviation, no ST elevations, normal MN and QTc intervals, and consistent with prior EKG from July 2024.  Troponin obtained x 2 and came back at 22 and 21, negative for NSTEMI.  Screening lab work obtained and patient's glucose was noted to be elevated without anion gap.  Hemoglobin A1c was obtained and elevated at 6.1, consistent with prediabetes range.  No anion gap on labs.  Lab work otherwise reassuring.  Patient was given IV fluids and had improvement in his symptoms.  He did not have chest pain at any point.  He has a nonfocal neurologic exam and was answering questions appropriately, NIH stroke scale score of 0.  We discussed observation for possible anginal equivalent and chest pain, observation for monitoring of glucose in the setting of new onset prediabetes and his symptoms, and also discussed possibility of consideration of glucose regulating medication such as metformin, however given the patient is in prediabetes range we opted for follow-up with primary care " physician to discuss this further.  Patient would prefer to go home rather than stay in observation and we discussed risks as well as outpatient follow-up plan and strict return precautions.  Patient expressed understanding prior to discharge.    I have reviewed the nursing notes. I have reviewed the findings, diagnosis, plan and need for follow up with the patient.    New Prescriptions    No medications on file       Final diagnoses:   Hyperglycemia   Prediabetes       Kosta Brower MD  [facility] EMERGENCY DEPARTMENT  9/5/2024     Kosta Brower MD  09/05/24 2141

## 2024-09-06 NOTE — TELEPHONE ENCOUNTER
Transitions of Care Outreach  Chief Complaint   Patient presents with    Hospital F/U       Most Recent Admission Date: 9/5/2024   Most Recent Admission Diagnosis:      Most Recent Discharge Date: 9/5/2024   Most Recent Discharge Diagnosis: Hyperglycemia - R73.9  Prediabetes - R73.03     Transitions of Care Assessment    Discharge Assessment  How are you doing now that you are home?: Pt is doing good.  How are your symptoms? (Red Flag symptoms escalate to triage hotline per guidelines): Improved  Do you know how to contact your clinic care team if you have future questions or changes to your health status? : Yes  Does the patient have their discharge instructions? : Yes  Does the patient have questions regarding their discharge instructions? : No  Were you started on any new medications or were there changes to any of your previous medications? : No  Does the patient have all of their medications?: Yes  Do you have questions regarding any of your medications? : No  Do you have all of your needed medical supplies or equipment (DME)?  (i.e. oxygen tank, CPAP, cane, etc.): Yes    Follow up Plan     Discharge Follow-Up  Discharge follow up appointment scheduled in alignment with recommended follow up timeframe or Transitions of Risk Category? (Low = within 30 days; Moderate= within 14 days; High= within 7 days): Yes  Discharge Follow Up Appointment Scheduled with?: Primary Care Provider    Follow up with Collin Salazar MD (Internal Medicine - Pediatrics)   Offered appointment, pt declined scheduling.    Future Appointments   Date Time Provider Department Center   9/9/2024  1:00 PM Ray Ji PA-C [facility]   10/15/2024  8:00 PM BRENNEN BED 3 [facility]   10/30/2024  9:30 AM Jimy Maya MD [facility]   12/19/2024 10:30 AM Jeramy Blanca MD [facility] SLE[facility]       Outpatient Plan as outlined on AVS reviewed with patient.    For any urgent concerns, please contact our 24 hour nurse triage line:  [phone] ([phone])       Tamie Pulido RN

## 2024-09-06 NOTE — DISCHARGE INSTRUCTIONS
You had a reassuring evaluation in the emergency department overall, however your glucose level was elevated and your Hemoglobin A1c was mildly elevated, which is concern for Prediabetes. Please follow up with your primary care physician for further care. If you develop new onset chest pain, weakness, fevers, or other new emergent concerns, return to the emergency department for further care.

## 2024-09-07 ENCOUNTER — TELEPHONE (OUTPATIENT)
Dept: CARDIOLOGY | Facility: CLINIC | Age: 74
End: 2024-09-07
Payer: COMMERCIAL

## 2024-09-07 DIAGNOSIS — I48.91 ATRIAL FIBRILLATION WITH RVR (H): ICD-10-CM

## 2024-09-07 DIAGNOSIS — I48.91 ATRIAL FIBRILLATION STATUS POST CARDIOVERSION (H): ICD-10-CM

## 2024-09-07 DIAGNOSIS — I10 PRIMARY HYPERTENSION: ICD-10-CM

## 2024-09-07 RX ORDER — ATORVASTATIN CALCIUM 10 MG/1
10 TABLET, FILM COATED ORAL DAILY
Qty: 90 TABLET | Refills: 3 | Status: SHIPPED | OUTPATIENT
Start: 2024-09-07

## 2024-09-07 RX ORDER — METOPROLOL SUCCINATE 25 MG/1
25 TABLET, EXTENDED RELEASE ORAL DAILY
Qty: 90 TABLET | Refills: 3 | Status: SHIPPED | OUTPATIENT
Start: 2024-09-07

## 2024-09-07 RX ORDER — AMLODIPINE BESYLATE 5 MG/1
5 TABLET ORAL DAILY
Qty: 90 TABLET | Refills: 3 | Status: SHIPPED | OUTPATIENT
Start: 2024-09-07

## 2024-09-07 NOTE — TELEPHONE ENCOUNTER
Patient calling requesting medication refills. Sent to pharmacy of choice. In future, requesting scripts be sent to [facility].     Talib Bautista MD  Cardiology Fellow, PGY-4  Pager: [phone]

## 2024-09-08 DIAGNOSIS — N20.0 KIDNEY STONE: Primary | ICD-10-CM

## 2024-09-09 ENCOUNTER — TELEPHONE (OUTPATIENT)
Dept: CARDIOLOGY | Facility: CLINIC | Age: 74
End: 2024-09-09

## 2024-09-09 NOTE — TELEPHONE ENCOUNTER
Called and spoke with patient. Patient states he was needing refills for his amlodipine, metoprolol and atorvastatin. Patient states he was needing temporary prescriptions sent to [facility].     [facility]ed [facility]. Pharmacy did confirm that prescriptions are being filled and should be ready for pickup soon.    Attempted to call patient to update status of prescription. Phone line busy. Unable to reach patient.     Yumi Jacome, RN, BSN  Cardiology RN Care Coordinator   [place]   Phone: [phone]  Fax: [phone] ([place]) [phone] ([place])

## 2024-09-09 NOTE — TELEPHONE ENCOUNTER
Spoke with patient regarding cystoscopy that was scheduled today 9/9 at 1:00pm. Patient understand this appointment is cancelled and is aware a surgery scheduler should be reaching out. I have forwarded this onto the surgery scheduling pool.

## 2024-09-09 NOTE — TELEPHONE ENCOUNTER
[facility] Call Center    Phone Message    May a detailed message be left on voicemail: yes     Reason for Call: Medication Refill Request    Has the patient contacted the pharmacy for the refill? Yes   Name of medication being requested: metoprolol succinate ER (TOPROL XL) 25 MG 24 hr tablet   amLODIPine (NORVASC) 5 MG tablet   atorvastatin (LIPITOR) 10 MG tablet  Provider who prescribed the medication: Dr Jose A Bautista  Pharmacy:    [facility] [address]      Date medication is needed: 9/10   Please send 1 month supply of Metoprolol to local pharmacy because pt will be out in a few days  [facility] [place], [place] - [address] [facility]      Action Taken: Other: Cardiology    Travel Screening: Not Applicable    Thank you!  [facility]       Date of Service:

## 2024-09-10 ENCOUNTER — TELEPHONE (OUTPATIENT)
Dept: UROLOGY | Facility: CLINIC | Age: 74
End: 2024-09-10
Payer: COMMERCIAL

## 2024-09-10 NOTE — TELEPHONE ENCOUNTER
Spoke with: Patient       Date of surgery: Thursday Sept 19 2024  Dr Kimball      Location: [facility]      Informed patient they will need a adult : YES      Pre op with provider: CUONG      H&P Scheduled in PAC Patient is scheduled for in person PAC on 9-12         Pre procedure covid : Not req      Additional imaging: NA         Surgery Packet : Mailed      Additional comments: Please call for surgery teaching

## 2024-09-10 NOTE — TELEPHONE ENCOUNTER
FUTURE VISIT INFORMATION      SURGERY INFORMATION:  Date: 24  Location: [facility]u or  Surgeon:  Delfin Kimball MD   Anesthesia Type:  choice  Procedure: Cystoscopy, Left Ureteroscopy, Left Retrograde Pyelogram, Left Laser Lithotripsy, Possible Left Ureteral Stent Placement     RECORDS REQUESTED FROM:       Primary Care Provider: [facility]    Pertinent Medical History: hypertension, atrial fibrillation    Most recent EKG+ Tracin24    Most recent ECHO: 24    Most recent Cardiac Stress Test: 23

## 2024-09-10 NOTE — TELEPHONE ENCOUNTER
Called and spoke with patient. Relayed to patient that prescriptions were received by [facility] and should be ready for pickup soon. Advised patient to call pharmacy to confirm that prescriptions are ready. Patient to call if any difficulty getting prescriptions. Advised patient that when he is needing further refills to call clinic and future refills can be sent to [facility]. Patient verbalized understanding.    Yumi Jacome, RN, BSN  Cardiology RN Care Coordinator   [place]   Phone: [phone]  Fax: [phone] ([place]) [phone] ([place])

## 2024-09-11 ENCOUNTER — TELEPHONE (OUTPATIENT)
Dept: UROLOGY | Facility: CLINIC | Age: 74
End: 2024-09-11
Payer: COMMERCIAL

## 2024-09-11 DIAGNOSIS — R31.0 GROSS HEMATURIA: Primary | ICD-10-CM

## 2024-09-11 DIAGNOSIS — N20.0 NEPHROLITHIASIS: ICD-10-CM

## 2024-09-11 NOTE — TELEPHONE ENCOUNTER
Procedure: Cystoscopy, Left Ureteroscopy, Left Retrograde Pyelogram, Left Laser Lithotripsy, Possible Left Ureteral Stent Placement     Date: 09/19  Provider: Jigar  Time: Jennifer time tbd, arrive 2 hours prior    Pre op appt: scheduled PAC 09/12  UA/UC: ordered to be done 09/12    Reviewed medications (anticoagulants, diabetes medications etc): reviewed, hold xarelto x 3 days, confirm at pre-op  Soap: reviewed  Reviewed when to start clear liquids and when to start NPO: yes  Confirmed pt has  and 24 hour observation: yes    Pt or family member expressed understanding: yes    Cindy Ceballos RN  9/11/2024  11:43 AM

## 2024-09-12 ENCOUNTER — LAB (OUTPATIENT)
Dept: LAB | Facility: CLINIC | Age: 74
End: 2024-09-12
Payer: COMMERCIAL

## 2024-09-12 ENCOUNTER — OFFICE VISIT (OUTPATIENT)
Dept: SURGERY | Facility: CLINIC | Age: 74
End: 2024-09-12
Payer: COMMERCIAL

## 2024-09-12 ENCOUNTER — ANESTHESIA EVENT (OUTPATIENT)
Dept: SURGERY | Facility: CLINIC | Age: 74
End: 2024-09-12
Payer: COMMERCIAL

## 2024-09-12 ENCOUNTER — PRE VISIT (OUTPATIENT)
Dept: SURGERY | Facility: CLINIC | Age: 74
End: 2024-09-12

## 2024-09-12 VITALS
DIASTOLIC BLOOD PRESSURE: 80 MMHG | OXYGEN SATURATION: 96 % | WEIGHT: 225 LBS | HEART RATE: 56 BPM | SYSTOLIC BLOOD PRESSURE: 125 MMHG | TEMPERATURE: 97.8 F | HEIGHT: 68 IN | BODY MASS INDEX: 34.1 KG/M2

## 2024-09-12 DIAGNOSIS — I48.91 ATRIAL FIBRILLATION, UNSPECIFIED TYPE (H): Chronic | ICD-10-CM

## 2024-09-12 DIAGNOSIS — N20.0 KIDNEY STONE: ICD-10-CM

## 2024-09-12 DIAGNOSIS — R31.0 GROSS HEMATURIA: ICD-10-CM

## 2024-09-12 DIAGNOSIS — Z01.818 PREOP EXAMINATION: Primary | ICD-10-CM

## 2024-09-12 PROCEDURE — 87086 URINE CULTURE/COLONY COUNT: CPT | Performed by: UROLOGY

## 2024-09-12 PROCEDURE — 99204 OFFICE O/P NEW MOD 45 MIN: CPT | Performed by: NURSE PRACTITIONER

## 2024-09-12 PROCEDURE — 99000 SPECIMEN HANDLING OFFICE-LAB: CPT | Performed by: PATHOLOGY

## 2024-09-12 ASSESSMENT — ENCOUNTER SYMPTOMS
DYSRHYTHMIAS: 1
SEIZURES: 0

## 2024-09-12 ASSESSMENT — PAIN SCALES - GENERAL: PAINLEVEL: NO PAIN (0)

## 2024-09-12 ASSESSMENT — LIFESTYLE VARIABLES: TOBACCO_USE: 1

## 2024-09-12 NOTE — PATIENT INSTRUCTIONS
Preparing for Your Surgery      Name:  Collin Frank   MRN:  [id number]   :  1950   Today's Date:  2024       Arriving for surgery:  Surgery date:  24  Arrival time:  9:45 am  Surgery time: 11:45 am    Please come to:     Please come to:       [facility] [place]    [address] [place]  [address]     The [facility] [place] Patient/Visitor Ramp is at [address]. Patients and visitors who self-park will receive the reduced hospital parking rate. If the Patient /Visitor Ramp is full, please follow the signs to the [employer] car park located at the main hospital entrance.       parking is available (24 hours/ 7 days a week)      Discounted parking pass options are available for patients and visitors. They can be purchased at the [employer] desk at the main hospital entrance.     -    Stop at the security desk and they will direct surgery patients to the Surgery Check in and [facility]. [phone]        - If you need directions, a wheelchair or an escort please stop at the Information/security desk in the lobby.     What can I eat or drink?  -  You may eat and drink normally up to 8 hours prior to arrival time. (Until 1:45 am on 24)  -  You may have clear liquids until 2 hours prior to arrival time. (Until 7:45 am on 24)    Examples of clear liquids:  Water  Clear broth  Juices (apple, white grape, white cranberry  and cider) without pulp  Noncarbonated, powder based beverages  (lemonade and Benjamin-Aid)  Sodas (Sprite, 7-Up, ginger ale and seltzer)  Coffee or tea (without milk or cream)  Gatorade    -  No Alcohol or cannabis products for at least 24 hours before surgery.     Which medicines can I take?    Hold Ibuprofen (Advil, Motrin) for 1 day(s) before surgery--unless otherwise directed by surgeon.  Hold Naproxen (Aleve) for 4 days before surgery.    Hold Xarelto for  3 days before surgery. Take last dose 9/15/24.    -  DO NOT take these medications the day of surgery:   Vitamin D   Losartan (Cozaar)     -  PLEASE TAKE these medications the day of surgery:   Flecainide (Tambocor)    Metoprolol succinate     How do I prepare myself?  - Please take 2 showers (one the night prior to surgery and one the morning of surgery) using Scrubcare or Hibiclens soap.    Use this soap only from the neck to your toes.     Leave the soap on your skin for one minute--then rinse thoroughly.      You may use your own shampoo and conditioner. No other hair products.   - Please remove all jewelry and body piercings.  - No lotions, deodorants or fragrance.  - Bring your ID and insurance card.    -If you use a CPAP machine, please bring the CPAP machine, tubing, and mask to hospital.    -If you have a Deep Brain Stimulator, Spinal Cord Stimulator, or any Neuro Stimulator device---you must bring the remote control to the hospital.      ALL PATIENTS GOING HOME THE SAME DAY OF SURGERY ARE REQUIRED TO HAVE A RESPONSIBLE ADULT TO DRIVE AND BE IN ATTENDANCE WITH THEM FOR 24 HOURS FOLLOWING SURGERY.    Covid testing policy as of 12/06/2022  Your surgeon will notify and schedule you for a COVID test if one is needed before surgery--please direct any questions or COVID symptoms to your surgeon      Questions or Concerns:    - For any questions regarding the day of surgery or your hospital stay, please contact the Pre Admission Nursing Office at [phone].       - If you have health changes between today and your surgery, please call your surgeon.       - For questions after surgery, please call your surgeons office.           Current Visitor Guidelines    You may have 2 visitors in the pre op area.    Visiting hours: 8 a.m. to 8:30 p.m.    Patients confirmed or suspected to have symptoms of COVID 19 or flu:     No visitors allowed for adult patients.   Children (under age 18) can have 1 named visitor.      People who are sick or showing symptoms of COVID 19 or flu:    Are not allowed to visit patients--we can only make exceptions in special situations.       Please follow these guidelines for your visit:          Please maintain social distance          Masking is optional--however at times you may be asked to wear a mask for the safety of yourself and others     Clean your hands with alcohol hand . Do this when you arrive at and leave the building and patient room,    And again after you touch your mask or anything in the room.     Go directly to and from the room you are visiting.     Stay in the patient s room during your visit. Limit going to other places in the hospital as much as possible     Leave bags and jackets at home or in the car.     For everyone s health, please don t come and go during your visit. That includes for smoking   during your visit.

## 2024-09-12 NOTE — H&P
Pre-Operative H & P     CC:  Preoperative exam to assess for increased cardiopulmonary risk while undergoing surgery and anesthesia.    Date of Encounter: 9/12/2024  Primary Care Physician:  Collin Salazar     Reason for visit:   Encounter Diagnoses   Name Primary?    Preop examination Yes    Kidney stone     Atrial fibrillation, unspecified type (H)        HPI  Collin Frank is a 74 year old male who presents for pre-operative H & P in preparation for  Procedure Information       Case: [id number] Date/Time: 09/19/24 1145    Procedure: Cystoscopy, Left Ureteroscopy, Left Retrograde Pyelogram, Left Holmium Laser Lithotripsy, Possible Left Ureteral Stent Placement (Left: Ureter)    Anesthesia type: Choice    Diagnosis: Kidney stone [N20.0]    Pre-op diagnosis: Kidney stone [N20.0]    Location: [facility] OR  /  OR    Providers: Delfin Kimball MD            The patient presents to the PAC in person today in preparation for the above scheduled procedure with comorbid conditions including factor V Leiden mutation, May-Thurner syndrome, 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    The patient was seen by MARIA R Ocampo, for further evaluation of recurrent and persistent gross hematuria.  CT urogram was ordered and showed left sided nephrolithiasis.  The patient was counseled of the findings and treatment options by the surgical team.      History is obtained from the patient and chart review    Hx of abnormal bleeding or anti-platelet use: Xarelto      Past Medical History  Past Medical History:   Diagnosis Date    Acute deep vein thrombosis (DVT) (H) 11/21/2011 11/2011- acute and extensive LLE DVTs, he underwent thrombolysis by IR       Atrial fibrillation with RVR (H) 12/11/2023    Chest pain, unspecified type 03/31/2021    Renal stones 03/21/2013    2 mm size non obstructing  2 right, 1 left==CT   [place] 3/13         Past Surgical History  Past Surgical History:   Procedure Laterality Date    ANESTHESIA CARDIOVERSION N/A 12/11/2023    Procedure: Anesthesia cardioversion;  Surgeon: GENERIC ANESTHESIA PROVIDER;  Location: [facility] OR    ANESTHESIA CARDIOVERSION N/A 7/2/2024    Procedure: Anesthesia cardioversion@1330;  Surgeon: GENERIC ANESTHESIA PROVIDER;  Location: [facility] OR    COLONOSCOPY  01/17/2008    Normal. Repeat in 10 years    COLONOSCOPY WITH CO2 INSUFFLATION N/A 02/01/2018    Procedure: COLONOSCOPY WITH CO2 INSUFFLATION;  COLON SCREEN/ ENGELMANN;  Surgeon: Jan Flores MD;  Location:  OR    ORTHOPEDIC SURGERY  1975    rt knee ORIF       Prior to Admission Medications  Current Outpatient Medications   Medication Sig Dispense Refill    amLODIPine (NORVASC) 5 MG tablet Take 1 tablet (5 mg) by mouth daily. (Patient taking differently: Take 5 mg by mouth every evening.) 90 tablet 3    atorvastatin (LIPITOR) 10 MG tablet Take 1 tablet (10 mg) by mouth daily. (Patient taking differently: Take 10 mg by mouth every evening.) 90 tablet 3    cholecalciferol (VITAMIN D) 1000 UNIT tablet Take 1,000 Units by mouth every morning. 100 tablet 3    flecainide (TAMBOCOR) 50 MG tablet Take 1 tablet (50 mg) by mouth 2 times daily 180 tablet 3    losartan (COZAAR) 50 MG tablet TAKE 1 TABLET DAILY (Patient taking differently: Take 50 mg by mouth every morning.) 90 tablet 3    metoprolol succinate ER (TOPROL XL) 25 MG 24 hr tablet Take 1 tablet (25 mg) by mouth daily. (Patient taking differently: Take 25 mg by mouth every morning.) 90 tablet 3    XARELTO ANTICOAGULANT 20 MG TABS tablet TAKE 1 TABLET DAILY WITH DINNER 90 tablet 3       Allergies  No Known Allergies    Social History  Social History     Socioeconomic History    Marital status: Single     Spouse name: Not on file    Number of children: Not on file    Years of education: Not on file    Highest education level: Not on file   Occupational History    Not on file    Tobacco Use    Smoking status: Former     Current packs/day: 0.00     Types: Cigarettes     Quit date: 2011     Years since quittin.6     Passive exposure: Never    Smokeless tobacco: Never   Vaping Use    Vaping status: Never Used   Substance and Sexual Activity    Alcohol use: No    Drug use: No    Sexual activity: Never   Other Topics Concern    Parent/sibling w/ CABG, MI or angioplasty before 65F 55M? No   Social History Narrative    Not on file     Social Determinants of Health     Financial Resource Strain: Low Risk  (2024)    Financial Resource Strain     Within the past 12 months, have you or your family members you live with been unable to get utilities (heat, electricity) when it was really needed?: No   Food Insecurity: Low Risk  (2024)    Food Insecurity     Within the past 12 months, did you worry that your food would run out before you got money to buy more?: No     Within the past 12 months, did the food you bought just not last and you didn t have money to get more?: No   Transportation Needs: Low Risk  (2024)    Transportation Needs     Within the past 12 months, has lack of transportation kept you from medical appointments, getting your medicines, non-medical meetings or appointments, work, or from getting things that you need?: No   Physical Activity: Insufficiently Active (2024)    Exercise Vital Sign     Days of Exercise per Week: 7 days     Minutes of Exercise per Session: 20 min   Stress: No Stress Concern Present (2024)    [employer] - Occupational Stress Questionnaire     Feeling of Stress : Not at all   Social Connections: Moderately Isolated (2024)    Social Connection and Isolation Panel [NHANES]     Frequency of Communication with Friends and Family: More than three times a week     Frequency of Social Gatherings with Friends and Family: Twice a week     Attends [employer] Services: More than 4 times per year      "Active Member of Clubs or Organizations: No     Attends Club or Organization Meetings: Never     Marital Status: Never    Interpersonal Safety: Low Risk  (2024)    Interpersonal Safety     Do you feel physically and emotionally safe where you currently live?: Yes     Within the past 12 months, have you been hit, slapped, kicked or otherwise physically hurt by someone?: No     Within the past 12 months, have you been humiliated or emotionally abused in other ways by your partner or ex-partner?: No   Housing Stability: Low Risk  (2024)    Housing Stability     Do you have housing? : Yes     Are you worried about losing your housing?: No       Family History  Family History   Problem Relation Age of Onset    Alzheimer Disease Mother     Heart Disease Father     Prostate Cancer Father     Cancer Brother 65        pancreatic       Prostate Cancer Brother         2 stents 70% and 90% blockage    Heart Disease Sister        Review of Systems  The complete review of systems is negative other than noted in the HPI or here.   Anesthesia Evaluation   Pt has had prior anesthetic. Type: MAC.    No history of anesthetic complications       ROS/MED HX  ENT/Pulmonary: Comment: Scheduled for a sleep study 10/15/2024    (+)     FRANCK risk factors,  hypertension,         tobacco use (Quit ~), Past use,                    (-) asthma   Neurologic: Comment: Hard of hearing   (-) no seizures, no CVA and no TIA   Cardiovascular: Comment: Evaluated in ED on 2024 for palpitations and feeling \"generally unwell\".  Normal troponins.  EKG showed sinus bradycardia with a rate of 58, left axis deviation, no ST elevations, normal WA and QTc intervals, and consistent with prior EKG from 2024.  Patient was given IVF and felt better.  Neuro exam was within normal limits.  A1C of 6.1>>prediabetic.  Plan to follow up with PCP in OP setting.     Today he denies cardiac symptoms including chest pain, SOB, palpitations, " syncope, FOX, orthopnea, or PND.       (+) Dyslipidemia hypertension- -   -  - -   Taking blood thinners                     dysrhythmias, a-fib,        Previous cardiac testing   Echo: Date: 6/2024 Results:  Limited Echocardiogram with portions of two-dimensional, color and spectral  Doppler performed. The patient's rhythm is atrial fibrillation.  ______________________________________________________________________________  Interpretation Summary     Global and regional left ventricular function is normal with an EF of 55-60%.  Biplane LVEF is 55%.  Global right ventricular function is normal.  No pericardial effusion is present.  This study was compared with the study from 12/11/23 .No significant changes  noted.    Stress Test:  Date: 7/2023 Results:  DSE  nterpretation Summary  Normal Dobutamine stress echocardiogram at target heart rate.  No resting or stress induced regional wallmotion abnormalities.  Normal resting and stress EKGs.  Normal heart rate and BP response to dobutamine. No symptoms during test.  Normal baseline screening echocardiogram with no significant valvular heart  disease and normal size of visualized aorta.    ECG Reviewed:  Date: 9/6/2024 Results:  Sinus bradycardia   Left axis deviation     Cath:  Date: Results:      METS/Exercise Tolerance:  Comment: Exercise bike that he rides daily.  Able to ascend a flight of stairs without chest pain.     Hematologic: Comments: Factor 5 Leiden mutation, heterozygous    May-Thurner syndrome     (+) History of blood clots (LLE DVT 2011),    pt is anticoagulated,        (-) anemia and history of blood transfusion   Musculoskeletal:       GI/Hepatic:     (+)             liver disease (hepatic hemangioma),    (-) GERD   Renal/Genitourinary:     (+) renal disease,      Nephrolithiasis , BPH,      Endo: Comment: Prediabetes    (+)               Obesity,    (-) Type I DM, Type II DM, thyroid disease and chronic steroid usage   Psychiatric/Substance Use:     "(-) psychiatric history, alcohol abuse history and chronic opioid use history   Infectious Disease:  - neg infectious disease ROS     Malignancy:  - neg malignancy ROS     Other:  - neg other ROS          /80 (BP Location: Right arm, Patient Position: Sitting, Cuff Size: Adult Large)   Pulse 56   Temp 97.8  F (36.6  C) (Oral)   Ht 1.715 m (5' 7.5\")   Wt 102.1 kg (225 lb)   SpO2 96%   BMI 34.72 kg/m      Physical Exam   Constitutional: Awake, alert, cooperative, no apparent distress, and appears stated age.  Eyes: Pupils equal, round and reactive to light, extra ocular muscles intact, sclera clear, conjunctiva normal.  HENT: Normocephalic, oral pharynx with moist mucus membranes, good dentition. No goiter appreciated.   Respiratory: Clear to auscultation bilaterally, no crackles or wheezing.  Cardiovascular: Regular rate and rhythm, normal S1 and S2, and no murmur noted.  Carotids +2, no bruits. No edema. Palpable pulses to radial  DP and PT arteries.   GI: Normal bowel sounds, soft, non-distended, non-tender, no masses palpated, no hepatosplenomegaly.    Lymph/Hematologic: No cervical lymphadenopathy and no supraclavicular lymphadenopathy.    Skin: Warm and dry.  No rashes at anticipated surgical site.   Musculoskeletal: Full ROM of neck. There is no redness, warmth, or swelling of the joints. Gross motor strength is normal.    Neurologic: Awake, alert, oriented to name, place and time. Cranial nerves II-XII are grossly intact. Gait is normal.   Neuropsychiatric: Calm, cooperative. Normal affect.     Prior Labs/Diagnostic Studies   All labs and imaging personally reviewed        Latest Reference Range & Units 09/05/24 18:08   Sodium 135 - 145 mmol/L 135   Potassium 3.4 - 5.3 mmol/L 4.3   Chloride 98 - 107 mmol/L 101   Carbon Dioxide (CO2) 22 - 29 mmol/L 23   Urea Nitrogen 8.0 - 23.0 mg/dL 18.7   Creatinine 0.67 - 1.17 mg/dL 1.12   GFR Estimate >60 mL/min/1.73m2 68   Calcium 8.8 - 10.4 mg/dL 9.3   Anion " Gap 7 - 15 mmol/L 11   Magnesium 1.7 - 2.3 mg/dL 3.9 (H)   Albumin 3.5 - 5.2 g/dL 3.9   Protein Total 6.4 - 8.3 g/dL 6.8   Alkaline Phosphatase 40 - 150 U/L 99   ALT 0 - 70 U/L 20   AST 0 - 45 U/L 27   Bilirubin Total <=1.2 mg/dL 1.2   Glucose 70 - 99 mg/dL 242 (H)   Hemoglobin A1C <5.7 % 6.1 (H)   Ketone Quantitative <=0.30 mmol/L <0.18   Troponin T, High Sensitivity <=22 ng/L 22   TSH 0.30 - 4.20 uIU/mL 3.92   WBC 4.0 - 11.0 10e3/uL 8.0   Hemoglobin 13.3 - 17.7 g/dL 15.6   Hematocrit 40.0 - 53.0 % 47.5   Platelet Count 150 - 450 10e3/uL 270   RBC Count 4.40 - 5.90 10e6/uL 4.82   MCV 78 - 100 fL 99   MCH 26.5 - 33.0 pg 32.4   MCHC 31.5 - 36.5 g/dL 32.8   RDW 10.0 - 15.0 % 12.7   % Neutrophils % 81   % Lymphocytes % 10   % Monocytes % 7   % Eosinophils % 1   % Basophils % 1   Absolute Basophils 0.0 - 0.2 10e3/uL 0.1   Absolute Eosinophils 0.0 - 0.7 10e3/uL 0.1   Absolute Immature Granulocytes <=0.4 10e3/uL 0.0   Absolute Lymphocytes 0.8 - 5.3 10e3/uL 0.8   Absolute Monocytes 0.0 - 1.3 10e3/uL 0.6   % Immature Granulocytes % 0   Absolute Neutrophils 1.6 - 8.3 10e3/uL 6.4   Absolute NRBCs 10e3/uL 0.0   NRBCs per 100 WBC <1 /100 0   (H): Data is abnormally high      PROCEDURES  ZIO PATCH 2/2024  Reason for Exam  Priority: Routine  Dx: Atrial fibrillation status post cardioversion (H) [I48.91 (ICD-10-CM)]; Atrial fibrillation (H) [I48.91 (ICD-10-CM)]; Persistent atrial fibrillation (H) [I48.19 (ICD-10-CM)]   Comments: This order will incur an additional cost to the patient. If the patient doesn't want to pay cost for application, please consider the Welcome Real-time Mail Out order.     Interpretation Summary    2 runs of nonsustained VT and multiple runs of mostly nonsustained SVT.  No symptoms reported       EKG/ stress test - if available please see in ROS above   Echo result w/o MOPS: Interpretation Summary Global and regional left ventricular function is normal with an EF of 55-60%.Biplane LVEF is 55%.Global right  ventricular function is normal.No pericardial effusion is present.This study was compared with the study from 12/11/23 .No significant changesnoted.           No data to display                  The patient's records and results personally reviewed by this provider.     Outside records reviewed from: Care Everywhere    LAB/DIAGNOSTIC STUDIES TODAY:    UA/UCx per urology  Additional labs not indicated due to recent labs above    Assessment    Collin Frank is a 74 year old male seen as a PAC referral for risk assessment and optimization for anesthesia.    Plan/Recommendations  Pt will be optimized for the proposed procedure.  See below for details on the assessment, risk, and preoperative recommendations    NEUROLOGY  - No history of TIA, CVA or seizure    -Post Op delirium risk factors:  Age    ENT  - No current airway concerns.  Will need to be reassessed day of surgery.  Mallampati: I  TM: > 3    CARDIAC  - No known h/o CAD.  Denies cardiac symptoms    - Afib s/p cardioversion 6/2024.    Flecainide for rhythm control and Xarelto for clot prevention  EKG today SB 54 bpm with left axis deviation  [id number] score:  5    - Hypertension  Stable on amlodipine, losartan and metoprolol    - HLD continue statin     - METS (Metabolic Equivalents)~3 Patient exercises on stationary bike.  He is able to ascend the basement stairs where the recycling is in his apartment building without symptoms.     - RCRI-Very low risk: Class 1 0.4% complication rate             Total Score: 0        PULMONARY  - Patient reports he is scheduled for a sleep study in October 2024  FRANCK Medium Risk             Total Score: 4    FRANCK: Hypertension    FRANCK: BMI over 35 kg/m2    FRANCK: Over 50 ys old    FRANCK: Male      - Denies asthma or inhaler use  - Tobacco History    History   Smoking Status    Former    Types: Cigarettes   Smokeless Tobacco    Never       GI  - Denies h/o GERD    - PONV Medium Risk  Total Score: 2           1 AN PONV: Patient  "is not a current smoker    1 AN PONV: Intended Post Op Opioids        /RENAL  - Nephrolithiasis presenting with hematuria   Above procedure scheduled  UA per urology    - BPH    - Baseline Creatinine  see above     ENDOCRINE    - BMI: Estimated body mass index is 34.72 kg/m  as calculated from the following:    Height as of this encounter: 1.715 m (5' 7.5\").    Weight as of this encounter: 102.1 kg (225 lb).  Obesity (BMI >30)    - Recent A1C of 6.1    HEME  Recurrent LLE DVT s/p pharmaco-mechanical thrombolysis and stent placement (2011).   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      The patient is optimized for their procedure. AVS with information on surgery time/arrival time, meds and NPO status given by nursing staff. No further diagnostic testing indicated.      On the day of service:     Prep time: 12 minutes  Visit time: 20 minutes  Documentation time: 18 minutes  ------------------------------------------  Total time: 50 minutes      CHITRA Guevara CNP  Preoperative Assessment Center  [facility]  Phone: [phone]  Fax: [phone]    "

## 2024-09-13 LAB
BACTERIA UR CULT: ABNORMAL
BACTERIA UR CULT: ABNORMAL

## 2024-09-14 LAB
ATRIAL RATE - MUSE: 54 BPM
DIASTOLIC BLOOD PRESSURE - MUSE: NORMAL MMHG
INTERPRETATION ECG - MUSE: NORMAL
P AXIS - MUSE: 55 DEGREES
PR INTERVAL - MUSE: 176 MS
QRS DURATION - MUSE: 90 MS
QT - MUSE: 438 MS
QTC - MUSE: 415 MS
R AXIS - MUSE: -39 DEGREES
SYSTOLIC BLOOD PRESSURE - MUSE: NORMAL MMHG
T AXIS - MUSE: 47 DEGREES
VENTRICULAR RATE- MUSE: 54 BPM

## 2024-09-16 ENCOUNTER — TELEPHONE (OUTPATIENT)
Dept: SLEEP MEDICINE | Facility: CLINIC | Age: 74
End: 2024-09-16
Payer: COMMERCIAL

## 2024-09-16 NOTE — TELEPHONE ENCOUNTER
Please extend pt's sleep study order. His appt got pushed out to 1/6/25  Thank you    Patricia Godfrey    Anabel Lozano

## 2024-09-17 RX ORDER — CEPHALEXIN 500 MG/1
500 CAPSULE ORAL 2 TIMES DAILY
Qty: 6 CAPSULE | Refills: 0 | Status: ON HOLD | OUTPATIENT
Start: 2024-09-17 | End: 2024-09-20

## 2024-09-19 ENCOUNTER — HOSPITAL ENCOUNTER (OUTPATIENT)
Facility: CLINIC | Age: 74
Discharge: HOME | End: 2024-09-20
Attending: UROLOGY | Admitting: UROLOGY
Payer: COMMERCIAL

## 2024-09-19 ENCOUNTER — ANESTHESIA (OUTPATIENT)
Dept: SURGERY | Facility: CLINIC | Age: 74
End: 2024-09-19
Payer: COMMERCIAL

## 2024-09-19 ENCOUNTER — APPOINTMENT (OUTPATIENT)
Dept: GENERAL RADIOLOGY | Facility: CLINIC | Age: 74
End: 2024-09-19
Attending: UROLOGY
Payer: COMMERCIAL

## 2024-09-19 DIAGNOSIS — N20.0 NEPHROLITHIASIS: ICD-10-CM

## 2024-09-19 LAB — GLUCOSE BLDC GLUCOMTR-MCNC: 105 MG/DL (ref 70–99)

## 2024-09-19 PROCEDURE — 250N000011 HC RX IP 250 OP 636: Performed by: NURSE ANESTHETIST, CERTIFIED REGISTERED

## 2024-09-19 PROCEDURE — C1747 HC OR ENDOSCOPE, SGL USE,URINARY TRACT, IMAGING/ILLUMINATION DEVICE: HCPCS | Performed by: UROLOGY

## 2024-09-19 PROCEDURE — C1894 INTRO/SHEATH, NON-LASER: HCPCS | Performed by: UROLOGY

## 2024-09-19 PROCEDURE — 99100 ANES PT EXTEME AGE<1 YR&>70: CPT | Performed by: NURSE ANESTHETIST, CERTIFIED REGISTERED

## 2024-09-19 PROCEDURE — 258N000003 HC RX IP 258 OP 636: Performed by: STUDENT IN AN ORGANIZED HEALTH CARE EDUCATION/TRAINING PROGRAM

## 2024-09-19 PROCEDURE — 272N000002 HC OR SUPPLY OTHER OPNP: Performed by: UROLOGY

## 2024-09-19 PROCEDURE — 82962 GLUCOSE BLOOD TEST: CPT

## 2024-09-19 PROCEDURE — 99100 ANES PT EXTEME AGE<1 YR&>70: CPT | Performed by: ANESTHESIOLOGY

## 2024-09-19 PROCEDURE — 360N000083 HC SURGERY LEVEL 3 W/ FLUORO, PER MIN: Performed by: UROLOGY

## 2024-09-19 PROCEDURE — 999N000141 HC STATISTIC PRE-PROCEDURE NURSING ASSESSMENT: Performed by: UROLOGY

## 2024-09-19 PROCEDURE — 82365 CALCULUS SPECTROSCOPY: CPT | Performed by: UROLOGY

## 2024-09-19 PROCEDURE — 258N000003 HC RX IP 258 OP 636: Performed by: ANESTHESIOLOGY

## 2024-09-19 PROCEDURE — 52356 CYSTO/URETERO W/LITHOTRIPSY: CPT | Performed by: NURSE ANESTHETIST, CERTIFIED REGISTERED

## 2024-09-19 PROCEDURE — 52356 CYSTO/URETERO W/LITHOTRIPSY: CPT | Performed by: ANESTHESIOLOGY

## 2024-09-19 PROCEDURE — C1758 CATHETER, URETERAL: HCPCS | Performed by: UROLOGY

## 2024-09-19 PROCEDURE — 250N000009 HC RX 250: Performed by: ANESTHESIOLOGY

## 2024-09-19 PROCEDURE — C1769 GUIDE WIRE: HCPCS | Performed by: UROLOGY

## 2024-09-19 PROCEDURE — 250N000013 HC RX MED GY IP 250 OP 250 PS 637: Performed by: UROLOGY

## 2024-09-19 PROCEDURE — 272N000001 HC OR GENERAL SUPPLY STERILE: Performed by: UROLOGY

## 2024-09-19 PROCEDURE — 370N000017 HC ANESTHESIA TECHNICAL FEE, PER MIN: Performed by: UROLOGY

## 2024-09-19 PROCEDURE — 250N000025 HC SEVOFLURANE, PER MIN: Performed by: UROLOGY

## 2024-09-19 PROCEDURE — 250N000011 HC RX IP 250 OP 636: Performed by: UROLOGY

## 2024-09-19 PROCEDURE — C2617 STENT, NON-COR, TEM W/O DEL: HCPCS | Performed by: UROLOGY

## 2024-09-19 PROCEDURE — 250N000011 HC RX IP 250 OP 636: Performed by: ANESTHESIOLOGY

## 2024-09-19 PROCEDURE — 999N000179 XR SURGERY CARM FLUORO LESS THAN 5 MIN W STILLS: Mod: TC

## 2024-09-19 PROCEDURE — 52356 CYSTO/URETERO W/LITHOTRIPSY: CPT | Mod: LT | Performed by: UROLOGY

## 2024-09-19 PROCEDURE — 255N000002 HC RX 255 OP 636: Performed by: UROLOGY

## 2024-09-19 PROCEDURE — 710N000010 HC RECOVERY PHASE 1, LEVEL 2, PER MIN: Performed by: UROLOGY

## 2024-09-19 DEVICE — URETERAL STENT
Type: IMPLANTABLE DEVICE | Site: URETER | Status: NON-FUNCTIONAL
Brand: PERCUFLEX™ PLUS
Removed: 2024-09-19

## 2024-09-19 RX ORDER — NALOXONE HYDROCHLORIDE 0.4 MG/ML
0.2 INJECTION, SOLUTION INTRAMUSCULAR; INTRAVENOUS; SUBCUTANEOUS
Status: DISCONTINUED | OUTPATIENT
Start: 2024-09-19 | End: 2024-09-20 | Stop reason: HOSPADM

## 2024-09-19 RX ORDER — ONDANSETRON 2 MG/ML
INJECTION INTRAMUSCULAR; INTRAVENOUS PRN
Status: DISCONTINUED | OUTPATIENT
Start: 2024-09-19 | End: 2024-09-19

## 2024-09-19 RX ORDER — ONDANSETRON 4 MG/1
4 TABLET, ORALLY DISINTEGRATING ORAL EVERY 30 MIN PRN
Status: DISCONTINUED | OUTPATIENT
Start: 2024-09-19 | End: 2024-09-19 | Stop reason: HOSPADM

## 2024-09-19 RX ORDER — OXYCODONE HYDROCHLORIDE 10 MG/1
10 TABLET ORAL
Status: DISCONTINUED | OUTPATIENT
Start: 2024-09-19 | End: 2024-09-19

## 2024-09-19 RX ORDER — NALOXONE HYDROCHLORIDE 0.4 MG/ML
0.4 INJECTION, SOLUTION INTRAMUSCULAR; INTRAVENOUS; SUBCUTANEOUS
Status: DISCONTINUED | OUTPATIENT
Start: 2024-09-19 | End: 2024-09-20 | Stop reason: HOSPADM

## 2024-09-19 RX ORDER — FLECAINIDE ACETATE 50 MG/1
50 TABLET ORAL 2 TIMES DAILY
Status: DISCONTINUED | OUTPATIENT
Start: 2024-09-19 | End: 2024-09-20 | Stop reason: HOSPADM

## 2024-09-19 RX ORDER — METOPROLOL SUCCINATE 25 MG/1
25 TABLET, EXTENDED RELEASE ORAL EVERY MORNING
Status: DISCONTINUED | OUTPATIENT
Start: 2024-09-20 | End: 2024-09-20 | Stop reason: HOSPADM

## 2024-09-19 RX ORDER — FENTANYL CITRATE 50 UG/ML
INJECTION, SOLUTION INTRAMUSCULAR; INTRAVENOUS PRN
Status: DISCONTINUED | OUTPATIENT
Start: 2024-09-19 | End: 2024-09-19

## 2024-09-19 RX ORDER — CALCIUM CARBONATE 500 MG/1
500 TABLET, CHEWABLE ORAL 4 TIMES DAILY PRN
Status: DISCONTINUED | OUTPATIENT
Start: 2024-09-19 | End: 2024-09-20 | Stop reason: HOSPADM

## 2024-09-19 RX ORDER — ONDANSETRON 2 MG/ML
4 INJECTION INTRAMUSCULAR; INTRAVENOUS EVERY 30 MIN PRN
Status: DISCONTINUED | OUTPATIENT
Start: 2024-09-19 | End: 2024-09-19 | Stop reason: HOSPADM

## 2024-09-19 RX ORDER — LOSARTAN POTASSIUM 25 MG/1
50 TABLET ORAL DAILY
Status: DISCONTINUED | OUTPATIENT
Start: 2024-09-20 | End: 2024-09-20 | Stop reason: HOSPADM

## 2024-09-19 RX ORDER — ACETAMINOPHEN 650 MG/1
650 SUPPOSITORY RECTAL ONCE
Status: COMPLETED | OUTPATIENT
Start: 2024-09-19 | End: 2024-09-19

## 2024-09-19 RX ORDER — DEXAMETHASONE SODIUM PHOSPHATE 4 MG/ML
4 INJECTION, SOLUTION INTRA-ARTICULAR; INTRALESIONAL; INTRAMUSCULAR; INTRAVENOUS; SOFT TISSUE
Status: DISCONTINUED | OUTPATIENT
Start: 2024-09-19 | End: 2024-09-19 | Stop reason: HOSPADM

## 2024-09-19 RX ORDER — EPHEDRINE SULFATE 50 MG/ML
INJECTION, SOLUTION INTRAMUSCULAR; INTRAVENOUS; SUBCUTANEOUS PRN
Status: DISCONTINUED | OUTPATIENT
Start: 2024-09-19 | End: 2024-09-19

## 2024-09-19 RX ORDER — TAMSULOSIN HYDROCHLORIDE 0.4 MG/1
0.4 CAPSULE ORAL EVERY 24 HOURS
Status: DISCONTINUED | OUTPATIENT
Start: 2024-09-19 | End: 2024-09-20 | Stop reason: HOSPADM

## 2024-09-19 RX ORDER — ALBUTEROL SULFATE 0.83 MG/ML
2.5 SOLUTION RESPIRATORY (INHALATION)
Status: DISCONTINUED | OUTPATIENT
Start: 2024-09-19 | End: 2024-09-20 | Stop reason: HOSPADM

## 2024-09-19 RX ORDER — IOPAMIDOL 510 MG/ML
INJECTION, SOLUTION INTRAVASCULAR PRN
Status: DISCONTINUED | OUTPATIENT
Start: 2024-09-19 | End: 2024-09-19 | Stop reason: HOSPADM

## 2024-09-19 RX ORDER — NALOXONE HYDROCHLORIDE 0.4 MG/ML
0.1 INJECTION, SOLUTION INTRAMUSCULAR; INTRAVENOUS; SUBCUTANEOUS
Status: DISCONTINUED | OUTPATIENT
Start: 2024-09-19 | End: 2024-09-19 | Stop reason: HOSPADM

## 2024-09-19 RX ORDER — LIDOCAINE 40 MG/G
CREAM TOPICAL
Status: DISCONTINUED | OUTPATIENT
Start: 2024-09-19 | End: 2024-09-20 | Stop reason: HOSPADM

## 2024-09-19 RX ORDER — HYDROXYZINE HYDROCHLORIDE 10 MG/1
10 TABLET, FILM COATED ORAL EVERY 6 HOURS PRN
Status: DISCONTINUED | OUTPATIENT
Start: 2024-09-19 | End: 2024-09-20 | Stop reason: HOSPADM

## 2024-09-19 RX ORDER — AMLODIPINE BESYLATE 5 MG/1
5 TABLET ORAL EVERY EVENING
Status: DISCONTINUED | OUTPATIENT
Start: 2024-09-19 | End: 2024-09-20 | Stop reason: HOSPADM

## 2024-09-19 RX ORDER — SODIUM CHLORIDE, SODIUM LACTATE, POTASSIUM CHLORIDE, CALCIUM CHLORIDE 600; 310; 30; 20 MG/100ML; MG/100ML; MG/100ML; MG/100ML
INJECTION, SOLUTION INTRAVENOUS CONTINUOUS PRN
Status: DISCONTINUED | OUTPATIENT
Start: 2024-09-19 | End: 2024-09-19

## 2024-09-19 RX ORDER — NALOXONE HYDROCHLORIDE 0.4 MG/ML
0.1 INJECTION, SOLUTION INTRAMUSCULAR; INTRAVENOUS; SUBCUTANEOUS
Status: DISCONTINUED | OUTPATIENT
Start: 2024-09-19 | End: 2024-09-19

## 2024-09-19 RX ORDER — LIDOCAINE HYDROCHLORIDE 20 MG/ML
INJECTION, SOLUTION INFILTRATION; PERINEURAL PRN
Status: DISCONTINUED | OUTPATIENT
Start: 2024-09-19 | End: 2024-09-19

## 2024-09-19 RX ORDER — ONDANSETRON 2 MG/ML
4 INJECTION INTRAMUSCULAR; INTRAVENOUS EVERY 30 MIN PRN
Status: DISCONTINUED | OUTPATIENT
Start: 2024-09-19 | End: 2024-09-19

## 2024-09-19 RX ORDER — CEFAZOLIN SODIUM/WATER 2 G/20 ML
2 SYRINGE (ML) INTRAVENOUS SEE ADMIN INSTRUCTIONS
Status: DISCONTINUED | OUTPATIENT
Start: 2024-09-19 | End: 2024-09-19 | Stop reason: HOSPADM

## 2024-09-19 RX ORDER — DEXAMETHASONE SODIUM PHOSPHATE 4 MG/ML
4 INJECTION, SOLUTION INTRA-ARTICULAR; INTRALESIONAL; INTRAMUSCULAR; INTRAVENOUS; SOFT TISSUE
Status: DISCONTINUED | OUTPATIENT
Start: 2024-09-19 | End: 2024-09-19

## 2024-09-19 RX ORDER — CEPHALEXIN 500 MG/1
500 CAPSULE ORAL 2 TIMES DAILY
Qty: 6 CAPSULE | Refills: 0 | Status: CANCELLED | OUTPATIENT
Start: 2024-09-19 | End: 2024-09-22

## 2024-09-19 RX ORDER — ONDANSETRON 4 MG/1
4 TABLET, ORALLY DISINTEGRATING ORAL EVERY 6 HOURS PRN
Status: DISCONTINUED | OUTPATIENT
Start: 2024-09-19 | End: 2024-09-20 | Stop reason: HOSPADM

## 2024-09-19 RX ORDER — HYDROMORPHONE HCL IN WATER/PF 6 MG/30 ML
0.2 PATIENT CONTROLLED ANALGESIA SYRINGE INTRAVENOUS EVERY 5 MIN PRN
Status: DISCONTINUED | OUTPATIENT
Start: 2024-09-19 | End: 2024-09-19 | Stop reason: HOSPADM

## 2024-09-19 RX ORDER — OXYCODONE HYDROCHLORIDE 5 MG/1
5 TABLET ORAL
Status: DISCONTINUED | OUTPATIENT
Start: 2024-09-19 | End: 2024-09-19

## 2024-09-19 RX ORDER — CEPHALEXIN 500 MG/1
500 CAPSULE ORAL 2 TIMES DAILY
Status: DISCONTINUED | OUTPATIENT
Start: 2024-09-19 | End: 2024-09-20 | Stop reason: HOSPADM

## 2024-09-19 RX ORDER — PROPOFOL 10 MG/ML
INJECTION, EMULSION INTRAVENOUS PRN
Status: DISCONTINUED | OUTPATIENT
Start: 2024-09-19 | End: 2024-09-19

## 2024-09-19 RX ORDER — ONDANSETRON 2 MG/ML
4 INJECTION INTRAMUSCULAR; INTRAVENOUS EVERY 6 HOURS PRN
Status: DISCONTINUED | OUTPATIENT
Start: 2024-09-19 | End: 2024-09-20 | Stop reason: HOSPADM

## 2024-09-19 RX ORDER — ONDANSETRON 4 MG/1
4 TABLET, ORALLY DISINTEGRATING ORAL EVERY 30 MIN PRN
Status: DISCONTINUED | OUTPATIENT
Start: 2024-09-19 | End: 2024-09-19

## 2024-09-19 RX ORDER — SODIUM CHLORIDE, SODIUM LACTATE, POTASSIUM CHLORIDE, CALCIUM CHLORIDE 600; 310; 30; 20 MG/100ML; MG/100ML; MG/100ML; MG/100ML
INJECTION, SOLUTION INTRAVENOUS CONTINUOUS
Status: DISCONTINUED | OUTPATIENT
Start: 2024-09-19 | End: 2024-09-19 | Stop reason: HOSPADM

## 2024-09-19 RX ORDER — OXYCODONE HYDROCHLORIDE 5 MG/1
5 TABLET ORAL EVERY 6 HOURS PRN
Status: DISCONTINUED | OUTPATIENT
Start: 2024-09-19 | End: 2024-09-20 | Stop reason: HOSPADM

## 2024-09-19 RX ORDER — DEXAMETHASONE SODIUM PHOSPHATE 4 MG/ML
INJECTION, SOLUTION INTRA-ARTICULAR; INTRALESIONAL; INTRAMUSCULAR; INTRAVENOUS; SOFT TISSUE PRN
Status: DISCONTINUED | OUTPATIENT
Start: 2024-09-19 | End: 2024-09-19

## 2024-09-19 RX ORDER — FENTANYL CITRATE 50 UG/ML
50 INJECTION, SOLUTION INTRAMUSCULAR; INTRAVENOUS EVERY 5 MIN PRN
Status: DISCONTINUED | OUTPATIENT
Start: 2024-09-19 | End: 2024-09-19 | Stop reason: HOSPADM

## 2024-09-19 RX ORDER — FENTANYL CITRATE 50 UG/ML
25 INJECTION, SOLUTION INTRAMUSCULAR; INTRAVENOUS EVERY 5 MIN PRN
Status: DISCONTINUED | OUTPATIENT
Start: 2024-09-19 | End: 2024-09-19 | Stop reason: HOSPADM

## 2024-09-19 RX ORDER — ATORVASTATIN CALCIUM 10 MG/1
10 TABLET, FILM COATED ORAL EVERY EVENING
Status: DISCONTINUED | OUTPATIENT
Start: 2024-09-19 | End: 2024-09-20 | Stop reason: HOSPADM

## 2024-09-19 RX ORDER — ACETAMINOPHEN 325 MG/1
975 TABLET ORAL ONCE
Status: COMPLETED | OUTPATIENT
Start: 2024-09-19 | End: 2024-09-19

## 2024-09-19 RX ORDER — ACETAMINOPHEN 325 MG/1
650 TABLET ORAL EVERY 6 HOURS PRN
Status: DISCONTINUED | OUTPATIENT
Start: 2024-09-19 | End: 2024-09-20 | Stop reason: HOSPADM

## 2024-09-19 RX ORDER — CEFAZOLIN SODIUM/WATER 2 G/20 ML
2 SYRINGE (ML) INTRAVENOUS
Status: COMPLETED | OUTPATIENT
Start: 2024-09-19 | End: 2024-09-19

## 2024-09-19 RX ORDER — HYDROMORPHONE HCL IN WATER/PF 6 MG/30 ML
0.4 PATIENT CONTROLLED ANALGESIA SYRINGE INTRAVENOUS EVERY 5 MIN PRN
Status: DISCONTINUED | OUTPATIENT
Start: 2024-09-19 | End: 2024-09-19 | Stop reason: HOSPADM

## 2024-09-19 RX ADMIN — ACETAMINOPHEN 975 MG: 325 TABLET ORAL at 09:52

## 2024-09-19 RX ADMIN — ONDANSETRON 4 MG: 2 INJECTION INTRAMUSCULAR; INTRAVENOUS at 12:17

## 2024-09-19 RX ADMIN — SUGAMMADEX 200 MG: 100 INJECTION, SOLUTION INTRAVENOUS at 12:19

## 2024-09-19 RX ADMIN — TAMSULOSIN HYDROCHLORIDE 0.4 MG: 0.4 CAPSULE ORAL at 20:49

## 2024-09-19 RX ADMIN — Medication 50 MG: at 10:55

## 2024-09-19 RX ADMIN — Medication 2 G: at 10:52

## 2024-09-19 RX ADMIN — EPHEDRINE SULFATE 10 MG: 5 INJECTION INTRAVENOUS at 11:01

## 2024-09-19 RX ADMIN — DEXAMETHASONE SODIUM PHOSPHATE 4 MG: 4 INJECTION, SOLUTION INTRA-ARTICULAR; INTRALESIONAL; INTRAMUSCULAR; INTRAVENOUS; SOFT TISSUE at 11:07

## 2024-09-19 RX ADMIN — FLECAINIDE ACETATE 50 MG: 50 TABLET ORAL at 20:50

## 2024-09-19 RX ADMIN — AMLODIPINE BESYLATE 5 MG: 5 TABLET ORAL at 20:50

## 2024-09-19 RX ADMIN — CEPHALEXIN 500 MG: 500 CAPSULE ORAL at 20:52

## 2024-09-19 RX ADMIN — FENTANYL CITRATE 75 MCG: 50 INJECTION INTRAMUSCULAR; INTRAVENOUS at 10:55

## 2024-09-19 RX ADMIN — SODIUM CHLORIDE, POTASSIUM CHLORIDE, SODIUM LACTATE AND CALCIUM CHLORIDE: 600; 310; 30; 20 INJECTION, SOLUTION INTRAVENOUS at 13:00

## 2024-09-19 RX ADMIN — PROPOFOL 160 MG: 10 INJECTION, EMULSION INTRAVENOUS at 10:55

## 2024-09-19 RX ADMIN — ATORVASTATIN CALCIUM 10 MG: 10 TABLET, FILM COATED ORAL at 20:50

## 2024-09-19 RX ADMIN — LIDOCAINE HYDROCHLORIDE 100 MG: 20 INJECTION, SOLUTION INFILTRATION; PERINEURAL at 10:55

## 2024-09-19 RX ADMIN — FENTANYL CITRATE 25 MCG: 50 INJECTION INTRAMUSCULAR; INTRAVENOUS at 10:43

## 2024-09-19 RX ADMIN — SODIUM CHLORIDE, POTASSIUM CHLORIDE, SODIUM LACTATE AND CALCIUM CHLORIDE: 600; 310; 30; 20 INJECTION, SOLUTION INTRAVENOUS at 10:44

## 2024-09-19 ASSESSMENT — LIFESTYLE VARIABLES: TOBACCO_USE: 1

## 2024-09-19 ASSESSMENT — ACTIVITIES OF DAILY LIVING (ADL)
ADLS_ACUITY_SCORE: 22
ADLS_ACUITY_SCORE: 31
ADLS_ACUITY_SCORE: 22
ADLS_ACUITY_SCORE: 25
ADLS_ACUITY_SCORE: 22

## 2024-09-19 ASSESSMENT — ENCOUNTER SYMPTOMS
DYSRHYTHMIAS: 1
SEIZURES: 0

## 2024-09-19 NOTE — ANESTHESIA CARE TRANSFER NOTE
Patient: Collin Frank    Procedure: Procedure(s):  Cystoscopy, Left Ureteroscopy, Left Retrograde Pyelogram, Left Holmium Laser Lithotripsy, Left Ureteral Stent Placement       Diagnosis: Kidney stone [N20.0]  Diagnosis Additional Information: No value filed.    Anesthesia Type:   No value filed.     Note:    Oropharynx: oropharynx clear of all foreign objects and spontaneously breathing  Level of Consciousness: awake  Oxygen Supplementation: nasal cannula  Level of Supplemental Oxygen (L/min / FiO2): 4  Independent Airway: airway patency satisfactory and stable  Dentition: dentition unchanged  Vital Signs Stable: post-procedure vital signs reviewed and stable  Report to RN Given: handoff report given  Patient transferred to: PACU    Handoff Report: Identifed the Patient, Identified the Reponsible Provider, Reviewed the pertinent medical history, Discussed the surgical course, Reviewed Intra-OP anesthesia mangement and issues during anesthesia, Set expectations for post-procedure period and Allowed opportunity for questions and acknowledgement of understanding      Vitals:  Vitals Value Taken Time   /66 09/19/24 1230   Temp     Pulse 55 09/19/24 1233   Resp 14 09/19/24 1233   SpO2 98 % 09/19/24 1233   Vitals shown include unfiled device data.    Electronically Signed By: CHITRA Perkins CRNA  September 19, 2024  12:33 PM

## 2024-09-19 NOTE — PROGRESS NOTES
I personally met with Collin Frank and discussed their current medical situation.     We reviewed the nature of planned surgery, the risks benefits and complications and treatment alternatives.      Shared decision made to proceed with left ureteroscopic stone treatment    We also reviewed the following financial disclosures potentially applicable to their surgery  I informed the patient that I do have a financial consulting relationship with [employer], a medical device company that makes products which could be used during the procedure  I presented an opportunity to ask questions  and informed them that they were capable of rescinding their consent or not proceeding without penalty if they desire to do so.

## 2024-09-19 NOTE — ANESTHESIA POSTPROCEDURE EVALUATION
Patient: Collin Frank    Procedure: Procedure(s):  Cystoscopy, Left Ureteroscopy, Left Retrograde Pyelogram, Left Holmium Laser Lithotripsy, Left Ureteral Stent Placement       Anesthesia Type:  No value filed.    Note:  Disposition: Outpatient   Postop Pain Control: Uneventful            Sign Out: Well controlled pain   PONV: No   Neuro/Psych: Uneventful            Sign Out: Acceptable/Baseline neuro status   Airway/Respiratory: Uneventful            Sign Out: Acceptable/Baseline resp. status   CV/Hemodynamics: Uneventful            Sign Out: Acceptable CV status; No obvious hypovolemia; No obvious fluid overload   Other NRE: NONE   DID A NON-ROUTINE EVENT OCCUR? No           Last vitals:  Vitals Value Taken Time   /65 09/19/24 1315   Temp 36.4  C (97.6  F) 09/19/24 1230   Pulse 53 09/19/24 1328   Resp 18 09/19/24 1328   SpO2 95 % 09/19/24 1328   Vitals shown include unfiled device data.    Electronically Signed By: Mahad Thomason MD  September 19, 2024  1:29 PM

## 2024-09-19 NOTE — OR NURSING
Patient does not have anyone to stay with him overnight tonight. Dr. Kimball notified. Will plan to admit patient overnight tonight.

## 2024-09-19 NOTE — ANESTHESIA PREPROCEDURE EVALUATION
Pre-Operative H & P     CC:  Preoperative exam to assess for increased cardiopulmonary risk while undergoing surgery and anesthesia.    Date of Encounter: 9/12/2024  Primary Care Physician:  Collin Salazar     Reason for visit:   No diagnosis found.      HPI  Collin Frank is a 74 year old male who presents for pre-operative H & P in preparation for  Procedure Information       Case: [id number] Date/Time: 09/19/24 1145    Procedure: Cystoscopy, Left Ureteroscopy, Left Retrograde Pyelogram, Left Holmium Laser Lithotripsy, Possible Left Ureteral Stent Placement (Left: Ureter)    Anesthesia type: Choice    Diagnosis: Kidney stone [N20.0]    Pre-op diagnosis: Kidney stone [N20.0]    [facility]  [facility]    Providers: Delfin Kimball MD            The patient presents to the PAC in person today in preparation for the above scheduled procedure with comorbid conditions including factor V Leiden mutation, May-Thurner syndrome, 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    The patient was seen by MARIA R Ocampo, for further evaluation of recurrent and persistent gross hematuria.  CT urogram was ordered and showed left sided nephrolithiasis.  The patient was counseled of the findings and treatment options by the surgical team.      History is obtained from the patient and chart review    Hx of abnormal bleeding or anti-platelet use: Xarelto      Past Medical History  Past Medical History:   Diagnosis Date    Acute deep vein thrombosis (DVT) (H) 11/21/2011 11/2011- acute and extensive LLE DVTs, he underwent thrombolysis by IR       Atrial fibrillation with RVR (H) 12/11/2023    Chest pain, unspecified type 03/31/2021    Renal stones 03/21/2013    2 mm size non obstructing  2 right, 1 left==CT  [place] 3/13         Past Surgical History  Past Surgical History:   Procedure Laterality Date     ANESTHESIA CARDIOVERSION N/A 2023    Procedure: Anesthesia cardioversion;  Surgeon: GENERIC ANESTHESIA PROVIDER;  [facility]    ANESTHESIA CARDIOVERSION N/A 2024    Procedure: Anesthesia cardioversion@1330;  Surgeon: GENERIC ANESTHESIA PROVIDER;  [facility]    COLONOSCOPY  2008    Normal. Repeat in 10 years    COLONOSCOPY WITH CO2 INSUFFLATION N/A 2018    Procedure: COLONOSCOPY WITH CO2 INSUFFLATION;  COLON SCREEN/ ENGELMANN;  Surgeon: Jan Flores MD;  Location: [facility]    ORTHOPEDIC SURGERY  1975    rt knee ORIF       Prior to Admission Medications  No current outpatient medications on file.       Allergies  No Known Allergies    Social History  Social History     Socioeconomic History    Marital status: Single     Spouse name: Not on file    Number of children: Not on file    Years of education: Not on file    Highest education level: Not on file   Occupational History    Not on file   Tobacco Use    Smoking status: Former     Current packs/day: 0.00     Types: Cigarettes     Quit date: 2011     Years since quittin.6     Passive exposure: Never    Smokeless tobacco: Never   Vaping Use    Vaping status: Never Used   Substance and Sexual Activity    Alcohol use: No    Drug use: No    Sexual activity: Never   Other Topics Concern    Parent/sibling w/ CABG, MI or angioplasty before 65F 55M? No   Social History Narrative    Not on file     Social Determinants of Health     Financial Resource Strain: Low Risk  (2024)    Financial Resource Strain     Within the past 12 months, have you or your family members you live with been unable to get utilities (heat, electricity) when it was really needed?: No   Food Insecurity: Low Risk  (2024)    Food Insecurity     Within the past 12 months, did you worry that your food would run out before you got money to buy more?: No     Within the past 12 months, did the food you bought just not last and you didn t have money to  get more?: No   Transportation Needs: Low Risk  (2024)    Transportation Needs     Within the past 12 months, has lack of transportation kept you from medical appointments, getting your medicines, non-medical meetings or appointments, work, or from getting things that you need?: No   Physical Activity: Insufficiently Active (2024)    Exercise Vital Sign     Days of Exercise per Week: 7 days     Minutes of Exercise per Session: 20 min   Stress: No Stress Concern Present (2024)    [employer] - Occupational Stress Questionnaire     Feeling of Stress : Not at all   Social Connections: Moderately Isolated (2024)    Social Connection and Isolation Panel [NHANES]     Frequency of Communication with Friends and Family: More than three times a week     Frequency of Social Gatherings with Friends and Family: Twice a week     Attends Nondenominational Services: More than 4 times per year     Active Member of Clubs or Organizations: No     Attends Club or Organization Meetings: Never     Marital Status: Never    Interpersonal Safety: Low Risk  (2024)    Interpersonal Safety     Do you feel physically and emotionally safe where you currently live?: Yes     Within the past 12 months, have you been hit, slapped, kicked or otherwise physically hurt by someone?: No     Within the past 12 months, have you been humiliated or emotionally abused in other ways by your partner or ex-partner?: No   Housing Stability: Low Risk  (2024)    Housing Stability     Do you have housing? : Yes     Are you worried about losing your housing?: No       Family History  Family History   Problem Relation Age of Onset    Alzheimer Disease Mother     Heart Disease Father     Prostate Cancer Father     Cancer Brother 65        pancreatic       Prostate Cancer Brother         2 stents 70% and 90% blockage    Heart Disease Sister        Review of Systems  The complete review of systems is  "negative other than noted in the HPI or here.   Anesthesia Evaluation   Pt has had prior anesthetic. Type: MAC.    No history of anesthetic complications       ROS/MED HX  ENT/Pulmonary: Comment: Scheduled for a sleep study 10/15/2024    (+)     FRANCK risk factors,  hypertension,         tobacco use (Quit ~2011), Past use,                    (-) asthma   Neurologic: Comment: Hard of hearing   (-) no seizures, no CVA and no TIA   Cardiovascular: Comment: Evaluated in ED on 9/5/2024 for palpitations and feeling \"generally unwell\".  Normal troponins.  EKG showed sinus bradycardia with a rate of 58, left axis deviation, no ST elevations, normal OH and QTc intervals, and consistent with prior EKG from July 2024.  Patient was given IVF and felt better.  Neuro exam was within normal limits.  A1C of 6.1>>prediabetic.  Plan to follow up with PCP in OP setting.     Today he denies cardiac symptoms including chest pain, SOB, palpitations, syncope, FOX, orthopnea, or PND.       (+) Dyslipidemia hypertension- -   -  - -   Taking blood thinners                     dysrhythmias, a-fib,        Previous cardiac testing   Echo: Date: 6/2024 Results:  Limited Echocardiogram with portions of two-dimensional, color and spectral  Doppler performed. The patient's rhythm is atrial fibrillation.  ______________________________________________________________________________  Interpretation Summary     Global and regional left ventricular function is normal with an EF of 55-60%.  Biplane LVEF is 55%.  Global right ventricular function is normal.  No pericardial effusion is present.  This study was compared with the study from 12/11/23 .No significant changes  noted.    Stress Test:  Date: 7/2023 Results:  DSE  nterpretation Summary  Normal Dobutamine stress echocardiogram at target heart rate.  No resting or stress induced regional wallmotion abnormalities.  Normal resting and stress EKGs.  Normal heart rate and BP response to dobutamine. No " "symptoms during test.  Normal baseline screening echocardiogram with no significant valvular heart  disease and normal size of visualized aorta.    ECG Reviewed:  Date: 9/6/2024 Results:  Sinus bradycardia   Left axis deviation     Cath:  Date: Results:      METS/Exercise Tolerance:  Comment: Exercise bike that he rides daily.  Able to ascend a flight of stairs without chest pain.     Hematologic: Comments: Factor 5 Leiden mutation, heterozygous    May-Thurner syndrome     (+) History of blood clots (LLE DVT 2011),    pt is anticoagulated,        (-) anemia and history of blood transfusion   Musculoskeletal:       GI/Hepatic:     (+)             liver disease (hepatic hemangioma),    (-) GERD   Renal/Genitourinary:     (+) renal disease,      Nephrolithiasis , BPH,      Endo: Comment: Prediabetes    (+)               Obesity,    (-) Type I DM, Type II DM, thyroid disease and chronic steroid usage   Psychiatric/Substance Use:    (-) psychiatric history, alcohol abuse history and chronic opioid use history   Infectious Disease:  - neg infectious disease ROS     Malignancy:  - neg malignancy ROS     Other:  - neg other ROS          /69 (BP Location: Left arm)   Pulse 54   Temp 37.2  C (98.9  F) (Oral)   Resp 18   Ht 1.715 m (5' 7.5\")   Wt 102.5 kg (225 lb 15.5 oz)   SpO2 96%   BMI 34.87 kg/m      Physical Exam   Constitutional: Awake, alert, cooperative, no apparent distress, and appears stated age.  Eyes: Pupils equal, round and reactive to light, extra ocular muscles intact, sclera clear, conjunctiva normal.  HENT: Normocephalic, oral pharynx with moist mucus membranes, good dentition. No goiter appreciated.   Respiratory: Clear to auscultation bilaterally, no crackles or wheezing.  Cardiovascular: Regular rate and rhythm, normal S1 and S2, and no murmur noted.  Carotids +2, no bruits. No edema. Palpable pulses to radial  DP and PT arteries.   GI: Normal bowel sounds, soft, non-distended, non-tender, no " masses palpated, no hepatosplenomegaly.    Lymph/Hematologic: No cervical lymphadenopathy and no supraclavicular lymphadenopathy.    Skin: Warm and dry.  No rashes at anticipated surgical site.   Musculoskeletal: Full ROM of neck. There is no redness, warmth, or swelling of the joints. Gross motor strength is normal.    Neurologic: Awake, alert, oriented to name, place and time. Cranial nerves II-XII are grossly intact. Gait is normal.   Neuropsychiatric: Calm, cooperative. Normal affect.     Prior Labs/Diagnostic Studies   All labs and imaging personally reviewed        Latest Reference Range & Units 09/05/24 18:08   Sodium 135 - 145 mmol/L 135   Potassium 3.4 - 5.3 mmol/L 4.3   Chloride 98 - 107 mmol/L 101   Carbon Dioxide (CO2) 22 - 29 mmol/L 23   Urea Nitrogen 8.0 - 23.0 mg/dL 18.7   Creatinine 0.67 - 1.17 mg/dL 1.12   GFR Estimate >60 mL/min/1.73m2 68   Calcium 8.8 - 10.4 mg/dL 9.3   Anion Gap 7 - 15 mmol/L 11   Magnesium 1.7 - 2.3 mg/dL 3.9 (H)   Albumin 3.5 - 5.2 g/dL 3.9   Protein Total 6.4 - 8.3 g/dL 6.8   Alkaline Phosphatase 40 - 150 U/L 99   ALT 0 - 70 U/L 20   AST 0 - 45 U/L 27   Bilirubin Total <=1.2 mg/dL 1.2   Glucose 70 - 99 mg/dL 242 (H)   Hemoglobin A1C <5.7 % 6.1 (H)   Ketone Quantitative <=0.30 mmol/L <0.18   Troponin T, High Sensitivity <=22 ng/L 22   TSH 0.30 - 4.20 uIU/mL 3.92   WBC 4.0 - 11.0 10e3/uL 8.0   Hemoglobin 13.3 - 17.7 g/dL 15.6   Hematocrit 40.0 - 53.0 % 47.5   Platelet Count 150 - 450 10e3/uL 270   RBC Count 4.40 - 5.90 10e6/uL 4.82   MCV 78 - 100 fL 99   MCH 26.5 - 33.0 pg 32.4   MCHC 31.5 - 36.5 g/dL 32.8   RDW 10.0 - 15.0 % 12.7   % Neutrophils % 81   % Lymphocytes % 10   % Monocytes % 7   % Eosinophils % 1   % Basophils % 1   Absolute Basophils 0.0 - 0.2 10e3/uL 0.1   Absolute Eosinophils 0.0 - 0.7 10e3/uL 0.1   Absolute Immature Granulocytes <=0.4 10e3/uL 0.0   Absolute Lymphocytes 0.8 - 5.3 10e3/uL 0.8   Absolute Monocytes 0.0 - 1.3 10e3/uL 0.6   % Immature Granulocytes % 0    Absolute Neutrophils 1.6 - 8.3 10e3/uL 6.4   Absolute NRBCs 10e3/uL 0.0   NRBCs per 100 WBC <1 /100 0   (H): Data is abnormally high      PROCEDURES  ZIO PATCH 2/2024  Reason for Exam  Priority: Routine  Dx: Atrial fibrillation status post cardioversion (H) [I48.91 (ICD-10-CM)]; Atrial fibrillation (H) [I48.91 (ICD-10-CM)]; Persistent atrial fibrillation (H) [I48.19 (ICD-10-CM)]   Comments: This order will incur an additional cost to the patient. If the patient doesn't want to pay cost for application, please consider the Zio Patch Mail Out order.     Interpretation Summary    2 runs of nonsustained VT and multiple runs of mostly nonsustained SVT.  No symptoms reported       EKG/ stress test - if available please see in ROS above   Echo result w/o MOPS: Interpretation Summary Global and regional left ventricular function is normal with an EF of 55-60%.Biplane LVEF is 55%.Global right ventricular function is normal.No pericardial effusion is present.This study was compared with the study from 12/11/23 .No significant changesnoted.           No data to display                  The patient's records and results personally reviewed by this provider.     Outside records reviewed from: Care Everywhere    LAB/DIAGNOSTIC STUDIES TODAY:    UA/UCx per urology  Additional labs not indicated due to recent labs above    Assessment    Collin Frank is a 74 year old male seen as a PAC referral for risk assessment and optimization for anesthesia.    Plan/Recommendations  Pt will be optimized for the proposed procedure.  See below for details on the assessment, risk, and preoperative recommendations    NEUROLOGY  - No history of TIA, CVA or seizure    -Post Op delirium risk factors:  Age    ENT  - No current airway concerns.  Will need to be reassessed day of surgery.  Mallampati: I  TM: > 3    CARDIAC  - No known h/o CAD.  Denies cardiac symptoms    - Afib s/p cardioversion 6/2024.    Flecainide for rhythm control and Xarelto  "for clot prevention  EKG today SB 54 bpm with left axis deviation  [id number] score:  5    - Hypertension  Stable on amlodipine, losartan and metoprolol    - HLD continue statin     - METS (Metabolic Equivalents)~3 Patient exercises on stationary bike.  He is able to ascend the basement stairs where the recycling is in his apartment building without symptoms.     - RCRI-Very low risk: Class 1 0.4% complication rate             Total Score: 0        PULMONARY  - Patient reports he is scheduled for a sleep study in October 2024  FRANCK Medium Risk             Total Score: 4    FRANCK: Hypertension    FRANCK: BMI over 35 kg/m2    FRANCK: Over 50 ys old    FRANCK: Male      - Denies asthma or inhaler use  - Tobacco History    History   Smoking Status    Former    Types: Cigarettes   Smokeless Tobacco    Never       GI  - Denies h/o GERD    - PONV Medium Risk  Total Score: 2           1 AN PONV: Patient is not a current smoker    1 AN PONV: Intended Post Op Opioids        /RENAL  - Nephrolithiasis presenting with hematuria   Above procedure scheduled  UA per urology    - BPH    - Baseline Creatinine  see above     ENDOCRINE    - BMI: Estimated body mass index is 34.87 kg/m  as calculated from the following:    Height as of this encounter: 1.715 m (5' 7.5\").    Weight as of this encounter: 102.5 kg (225 lb 15.5 oz).  Obesity (BMI >30)    - Recent A1C of 6.1    HEME  Recurrent LLE DVT s/p pharmaco-mechanical thrombolysis and stent placement (2011).   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      The patient is optimized for their procedure. AVS with information on surgery time/arrival time, meds and NPO status given by nursing staff. No further diagnostic testing indicated.      On the day of service:     Prep time: 12 minutes  Visit time: 20 minutes  Documentation time: 18 minutes  ------------------------------------------  Total time: 50 minutes      CHITRA Guevara CNP  Preoperative Assessment Center  [facility]  Phone: [phone]  Fax: [phone]    Physical Exam    Airway        Mallampati: III   TM distance: > 3 FB   Neck ROM: full   Mouth opening: > 3 cm    Respiratory Devices and Support         Dental       (+) Modest Abnormalities - crowns, retainers, 1 or 2 missing teeth      Cardiovascular   cardiovascular exam normal          Pulmonary   pulmonary exam normal                Anesthesia Plan    ASA Status:  3    NPO Status:  NPO Appropriate    Anesthesia Type: General.     - Airway: ETT   Induction: Intravenous, Propofol.   Maintenance: Balanced.        Consents    Anesthesia Plan(s) and associated risks, benefits, and realistic alternatives discussed. Questions answered and patient/representative(s) expressed understanding.     - Discussed:     - Discussed with:  Patient      - Extended Intubation/Ventilatory Support Discussed: No.      - Patient is DNR/DNI Status: No          Postoperative Care    Pain management: IV analgesics, Oral pain medications, Multi-modal analgesia.   PONV prophylaxis: Ondansetron (or other 5HT-3), Dexamethasone or Solumedrol     Comments:

## 2024-09-19 NOTE — ANESTHESIA PROCEDURE NOTES
Airway       Patient location during procedure: OR       Procedure Start/Stop Times: 9/19/2024 10:59 AM  Staff -        CRNA: Angi Eckert APRN CRNA       Performed By: CRNAIndications and Patient Condition       Indications for airway management: gio-procedural       Induction type:intravenous       Mask difficulty assessment: 2 - vent by mask + OA or adjuvant +/- NMBA    Final Airway Details       Final airway type: endotracheal airway       Successful airway: ETT - single  Endotracheal Airway Details        ETT size (mm): 8.0       Cuffed: yes       Successful intubation technique: direct laryngoscopy       DL Blade Type: MAC 4       Grade View of Cords: 1       Adjucts: stylet       Position: Right       Measured from: gums/teeth       Secured at (cm): 24       Bite block used: None    Post intubation assessment        Placement verified by: capnometry, equal breath sounds and chest rise        Number of attempts at approach: 1       Number of other approaches attempted: 0       Secured with: tape       Ease of procedure: easy       Dentition: Intact    Medication(s) Administered   Medication Administration Time: 9/19/2024 10:59 AM

## 2024-09-19 NOTE — OP NOTE
OPERATIVE REPORT    PREOPERATIVE DIAGNOSIS:  left renal stone(s)    POSTOPERATIVE DIAGNOSIS:  Same    PROCEDURES PERFORMED:   Procedure(s) and Anesthesia Type:     * Cystoscopy, Left Ureteroscopy, Left Retrograde Pyelogram, Left Holmium Laser Lithotripsy, Possible Left Ureteral Stent Placement - Choice    STAFF SURGEON: Dr Kimball, present and participatory for the entire case.   RESIDENT(S): Baltazar Barrera MD    ESTIMATED BLOOD LOSS: <5 cc    DRAINS/TUBES:   left  6 [place] x 26cm double-J ureteral stent    IV FLUIDS: Please see dictated anesthesia record  COMPLICATIONS: None.   SPECIMEN:   ID Type Source Tests Collected by Time Destination   A : Left Kidney Stone Calculus/Stone Kidney, Left STONE ANALYSIS Delfin Kimball MD 9/19/2024 11:42 AM          SIGNIFICANT FINDINGS: left sided renal stone, laser treated and extracted. No ureteral injury; stent left on a string.   Maximum Stone Size 1cm  Retrograde Pyelogram no hydro or filling defects    CASE COMPLEXITIES: none    BRIEF OPERATIVE INDICATIONS: Collin Frank is a(n) 74 year old male who presented with 10 mm calculus in the left proximal ureter found on CTU done for gross hematuria.    After a discussion of all risks, benefits, and alternatives, the patient elected to proceed with definitive stone management with a ureteroscopic approach.    After induction of general anesthesia the patient was repositioned in dorsal lithotomy and prepped and draped in the standard sterile fashion.  A time out was performed confirming the appropriate patient identity and planned procedure.  The patient received culture directed antibiotics and had bilateral sequential compression devices for DVT propylaxis.    A rigid cystoscope was inserted into a well-lubricated urethra. The urethra was unremarkable without stricture. We did cystoscopy of the bladder which was without masses or suspicious mucosal changes. The ureteral orifice was identified and cannulated with a sensor wire  with the aid of a 5 [place]ian open-ended catheter. The wire passed without resistance into the upper pole. We gently dilated the ureter using an 8 and 10 dilator which passed easily.     Flexible URS   A ureteral access sheath was advanced up to the left proximall ureter. The flexible ureteroscope was used to identify the stone(s). A 200 micron holmium laser fiber was used and lithotripsy was performed. A tipless nitinol stone basket was used to remove all fragments greater than 1 mm.  Pullback ureteroscopy was performed and showed no retained stone fragments or significant ureteral injury    A 6Fr x 26-cm double-J stent was advanced over the Sensor wire, and a good proximal curl was seen in the renal pelvis on fluoroscopy and the distal curl was seen in the bladder. The bladder was drained.    The patient tolerated the procedure well and there were no apparent complications. The patient  was transported to the postanesthesia care unit in stable condition.     POSTOP PLAN:  - obs overnight as pt does not have a ride home  - may discharge tomorrow am    I, Delfin Kimball, was present and participatory for the entirety of the procedure

## 2024-09-20 ENCOUNTER — TELEPHONE (OUTPATIENT)
Dept: UROLOGY | Facility: CLINIC | Age: 74
End: 2024-09-20
Payer: COMMERCIAL

## 2024-09-20 VITALS
HEART RATE: 62 BPM | RESPIRATION RATE: 16 BRPM | HEIGHT: 68 IN | DIASTOLIC BLOOD PRESSURE: 65 MMHG | WEIGHT: 225.97 LBS | SYSTOLIC BLOOD PRESSURE: 111 MMHG | BODY MASS INDEX: 34.25 KG/M2 | OXYGEN SATURATION: 96 % | TEMPERATURE: 98 F

## 2024-09-20 PROCEDURE — 250N000013 HC RX MED GY IP 250 OP 250 PS 637: Performed by: UROLOGY

## 2024-09-20 RX ORDER — TOLTERODINE TARTRATE 2 MG/1
2 TABLET, EXTENDED RELEASE ORAL ONCE
Status: CANCELLED | OUTPATIENT
Start: 2024-09-20

## 2024-09-20 RX ORDER — TAMSULOSIN HYDROCHLORIDE 0.4 MG/1
0.4 CAPSULE ORAL EVERY 24 HOURS
Qty: 10 CAPSULE | Refills: 0 | Status: SHIPPED | OUTPATIENT
Start: 2024-09-20

## 2024-09-20 RX ORDER — TOLTERODINE TARTRATE 2 MG/1
2 TABLET, EXTENDED RELEASE ORAL EVERY 12 HOURS PRN
Qty: 30 TABLET | Refills: 0 | Status: SHIPPED | OUTPATIENT
Start: 2024-09-20

## 2024-09-20 RX ORDER — ACETAMINOPHEN 325 MG/1
650 TABLET ORAL EVERY 4 HOURS PRN
Qty: 50 TABLET | Refills: 0 | Status: SHIPPED | OUTPATIENT
Start: 2024-09-20 | End: 2024-09-20

## 2024-09-20 RX ORDER — AMOXICILLIN 250 MG
1-2 CAPSULE ORAL 2 TIMES DAILY
Qty: 30 TABLET | Refills: 0 | Status: SHIPPED | OUTPATIENT
Start: 2024-09-20

## 2024-09-20 RX ORDER — HYDROXYZINE HYDROCHLORIDE 10 MG/1
10 TABLET, FILM COATED ORAL EVERY 6 HOURS PRN
Qty: 30 TABLET | Refills: 0 | Status: SHIPPED | OUTPATIENT
Start: 2024-09-20

## 2024-09-20 RX ORDER — ONDANSETRON 4 MG/1
4-8 TABLET, ORALLY DISINTEGRATING ORAL EVERY 8 HOURS PRN
Qty: 10 TABLET | Refills: 0 | Status: SHIPPED | OUTPATIENT
Start: 2024-09-20

## 2024-09-20 RX ORDER — ACETAMINOPHEN 325 MG/1
650 TABLET ORAL ONCE
Status: CANCELLED | OUTPATIENT
Start: 2024-09-20

## 2024-09-20 RX ORDER — ACETAMINOPHEN 325 MG/1
650 TABLET ORAL EVERY 4 HOURS PRN
Qty: 100 TABLET | Refills: 0 | Status: SHIPPED | OUTPATIENT
Start: 2024-09-20

## 2024-09-20 RX ORDER — OXYCODONE HYDROCHLORIDE 5 MG/1
5 TABLET ORAL ONCE
Status: CANCELLED | OUTPATIENT
Start: 2024-09-20

## 2024-09-20 RX ADMIN — CEPHALEXIN 500 MG: 500 CAPSULE ORAL at 09:16

## 2024-09-20 RX ADMIN — METOPROLOL SUCCINATE 25 MG: 25 TABLET, EXTENDED RELEASE ORAL at 09:16

## 2024-09-20 RX ADMIN — FLECAINIDE ACETATE 50 MG: 50 TABLET ORAL at 09:16

## 2024-09-20 RX ADMIN — LOSARTAN POTASSIUM 50 MG: 25 TABLET, FILM COATED ORAL at 09:16

## 2024-09-20 ASSESSMENT — ACTIVITIES OF DAILY LIVING (ADL)
ADLS_ACUITY_SCORE: 25

## 2024-09-20 NOTE — DISCHARGE SUMMARY
[facility] UroDischarge Summary    Patient: Collin Frank    MRN: [id number]   : 1950         Date of Admission:  2024   Date of Discharge::  24  Admitting Physician:  Delfin Kimball MD  Discharge Physician:  Kiah Clifton              Admission Diagnoses:   # Kidney stone [N20.0]    Past Medical History:   Diagnosis Date    Acute deep vein thrombosis (DVT) (H) 2011- acute and extensive LLE DVTs, he underwent thrombolysis by IR       Atrial fibrillation with RVR (H) 2023    Chest pain, unspecified type 2021    Renal stones 2013    2 mm size non obstructing  2 right, 1 left==CT  [place] 3/13               Discharge Diagnosis:     # Kidney stone [N20.0]    Past Medical History:   Diagnosis Date    Acute deep vein thrombosis (DVT) (H) 2011- acute and extensive LLE DVTs, he underwent thrombolysis by IR       Atrial fibrillation with RVR (H) 2023    Chest pain, unspecified type 2021    Renal stones 2013    2 mm size non obstructing  2 right, 1 left==CT  [place] 3/13              Procedures:     Procedure(s): 24 -   PROCEDURES PERFORMED:   Procedure(s) and Anesthesia Type:     * Cystoscopy, Left Ureteroscopy, Left Retrograde Pyelogram, Left Holmium Laser Lithotripsy, Possible Left Ureteral Stent Placement - Choice    Dr. iKmball - SURGEON             Medications Prior to Admission:     Medications Prior to Admission   Medication Sig Dispense Refill Last Dose    amLODIPine (NORVASC) 5 MG tablet Take 1 tablet (5 mg) by mouth daily. (Patient taking differently: Take 5 mg by mouth every evening.) 90 tablet 3 2024 at 2030    atorvastatin (LIPITOR) 10 MG tablet Take 1 tablet (10 mg) by mouth daily. (Patient taking differently: Take 10 mg by mouth every evening.) 90 tablet 3 2024 at 1930    cephALEXin (KEFLEX) 500 MG capsule Take 1 capsule (500 mg) by mouth 2 times daily. 6 capsule 0 2024 at 0700     cholecalciferol (VITAMIN D) 1000 UNIT tablet Take 1,000 Units by mouth every morning. 100 tablet 3 9/18/2024 at 2200    flecainide (TAMBOCOR) 50 MG tablet Take 1 tablet (50 mg) by mouth 2 times daily 180 tablet 3 9/19/2024 at 0700    losartan (COZAAR) 50 MG tablet TAKE 1 TABLET DAILY (Patient taking differently: Take 50 mg by mouth every morning.) 90 tablet 3 9/18/2024 at 11089    metoprolol succinate ER (TOPROL XL) 25 MG 24 hr tablet Take 1 tablet (25 mg) by mouth daily. (Patient taking differently: Take 25 mg by mouth every morning.) 90 tablet 3 9/19/2024 at 0715    XARELTO ANTICOAGULANT 20 MG TABS tablet TAKE 1 TABLET DAILY WITH DINNER 90 tablet 3 9/15/2024             Discharge Medications:     Current Discharge Medication List        START taking these medications    Details   ondansetron (ZOFRAN ODT) 4 MG ODT tab Take 1-2 tablets (4-8 mg) by mouth every 8 hours as needed for nausea. Dissolve ON the tongue.  Qty: 10 tablet, Refills: 0    Associated Diagnoses: Nephrolithiasis      acetaminophen (TYLENOL) 325 MG tablet Take 2 tablets (650 mg) by mouth every 4 hours as needed for other (mild pain).  Qty: 100 tablet, Refills: 0    Associated Diagnoses: Nephrolithiasis      hydrOXYzine HCl (ATARAX) 10 MG tablet Take 1 tablet (10 mg) by mouth every 6 hours as needed for itching or anxiety (with pain, moderate pain).  Qty: 30 tablet, Refills: 0    Associated Diagnoses: Nephrolithiasis           CONTINUE these medications which have NOT CHANGED    Details   amLODIPine (NORVASC) 5 MG tablet Take 1 tablet (5 mg) by mouth daily.  Qty: 90 tablet, Refills: 3    Associated Diagnoses: Primary hypertension      atorvastatin (LIPITOR) 10 MG tablet Take 1 tablet (10 mg) by mouth daily.  Qty: 90 tablet, Refills: 3    Associated Diagnoses: Atrial fibrillation status post cardioversion      cephALEXin (KEFLEX) 500 MG capsule Take 1 capsule (500 mg) by mouth 2 times daily.  Qty: 6 capsule, Refills: 0    Associated Diagnoses:  Nephrolithiasis      cholecalciferol (VITAMIN D) 1000 UNIT tablet Take 1,000 Units by mouth every morning.  Qty: 100 tablet, Refills: 3    Associated Diagnoses: Unspecified vitamin D deficiency      flecainide (TAMBOCOR) 50 MG tablet Take 1 tablet (50 mg) by mouth 2 times daily  Qty: 180 tablet, Refills: 3    Associated Diagnoses: Persistent atrial fibrillation      losartan (COZAAR) 50 MG tablet TAKE 1 TABLET DAILY  Qty: 90 tablet, Refills: 3    Associated Diagnoses: Benign essential hypertension      metoprolol succinate ER (TOPROL XL) 25 MG 24 hr tablet Take 1 tablet (25 mg) by mouth daily.  Qty: 90 tablet, Refills: 3    Associated Diagnoses: Atrial fibrillation with RVR      XARELTO ANTICOAGULANT 20 MG TABS tablet TAKE 1 TABLET DAILY WITH DINNER  Qty: 90 tablet, Refills: 3    Associated Diagnoses: Chronic deep vein thrombosis (DVT) of popliteal vein of left lower extremity                   Consultations:   Consultation during this admission received:   None          Brief History of Illness:   Reason for admission requiring a surgical or invasive procedure:   Kidney stone [N20.0]   The patient underwent the following procedure(s):   See above   There were no immediate complications during this procedure.    Please refer to the full operative summary for details.           Hospital Course:   The patient's hospital course was unremarkable.  Collin Frank recovered as anticipated and experienced no post-operative complications.      On POD #1 he was ambulating without assitance, tolerating the discharge diet, had pain controlled with PO medications to go home with, and was requiring no IV medications or fluids. He was discharged to home with a stent on a string.  He was given appropriate contact information, follow-up and instructions as seen below in the discharge paperwork.         Discharge Labs:     Lab Results   Component Value Date    PSA 1.66 12/12/2018    PSA 1.11 12/11/2017    PSA 2.72 12/06/2016    PSA  "1.07 12/01/2015    PSA 1.08 10/30/2014     No lab results found in last 7 days.    Recent Labs   Lab 09/19/24  1006   *     No lab results found in last 7 days.    Invalid input(s): \"URINEBLOOD\"  Results for orders placed or performed in visit on 09/12/24   Urine Culture Aerobic Bacterial    Specimen: Urine, Midstream   Result Value Ref Range    Culture 10,000-50,000 CFU/mL Urogenital ernesto     Culture 10,000-50,000 CFU/mL Streptococcus anginosus (A)             Discharge Instructions and Follow-Up:   Stent/Ureteroscopy:    Activity  - There are no activity restrictions    Diet:  You may resume your regular diet.     Common symptoms related to the stent:  - You will likely notice some blood in the urine for as long as the stent is in place. You may also notice more blood in the urine after physical activity. Normal urine color can range from yellow to dark red. This is not problematic as long as you are able to empty your bladder. Although urine may seem quite bloody, a few drops of blood can color the entire toilet bowl red- much like food coloring. Increase your fluid intake to help flush the blood out of your bladder.   - You may also notice a twinge of pain in your kidney during urination. This can be severe, but should get better after the first few days with the stent. This is due to urine traveling backward (up the stent into your kidney) when the bladder squeezes. It is normal and not a cause for concern.  - You may feel like you need to urinate more frequently than usual. Some patients experience a lower abdominal cramping or \"spasm\". You may notice urine leakage from your urethra after this happens. This is due to the stent rubbing against your bladder wall and should get better over the next few days. Additionally, your doctor may prescribe a medication to help with this.   - You may experience some burning with urination due to minor trauma from the scope used during your surgery. This should " "disappear over the next few days.     Medications:  - Anti-inflammatories/NSAIDs (Ibuprofen, Advil, Aleve) are the most effective pain relievers for stent-related discomfort. You may safely use these in combination with Tylenol.   - You may also take Tylenol for pain control- but not more than 3000 mg daily.  - Flomax (Tamsulosin): This is a medication used to relax the ureter. It should help with flank pain associated with the stent. Take 0.4 mg (1 pill) every day.  Continue for 3 days beyond stent removal  - Toterodine (Detrol): This is a medication used to treat overactive bladder/bladder spasms related to the stent. You may take 2mg twice daily as needed. This medication causes constipation so make sure to take a stool softener along with it.   - We recommend over the counter fiber (metamucil or benefiber) and stool softeners (miralax, docusate or senna) to prevent postoperative constipation, but stop if you develop diarrhea.  - ANTIBIOTICS (cephalexin) - continue three times daily for 7 days for urinary tract infection     Removing your stent at home:  Some patients will discharge with a stent on a string- taped to the outside of your body.  - In 7 days, remove the tape from your body and pull the string with gentle force until the stent is removed. This is a long, thin, blue plastic tube.   - Some patient are more comfortable pulling the stent in the bathtub or shower  - You may notice a \"tugging\" sensation in your flank, but it should not be severely painful  - You may notice flank discomfort for the next 24 hours after the stent is removed. Continue to take tamsulosin to help with this  - If the tape falls off before your stent removal date, any medical grade tape may be used to re-secure the strings. Just ensure the strings are not on tension.    Follow-Up:  - Follow up with Dr. Kimball for a postop checkup.  He will arrange this appointment.   - Call or return sooner than your regularly scheduled visit if " "you develop any of the following: fever (greater than 101.5), uncontrolled pain, uncontrolled nausea or vomiting, or inability to urinate.    Phone numbers:   - Monday through Friday 8am to 4:30pm: Call [phone] with questions, requests for medication refills, or to schedule or confirm an appointment.  - Nights, weekends, or holidays: call the after hours emergency pager - [phone] and tell the  \"I would like to page the Urology Resident on call.\" Typically, the on-call provider should return your call within 30 minutes.  Please page the on-call provider again if you haven't been contacted as expected.  Rarely, the on-call provider will be unable to promptly return a call due to a hospital emergency.  If you have paged twice and are still not contacted, ask the hospital  to page the \"urology CHIEF-RESIDENT on call\".   - Please note that due to prescribing laws, resident physicians are unable to prescribe narcotics after-hours. If you feel as though you will need a refill of a narcotic pain medication, you will need to call the clinic during business hours OR seek emergency care.  - For emergencies, call 911         Discharge Disposition:     Discharged to home      Attestation: I have reviewed today's vital signs, notes, medications, labs and imaging.    Kiah Clifton PA-C  Urology Physician Assistant  Personal Pager: [phone]    Please call Job Code:   [phone] to reach the Urology resident or PA on call - Weekdays  [phone] to reach the Urology resident or PA on call - Weeknights and weekends    September 20, 2024         "

## 2024-09-20 NOTE — PLAN OF CARE
Goal Outcome Evaluation:      Plan of Care Reviewed With: patient    Overall Patient Progress: improvingOverall Patient Progress: improving    Outcome Evaluation: Discharging home    Prescriptions per [facility]. Patient denies pain, voids per urinal (hematuria). Discharge orders reviewed. Patient requested clinic appointment for removal of stents, Dorie Clifton notified. Urology to contact the patient for clinic appointment. All belongings with the patient, cane and clothes.

## 2024-09-20 NOTE — PROGRESS NOTES
"Urology  Progress Note    NAEO  Stent pain overnight  Tolerating regular diet without nausea or vomiting  OOB and Ambulating    Exam  /65 (BP Location: Left arm)   Pulse 62   Temp 98  F (36.7  C) (Oral)   Resp 16   Ht 1.715 m (5' 7.5\")   Wt 102.5 kg (225 lb 15.5 oz)   SpO2 96%   BMI 34.87 kg/m    No acute distress  Unlabored breathing  Abdomen soft,  appropriately tender, nondistended      +2x/NR    Labs  Recent Labs   Lab Test 09/05/24  1808 08/30/24  0808 03/14/24  0348 02/28/24  0812   WBC 8.0  --  6.7 8.1   HGB 15.6  --  15.4 15.6   CR 1.12 1.0 1.15 1.06         Assessment/Plan  74 year old male with PMH of 10 mm left proximal ureteral stone, POD#1 s/p L URS/HLL who was admitted for observation.     Neuro: tylenol, prn oxyy  CV: HD appropriate. Continue PTA amlodipine, losartan, metoprolol, flecainide and atorvastatin  Pulm: incentive spirometry while awake  FEN/GI: Regular diet  Endo: Relatively euglycemic  : Stent plan to be discussed with staff  Heme/ID: HD stable, transfuse prn, watch for s/s of infection  Activity: Up as tolerated  Ambulate at least TID  Dispo: Anticipate discharge home today.     Seen and examined with the chief resident. Will discuss with staff surgeon, Dr. Jigar Murillo MD, PGY2  Urology Resident    Contacting the urology team: Please see [facility] and page on-call clinician with any questions or concerns regarding this patient. Note writer may be unavailable.     To access [employer] from intranet: under \"Applications\" --> \"Business Applications\" select \"[employer] Smartweb\" and search \"Urology Adult & Pediatric/[facility].\" Please note that any question about a urology inpatient, [place] or [place], should go to job code [id number].      "

## 2024-09-20 NOTE — TELEPHONE ENCOUNTER
Spoke with patient, who reports he is doing well s/p URS and stent placement.     Scheduled RN visit for stent removal per patient request. Rec Tylenol 1 hour prior. Pt expressed understanding.     RN will schedule follow up appointments at time of stent removal.     TIFFANIE White  Care Coordinator- Urology   [phone]

## 2024-09-20 NOTE — PLAN OF CARE
"Goal Outcome Evaluation:      Plan of Care Reviewed With: patient    Overall Patient Progress: improving    Outcome Evaluation: Pt states he has pain when beginning to urinate. Hematuria.  /81 (BP Location: Left arm)   Pulse 63   Temp 97.4  F (36.3  C) (Oral)   Resp 15   Ht 1.715 m (5' 7.5\")   Wt 102.5 kg (225 lb 15.5 oz)   SpO2 97%   BMI 34.87 kg/m      Shift: 9231-1814  Isolation Status: Standard  VS: Stable on RA, afebrile  Neuro: Aox4  Behaviors: Calm, cooperative, and pleasant  BG: n/a  Labs/Imaging: Pending AM lab results  Respiratory: WDL  Cardiac: WDL  Pain/Nausea: Pt states he only has some pain when beginning to urinate. Denies nausea  PRN: None given  Diet: Regular  LDA: R PIV - SL  Infusion(s): None  GI/: Voiding, hematuria. Last BM was 9/18. Stent coming out of urethra with drainage.  Skin: No new deficits found  Mobility: SBA  Plan: Continue with plan of care, will notify MD with any changes.     "

## 2024-09-20 NOTE — PROGRESS NOTES
Admitted/transferred from:   Time of arrival on unit 9840  2 RN full  skin assessment completed by Rosalind HERNANDEZ and Yuly TRAN  Skin assessment finding: issues found stent coming out of urethra with bloody drainage. R PIV    Interventions/actions: skin interventions continue to monitor.      Will continue to monitor.

## 2024-09-23 ENCOUNTER — TELEPHONE (OUTPATIENT)
Dept: CARDIOLOGY | Facility: CLINIC | Age: 74
End: 2024-09-23
Payer: COMMERCIAL

## 2024-09-23 LAB
APPEARANCE STONE: NORMAL
COMPN STONE: NORMAL
SPECIMEN WT: 43 MG

## 2024-09-23 NOTE — TELEPHONE ENCOUNTER
Called and spoke with patient. Patient states he does not need refills at this time. He would like [facility]. Discussed with patient that when he is getting low on medications, he should notify clinic and clinic can [facility] refills [facility]. Patient verbalized understanding. No further questions at this time.    Yumi Jacome, RN, BSN  Cardiology RN Care Coordinator   [place]   Phone: [phone]  Fax: [phone] ([place]) [phone] ([place])

## 2024-09-23 NOTE — TELEPHONE ENCOUNTER
Health Call Center    Phone Message    May a detailed message be left on voicemail: yes     Reason for Call: Medication Question or concern regarding medication   Prescription Clarification  Name of Medication: flecainide  Prescribing Provider: Can   Pharmacy:      [facility] - [address]   What on the order needs clarification? Patient would like to make sure [facility] gets future approval for any future or new medications being prescribed by Dr Maya.  Patient states he is current on medications but wants future approval to be granted.       Action Taken: Other: cardio    Travel Screening: Not Applicable     Date of Service:

## 2024-09-26 ENCOUNTER — ALLIED HEALTH/NURSE VISIT (OUTPATIENT)
Dept: UROLOGY | Facility: CLINIC | Age: 74
End: 2024-09-26
Payer: COMMERCIAL

## 2024-09-26 DIAGNOSIS — N20.0 NEPHROLITHIASIS: Primary | ICD-10-CM

## 2024-09-26 NOTE — PROGRESS NOTES
Patient referred for stent removal by Dr. Kimball.    Patient educated regarding stent removal procedure and possible symptoms after removal.  Patient voiced understanding of information.      Handout given to patient. Stent removed without difficulty.     Scheduled follow up.     Supervising provider of the day, Ray Sherman, was available if needed.     TIFFANIE White  Care Coordinator- Urology   [phone]

## 2024-10-08 ENCOUNTER — DOCUMENTATION ONLY (OUTPATIENT)
Dept: OTHER | Facility: CLINIC | Age: 74
End: 2024-10-08
Payer: COMMERCIAL

## 2024-10-27 NOTE — PROGRESS NOTES
Thank you        [facility]      HPI: Mr. Collin Frank is a 73 year old  male with PMH significant for    -Factor V Leiden mutation  -Chronic left lower extremity DVT and PE on Xarelto  -Hypertension  -Obesity    Patient presented emergency room on 12/11/2023 for weakness and shortness of breath for 3 days.  He tells me that he was sweating excessively.  EKG showed A-fib with RVR (heart rate 100-130's).  Underwent ALON guided DC cardioversion which converted patient to sinus rhythm.  ALON showed normal baseline echocardiogram.  He is following up with me today after this event.    Currently asymptomatic.  Denies chest pain, dizziness, palpitations, shortness of breath.  He denies prior history of atrial fibrillation.    Patient presented to emergency room in July of this year for fleeting chest pain and underwent CT scan which showed coronary artery calcium in the LAD.  Subsequently underwent dobutamine stress test which was negative.  He had a follow-up with cardiology on 9/1 and he was recommended better control of blood pressure.    No alcohol abuse.  No history of CVA.  No history of sleep apnea.  Current medications:  Losartan 50 mg daily  Metoprolol 25 mg daily  Xarelto 20 mg daily  Simvastatin 20 mg daily    Medications, personal, family, and social history reviewed with patient and revised.    Interval history 6/5/2024:  Patient is being seen today for follow-up.  I saw him last time 6 months ago.  At that time he was status post cardioversion.  In the interim patient presented to emergency room in February and March Both times he was found to be in A-fib with RVR.  Patient tells me that he was not aware that he was back into A-fib.  He feels lethargic and tired over the last few months.  At times he is winded with activity.  No chest pain.  He is not physically very active.  He spends a lot of time at home reading books.  He is very hard to hearing.    EKG in clinic today shows A-fib  with RVR at  109 bpm at rest.    Interval history 10/30/2024:  Patient is being today after cardioversion in July of this year.  He is currently asymptomatic.  Sinus rhythm.  Feeling well.    PAST MEDICAL HISTORY:  Past Medical History:   Diagnosis Date    Acute deep vein thrombosis (DVT) (H) 11/21/2011 11/2011- acute and extensive LLE DVTs, he underwent thrombolysis by IR       Atrial fibrillation with RVR (H) 12/11/2023    Chest pain, unspecified type 03/31/2021    Renal stones 03/21/2013    2 mm size non obstructing  2 right, 1 left==CT  [place] 3/13         CURRENT MEDICATIONS:  Current Outpatient Medications   Medication Sig Dispense Refill    acetaminophen (TYLENOL) 325 MG tablet Take 2 tablets (650 mg) by mouth every 4 hours as needed for other (mild pain). 100 tablet 0    amLODIPine (NORVASC) 5 MG tablet Take 1 tablet (5 mg) by mouth daily. (Patient taking differently: Take 5 mg by mouth every evening.) 90 tablet 3    atorvastatin (LIPITOR) 10 MG tablet Take 1 tablet (10 mg) by mouth daily. (Patient taking differently: Take 10 mg by mouth every evening.) 90 tablet 3    cholecalciferol (VITAMIN D) 1000 UNIT tablet Take 1,000 Units by mouth every morning. 100 tablet 3    flecainide (TAMBOCOR) 50 MG tablet Take 1 tablet (50 mg) by mouth 2 times daily 180 tablet 3    hydrOXYzine HCl (ATARAX) 10 MG tablet Take 1 tablet (10 mg) by mouth every 6 hours as needed for itching or anxiety (with pain, moderate pain). 30 tablet 0    losartan (COZAAR) 50 MG tablet TAKE 1 TABLET DAILY (Patient taking differently: Take 50 mg by mouth every morning.) 90 tablet 3    metoprolol succinate ER (TOPROL XL) 25 MG 24 hr tablet Take 1 tablet (25 mg) by mouth daily. (Patient taking differently: Take 25 mg by mouth every morning.) 90 tablet 3    ondansetron (ZOFRAN ODT) 4 MG ODT tab Take 1-2 tablets (4-8 mg) by mouth every 8 hours as needed for nausea. Dissolve ON the tongue. 10 tablet 0    senna-docusate (SENOKOT-S/PERICOLACE) 8.6-50  MG tablet Take 1-2 tablets by mouth 2 times daily. 30 tablet 0    tamsulosin (FLOMAX) 0.4 MG capsule Take 1 capsule (0.4 mg) by mouth every 24 hours. Until your ureteral stent has been removed 10 capsule 0    tolterodine (DETROL) 2 MG tablet Take 1 tablet (2 mg) by mouth every 12 hours as needed for incontinence (Spasms). 30 tablet 0    XARELTO ANTICOAGULANT 20 MG TABS tablet TAKE 1 TABLET DAILY WITH DINNER 90 tablet 3       PAST SURGICAL HISTORY:  Past Surgical History:   Procedure Laterality Date    ANESTHESIA CARDIOVERSION N/A 2023    Procedure: Anesthesia cardioversion;  Surgeon: GENERIC ANESTHESIA PROVIDER;  Location: [facility]    ANESTHESIA CARDIOVERSION N/A 2024    Procedure: Anesthesia cardioversion@1330;  Surgeon: GENERIC ANESTHESIA PROVIDER;  [facility]    COLONOSCOPY  2008    Normal. Repeat in 10 years    COLONOSCOPY WITH CO2 INSUFFLATION N/A 2018    Procedure: COLONOSCOPY WITH CO2 INSUFFLATION;  COLON SCREEN/ ENGELMANN;  Surgeon: Jan Flores MD;  Location: [facility]    COMBINED CYSTOSCOPY, RETROGRADES, URETEROSCOPY, LASER HOLMIUM LITHOTRIPSY URETER(S), INSERT STENT Left 2024    Procedure: Cystoscopy, Left Ureteroscopy, Left Retrograde Pyelogram, Left Holmium Laser Lithotripsy, Left Ureteral Stent Placement;  Surgeon: Delfin Kimball MD;  Location: [facility] OR    ORTHOPEDIC SURGERY      rt knee ORIF       ALLERGIES:   No Known Allergies    FAMILY HISTORY:  Family History   Problem Relation Age of Onset    Alzheimer Disease Mother     Heart Disease Father     Prostate Cancer Father     Cancer Brother 65        pancreatic       Prostate Cancer Brother         2 stents 70% and 90% blockage    Heart Disease Sister          SOCIAL HISTORY:  Social History     Tobacco Use    Smoking status: Former     Current packs/day: 0.00     Types: Cigarettes     Quit date: 2011     Years since quittin.7     Passive exposure: Never    Smokeless tobacco: Never   Vaping  Use    Vaping status: Never Used   Substance Use Topics    Alcohol use: No    Drug use: No       ROS:   Constitutional: No fever, chills, or sweats. Weight stable.   Cardiovascular: As per HPI.       Exam:  /86 (BP Location: Right arm, Patient Position: Chair, Cuff Size: Adult Regular)   Pulse 56   Wt 103 kg (227 lb)   SpO2 98%   BMI 35.03 kg/m    GENERAL APPEARANCE: alert and no distress  HEENT: no icterus, no central cyanosis  LYMPH/NECK: no adenopathy, no asymmetry, JVP not elevated  CARDIOVASCULAR: Irregular rhythm, normal S1, S2, no S3 or S4 and no murmur, click or rub, precordium quiet with normal PMI.  EXTREMITIES: no edema  NEURO: alert, normal speech,and affect  SKIN: no ecchymoses, no rashes     I have reviewed the labs and personally reviewed the imaging below and made my comment in the assessment and plan.    Labs:  CBC RESULTS:   Lab Results   Component Value Date    WBC 8.0 09/05/2024    WBC 7.2 03/31/2021    RBC 4.82 09/05/2024    RBC 4.60 03/31/2021    HGB 15.6 09/05/2024    HGB 14.7 03/31/2021    HCT 47.5 09/05/2024    HCT 43.5 03/31/2021    MCV 99 09/05/2024    MCV 95 03/31/2021    MCH 32.4 09/05/2024    MCH 32.0 03/31/2021    MCHC 32.8 09/05/2024    MCHC 33.8 03/31/2021    RDW 12.7 09/05/2024    RDW 12.8 03/31/2021     09/05/2024     03/31/2021       BMP RESULTS:  Lab Results   Component Value Date     09/05/2024     04/01/2021    POTASSIUM 4.3 09/05/2024    POTASSIUM 4.1 12/12/2022    POTASSIUM 3.9 04/01/2021    CHLORIDE 101 09/05/2024    CHLORIDE 109 12/12/2022    CHLORIDE 108 04/01/2021    CO2 23 09/05/2024    CO2 27 12/12/2022    CO2 25 04/01/2021    ANIONGAP 11 09/05/2024    ANIONGAP 4 12/12/2022    ANIONGAP 7 04/01/2021     (H) 09/19/2024    GLC 89 12/12/2022    GLC 90 04/01/2021    BUN 18.7 09/05/2024    BUN 15 12/12/2022    BUN 15 04/01/2021    CR 1.12 09/05/2024    CR 0.93 04/01/2021    GFRESTIMATED 68 09/05/2024    GFRESTIMATED >60 08/30/2024     GFRESTIMATED 81 04/01/2021    GFRESTBLACK >90 04/01/2021    MARTA 9.3 09/05/2024    MARTA 8.8 04/01/2021      Echocardiogram 6/25/2024:  Global and regional left ventricular function is normal with an EF of 55-60%.  Biplane LVEF is 55%.  Global right ventricular function is normal.  No pericardial effusion is present.  This study was compared with the study from 12/11/23 .No significant changes  noted.  DC cardioversion 7/2/2024: Successful cardioversion to sinus rhythm  EKG 2/28/2024 shows A-fib.    ALON cardioversion 12/11/2023:   Global and regional left ventricular function is normal with an EF of 55-60%.  The right ventricle is normal size. Global right ventricular function is  normal.  The left atrial appendage is normal. It is free of spontaneous echo contrast  and thrombus.  No significant valvular abnormalities.  No pericardial effusion is present.  Previous study not available for comparison    DC cardioversion successful 12/11/2023    Dobutamine stress test 7/1/2023: Normal    Assessment :     # Persistent atrial fibrillation status post DC cardioversion 12/11/2023, status post DC cardioversion on 7/2/2024 maintaining sinus rhythm since    -Asymptomatic.  Feeling well.  Physical exam is unremarkable.  Sinus rhythm in clinic today.  -Continue flecainide 50 mg twice daily for rhythm control  -Continue metoprolol 25 mg daily  -Continue Xarelto 20 mg daily  -Avoid alcohol  -Lose weight    # Hypertension well-controlled  -Continue losartan 50 mg  -Continue amlodipine 5 mg    # History of hematuria with kidney stones.  -Recently had procedure to remove kidney stones.    Return to clinic 1 year or earlier as needed.  No medication changes today.    Total time spent today for this visit is 15 minutes including precharting, face-to-face clinic visit, review of labs/imaging and medical documentation.    Jimy STEWART MD  [facility] Division of Cardiology  Pager [phone]

## 2024-10-30 ENCOUNTER — OFFICE VISIT (OUTPATIENT)
Dept: CARDIOLOGY | Facility: CLINIC | Age: 74
End: 2024-10-30
Payer: COMMERCIAL

## 2024-10-30 VITALS
SYSTOLIC BLOOD PRESSURE: 136 MMHG | WEIGHT: 227 LBS | OXYGEN SATURATION: 98 % | BODY MASS INDEX: 35.03 KG/M2 | DIASTOLIC BLOOD PRESSURE: 86 MMHG | HEART RATE: 56 BPM

## 2024-10-30 DIAGNOSIS — I82.532 CHRONIC DEEP VEIN THROMBOSIS (DVT) OF POPLITEAL VEIN OF LEFT LOWER EXTREMITY (H): ICD-10-CM

## 2024-10-30 DIAGNOSIS — I10 HYPERTENSION, WELL CONTROLLED: ICD-10-CM

## 2024-10-30 DIAGNOSIS — I48.91 ATRIAL FIBRILLATION STATUS POST CARDIOVERSION (H): Primary | ICD-10-CM

## 2024-10-30 RX ORDER — RIVAROXABAN 20 MG/1
20 TABLET, FILM COATED ORAL
Qty: 90 TABLET | Refills: 3 | Status: SHIPPED | OUTPATIENT
Start: 2024-10-30

## 2024-10-30 NOTE — NURSING NOTE
Return Appointment: Patient given instructions regarding scheduling next clinic visit. Patient demonstrated understanding of this information and agreed to call with further questions or concerns. Patient with no changes today. Answered patient's questions. Patient to follow up in 1 year.    Patient stated he understood all health information given and agreed to call with further questions or concerns.     Yumi Jacome RN, BSN  Cardiology RN Care Coordinator   [place]   Phone: [phone]  Fax: [phone] ([place]) [phone] ([place])

## 2024-10-30 NOTE — LETTER
10/30/2024      RE: Collin Frank  [address]  [facility] [address]       Dear Colleague,    Thank you for the opportunity to participate in the care of your patient, Collin Frank, at the [facility]. Please see a copy of my visit note below.    Thank you        [facility]      HPI: Mr. Collin Frank is a 73 year old  male with PMH significant for    -Factor V Leiden mutation  -Chronic left lower extremity DVT and PE on Xarelto  -Hypertension  -Obesity    Patient presented emergency room on 12/11/2023 for weakness and shortness of breath for 3 days.  He tells me that he was sweating excessively.  EKG showed A-fib with RVR (heart rate 100-130's).  Underwent ALON guided DC cardioversion which converted patient to sinus rhythm.  ALON showed normal baseline echocardiogram.  He is following up with me today after this event.    Currently asymptomatic.  Denies chest pain, dizziness, palpitations, shortness of breath.  He denies prior history of atrial fibrillation.    Patient presented to emergency room in July of this year for fleeting chest pain and underwent CT scan which showed coronary artery calcium in the LAD.  Subsequently underwent dobutamine stress test which was negative.  He had a follow-up with cardiology on 9/1 and he was recommended better control of blood pressure.    No alcohol abuse.  No history of CVA.  No history of sleep apnea.  Current medications:  Losartan 50 mg daily  Metoprolol 25 mg daily  Xarelto 20 mg daily  Simvastatin 20 mg daily    Medications, personal, family, and social history reviewed with patient and revised.    Interval history 6/5/2024:  Patient is being seen today for follow-up.  I saw him last time 6 months ago.  At that time he was status post cardioversion.  In the interim patient presented to emergency room in February and March Both times he was  found to be in A-fib with RVR.  Patient tells me that he was not aware that he was back into A-fib.  He feels lethargic and tired over the last few months.  At times he is winded with activity.  No chest pain.  He is not physically very active.  He spends a lot of time at home reading books.  He is very hard to hearing.    EKG in clinic today shows A-fib with RVR at  109 bpm at rest.    Interval history 10/30/2024:  Patient is being today after cardioversion in July of this year.  He is currently asymptomatic.  Sinus rhythm.  Feeling well.    PAST MEDICAL HISTORY:  Past Medical History:   Diagnosis Date     Acute deep vein thrombosis (DVT) (H) 11/21/2011 11/2011- acute and extensive LLE DVTs, he underwent thrombolysis by IR        Atrial fibrillation with RVR (H) 12/11/2023     Chest pain, unspecified type 03/31/2021     Renal stones 03/21/2013    2 mm size non obstructing  2 right, 1 left==CT  [place] 3/13         CURRENT MEDICATIONS:  Current Outpatient Medications   Medication Sig Dispense Refill     acetaminophen (TYLENOL) 325 MG tablet Take 2 tablets (650 mg) by mouth every 4 hours as needed for other (mild pain). 100 tablet 0     amLODIPine (NORVASC) 5 MG tablet Take 1 tablet (5 mg) by mouth daily. (Patient taking differently: Take 5 mg by mouth every evening.) 90 tablet 3     atorvastatin (LIPITOR) 10 MG tablet Take 1 tablet (10 mg) by mouth daily. (Patient taking differently: Take 10 mg by mouth every evening.) 90 tablet 3     cholecalciferol (VITAMIN D) 1000 UNIT tablet Take 1,000 Units by mouth every morning. 100 tablet 3     flecainide (TAMBOCOR) 50 MG tablet Take 1 tablet (50 mg) by mouth 2 times daily 180 tablet 3     hydrOXYzine HCl (ATARAX) 10 MG tablet Take 1 tablet (10 mg) by mouth every 6 hours as needed for itching or anxiety (with pain, moderate pain). 30 tablet 0     losartan (COZAAR) 50 MG tablet TAKE 1 TABLET DAILY (Patient taking differently: Take 50 mg by mouth every morning.) 90 tablet 3      metoprolol succinate ER (TOPROL XL) 25 MG 24 hr tablet Take 1 tablet (25 mg) by mouth daily. (Patient taking differently: Take 25 mg by mouth every morning.) 90 tablet 3     ondansetron (ZOFRAN ODT) 4 MG ODT tab Take 1-2 tablets (4-8 mg) by mouth every 8 hours as needed for nausea. Dissolve ON the tongue. 10 tablet 0     senna-docusate (SENOKOT-S/PERICOLACE) 8.6-50 MG tablet Take 1-2 tablets by mouth 2 times daily. 30 tablet 0     tamsulosin (FLOMAX) 0.4 MG capsule Take 1 capsule (0.4 mg) by mouth every 24 hours. Until your ureteral stent has been removed 10 capsule 0     tolterodine (DETROL) 2 MG tablet Take 1 tablet (2 mg) by mouth every 12 hours as needed for incontinence (Spasms). 30 tablet 0     XARELTO ANTICOAGULANT 20 MG TABS tablet TAKE 1 TABLET DAILY WITH DINNER 90 tablet 3       PAST SURGICAL HISTORY:  Past Surgical History:   Procedure Laterality Date     ANESTHESIA CARDIOVERSION N/A 12/11/2023    Procedure: Anesthesia cardioversion;  Surgeon: GENERIC ANESTHESIA PROVIDER;  Location: [facility] OR     ANESTHESIA CARDIOVERSION N/A 7/2/2024    Procedure: Anesthesia cardioversion@1330;  Surgeon: GENERIC ANESTHESIA PROVIDER;  Location: [facility]     COLONOSCOPY  01/17/2008    Normal. Repeat in 10 years     COLONOSCOPY WITH CO2 INSUFFLATION N/A 02/01/2018    Procedure: COLONOSCOPY WITH CO2 INSUFFLATION;  COLON SCREEN/ ENGELMANN;  Surgeon: Jan Flores MD;  Location:  OR     COMBINED CYSTOSCOPY, RETROGRADES, URETEROSCOPY, LASER HOLMIUM LITHOTRIPSY URETER(S), INSERT STENT Left 9/19/2024    Procedure: Cystoscopy, Left Ureteroscopy, Left Retrograde Pyelogram, Left Holmium Laser Lithotripsy, Left Ureteral Stent Placement;  Surgeon: Delfin Kimball MD;  Location: [facility] OR     ORTHOPEDIC SURGERY  1975    rt knee ORIF       ALLERGIES:   No Known Allergies    FAMILY HISTORY:  Family History   Problem Relation Age of Onset     Alzheimer Disease Mother      Heart Disease Father      Prostate Cancer Father       Cancer Brother 65        pancreatic        Prostate Cancer Brother         2 stents 70% and 90% blockage     Heart Disease Sister          SOCIAL HISTORY:  Social History     Tobacco Use     Smoking status: Former     Current packs/day: 0.00     Types: Cigarettes     Quit date: 2011     Years since quittin.7     Passive exposure: Never     Smokeless tobacco: Never   Vaping Use     Vaping status: Never Used   Substance Use Topics     Alcohol use: No     Drug use: No       ROS:   Constitutional: No fever, chills, or sweats. Weight stable.   Cardiovascular: As per HPI.       Exam:  /86 (BP Location: Right arm, Patient Position: Chair, Cuff Size: Adult Regular)   Pulse 56   Wt 103 kg (227 lb)   SpO2 98%   BMI 35.03 kg/m    GENERAL APPEARANCE: alert and no distress  HEENT: no icterus, no central cyanosis  LYMPH/NECK: no adenopathy, no asymmetry, JVP not elevated  CARDIOVASCULAR: Irregular rhythm, normal S1, S2, no S3 or S4 and no murmur, click or rub, precordium quiet with normal PMI.  EXTREMITIES: no edema  NEURO: alert, normal speech,and affect  SKIN: no ecchymoses, no rashes     I have reviewed the labs and personally reviewed the imaging below and made my comment in the assessment and plan.    Labs:  CBC RESULTS:   Lab Results   Component Value Date    WBC 8.0 2024    WBC 7.2 2021    RBC 4.82 2024    RBC 4.60 2021    HGB 15.6 2024    HGB 14.7 2021    HCT 47.5 2024    HCT 43.5 2021    MCV 99 2024    MCV 95 2021    MCH 32.4 2024    MCH 32.0 2021    MCHC 32.8 2024    MCHC 33.8 2021    RDW 12.7 2024    RDW 12.8 2021     2024     2021       BMP RESULTS:  Lab Results   Component Value Date     2024     2021    POTASSIUM 4.3 2024    POTASSIUM 4.1 2022    POTASSIUM 3.9 2021    CHLORIDE 101 2024    CHLORIDE 109 2022     CHLORIDE 108 04/01/2021    CO2 23 09/05/2024    CO2 27 12/12/2022    CO2 25 04/01/2021    ANIONGAP 11 09/05/2024    ANIONGAP 4 12/12/2022    ANIONGAP 7 04/01/2021     (H) 09/19/2024    GLC 89 12/12/2022    GLC 90 04/01/2021    BUN 18.7 09/05/2024    BUN 15 12/12/2022    BUN 15 04/01/2021    CR 1.12 09/05/2024    CR 0.93 04/01/2021    GFRESTIMATED 68 09/05/2024    GFRESTIMATED >60 08/30/2024    GFRESTIMATED 81 04/01/2021    GFRESTBLACK >90 04/01/2021    MARTA 9.3 09/05/2024    MARTA 8.8 04/01/2021      Echocardiogram 6/25/2024:  Global and regional left ventricular function is normal with an EF of 55-60%.  Biplane LVEF is 55%.  Global right ventricular function is normal.  No pericardial effusion is present.  This study was compared with the study from 12/11/23 .No significant changes  noted.  DC cardioversion 7/2/2024: Successful cardioversion to sinus rhythm  EKG 2/28/2024 shows A-fib.    ALON cardioversion 12/11/2023:   Global and regional left ventricular function is normal with an EF of 55-60%.  The right ventricle is normal size. Global right ventricular function is  normal.  The left atrial appendage is normal. It is free of spontaneous echo contrast  and thrombus.  No significant valvular abnormalities.  No pericardial effusion is present.  Previous study not available for comparison    DC cardioversion successful 12/11/2023    Dobutamine stress test 7/1/2023: Normal    Assessment :     # Persistent atrial fibrillation status post DC cardioversion 12/11/2023, status post DC cardioversion on 7/2/2024 maintaining sinus rhythm since    -Asymptomatic.  Feeling well.  Physical exam is unremarkable.  Sinus rhythm in clinic today.  -Continue flecainide 50 mg twice daily for rhythm control  -Continue metoprolol 25 mg daily  -Continue Xarelto 20 mg daily  -Avoid alcohol  -Lose weight    # Hypertension well-controlled  -Continue losartan 50 mg  -Continue amlodipine 5 mg    # History of hematuria with kidney  stones.  -Recently had procedure to remove kidney stones.    Return to clinic 1 year or earlier as needed.  No medication changes today.    Total time spent today for this visit is 15 minutes including precharting, face-to-face clinic visit, review of labs/imaging and medical documentation.    Jimy STEWART MD  [facility] Division of Cardiology  Pager [phone]       Please do not hesitate to contact me if you have any questions/concerns.     Sincerely,     Jimy Stewart MD

## 2024-10-30 NOTE — PATIENT INSTRUCTIONS
Thank you for coming to the [facility] [place]; please note the following instructions:    1. No changes today    2. Follow up with Dr. Maya in 1 year        If you have any questions regarding your visit please contact your care team:     Cardiology  Telephone Number   Eliane HAWKINS., RN  Yumi PADILLA, RN  Ceci BAINS, RN  Elizabeth SHEPARD, RMA  Daxa HALE, RMA  Cristin LUA, Clinic Facilitator  Yumiko PADILLA, Clinic Facilitator [phone] (option 1)   For scheduling appts:     [phone] (select option 1)       For the Device Clinic (Pacemakers and ICD's)  RN's :  Annalisa Fontaine   During business hours: [phone]    *After business hours:  [phone] (select option 4)      Normal test result notifications will be released via [employer] or mailed within 7 business days.  All other test results, will be communicated via telephone once reviewed by your cardiologist.    If you need a medication refill please contact your pharmacy.  Please allow 3 business days for your refill to be completed.    As always, thank you for trusting us with your health care needs!

## 2024-10-30 NOTE — NURSING NOTE
"Chief Complaint   Patient presents with    RECHECK     follow-up s/p cardioversion       Initial /86 (BP Location: Right arm, Patient Position: Chair, Cuff Size: Adult Regular)   Pulse 56   Wt 103 kg (227 lb)   SpO2 98%   BMI 35.03 kg/m   Estimated body mass index is 35.03 kg/m  as calculated from the following:    Height as of 9/19/24: 1.715 m (5' 7.5\").    Weight as of this encounter: 103 kg (227 lb)..  BP completed using cuff size: regular    Yumiko Boss, Visit Facilitator    "

## 2024-11-06 ENCOUNTER — TELEPHONE (OUTPATIENT)
Dept: CARDIOLOGY | Facility: CLINIC | Age: 74
End: 2024-11-06
Payer: COMMERCIAL

## 2024-11-06 ENCOUNTER — DOCUMENTATION ONLY (OUTPATIENT)
Dept: OTHER | Facility: CLINIC | Age: 74
End: 2024-11-06
Payer: COMMERCIAL

## 2024-11-06 DIAGNOSIS — I48.91 ATRIAL FIBRILLATION WITH RVR (H): ICD-10-CM

## 2024-11-06 DIAGNOSIS — I48.91 ATRIAL FIBRILLATION STATUS POST CARDIOVERSION (H): ICD-10-CM

## 2024-11-06 DIAGNOSIS — I10 PRIMARY HYPERTENSION: ICD-10-CM

## 2024-11-06 DIAGNOSIS — I48.19 PERSISTENT ATRIAL FIBRILLATION (H): ICD-10-CM

## 2024-11-06 NOTE — TELEPHONE ENCOUNTER
"[facility] Call Center    Phone Message    May a detailed message be left on voicemail: yes     Reason for Call: Other: Pt called in requesting to speak to a care team member about \"billing\". Writer offered to transfer patient to billing department and pt requested message be sent to care team for a call back instead. Please review. Thank you!     Action Taken: Message routed to:  Other: [facility] [[id number]]    Travel Screening: Not Applicable     Date of Service:                                                                     "

## 2024-11-07 RX ORDER — METOPROLOL SUCCINATE 25 MG/1
25 TABLET, EXTENDED RELEASE ORAL DAILY
Qty: 90 TABLET | Refills: 3 | Status: SHIPPED | OUTPATIENT
Start: 2024-11-07

## 2024-11-07 RX ORDER — AMLODIPINE BESYLATE 5 MG/1
5 TABLET ORAL DAILY
Qty: 90 TABLET | Refills: 3 | Status: SHIPPED | OUTPATIENT
Start: 2024-11-07

## 2024-11-07 RX ORDER — FLECAINIDE ACETATE 50 MG/1
50 TABLET ORAL 2 TIMES DAILY
Qty: 180 TABLET | Refills: 3 | Status: SHIPPED | OUTPATIENT
Start: 2024-11-07

## 2024-11-07 RX ORDER — ATORVASTATIN CALCIUM 10 MG/1
10 TABLET, FILM COATED ORAL DAILY
Qty: 90 TABLET | Refills: 3 | Status: SHIPPED | OUTPATIENT
Start: 2024-11-07

## 2024-11-07 NOTE — TELEPHONE ENCOUNTER
Spoke to patient. Patient is requesting amlodipine, metoprolol, atorvastatin and flecainide to be sent to [facility].  He does not drive. Has approx 2 weeks left.  Patient also requesting writer to call [facility] since he is being told that the team needs to call [facility] to approve the mail out.  Writer will call [facility].    Eliane Luo RN  Cardiology Care Coordinator  [facility]  [phone] option 1

## 2024-11-26 ENCOUNTER — ANCILLARY PROCEDURE (OUTPATIENT)
Dept: ULTRASOUND IMAGING | Facility: CLINIC | Age: 74
End: 2024-11-26
Attending: UROLOGY
Payer: COMMERCIAL

## 2024-11-26 DIAGNOSIS — N20.0 NEPHROLITHIASIS: ICD-10-CM

## 2024-11-26 PROCEDURE — 76770 US EXAM ABDO BACK WALL COMP: CPT | Performed by: RADIOLOGY

## 2024-12-02 ENCOUNTER — APPOINTMENT (OUTPATIENT)
Dept: GENERAL RADIOLOGY | Facility: CLINIC | Age: 74
End: 2024-12-02
Attending: EMERGENCY MEDICINE
Payer: COMMERCIAL

## 2024-12-02 ENCOUNTER — HOSPITAL ENCOUNTER (EMERGENCY)
Facility: CLINIC | Age: 74
Discharge: HOME | End: 2024-12-02
Attending: EMERGENCY MEDICINE | Admitting: EMERGENCY MEDICINE
Payer: COMMERCIAL

## 2024-12-02 VITALS
TEMPERATURE: 97.9 F | DIASTOLIC BLOOD PRESSURE: 77 MMHG | OXYGEN SATURATION: 93 % | SYSTOLIC BLOOD PRESSURE: 124 MMHG | RESPIRATION RATE: 18 BRPM | HEART RATE: 48 BPM

## 2024-12-02 DIAGNOSIS — R00.2 PALPITATIONS: ICD-10-CM

## 2024-12-02 LAB
ANION GAP SERPL CALCULATED.3IONS-SCNC: 5 MMOL/L (ref 7–15)
ATRIAL RATE - MUSE: 53 BPM
BASOPHILS # BLD AUTO: 0.1 10E3/UL (ref 0–0.2)
BASOPHILS NFR BLD AUTO: 1 %
BUN SERPL-MCNC: 13.6 MG/DL (ref 8–23)
CALCIUM SERPL-MCNC: 8.8 MG/DL (ref 8.8–10.4)
CHLORIDE SERPL-SCNC: 107 MMOL/L (ref 98–107)
CREAT SERPL-MCNC: 0.98 MG/DL (ref 0.67–1.17)
DIASTOLIC BLOOD PRESSURE - MUSE: NORMAL MMHG
EGFRCR SERPLBLD CKD-EPI 2021: 80 ML/MIN/1.73M2
EOSINOPHIL # BLD AUTO: 0.3 10E3/UL (ref 0–0.7)
EOSINOPHIL NFR BLD AUTO: 3 %
ERYTHROCYTE [DISTWIDTH] IN BLOOD BY AUTOMATED COUNT: 13.5 % (ref 10–15)
GLUCOSE SERPL-MCNC: 118 MG/DL (ref 70–99)
HCO3 SERPL-SCNC: 27 MMOL/L (ref 22–29)
HCT VFR BLD AUTO: 45.2 % (ref 40–53)
HGB BLD-MCNC: 15.1 G/DL (ref 13.3–17.7)
HOLD SPECIMEN: NORMAL
HOLD SPECIMEN: NORMAL
IMM GRANULOCYTES # BLD: 0 10E3/UL
IMM GRANULOCYTES NFR BLD: 0 %
INTERPRETATION ECG - MUSE: NORMAL
LYMPHOCYTES # BLD AUTO: 1.7 10E3/UL (ref 0.8–5.3)
LYMPHOCYTES NFR BLD AUTO: 23 %
MAGNESIUM SERPL-MCNC: 2.1 MG/DL (ref 1.7–2.3)
MCH RBC QN AUTO: 33.1 PG (ref 26.5–33)
MCHC RBC AUTO-ENTMCNC: 33.4 G/DL (ref 31.5–36.5)
MCV RBC AUTO: 99 FL (ref 78–100)
MONOCYTES # BLD AUTO: 0.6 10E3/UL (ref 0–1.3)
MONOCYTES NFR BLD AUTO: 8 %
NEUTROPHILS # BLD AUTO: 4.8 10E3/UL (ref 1.6–8.3)
NEUTROPHILS NFR BLD AUTO: 64 %
NRBC # BLD AUTO: 0 10E3/UL
NRBC BLD AUTO-RTO: 0 /100
P AXIS - MUSE: 63 DEGREES
PLATELET # BLD AUTO: 166 10E3/UL (ref 150–450)
POTASSIUM SERPL-SCNC: 4 MMOL/L (ref 3.4–5.3)
PR INTERVAL - MUSE: 182 MS
QRS DURATION - MUSE: 92 MS
QT - MUSE: 436 MS
QTC - MUSE: 409 MS
R AXIS - MUSE: -24 DEGREES
RBC # BLD AUTO: 4.56 10E6/UL (ref 4.4–5.9)
SODIUM SERPL-SCNC: 139 MMOL/L (ref 135–145)
SYSTOLIC BLOOD PRESSURE - MUSE: NORMAL MMHG
T AXIS - MUSE: 45 DEGREES
TROPONIN T SERPL HS-MCNC: 19 NG/L
TROPONIN T SERPL HS-MCNC: 26 NG/L
VENTRICULAR RATE- MUSE: 53 BPM
WBC # BLD AUTO: 7.5 10E3/UL (ref 4–11)

## 2024-12-02 PROCEDURE — 85004 AUTOMATED DIFF WBC COUNT: CPT | Performed by: EMERGENCY MEDICINE

## 2024-12-02 PROCEDURE — 93010 ELECTROCARDIOGRAM REPORT: CPT | Performed by: EMERGENCY MEDICINE

## 2024-12-02 PROCEDURE — 71046 X-RAY EXAM CHEST 2 VIEWS: CPT

## 2024-12-02 PROCEDURE — 85018 HEMOGLOBIN: CPT | Performed by: EMERGENCY MEDICINE

## 2024-12-02 PROCEDURE — 71046 X-RAY EXAM CHEST 2 VIEWS: CPT | Mod: 26 | Performed by: RADIOLOGY

## 2024-12-02 PROCEDURE — 84484 ASSAY OF TROPONIN QUANT: CPT | Performed by: EMERGENCY MEDICINE

## 2024-12-02 PROCEDURE — 99284 EMERGENCY DEPT VISIT MOD MDM: CPT | Performed by: EMERGENCY MEDICINE

## 2024-12-02 PROCEDURE — 99285 EMERGENCY DEPT VISIT HI MDM: CPT | Mod: 25 | Performed by: EMERGENCY MEDICINE

## 2024-12-02 PROCEDURE — 80048 BASIC METABOLIC PNL TOTAL CA: CPT | Performed by: EMERGENCY MEDICINE

## 2024-12-02 PROCEDURE — 83735 ASSAY OF MAGNESIUM: CPT | Performed by: EMERGENCY MEDICINE

## 2024-12-02 PROCEDURE — 36415 COLL VENOUS BLD VENIPUNCTURE: CPT | Performed by: EMERGENCY MEDICINE

## 2024-12-02 PROCEDURE — 93005 ELECTROCARDIOGRAM TRACING: CPT | Performed by: EMERGENCY MEDICINE

## 2024-12-02 PROCEDURE — 82565 ASSAY OF CREATININE: CPT | Performed by: EMERGENCY MEDICINE

## 2024-12-02 PROCEDURE — 84520 ASSAY OF UREA NITROGEN: CPT | Performed by: EMERGENCY MEDICINE

## 2024-12-02 ASSESSMENT — ACTIVITIES OF DAILY LIVING (ADL)
ADLS_ACUITY_SCORE: 49

## 2024-12-02 ASSESSMENT — COLUMBIA-SUICIDE SEVERITY RATING SCALE - C-SSRS
2. HAVE YOU ACTUALLY HAD ANY THOUGHTS OF KILLING YOURSELF IN THE PAST MONTH?: NO
6. HAVE YOU EVER DONE ANYTHING, STARTED TO DO ANYTHING, OR PREPARED TO DO ANYTHING TO END YOUR LIFE?: NO
1. IN THE PAST MONTH, HAVE YOU WISHED YOU WERE DEAD OR WISHED YOU COULD GO TO SLEEP AND NOT WAKE UP?: NO

## 2024-12-02 NOTE — ED PROVIDER NOTES
ED Provider Note  [facility]      History     Chief Complaint   Patient presents with    Palpitations     HPI  Collin Frank is a 74 year old male who presents with palpitations.  He states that 2 days ago while he was in bed he noticed a feeling of palpitations.  He felt like his heartbeat was abnormal. He describes it as like a shock through his whole body. This was intermittent.  He states that during the day it is not present and then it happened again Sunday night when tying to go to sleep. He states it only happens when trying to go to sleep.  He states that he currently does not have any symptoms but was worried his symptoms could be related to his A-fib so wanted to be evaluated.  Denies any associated shortness of breath or chest pain.  No leg swelling.  No recent changes to his medications reported.  No fevers, illness.    Past Medical History  Past Medical History:   Diagnosis Date    Acute deep vein thrombosis (DVT) (H) 11/21/2011 11/2011- acute and extensive LLE DVTs, he underwent thrombolysis by IR       Atrial fibrillation with RVR (H) 12/11/2023    Chest pain, unspecified type 03/31/2021    Renal stones 03/21/2013    2 mm size non obstructing  2 right, 1 left==CT  [facility] 3/13       Past Surgical History:   Procedure Laterality Date    ANESTHESIA CARDIOVERSION N/A 12/11/2023    Procedure: Anesthesia cardioversion;  Surgeon: GENERIC ANESTHESIA PROVIDER;  Location: [facility] OR    ANESTHESIA CARDIOVERSION N/A 7/2/2024    Procedure: Anesthesia cardioversion@1330;  Surgeon: GENERIC ANESTHESIA PROVIDER;  Location: [facility] OR    COLONOSCOPY  01/17/2008    Normal. Repeat in 10 years    COLONOSCOPY WITH CO2 INSUFFLATION N/A 02/01/2018    Procedure: COLONOSCOPY WITH CO2 INSUFFLATION;  COLON SCREEN/ ENGELMANN;  Surgeon: Jan Flores MD;  Location: [facility] OR    COMBINED CYSTOSCOPY, RETROGRADES, URETEROSCOPY, LASER HOLMIUM LITHOTRIPSY URETER(S), INSERT STENT Left 9/19/2024    Procedure:  Cystoscopy, Left Ureteroscopy, Left Retrograde Pyelogram, Left Holmium Laser Lithotripsy, Left Ureteral Stent Placement;  Surgeon: Delfin Kimball MD;  Location: [facility] OR    ORTHOPEDIC SURGERY  1975    rt knee ORIF     acetaminophen (TYLENOL) 325 MG tablet  amLODIPine (NORVASC) 5 MG tablet  atorvastatin (LIPITOR) 10 MG tablet  cholecalciferol (VITAMIN D) 1000 UNIT tablet  flecainide (TAMBOCOR) 50 MG tablet  hydrOXYzine HCl (ATARAX) 10 MG tablet  losartan (COZAAR) 50 MG tablet  metoprolol succinate ER (TOPROL XL) 25 MG 24 hr tablet  ondansetron (ZOFRAN ODT) 4 MG ODT tab  senna-docusate (SENOKOT-S/PERICOLACE) 8.6-50 MG tablet  tamsulosin (FLOMAX) 0.4 MG capsule  tolterodine (DETROL) 2 MG tablet  XARELTO ANTICOAGULANT 20 MG TABS tablet      No Known Allergies  Family History  Family History   Problem Relation Age of Onset    Alzheimer Disease Mother     Heart Disease Father     Prostate Cancer Father     Cancer Brother 65        pancreatic       Prostate Cancer Brother         2 stents 70% and 90% blockage    Heart Disease Sister      Social History   Social History     Tobacco Use    Smoking status: Former     Current packs/day: 0.00     Types: Cigarettes     Quit date: 2011     Years since quittin.8     Passive exposure: Never    Smokeless tobacco: Never   Vaping Use    Vaping status: Never Used   Substance Use Topics    Alcohol use: No    Drug use: No      A medically appropriate review of systems was performed with pertinent positives and negatives noted in the HPI, and all other systems negative.    Physical Exam   BP: 133/68  Pulse: 54  Temp: 97.9  F (36.6  C)  Resp: 18  SpO2: 97 %  Physical Exam  Constitutional:       General: He is not in acute distress.     Appearance: He is not toxic-appearing.   HENT:      Head: Normocephalic and atraumatic.      Nose: Nose normal.      Mouth/Throat:      Mouth: Mucous membranes are moist.      Pharynx: Oropharynx is clear.   Eyes:      Conjunctiva/sclera:  Conjunctivae normal.      Pupils: Pupils are equal, round, and reactive to light.   Cardiovascular:      Rate and Rhythm: Regular rhythm. Bradycardia present.      Heart sounds: No murmur heard.  Pulmonary:      Effort: Pulmonary effort is normal. No respiratory distress.      Breath sounds: No wheezing or rales.   Musculoskeletal:         General: Normal range of motion.      Cervical back: Normal range of motion.      Right lower leg: No edema.      Left lower leg: No edema.   Skin:     General: Skin is warm and dry.   Neurological:      General: No focal deficit present.      Mental Status: He is alert and oriented to person, place, and time.           ED Course, Procedures, & Data      Procedures            EKG Interpretation:      Interpreted by Delfin Morrissey MD  Time reviewed: 7:20  Symptoms at time of EKG: None   Rhythm: normal sinus   Rate: Bradycardia  Ectopy: none  Conduction: normal  ST Segments/ T Waves: No acute ischemic changes  Comparison to prior: Unchanged    Clinical Impression: no acute changes                 Results for orders placed or performed during the hospital encounter of 12/02/24   Chest XR,  PA & LAT     Status: None    Narrative    XR CHEST 2 VIEWS  12/2/2024 10:49 AM     HISTORY:  palpitations       COMPARISON:  3/14/2024 CXR.    TECHNIQUE: 3/4/2024 chest x-ray    FINDINGS:   Cardiomediastinal silhouette is within normal limits. Trachea is  midline. No appreciable pleural effusion or pneumothorax. No focal  airspace opacities.     Visualized upper abdomen, soft tissues, and bones are unremarkable.  Streaky linear bandlike atelectasis in left lower lobe not  significantly changed from 3/14/2024.      Impression    IMPRESSION: Redemonstration of mild linear atelectasis in the left  lower lobe. No acute focal consolidations.    I have personally reviewed the examination and initial interpretation  and I agree with the findings.    JIMI CASH MD         SYSTEM ID:  [id number]    Basic metabolic panel     Status: Abnormal   Result Value Ref Range    Sodium 139 135 - 145 mmol/L    Potassium 4.0 3.4 - 5.3 mmol/L    Chloride 107 98 - 107 mmol/L    Carbon Dioxide (CO2) 27 22 - 29 mmol/L    Anion Gap 5 (L) 7 - 15 mmol/L    Urea Nitrogen 13.6 8.0 - 23.0 mg/dL    Creatinine 0.98 0.67 - 1.17 mg/dL    GFR Estimate 80 >60 mL/min/1.73m2    Calcium 8.8 8.8 - 10.4 mg/dL    Glucose 118 (H) 70 - 99 mg/dL   Troponin T, High Sensitivity     Status: Normal   Result Value Ref Range    Troponin T, High Sensitivity 19 <=22 ng/L   Magnesium     Status: Normal   Result Value Ref Range    Magnesium 2.1 1.7 - 2.3 mg/dL   Jenkins Draw     Status: None    Narrative    The following orders were created for panel order Jenkins Draw.  Procedure                               Abnormality         Status                     ---------                               -----------         ------                     Extra Blue Top Tube[[id number]]                              Final result               Extra Red Top Tube[[id number]]                               Final result                 Please view results for these tests on the individual orders.   CBC with platelets and differential     Status: Abnormal   Result Value Ref Range    WBC Count 7.5 4.0 - 11.0 10e3/uL    RBC Count 4.56 4.40 - 5.90 10e6/uL    Hemoglobin 15.1 13.3 - 17.7 g/dL    Hematocrit 45.2 40.0 - 53.0 %    MCV 99 78 - 100 fL    MCH 33.1 (H) 26.5 - 33.0 pg    MCHC 33.4 31.5 - 36.5 g/dL    RDW 13.5 10.0 - 15.0 %    Platelet Count 166 150 - 450 10e3/uL    % Neutrophils 64 %    % Lymphocytes 23 %    % Monocytes 8 %    % Eosinophils 3 %    % Basophils 1 %    % Immature Granulocytes 0 %    NRBCs per 100 WBC 0 <1 /100    Absolute Neutrophils 4.8 1.6 - 8.3 10e3/uL    Absolute Lymphocytes 1.7 0.8 - 5.3 10e3/uL    Absolute Monocytes 0.6 0.0 - 1.3 10e3/uL    Absolute Eosinophils 0.3 0.0 - 0.7 10e3/uL    Absolute Basophils 0.1 0.0 - 0.2 10e3/uL    Absolute Immature  Granulocytes 0.0 <=0.4 10e3/uL    Absolute NRBCs 0.0 10e3/uL   Extra Blue Top Tube     Status: None   Result Value Ref Range    Hold Specimen JIC    Extra Red Top Tube     Status: None   Result Value Ref Range    Hold Specimen JIC    Troponin T, High Sensitivity     Status: Abnormal   Result Value Ref Range    Troponin T, High Sensitivity 26 (H) <=22 ng/L   EKG 12-lead, tracing only     Status: None   Result Value Ref Range    Systolic Blood Pressure  mmHg    Diastolic Blood Pressure  mmHg    Ventricular Rate 53 BPM    Atrial Rate 53 BPM    CA Interval 182 ms    QRS Duration 92 ms     ms    QTc 409 ms    P Axis 63 degrees    R AXIS -24 degrees    T Axis 45 degrees    Interpretation ECG       Sinus bradycardia  Otherwise normal ECG  Unconfirmed report - interpretation of this ECG is computer generated - see medical record for final interpretation  Confirmed by - EMERGENCY ROOM, PHYSICIAN ([id number]),  RAYA MORENO ([id number]) on 12/2/2024 8:39:46 AM     CBC with platelets differential     Status: Abnormal    Narrative    The following orders were created for panel order CBC with platelets differential.  Procedure                               Abnormality         Status                     ---------                               -----------         ------                     CBC with platelets and d...[[id number]]  Abnormal            Final result                 Please view results for these tests on the individual orders.     Medications - No data to display  Labs Ordered and Resulted from Time of ED Arrival to Time of ED Departure   BASIC METABOLIC PANEL - Abnormal       Result Value    Sodium 139      Potassium 4.0      Chloride 107      Carbon Dioxide (CO2) 27      Anion Gap 5 (*)     Urea Nitrogen 13.6      Creatinine 0.98      GFR Estimate 80      Calcium 8.8      Glucose 118 (*)    CBC WITH PLATELETS AND DIFFERENTIAL - Abnormal    WBC Count 7.5      RBC Count 4.56      Hemoglobin 15.1      Hematocrit  45.2      MCV 99      MCH 33.1 (*)     MCHC 33.4      RDW 13.5      Platelet Count 166      % Neutrophils 64      % Lymphocytes 23      % Monocytes 8      % Eosinophils 3      % Basophils 1      % Immature Granulocytes 0      NRBCs per 100 WBC 0      Absolute Neutrophils 4.8      Absolute Lymphocytes 1.7      Absolute Monocytes 0.6      Absolute Eosinophils 0.3      Absolute Basophils 0.1      Absolute Immature Granulocytes 0.0      Absolute NRBCs 0.0     TROPONIN T, HIGH SENSITIVITY - Abnormal    Troponin T, High Sensitivity 26 (*)    TROPONIN T, HIGH SENSITIVITY - Normal    Troponin T, High Sensitivity 19     MAGNESIUM - Normal    Magnesium 2.1       Chest XR,  PA & LAT   Final Result   IMPRESSION: Redemonstration of mild linear atelectasis in the left   lower lobe. No acute focal consolidations.      I have personally reviewed the examination and initial interpretation   and I agree with the findings.      JIMI CASH MD            SYSTEM ID:  [id number]             Critical care was not performed.     Medical Decision Making  The patient's presentation was of moderate complexity (an undiagnosed new problem with uncertain prognosis).    The patient's evaluation involved:  review of external note(s) from 3+ sources (clinic note, nursing note, telephone encounter)  review of 3+ test result(s) ordered prior to this encounter (cbc, bmp, echo, ekg)  ordering and/or review of 3+ test(s) in this encounter (see separate area of note for details)    The patient's management necessitated only low risk treatment.    Assessment & Plan    This is a 74-year-old male with history of A-fib here with palpitations.  He was overall well-appearing, vitals notable for bradycardia but otherwise reassuring.  He does have a history of sinus bradycardia per chart review, he is on beta-blockers.  There is no associated chest pain or shortness of breath.  His EKG showed sinus bradycardia without acute ischemic findings.  Troponin 19  and then stable on recheck.  Labs otherwise reassuring, chest x-ray showed no focal consolidations or other acute findings.    The patient was monitored for several hours and continued to deny any symptoms.  Would consider palpitations being from paroxysmal A-fib with his history but would also consider as the symptoms are only present when going to sleep there could be potential sleep apnea or other sleep disorder.  Results were discussed with the patient.  He continued to feel well, denied any symptoms.  Will plan on outpatient follow-up.  Return precautions discussed and all questions answered.    I have reviewed the nursing notes. I have reviewed the findings, diagnosis, plan and need for follow up with the patient.    Discharge Medication List as of 12/2/2024 12:50 PM          Final diagnoses:   Palpitations       Delfin HERNANDEZ [facility] EMERGENCY DEPARTMENT  12/2/2024     Delfin Morrissey MD  12/02/24 [id number]

## 2024-12-02 NOTE — ED TRIAGE NOTES
"Pt c/o not being able to sleep because \"something didn't feel right...like it's skipping.\" Pt denies CP, SOB, dizziness. Pt heart rate and rhythm regular on auscultation         "

## 2024-12-02 NOTE — DISCHARGE INSTRUCTIONS
Follow up with your primary care doctor and cardiologist. If you have any new or worsening symptoms please return to the emergency department.

## 2024-12-03 ENCOUNTER — PATIENT OUTREACH (OUTPATIENT)
Dept: CARE COORDINATION | Facility: CLINIC | Age: 74
End: 2024-12-03
Payer: COMMERCIAL

## 2024-12-03 NOTE — PROGRESS NOTES
Clinic Care Coordination Contact  Community Health Worker Initial Outreach    CHW Initial Information Gathering:  Referral Source: ED Follow-Up  Current living arrangement:: Not Assessed  CHW Additional Questions  If ED/Hospital discharge, follow-up appointment scheduled as recommended?: Yes  Medication changes made following ED/Hospital discharge?: No  MyChart active?: No    Spoke to patient. Collin shares he is doing  just fine . Declined needing help with scheduling. Expressed gratitude for follow-up phone call. No additional support is needed at this time.     Patient accepts CC: No, declined. Patient will be sent Care Coordination introduction letter for future reference.     Fe Jose [facility]  Care Coordination

## 2024-12-08 ENCOUNTER — APPOINTMENT (OUTPATIENT)
Dept: GENERAL RADIOLOGY | Facility: CLINIC | Age: 74
End: 2024-12-08
Attending: EMERGENCY MEDICINE
Payer: COMMERCIAL

## 2024-12-08 ENCOUNTER — APPOINTMENT (OUTPATIENT)
Dept: CT IMAGING | Facility: CLINIC | Age: 74
End: 2024-12-08
Attending: EMERGENCY MEDICINE
Payer: COMMERCIAL

## 2024-12-08 ENCOUNTER — APPOINTMENT (OUTPATIENT)
Dept: MRI IMAGING | Facility: CLINIC | Age: 74
End: 2024-12-08
Attending: EMERGENCY MEDICINE
Payer: COMMERCIAL

## 2024-12-08 ENCOUNTER — HOSPITAL ENCOUNTER (OUTPATIENT)
Facility: CLINIC | Age: 74
Setting detail: OBSERVATION
Discharge: HOME | End: 2024-12-09
Attending: EMERGENCY MEDICINE | Admitting: STUDENT IN AN ORGANIZED HEALTH CARE EDUCATION/TRAINING PROGRAM
Payer: COMMERCIAL

## 2024-12-08 ENCOUNTER — APPOINTMENT (OUTPATIENT)
Dept: ULTRASOUND IMAGING | Facility: CLINIC | Age: 74
End: 2024-12-08
Attending: EMERGENCY MEDICINE
Payer: COMMERCIAL

## 2024-12-08 DIAGNOSIS — I20.0 UNSTABLE ANGINA (H): Primary | ICD-10-CM

## 2024-12-08 DIAGNOSIS — R29.898 ARM WEAKNESS: ICD-10-CM

## 2024-12-08 DIAGNOSIS — I10 BENIGN ESSENTIAL HYPERTENSION: ICD-10-CM

## 2024-12-08 DIAGNOSIS — E78.5 HYPERLIPIDEMIA, UNSPECIFIED HYPERLIPIDEMIA TYPE: Chronic | ICD-10-CM

## 2024-12-08 DIAGNOSIS — G45.9 TIA (TRANSIENT ISCHEMIC ATTACK): ICD-10-CM

## 2024-12-08 LAB
ALBUMIN SERPL BCG-MCNC: 3.8 G/DL (ref 3.5–5.2)
ALP SERPL-CCNC: 95 U/L (ref 40–150)
ALT SERPL W P-5'-P-CCNC: 20 U/L (ref 0–70)
ANION GAP SERPL CALCULATED.3IONS-SCNC: 11 MMOL/L (ref 7–15)
AST SERPL W P-5'-P-CCNC: 30 U/L (ref 0–45)
ATRIAL RATE - MUSE: 60 BPM
BASOPHILS # BLD AUTO: 0.1 10E3/UL (ref 0–0.2)
BASOPHILS NFR BLD AUTO: 1 %
BILIRUB SERPL-MCNC: 0.9 MG/DL
BUN SERPL-MCNC: 19.4 MG/DL (ref 8–23)
CALCIUM SERPL-MCNC: 9.1 MG/DL (ref 8.8–10.4)
CHLORIDE SERPL-SCNC: 104 MMOL/L (ref 98–107)
CHOLEST SERPL-MCNC: 135 MG/DL
CREAT SERPL-MCNC: 1.02 MG/DL (ref 0.67–1.17)
D DIMER PPP FEU-MCNC: 0.63 UG/ML FEU (ref 0–0.5)
DIASTOLIC BLOOD PRESSURE - MUSE: NORMAL MMHG
EGFRCR SERPLBLD CKD-EPI 2021: 76 ML/MIN/1.73M2
EOSINOPHIL # BLD AUTO: 0 10E3/UL (ref 0–0.7)
EOSINOPHIL NFR BLD AUTO: 0 %
ERYTHROCYTE [DISTWIDTH] IN BLOOD BY AUTOMATED COUNT: 12.7 % (ref 10–15)
GLUCOSE SERPL-MCNC: 111 MG/DL (ref 70–99)
HCO3 SERPL-SCNC: 20 MMOL/L (ref 22–29)
HCT VFR BLD AUTO: 43.4 % (ref 40–53)
HDLC SERPL-MCNC: 51 MG/DL
HGB BLD-MCNC: 14.8 G/DL (ref 13.3–17.7)
IMM GRANULOCYTES # BLD: 0 10E3/UL
IMM GRANULOCYTES NFR BLD: 0 %
INTERPRETATION ECG - MUSE: NORMAL
LDLC SERPL CALC-MCNC: 74 MG/DL
LIPASE SERPL-CCNC: 19 U/L (ref 13–60)
LYMPHOCYTES # BLD AUTO: 1.2 10E3/UL (ref 0.8–5.3)
LYMPHOCYTES NFR BLD AUTO: 16 %
MCH RBC QN AUTO: 33.3 PG (ref 26.5–33)
MCHC RBC AUTO-ENTMCNC: 34.1 G/DL (ref 31.5–36.5)
MCV RBC AUTO: 98 FL (ref 78–100)
MONOCYTES # BLD AUTO: 0.6 10E3/UL (ref 0–1.3)
MONOCYTES NFR BLD AUTO: 8 %
NEUTROPHILS # BLD AUTO: 5.9 10E3/UL (ref 1.6–8.3)
NEUTROPHILS NFR BLD AUTO: 75 %
NONHDLC SERPL-MCNC: 84 MG/DL
NRBC # BLD AUTO: 0 10E3/UL
NRBC BLD AUTO-RTO: 0 /100
P AXIS - MUSE: 49 DEGREES
PLATELET # BLD AUTO: 254 10E3/UL (ref 150–450)
POTASSIUM SERPL-SCNC: 4.6 MMOL/L (ref 3.4–5.3)
PR INTERVAL - MUSE: 198 MS
PROT SERPL-MCNC: 6.5 G/DL (ref 6.4–8.3)
QRS DURATION - MUSE: 94 MS
QT - MUSE: 430 MS
QTC - MUSE: 430 MS
R AXIS - MUSE: -37 DEGREES
RBC # BLD AUTO: 4.45 10E6/UL (ref 4.4–5.9)
SODIUM SERPL-SCNC: 135 MMOL/L (ref 135–145)
SYSTOLIC BLOOD PRESSURE - MUSE: NORMAL MMHG
T AXIS - MUSE: 49 DEGREES
TRIGL SERPL-MCNC: 51 MG/DL
TROPONIN T SERPL HS-MCNC: 17 NG/L
TROPONIN T SERPL HS-MCNC: 19 NG/L
VENTRICULAR RATE- MUSE: 60 BPM
WBC # BLD AUTO: 7.8 10E3/UL (ref 4–11)

## 2024-12-08 PROCEDURE — 99223 1ST HOSP IP/OBS HIGH 75: CPT | Mod: GC | Performed by: PSYCHIATRY & NEUROLOGY

## 2024-12-08 PROCEDURE — 70549 MR ANGIOGRAPH NECK W/O&W/DYE: CPT | Mod: 26 | Performed by: RADIOLOGY

## 2024-12-08 PROCEDURE — 71275 CT ANGIOGRAPHY CHEST: CPT

## 2024-12-08 PROCEDURE — 99223 1ST HOSP IP/OBS HIGH 75: CPT | Mod: 25 | Performed by: STUDENT IN AN ORGANIZED HEALTH CARE EDUCATION/TRAINING PROGRAM

## 2024-12-08 PROCEDURE — 71046 X-RAY EXAM CHEST 2 VIEWS: CPT

## 2024-12-08 PROCEDURE — 250N000011 HC RX IP 250 OP 636: Performed by: EMERGENCY MEDICINE

## 2024-12-08 PROCEDURE — 93010 ELECTROCARDIOGRAM REPORT: CPT | Performed by: EMERGENCY MEDICINE

## 2024-12-08 PROCEDURE — 83690 ASSAY OF LIPASE: CPT | Performed by: EMERGENCY MEDICINE

## 2024-12-08 PROCEDURE — 255N000002 HC RX 255 OP 636: Performed by: EMERGENCY MEDICINE

## 2024-12-08 PROCEDURE — 84520 ASSAY OF UREA NITROGEN: CPT | Performed by: EMERGENCY MEDICINE

## 2024-12-08 PROCEDURE — 70553 MRI BRAIN STEM W/O & W/DYE: CPT | Mod: 26 | Performed by: RADIOLOGY

## 2024-12-08 PROCEDURE — 70549 MR ANGIOGRAPH NECK W/O&W/DYE: CPT

## 2024-12-08 PROCEDURE — 250N000013 HC RX MED GY IP 250 OP 250 PS 637: Performed by: EMERGENCY MEDICINE

## 2024-12-08 PROCEDURE — 99285 EMERGENCY DEPT VISIT HI MDM: CPT | Performed by: EMERGENCY MEDICINE

## 2024-12-08 PROCEDURE — 82465 ASSAY BLD/SERUM CHOLESTEROL: CPT

## 2024-12-08 PROCEDURE — G0378 HOSPITAL OBSERVATION PER HR: HCPCS

## 2024-12-08 PROCEDURE — 99285 EMERGENCY DEPT VISIT HI MDM: CPT | Mod: 25 | Performed by: EMERGENCY MEDICINE

## 2024-12-08 PROCEDURE — 93971 EXTREMITY STUDY: CPT | Mod: 26 | Performed by: RADIOLOGY

## 2024-12-08 PROCEDURE — 80048 BASIC METABOLIC PNL TOTAL CA: CPT | Performed by: EMERGENCY MEDICINE

## 2024-12-08 PROCEDURE — 93005 ELECTROCARDIOGRAM TRACING: CPT | Performed by: EMERGENCY MEDICINE

## 2024-12-08 PROCEDURE — 85004 AUTOMATED DIFF WBC COUNT: CPT | Performed by: EMERGENCY MEDICINE

## 2024-12-08 PROCEDURE — 36415 COLL VENOUS BLD VENIPUNCTURE: CPT | Performed by: EMERGENCY MEDICINE

## 2024-12-08 PROCEDURE — 250N000013 HC RX MED GY IP 250 OP 250 PS 637

## 2024-12-08 PROCEDURE — 70553 MRI BRAIN STEM W/O & W/DYE: CPT

## 2024-12-08 PROCEDURE — 85041 AUTOMATED RBC COUNT: CPT | Performed by: EMERGENCY MEDICINE

## 2024-12-08 PROCEDURE — 71046 X-RAY EXAM CHEST 2 VIEWS: CPT | Mod: 26 | Performed by: RADIOLOGY

## 2024-12-08 PROCEDURE — 93971 EXTREMITY STUDY: CPT | Mod: LT

## 2024-12-08 PROCEDURE — 70544 MR ANGIOGRAPHY HEAD W/O DYE: CPT

## 2024-12-08 PROCEDURE — 85379 FIBRIN DEGRADATION QUANT: CPT | Performed by: EMERGENCY MEDICINE

## 2024-12-08 PROCEDURE — 71275 CT ANGIOGRAPHY CHEST: CPT | Mod: 26 | Performed by: RADIOLOGY

## 2024-12-08 PROCEDURE — 84484 ASSAY OF TROPONIN QUANT: CPT | Performed by: EMERGENCY MEDICINE

## 2024-12-08 PROCEDURE — A9585 GADOBUTROL INJECTION: HCPCS | Performed by: EMERGENCY MEDICINE

## 2024-12-08 RX ORDER — METOPROLOL SUCCINATE 25 MG/1
25 TABLET, EXTENDED RELEASE ORAL DAILY
Status: DISCONTINUED | OUTPATIENT
Start: 2024-12-08 | End: 2024-12-09 | Stop reason: HOSPADM

## 2024-12-08 RX ORDER — AMOXICILLIN 250 MG
2 CAPSULE ORAL 2 TIMES DAILY PRN
Status: DISCONTINUED | OUTPATIENT
Start: 2024-12-08 | End: 2024-12-09 | Stop reason: HOSPADM

## 2024-12-08 RX ORDER — HYDRALAZINE HYDROCHLORIDE 10 MG/1
10 TABLET, FILM COATED ORAL EVERY 4 HOURS PRN
Status: DISCONTINUED | OUTPATIENT
Start: 2024-12-08 | End: 2024-12-09 | Stop reason: HOSPADM

## 2024-12-08 RX ORDER — POLYETHYLENE GLYCOL 3350 17 G/17G
17 POWDER, FOR SOLUTION ORAL 2 TIMES DAILY PRN
Status: DISCONTINUED | OUTPATIENT
Start: 2024-12-08 | End: 2024-12-09 | Stop reason: HOSPADM

## 2024-12-08 RX ORDER — ONDANSETRON 2 MG/ML
4 INJECTION INTRAMUSCULAR; INTRAVENOUS EVERY 6 HOURS PRN
Status: DISCONTINUED | OUTPATIENT
Start: 2024-12-08 | End: 2024-12-09 | Stop reason: HOSPADM

## 2024-12-08 RX ORDER — ATORVASTATIN CALCIUM 10 MG/1
10 TABLET, FILM COATED ORAL DAILY
Status: DISCONTINUED | OUTPATIENT
Start: 2024-12-08 | End: 2024-12-09

## 2024-12-08 RX ORDER — ACETAMINOPHEN 325 MG/1
650 TABLET ORAL EVERY 4 HOURS PRN
Status: DISCONTINUED | OUTPATIENT
Start: 2024-12-08 | End: 2024-12-09 | Stop reason: HOSPADM

## 2024-12-08 RX ORDER — HYDRALAZINE HYDROCHLORIDE 20 MG/ML
10 INJECTION INTRAMUSCULAR; INTRAVENOUS EVERY 4 HOURS PRN
Status: DISCONTINUED | OUTPATIENT
Start: 2024-12-08 | End: 2024-12-09 | Stop reason: HOSPADM

## 2024-12-08 RX ORDER — IOPAMIDOL 755 MG/ML
72 INJECTION, SOLUTION INTRAVASCULAR ONCE
Status: COMPLETED | OUTPATIENT
Start: 2024-12-08 | End: 2024-12-08

## 2024-12-08 RX ORDER — ONDANSETRON 4 MG/1
4 TABLET, ORALLY DISINTEGRATING ORAL EVERY 6 HOURS PRN
Status: DISCONTINUED | OUTPATIENT
Start: 2024-12-08 | End: 2024-12-09 | Stop reason: HOSPADM

## 2024-12-08 RX ORDER — ACETAMINOPHEN 650 MG/1
650 SUPPOSITORY RECTAL EVERY 4 HOURS PRN
Status: DISCONTINUED | OUTPATIENT
Start: 2024-12-08 | End: 2024-12-09 | Stop reason: HOSPADM

## 2024-12-08 RX ORDER — GADOBUTROL 604.72 MG/ML
0.1 INJECTION INTRAVENOUS ONCE
Status: COMPLETED | OUTPATIENT
Start: 2024-12-08 | End: 2024-12-08

## 2024-12-08 RX ORDER — ASPIRIN 81 MG/1
81 TABLET, CHEWABLE ORAL DAILY
Status: DISCONTINUED | OUTPATIENT
Start: 2024-12-08 | End: 2024-12-08

## 2024-12-08 RX ORDER — FLECAINIDE ACETATE 50 MG/1
50 TABLET ORAL 2 TIMES DAILY
Status: DISCONTINUED | OUTPATIENT
Start: 2024-12-08 | End: 2024-12-09 | Stop reason: HOSPADM

## 2024-12-08 RX ORDER — AMOXICILLIN 250 MG
1 CAPSULE ORAL 2 TIMES DAILY PRN
Status: DISCONTINUED | OUTPATIENT
Start: 2024-12-08 | End: 2024-12-09 | Stop reason: HOSPADM

## 2024-12-08 RX ORDER — PROCHLORPERAZINE MALEATE 5 MG/1
5 TABLET ORAL EVERY 6 HOURS PRN
Status: DISCONTINUED | OUTPATIENT
Start: 2024-12-08 | End: 2024-12-09 | Stop reason: HOSPADM

## 2024-12-08 RX ORDER — AMLODIPINE BESYLATE 5 MG/1
5 TABLET ORAL DAILY
Status: DISCONTINUED | OUTPATIENT
Start: 2024-12-08 | End: 2024-12-09 | Stop reason: HOSPADM

## 2024-12-08 RX ORDER — LOSARTAN POTASSIUM 50 MG/1
50 TABLET ORAL EVERY MORNING
Status: DISCONTINUED | OUTPATIENT
Start: 2024-12-08 | End: 2024-12-09 | Stop reason: HOSPADM

## 2024-12-08 RX ORDER — ASPIRIN 81 MG/1
324 TABLET, CHEWABLE ORAL ONCE
Status: DISCONTINUED | OUTPATIENT
Start: 2024-12-08 | End: 2024-12-08

## 2024-12-08 RX ORDER — ASPIRIN 81 MG/1
324 TABLET, CHEWABLE ORAL ONCE
Status: COMPLETED | OUTPATIENT
Start: 2024-12-08 | End: 2024-12-08

## 2024-12-08 RX ADMIN — GADOBUTROL 10 ML: 604.72 INJECTION INTRAVENOUS at 10:25

## 2024-12-08 RX ADMIN — FLECAINIDE ACETATE 50 MG: 50 TABLET ORAL at 09:22

## 2024-12-08 RX ADMIN — ATORVASTATIN CALCIUM 10 MG: 10 TABLET, FILM COATED ORAL at 09:22

## 2024-12-08 RX ADMIN — IOPAMIDOL 72 ML: 755 INJECTION, SOLUTION INTRAVENOUS at 05:42

## 2024-12-08 RX ADMIN — ASPIRIN 81 MG CHEWABLE TABLET 81 MG: 81 TABLET CHEWABLE at 04:02

## 2024-12-08 RX ADMIN — ASPIRIN 81 MG CHEWABLE TABLET 324 MG: 81 TABLET CHEWABLE at 04:01

## 2024-12-08 RX ADMIN — FLECAINIDE ACETATE 50 MG: 50 TABLET ORAL at 20:38

## 2024-12-08 RX ADMIN — METOPROLOL SUCCINATE 25 MG: 25 TABLET, EXTENDED RELEASE ORAL at 09:19

## 2024-12-08 ASSESSMENT — ACTIVITIES OF DAILY LIVING (ADL)
ADLS_ACUITY_SCORE: 49
ADLS_ACUITY_SCORE: 57
ADLS_ACUITY_SCORE: 54
ADLS_ACUITY_SCORE: 57
ADLS_ACUITY_SCORE: 49
ADLS_ACUITY_SCORE: 49
ADLS_ACUITY_SCORE: 54
ADLS_ACUITY_SCORE: 49
ADLS_ACUITY_SCORE: 57
ADLS_ACUITY_SCORE: 49
ADLS_ACUITY_SCORE: 57
ADLS_ACUITY_SCORE: 57
ADLS_ACUITY_SCORE: 54
ADLS_ACUITY_SCORE: 54
ADLS_ACUITY_SCORE: 49
ADLS_ACUITY_SCORE: 49
ADLS_ACUITY_SCORE: 54
ADLS_ACUITY_SCORE: 49
ADLS_ACUITY_SCORE: 49

## 2024-12-08 NOTE — CONSULTS
"[facility]    Stroke Consult Note    Reason for Consult:  c/f TIA    Chief Complaint: Generalized Weakness and Arm Pain (Left arm pain)       HPI  74 year old male who has a PMHx significant for Factor V Leiden heterozygous mutation,  May-Thurner syndrome, recurrent LLE DVT s/p pharmaco- mechanical thrombolysis and stent placement (2011),   BPH, nephrolithiasis, HTN and HLD, paroxysmal afib on metoprolol for rate control and xarelto for a/c, who presents today after brief episode of diaphoresis and left arm weakness that self resolved. Neurology consulted d/t concern for Transient ischemic attack.    Patient tells me that he was preparing dinner, standing in his kitchen when he suddenly felt diaphoretic \"like I was catching a cold\" and left arm weakness that lasted for half a minute. He describes the weakness as generalized but unable to specify.  He denies arm pain with the symptoms.  He is unsure if he had numbness tingling in the arm as well but states that could have been a possibility.  He denies vision changes, changes in speech, or paresthesias or weakness in other linder of the body. He also denies dizziness, headache, chest pain and loss of consciousness. Patient denies clumsiness or dropping objects with left arm. He was able to finish preparing dinner and decided to go to sleep. He read a lot of articles online regarding neurologic symptoms related to Afib so decided to come to Emergency department for further evaluation. He currently feels at baseline in Emergency department.     He currently denies any residual weakness, numbness or speech difficulties at this time. He denies recent trauma or infection. He has no prior history of stroke or Transient ischemic attack. He is compliant with Xarelto. Patient is former smoker (quit ~2011) and denies alcohol or other illicit substance use.     TIA Evaluation Summarized    MRI and/or Head CT None, pending "   Intracranial Vasculature None, pending   Cervical Vasculature None, pending     Echocardiogram 6/21/24 Global and regional left ventricular function is normal with an EF of 55-60%.  Biplane LVEF is 55%.  Global right ventricular function is normal.  No pericardial effusion is present.  This study was compared with the study from 12/11/23 .No significant changes  noted.     EKG/Telemetry SR qtc 430   Other Testing Not Applicable       LDL No lab value available in past 30 days   A1C No lab value available in past 90 days       ABCD2 Patients Score   Age >= 60 years 1 point 1   Blood Pressure    SBP >= 140 or DBP >=  90    1 point 1   Clinical Features    - Unilateral weakness    - Speech disturbance w/o weakness    - Other    2 points  1 point    0 points 1   Duration of symptoms    >= 60 minutes    10-59 minutes    < 10 minutes   2 points  1 point  0 points 0   Diabetes  1 point 0   Patient s ABCD2 Score (0-7) = 3       Impression  #Transient chills and left arm weakness c/f Transient ischemic attack  #Atrial fibrillation on Xarelto  #Factor V Leiden mutation, heterozygous   #Hx of recurrent LLE DVTs   Patient is 73yo male with past medical history significant for factor V Leyden heterozygous mutation, May Morris syndrome, recurrent left lower extremity DVT, hypertension, hyperlipidemia, proximal atrial fibrillation on Xarelto who presents with transient diaphoresis and left arm weakness that self resolved.  Neurology consulted to workup for possible transient ischemic attack.  Patient reports feeling at baseline, neurologic exam was nonfocal with intact sensation, strength, coordination.  AB CD2 score of 3 for age, blood pressure, unilateral weakness.  Given multiple risk factors including factor V Leyden heterozygous mutation, history of recurrent DVTs, proximal atrial fibrillation, patient is at risk for possible intracranial clot development that led to brief unilateral weakness.  However he states he is  compliant on Xarelto which would theoretically lower his risk.  His last TTE was relatively unremarkable with preserved ejection fraction.  His last LDL was within recommended range and he is nondiabetic.  However, given new neurologic symptom, would benefit from repeat echocardiogram to assess for any cardiac abnormalities.  Will also get brain imaging to assess for any intracranial injuries as it appears that patient has not had any prior for comparison.    Recommendations   -MRI/MRA  -TTE  - Avoid hypotonic IV fluids  - MRI /MRA  - TTE   - Telemetry, EKG  -  Lipid Panel  - Bedside Glucose Monitoring  - Sleep Apnea Screen  - Euthermia, Euglycemia      Patient Follow-up      Thank you for this consult. We will admit this patient to the Stroke Service.    The patient was discussed with Stroke Fellow, Dr. Ramos.  The Stroke Staff overnight is Dr. Mccullough. The daytime stroke attending is Dr. Schmidt.    Raissa Goodwin MD  Neurology Resident     _____________________________________________________    Clinically Significant Risk Factors Present on Admission                # Drug Induced Coagulation Defect: home medication list includes an anticoagulant medication    # Hypertension: Noted on problem list                       Past Medical History    Past Medical History:   Diagnosis Date    Acute deep vein thrombosis (DVT) (H) 11/21/2011 11/2011- acute and extensive LLE DVTs, he underwent thrombolysis by IR       Atrial fibrillation with RVR (H) 12/11/2023    Chest pain, unspecified type 03/31/2021    Renal stones 03/21/2013    2 mm size non obstructing  2 right, 1 left==CT  [place] 3/13       Medications   Home Meds  Prior to Admission medications    Medication Sig Start Date End Date Taking? Authorizing Provider   acetaminophen (TYLENOL) 325 MG tablet Take 2 tablets (650 mg) by mouth every 4 hours as needed for other (mild pain).  Patient not taking: Reported on 10/30/2024 9/20/24   Delfin Kimball MD   amLODIPine  (NORVASC) 5 MG tablet Take 1 tablet (5 mg) by mouth daily. 11/7/24   Jimy Maya MD   atorvastatin (LIPITOR) 10 MG tablet Take 1 tablet (10 mg) by mouth daily. 11/7/24   Jimy Maya MD   cholecalciferol (VITAMIN D) 1000 UNIT tablet Take 1,000 Units by mouth every morning. 10/30/14   Isaac Membreno MD   flecainide (TAMBOCOR) 50 MG tablet Take 1 tablet (50 mg) by mouth 2 times daily. 11/7/24   Jimy Maya MD   hydrOXYzine HCl (ATARAX) 10 MG tablet Take 1 tablet (10 mg) by mouth every 6 hours as needed for itching or anxiety (with pain, moderate pain).  Patient not taking: Reported on 10/30/2024 9/20/24   Delfin Kimball MD   losartan (COZAAR) 50 MG tablet TAKE 1 TABLET DAILY  Patient taking differently: Take 50 mg by mouth every morning. 8/13/24   Delfin Hernandez MD   metoprolol succinate ER (TOPROL XL) 25 MG 24 hr tablet Take 1 tablet (25 mg) by mouth daily. 11/7/24   Jimy Maya MD   ondansetron (ZOFRAN ODT) 4 MG ODT tab Take 1-2 tablets (4-8 mg) by mouth every 8 hours as needed for nausea. Dissolve ON the tongue. 9/20/24   Delfin Kimball MD   senna-docusate (SENOKOT-S/PERICOLACE) 8.6-50 MG tablet Take 1-2 tablets by mouth 2 times daily.  Patient not taking: Reported on 10/30/2024 9/20/24   Dorie Clifton PA   tamsulosin (FLOMAX) 0.4 MG capsule Take 1 capsule (0.4 mg) by mouth every 24 hours. Until your ureteral stent has been removed  Patient not taking: Reported on 10/30/2024 9/20/24   Dorie Clifton PA   tolterodine (DETROL) 2 MG tablet Take 1 tablet (2 mg) by mouth every 12 hours as needed for incontinence (Spasms).  Patient not taking: Reported on 10/30/2024 9/20/24   Dorie Clifton PA   XARELTO ANTICOAGULANT 20 MG TABS tablet TAKE 1 TABLET DAILY WITH DINNER 10/30/24   Collin Salazar MD       Scheduled Meds  Current Facility-Administered Medications   Medication Dose Route Frequency Provider Last Rate Last Admin    aspirin (ASA) chewable tablet 81 mg  81 mg  Oral Daily Sylvia Blanchard MD   81 mg at 12/08/24 0402       Infusion Meds  Current Facility-Administered Medications   Medication Dose Route Frequency Provider Last Rate Last Admin       Allergies   No Known Allergies       PHYSICAL EXAMINATION   Temp:  [97.8  F (36.6  C)] 97.8  F (36.6  C)  Pulse:  [64] 64  Resp:  [18] 18  BP: (147)/(81) 147/81  SpO2:  [96 %] 96 %    General Exam  General:  patient lying in bed without any acute distress    HEENT:  normocephalic/atraumatic, no oral lesions, no epistaxis   Cardio:  RRR  Pulmonary:  no respiratory distress  Extremities:  mild peripheral edema, warm and well perfused  Skin:  intact     Neuro Exam  Mental Status:  alert, oriented x 3, follows commands, speech clear and fluent, naming and repetition normal  Cranial Nerves:  visual fields intact, EOMI with normal smooth pursuit, facial sensation intact and symmetric facial movements symmetric, hearing not formally tested but intact to conversation, no dysarthria, shoulder shrug equal bilaterally, tongue protrusion midline  Motor:  no abnormal movements, able to move all limbs antigravity spontaneously with no signs of hemiparesis observed, no pronator drift   Reflexes:  normoreflexic and symmetric in bilateral upper extremities (biceps and brachioradialis), hyporeflexic left patellar, VLAD right patellar (knee surgery), hyporeflexic symmetric bilateral achilles. Toes downgoing no clonus.  Sensory:  light touch sensation intact and symmetric throughout upper and lower extremities, no extinction on double simultaneous stimulation  Coordination:  FNF and HS intact bilaterally without dysmetria  Station/Gait:  deferred      Imaging  I personally reviewed all imaging; relevant findings per HPI.    Labs Data   CBC  Recent Labs   Lab 12/08/24  0213 12/02/24  0743   WBC 7.8 7.5   RBC 4.45 4.56   HGB 14.8 15.1   HCT 43.4 45.2    166     Basic Metabolic Panel   Recent Labs   Lab 12/08/24  0212 12/02/24  0850    139    POTASSIUM 4.6 4.0   CHLORIDE 104 107   CO2 20* 27   BUN 19.4 13.6   CR 1.02 0.98   * 118*   MARTA 9.1 8.8     Liver Panel  Recent Labs   Lab 12/08/24  0212   PROTTOTAL 6.5   ALBUMIN 3.8   BILITOTAL 0.9   ALKPHOS 95   AST 30   ALT 20     INR    Recent Labs   Lab Test 02/28/24  0812 07/21/23  0844 05/10/23  1407   INR 1.74* 1.42* 1.49*       LDL 12/14/2023 58  A1c 9/5/24 6.1  TTE 6/21/24 Global and regional left ventricular function is normal with an EF of 55-60%.  Biplane LVEF is 55%.  Global right ventricular function is normal.  No pericardial effusion is present.  This study was compared with the study from 12/11/23 .No significant changes  noted.  ______     Stroke Consult Data Data   This was a non-emergent, non-telestroke consult.

## 2024-12-08 NOTE — PROGRESS NOTES
/77 (BP Location: Right arm, Patient Position: Semi-Fernandez's, Cuff Size: Adult Regular)   Pulse 54   Temp 98.3  F (36.8  C) (Oral)   Resp 18   SpO2 95%     Observation goals:  -diagnostic tests  completed and resulted including head imaging and TTE: not met  -vital signs normal or at patient baseline: met

## 2024-12-08 NOTE — PROGRESS NOTES
/76 (BP Location: Right arm, Patient Position: Semi-Fernandez's, Cuff Size: Adult Regular)   Pulse 60   Temp 97.5  F (36.4  C) (Oral)   Resp 18   SpO2 95%     Observation goals:  -diagnostic tests  completed and resulted including head imaging and TTE: not met  -vital signs normal or at patient baseline: met

## 2024-12-08 NOTE — CONSULTS
Cardiology Inpatient Consultation  Date of Service: 12/08/2024  Patient: Collin Frank  MRN: [id number]  Admission Date: 12/8/2024  Hospital Day: 0            IMPRESSION     Unstable angina   Paroxysmal A fib  HTN  HLD              ASSESSMENT AND PLAN   Collin Frank is a 74 year old male with hx of paroxysmal A fib on LTAC, Factor V Leiden mutation, May-Thurner syndrome, recurrent LLE DVT, HTN, HLD, BPH, nephrolithiasis who presented to the ER with CC of diaphresis and left shoulder pain. Cardiology consulted for concern for ACS.     Patient presents with nonspecific symptoms of diaphoresis and left arm pain that are c/f ACS. Given lack of troponin and no significant EKG findings, presentation would most c/w unstable angina in the best. Patient has had multiple stress tests that did not detect any ischemia, however he has been noted to have severe coronary calcifications on nonspecific CT imaging as well as recurrent atypical symptoms that have been c/f angina. Given patient's multiple risk factors and symptoms, will proceed with coronary angiogram to further delineate patient's anatomy.     Recommendations:   - Coronary angiogram, PARQ discussed, patient consented and NPO for procedure tomorrow; Xarelto held in preparation  - Continue PTA atorvastatin 10mg daily   - Continue PTA flecainide, metoprolol    Plan of care discussed with Dr. Eri Chappell, who agrees with above plan.    Thank you for consulting the cardiovascular services at the [facility]. Please do not hesitate to call us with any questions.     Talib Bautista MD  Cardiology Fellow, PGY-4  Pager: [phone]             HISTORY OF PRESENT ILLNESS     Collin Frank is a 74 year old male with hx of paroxysmal A fib on LTAC, Factor V Leiden mutation, May-Thurner syndrome, recurrent LLE DVT, HTN, HLD, BPH, nephrolithiasis who presented to the ER with CC of diaphresis and left shoulder pain. Cardiology consulted for  concern for ACS.     Last night, while patient was cleaning up after having dinner, he noticed abrupt onset diaphoresis with subsequent tingling and discomfort that radiated down his left arm. He states that these symptoms lasted about an hour before self resolving. He has not had symptoms like this in the past. He denies having any chest pain or pressure throughout this episode. He denies any SOB, FOX, palpitations, lightheadedness, or TLOC. He states that he uses an exercise bike for about 20-30 minutes 2x/week and has not had similar sx when he exercises.     Patient is a previous smoker (quit 2011) and denies EtOH use, marijuana use, or previous recreational drug use. He lives alone in an apartment that is on the second story of a building.    At the time of interview, the patient denies chest pain, dyspnea at rest or with exertion, orthopnea, PND, palpitations, lightheadedness, or syncope.     Review of Systems:    Complete review of systems was performed and negative except per HPI.             CARDIAC HISTORY AND IMAGING     12-Lead ECG:  Sinus rhythm with left axis deviation, no specific ST changes    Transthoracic Echocardiogram(s):  6/21/24  Global and regional left ventricular function is normal with an EF of 55-60%.  Biplane LVEF is 55%.  Global right ventricular function is normal.  No pericardial effusion is present.  This study was compared with the study from 12/11/23 .No significant changes  noted.    Catheterization(s):   N/A    CMR and/or Additional Imaging  Stress echo 7/21/23:  Normal Dobutamine stress echocardiogram at target heart rate.  No resting or stress induced regional wallmotion abnormalities.  Normal resting and stress EKGs.  Normal heart rate and BP response to dobutamine. No symptoms during test.  Normal baseline screening echocardiogram with no significant valvular heart  disease and normal size of visualized aorta.            PAST MEDICAL HISTORY      Past Medical History:    Diagnosis Date    Acute deep vein thrombosis (DVT) (H) 11/21/2011 11/2011- acute and extensive LLE DVTs, he underwent thrombolysis by IR       Atrial fibrillation with RVR (H) 12/11/2023    Chest pain, unspecified type 03/31/2021    Renal stones 03/21/2013    2 mm size non obstructing  2 right, 1 left==CT  [place] 3/13       Active Problems:  Patient Active Problem List    Diagnosis Date Noted    Arm weakness 12/08/2024     Priority: Medium    Nephrolithiasis 09/19/2024     Priority: Medium    Hyperlipidemia 06/26/2024     Priority: Medium    Atrial fibrillation (H) 12/11/2023     Priority: Medium     Presented emergency room 12/11/2023 for weakness and shortness of breath for 3 days. EKG showed A-fib with RVR (heart rate 100-130's). Underwent ALON guided DC cardioversion which converted patient to sinus rhythm.       Class 2 severe obesity due to excess calories with serious comorbidity and body mass index (BMI) of 35.0 to 35.9 in adult (H) 09/11/2023     Priority: Medium    Primary osteoarthritis of both knees 07/03/2019     Priority: Medium    Benign essential hypertension 11/29/2018     Priority: Medium    Benign non-nodular prostatic hyperplasia without lower urinary tract symptoms 12/06/2016     Priority: Medium    FH: prostate cancer 10/10/2013     Priority: Medium    Hepatic hemangioma 03/21/2013     Priority: Medium     1.5 x 1.4 x 1 cm US [place]  3/17/13      Vitamin D deficiency 10/22/2012     Priority: Medium    Long term current use of anticoagulant 10/16/2012     Priority: Medium    Factor 5 Leiden mutation, heterozygous (H) 11/30/2011     Priority: Medium     Homozygous Mutant 2 copies Leiden mutation   Significant risk iof thrombolembolic events  Knott - 11-28-11  Candidate for lifelong anticoagulation         May-Thurner syndrome 11/30/2011     Priority: Medium     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 K[place]t 11-11      History of deep venous thrombosis 11/21/2011     Priority: Medium      Admit 2011- acute and extensive LLE DVTs, s/p thrombolysis by IR        Social History:  Social History     Tobacco Use    Smoking status: Former     Current packs/day: 0.00     Types: Cigarettes     Quit date: 2011     Years since quittin.8     Passive exposure: Never    Smokeless tobacco: Never   Vaping Use    Vaping status: Never Used   Substance Use Topics    Alcohol use: No    Drug use: No     Family History:  Family History   Problem Relation Age of Onset    Alzheimer Disease Mother     Heart Disease Father     Prostate Cancer Father     Cancer Brother 65        pancreatic       Prostate Cancer Brother         2 stents 70% and 90% blockage    Heart Disease Sister      Medications:  Current Facility-Administered Medications   Medication Dose Route Frequency Provider Last Rate Last Admin    [Held by provider] amLODIPine (NORVASC) tablet 5 mg  5 mg Oral Daily Raissa Goodwin MD        atorvastatin (LIPITOR) tablet 10 mg  10 mg Oral Daily Raissa Goodwin MD   10 mg at 24    flecainide (TAMBOCOR) tablet 50 mg  50 mg Oral BID Raissa Goodwin MD   50 mg at 24    [Held by provider] losartan (COZAAR) tablet 50 mg  50 mg Oral QAM Raissa Goodwin MD        metoprolol succinate ER (TOPROL XL) 24 hr tablet 25 mg  25 mg Oral Daily Raissa Goodwin MD   25 mg at 24 09    [Held by provider] rivaroxaban ANTICOAGULANT (XARELTO) tablet 20 mg  20 mg Oral Daily with supper Raissa Goodwin MD         Current Facility-Administered Medications   Medication Dose Route Frequency Provider Last Rate Last Admin    Patient is already receiving anticoagulation with heparin, enoxaparin (LOVENOX), warfarin (COUMADIN)  or other anticoagulant medication   Does not apply Continuous PRN Raissa Goodwin MD                 PHYSICAL EXAM     Temp:  [97  F (36.1  C)-98.3  F (36.8  C)] 97  F (36.1  C)  Pulse:  [54-64] 63  Resp:  [18] 18  BP: (126-148)/(76-89) 148/89  SpO2:  [95 %-98 %] 98 %    Intake/Output Summary  (Last 24 hours) at 12/8/2024 1508  Last data filed at 12/8/2024 1450  Gross per 24 hour   Intake 750 ml   Output --   Net 750 ml     GEN: Elderly appearing gentleman resting in bed in no acute distress  CV: RRR, normal S1, S2. No murmurs. JVP flat. No edema. Symmetric 2+ radial pulses.  Pulm: Clear bilaterally with normal WOB.  Abd: Nondistended.            DIAGNOSTICS     All labs and imaging were reviewed, of note:    CMP  Recent Labs   Lab 12/08/24  0212 12/02/24  0850 12/02/24  0743    139  --    POTASSIUM 4.6 4.0  --    CHLORIDE 104 107  --    CO2 20* 27  --    ANIONGAP 11 5*  --    * 118*  --    BUN 19.4 13.6  --    CR 1.02 0.98  --    GFRESTIMATED 76 80  --    MARTA 9.1 8.8  --    MAG  --   --  2.1   PROTTOTAL 6.5  --   --    ALBUMIN 3.8  --   --    BILITOTAL 0.9  --   --    ALKPHOS 95  --   --    AST 30  --   --    ALT 20  --   --      CBC  Recent Labs   Lab 12/08/24  0213 12/02/24  0743   WBC 7.8 7.5   RBC 4.45 4.56   HGB 14.8 15.1   HCT 43.4 45.2   MCV 98 99   MCH 33.3* 33.1*   MCHC 34.1 33.4   RDW 12.7 13.5    166     INRNo lab results found in last 7 days.  Arterial Blood GasNo lab results found in last 7 days.  Troponin  Lab Results   Component Value Date    CTROPT 19 12/08/2024    CTROPT 17 12/08/2024    CTROPT 26 (H) 12/02/2024    CTROPT 19 12/02/2024    CTROPT 21 09/05/2024    TROPONINIS 21 06/01/2022    TROPONINIS 19 06/01/2022

## 2024-12-08 NOTE — MEDICATION SCRIBE - ADMISSION MEDICATION HISTORY
Medication Scribe Admission Medication History    Admission medication history is complete. The information provided in this note is only as accurate as the sources available at the time of the update.    Information Source(s): Patient via in-person    Pertinent Information: Spoke with patient in person and completed medication hx. Patient brought in medication list from home and reviewed medications. Changes made per medication list and verifying with patient.       Changes made to PTA medication list:  Added: None  Deleted: Acetaminophen 325 MG Tab            Hydroxyzine 10 MG Tab            Zofran 4 MG ODT Tab            Senna 8.6-50 MG Tab            Tamsulosin 0.4 MG Cap            Tolterodine 2 MG Tab  Changed: None    Allergies reviewed with patient and updates made in EHR: no    Medication History Completed By: Mary Trujillo 12/8/2024 10:36 AM    PTA Med List   Medication Sig Last Dose/Taking    amLODIPine (NORVASC) 5 MG tablet Take 1 tablet (5 mg) by mouth daily. 12/6/2024 Bedtime    atorvastatin (LIPITOR) 10 MG tablet Take 1 tablet (10 mg) by mouth daily. 12/7/2024    cholecalciferol (VITAMIN D) 1000 UNIT tablet Take 1,000 Units by mouth every morning. 12/7/2024    flecainide (TAMBOCOR) 50 MG tablet Take 1 tablet (50 mg) by mouth 2 times daily. 12/7/2024 Evening    losartan (COZAAR) 50 MG tablet TAKE 1 TABLET DAILY (Patient taking differently: Take 50 mg by mouth every morning.) 12/7/2024    metoprolol succinate ER (TOPROL XL) 25 MG 24 hr tablet Take 1 tablet (25 mg) by mouth daily. 12/7/2024    XARELTO ANTICOAGULANT 20 MG TABS tablet TAKE 1 TABLET DAILY WITH DINNER 12/7/2024 Evening

## 2024-12-08 NOTE — CARE PLAN
"PRIMARY DIAGNOSIS: \"GENERIC\" NURSING  OUTPATIENT/OBSERVATION GOALS TO BE MET BEFORE DISCHARGE:  ADLs back to baseline: No    Activity and level of assistance: Up with standby assistance.    Pain status: Pain free.    Return to near baseline physical activity: No     Discharge Planner Nurse   Safe discharge environment identified: No  Barriers to discharge: Yes       Entered by: Usman Pickett RN 12/08/2024 4:57 PM    -diagnostic tests  completed and resulted including head imaging and TTE Not met  -vital signs normal or at patient baseline Met  Please review provider order for any additional goals.   Nurse to notify provider when observation goals have been met and patient is ready for discharge.    "

## 2024-12-08 NOTE — H&P
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 Neurology consulted d/t concern for Transient ischemic attack.     Patient tells me that he was preparing dinner, standing in his kitchen when he suddenly felt diaphoretic \"like I was catching a cold\" and left arm weakness that lasted for half a minute. He describes the weakness as generalized but unable to specify.  He denies arm pain with the symptoms.  He is unsure if he had numbness tingling in the arm as well but states that could have been a possibility.  He denies vision changes, changes in speech, or paresthesias or weakness in other linder of the body. He also denies dizziness, headache, chest pain and loss of consciousness. Patient denies clumsiness or dropping objects with left arm. He was able to finish preparing dinner and decided to go to sleep. He read a lot of articles online regarding neurologic symptoms related to Afib so decided to come to Emergency department for further evaluation. He currently feels at baseline in Emergency department.      He currently denies any residual weakness, numbness or speech difficulties at this time. He denies recent trauma or infection. He has no prior history of stroke or Transient ischemic attack. He is compliant with Xarelto. Patient is former smoker (quit ~2011) and denies alcohol or other illicit substance use.     Per chart review, patient had presented to Emergency department 12/2 (last week) for palpitations in bed \"like a shock " "through body\". He was found to be in sinus bradycardia. EKG without acute ischemic findings and Chest xray negative for focal opacities. He was discharged home after a few hours of observation in Emergency department.    Intravenous Thrombolysis  Not given due to:   - minor/isolated/quickly resolving symptoms    Endovascular Treatment  N/a resolved symptoms    Impression   #Transient chills and left arm weakness c/f Transient ischemic attack  #Atrial fibrillation on Xarelto  #Factor V Leiden mutation, heterozygous   #Hx of recurrent LLE DVTs   Patient is 75yo male with past medical history significant for factor V Leyden heterozygous mutation, May Morris syndrome, recurrent left lower extremity DVT, hypertension, hyperlipidemia, proximal atrial fibrillation on Xarelto who presents with transient diaphoresis and left arm weakness that self resolved.  Neurology consulted to workup for possible transient ischemic attack.  Patient reports feeling at baseline, neurologic exam was nonfocal with intact sensation, strength, coordination.  AB CD2 score of 3 for age, blood pressure, unilateral weakness.  Given multiple risk factors including factor V Leyden heterozygous mutation, history of recurrent DVTs, proximal atrial fibrillation, patient is at risk for possible intracranial clot development that led to brief unilateral weakness.  However he states he is compliant on Xarelto which would theoretically lower his risk.  His last TTE was relatively unremarkable with preserved ejection fraction.  His last LDL was within recommended range and he is nondiabetic.  However, given new neurologic symptom, would benefit from repeat echocardiogram to assess for any cardiac abnormalities.  Will also get brain imaging to assess for any intracranial injuries as it appears that patient has not had any prior for comparison.     Plan  #transient episode of left arm weakness r/o Transient Ischemic Attack Plan  - ABCD2 Score: 3  - Admit to " Neurology for observation  - Resume PTA Xarelto 20mg daily  pending head imaging  - Statin pending repeat lipid panel  - MRI/MRA  - 24-hour Telemetry  - Bedside Glucose Monitoring  -   Lipid Panel  - Apnea Screen, Depression Screen  - Euthermia, Euglycemia        #Paroxysmal Atrial fibrillation  #Hx of recurrent LLE DVT  Seen in ED 12/11/2023 for weakness, sweating and shortness of breath for 3 days.  EKG showed A-fib with RVR (heart rate 100-130's).  Underwent ALON guided DC cardioversion which converted patient to sinus rhythm.  Seen in feb and march and found to be back in A-fib with RVR. Cardioversion in July. Currently asymptomatic.  Denies chest pain, dizziness, palpitations, shortness of breath. EKG shows sinus rhythm.   -resume Xarelto pending imaging (held currently)  -PTA flecainide 50mg BID    #Coronary artery disease  #HLD  #hypertension  -holding losartan 50mg QAM, amlodipine 5mg daily pending imaging, permissive hypertension for now  -PTA metoprolol succinate ER 25mg daily  -atorvastatin 10mg daily    #MISC  Pain, anxiety, nausea control  -tylenol prn  -hydroxyzine prn  -zofran prn  -bowel regimen senna-docusate    #Hx of BPH and hematuria  -Asx, CTM. Resume tamsulosin if needed.    Prophylaxis            For VTE Prevention:  - resume PTA Xarelto  pending imaging    For Acid Suppression:  - GI prophylaxis is not indicated    Code Status  Full Code    During initial physical assessment, the plan of care was discussed and developed with patient.  Plan of care includes: head imaging, TTE, labs .    Patient was admitted via [facility] ([place])    The patient will be admitted to the Neuro Critical Care/Stroke team..     The patient was discussed with Stroke Fellow, Dr. Ramos.  The Stroke Staff overnight is Dr. Mccullough. The day time stroke staff is Dr. Schmidt.    Raissa Goodwin MD  Neurology Resident    ___________________________________________________    Nutrition:   Orders Placed This  Encounter      Regular Diet Adult    Clinically Significant Risk Factors Present on Admission                # Drug Induced Coagulation Defect: home medication list includes an anticoagulant medication    # Hypertension: Noted on problem list                     Past Medical History   Past Medical History:   Diagnosis Date    Acute deep vein thrombosis (DVT) (H) 11/21/2011 11/2011- acute and extensive LLE DVTs, he underwent thrombolysis by IR       Atrial fibrillation with RVR (H) 12/11/2023    Chest pain, unspecified type 03/31/2021    Renal stones 03/21/2013    2 mm size non obstructing  2 right, 1 left==CT  Ivor 3/13       Past Surgical History   Past Surgical History:   Procedure Laterality Date    ANESTHESIA CARDIOVERSION N/A 12/11/2023    Procedure: Anesthesia cardioversion;  Surgeon: GENERIC ANESTHESIA PROVIDER;  Location: [facility] OR    ANESTHESIA CARDIOVERSION N/A 7/2/2024    Procedure: Anesthesia cardioversion@1330;  Surgeon: GENERIC ANESTHESIA PROVIDER;  Location: [facility] OR    COLONOSCOPY  01/17/2008    Normal. Repeat in 10 years    COLONOSCOPY WITH CO2 INSUFFLATION N/A 02/01/2018    Procedure: COLONOSCOPY WITH CO2 INSUFFLATION;  COLON SCREEN/ ENGELMANN;  Surgeon: Jan Flores MD;  Location: [facility] OR    COMBINED CYSTOSCOPY, RETROGRADES, URETEROSCOPY, LASER HOLMIUM LITHOTRIPSY URETER(S), INSERT STENT Left 9/19/2024    Procedure: Cystoscopy, Left Ureteroscopy, Left Retrograde Pyelogram, Left Holmium Laser Lithotripsy, Left Ureteral Stent Placement;  Surgeon: Delfin Kimball MD;  Location: [facility] OR    ORTHOPEDIC SURGERY  1975    rt knee ORIF     Medications   Home Meds  Prior to Admission medications    Medication Sig Start Date End Date Taking? Authorizing Provider   acetaminophen (TYLENOL) 325 MG tablet Take 2 tablets (650 mg) by mouth every 4 hours as needed for other (mild pain).  Patient not taking: Reported on 10/30/2024 9/20/24   Delfin Kimball MD   amLODIPine (NORVASC) 5 MG tablet Take 1  tablet (5 mg) by mouth daily. 11/7/24   Jimy Maya MD   atorvastatin (LIPITOR) 10 MG tablet Take 1 tablet (10 mg) by mouth daily. 11/7/24   Jimy Maya MD   cholecalciferol (VITAMIN D) 1000 UNIT tablet Take 1,000 Units by mouth every morning. 10/30/14   Isaac Membreno MD   flecainide (TAMBOCOR) 50 MG tablet Take 1 tablet (50 mg) by mouth 2 times daily. 11/7/24   Jimy Maya MD   hydrOXYzine HCl (ATARAX) 10 MG tablet Take 1 tablet (10 mg) by mouth every 6 hours as needed for itching or anxiety (with pain, moderate pain).  Patient not taking: Reported on 10/30/2024 9/20/24   Delfin Kimball MD   losartan (COZAAR) 50 MG tablet TAKE 1 TABLET DAILY  Patient taking differently: Take 50 mg by mouth every morning. 8/13/24   Delfin Hernandez MD   metoprolol succinate ER (TOPROL XL) 25 MG 24 hr tablet Take 1 tablet (25 mg) by mouth daily. 11/7/24   Jimy Maya MD   ondansetron (ZOFRAN ODT) 4 MG ODT tab Take 1-2 tablets (4-8 mg) by mouth every 8 hours as needed for nausea. Dissolve ON the tongue. 9/20/24   Delfin Kimball MD   senna-docusate (SENOKOT-S/PERICOLACE) 8.6-50 MG tablet Take 1-2 tablets by mouth 2 times daily.  Patient not taking: Reported on 10/30/2024 9/20/24   Dorie Clifton PA   tamsulosin (FLOMAX) 0.4 MG capsule Take 1 capsule (0.4 mg) by mouth every 24 hours. Until your ureteral stent has been removed  Patient not taking: Reported on 10/30/2024 9/20/24   Dorie Clifton PA   tolterodine (DETROL) 2 MG tablet Take 1 tablet (2 mg) by mouth every 12 hours as needed for incontinence (Spasms).  Patient not taking: Reported on 10/30/2024 9/20/24   Fedunok, Dorie A, PA   XARELTO ANTICOAGULANT 20 MG TABS tablet TAKE 1 TABLET DAILY WITH DINNER 10/30/24   Collin Salazar MD       Scheduled Meds  Current Facility-Administered Medications   Medication Dose Route Frequency Provider Last Rate Last Admin    [Held by provider] amLODIPine (NORVASC) tablet 5 mg  5 mg Oral Daily  Raissa Goodwin MD        atorvastatin (LIPITOR) tablet 10 mg  10 mg Oral Daily Raissa Goodwin MD        flecainide (TAMBOCOR) tablet 50 mg  50 mg Oral BID Raissa Goodwin MD        [Held by provider] losartan (COZAAR) tablet 50 mg  50 mg Oral QAM Raissa Goodwin MD        metoprolol succinate ER (TOPROL XL) 24 hr tablet 25 mg  25 mg Oral Daily Raissa Goodwin MD        [Held by provider] rivaroxaban ANTICOAGULANT (XARELTO) tablet 20 mg  20 mg Oral Daily with supper Raissa Goodwin MD           Infusion Meds  Current Facility-Administered Medications   Medication Dose Route Frequency Provider Last Rate Last Admin    Patient is already receiving anticoagulation with heparin, enoxaparin (LOVENOX), warfarin (COUMADIN)  or other anticoagulant medication   Does not apply Continuous PRN Raissa Goodwin MD           PRN Meds  Current Facility-Administered Medications   Medication Dose Route Frequency Provider Last Rate Last Admin    acetaminophen (TYLENOL) tablet 650 mg  650 mg Oral Q4H PRN Raissa Goodwin MD        Or    acetaminophen (TYLENOL) Suppository 650 mg  650 mg Rectal Q4H PRN Raissa Goodwin MD        hydrALAZINE (APRESOLINE) tablet 10 mg  10 mg Oral Q4H PRN Raissa Goodwin MD        Or    hydrALAZINE (APRESOLINE) injection 10 mg  10 mg Intravenous Q4H PRN Raissa Goodwin MD        melatonin tablet 1 mg  1 mg Oral At Bedtime PRN Raissa Goodwin MD        ondansetron (ZOFRAN ODT) ODT tab 4 mg  4 mg Oral Q6H PRN Raissa Goodwin MD        Or    ondansetron (ZOFRAN) injection 4 mg  4 mg Intravenous Q6H PRN Raissa Goodwin MD        Patient is already receiving anticoagulation with heparin, enoxaparin (LOVENOX), warfarin (COUMADIN)  or other anticoagulant medication   Does not apply Continuous PRN Raissa Goodwin MD        polyethylene glycol (MIRALAX) Packet 17 g  17 g Oral BID PRN Raissa Goodwin MD        prochlorperazine (COMPAZINE) injection 5 mg  5 mg Intravenous Q6H PRN Raissa Goodwin MD        Or    prochlorperazine (COMPAZINE) tablet 5 mg   5 mg Oral Q6H PRN Raissa Goodwin MD        senna-docusate (SENOKOT-S/PERICOLACE) 8.6-50 MG per tablet 1 tablet  1 tablet Oral BID PRN Raissa Goodwin MD        Or    senna-docusate (SENOKOT-S/PERICOLACE) 8.6-50 MG per tablet 2 tablet  2 tablet Oral BID PRN Raissa Goodwin MD           Allergies   No Known Allergies  Family History   Family History   Problem Relation Age of Onset    Alzheimer Disease Mother     Heart Disease Father     Prostate Cancer Father     Cancer Brother 65        pancreatic       Prostate Cancer Brother         2 stents 70% and 90% blockage    Heart Disease Sister      Social History   Social History     Tobacco Use    Smoking status: Former     Current packs/day: 0.00     Types: Cigarettes     Quit date: 2011     Years since quittin.8     Passive exposure: Never    Smokeless tobacco: Never   Vaping Use    Vaping status: Never Used   Substance Use Topics    Alcohol use: No    Drug use: No       Review of Systems   The 10 point Review of Systems is negative other than noted in the HPI or here.         PHYSICAL EXAMINATION  Temp:  [97.8  F (36.6  C)] 97.8  F (36.6  C)  Pulse:  [64] 64  Resp:  [18] 18  BP: (147)/(81) 147/81  SpO2:  [96 %] 96 %    General Exam  General:  patient lying in bed without any acute distress    HEENT:  normocephalic/atraumatic, no oral lesions, no epistaxis   Cardio:  RRR  Pulmonary:  no respiratory distress  Extremities:  mild peripheral edema, warm and well perfused  Skin:  intact      Neuro Exam  Mental Status:  alert, oriented x 3, follows commands, speech clear and fluent, naming and repetition normal  Cranial Nerves:  visual fields intact, EOMI with normal smooth pursuit (mildly disconjugate gaze with right eye intorsion with lateral movement and midline though patient denies diplopia) facial sensation intact and symmetric facial movements symmetric, hearing not formally tested but intact to conversation (noted hard of hearing at baseline), no  dysarthria, shoulder shrug equal bilaterally, tongue protrusion midline  Motor:  no abnormal movements, able to move all limbs antigravity spontaneously with no signs of hemiparesis observed, no pronator drift   Reflexes:  normoreflexic and symmetric in bilateral upper extremities (biceps and brachioradialis), hyporeflexic left patellar, VLAD right patellar (knee surgery), hyporeflexic symmetric bilateral achilles. Toes downgoing no clonus.  Sensory:  light touch sensation intact and symmetric throughout upper and lower extremities, no extinction on double simultaneous stimulation  Coordination:  FNF and HS intact bilaterally without dysmetria  Station/Gait:  deferred          Dysphagia Screen  Passed screening, no dysarthria - Regular Diet with thin liquids  12/08/2024      Stroke Scales    NIHSS  1a. Level of Consciousness 0-->Alert, keenly responsive   1b. LOC Questions 0-->Answers both questions correctly   1c. LOC Commands 0-->Performs both tasks correctly   2.   Best Gaze 0-->Normal   3.   Visual 0-->No visual loss   4.   Facial Palsy 0-->Normal symmetrical movements   5a. Motor Arm, Left 0-->No drift, limb holds 90 (or 45) degrees for full 10 secs   5b. Motor Arm, Right 0-->No drift, limb holds 90 (or 45) degrees for full 10 secs   6a. Motor Leg, Left 0-->No drift, leg holds 30 degree position for full 5 secs   6b. Motor Leg, right 0-->No drift, leg holds 30 degree position for full 5 secs   7.   Limb Ataxia 0-->Absent   8.   Sensory 0-->Normal, no sensory loss   9.   Best Language 0-->No aphasia, normal   10. Dysarthria 0-->Normal   11. Extinction and Inattention  0-->No abnormality   Total 0 (12/08/24 0619)       Modified [place] Score (Pre-morbid)  0    Imaging  I personally reviewed all imaging; relevant findings per the HPI.    Lab Results Data   CBC  Recent Labs   Lab 12/08/24  0213 12/02/24  0743   WBC 7.8 7.5   RBC 4.45 4.56   HGB 14.8 15.1   HCT 43.4 45.2    166     Basic Metabolic Panel    Recent Labs   Lab 12/08/24  0212 12/02/24  0850    139   POTASSIUM 4.6 4.0   CHLORIDE 104 107   CO2 20* 27   BUN 19.4 13.6   CR 1.02 0.98   * 118*   MARTA 9.1 8.8     Liver Panel  Recent Labs   Lab 12/08/24  0212   PROTTOTAL 6.5   ALBUMIN 3.8   BILITOTAL 0.9   ALKPHOS 95   AST 30   ALT 20     INR    Recent Labs   Lab Test 02/28/24  0812 07/21/23  0844 05/10/23  1407   INR 1.74* 1.42* 1.49*      Lipid Profile    Recent Labs   Lab Test 12/14/23  0912 12/12/22  1158 12/10/21  1045   CHOL 117 125 109   HDL 47 51 54   LDL 58 62 44   TRIG 61 58 53     A1C    Recent Labs   Lab Test 09/05/24  1808 12/14/23  0912   A1C 6.1* 5.8*     Troponin    Recent Labs   Lab 12/08/24  0411 12/08/24  0212 12/02/24  1055   CTROPT 19 17 26*          Stroke Code / Stroke Consult Data Data    Not a stroke code

## 2024-12-08 NOTE — ED PROVIDER NOTES
[place] EMERGENCY DEPARTMENT ([facility])    12/08/24       ED PROVIDER NOTE       History     Chief Complaint   Patient presents with    Generalized Weakness     HPI  Collin Frank is a 74 year old male with a past medical history of Factor V Leiden mutation, chronic left lower extremity DVT and PE on Xarelto, HTN, Prostate cancer, paroxysmal afib on metoprolol for rate control and xarelto for a/c, who presents today with episodic weakness in his left arm.    Patient reports left arm pain, sweating, and left arm weakness. Patient states his symptoms got better when he laid down. Patient took an OTC cold and flu medication because he thought that was causing his symptoms. Currently, patient denies any left arm weakness. Patient reports a history of heart issues, but denies a similar episode. Denies fevers, shortness of breath, cough, chest pressure. Denies abdominal pain, vomiting, or dizziness. Denies any missed doses of Xarelto.       Past Medical History  Past Medical History:   Diagnosis Date    Acute deep vein thrombosis (DVT) (H) 11/21/2011 11/2011- acute and extensive LLE DVTs, he underwent thrombolysis by IR       Atrial fibrillation with RVR (H) 12/11/2023    Chest pain, unspecified type 03/31/2021    Renal stones 03/21/2013    2 mm size non obstructing  2 right, 1 left==CT  [place] 3/13       Past Surgical History:   Procedure Laterality Date    ANESTHESIA CARDIOVERSION N/A 12/11/2023    Procedure: Anesthesia cardioversion;  Surgeon: GENERIC ANESTHESIA PROVIDER;  Location: [facility] OR    ANESTHESIA CARDIOVERSION N/A 7/2/2024    Procedure: Anesthesia cardioversion@1330;  Surgeon: GENERIC ANESTHESIA PROVIDER;  Location: [facility] OR    COLONOSCOPY  01/17/2008    Normal. Repeat in 10 years    COLONOSCOPY WITH CO2 INSUFFLATION N/A 02/01/2018    Procedure: COLONOSCOPY WITH CO2 INSUFFLATION;  COLON SCREEN/ ENGELMANN;  Surgeon: Jan Flores MD;  Location: [facility] OR    COMBINED CYSTOSCOPY, RETROGRADES,  URETEROSCOPY, LASER HOLMIUM LITHOTRIPSY URETER(S), INSERT STENT Left 2024    Procedure: Cystoscopy, Left Ureteroscopy, Left Retrograde Pyelogram, Left Holmium Laser Lithotripsy, Left Ureteral Stent Placement;  Surgeon: Delfin Kimball MD;  Location: [facility] OR    ORTHOPEDIC SURGERY  1975    rt knee ORIF     acetaminophen (TYLENOL) 325 MG tablet  amLODIPine (NORVASC) 5 MG tablet  atorvastatin (LIPITOR) 10 MG tablet  cholecalciferol (VITAMIN D) 1000 UNIT tablet  flecainide (TAMBOCOR) 50 MG tablet  hydrOXYzine HCl (ATARAX) 10 MG tablet  losartan (COZAAR) 50 MG tablet  metoprolol succinate ER (TOPROL XL) 25 MG 24 hr tablet  ondansetron (ZOFRAN ODT) 4 MG ODT tab  senna-docusate (SENOKOT-S/PERICOLACE) 8.6-50 MG tablet  tamsulosin (FLOMAX) 0.4 MG capsule  tolterodine (DETROL) 2 MG tablet  XARELTO ANTICOAGULANT 20 MG TABS tablet      No Known Allergies  Family History  Family History   Problem Relation Age of Onset    Alzheimer Disease Mother     Heart Disease Father     Prostate Cancer Father     Cancer Brother 65        pancreatic       Prostate Cancer Brother         2 stents 70% and 90% blockage    Heart Disease Sister      Social History   Social History     Tobacco Use    Smoking status: Former     Current packs/day: 0.00     Types: Cigarettes     Quit date: 2011     Years since quittin.8     Passive exposure: Never    Smokeless tobacco: Never   Vaping Use    Vaping status: Never Used   Substance Use Topics    Alcohol use: No    Drug use: No      Past medical history, past surgical history, medications, allergies, family history, and social history were reviewed with the patient. No additional pertinent items.     A complete review of systems was performed with pertinent positives and negatives noted in the HPI, and all other systems negative.    Physical Exam      Physical Exam  Vitals and nursing note reviewed.   Constitutional:       General: He is not in acute distress.     Appearance: Normal  appearance. He is well-developed. He is not ill-appearing, toxic-appearing or diaphoretic.   HENT:      Head: Normocephalic and atraumatic.      Nose: Nose normal.      Mouth/Throat:      Mouth: Mucous membranes are moist.   Eyes:      General: No scleral icterus.     Conjunctiva/sclera: Conjunctivae normal.   Cardiovascular:      Rate and Rhythm: Normal rate.      Heart sounds: Normal heart sounds.   Pulmonary:      Effort: Pulmonary effort is normal. No respiratory distress.      Breath sounds: Normal breath sounds. No stridor.   Chest:      Chest wall: No tenderness.   Abdominal:      General: There is no distension.      Palpations: Abdomen is soft.      Tenderness: There is no abdominal tenderness. There is no guarding or rebound.   Musculoskeletal:         General: No swelling, tenderness, deformity or signs of injury. Normal range of motion.      Left shoulder: Normal.      Left wrist: Normal.      Left hand: Normal.      Cervical back: Normal range of motion and neck supple. No rigidity.   Skin:     General: Skin is warm and dry.      Coloration: Skin is not jaundiced or pale.      Findings: No erythema or rash.   Neurological:      General: No focal deficit present.      Mental Status: He is alert and oriented to person, place, and time.      Sensory: No sensory deficit.      Motor: No weakness.   Psychiatric:         Mood and Affect: Mood normal.         Behavior: Behavior normal.         Thought Content: Thought content normal.           ED Course, Procedures, & Data      Procedures            EKG Interpretation:      Interpreted by Sylvia Blanchard MD  Time reviewed: 01:10:20   Symptoms at time of EKG: Left arm pain    Rhythm: Normal sinus    Rate: 60 bpm  Axis: Left Axis Deviation  Ectopy: None  Conduction: Normal  ST Segments/ T Waves: No ST-T wave changes and No acute ischemic changes  Q Waves: None  Comparison to prior: 12/2/24    Clinical Impression: Normal sinus rhythm with left axis deviation, no  acute ischemic changes             No results found for any visits on 12/08/24.  Medications - No data to display  Labs Ordered and Resulted from Time of ED Arrival to Time of ED Departure - No data to display  Echo Complete   Final Result      MR Brain w/o & w Contrast   Final Result   Impression:   1. No evidence of acute infarction or intracranial hemorrhage.   2. No abnormal enhancing lesions intracranially.   3. Head MRA demonstrates no definite aneurysm or stenosis of the major   intracranial arteries.   4. Neck MRA demonstrates patent major cervical arteries.      I have personally reviewed the examination and initial interpretation   and I agree with the findings.      JAROCHO ALONSO MD            SYSTEM ID:  [id number]      MRA Neck (Carotids) wo & w Contrast   Final Result   Impression:   1. No evidence of acute infarction or intracranial hemorrhage.   2. No abnormal enhancing lesions intracranially.   3. Head MRA demonstrates no definite aneurysm or stenosis of the major   intracranial arteries.   4. Neck MRA demonstrates patent major cervical arteries.      I have personally reviewed the examination and initial interpretation   and I agree with the findings.      JAROCHO ALONSO MD            SYSTEM ID:  [id number]      MRA Brain ([place] [facility] [place]) w/o Contrast   Final Result   Impression:   1. No evidence of acute infarction or intracranial hemorrhage.   2. No abnormal enhancing lesions intracranially.   3. Head MRA demonstrates no definite aneurysm or stenosis of the major   intracranial arteries.   4. Neck MRA demonstrates patent major cervical arteries.      I have personally reviewed the examination and initial interpretation   and I agree with the findings.      JAROCHO ALONSO MD            SYSTEM ID:  [id number]      US Upper Extremity Venous Duplex Left   Final Result   IMPRESSION:   1.  No evidence of left upper extremity deep venous thrombosis.      I have personally reviewed the  examination and initial interpretation   and I agree with the findings.      GERRY HUSSEIN MD            SYSTEM ID:  [id number]      CT Chest Pulmonary Embolism w Contrast   Final Result   IMPRESSION:   1.  Negative for pulmonary embolism.      2.  No evidence of pneumonia or pulmonary edema.      3.  Moderate to severe coronary artery disease.      XR Chest 2 Views   Final Result   IMPRESSION: Heart size within normal limits. A few linear foci of atelectasis or scarring at the left lung base. Lungs are otherwise clear. No visible pneumothorax or pleural effusion.                 Assessment & Plan    Collin Frank is a 74 year old male with a past medical history of Factor V Leiden mutation, chronic left lower extremity DVT and PE on Xarelto, HTN, Prostate cancer, paroxysmal afib on metoprolol for rate control and xarelto for a/c, who presents today with episodic weakness in his left arm.    Ddx: TIA, ACS, DVT of the left upper extremity    Asymptomatic at the time of arrival.  Afebrile, hypertensive to 147/81.  Sats 96% on room air.  EKG with normal sinus rhythm without acute ischemic changes.  Troponin 17.  Repeat 19.  D-dimer elevated to 0.63.  Ordered CT PE and left upper extremity DVT ultrasound.  CT negative for pulmonary embolism or pulmonary pathology.  No DVT on ultrasound.  Consulted neurology for TIA evaluation.  Ordered MRI imaging with angio.  Patient remains without recurrent pain or focal neurologic deficits on examination.  Lipase normal.  CMP normal.  CBC normal.  Patient admitted to neurology on observation status for further TIA workup.      I have reviewed the nursing notes. I have reviewed the findings, diagnosis, plan and need for follow up with the patient.    New Prescriptions    No medications on file       Final diagnoses:   Unstable angina (H)   Benign essential hypertension   Hyperlipidemia, unspecified hyperlipidemia type   Arm weakness   TIA (transient ischemic attack)   Roya AREVALO  Sim, am serving as a trained medical scribe to document services personally performed by Sylvia Blanchard MD based on the provider's statements to me on December 8, 2024.  This document has been checked and approved by the attending provider.    I, Sylvia Blanchard MD, was physically present and have reviewed and verified the accuracy of this note documented by Roya Montemayor, medical scribe.      Sylvia Blanchard MD   [facility] EMERGENCY DEPARTMENT  12/8/2024     Sylvia Blanchard MD  12/11/24 0241

## 2024-12-08 NOTE — ED TRIAGE NOTES
"Pt c/o episode of left arm/shoulder pain lasting \"a couple of minutes\" with diaphoresis and weakness while making dinner. Pt denies CP, SOB. Pt states the pain improved when he laid down. Pt asymptomatic at this time.        "

## 2024-12-09 ENCOUNTER — APPOINTMENT (OUTPATIENT)
Dept: CARDIOLOGY | Facility: CLINIC | Age: 74
End: 2024-12-09
Payer: COMMERCIAL

## 2024-12-09 ENCOUNTER — HOSPITAL ENCOUNTER (OUTPATIENT)
Dept: MEDSURG UNIT | Facility: CLINIC | Age: 74
Discharge: HOME | End: 2024-12-09
Attending: INTERNAL MEDICINE
Payer: COMMERCIAL

## 2024-12-09 VITALS
RESPIRATION RATE: 16 BRPM | TEMPERATURE: 97.5 F | HEART RATE: 52 BPM | DIASTOLIC BLOOD PRESSURE: 71 MMHG | OXYGEN SATURATION: 98 % | SYSTOLIC BLOOD PRESSURE: 157 MMHG

## 2024-12-09 LAB
ANION GAP SERPL CALCULATED.3IONS-SCNC: 10 MMOL/L (ref 7–15)
BI-PLANE LVEF ECHO: NORMAL
BUN SERPL-MCNC: 12.6 MG/DL (ref 8–23)
CALCIUM SERPL-MCNC: 9.1 MG/DL (ref 8.8–10.4)
CHLORIDE SERPL-SCNC: 104 MMOL/L (ref 98–107)
CREAT SERPL-MCNC: 1.07 MG/DL (ref 0.67–1.17)
EGFRCR SERPLBLD CKD-EPI 2021: 72 ML/MIN/1.73M2
ERYTHROCYTE [DISTWIDTH] IN BLOOD BY AUTOMATED COUNT: 12.8 % (ref 10–15)
GLUCOSE SERPL-MCNC: 85 MG/DL (ref 70–99)
HCO3 SERPL-SCNC: 25 MMOL/L (ref 22–29)
HCT VFR BLD AUTO: 46.5 % (ref 40–53)
HGB BLD-MCNC: 15.5 G/DL (ref 13.3–17.7)
INR PPP: 1.24 (ref 0.85–1.15)
LVEF ECHO: NORMAL
MAGNESIUM SERPL-MCNC: 2.1 MG/DL (ref 1.7–2.3)
MCH RBC QN AUTO: 32.7 PG (ref 26.5–33)
MCHC RBC AUTO-ENTMCNC: 33.3 G/DL (ref 31.5–36.5)
MCV RBC AUTO: 98 FL (ref 78–100)
PLATELET # BLD AUTO: 267 10E3/UL (ref 150–450)
POTASSIUM SERPL-SCNC: 4.2 MMOL/L (ref 3.4–5.3)
RBC # BLD AUTO: 4.74 10E6/UL (ref 4.4–5.9)
SODIUM SERPL-SCNC: 139 MMOL/L (ref 135–145)
WBC # BLD AUTO: 7.5 10E3/UL (ref 4–11)

## 2024-12-09 PROCEDURE — G0378 HOSPITAL OBSERVATION PER HR: HCPCS

## 2024-12-09 PROCEDURE — 85014 HEMATOCRIT: CPT | Performed by: NURSE PRACTITIONER

## 2024-12-09 PROCEDURE — 93306 TTE W/DOPPLER COMPLETE: CPT | Mod: 26 | Performed by: INTERNAL MEDICINE

## 2024-12-09 PROCEDURE — 36415 COLL VENOUS BLD VENIPUNCTURE: CPT

## 2024-12-09 PROCEDURE — 36415 COLL VENOUS BLD VENIPUNCTURE: CPT | Performed by: NURSE PRACTITIONER

## 2024-12-09 PROCEDURE — 250N000013 HC RX MED GY IP 250 OP 250 PS 637: Performed by: NURSE PRACTITIONER

## 2024-12-09 PROCEDURE — 85041 AUTOMATED RBC COUNT: CPT | Performed by: NURSE PRACTITIONER

## 2024-12-09 PROCEDURE — 250N000013 HC RX MED GY IP 250 OP 250 PS 637

## 2024-12-09 PROCEDURE — 82435 ASSAY OF BLOOD CHLORIDE: CPT | Performed by: NURSE PRACTITIONER

## 2024-12-09 PROCEDURE — 93306 TTE W/DOPPLER COMPLETE: CPT

## 2024-12-09 PROCEDURE — 999N000142 HC STATISTIC PROCEDURE PREP ONLY

## 2024-12-09 PROCEDURE — 99239 HOSP IP/OBS DSCHRG MGMT >30: CPT | Mod: 25 | Performed by: NURSE PRACTITIONER

## 2024-12-09 PROCEDURE — 85610 PROTHROMBIN TIME: CPT

## 2024-12-09 PROCEDURE — 83735 ASSAY OF MAGNESIUM: CPT | Performed by: NURSE PRACTITIONER

## 2024-12-09 PROCEDURE — 80048 BASIC METABOLIC PNL TOTAL CA: CPT | Performed by: NURSE PRACTITIONER

## 2024-12-09 RX ORDER — LOSARTAN POTASSIUM 50 MG/1
25 TABLET ORAL DAILY
COMMUNITY
Start: 2024-12-09

## 2024-12-09 RX ORDER — ASPIRIN 325 MG
325 TABLET ORAL ONCE
Status: CANCELLED | OUTPATIENT
Start: 2024-12-09 | End: 2024-12-09

## 2024-12-09 RX ORDER — SODIUM CHLORIDE 9 MG/ML
INJECTION, SOLUTION INTRAVENOUS CONTINUOUS
Status: CANCELLED | OUTPATIENT
Start: 2024-12-09

## 2024-12-09 RX ORDER — POTASSIUM CHLORIDE 750 MG/1
40 TABLET, EXTENDED RELEASE ORAL
Status: CANCELLED | OUTPATIENT
Start: 2024-12-09

## 2024-12-09 RX ORDER — SODIUM CHLORIDE 9 MG/ML
INJECTION, SOLUTION INTRAVENOUS CONTINUOUS
Status: DISCONTINUED | OUTPATIENT
Start: 2024-12-09 | End: 2024-12-09 | Stop reason: HOSPADM

## 2024-12-09 RX ORDER — NICOTINE POLACRILEX 4 MG
15-30 LOZENGE BUCCAL
Status: CANCELLED | OUTPATIENT
Start: 2024-12-09

## 2024-12-09 RX ORDER — LIDOCAINE 40 MG/G
CREAM TOPICAL
Status: CANCELLED | OUTPATIENT
Start: 2024-12-09

## 2024-12-09 RX ORDER — LIDOCAINE 40 MG/G
CREAM TOPICAL
Status: DISCONTINUED | OUTPATIENT
Start: 2024-12-09 | End: 2024-12-09 | Stop reason: HOSPADM

## 2024-12-09 RX ORDER — ASPIRIN 325 MG
325 TABLET ORAL ONCE
Status: COMPLETED | OUTPATIENT
Start: 2024-12-09 | End: 2024-12-09

## 2024-12-09 RX ORDER — ATORVASTATIN CALCIUM 40 MG/1
40 TABLET, FILM COATED ORAL DAILY
Qty: 90 TABLET | Refills: 3 | Status: SHIPPED | OUTPATIENT
Start: 2024-12-10

## 2024-12-09 RX ORDER — DEXTROSE MONOHYDRATE 25 G/50ML
25-50 INJECTION, SOLUTION INTRAVENOUS
Status: CANCELLED | OUTPATIENT
Start: 2024-12-09

## 2024-12-09 RX ORDER — ASPIRIN 81 MG/1
243 TABLET, CHEWABLE ORAL ONCE
Status: CANCELLED | OUTPATIENT
Start: 2024-12-09

## 2024-12-09 RX ORDER — POTASSIUM CHLORIDE 750 MG/1
20 TABLET, EXTENDED RELEASE ORAL
Status: CANCELLED | OUTPATIENT
Start: 2024-12-09

## 2024-12-09 RX ORDER — ATORVASTATIN CALCIUM 20 MG/1
40 TABLET, FILM COATED ORAL DAILY
Status: DISCONTINUED | OUTPATIENT
Start: 2024-12-10 | End: 2024-12-09 | Stop reason: HOSPADM

## 2024-12-09 RX ADMIN — ATORVASTATIN CALCIUM 10 MG: 10 TABLET, FILM COATED ORAL at 08:05

## 2024-12-09 RX ADMIN — FLECAINIDE ACETATE 50 MG: 50 TABLET ORAL at 08:05

## 2024-12-09 RX ADMIN — ASPIRIN 325 MG ORAL TABLET 325 MG: 325 PILL ORAL at 08:05

## 2024-12-09 ASSESSMENT — ACTIVITIES OF DAILY LIVING (ADL)
ADLS_ACUITY_SCORE: 53

## 2024-12-09 NOTE — PLAN OF CARE
PRIMARY DIAGNOSIS: ARM WEAKNESS    OUTPATIENT/OBSERVATION GOALS TO BE MET BEFORE DISCHARGE  1. Orthostatic performed: N/A    2. Tolerating PO medications: Yes    3. Return to near baseline physical activity: Yes- Pt denies L. Shoulder pain    4. Cleared for discharge by consultants (if involved): No    Discharge Planner Nurse   Safe discharge environment identified: No  Barriers to discharge: Yes       Entered by: Ashley Edmondson RN 12/08/2024 11:29 PM     Please review provider order for any additional goals.   Nurse to notify provider when observation goals have been met and patient is ready for discharge.      -diagnostic tests  completed and resulted including head imaging and TTE Not met  -vital signs normal or at patient baseline Met

## 2024-12-09 NOTE — PRE-PROCEDURE
Consenting/Education for Cardiology Procedure: Coronary angiogram with possible intervention     Patient understands we would like to perform the listed procedure(s) due to CP     The patient understands the following:      The procedure was described to the patient in detail.     Moderate sedation is planned for this procedure. Patient understands risks and complications of the procedure which include but are not limited to bruising/swelling around the incision site, infection, bleeding, allergic reaction to local anesthetic, air embolism, arterial puncture, stroke, heart attack, need for emergency heart surgery, death.        Patient verbalized understanding of risks and benefits and has elected to proceed with the procedure or procedures listed above.    Park BUENROSTRO CNP  [facility]  ICU Cardiology-CICU Service  Send message or 10 digit call back number Securely via [facility] with the [facility] Web Console (learn more here)    AC: Xarelto held   VS:   BP (!) 157/71 (BP Location: Right arm)   Pulse 52   Temp 97.5  F (36.4  C) (Oral)   Resp 16   SpO2 98%   LABS:  Recent Labs   Lab 12/09/24  0856 12/08/24  0212    135   POTASSIUM 4.2 4.6   CHLORIDE 104 104   CO2 25 20*   BUN 12.6 19.4   CR 1.07 1.02   MAG 2.1  --      Estimated Creatinine Clearance: 67.8 mL/min (based on SCr of 1.07 mg/dL).                 Recent Labs   Lab 12/09/24  0636   INR 1.24*     Recent Labs   Lab 12/09/24  0856 12/08/24  0213   HGB 15.5 14.8   HCT 46.5 43.4    254

## 2024-12-09 NOTE — PROGRESS NOTES
Collin arrived on 2A for [facility] after discharge from Observation.  Slightly hypertensive, denies pain. HR 52.  Left message with sister to determine if he has a ride home.

## 2024-12-09 NOTE — PLAN OF CARE
"Goal Outcome Evaluation:    -diagnostic tests completed and resulted including head imaging and TTE - progressing  -vital signs normal or at patient baseline - met   /80 (BP Location: Right arm)   Pulse 50   Temp 97.9  F (36.6  C) (Oral)   Resp 16   SpO2 97%     PRIMARY DIAGNOSIS: \"GENERIC\" NURSING  OUTPATIENT/OBSERVATION GOALS TO BE MET BEFORE DISCHARGE:  ADLs back to baseline: Yes    Activity and level of assistance: Up with standby assistance.    Pain status: Pain free.    Return to near baseline physical activity: Yes     Discharge Planner Nurse   Safe discharge environment identified: No  Barriers to discharge: Yes       Entered by: Erum Uriarte RN 12/09/2024 12:13 PM     Please review provider order for any additional goals.   Nurse to notify provider when observation goals have been met and patient is ready for discharge.    "

## 2024-12-09 NOTE — PLAN OF CARE
PRIMARY DIAGNOSIS: ARM WEAKNESS     OUTPATIENT/OBSERVATION GOALS TO BE MET BEFORE DISCHARGE  1. Orthostatic performed: N/A     2. Tolerating PO medications: Yes     3. Return to near baseline physical activity: Yes- Pt denies L. Shoulder pain     4. Cleared for discharge by consultants (if involved): No     Discharge Planner Nurse   Safe discharge environment identified: No  Barriers to discharge: Yes       Entered by: Ashley Edmondson RN 12/08/2024 11:29 PM     Please review provider order for any additional goals.   Nurse to notify provider when observation goals have been met and patient is ready for discharge.       -diagnostic tests  completed and resulted including head imaging and TTE Not met  -vital signs normal or at patient baseline Met          Goal Outcome Evaluation:      Plan of Care Reviewed With: patient

## 2024-12-09 NOTE — PLAN OF CARE
"Goal Outcome Evaluation:    -diagnostic tests completed and resulted including head imaging and TTE - progressing  -vital signs normal or at patient baseline - met   /82   Pulse 50   Temp 97.8  F (36.6  C) (Oral)   Resp 18   SpO2 95%     PRIMARY DIAGNOSIS: \"GENERIC\" NURSING  OUTPATIENT/OBSERVATION GOALS TO BE MET BEFORE DISCHARGE:  ADLs back to baseline: Yes    Activity and level of assistance: Up with standby assistance.    Pain status: Pain free.    Return to near baseline physical activity: Yes     Discharge Planner Nurse   Safe discharge environment identified: No  Barriers to discharge: Yes       Entered by: Erum Uriarte RN 12/09/2024 9:45 AM     Please review provider order for any additional goals.   Nurse to notify provider when observation goals have been met and patient is ready for discharge.    "

## 2024-12-09 NOTE — PROGRESS NOTES
[facility]    Vascular Neurology Progress Note    Interval History   NAEO.    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 Evaluating for possible TIA, transferred to cardiology service for further workup including cardiac cath.     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  Neurology consulted to workup for possible transient ischemic attack.  Patient reports feeling at baseline, neurologic exam was nonfocal with intact sensation, strength, coordination.  AB CD2 score of 3 for age, blood pressure, unilateral weakness.  Given multiple risk factors including factor V Leyden heterozygous mutation, history of recurrent DVTs, proximal atrial fibrillation, patient is at risk for possible intracranial clot development that led to brief unilateral weakness.  However he states he is compliant on Xarelto which would theoretically lower his risk.  His last TTE was relatively unremarkable with preserved ejection fraction.  His last LDL was within recommended range and he is nondiabetic.  However, given new neurologic symptom, would benefit from repeat echocardiogram to assess for any cardiac abnormalities.  Will also get brain imaging to assess for any intracranial injuries as it appears that patient has not had any prior for comparison.      #Transient chills and left arm weakness c/f Transient ischemic attack, etiology ESUS  #Atrial fibrillation on Xarelto  #Factor V Leiden mutation, heterozygous   #Hx of recurrent LLE DVTs  MRI, MRA unremarkable. No significant stenosis, flow limitations or areas of ischemia identified that would suggest TIA. Nevertheless, presentation is consistent with TIA, etiology ESUS, in this gentleman with several vascular risk factors as above.   - Goal LDL <70. Consider increasing atorva to high intensity (40 or 80mg). LDL 74 on 12/8/2024  - TTE without signs of PFO or clot. Likely not cardiogenic TIA.   - Further cardiac investigation per cardiology.     DVT Prophylaxis: Per primary    Patient Follow-up    - in the next 12 week(s) with PCP  - In the next 8 weeks with vascular neurology (ordered for you).    No further stroke evaluation is recommended, so we will sign off. Please contact us with any additional questions.    Medically Ready for Discharge: Anticipated in 2-4 Days    The Stroke Staff is Dr. Luevano.    Buddy Pascal MD  Neurology Resident  Pager:  Thaddeus    Physical Examination     Temp: 97.8  F (36.6  C) Temp src: Oral BP: 123/75 Pulse: 52   Resp: 18 SpO2: 95 % O2 Device: None (Room air)      General Exam  General:  patient lying in bed without any acute distress    HEENT:  normocephalic/atraumatic, no oral lesions, no epistaxis   Cardio:  RRR  Pulmonary:  no respiratory distress  Extremities: warm and well perfused    Neuro Exam  Mental Status:  alert, oriented x 3, follows commands, speech clear and fluent, naming and repetition normal  Cranial Nerves:  visual fields intact, EOMI with normal smooth pursuit (mildly disconjugate gaze with right eye intorsion with lateral movement and midline though patient denies diplopia) facial sensation intact and symmetric facial movements symmetric, hearing not formally tested but intact to conversation (noted hard of hearing at baseline), no dysarthria, shoulder shrug  equal bilaterally, tongue protrusion midline  Motor:  no abnormal movements, able to move all limbs antigravity spontaneously with no signs of hemiparesis observed, no pronator drift   Sensory:  light touch sensation intact and symmetric throughout upper and lower extremities, no extinction on double simultaneous stimulation  Station/Gait:  deferred    Stroke Scales     NIHSS: 0    Imaging/Labs   (Bolded imaging and labs new and/or personally reviewed or re-reviewed by me today)    TIA Evaluation Summarized     MRI and/or Head CT None, pending   Intracranial Vasculature None, pending   Cervical Vasculature None, pending      Echocardiogram No cardiac source for embolus identified. Global and regional left ventricular  function is normal with an EF of 60-65%. Biplane LVEF is 61%.  Right ventricular function, chamber size, wall motion, and thickness are  normal.  No significant valvular abnormalities present.  IVC diameter <2.1 cm collapsing >50% with sniff suggests a normal RA pressure  of 3 mmHg.  No pericardial effusion is present.      EKG/Telemetry SR qtc 430   Other Testing Not Applicable        LDL No lab value available in past 30 days   A1C No lab value available in past 90 days              ABCD2 Patients Score   Age >= 60 years 1 point 1   Blood Pressure    SBP >= 140 or DBP >=  90     1 point 1   Clinical Features    - Unilateral weakness    - Speech disturbance w/o weakness    - Other    2 points  1 point     0 points 1   Duration of symptoms    >= 60 minutes    10-59 minutes    < 10 minutes    2 points  1 point  0 points 0   Diabetes  1 point 0   Patient s ABCD2 Score (0-7) = 3

## 2024-12-09 NOTE — DISCHARGE SUMMARY
[address] [address]  p: [phone]    Discharge Summary: Cardiology Service    Collin Frank MRN# [id number]   YOB: 1950 Age: 74 year old       Admission Date: 12/08/2024  Discharge Date: 12/09/2024    Discharge Diagnoses:  # Unstable angina  # Paroxysmal atrial fibrillation  # HTN  # HLD  # Factor V Leiden  # May-Thurner syndrome  # Recurrent LLE DVT  # BPH  # Nephrolithiasis    Brief HPI:  Collin Frank is a 74 year old male with a history of paroxysmal A fib on Xarelto, Factor V Leiden mutation, May-Thurner syndrome, recurrent LLE DVT, HTN, HLD, BPH, nephrolithiasis who presented to the ER with CC of diaphresis and left shoulder pain. Cardiology consulted for concern for ACS. Ultimately transferred to cardiology service for further ischemic evaluation. Patient will have coronary angiogram 12/9/24.     Hospital Course by Diagnosis:  # Concern for unstable angina  # HTN  # HLD  Patient presents with nonspecific symptoms of diaphoresis and left arm pain that are c/f ACS. Given lack of troponin and no significant EKG findings, presentation would most c/w unstable angina in the best. Patient has had multiple stress tests that did not detect any ischemia, however he has been noted to have severe coronary calcifications on nonspecific CT imaging as well as recurrent atypical symptoms that have been c/f angina. Given patient's multiple risk factors and symptoms, will proceed with coronary angiogram to further delineate patient's anatomy.   - TTE 12/9: EF preserved, no rWMA, IVC normal  - Ischemic evaluation, coronary angiogram 12/9   - PTA Losartan and Norvasc held on admission, resume at lower dose with PCP follow up  - Increase PTA Lipitor to high intensity 40 mg daily  - Cardiology and PCP follow up    # Paroxysmal atrial fibrillation  # Factor V Leiden  # May-Thurner syndrome  # Recurrent LLE DVT  - Resume Xarelto post  procedure  - Continue PTA Flecainide 50 mg BID, Metoprolol XL 25 mg daily    # BPH  # Nephrolithiasis  - Continue PTA Tamsulosin    Pertinent Procedures:  1. Coronary angiogram 12/9    Consults:  Neurology  Cardiology    Medication Changes:  See below     Discharge medications:   Current Discharge Medication List        CONTINUE these medications which have CHANGED    Details   atorvastatin (LIPITOR) 40 MG tablet Take 1 tablet (40 mg) by mouth daily.  Qty: 90 tablet, Refills: 3    Associated Diagnoses: Hyperlipidemia, unspecified hyperlipidemia type      losartan (COZAAR) 50 MG tablet Take 0.5 tablets (25 mg) by mouth daily.    Associated Diagnoses: Benign essential hypertension           CONTINUE these medications which have NOT CHANGED    Details   amLODIPine (NORVASC) 5 MG tablet Take 1 tablet (5 mg) by mouth daily.  Qty: 90 tablet, Refills: 3    Associated Diagnoses: Primary hypertension      cholecalciferol (VITAMIN D) 1000 UNIT tablet Take 1,000 Units by mouth every morning.  Qty: 100 tablet, Refills: 3    Associated Diagnoses: Unspecified vitamin D deficiency      flecainide (TAMBOCOR) 50 MG tablet Take 1 tablet (50 mg) by mouth 2 times daily.  Qty: 180 tablet, Refills: 3    Associated Diagnoses: Persistent atrial fibrillation (H)      metoprolol succinate ER (TOPROL XL) 25 MG 24 hr tablet Take 1 tablet (25 mg) by mouth daily.  Qty: 90 tablet, Refills: 3    Associated Diagnoses: Atrial fibrillation with RVR (H)      XARELTO ANTICOAGULANT 20 MG TABS tablet TAKE 1 TABLET DAILY WITH DINNER  Qty: 90 tablet, Refills: 3    Associated Diagnoses: Chronic deep vein thrombosis (DVT) of popliteal vein of left lower extremity (H)             Follow-up:  PCP 1 week  Cardiology 2 weeks for post angiogram evaluation    Labs or imaging requiring follow-up after discharge:  BMP, CBC    Code status:  FULL    Condition on discharge  Temp:  [97  F (36.1  C)-98.3  F (36.8  C)] 97.8  F (36.6  C)  Pulse:  [50-63] 50  Resp:  [16-18]  18  BP: (118-148)/(72-89) 139/82  SpO2:  [92 %-98 %] 95 %  General: Alert, interactive, no acute distress  HEENT: Normocephalic, atraumatic. Sclera anicteric.   Neck: JVP not elevated  Cardiovascular: Regular rate and rhythm, normal S1 and S2, no murmurs, gallops, or rubs. Radial and pedal pulses palpable bilaterally.   Resp: Regular work of breathing on room air. Clear to auscultation bilaterally, no rales, wheezes, or rhonchi  GI: Soft, nontender, nondistended.   Extremities: no edema, no cyanosis or clubbing, warm and well perfused  Skin: Warm and dry, no jaundice or rash  Neuro: Alert and oriented x 3. CN 2-12 intact, moves all extremities equally, normal speech  Psych: Mood and affect are appropriate    Imaging with results:  EKG 12/8/24:      Echocardiogram 12/9/24:  Interpretation Summary  No cardiac source for embolus identified. Global and regional left ventricular  function is normal with an EF of 60-65%. Biplane LVEF is 61%.  Right ventricular function, chamber size, wall motion, and thickness are  normal.  No significant valvular abnormalities present.  IVC diameter <2.1 cm collapsing >50% with sniff suggests a normal RA pressure  of 3 mmHg.  No pericardial effusion is present.     This study was compared with the study from 6/21/2024. No significant changes  noted.    Recent Labs   Lab 12/08/24  0212      POTASSIUM 4.6   CHLORIDE 104   CO2 20*   ANIONGAP 11   *   BUN 19.4   CR 1.02   GFRESTIMATED 76   MARTA 9.1         Patient Care Team:  Collin Salazar MD as PCP - General (Internal Medicine)  Sujey Dutton MD as MD (Advanced Heart Failure and Transplant Cardiology)  Jimy Maya MD as Assigned Heart and Vascular Provider  Delfin Hernandez MD as Assigned PCP  Ray Ji PA-C as Physician Assistant (Physician Assistant - Medical)  Jeramy Blanca MD as Assigned Sleep Provider  Ray Ji PA-C as Assigned Surgical Provider    Time Spent on this Encounter    I, Jeanette Manuel, CHITRA CNP, personally saw the patient today and spent greater than 30 minutes discharging this patient.    >30 minutes spent in discharge, including >50% in counseling and coordination of care, medication review and plan of care recommended on follow up. Questions were answered.   It was our pleasure to care for Collin Frank during this hospitalization. Please do not hesitate to contact me should there be questions regarding the hospital course or discharge plan.    Patient discussed with staff cardiologist, Dr. Larson, who agrees with the above documentation and plan. Documentation represents joint decision making.     CHITRA Luis, CNP  [facility]

## 2024-12-09 NOTE — PROGRESS NOTES
DISCHARGE       ----------------------------------------------------------------------------  Discharged to: Home  Via: Automobile  Accompanied by: Family  Discharge Instructions: diet, activity, medications, follow up appointments, when to call the MD, aftercare instructions, and what to watchout for (i.e. s/s of infection, increasing SOB, palpitations, chest pain,)  Prescriptions: To be filled by       pharmacy per pt's request; medication list reviewed & sent with pt  Follow Up Appointments: arranged; information given  Belongings: All sent with pt  IV: not removed. Pt transported to 2a for procedure immediately following discharge from obs 1a. IV remained per 2a charge nurse request.  Telemetry: off  Pt exhibits understanding of above discharge instructions; all questions answered.    Discharge Paperwork: Signed, copied, and sent home with patient.

## 2024-12-09 NOTE — PROGRESS NOTES
Patient discharged and planned to have angiogram before he goes home today. Unfortunately he has no ride home, was planning on taking  a cab home. Bedside nurse and this writer called sister several times with no answer. Sister has already told inpatient nurse that she has a doctor appointment today and would not be able to drive him home tonight. Floor NP talked to Dr Blanco, will send home and reschedule angiogram as an out patient.

## 2024-12-10 ENCOUNTER — TELEPHONE (OUTPATIENT)
Dept: NEUROLOGY | Facility: CLINIC | Age: 74
End: 2024-12-10

## 2024-12-10 ENCOUNTER — VIRTUAL VISIT (OUTPATIENT)
Dept: UROLOGY | Facility: CLINIC | Age: 74
End: 2024-12-10
Payer: COMMERCIAL

## 2024-12-10 ENCOUNTER — TELEPHONE (OUTPATIENT)
Dept: UROLOGY | Facility: CLINIC | Age: 74
End: 2024-12-10

## 2024-12-10 ENCOUNTER — PATIENT OUTREACH (OUTPATIENT)
Dept: CARE COORDINATION | Facility: CLINIC | Age: 74
End: 2024-12-10

## 2024-12-10 DIAGNOSIS — N20.0 NEPHROLITHIASIS: Primary | ICD-10-CM

## 2024-12-10 DIAGNOSIS — N20.0 CALCIUM OXALATE KIDNEY STONES: ICD-10-CM

## 2024-12-10 ASSESSMENT — PAIN SCALES - GENERAL: PAINLEVEL_OUTOF10: NO PAIN (0)

## 2024-12-10 NOTE — PROGRESS NOTES
Urology Telephonic Office Visit    Telephonic-Visit Details    Type of service:  Telephonic Visit    Telephonic Start Time: 1240    Telephonic End Time: 1253    Originating Location (pt. Location): Home    Distant Location (provider location):  Off-site     Platform used for Telephonic Visit: [employer]             Assessment and Plan:     Assessment: 74 year old male with history of CaOx stones     Plan:  -Reviewed renal US with patient. No noted hydronephrosis. Noted stable nonobstructing right upper pole stone. Discussed option of 24 hour urine study for further evaluation for risks of stone. Pt would like to hold off at this time.  -The patient and I discussed the diagnosis and natural history of urolithiasis. We discussed some general measures to prevent recurrent kidney stones.  These include fluid intake to achieve 2.5 liters of urine per day, decreased salt intake, normal calcium intake, lowering animal protein intake, avoiding high amounts of oxalate containing foods, and increased citrate intake with orange juice/lemonade.  -RTC in 1 year with CT scan or sooner IVETH Ji CNP  Department of Urology  December 10, 2024    I spent a total of 25 minutes spent on the date of the encounter doing chart review, history and exam, documentation, and further activities as noted above.          Chief Complaint:   Post-op visit.          History of Present Illness:    Collin Frank is a pleasant 74 year old male who presents for post-op follow up. PMHx: Afib     Mr. Frank had a left URS/LL on 09/19/24 with Dr. Kimball for a left 10mm ureteral stone which was found on CTU for gross hematuria. Stent was removed in clinic on 09/26/24 without complication.    Stone Analysis: CaOx    Renal US on 11/26/24 (images personally reviewed) revealed bilateral symmetrical kidneys without hydronephrosis. Bladder was unremarkable.     CTU on 08/30/24 (images personally reviewed) revealed a right 2mm nonobstructing  right upper pole stone.     He is doing well post-op. Denies any flank pain, f/c/n/v,  gross hematuria, or dysuria.     First stone episode was in 2014.   Family history of nephrolithiasis in father    Stone Risk Factors: None         Past Medical History:     Past Medical History:   Diagnosis Date    Acute deep vein thrombosis (DVT) (H) 11/21/2011 11/2011- acute and extensive LLE DVTs, he underwent thrombolysis by IR       Atrial fibrillation with RVR (H) 12/11/2023    Chest pain, unspecified type 03/31/2021    Renal stones 03/21/2013    2 mm size non obstructing  2 right, 1 left==CT  Bend 3/13              Past Surgical History:     Past Surgical History:   Procedure Laterality Date    ANESTHESIA CARDIOVERSION N/A 12/11/2023    Procedure: Anesthesia cardioversion;  Surgeon: GENERIC ANESTHESIA PROVIDER;  Location: [facility] OR    ANESTHESIA CARDIOVERSION N/A 7/2/2024    Procedure: Anesthesia cardioversion@1330;  Surgeon: GENERIC ANESTHESIA PROVIDER;  Location: [facility] OR    COLONOSCOPY  01/17/2008    Normal. Repeat in 10 years    COLONOSCOPY WITH CO2 INSUFFLATION N/A 02/01/2018    Procedure: COLONOSCOPY WITH CO2 INSUFFLATION;  COLON SCREEN/ ENGELMANN;  Surgeon: Jan Flores MD;  Location: [facility] OR    COMBINED CYSTOSCOPY, RETROGRADES, URETEROSCOPY, LASER HOLMIUM LITHOTRIPSY URETER(S), INSERT STENT Left 9/19/2024    Procedure: Cystoscopy, Left Ureteroscopy, Left Retrograde Pyelogram, Left Holmium Laser Lithotripsy, Left Ureteral Stent Placement;  Surgeon: Delfin Kimball MD;  Location: [facility] OR    ORTHOPEDIC SURGERY  1975    rt knee ORIF            Medications     Current Outpatient Medications   Medication Sig Dispense Refill    amLODIPine (NORVASC) 5 MG tablet Take 1 tablet (5 mg) by mouth daily. 90 tablet 3    atorvastatin (LIPITOR) 40 MG tablet Take 1 tablet (40 mg) by mouth daily. 90 tablet 3    cholecalciferol (VITAMIN D) 1000 UNIT tablet Take 1,000 Units by mouth every morning. 100 tablet 3    flecainide  (TAMBOCOR) 50 MG tablet Take 1 tablet (50 mg) by mouth 2 times daily. 180 tablet 3    losartan (COZAAR) 50 MG tablet Take 0.5 tablets (25 mg) by mouth daily.      metoprolol succinate ER (TOPROL XL) 25 MG 24 hr tablet Take 1 tablet (25 mg) by mouth daily. 90 tablet 3    XARELTO ANTICOAGULANT 20 MG TABS tablet TAKE 1 TABLET DAILY WITH DINNER 90 tablet 3     No current facility-administered medications for this visit.            Family History:     Family History   Problem Relation Age of Onset    Alzheimer Disease Mother     Heart Disease Father     Prostate Cancer Father     Cancer Brother 65        pancreatic       Prostate Cancer Brother         2 stents 70% and 90% blockage    Heart Disease Sister             Social History:     Social History     Socioeconomic History    Marital status: Single     Spouse name: Not on file    Number of children: Not on file    Years of education: Not on file    Highest education level: Not on file   Occupational History    Not on file   Tobacco Use    Smoking status: Former     Current packs/day: 0.00     Types: Cigarettes     Quit date: 2011     Years since quittin.8     Passive exposure: Never    Smokeless tobacco: Never   Vaping Use    Vaping status: Never Used   Substance and Sexual Activity    Alcohol use: No    Drug use: No    Sexual activity: Never   Other Topics Concern    Parent/sibling w/ CABG, MI or angioplasty before 65F 55M? No   Social History Narrative    Not on file     Social Drivers of Health     Financial Resource Strain: Low Risk  (2024)    Financial Resource Strain     Within the past 12 months, have you or your family members you live with been unable to get utilities (heat, electricity) when it was really needed?: No   Food Insecurity: Low Risk  (2024)    Food Insecurity     Within the past 12 months, did you worry that your food would run out before you got money to buy more?: No     Within the past 12 months, did the food you  bought just not last and you didn t have money to get more?: No   Transportation Needs: Low Risk  (12/8/2024)    Transportation Needs     Within the past 12 months, has lack of transportation kept you from medical appointments, getting your medicines, non-medical meetings or appointments, work, or from getting things that you need?: No   Physical Activity: Insufficiently Active (2/28/2024)    Exercise Vital Sign     Days of Exercise per Week: 7 days     Minutes of Exercise per Session: 20 min   Stress: No Stress Concern Present (2/28/2024)    [employer] - Occupational Stress Questionnaire     Feeling of Stress : Not at all   Social Connections: Moderately Isolated (2/28/2024)    Social Connection and Isolation Panel [NHANES]     Frequency of Communication with Friends and Family: More than three times a week     Frequency of Social Gatherings with Friends and Family: Twice a week     Attends Gnosticist Services: More than 4 times per year     Active Member of Clubs or Organizations: No     Attends Club or Organization Meetings: Never     Marital Status: Never    Interpersonal Safety: Low Risk  (9/19/2024)    Interpersonal Safety     Do you feel physically and emotionally safe where you currently live?: Yes     Within the past 12 months, have you been hit, slapped, kicked or otherwise physically hurt by someone?: No     Within the past 12 months, have you been humiliated or emotionally abused in other ways by your partner or ex-partner?: No   Housing Stability: Low Risk  (12/8/2024)    Housing Stability     Do you have housing? : Yes     Are you worried about losing your housing?: No            Allergies:   Patient has no known allergies.         Review of Systems:  From intake questionnaire   Negative 14 system review except as noted on HPI, nurse's note.         Physical Exam:     The rest of a comprehensive physical examination is deferred due to telephonic visit  restrictions        Labs:    I personally reviewed all applicable laboratory data and went over findings with patient  Significant for:    CBC RESULTS:  Recent Labs   Lab Test 12/09/24  0856 12/08/24  0213 12/02/24  0743 09/05/24  1808   WBC 7.5 7.8 7.5 8.0   HGB 15.5 14.8 15.1 15.6    254 166 270        BMP RESULTS:  Recent Labs   Lab Test 12/09/24  0856 12/08/24  0212 12/02/24  0850 09/19/24  1006 09/05/24  1808 12/10/21  1045 04/01/21  0028 03/31/21  0821 12/29/20  1149 05/06/20  0218    135 139  --  135   < > 140 138 138 138   POTASSIUM 4.2 4.6 4.0  --  4.3   < > 3.9 3.6 4.3 3.9   CHLORIDE 104 104 107  --  101   < > 108 106 105 106   CO2 25 20* 27  --  23   < > 25 23 32 28   ANIONGAP 10 11 5*  --  11   < > 7 8 1* 4   GLC 85 111* 118* 105* 242*   < > 90 122* 88 101*   BUN 12.6 19.4 13.6  --  18.7   < > 15 10 12 16   CR 1.07 1.02 0.98  --  1.12   < > 0.93 0.98 1.02 0.88   GFRESTIMATED 72 76 80  --  68   < > 81 76 73 86   GFRESTBLACK  --   --   --   --   --   --  >90 88 84 >90   MARTA 9.1 9.1 8.8  --  9.3   < > 8.8 8.9 9.3 8.9    < > = values in this interval not displayed.       UA RESULTS:   Recent Labs   Lab Test 09/05/24 2011 03/31/21  1317 01/09/20  1040   SG 1.017 1.014 1.020   URINEPH 5.5 6.5 6.0   NITRITE Negative Negative Negative   RBCU 18* 3* 25-50*   WBCU 3 1 0 - 5       CALCIUM RESULTS  Lab Results   Component Value Date    MARTA 9.1 12/09/2024    MARTA 9.1 12/08/2024    MARTA 8.8 12/02/2024    MARTA 8.8 04/01/2021    MARTA 8.9 03/31/2021    MARTA 9.3 12/29/2020           Imaging:    I personally reviewed all applicable imaging and went over the below findings with patient.    Narrative & Impression   EXAMINATION: US RENAL COMPLETE NON-VASCULAR 11/26/2024 11:49 AM      COMPARISON: 8/30/2024.     HISTORY: Nephrolithiasis     TECHNIQUE: The kidneys and bladder were scanned in the standard  fashion with specialized ultrasound transducer(s) using both gray  scale and limited color/spectral Doppler  techniques.     FINDINGS:     Right kidney: Measures 10.3 cm in length. Parenchyma is of normal  thickness and echogenicity. No focal mass. No hydronephrosis.     Left kidney: Measures 11.0 cm in length. Parenchyma is of normal  thickness and echogenicity. No focal mass. No hydronephrosis. Tiny 2  mm echogenic focus in the lower left kidney with twinkle artifact.     Bladder: Prominent prostate. Otherwise unremarkable.                                                                         IMPRESSION:  1.  No hydronephrosis.  2.  Tiny 2 mm nonobstructing stone in the lower left kidney.

## 2024-12-10 NOTE — TELEPHONE ENCOUNTER
Called patient to offer 12/17/2024 with Erna at [place] or 1/28/25 at 1pm with Tristen at . Patient declined both and prefers to be seen at [facility]. Message sent to clinic    Cristin Field on 12/10/2024 at 12:01 PM

## 2024-12-10 NOTE — PATIENT INSTRUCTIONS
"UROLOGY CLINIC VISIT PATIENT INSTRUCTIONS    DIET & LIFESTYLE CHANGES FOR PATIENTS WITH KIDNEY STONES    If you've had a kidney stone, you are likely to form another. Risk of recurrence is 15% at 1 year, 35% to 40% at 2 years, and 50% at 10 years. Therefore, prevention is very important.  Because of the chemical analysis of both your stone & urine, some changes in your diet & lifestyle are recommended. These recommendations have shown to be effective.    CALCIUM STONES (Oxalate and Phosphate)    Fluid intake is the most important prevention measure to help prevent stones. Fluid intake should be at least 2.5 liters per day or 90-120oz per day. With goal of urine output of >2.5L per day.   Increasing liquids that have citric acid may help such as low calorie orange juice, lemonade (Crystal Light Lemonade or True Lemon/Lime), or adding a citrus to your water.  You can add lemon juice or fresh codey to your water daily.  Lemon juice increases the citrate in your urine, and helps decrease the formation of stone and even breakdown certain types of stones. Add a cap full/teaspoon of pure lemon juice to each glass.   Try to limit sugar, especially if you have diabetes.    Helpful Fluid Measurements:  1 liter = 34oz  1 quart = 32 oz  24 pack water: Each bottle 16.9 oz     Low Oxalate Diet: Limit your consumption of OXALATE-rich foods including:  All nuts and nut products including peanuts, almonds,peanut butter, almond milk  Spinach  Rhubarb  Beets  Chocolate  Soybeans and soy products     Website:   [email or web address]    Below is a link to a PDF that is based on [employer] research. Please stick to pages 6-9 of this document. My suggestion is to review the list of food that is OK. The \"avoid\" list can be overwhelming.  [email or web address]    Low Sodium Diet: Salt (sodium chloride) " is found in abundance in many foods. High sodium levels in the urine can interfere with the kidney's handling of calcium.   Trying a DASH (Dietary Approaches to Stop Hypertension) diet which is eating more fruits and vegetables, limiting salt intake, moderate in low-fat diary products, and low in animal protein.   Try to decrease salt intake to <2000 mg of sodium daily.     Tips for reducing the salt in your diet:  Don't use salt at the table  Reduce the salt used in food preparation. Try 1/2 teaspoon when recipes call for 1 teaspoon.  Use herbs and spices for flavoring instead of salt.  Avoid salty foods.  Check the label before you buy or use a product. Note sodium and portion size information.  Try to consume less than 2,000 mg/day. (1 teaspoon = 2,000 mg)    Foods with high sodium content include:  Processed meat (including luncheon meats, sausage)   Crackers   Instant cereal   Processed cheese   Canned soups   Chips and snack foods   Soy sauce    Low Animal Protein: Reduce animal protein (meat) intake to no more than 8-10 ounces per day. Increase fruit and vegetables to 5 servings per day.    Maintain a normal calcium diet: Calcium rich foods are encouraged, but no more than 1000 - 1200 mg per day. Researches have found that people with low calcium intakes tend to have more stones. Foods with high calcium content are acceptable and include:  Calcium rich foods include:   Diary (cheese, milk, and yogurt)  Enriched cereals  Meat and fish  Dark green vegetables    Limit Vitamin C intake to < 1000 mg daily.    If you have any issues, questions or concerns in the meantime, do not hesitate to contact us at [facility] at [phone] or via [employer].     Eliane Ji CNP  Department of Urology

## 2024-12-10 NOTE — NURSING NOTE
Current patient location: [address] [place] [address] [place]   [facility] [id number]    Is the patient currently in the state of [place]? YES    Visit mode:TELEPHONE    If the visit is dropped, the patient can be reconnected by:TELEPHONE VISIT: Phone number: [phone]    Will anyone else be joining the visit? NO  (If patient encounters technical issues they should call [phone])    Are changes needed to the allergy or medication list? No    Are refills needed on medications prescribed by this physician? NO    Rooming Documentation:  Questionnaire(s) not done per department protocol    Reason for visit: RECHECK Shelby Kocher VVF

## 2024-12-10 NOTE — PROGRESS NOTES
[facility]: Transitions of Care Outreach  Chief Complaint   Patient presents with    Clinic Care Coordination - Post Hospital       Most Recent Admission Date: 12/8/2024   Most Recent Admission Diagnosis: Unstable angina (H) - I20.0     Most Recent Discharge Date: 12/9/2024   Most Recent Discharge Diagnosis: Unstable angina (H) - I20.0  Benign essential hypertension - I10  Hyperlipidemia, unspecified hyperlipidemia type - E78.5  Arm weakness - R29.898  TIA (transient ischemic attack) - G45.9     Transitions of Care Assessment    Discharge Assessment  How are you doing now that you are home?: States a little tired but alright.  How are your symptoms? (Red Flag symptoms escalate to triage hotline per guidelines): Unchanged  Do you know how to contact your clinic care team if you have future questions or changes to your health status? : Yes  Does the patient have their discharge instructions? : Yes  Does the patient have questions regarding their discharge instructions? : No  Were you started on any new medications or were there changes to any of your previous medications? : Yes  Does the patient have all of their medications?: No (see comment) (Patient is waiting to get medications from the pharmacy.)  Do you have questions regarding any of your medications? : No  Do you have all of your needed medical supplies or equipment (DME)?  (i.e. oxygen tank, CPAP, cane, etc.): Yes         Post-op (Clinicians Only)  Did the patient have surgery or a procedure: No  Fever: No  Chills: No  Eating & Drinking: eating and drinking without complaints/concerns  PO Intake: other (Heart Healthy)  Additional Symptoms:  (Denies)  Bowel Function: normal  Date of last BM: 12/10/24  Urinary Status: voiding without complaint/concerns    CCRC Explained and offered Care Coordination support to eligible patients: Yes    Patient accepted? No    Follow up Plan     Discharge Follow-Up  Discharge follow up appointment scheduled  in alignment with recommended follow up timeframe or Transitions of Risk Category? (Low = within 30 days; Moderate= within 14 days; High= within 7 days): No  Patient's follow up appointment not scheduled: Patient accepted scheduling support. Appt scheduled/requested per protocol.    Future Appointments   Date Time Provider Department Center   12/23/2024  3:40 PM [facility]   12/27/2024  9:00 AM [facility] LAB GOLD WAITING [facility]   12/27/2024  9:30 AM [facility]   1/6/2025  8:00 PM BK BED 1 [facility] BK SLEEP   2/21/2025  4:15 PM Jack Ramon MD [facility]   2/27/2025 10:30 AM Jeramy Blanca MD [facility] BK SLEEP       Outpatient Plan as outlined on AVS reviewed with patient.    For any urgent concerns, please contact our 24 hour nurse triage line: [phone] ([phone])       Tamie Riojas RN

## 2024-12-10 NOTE — TELEPHONE ENCOUNTER
[facility] Call Center    Phone Message    May a detailed message be left on voicemail: yes     Reason for Call: Appointment Intake    Referring Provider Name: Buddy Pascal MD    Diagnosis and/or Symptoms: Arm weakness [R29.898]  TIA (transient ischemic attack) [G45.9]    Please review and assist with scheduling, per patient he was admitted on 12/8/24, no hospital follow up order, scheduling instruction needed    Action Taken: Message routed to:  [facility] Neurology    Travel Screening: Not Applicable     Date of Service:

## 2024-12-10 NOTE — TELEPHONE ENCOUNTER
Patient confirmed scheduled appointment:  Date: 2/21/2025  Time: 4:15pm  Visit type: New Neurology  Provider: Tristen  Location: [facility]  Testing/imaging: NA  Additional notes: scheduled per TIFFANIE Zavaleta on 12/10/2024 at 1:00 PM

## 2024-12-10 NOTE — TELEPHONE ENCOUNTER
[facility] Call Center    Phone Message    May a detailed message be left on voicemail: yes     Reason for Call: Other: Patient called in requesting AVS and any educational material in regards to what they discussed during his visit today 12/10/24 (which foods to avoid etc) be mailed to his home address. Please review and call patient back with any questions.      Action Taken: Message routed to:  Other: dejuan uro    Travel Screening: Not Applicable     Date of Service:

## 2024-12-17 ENCOUNTER — VIRTUAL VISIT (OUTPATIENT)
Dept: FAMILY MEDICINE | Facility: CLINIC | Age: 74
End: 2024-12-17
Payer: COMMERCIAL

## 2024-12-17 DIAGNOSIS — Z09 HOSPITAL DISCHARGE FOLLOW-UP: Primary | ICD-10-CM

## 2024-12-17 DIAGNOSIS — I20.89 ANGINA AT REST (H): ICD-10-CM

## 2024-12-17 DIAGNOSIS — I82.532 CHRONIC DEEP VEIN THROMBOSIS (DVT) OF POPLITEAL VEIN OF LEFT LOWER EXTREMITY (H): ICD-10-CM

## 2024-12-17 DIAGNOSIS — D68.51 FACTOR 5 LEIDEN MUTATION, HETEROZYGOUS (H): ICD-10-CM

## 2024-12-17 DIAGNOSIS — E66.812 CLASS 2 SEVERE OBESITY DUE TO EXCESS CALORIES WITH SERIOUS COMORBIDITY AND BODY MASS INDEX (BMI) OF 35.0 TO 35.9 IN ADULT (H): ICD-10-CM

## 2024-12-17 DIAGNOSIS — E66.01 CLASS 2 SEVERE OBESITY DUE TO EXCESS CALORIES WITH SERIOUS COMORBIDITY AND BODY MASS INDEX (BMI) OF 35.0 TO 35.9 IN ADULT (H): ICD-10-CM

## 2024-12-17 PROCEDURE — 99443 PR PHYSICIAN TELEPHONE EVALUATION 21-30 MIN: CPT | Mod: 93 | Performed by: FAMILY MEDICINE

## 2024-12-17 NOTE — PROGRESS NOTES
"Collin is a 74 year old who is being evaluated via a billable telephone visit.    What phone number would you like to be contacted at? [phone]  How would you like to obtain your AVS? Mail a copy  Originating Location (pt. Location): Home    Distant Location (provider location):  On-site    Assessment & Plan       ICD-10-CM    1. Hospital discharge follow-up  Z09       2. Angina at rest (H)  I20.89       3. Chronic deep vein thrombosis (DVT) of popliteal vein of left lower extremity (H)  I82.532       4. Class 2 severe obesity due to excess calories with serious comorbidity and body mass index (BMI) of 35.0 to 35.9 in adult (H)  E66.812     E66.01     Z68.35       5. Factor 5 Leiden mutation, heterozygous (H)  D68.51             DVT - taking xarelto, stable    MED REC REQUIRED  Post Medication Reconciliation Status:     BMI  Estimated body mass index is 35.03 kg/m  as calculated from the following:    Height as of 9/19/24: 1.715 m (5' 7.5\").    Weight as of 10/30/24: 103 kg (227 lb).   Weight management plan: Discussed healthy diet and exercise guidelines      There are no Patient Instructions on file for this visit.    Subjective   Collin is a 74 year old, presenting for the following health issues:  Hospital F/U        12/17/2024     8:55 AM   Additional Questions   Roomed by Aylin   Accompanied by none         12/17/2024     8:55 AM   Patient Reported Additional Medications   Patient reports taking the following new medications none     HPI        Hospital Follow-up Visit:    Hospital/Nursing Home/ Rehab Facility: [facility]  Date of Admission: 12/08/2024  Date of Discharge: 12/09/2024  Reason(s) for Admission: Weakness in left arm  Was the patient in the ICU or did the patient experience delirium during hospitalization?  Yes       Do you have any other stressors you would like to discuss with your provider? No    Problems taking medications regularly:  " None  Medication changes since discharge: None  Problems adhering to non-medication therapy:  None    Summary of hospitalization:  [facility] discharge summary reviewed  Diagnostic Tests/Treatments reviewed.  Follow up needed: none  Other Healthcare Providers Involved in Patient s Care:         None  Update since discharge: improved.       Angiogram scheduled later this month  Asymptomatic since discharge    Plan of care communicated with patient                   Review of Systems  Constitutional, HEENT, cardiovascular, pulmonary, gi and gu systems are negative, except as otherwise noted.      Objective           Vitals:  No vitals were obtained today due to virtual visit.    Physical Exam   General: Alert and no distress //Respiratory: No audible wheeze, cough, or shortness of breath // Psychiatric:  Appropriate affect, tone, and pace of words            Phone call duration: 24 minutes  Signed Electronically by: Delfin Lobato MD

## 2024-12-18 NOTE — TELEPHONE ENCOUNTER
RECORDS RECEIVED FROM: Internal    REASON FOR VISIT: Arm weakness [R29.898]  TIA (transient ischemic attack) [G45.9]    PROVIDER: Jack Ramon MD   DATE OF APPT: 2/21/25 @ 4:15 pm    NOTES (FOR ALL VISITS) STATUS DETAILS   OFFICE NOTE from referring provider Internal Hosp Referral    OFFICE NOTE from other specialist Internal 12/17/24 Delfin Hernandez MD @[facility]     DISCHARGE SUMMARY from hospital Internal 12/8/24-12/9/24 Eri Chappell MD @[facility]     MEDICATION LIST Internal    IMAGING  (FOR ALL VISITS)     MRI (HEAD, NECK, SPINE) Internal [facility]  12/8/24 MR Brain  12/8/24 MRA Neck (Carotid)  12/8/24 MRA Brain (COW)

## 2024-12-23 ENCOUNTER — OFFICE VISIT (OUTPATIENT)
Dept: FAMILY MEDICINE | Facility: CLINIC | Age: 74
End: 2024-12-23
Payer: COMMERCIAL

## 2024-12-23 ENCOUNTER — TELEPHONE (OUTPATIENT)
Dept: CARDIOLOGY | Facility: CLINIC | Age: 74
End: 2024-12-23

## 2024-12-23 VITALS
OXYGEN SATURATION: 96 % | HEIGHT: 68 IN | RESPIRATION RATE: 16 BRPM | HEART RATE: 74 BPM | TEMPERATURE: 97.1 F | WEIGHT: 221 LBS | DIASTOLIC BLOOD PRESSURE: 74 MMHG | SYSTOLIC BLOOD PRESSURE: 114 MMHG | BODY MASS INDEX: 33.49 KG/M2

## 2024-12-23 DIAGNOSIS — Z29.11 NEED FOR VACCINATION AGAINST RESPIRATORY SYNCYTIAL VIRUS: ICD-10-CM

## 2024-12-23 DIAGNOSIS — R73.03 PREDIABETES: ICD-10-CM

## 2024-12-23 DIAGNOSIS — J06.9 BACTERIAL URI: ICD-10-CM

## 2024-12-23 DIAGNOSIS — I10 BENIGN ESSENTIAL HYPERTENSION: ICD-10-CM

## 2024-12-23 DIAGNOSIS — Z00.00 ENCOUNTER FOR MEDICARE ANNUAL WELLNESS EXAM: Primary | ICD-10-CM

## 2024-12-23 DIAGNOSIS — Z23 NEED FOR TDAP VACCINATION: ICD-10-CM

## 2024-12-23 DIAGNOSIS — B96.89 BACTERIAL URI: ICD-10-CM

## 2024-12-23 LAB
ANION GAP SERPL CALCULATED.3IONS-SCNC: 12 MMOL/L (ref 7–15)
BUN SERPL-MCNC: 17.7 MG/DL (ref 8–23)
CALCIUM SERPL-MCNC: 9 MG/DL (ref 8.8–10.4)
CHLORIDE SERPL-SCNC: 102 MMOL/L (ref 98–107)
CREAT SERPL-MCNC: 1.32 MG/DL (ref 0.67–1.17)
EGFRCR SERPLBLD CKD-EPI 2021: 56 ML/MIN/1.73M2
EST. AVERAGE GLUCOSE BLD GHB EST-MCNC: 114 MG/DL
GLUCOSE SERPL-MCNC: 91 MG/DL (ref 70–99)
HBA1C MFR BLD: 5.6 % (ref 0–5.6)
HCO3 SERPL-SCNC: 20 MMOL/L (ref 22–29)
POTASSIUM SERPL-SCNC: 4.2 MMOL/L (ref 3.4–5.3)
SODIUM SERPL-SCNC: 134 MMOL/L (ref 135–145)

## 2024-12-23 PROCEDURE — G0439 PPPS, SUBSEQ VISIT: HCPCS | Performed by: INTERNAL MEDICINE

## 2024-12-23 PROCEDURE — 36415 COLL VENOUS BLD VENIPUNCTURE: CPT | Performed by: INTERNAL MEDICINE

## 2024-12-23 PROCEDURE — 83036 HEMOGLOBIN GLYCOSYLATED A1C: CPT | Performed by: INTERNAL MEDICINE

## 2024-12-23 PROCEDURE — 80048 BASIC METABOLIC PNL TOTAL CA: CPT | Performed by: INTERNAL MEDICINE

## 2024-12-23 RX ORDER — AZITHROMYCIN 250 MG/1
TABLET, FILM COATED ORAL
Qty: 6 TABLET | Refills: 0 | Status: SHIPPED | OUTPATIENT
Start: 2024-12-23 | End: 2024-12-28

## 2024-12-23 RX ORDER — LOSARTAN POTASSIUM 50 MG/1
25 TABLET ORAL DAILY
Qty: 45 TABLET | Refills: 3 | Status: SHIPPED | OUTPATIENT
Start: 2024-12-23

## 2024-12-23 SDOH — HEALTH STABILITY: PHYSICAL HEALTH: ON AVERAGE, HOW MANY MINUTES DO YOU ENGAGE IN EXERCISE AT THIS LEVEL?: 0 MIN

## 2024-12-23 SDOH — HEALTH STABILITY: PHYSICAL HEALTH: ON AVERAGE, HOW MANY DAYS PER WEEK DO YOU ENGAGE IN MODERATE TO STRENUOUS EXERCISE (LIKE A BRISK WALK)?: 0 DAYS

## 2024-12-23 ASSESSMENT — PAIN SCALES - GENERAL: PAINLEVEL_OUTOF10: NO PAIN (0)

## 2024-12-23 ASSESSMENT — SOCIAL DETERMINANTS OF HEALTH (SDOH): HOW OFTEN DO YOU GET TOGETHER WITH FRIENDS OR RELATIVES?: TWICE A WEEK

## 2024-12-23 NOTE — TELEPHONE ENCOUNTER
Called Kirk for pre-angiogram instructions. All questions answered.     Pre-procedure instructions - Coronary Angiogram  Patient Education    Your arrival time is 12/27 at 7am.  Location is [address] Waiting Room  Please plan on being at the hospital all day.  At any time, emergencies and/or urgent cases may come up which could delay the start of your procedure.    Pre-procedure instructions - Coronary Angiogram  Shower in the evening before or the morning of the procedure  No solid food for 8 hours prior and nothing to drink 2 hours prior to arrival time  You can take your morning medications (except for diabetic and blood thinners) with sips of water.  Take 325 mg of Aspirin the night before and the morning of your procedure.  You will need to arrange a ride to drop you off and , as you will be unable to drive home. Prior to discharge you may be required to lay flat for approximately 2-6 hours in the recovery unit to ensure proper clotting of the artery. Please note: You cannot take an Uber/Taxi/etc unless you are accompanied by someone.You will need a responsible adult to stay with you for 24 hours post-procedure.              Diabetic Medication Instructions  Hold oral diabetic medication in morning of your procedure and for 48 hours after IV contrast is given  Typical instructions for insulin diabetic medication holding are below. However, please reach out to your Primary Care Provider or Endocrinologist for specific instructions  DO NOT take any oral diabetic medication, short-acting diabetes medications/insulin, humalog or regular insulin the morning of your test  Take   dose of long-acting insulin (Lantus, Levemir) the day of your test  Remember to bring your glucometer and insulin with you to take after your test if needed  GLP-1 Agonists Instructions  DO NOT take injectable GLP-1 agonists semaglutide (Ozempic,  Wegovy), dulaglutide (Trulicity), exenatide ER (Bydureon), tirzepatide (Mounjaro), or oral semaglutide (Rybelsus) for 7 days prior your procedure  Hold once daily injectable GLP-1 agonists exenatide (Byetta), liraglutide (Saxenda, Victoza), lixisenatide (Soligua) the day before and day of your procedure              Anticoagulation Medication Instructions   rivaroxaban (XARELTO) - Hold 48 hours prior to procedure    You will need to follow up with one of our cardiology APPs 1-2 weeks after your procedure. If you need help scheduling or rescheduling your appointment, please call [phone]

## 2024-12-23 NOTE — PROGRESS NOTES
Preventive Care Visit  [facility] SILVINO Salazar MD, Internal Medicine - Pediatrics  Dec 23, 2024      Assessment & Plan   Problem List Items Addressed This Visit       Benign essential hypertension (Chronic)    Relevant Medications    losartan (COZAAR) 50 MG tablet     Other Visit Diagnoses       Encounter for Medicare annual wellness exam    -  Primary    Need for Tdap vaccination        Need for vaccination against respiratory syncytial virus        Bacterial URI        Relevant Medications    azithromycin (ZITHROMAX) 250 MG tablet    Prediabetes        Relevant Orders    Hemoglobin A1c (Completed)    Basic metabolic panel  (Ca, Cl, CO2, Creat, Gluc, K, Na, BUN) (Completed)                  MED REC REQUIRED  Post Medication Reconciliation Status: discharge medications reconciled, continue medications without change  Counseling  Appropriate preventive services were addressed with this patient via screening, questionnaire, or discussion as appropriate for fall prevention, nutrition, physical activity, Tobacco-use cessation, social engagement, weight loss and cognition.  Checklist reviewing preventive services available has been given to the patient.  Reviewed patient's diet, addressing concerns and/or questions.   The patient was provided with written information regarding signs of hearing loss.     Work on weight loss  Regular exercise      Fermin Polanco is a 74 year old, presenting for the following:  Physical        12/23/2024     2:27 PM   Additional Questions   Roomed by Marimar DIXON          Health Care Directive  Patient has a Health Care Directive on file  Advance care planning document is on file and is current.      12/23/2024   General Health   How would you rate your overall physical health? (!) FAIR   Feel stress (tense, anxious, or unable to sleep) Not at all         12/23/2024   Nutrition   Diet: Regular (no restrictions)         12/23/2024   Exercise   Days per  week of moderate/strenous exercise 0 days   Average minutes spent exercising at this level 0 min   (!) EXERCISE CONCERN      12/23/2024   Social Factors   Frequency of gathering with friends or relatives Twice a week   Worry food won't last until get money to buy more No   Food not last or not have enough money for food? No   Do you have housing? (Housing is defined as stable permanent housing and does not include staying ouside in a car, in a tent, in an abandoned building, in an overnight shelter, or couch-surfing.) Yes   Are you worried about losing your housing? No   Lack of transportation? No   Unable to get utilities (heat,electricity)? No         12/23/2024   Fall Risk   Fallen 2 or more times in the past year? No    Trouble with walking or balance? No        Proxy-reported          12/23/2024   Activities of Daily Living- Home Safety   Needs help with the following daily activites None of the above   Safety concerns in the home None of the above         12/23/2024   Dental   Dentist two times every year? Yes         12/23/2024   Hearing Screening   Hearing concerns? (!) I NEED TO ASK PEOPLE TO SPEAK UP OR REPEAT THEMSELVES.    (!) IT'S HARD TO FOLLOW A CONVERSATION IN A NOISY RESTAURANT OR CROWDED ROOM.    (!) TROUBLE UNDESTANDING A SPEAKER IN A PUBLIC MEETING OR [employer] SERVICE.       Multiple values from one day are sorted in reverse-chronological order         12/23/2024   Driving Risk Screening   Patient/family members have concerns about driving (!) DECLINE         12/23/2024   General Alertness/Fatigue Screening   Have you been more tired than usual lately? No         12/23/2024   Urinary Incontinence Screening   Bothered by leaking urine in past 6 months No         12/23/2024   TB Screening   Were you born outside of the [place]? No         Today's PHQ-2 Score:       12/23/2024     2:39 PM   PHQ-2 ( 1999 Pf[employer])   Q1: Little interest or pleasure in doing things 0    Q2: Feeling down, depressed or  hopeless 0    PHQ-2 Score 0    Q1: Little interest or pleasure in doing things Not at all   Q2: Feeling down, depressed or hopeless Not at all   PHQ-2 Score 0       Proxy-reported           2024   Substance Use   Alcohol more than 3/day or more than 7/wk No   Do you have a current opioid prescription? No   How severe/bad is pain from 1 to 10? 0/10 (No Pain)   Do you use any other substances recreationally? No     Social History     Tobacco Use    Smoking status: Former     Current packs/day: 0.00     Types: Cigarettes     Quit date: 2011     Years since quittin.9     Passive exposure: Never    Smokeless tobacco: Never   Vaping Use    Vaping status: Never Used   Substance Use Topics    Alcohol use: No    Drug use: No       ASCVD Risk   The 10-year ASCVD risk score (Ethel CORDOVA, et al., 2019) is: 22.5%    Values used to calculate the score:      Age: 74 years      Sex: Male      Is Non- : No      Diabetic: No      Tobacco smoker: No      Systolic Blood Pressure: 114 mmHg      Is BP treated: Yes      HDL Cholesterol: 51 mg/dL      Total Cholesterol: 135 mg/dL            Reviewed and updated as needed this visit by Provider                    Lab work is in process  Labs reviewed in EPIC  BP Readings from Last 3 Encounters:   24 114/74   24 (!) 157/71   24 124/77    Wt Readings from Last 3 Encounters:   24 100.2 kg (221 lb)   10/30/24 103 kg (227 lb)   24 102.5 kg (225 lb 15.5 oz)                  Patient Active Problem List   Diagnosis    Factor 5 Leiden mutation, heterozygous (H)    May-Thurner syndrome    Long term current use of anticoagulant    Vitamin D deficiency    Hepatic hemangioma    FH: prostate cancer    Benign non-nodular prostatic hyperplasia without lower urinary tract symptoms    Benign essential hypertension    Primary osteoarthritis of both knees    History of deep venous thrombosis    Class 2 severe obesity due to excess  calories with serious comorbidity and body mass index (BMI) of 35.0 to 35.9 in adult (H)    Atrial fibrillation (H)    Hyperlipidemia    Nephrolithiasis    Arm weakness    Chronic deep vein thrombosis (DVT) of popliteal vein of left lower extremity (H)     Past Surgical History:   Procedure Laterality Date    ANESTHESIA CARDIOVERSION N/A 2023    Procedure: Anesthesia cardioversion;  Surgeon: GENERIC ANESTHESIA PROVIDER;  [facility]    ANESTHESIA CARDIOVERSION N/A 2024    Procedure: Anesthesia cardioversion@1330;  Surgeon: GENERIC ANESTHESIA PROVIDER;  Location: [facility] OR    COLONOSCOPY  2008    Normal. Repeat in 10 years    COLONOSCOPY WITH CO2 INSUFFLATION N/A 2018    Procedure: COLONOSCOPY WITH CO2 INSUFFLATION;  COLON SCREEN/ ENGELMANN;  Surgeon: Jan Flores MD;  Location: [facility] OR    COMBINED CYSTOSCOPY, RETROGRADES, URETEROSCOPY, LASER HOLMIUM LITHOTRIPSY URETER(S), INSERT STENT Left 2024    Procedure: Cystoscopy, Left Ureteroscopy, Left Retrograde Pyelogram, Left Holmium Laser Lithotripsy, Left Ureteral Stent Placement;  Surgeon: Delfin Kimball MD;  Location: [facility] OR    ORTHOPEDIC SURGERY      rt knee ORIF       Social History     Tobacco Use    Smoking status: Former     Current packs/day: 0.00     Types: Cigarettes     Quit date: 2011     Years since quittin.9     Passive exposure: Never    Smokeless tobacco: Never   Substance Use Topics    Alcohol use: No     Family History   Problem Relation Age of Onset    Alzheimer Disease Mother     Heart Disease Father     Prostate Cancer Father     Cancer Brother 65        pancreatic       Prostate Cancer Brother         2 stents 70% and 90% blockage    Heart Disease Sister          Current Outpatient Medications   Medication Sig Dispense Refill    amLODIPine (NORVASC) 5 MG tablet Take 1 tablet (5 mg) by mouth daily. 90 tablet 3    atorvastatin (LIPITOR) 40 MG tablet Take 1 tablet (40 mg) by mouth daily. 90  tablet 3    azithromycin (ZITHROMAX) 250 MG tablet Take 2 tablets (500 mg) by mouth daily for 1 day, THEN 1 tablet (250 mg) daily for 4 days. 6 tablet 0    cholecalciferol (VITAMIN D) 1000 UNIT tablet Take 1,000 Units by mouth every morning. 100 tablet 3    flecainide (TAMBOCOR) 50 MG tablet Take 1 tablet (50 mg) by mouth 2 times daily. 180 tablet 3    losartan (COZAAR) 50 MG tablet Take 0.5 tablets (25 mg) by mouth daily. 45 tablet 3    metoprolol succinate ER (TOPROL XL) 25 MG 24 hr tablet Take 1 tablet (25 mg) by mouth daily. 90 tablet 3    XARELTO ANTICOAGULANT 20 MG TABS tablet TAKE 1 TABLET DAILY WITH DINNER 90 tablet 3     No Known Allergies  Current providers sharing in care for this patient include:  Patient Care Team:  Collin Salazar MD as PCP - General (Internal Medicine)  Sujey Dutton MD as MD (Advanced Heart Failure and Transplant Cardiology)  Jimy Maya MD as Assigned Heart and Vascular Provider  Delfin Hernandez MD as Assigned PCP  Ray Ji PA-C as Physician Assistant (Physician Assistant - Medical)  Jeramy Blanca MD as Assigned Sleep Provider  Ray Ji PA-C as Assigned Surgical Provider    The following health maintenance items are reviewed in Epic and correct as of today:  Health Maintenance   Topic Date Due    DTAP/TDAP/TD IMMUNIZATION (2 - Td or Tdap) 10/16/2022    COVID-19 Vaccine (7 - 2024-25 season) 09/01/2024    LIPID  12/08/2025    LUNG CANCER SCREENING  12/08/2025    MEDICARE ANNUAL WELLNESS VISIT  12/23/2025    BMP  12/23/2025    ANNUAL REVIEW OF HM ORDERS  12/23/2025    FALL RISK ASSESSMENT  12/23/2025    GLUCOSE  12/23/2027    ADVANCE CARE PLANNING  11/06/2029    HEPATITIS C SCREENING  Completed    PHQ-2 (once per calendar year)  Completed    INFLUENZA VACCINE  Completed    Pneumococcal Vaccine: 50+ Years  Completed    ZOSTER IMMUNIZATION  Completed    RSV VACCINE  Completed    HPV IMMUNIZATION  Aged Out    MENINGITIS IMMUNIZATION  Aged Out  "   RSV MONOCLONAL ANTIBODY  Aged Out    COLORECTAL CANCER SCREENING  Discontinued         Review of Systems  Constitutional, HEENT, cardiovascular, pulmonary, gi and gu systems are negative, except as otherwise noted.     Objective    Exam  /74 (BP Location: Right arm, Patient Position: Sitting, Cuff Size: Adult Large)   Pulse 74   Temp 97.1  F (36.2  C) (Temporal)   Resp 16   Ht 1.715 m (5' 7.5\")   Wt 100.2 kg (221 lb)   SpO2 96%   BMI 34.10 kg/m     Estimated body mass index is 34.1 kg/m  as calculated from the following:    Height as of this encounter: 1.715 m (5' 7.5\").    Weight as of this encounter: 100.2 kg (221 lb).    Physical Exam  GENERAL: alert and no distress  EYES: Eyes grossly normal to inspection, PERRL and conjunctivae and sclerae normal  HENT: ear canals and TM's normal, nose and mouth without ulcers or lesions  NECK: no adenopathy, no asymmetry, masses, or scars  RESP: lungs clear to auscultation - no rales, rhonchi or wheezes  CV: regular rate and rhythm, normal S1 S2, no S3 or S4, no murmur, click or rub, no peripheral edema  ABDOMEN: soft, nontender, no hepatosplenomegaly, no masses and bowel sounds normal  MS: no gross musculoskeletal defects noted, no edema  SKIN: no suspicious lesions or rashes  NEURO: Normal strength and tone, mentation intact and speech normal  BACK: no CVA tenderness, no paralumbar tenderness  PSYCH: mentation appears normal, affect normal/bright  LYMPH: no cervical, supraclavicular, axillary, or inguinal adenopathy        12/23/2024   Mini Cog   Mini-Cog Not Completed (choose reason) Deaf/hard of hearing              Signed Electronically by: Collin Salazar MD    "

## 2024-12-23 NOTE — PATIENT INSTRUCTIONS
For the antibiotics   Take 2 pills today   Take one pill daily for 4 days after that with food        Patient Education   Preventive Care Advice   This is general advice given by our system to help you stay healthy. However, your care team may have specific advice just for you. Please talk to your care team about your preventive care needs.  Nutrition  Eat 5 or more servings of fruits and vegetables each day.  Try wheat bread, brown rice and whole grain pasta (instead of white bread, rice, and pasta).  Get enough calcium and vitamin D. Check the label on foods and aim for 100% of the RDA (recommended daily allowance).  Lifestyle  Exercise at least 150 minutes each week  (30 minutes a day, 5 days a week).  Do muscle strengthening activities 2 days a week. These help control your weight and prevent disease.  No smoking.  Wear sunscreen to prevent skin cancer.  Have a dental exam and cleaning every 6 months.  Yearly exams  See your health care team every year to talk about:  Any changes in your health.  Any medicines your care team has prescribed.  Preventive care, family planning, and ways to prevent chronic diseases.  Shots (vaccines)   HPV shots (up to age 26), if you've never had them before.  Hepatitis B shots (up to age 59), if you've never had them before.  COVID-19 shot: Get this shot when it's due.  Flu shot: Get a flu shot every year.  Tetanus shot: Get a tetanus shot every 10 years.  Pneumococcal, hepatitis A, and RSV shots: Ask your care team if you need these based on your risk.  Shingles shot (for age 50 and up)  General health tests  Diabetes screening:  Starting at age 35, Get screened for diabetes at least every 3 years.  If you are younger than age 35, ask your care team if you should be screened for diabetes.  Cholesterol test: At age 39, start having a cholesterol test every 5 years, or more often if advised.  Bone density scan (DEXA): At age 50, ask your care team if you should have this scan for  osteoporosis (brittle bones).  Hepatitis C: Get tested at least once in your life.  STIs (sexually transmitted infections)  Before age 24: Ask your care team if you should be screened for STIs.  After age 24: Get screened for STIs if you're at risk. You are at risk for STIs (including HIV) if:  You are sexually active with more than one person.  You don't use condoms every time.  You or a partner was diagnosed with a sexually transmitted infection.  If you are at risk for HIV, ask about PrEP medicine to prevent HIV.  Get tested for HIV at least once in your life, whether you are at risk for HIV or not.  Cancer screening tests  Cervical cancer screening: If you have a cervix, begin getting regular cervical cancer screening tests starting at age 21.  Breast cancer scan (mammogram): If you've ever had breasts, begin having regular mammograms starting at age 40. This is a scan to check for breast cancer.  Colon cancer screening: It is important to start screening for colon cancer at age 45.  Have a colonoscopy test every 10 years (or more often if you're at risk) Or, ask your provider about stool tests like a FIT test every year or Cologuard test every 3 years.  To learn more about your testing options, visit:   .  For help making a decision, visit:   https://bit.ly/hf69940.  Prostate cancer screening test: If you have a prostate, ask your care team if a prostate cancer screening test (PSA) at age 55 is right for you.  Lung cancer screening: If you are a current or former smoker ages 50 to 80, ask your care team if ongoing lung cancer screenings are right for you.  For informational purposes only. Not to replace the advice of your health care provider. Copyright   2023 [employer]. All rights reserved. Clinically reviewed by the [facility] Transitions Program. [employer] [id number] - REV 01/24.  Hearing Loss: Care Instructions  Overview     Hearing loss is a sudden or slow decrease in how well you hear.  It can range from slight to profound. Permanent hearing loss can occur with aging. It also can happen when you are exposed long-term to loud noise. Examples include listening to loud music, riding motorcycles, or being around other loud machines.  Hearing loss can affect your work and home life. It can make you feel lonely or depressed. You may feel that you have lost your independence. But hearing aids and other devices can help you hear better and feel connected to others.  Follow-up care is a key part of your treatment and safety. Be sure to make and go to all appointments, and call your doctor if you are having problems. It's also a good idea to know your test results and keep a list of the medicines you take.  How can you care for yourself at home?  Avoid loud noises whenever possible. This helps keep your hearing from getting worse.  Always wear hearing protection around loud noises.  Wear a hearing aid as directed.  A professional can help you pick a hearing aid that will work best for you.  You can also get hearing aids over the counter for mild to moderate hearing loss.  Have hearing tests as your doctor suggests. They can show whether your hearing has changed. Your hearing aid may need to be adjusted.  Use other devices as needed. These may include:  Telephone amplifiers and hearing aids that can connect to a television, stereo, radio, or microphone.  Devices that use lights or vibrations. These alert you to the doorbell, a ringing telephone, or a baby monitor.  Television closed-captioning. This shows the words at the bottom of the screen. Most new TVs can do this.  TTY (text telephone). This lets you type messages back and forth on the telephone instead of talking or listening. These devices are also called TDD. When messages are typed on the keyboard, they are sent over the phone line to a receiving TTY. The message is shown on a monitor.  Use text messaging, social media, and email if it is hard for  "you to communicate by telephone.  Try to learn a listening technique called speechreading. It is not lipreading. You pay attention to people's gestures, expressions, posture, and tone of voice. These clues can help you understand what a person is saying. Face the person you are talking to, and have them face you. Make sure the lighting is good. You need to see the other person's face clearly.  Think about counseling if you need help to adjust to your hearing loss.  When should you call for help?  Watch closely for changes in your health, and be sure to contact your doctor if:    You think your hearing is getting worse.     You have new symptoms, such as dizziness or nausea.   Where can you learn more?  Go to https://www.[employer].net/patiented  Enter R798 in the search box to learn more about \"Hearing Loss: Care Instructions.\"  Current as of: September 27, 2023  Content Version: 14.3    2024 [employer].   Care instructions adapted under license by your healthcare professional. If you have questions about a medical condition or this instruction, always ask your healthcare professional. [employer] disclaims any warranty or liability for your use of this information.       "

## 2024-12-26 ENCOUNTER — TELEPHONE (OUTPATIENT)
Dept: CARDIOLOGY | Facility: CLINIC | Age: 74
End: 2024-12-26
Payer: COMMERCIAL

## 2024-12-26 NOTE — TELEPHONE ENCOUNTER
Health Call Center    Phone Message    May a detailed message be left on voicemail: yes     Reason for Call: Other: Whitney called requesting to speak with Kirk's team about his procedure tomorrow including discussing if Kirk could be held overnight at the hospital regardless of what happens during his procedure and if Whitney could be informed of his discharge time as soon as there is a good estimate. Please reach out to Whitney to discuss. Thank you!     Action Taken: Other: Cardiology    Travel Screening: Not Applicable    Thank you!  [facility]       Date of Service:

## 2024-12-26 NOTE — TELEPHONE ENCOUNTER
Called and spoke with patient. No consent on file to communicate with sister. Patient states that sister did call on his behalf in regards to if he would need to stay overnight. Reviewed with patient that patient's are usually sent home after procedure. Patient agreeable with plan. Reviewed with patient that cannot give exact time frame of how long procedure will take or when he will be discharged. Patient verbalized understanding. No further questions regarding procedure at this time.    Yumi Jacome RN, BSN  Cardiology RN Care Coordinator   [place]   Phone: [phone]  Fax: [phone] ([place]) [phone] ([place])

## 2024-12-27 ENCOUNTER — APPOINTMENT (OUTPATIENT)
Dept: LAB | Facility: CLINIC | Age: 74
End: 2024-12-27
Attending: INTERNAL MEDICINE
Payer: COMMERCIAL

## 2024-12-27 ENCOUNTER — HOSPITAL ENCOUNTER (OUTPATIENT)
Facility: CLINIC | Age: 74
Discharge: HOME | End: 2024-12-28
Attending: INTERNAL MEDICINE | Admitting: INTERNAL MEDICINE
Payer: COMMERCIAL

## 2024-12-27 ENCOUNTER — HOSPITAL ENCOUNTER (OUTPATIENT)
Dept: MEDSURG UNIT | Facility: CLINIC | Age: 74
Discharge: HOME | End: 2024-12-27
Attending: INTERNAL MEDICINE | Admitting: INTERNAL MEDICINE
Payer: COMMERCIAL

## 2024-12-27 VITALS
HEART RATE: 52 BPM | SYSTOLIC BLOOD PRESSURE: 132 MMHG | DIASTOLIC BLOOD PRESSURE: 60 MMHG | RESPIRATION RATE: 16 BRPM | OXYGEN SATURATION: 99 % | TEMPERATURE: 97.4 F | WEIGHT: 217.6 LBS | BODY MASS INDEX: 33.58 KG/M2

## 2024-12-27 DIAGNOSIS — R29.898 ARM WEAKNESS: ICD-10-CM

## 2024-12-27 DIAGNOSIS — I24.9 ACUTE CORONARY SYNDROME (H): Primary | ICD-10-CM

## 2024-12-27 PROBLEM — Z98.890 STATUS POST CORONARY ANGIOGRAM: Status: ACTIVE | Noted: 2024-12-27

## 2024-12-27 LAB
ACT BLD: 304 SECONDS (ref 74–150)
ANION GAP SERPL CALCULATED.3IONS-SCNC: 9 MMOL/L (ref 7–15)
APTT PPP: 28 SECONDS (ref 22–38)
BUN SERPL-MCNC: 9.3 MG/DL (ref 8–23)
CALCIUM SERPL-MCNC: 9 MG/DL (ref 8.8–10.4)
CHLORIDE SERPL-SCNC: 104 MMOL/L (ref 98–107)
CHOLEST SERPL-MCNC: 97 MG/DL
CREAT SERPL-MCNC: 1.15 MG/DL (ref 0.67–1.17)
EGFRCR SERPLBLD CKD-EPI 2021: 66 ML/MIN/1.73M2
ERYTHROCYTE [DISTWIDTH] IN BLOOD BY AUTOMATED COUNT: 12.9 % (ref 10–15)
GLUCOSE SERPL-MCNC: 92 MG/DL (ref 70–99)
HCO3 SERPL-SCNC: 24 MMOL/L (ref 22–29)
HCT VFR BLD AUTO: 42 % (ref 40–53)
HDLC SERPL-MCNC: 33 MG/DL
HGB BLD-MCNC: 14.9 G/DL (ref 13.3–17.7)
INR PPP: 1.31 (ref 0.85–1.15)
LDLC SERPL CALC-MCNC: 52 MG/DL
MCH RBC QN AUTO: 33.1 PG (ref 26.5–33)
MCHC RBC AUTO-ENTMCNC: 35.5 G/DL (ref 31.5–36.5)
MCV RBC AUTO: 93 FL (ref 78–100)
NONHDLC SERPL-MCNC: 64 MG/DL
PLATELET # BLD AUTO: 289 10E3/UL (ref 150–450)
POTASSIUM SERPL-SCNC: 3.8 MMOL/L (ref 3.4–5.3)
RBC # BLD AUTO: 4.5 10E6/UL (ref 4.4–5.9)
SODIUM SERPL-SCNC: 137 MMOL/L (ref 135–145)
TRIGL SERPL-MCNC: 59 MG/DL
WBC # BLD AUTO: 5.4 10E3/UL (ref 4–11)

## 2024-12-27 PROCEDURE — 93010 ELECTROCARDIOGRAM REPORT: CPT | Mod: 76 | Performed by: INTERNAL MEDICINE

## 2024-12-27 PROCEDURE — C1725 CATH, TRANSLUMIN NON-LASER: HCPCS | Performed by: INTERNAL MEDICINE

## 2024-12-27 PROCEDURE — C1894 INTRO/SHEATH, NON-LASER: HCPCS | Performed by: INTERNAL MEDICINE

## 2024-12-27 PROCEDURE — 250N000011 HC RX IP 250 OP 636: Performed by: INTERNAL MEDICINE

## 2024-12-27 PROCEDURE — 36415 COLL VENOUS BLD VENIPUNCTURE: CPT

## 2024-12-27 PROCEDURE — 82465 ASSAY BLD/SERUM CHOLESTEROL: CPT | Performed by: STUDENT IN AN ORGANIZED HEALTH CARE EDUCATION/TRAINING PROGRAM

## 2024-12-27 PROCEDURE — 999N000142 HC STATISTIC PROCEDURE PREP ONLY

## 2024-12-27 PROCEDURE — 250N000013 HC RX MED GY IP 250 OP 250 PS 637: Performed by: INTERNAL MEDICINE

## 2024-12-27 PROCEDURE — 999N000134 HC STATISTIC PP CARE STAGE 3

## 2024-12-27 PROCEDURE — 93454 CORONARY ARTERY ANGIO S&I: CPT | Performed by: INTERNAL MEDICINE

## 2024-12-27 PROCEDURE — 85014 HEMATOCRIT: CPT

## 2024-12-27 PROCEDURE — 99153 MOD SED SAME PHYS/QHP EA: CPT | Performed by: INTERNAL MEDICINE

## 2024-12-27 PROCEDURE — 80048 BASIC METABOLIC PNL TOTAL CA: CPT

## 2024-12-27 PROCEDURE — 92928 PRQ TCAT PLMT NTRAC ST 1 LES: CPT | Mod: RC | Performed by: INTERNAL MEDICINE

## 2024-12-27 PROCEDURE — 85730 THROMBOPLASTIN TIME PARTIAL: CPT

## 2024-12-27 PROCEDURE — C1874 STENT, COATED/COV W/DEL SYS: HCPCS | Performed by: INTERNAL MEDICINE

## 2024-12-27 PROCEDURE — 99152 MOD SED SAME PHYS/QHP 5/>YRS: CPT | Performed by: INTERNAL MEDICINE

## 2024-12-27 PROCEDURE — 999N000054 HC STATISTIC EKG NON-CHARGEABLE

## 2024-12-27 PROCEDURE — 250N000009 HC RX 250: Performed by: INTERNAL MEDICINE

## 2024-12-27 PROCEDURE — 258N000003 HC RX IP 258 OP 636

## 2024-12-27 PROCEDURE — 85610 PROTHROMBIN TIME: CPT

## 2024-12-27 PROCEDURE — 250N000013 HC RX MED GY IP 250 OP 250 PS 637

## 2024-12-27 PROCEDURE — C1887 CATHETER, GUIDING: HCPCS | Performed by: INTERNAL MEDICINE

## 2024-12-27 PROCEDURE — 85347 COAGULATION TIME ACTIVATED: CPT

## 2024-12-27 PROCEDURE — 99152 MOD SED SAME PHYS/QHP 5/>YRS: CPT | Mod: GC | Performed by: INTERNAL MEDICINE

## 2024-12-27 PROCEDURE — C9600 PERC DRUG-EL COR STENT SING: HCPCS | Performed by: INTERNAL MEDICINE

## 2024-12-27 PROCEDURE — C1769 GUIDE WIRE: HCPCS | Performed by: INTERNAL MEDICINE

## 2024-12-27 PROCEDURE — 999N000248 HC STATISTIC IV INSERT WITH US BY RN

## 2024-12-27 PROCEDURE — 93454 CORONARY ARTERY ANGIO S&I: CPT | Mod: 26 | Performed by: INTERNAL MEDICINE

## 2024-12-27 PROCEDURE — 272N000001 HC OR GENERAL SUPPLY STERILE: Performed by: INTERNAL MEDICINE

## 2024-12-27 PROCEDURE — 93005 ELECTROCARDIOGRAM TRACING: CPT

## 2024-12-27 PROCEDURE — 85041 AUTOMATED RBC COUNT: CPT

## 2024-12-27 PROCEDURE — 250N000013 HC RX MED GY IP 250 OP 250 PS 637: Performed by: STUDENT IN AN ORGANIZED HEALTH CARE EDUCATION/TRAINING PROGRAM

## 2024-12-27 DEVICE — STENT CORONARY DES SYNERGY XD MR US 3.00X20MM H7493941820300: Type: IMPLANTABLE DEVICE | Status: FUNCTIONAL

## 2024-12-27 DEVICE — STENT CORONARY DES SYNERGY XD MR US 3.00X16MM H7493941816300: Type: IMPLANTABLE DEVICE | Status: FUNCTIONAL

## 2024-12-27 RX ORDER — IOPAMIDOL 755 MG/ML
INJECTION, SOLUTION INTRAVASCULAR
Status: DISCONTINUED | OUTPATIENT
Start: 2024-12-27 | End: 2024-12-27 | Stop reason: HOSPADM

## 2024-12-27 RX ORDER — FENTANYL CITRATE 50 UG/ML
25 INJECTION, SOLUTION INTRAMUSCULAR; INTRAVENOUS
Status: DISCONTINUED | OUTPATIENT
Start: 2024-12-27 | End: 2024-12-28 | Stop reason: HOSPADM

## 2024-12-27 RX ORDER — ATROPINE SULFATE 0.1 MG/ML
0.5 INJECTION INTRAVENOUS
Status: ACTIVE | OUTPATIENT
Start: 2024-12-27 | End: 2024-12-27

## 2024-12-27 RX ORDER — ASPIRIN 81 MG/1
81 TABLET, CHEWABLE ORAL DAILY
Qty: 30 TABLET | Refills: 3 | Status: SHIPPED | OUTPATIENT
Start: 2024-12-27 | End: 2024-12-27

## 2024-12-27 RX ORDER — ASPIRIN 81 MG/1
81 TABLET, CHEWABLE ORAL ONCE
Status: CANCELLED | OUTPATIENT
Start: 2024-12-27 | End: 2024-12-27

## 2024-12-27 RX ORDER — ATORVASTATIN CALCIUM 80 MG/1
80 TABLET, FILM COATED ORAL DAILY
Qty: 30 TABLET | Refills: 3 | Status: SHIPPED | OUTPATIENT
Start: 2024-12-27 | End: 2025-01-14

## 2024-12-27 RX ORDER — NALOXONE HYDROCHLORIDE 0.4 MG/ML
0.4 INJECTION, SOLUTION INTRAMUSCULAR; INTRAVENOUS; SUBCUTANEOUS
Status: ACTIVE | OUTPATIENT
Start: 2024-12-27 | End: 2024-12-27

## 2024-12-27 RX ORDER — ATORVASTATIN CALCIUM 80 MG/1
80 TABLET, FILM COATED ORAL EVERY EVENING
Status: DISCONTINUED | OUTPATIENT
Start: 2024-12-27 | End: 2024-12-28 | Stop reason: HOSPADM

## 2024-12-27 RX ORDER — NITROGLYCERIN 5 MG/ML
VIAL (ML) INTRAVENOUS
Status: DISCONTINUED | OUTPATIENT
Start: 2024-12-27 | End: 2024-12-27 | Stop reason: HOSPADM

## 2024-12-27 RX ORDER — LOSARTAN POTASSIUM 25 MG/1
25 TABLET ORAL DAILY
Status: DISCONTINUED | OUTPATIENT
Start: 2024-12-28 | End: 2024-12-28 | Stop reason: HOSPADM

## 2024-12-27 RX ORDER — NALOXONE HYDROCHLORIDE 0.4 MG/ML
0.2 INJECTION, SOLUTION INTRAMUSCULAR; INTRAVENOUS; SUBCUTANEOUS
Status: ACTIVE | OUTPATIENT
Start: 2024-12-27 | End: 2024-12-27

## 2024-12-27 RX ORDER — HYDRALAZINE HYDROCHLORIDE 20 MG/ML
10 INJECTION INTRAMUSCULAR; INTRAVENOUS EVERY 4 HOURS PRN
Status: DISCONTINUED | OUTPATIENT
Start: 2024-12-27 | End: 2024-12-28 | Stop reason: HOSPADM

## 2024-12-27 RX ORDER — POTASSIUM CHLORIDE 750 MG/1
20 TABLET, EXTENDED RELEASE ORAL
Status: COMPLETED | OUTPATIENT
Start: 2024-12-27 | End: 2024-12-27

## 2024-12-27 RX ORDER — NITROGLYCERIN 0.4 MG/1
0.4 TABLET SUBLINGUAL EVERY 5 MIN PRN
Status: DISCONTINUED | OUTPATIENT
Start: 2024-12-27 | End: 2024-12-28 | Stop reason: HOSPADM

## 2024-12-27 RX ORDER — METOPROLOL TARTRATE 1 MG/ML
5 INJECTION, SOLUTION INTRAVENOUS
Status: DISCONTINUED | OUTPATIENT
Start: 2024-12-27 | End: 2024-12-28 | Stop reason: HOSPADM

## 2024-12-27 RX ORDER — FLUMAZENIL 0.1 MG/ML
0.2 INJECTION, SOLUTION INTRAVENOUS
Status: ACTIVE | OUTPATIENT
Start: 2024-12-27 | End: 2024-12-27

## 2024-12-27 RX ORDER — ONDANSETRON 4 MG/1
4 TABLET, ORALLY DISINTEGRATING ORAL EVERY 6 HOURS PRN
Status: DISCONTINUED | OUTPATIENT
Start: 2024-12-27 | End: 2024-12-28 | Stop reason: HOSPADM

## 2024-12-27 RX ORDER — ONDANSETRON 2 MG/ML
4 INJECTION INTRAMUSCULAR; INTRAVENOUS EVERY 6 HOURS PRN
Status: DISCONTINUED | OUTPATIENT
Start: 2024-12-27 | End: 2024-12-28 | Stop reason: HOSPADM

## 2024-12-27 RX ORDER — FENTANYL CITRATE-0.9 % NACL/PF 10 MCG/ML
PLASTIC BAG, INJECTION (ML) INTRAVENOUS
Status: DISCONTINUED | OUTPATIENT
Start: 2024-12-27 | End: 2024-12-27 | Stop reason: HOSPADM

## 2024-12-27 RX ORDER — LIDOCAINE 40 MG/G
CREAM TOPICAL
Status: DISCONTINUED | OUTPATIENT
Start: 2024-12-27 | End: 2024-12-27 | Stop reason: HOSPADM

## 2024-12-27 RX ORDER — HEPARIN SODIUM 1000 [USP'U]/ML
INJECTION, SOLUTION INTRAVENOUS; SUBCUTANEOUS
Status: DISCONTINUED | OUTPATIENT
Start: 2024-12-27 | End: 2024-12-27 | Stop reason: HOSPADM

## 2024-12-27 RX ORDER — ATORVASTATIN CALCIUM 80 MG/1
80 TABLET, FILM COATED ORAL DAILY
Status: DISCONTINUED | OUTPATIENT
Start: 2024-12-27 | End: 2024-12-27

## 2024-12-27 RX ORDER — METOPROLOL SUCCINATE 25 MG/1
25 TABLET, EXTENDED RELEASE ORAL DAILY
Status: DISCONTINUED | OUTPATIENT
Start: 2024-12-28 | End: 2024-12-28 | Stop reason: HOSPADM

## 2024-12-27 RX ORDER — SODIUM CHLORIDE 9 MG/ML
INJECTION, SOLUTION INTRAVENOUS CONTINUOUS
Status: ACTIVE | OUTPATIENT
Start: 2024-12-27 | End: 2024-12-27

## 2024-12-27 RX ORDER — POTASSIUM CHLORIDE 750 MG/1
40 TABLET, EXTENDED RELEASE ORAL
Status: DISCONTINUED | OUTPATIENT
Start: 2024-12-27 | End: 2024-12-27 | Stop reason: HOSPADM

## 2024-12-27 RX ORDER — ASPIRIN 81 MG/1
81 TABLET ORAL DAILY
Status: CANCELLED | OUTPATIENT
Start: 2024-12-28

## 2024-12-27 RX ORDER — AMLODIPINE BESYLATE 5 MG/1
5 TABLET ORAL DAILY
Status: DISCONTINUED | OUTPATIENT
Start: 2024-12-28 | End: 2024-12-28 | Stop reason: HOSPADM

## 2024-12-27 RX ORDER — ASPIRIN 325 MG
325 TABLET ORAL ONCE
Status: COMPLETED | OUTPATIENT
Start: 2024-12-27 | End: 2024-12-27

## 2024-12-27 RX ORDER — OXYCODONE HYDROCHLORIDE 5 MG/1
5 TABLET ORAL EVERY 4 HOURS PRN
Status: DISCONTINUED | OUTPATIENT
Start: 2024-12-27 | End: 2024-12-28 | Stop reason: HOSPADM

## 2024-12-27 RX ORDER — FENTANYL CITRATE 50 UG/ML
INJECTION, SOLUTION INTRAMUSCULAR; INTRAVENOUS
Status: DISCONTINUED | OUTPATIENT
Start: 2024-12-27 | End: 2024-12-27 | Stop reason: HOSPADM

## 2024-12-27 RX ORDER — OXYCODONE HYDROCHLORIDE 10 MG/1
10 TABLET ORAL EVERY 4 HOURS PRN
Status: DISCONTINUED | OUTPATIENT
Start: 2024-12-27 | End: 2024-12-28 | Stop reason: HOSPADM

## 2024-12-27 RX ORDER — ASPIRIN 81 MG/1
243 TABLET, CHEWABLE ORAL ONCE
Status: COMPLETED | OUTPATIENT
Start: 2024-12-27 | End: 2024-12-27

## 2024-12-27 RX ORDER — ACETAMINOPHEN 325 MG/1
650 TABLET ORAL EVERY 4 HOURS PRN
Status: DISCONTINUED | OUTPATIENT
Start: 2024-12-27 | End: 2024-12-28 | Stop reason: HOSPADM

## 2024-12-27 RX ORDER — NICARDIPINE HYDROCHLORIDE 2.5 MG/ML
INJECTION INTRAVENOUS
Status: DISCONTINUED | OUTPATIENT
Start: 2024-12-27 | End: 2024-12-27 | Stop reason: HOSPADM

## 2024-12-27 RX ORDER — SODIUM CHLORIDE 9 MG/ML
INJECTION, SOLUTION INTRAVENOUS CONTINUOUS
Status: DISCONTINUED | OUTPATIENT
Start: 2024-12-27 | End: 2024-12-27 | Stop reason: HOSPADM

## 2024-12-27 RX ADMIN — SODIUM CHLORIDE: 9 INJECTION, SOLUTION INTRAVENOUS at 11:18

## 2024-12-27 RX ADMIN — TICAGRELOR 90 MG: 90 TABLET ORAL at 21:50

## 2024-12-27 RX ADMIN — ATORVASTATIN CALCIUM 80 MG: 80 TABLET, FILM COATED ORAL at 20:44

## 2024-12-27 RX ADMIN — POTASSIUM CHLORIDE 20 MEQ: 750 TABLET, EXTENDED RELEASE ORAL at 10:41

## 2024-12-27 ASSESSMENT — ACTIVITIES OF DAILY LIVING (ADL)
ADLS_ACUITY_SCORE: 56

## 2024-12-27 NOTE — PROGRESS NOTES
TR band removed with any complcations. Pt as no bleeding or hematoma.  +2RP. Pt ambulated and tolerated activity.  Pt urinated.      Rx picked up from discharge pharmacy.      Transfer orders switched to obs unit due to no inpatient bed available for pt and pt does not have a ride to home. Pt also stated that he lives alone and no one to stay with him over night. Spoke to sushma Dietrich.

## 2024-12-27 NOTE — PROGRESS NOTES
Collin arrived back from CCL s/p 2 stents  VSS, SB, RA, denies pain.  TR band with 14cc air, May begin deflating at 1425  Need post procedure orders and brilinta prescription

## 2024-12-27 NOTE — PRE-PROCEDURE
"Consenting/Education for Cardiology Procedure: Possible percutaneous intervention and Coronary angiogram    Patient understands we would like to perform the listed procedure(s) due to CAD evaluation secondary to chest pain.    The patient understands the following:     The procedure was described to the patient in detail.    Moderate sedation is required for this procedure and the risks, benefits and alternatives to moderate sedation were discussed. Patient also understands risks and complications of the procedure which include but are not limited to bruising/swelling around the incision site, infection, bleeding, allergic reaction to local anesthetic, air embolism, arterial puncture, stroke, heart attack, need for emergency surgery, death.    Patient verbalized understanding of risks and benefits and has elected to proceed with the procedure or procedures listed above.    CHITRA Kirkpatrick CNP  Cardiology     Clinically Significant Risk Factors Present on Admission               # Drug Induced Coagulation Defect: home medication list includes an anticoagulant medication    # Hypertension: Noted on problem list           # Obesity: Estimated body mass index is 33.58 kg/m  as calculated from the following:    Height as of 12/23/24: 1.715 m (5' 7.5\").    Weight as of an earlier encounter on 12/27/24: 98.7 kg (217 lb 9.6 oz).         "

## 2024-12-27 NOTE — DISCHARGE INSTRUCTIONS
Going Home after a Coronary Angiogram with Stent Placement (Cardiac)    After you go home:  Have an adult stay with you for 24 hours.  Drink plenty of fluids.  You may eat your normal diet, unless your doctor tells you otherwise.  For 24 hours:  - Relax and take it easy.  - Do NOT smoke.  - Do NOT make any important or legal decisions.  - Do NOT drive or operate machines at home or at work.  - Do NOT drink alcohol.  Remove the dressing on your  after 24 hours. If there is minor oozing, apply another Band-aid and remove it after 12 hours.  For 2 days, do NOT have sex or do any heavy exercise.  Do NOT take a bath, or use a hot tub or pool for at least 3 days. You may shower.    Care of wrist or arm site  It is normal to have soreness at the puncture site and mild tingling in your hand for up to 3 days.  For 2 days, do not use your hand or arm to support your weight (such as rising from a chair) or bend your wrist (such as lifting a garage door).  For 2 days, do not lift more than 5 pounds or exercise your arm (tennis, golf or bowling).      If you start bleeding from the site in your arm:  Sit down and press firmly on the site with your fingers for 10 minutes. Call your doctor as soon as you can.  If the bleeding stops, sit still and keep your wrist straight for 2 hours.  Medicines  If you have begun Plavix or Effient (with a stent), do not stop taking it until you talk to your heart doctor (cardiologist).  If you are on metformin (Glucophage), do not restart it until you have blood tests (within 2 to 3 days after discharge). When your doctor tells you it is safe, you may restart the metformin.  Call your doctor if:  You have a large or growing hard lump around the site.    The site is red, swollen, hot or tender.  Blood or fluid is draining from the site.  You have chills or a fever greater than 101 F (38 C).  Your leg or arm turns bluish, feels numb or cool.  You have hives, a rash or unusual itching.  Call 052  right away if you have:   Bleeding that does not stop.   Heavy bleeding.    [facility] at [place]:    [phone] (7 days a week) or [phone] (7 days a week)  Ask the  to contact the Cardiology Fellow on call.

## 2024-12-27 NOTE — PRE-PROCEDURE
GENERAL PRE-PROCEDURE:   Procedure:  Coronary angiogram with possible percutaneous coronary intervention  Date/Time:  12/27/2024 10:53 AM    Verbal consent obtained?: Yes    Written consent obtained?: Yes    Risks and benefits: Risks, benefits and alternatives were discussed    DC Plan: Appropriate discharge home plan in place for patients who are going home after procedure   Consent given by:  Patient  Patient states understanding of procedure being performed: Yes    Patient's understanding of procedure matches consent: Yes    Procedure consent matches procedure scheduled: Yes    Expected level of sedation:  Moderate  Appropriately NPO:  Yes  ASA Class:  2  Mallampati  :  Grade 2- soft palate, base of uvula, tonsillar pillars, and portion of posterior pharyngeal wall visible  Lungs:  Lungs clear with good breath sounds bilaterally  Heart:  Normal heart sounds and rate  History & Physical reviewed:  History and physical reviewed and no updates needed  Statement of review:  I have reviewed the lab findings, diagnostic data, medications, and the plan for sedation

## 2024-12-27 NOTE — PROGRESS NOTES
Pt assisted again to use urinal. TR band successfully decompressed without complications. Bedside handoff report given to Kim RN. Pt stable. Plan for admission overnight on 6C.

## 2024-12-27 NOTE — PROGRESS NOTES
Pt arrived to Unit 2a for CORS procedure in CCL. AFVSS. Denies pain. Labs resulted. Pt took Aspirin 324 mg yesterday and this morning. K level 3.8. Pt given K 20mEq, per order. ECG done. Consent done. PIV being placed by [facility]. Pt bilat pp & pt pulses marked. Pt's sister, Whitney, off site & nearby, to drive pt home post procedure. Pt stable.

## 2024-12-27 NOTE — PROGRESS NOTES
Scant bleeding at TR band site at 1240. Air reinflated into TR band until hemostasis reachieved. Hemostasis achieved again. Pt then assisted to use urinal. Pt stable.

## 2024-12-28 VITALS
RESPIRATION RATE: 16 BRPM | OXYGEN SATURATION: 100 % | TEMPERATURE: 97.6 F | DIASTOLIC BLOOD PRESSURE: 80 MMHG | HEART RATE: 54 BPM | SYSTOLIC BLOOD PRESSURE: 125 MMHG

## 2024-12-28 LAB
ANION GAP SERPL CALCULATED.3IONS-SCNC: 7 MMOL/L (ref 7–15)
BUN SERPL-MCNC: 9.4 MG/DL (ref 8–23)
CALCIUM SERPL-MCNC: 9.3 MG/DL (ref 8.8–10.4)
CHLORIDE SERPL-SCNC: 107 MMOL/L (ref 98–107)
CREAT SERPL-MCNC: 1.01 MG/DL (ref 0.67–1.17)
EGFRCR SERPLBLD CKD-EPI 2021: 77 ML/MIN/1.73M2
GLUCOSE SERPL-MCNC: 86 MG/DL (ref 70–99)
HCO3 SERPL-SCNC: 24 MMOL/L (ref 22–29)
HOLD SPECIMEN: NORMAL
POTASSIUM SERPL-SCNC: 4.3 MMOL/L (ref 3.4–5.3)
SODIUM SERPL-SCNC: 138 MMOL/L (ref 135–145)

## 2024-12-28 PROCEDURE — 99212 OFFICE O/P EST SF 10 MIN: CPT | Performed by: NURSE PRACTITIONER

## 2024-12-28 PROCEDURE — 80048 BASIC METABOLIC PNL TOTAL CA: CPT | Performed by: STUDENT IN AN ORGANIZED HEALTH CARE EDUCATION/TRAINING PROGRAM

## 2024-12-28 PROCEDURE — 250N000013 HC RX MED GY IP 250 OP 250 PS 637: Performed by: STUDENT IN AN ORGANIZED HEALTH CARE EDUCATION/TRAINING PROGRAM

## 2024-12-28 PROCEDURE — 250N000013 HC RX MED GY IP 250 OP 250 PS 637

## 2024-12-28 PROCEDURE — 999N000054 HC STATISTIC EKG NON-CHARGEABLE

## 2024-12-28 PROCEDURE — 36415 COLL VENOUS BLD VENIPUNCTURE: CPT | Performed by: STUDENT IN AN ORGANIZED HEALTH CARE EDUCATION/TRAINING PROGRAM

## 2024-12-28 PROCEDURE — 93005 ELECTROCARDIOGRAM TRACING: CPT

## 2024-12-28 RX ADMIN — AMLODIPINE BESYLATE 5 MG: 5 TABLET ORAL at 08:17

## 2024-12-28 RX ADMIN — LOSARTAN POTASSIUM 25 MG: 25 TABLET, FILM COATED ORAL at 08:17

## 2024-12-28 RX ADMIN — METOPROLOL SUCCINATE 25 MG: 25 TABLET, EXTENDED RELEASE ORAL at 08:17

## 2024-12-28 RX ADMIN — TICAGRELOR 90 MG: 90 TABLET ORAL at 08:17

## 2024-12-28 ASSESSMENT — ACTIVITIES OF DAILY LIVING (ADL)
ADLS_ACUITY_SCORE: 56

## 2024-12-28 NOTE — PROVIDER NOTIFICATION
"Via Vocera:     \"Hello fyi pt is bradycardic. Pt currently 42 asymptomatic and resting. Wanted you to be aware. Thank you\"    MD notified and aware  "

## 2024-12-28 NOTE — PROGRESS NOTES
Transfer~1830  Transferred to:1A  Via:bed  Reason for transfer: for over night monitoring.  pt does not have a ride home and no one to day with him.  Family: Aware of transfer  Belongings: Sent with pt  Chart: Sent with pt  Medications: Meds/Rx that was picked up from discharge pharmacy was given to pt's bed side Cami MORENO.   Report called to: TIFFANIE Almanza

## 2024-12-28 NOTE — PROGRESS NOTES
Patient's After Visit Summary was reviewed with patient alone  Patient verbalized understanding of After Visit Summary, recommended follow up and was given an opportunity to ask questions.   Discharge medications sent home with patient/family: YES, No   Discharged with sister.  Transport tolerated.

## 2024-12-28 NOTE — PLAN OF CARE
Pt arrived on the unit, right arm site covered with gauze, no bleeding or hematoma noted. Pt denies pain.    /64   Pulse 51   Temp 97.6  F (36.4  C) (Oral)   Resp 18   SpO2 98%

## 2024-12-28 NOTE — PLAN OF CARE
Shift: 0723-5552  VS: /80 (BP Location: Left arm, Patient Position: Semi-Fernandez's, Cuff Size: Adult Large)   Pulse 54   Temp 97.6  F (36.4  C) (Oral)   Resp 16   SpO2 100%    Pain: Denies  Neuro: WDL, A&Ox4  Cardiac: On Telemetry- Sinus Bradycardia. Asymptomatic. MD notified and aware  Respiratory: WDL, On pulse ox  GI: WDL  Diet/Appetite: Low fat, Low na  : WDL, Urinal at bedside  LDA's: L. PIV  Skin: R. Radial site covered with gauze  Activity: SBA, Walker at bedside  Tests/Procedures:   Pertinent Labs/Lab Collection: BMP     Plan: Goal Outcome Evaluation:

## 2024-12-28 NOTE — PROGRESS NOTES
Collin Frank is a 74 year old male with a history of paroxysmal A fib on Xarelto, Factor V Leiden mutation, May-Thurner syndrome, recurrent LLE DVT, HTN, HLD, BPH, nephrolithiasis who presented for planned outpatient coronary angiogram. Case was uncomplicated and patient received PCI with MAGUI X 2 to RCA. Plan was for patient to discharge with friend but unfortunately his ride fell through and patient remained outpatient status overnight since he did not have a safe discharge plan home.     On exam, RRA site CDI, no hematoma or CMS changes noted. Labs and telemetry reviewed, unremarkable (SB overnight with rest, asymptomatic).     Plan to discharge patient with PTA Xarelto and new prescription for Brilinta. No need for Aspirin per interventional cardiologist. Prescriptions filled by cath lab provider. Patient discharged in stable condition.     Patient was previously on Flecanaide, this is discontinued due to CAD.     Patient again this morning states he has no ride home. Stated his sister was 'traumatized' when dropping him off for angiogram and does not want to return. SW consult placed to assist patient in scheduling cab, also discussed with patient to attempt to set up cab ride himself or call other family/friends. Updated bedside RN who will also help establish safe discharge plan.      Conclusion         Prox RCA to Mid RCA lesion is 90% stenosed.    Prox RCA-1 lesion is 30% stenosed.    Mid LAD lesion is 30% stenosed.    Prox RCA-2 lesion is 40% stenosed.     -Severe stenosis of the mid RCA.  -Successful deployment of two drug eluting stents from proximal to distal RCA.             Plan     Follow bedrest per protocol   Continued medical management and lifestyle modifications for cardiovascular risk factor optimizations.   Follow up with Dr. Maya..   Arterial sheath removed from radial artery with TR band placement.   Discharge today per protocol and Admit for observation        -Restart xarelto  tomorrow.  -Brillinta 90mg BID for 1 year followed by switching to aspirin 81mg lifelong.   -Stop home aspirin as patient will now be on brillinta and xarelto.   -Increase atorvastatin to 80mg daily.     Recommendations    General Recommendations:  - Patient given specific instructions regarding care of arteriotomy site, activity restrictions, signs and symptoms of cardiac or vascular complications and to seek immediate medical evaluation should they occur.   - Arterial sheath removed from radial artery with TR Band placement.     Coronary Findings    Diagnostic  Dominance: Right  Left Anterior Descending   The vessel is moderate in size.   Mid LAD lesion is 30% stenosed.      Left Circumflex      First Obtuse Marginal Branch   The vessel is small.      Second Obtuse Marginal Branch   The vessel is small.      Third Obtuse Marginal Branch   The vessel is moderate in size.      Right Coronary Artery   The vessel is moderate in size.   Prox RCA-1 lesion is 30% stenosed.   Prox RCA-2 lesion is 40% stenosed.   Prox RCA to Mid RCA lesion is 90% stenosed. The lesion is calcified.         Intervention     Prox RCA-2 lesion   Stent   A STENT CORONARY MAGUI SYNERGY XD MR US 3.81K98PP [id number] drug eluting stent was successfully placed. Pre-stent angioplasty was performed using a CATH BALLOON EMERGE 2.5X12MM [id number] supply. Maximum pressure: 12 gabriella. . Minimum lumen area: 3 mm . The strut is apposed. The intervention was successful. No complications occurred at this lesion.   There is a 0% residual stenosis post intervention.      Prox RCA to Mid RCA lesion   Stent   Lesion length: 16 mm. A guide catheter was successfully placed. The crossed the lesion. A STENT CORONARY MAGUI SYNERGY XD MR US 3.74W86DN [id number] drug eluting stent was successfully placed. Pre-stent angioplasty was performed using a CATH BALLOON EMERGE 2.5X12MM [id number] supply. Maximum pressure: 12 gabriella. Inflation time: 10 sec. Inflation  time: 10 sec. . Inflation time: 10 sec.   There is a 0% residual stenosis post intervention.

## 2024-12-30 ENCOUNTER — TELEPHONE (OUTPATIENT)
Dept: CARDIOLOGY | Facility: CLINIC | Age: 74
End: 2024-12-30
Payer: COMMERCIAL

## 2024-12-30 LAB
ATRIAL RATE - MUSE: 50 BPM
ATRIAL RATE - MUSE: 50 BPM
ATRIAL RATE - MUSE: 51 BPM
DIASTOLIC BLOOD PRESSURE - MUSE: NORMAL MMHG
INTERPRETATION ECG - MUSE: NORMAL
P AXIS - MUSE: 55 DEGREES
P AXIS - MUSE: 60 DEGREES
P AXIS - MUSE: 64 DEGREES
PR INTERVAL - MUSE: 184 MS
PR INTERVAL - MUSE: 194 MS
PR INTERVAL - MUSE: 198 MS
QRS DURATION - MUSE: 86 MS
QRS DURATION - MUSE: 90 MS
QRS DURATION - MUSE: 96 MS
QT - MUSE: 454 MS
QT - MUSE: 466 MS
QT - MUSE: 466 MS
QTC - MUSE: 413 MS
QTC - MUSE: 424 MS
QTC - MUSE: 429 MS
R AXIS - MUSE: -25 DEGREES
R AXIS - MUSE: -34 DEGREES
R AXIS - MUSE: -36 DEGREES
SYSTOLIC BLOOD PRESSURE - MUSE: NORMAL MMHG
T AXIS - MUSE: 21 DEGREES
T AXIS - MUSE: 3 DEGREES
T AXIS - MUSE: 38 DEGREES
VENTRICULAR RATE- MUSE: 50 BPM
VENTRICULAR RATE- MUSE: 50 BPM
VENTRICULAR RATE- MUSE: 51 BPM

## 2024-12-30 NOTE — TELEPHONE ENCOUNTER
----- Message from Katherine PARKER sent at 12/30/2024  8:19 AM CST -----  I am sorry about that! Patient should be attached now!     Katherine  ----- Message -----  From: Yumi Jacome RN  Sent: 12/30/2024   8:16 AM CST  To: Katherine Roman RN;  Cardiology Nurse    Higinio Murphy,    I don't see a patient attached. Which patient is this for? Thanks!  ----- Message -----  From: Katherine Roman, TIFFANIE  Sent: 12/30/2024   8:15 AM CST  To:  Cardiology Nurse    Good morning,   It looks like this patient has previously seen Dr. Maya. He had angiogram that was set up as an outpatient on Friday. Results are below, let me know if the team is unable to follow-up.     Access: R radial     Conclusion     Prox RCA to Mid RCA lesion is 90% stenosed.     Prox RCA-1 lesion is 30% stenosed.     Mid LAD lesion is 30% stenosed.     Prox RCA-2 lesion is 40% stenosed.     -Severe stenosis of the mid RCA.  -Successful deployment of two drug eluting stents from proximal to distal RCA.        Plan  -Restart xarelto tomorrow.  -Brillinta 90mg BID for 1 year followed by switching to aspirin 81mg lifelong.   -Stop home aspirin as patient will now be on brillinta and xarelto.   -Increase atorvastatin to 80mg day     Thanks!  Katherine

## 2024-12-30 NOTE — TELEPHONE ENCOUNTER
Jimy Stewart MD Krist, Chana, RN  Please tell this patient not to take flecainide since he had a stent. No more flecainide.    DR STEWART

## 2024-12-30 NOTE — TELEPHONE ENCOUNTER
Post-Angiogram Discharge Call    How are you feeling since you got home? Do you understand your results?  Patient reports that he is feeling well. No concerns at this time.  Yes - Results discussed    How is your groin/wrist/incision site? Can you please describe it?  Site is clean, dry and intact. No concerns at this time.    Do you have any questions regarding your restrictions and when to resume normal activity?  No    Do you have the anti-platelet medication you were prescribed?  Yes - Medication prescribed and patient is taking it    Any questions about the other medications you were prescribed?  No - each medication change reviewed, including purpose and side effects    Has cardiac rehab reached out to you?  No - If you have not heard from the scheduling office within 2 business days, please call [phone] for all locations, with the exception of [place], please call [phone] and [facility], please call [phone]    Do you have any other questions about the discharge instructions?  No    Has a follow up appointment been made within 1-2 weeks?  YES - With d on January / 14 / 2024 Appointment with Nellie made to review coronary angiogram results.    Patient confirmed he will stop taking flecainide.    Yumi Jacome, RN, BSN  Cardiology RN Care Coordinator   [place]   Phone: [phone]  Fax: [phone] ([place]) [phone] ([place])

## 2025-01-03 ENCOUNTER — HOSPITAL ENCOUNTER (EMERGENCY)
Facility: CLINIC | Age: 75
Discharge: HOME | End: 2025-01-04
Attending: INTERNAL MEDICINE | Admitting: INTERNAL MEDICINE
Payer: COMMERCIAL

## 2025-01-03 VITALS
HEART RATE: 63 BPM | SYSTOLIC BLOOD PRESSURE: 126 MMHG | WEIGHT: 222 LBS | OXYGEN SATURATION: 98 % | TEMPERATURE: 97.6 F | DIASTOLIC BLOOD PRESSURE: 79 MMHG | HEIGHT: 68 IN | RESPIRATION RATE: 18 BRPM | BODY MASS INDEX: 33.65 KG/M2

## 2025-01-03 DIAGNOSIS — R31.9 HEMATURIA, UNSPECIFIED TYPE: ICD-10-CM

## 2025-01-03 LAB
APTT PPP: 30 SECONDS (ref 22–38)
INR PPP: 1.57 (ref 0.85–1.15)

## 2025-01-03 PROCEDURE — 99284 EMERGENCY DEPT VISIT MOD MDM: CPT | Performed by: INTERNAL MEDICINE

## 2025-01-03 PROCEDURE — 85730 THROMBOPLASTIN TIME PARTIAL: CPT | Performed by: INTERNAL MEDICINE

## 2025-01-03 PROCEDURE — 80048 BASIC METABOLIC PNL TOTAL CA: CPT | Performed by: INTERNAL MEDICINE

## 2025-01-03 PROCEDURE — 99283 EMERGENCY DEPT VISIT LOW MDM: CPT | Performed by: INTERNAL MEDICINE

## 2025-01-03 PROCEDURE — 85610 PROTHROMBIN TIME: CPT | Performed by: INTERNAL MEDICINE

## 2025-01-03 PROCEDURE — 85048 AUTOMATED LEUKOCYTE COUNT: CPT | Performed by: INTERNAL MEDICINE

## 2025-01-03 PROCEDURE — 36415 COLL VENOUS BLD VENIPUNCTURE: CPT | Performed by: INTERNAL MEDICINE

## 2025-01-03 PROCEDURE — 85004 AUTOMATED DIFF WBC COUNT: CPT | Performed by: INTERNAL MEDICINE

## 2025-01-03 ASSESSMENT — COLUMBIA-SUICIDE SEVERITY RATING SCALE - C-SSRS
1. IN THE PAST MONTH, HAVE YOU WISHED YOU WERE DEAD OR WISHED YOU COULD GO TO SLEEP AND NOT WAKE UP?: NO
2. HAVE YOU ACTUALLY HAD ANY THOUGHTS OF KILLING YOURSELF IN THE PAST MONTH?: NO
6. HAVE YOU EVER DONE ANYTHING, STARTED TO DO ANYTHING, OR PREPARED TO DO ANYTHING TO END YOUR LIFE?: NO

## 2025-01-03 ASSESSMENT — ACTIVITIES OF DAILY LIVING (ADL): ADLS_ACUITY_SCORE: 56

## 2025-01-04 LAB
ANION GAP SERPL CALCULATED.3IONS-SCNC: 9 MMOL/L (ref 7–15)
BASOPHILS # BLD AUTO: 0.1 10E3/UL (ref 0–0.2)
BASOPHILS NFR BLD AUTO: 2 %
BUN SERPL-MCNC: 12.4 MG/DL (ref 8–23)
CALCIUM SERPL-MCNC: 9.1 MG/DL (ref 8.8–10.4)
CHLORIDE SERPL-SCNC: 105 MMOL/L (ref 98–107)
CREAT SERPL-MCNC: 0.92 MG/DL (ref 0.67–1.17)
EGFRCR SERPLBLD CKD-EPI 2021: 86 ML/MIN/1.73M2
EOSINOPHIL # BLD AUTO: 0.1 10E3/UL (ref 0–0.7)
EOSINOPHIL NFR BLD AUTO: 2 %
ERYTHROCYTE [DISTWIDTH] IN BLOOD BY AUTOMATED COUNT: 13.4 % (ref 10–15)
GLUCOSE SERPL-MCNC: 103 MG/DL (ref 70–99)
HCO3 SERPL-SCNC: 22 MMOL/L (ref 22–29)
HCT VFR BLD AUTO: 43.7 % (ref 40–53)
HGB BLD-MCNC: 15.1 G/DL (ref 13.3–17.7)
HOLD SPECIMEN: NORMAL
HOLD SPECIMEN: NORMAL
IMM GRANULOCYTES # BLD: 0 10E3/UL
IMM GRANULOCYTES NFR BLD: 0 %
LYMPHOCYTES # BLD AUTO: 1 10E3/UL (ref 0.8–5.3)
LYMPHOCYTES NFR BLD AUTO: 19 %
MCH RBC QN AUTO: 33.4 PG (ref 26.5–33)
MCHC RBC AUTO-ENTMCNC: 34.6 G/DL (ref 31.5–36.5)
MCV RBC AUTO: 97 FL (ref 78–100)
MONOCYTES # BLD AUTO: 0.6 10E3/UL (ref 0–1.3)
MONOCYTES NFR BLD AUTO: 11 %
NEUTROPHILS # BLD AUTO: 3.5 10E3/UL (ref 1.6–8.3)
NEUTROPHILS NFR BLD AUTO: 67 %
NRBC # BLD AUTO: 0 10E3/UL
NRBC BLD AUTO-RTO: 0 /100
PLATELET # BLD AUTO: 259 10E3/UL (ref 150–450)
POTASSIUM SERPL-SCNC: 4.2 MMOL/L (ref 3.4–5.3)
RBC # BLD AUTO: 4.52 10E6/UL (ref 4.4–5.9)
SODIUM SERPL-SCNC: 136 MMOL/L (ref 135–145)
WBC # BLD AUTO: 5.3 10E3/UL (ref 4–11)

## 2025-01-04 NOTE — DISCHARGE INSTRUCTIONS
Both the Brilinta and the Xarelto are very important for your health right now. Unfortunately, they can also cause bleeding. Currently, your hgb is normal at 15 and you are stable during your ER stay. Please follow up with your pcp tomorrow. If you develop further bleeding, including rectal bleeding, fainting, shortness of breath, chest pain, or any other concerning symptoms, please return to the Er.

## 2025-01-04 NOTE — ED TRIAGE NOTES
Patient had 2 cardiac stents put in on the 27th and was put on xarelto. Today the patient had some blood in his urine. Patient here to make sure everything is Okay. Patient also has some brusing on his left forearm from his IV sight that he is concerned about.

## 2025-01-04 NOTE — ED PROVIDER NOTES
[place] EMERGENCY DEPARTMENT ([facility])    1/03/25       ED PROVIDER NOTE     History     Chief Complaint   Patient presents with    Hematuria     Patient had 2 cardiac stents put in on the 27th and was put on xarelto. Today the patient had some blood in his urine. Patient here to make sure everything is Okay. Patient also has some brusing on his left forearm from his IV sight that he is concerned about.      DESTINY Frank is a 74 year old male with history of recent hospitalization for acute coronary syndrome s/p coronary angiogram and placement of stents.  He was started on Brilinta in addition to his ongoing Xarelto.  Today he noticed blood in his urine as well as bruising to his left forearm where he had had an IV placed and was concerned and so presents to the emergency Department.     Past Medical History  Past Medical History:   Diagnosis Date    Acute deep vein thrombosis (DVT) (H) 11/21/2011 11/2011- acute and extensive LLE DVTs, he underwent thrombolysis by IR       Atrial fibrillation with RVR (H) 12/11/2023    Chest pain, unspecified type 03/31/2021    Renal stones 03/21/2013    2 mm size non obstructing  2 right, 1 left==CT  [place] 3/13       Past Surgical History:   Procedure Laterality Date    ANESTHESIA CARDIOVERSION N/A 12/11/2023    Procedure: Anesthesia cardioversion;  Surgeon: GENERIC ANESTHESIA PROVIDER;  Location: [facility] OR    ANESTHESIA CARDIOVERSION N/A 7/2/2024    Procedure: Anesthesia cardioversion@1330;  Surgeon: GENERIC ANESTHESIA PROVIDER;  Location: [facility] OR    COLONOSCOPY  01/17/2008    Normal. Repeat in 10 years    COLONOSCOPY WITH CO2 INSUFFLATION N/A 02/01/2018    Procedure: COLONOSCOPY WITH CO2 INSUFFLATION;  COLON SCREEN/ ANNMARIEELMANN;  Surgeon: Jan Flores MD;  Location: [facility] OR    COMBINED CYSTOSCOPY, RETROGRADES, URETEROSCOPY, LASER HOLMIUM LITHOTRIPSY URETER(S), INSERT STENT Left 9/19/2024    Procedure: Cystoscopy, Left Ureteroscopy, Left Retrograde  "Pyelogram, Left Holmium Laser Lithotripsy, Left Ureteral Stent Placement;  Surgeon: Delfin Kimball MD;  Location: [facility] OR    CV CORONARY ANGIOGRAM N/A 2024    Procedure: Coronary Angiogram;  Surgeon: Arron Do MD;  Location:  [facility] CATH LAB    CV PCI N/A 2024    Procedure: Percutaneous Coronary Intervention;  Surgeon: Arron Do MD;  Location:  [facility] CARD[facility] CATH LAB    ORTHOPEDIC SURGERY  1975    rt knee ORIF     amLODIPine (NORVASC) 5 MG tablet  atorvastatin (LIPITOR) 80 MG tablet  cholecalciferol (VITAMIN D) 1000 UNIT tablet  losartan (COZAAR) 50 MG tablet  metoprolol succinate ER (TOPROL XL) 25 MG 24 hr tablet  ticagrelor (BRILINTA) 90 MG tablet  XARELTO ANTICOAGULANT 20 MG TABS tablet      No Known Allergies  Family History  Family History   Problem Relation Age of Onset    Alzheimer Disease Mother     Heart Disease Father     Prostate Cancer Father     Cancer Brother 65        pancreatic       Prostate Cancer Brother         2 stents 70% and 90% blockage    Heart Disease Sister      Social History   Social History     Tobacco Use    Smoking status: Former     Current packs/day: 0.00     Types: Cigarettes     Quit date: 2011     Years since quittin.9     Passive exposure: Never    Smokeless tobacco: Never   Vaping Use    Vaping status: Never Used   Substance Use Topics    Alcohol use: No    Drug use: No      A medically appropriate review of systems was performed with pertinent positives and negatives noted in the HPI, and all other systems negative.    Physical Exam   BP: 126/79  Pulse: 63  Temp: 97.6  F (36.4  C)  Resp: 18  Height: 171.5 cm (5' 7.5\")  Weight: 100.7 kg (222 lb)  SpO2: 98 %    Physical Exam  Vitals reviewed.   Constitutional:       General: He is not in acute distress.     Appearance: He is not ill-appearing, toxic-appearing or diaphoretic.   HENT:      Mouth/Throat:      Mouth: Mucous membranes are moist.   Eyes:      " General: No scleral icterus.  Cardiovascular:      Rate and Rhythm: Normal rate.   Pulmonary:      Effort: Pulmonary effort is normal. No respiratory distress.   Abdominal:      Palpations: Abdomen is soft.      Tenderness: There is no abdominal tenderness.   Musculoskeletal:      Cervical back: No tenderness.   Skin:     General: Skin is warm.   Neurological:      General: No focal deficit present.      Mental Status: He is alert.   Psychiatric:         Mood and Affect: Mood normal.             ED Course, Procedures, & Data      Procedures                Results for orders placed or performed during the hospital encounter of 01/03/25   INR     Status: Abnormal   Result Value Ref Range    INR 1.57 (H) 0.85 - 1.15   Partial thromboplastin time     Status: Normal   Result Value Ref Range    aPTT 30 22 - 38 Seconds   [place] Draw     Status: None (In process)    Narrative    The following orders were created for panel order [place] Draw.  Procedure                               Abnormality         Status                     ---------                               -----------         ------                     Extra Blue Top Tube[[id number]]                                                         Extra Red Top Tube[[id number]]                                                          Extra Green Top (Lithium...[[id number]]                      In process                 Extra Purple Top Tube[[id number]]                                                         Please view results for these tests on the individual orders.   Basic metabolic panel     Status: Abnormal   Result Value Ref Range    Sodium 136 135 - 145 mmol/L    Potassium 4.2 3.4 - 5.3 mmol/L    Chloride 105 98 - 107 mmol/L    Carbon Dioxide (CO2) 22 22 - 29 mmol/L    Anion Gap 9 7 - 15 mmol/L    Urea Nitrogen 12.4 8.0 - 23.0 mg/dL    Creatinine 0.92 0.67 - 1.17 mg/dL    GFR Estimate 86 >60 mL/min/1.73m2    Calcium 9.1 8.8 - 10.4 mg/dL    Glucose 103 (H) 70 - 99  mg/dL   CBC with Platelets & Differential     Status: None (In process)    Narrative    The following orders were created for panel order CBC with Platelets & Differential.  Procedure                               Abnormality         Status                     ---------                               -----------         ------                     CBC with platelets and d...[[id number]]                      In process                   Please view results for these tests on the individual orders.     Medications - No data to display  Labs Ordered and Resulted from Time of ED Arrival to Time of ED Departure   INR - Abnormal       Result Value    INR 1.57 (*)    BASIC METABOLIC PANEL - Abnormal    Sodium 136      Potassium 4.2      Chloride 105      Carbon Dioxide (CO2) 22      Anion Gap 9      Urea Nitrogen 12.4      Creatinine 0.92      GFR Estimate 86      Calcium 9.1      Glucose 103 (*)    PARTIAL THROMBOPLASTIN TIME - Normal    aPTT 30     CBC WITH PLATELETS AND DIFFERENTIAL     No orders to display            Problems Addressed  MDM Moderate: 1 undiagnosed new problem with uncertain prognosis      Data   Considered  Data Moderate: Order of (or considering) each unique test (Cat 1), Review of the results of each unique test (Cat 1), and Review of prior external note(s) from each unique source (cat 1)      Risk of Patient Management      Moderate Risk: Prescription drug management            Assessment & Plan    Collin Frank is a 74 year old male presents with hematuria in the context of recent hospitalization for acute coronary syndrome s/p coronary angiogram and placement of stents and new addition of Brilinta to Xarelto.       Throughout ED stay alert, interactive, nontoxic, stable, no distress. Hgb 15. Since both the antiplatelet and the Xarelto are vital given his previous VTEs and new stents, I spoke with him in a shared decision making about the risks and benefits of stopping vs continuing the meds. He  decided to continue which I think is the most reasonable. He will follow up with his pcp and he understands strict return to ER precautions.  I have reviewed the nursing notes. I have reviewed the findings, diagnosis, plan and need for follow up with the patient.    New Prescriptions    No medications on file       Final diagnoses:   None     I, Caitlin Campos, am serving as a trained medical scribe to document services personally performed by Lauren Pinto MD based on the provider's statements to me on January 3, 2025.  This document has been checked and approved by the attending provider.    I, Lauren Pinto MD, was physically present and have reviewed and verified the accuracy of this note documented by Caitlin Campos, medical scribe.      Lauren Pinto MD   [facility] EMERGENCY DEPARTMENT  1/3/2025           Lauren Pinto MD  01/04/25 0033

## 2025-01-06 ENCOUNTER — PATIENT OUTREACH (OUTPATIENT)
Dept: CARE COORDINATION | Facility: CLINIC | Age: 75
End: 2025-01-06

## 2025-01-06 ENCOUNTER — THERAPY VISIT (OUTPATIENT)
Dept: SLEEP MEDICINE | Facility: CLINIC | Age: 75
End: 2025-01-06
Payer: COMMERCIAL

## 2025-01-06 DIAGNOSIS — I10 BENIGN ESSENTIAL HYPERTENSION: Chronic | ICD-10-CM

## 2025-01-06 DIAGNOSIS — I48.91 ATRIAL FIBRILLATION STATUS POST CARDIOVERSION (H): ICD-10-CM

## 2025-01-06 DIAGNOSIS — R53.81 MALAISE AND FATIGUE: ICD-10-CM

## 2025-01-06 DIAGNOSIS — E66.01 CLASS 2 SEVERE OBESITY DUE TO EXCESS CALORIES WITH SERIOUS COMORBIDITY AND BODY MASS INDEX (BMI) OF 35.0 TO 35.9 IN ADULT (H): Chronic | ICD-10-CM

## 2025-01-06 DIAGNOSIS — E66.812 CLASS 2 SEVERE OBESITY DUE TO EXCESS CALORIES WITH SERIOUS COMORBIDITY AND BODY MASS INDEX (BMI) OF 35.0 TO 35.9 IN ADULT (H): Chronic | ICD-10-CM

## 2025-01-06 DIAGNOSIS — R53.83 MALAISE AND FATIGUE: ICD-10-CM

## 2025-01-06 NOTE — PROGRESS NOTES
Clinic Care Coordination Contact  Community Health Worker Initial Outreach    CHW Initial Information Gathering:  Referral Source: ED Follow-Up  Current living arrangement:: Not Assessed  CHW Additional Questions  If ED/Hospital discharge, follow-up appointment scheduled as recommended?: Yes  Medication changes made following ED/Hospital discharge?: No  MyChart active?: No  Patient agreeable to assistance with activating MyChart?: No    Patient accepts CC: No, Patient expressed no additional support is needed at this time. Unable to send care coordination introduction letter since MyChart is not active.  Clinic Care Coordination services remain available via referral if needed.        Nereyda Castano  Community Health Worker    Connected Care Resources   [facility]     *Connected Care Resources Team does NOT   follow patient ongoing. Referrals are identified   based on internal discharge report and the outreach is to ensure   Patient has understanding of their discharge instructions.

## 2025-01-11 PROBLEM — Z98.890 STATUS POST CORONARY ANGIOGRAM: Status: RESOLVED | Noted: 2024-12-27 | Resolved: 2025-01-11

## 2025-01-11 PROBLEM — G47.33 OSA (OBSTRUCTIVE SLEEP APNEA): Chronic | Status: ACTIVE | Noted: 2025-01-11

## 2025-01-11 PROBLEM — I48.91 ATRIAL FIBRILLATION (H): Chronic | Status: ACTIVE | Noted: 2023-12-11

## 2025-01-11 PROBLEM — Z87.442 HISTORY OF KIDNEY STONES: Chronic | Status: ACTIVE | Noted: 2024-09-19

## 2025-01-11 PROBLEM — I25.10 CORONARY ARTERY DISEASE INVOLVING NATIVE CORONARY ARTERY OF NATIVE HEART: Chronic | Status: ACTIVE | Noted: 2025-01-11

## 2025-01-11 PROBLEM — Z87.442 HISTORY OF KIDNEY STONES: Status: ACTIVE | Noted: 2024-09-19

## 2025-01-13 NOTE — PROGRESS NOTES
[facility]    Patient Name: Collin Frank   : 1950   Date of Visit: 2025  Primary Care Physician: Collin Salazar MD         Collin Frank is a pleasant 74 year old male, who presents for s/p angiogram. Prior history significant for paroxysmal A fib on Xarelto, Factor V Leiden mutation, May-Thurner syndrome, recurrent LLE DVT, HTN, HLD, BPH, nephrolithiasis.    Patient had a recent admission for concern for ACS/unstable angina and was scheduled for outpatient angiogram, which he underwent on 24. This revealed severe stenosis of the mid RCA, now s/p MAGUI x2 to RCA. Atorvastatin was increased to 80 mg daily.    Collin did present to ER on 1/3/24 with concern for blood in his urine. His Hgb was 15 and he was neurologically intact. ER provider discussed risks vs. benefits of DAPT, and patient agreed with continuing both and was discharged in stable condition.    Today in clinic, patient states they have no concerns with angiogram access site. Denies redness, swelling, bruising, purulent draining. Also denies numbness or tingling in extremity distal to site.     He reports no cardiac symptoms - denies chest pain, SOB, palpitations, dizziness. States his symptoms prior to     Has not yet started cardiac rehab. He says he needs to call them back to get set up.    Home BPs: reports about the same as in clinic 120-130/70-80 but does not check often    Current Cardiac Medications  Amlodipine 5 mg daily  Atorvastatin 80 mg daily  Losartan 25 mg daily  Toprol 25 mg daily  Brilinta 90 mg BID  Xarelto 20 mg daily      PAST MEDICAL HISTORY:  Past Medical History:   Diagnosis Date    Acute deep vein thrombosis (DVT) (H) 2011- acute and extensive LLE DVTs, he underwent thrombolysis by IR       Atrial fibrillation with RVR (H) 2023    Chest pain, unspecified type 2021    Nephrolithiasis 2024    FRANCK (obstructive sleep apnea) - moderate (AHI 24) 2025  [place] Diagnostic Sleep Study (222.0 lbs) - AHI 24.9, RDI 40.5, Supine .0, REM AHI 36.0, Low O2 89.0%, Time Spent <=88% 0 minutes / Time Spent <=89% 0.3 minutes. PLM index was 43.1 movements per hour. PLM Arousal Index was 14.1 per hour. Possible periodic breathing noted    Renal stones 2013    2 mm size non obstructing  2 right, 1 left==CT  [place] 3/13      Status post coronary angiogram 2024       PAST SURGICAL HISTORY:  Past Surgical History:   Procedure Laterality Date    ANESTHESIA CARDIOVERSION N/A 2023    Procedure: Anesthesia cardioversion;  Surgeon: GENERIC ANESTHESIA PROVIDER;  Location: [facility]    ANESTHESIA CARDIOVERSION N/A 2024    Procedure: Anesthesia cardioversion@1330;  Surgeon: GENERIC ANESTHESIA PROVIDER;  Location: [facility]    COLONOSCOPY  2008    Normal. Repeat in 10 years    COLONOSCOPY WITH CO2 INSUFFLATION N/A 2018    Procedure: COLONOSCOPY WITH CO2 INSUFFLATION;  COLON SCREEN/ ANNMARIEELMANN;  Surgeon: Jan Flores MD;  Location: [facility] OR    COMBINED CYSTOSCOPY, RETROGRADES, URETEROSCOPY, LASER HOLMIUM LITHOTRIPSY URETER(S), INSERT STENT Left 2024    Procedure: Cystoscopy, Left Ureteroscopy, Left Retrograde Pyelogram, Left Holmium Laser Lithotripsy, Left Ureteral Stent Placement;  Surgeon: Delfin Kimball MD;  Location: [facility] OR    CV CORONARY ANGIOGRAM N/A 2024    Procedure: Coronary Angiogram;  Surgeon: Arron Do MD;  Location:  HE[facility] CARDIAC CATH LAB    CV PCI N/A 2024    Procedure: Percutaneous Coronary Intervention;  Surgeon: Arron Do MD;  Location:  HEART CARDIAC CATH LAB    ORTHOPEDIC SURGERY  1975    rt knee ORIF       FAMILY HISTORY:  Family History   Problem Relation Age of Onset    Alzheimer Disease Mother     Heart Disease Father     Prostate Cancer Father     Cancer Brother 65        pancreatic       Prostate Cancer Brother         2 stents 70% and 90% blockage    Heart Disease  Sister        SOCIAL HISTORY:  Social History     Socioeconomic History    Marital status: Single   Tobacco Use    Smoking status: Former     Current packs/day: 0.00     Types: Cigarettes     Quit date: 2011     Years since quittin.9     Passive exposure: Never    Smokeless tobacco: Never   Vaping Use    Vaping status: Never Used   Substance and Sexual Activity    Alcohol use: No    Drug use: No    Sexual activity: Never   Other Topics Concern    Parent/sibling w/ CABG, MI or angioplasty before 65F 55M? No     Social Drivers of Health     Financial Resource Strain: Low Risk  (2024)    Financial Resource Strain     Within the past 12 months, have you or your family members you live with been unable to get utilities (heat, electricity) when it was really needed?: No   Food Insecurity: Low Risk  (2024)    Food Insecurity     Within the past 12 months, did you worry that your food would run out before you got money to buy more?: No     Within the past 12 months, did the food you bought just not last and you didn t have money to get more?: No   Transportation Needs: Low Risk  (2024)    Transportation Needs     Within the past 12 months, has lack of transportation kept you from medical appointments, getting your medicines, non-medical meetings or appointments, work, or from getting things that you need?: No   Physical Activity: Inactive (2024)    Exercise Vital Sign     Days of Exercise per Week: 0 days     Minutes of Exercise per Session: 0 min   Stress: No Stress Concern Present (2024)    [employer] - Occupational Stress Questionnaire     Feeling of Stress : Not at all   Social Connections: Moderately Isolated (2024)    Social Connection and Isolation Panel [NHANES]     Frequency of Communication with Friends and Family: More than three times a week     Frequency of Social Gatherings with Friends and Family: Twice a week     Attends Sikhism  Services: More than 4 times per year     Active Member of Clubs or Organizations: No     Attends Club or Organization Meetings: Never     Marital Status: Never    Interpersonal Safety: Low Risk  (12/23/2024)    Interpersonal Safety     Do you feel physically and emotionally safe where you currently live?: Yes     Within the past 12 months, have you been hit, slapped, kicked or otherwise physically hurt by someone?: No     Within the past 12 months, have you been humiliated or emotionally abused in other ways by your partner or ex-partner?: No   Housing Stability: Low Risk  (12/23/2024)    Housing Stability     Do you have housing? : Yes     Are you worried about losing your housing?: No       MEDICATIONS:  Current Outpatient Medications   Medication Sig Dispense Refill    amLODIPine (NORVASC) 5 MG tablet Take 1 tablet (5 mg) by mouth daily. 90 tablet 3    atorvastatin (LIPITOR) 80 MG tablet Take 1 tablet (80 mg) by mouth daily. 30 tablet 3    cholecalciferol (VITAMIN D) 1000 UNIT tablet Take 1,000 Units by mouth every morning. 100 tablet 3    losartan (COZAAR) 50 MG tablet Take 0.5 tablets (25 mg) by mouth daily. 45 tablet 3    metoprolol succinate ER (TOPROL XL) 25 MG 24 hr tablet Take 1 tablet (25 mg) by mouth daily. 90 tablet 3    ticagrelor (BRILINTA) 90 MG tablet Take 1 tablet (90 mg) by mouth 2 times daily. Start this evening. 180 tablet 3    triamcinolone (KENALOG) 0.1 % external cream Apply topically 2 times daily. as needed for leg rash 45 g 0    XARELTO ANTICOAGULANT 20 MG TABS tablet TAKE 1 TABLET DAILY WITH DINNER 90 tablet 3     No current facility-administered medications for this visit.        ALLERGIES:   No Known Allergies    ROS:  A complete 10-point ROS was negative except as above.    PHYSICAL EXAM:  /79 (BP Location: Right arm, Patient Position: Chair, Cuff Size: Adult Regular)   Pulse 62   Wt 102.5 kg (226 lb)   SpO2 99%   BMI 34.87 kg/m    Gen: alert, interactive, NAD  Neck:  supple, no JVD  CV: RRR, no m/r/g  Chest: CTAB, no wheezes or crackles  Abd: soft, NT, ND, +BS  Ext: no LE edema    LABS:    CMP  Last Comprehensive Metabolic Panel:  Sodium   Date Value Ref Range Status   01/03/2025 136 135 - 145 mmol/L Final   04/01/2021 140 133 - 144 mmol/L Final     Potassium   Date Value Ref Range Status   01/03/2025 4.2 3.4 - 5.3 mmol/L Final   12/12/2022 4.1 3.4 - 5.3 mmol/L Final   04/01/2021 3.9 3.4 - 5.3 mmol/L Final     Chloride   Date Value Ref Range Status   01/03/2025 105 98 - 107 mmol/L Final   12/12/2022 109 94 - 109 mmol/L Final   04/01/2021 108 94 - 109 mmol/L Final     Carbon Dioxide   Date Value Ref Range Status   04/01/2021 25 20 - 32 mmol/L Final     Carbon Dioxide (CO2)   Date Value Ref Range Status   01/03/2025 22 22 - 29 mmol/L Final   12/12/2022 27 20 - 32 mmol/L Final     Anion Gap   Date Value Ref Range Status   01/03/2025 9 7 - 15 mmol/L Final   12/12/2022 4 3 - 14 mmol/L Final   04/01/2021 7 3 - 14 mmol/L Final     Glucose   Date Value Ref Range Status   01/03/2025 103 (H) 70 - 99 mg/dL Final   12/12/2022 89 70 - 99 mg/dL Final   04/01/2021 90 70 - 99 mg/dL Final     GLUCOSE BY METER POCT   Date Value Ref Range Status   09/19/2024 105 (H) 70 - 99 mg/dL Final     Urea Nitrogen   Date Value Ref Range Status   01/03/2025 12.4 8.0 - 23.0 mg/dL Final   12/12/2022 15 7 - 30 mg/dL Final   04/01/2021 15 7 - 30 mg/dL Final     Creatinine   Date Value Ref Range Status   01/03/2025 0.92 0.67 - 1.17 mg/dL Final   04/01/2021 0.93 0.66 - 1.25 mg/dL Final     GFR Estimate   Date Value Ref Range Status   01/03/2025 86 >60 mL/min/1.73m2 Final     Comment:     eGFR calculated using 2021 CKD-EPI equation.   04/01/2021 81 >60 mL/min/[1.73_m2] Final     Comment:     Non  GFR Calc  Starting 12/18/2018, serum creatinine based estimated GFR (eGFR) will be   calculated using the Chronic Kidney Disease Epidemiology Collaboration   (CKD-EPI) equation.       GFR, ESTIMATED POCT    Date Value Ref Range Status   08/30/2024 >60 >60 mL/min/1.73m2 Final     Calcium   Date Value Ref Range Status   01/03/2025 9.1 8.8 - 10.4 mg/dL Final     Comment:     Reference intervals for this test were updated on 7/16/2024 to reflect our healthy population more accurately. There may be differences in the flagging of prior results with similar values performed with this method. Those prior results can be interpreted in the context of the updated reference intervals.   04/01/2021 8.8 8.5 - 10.1 mg/dL Final     Bilirubin Total   Date Value Ref Range Status   12/08/2024 0.9 <=1.2 mg/dL Final   03/31/2021 0.8 0.2 - 1.3 mg/dL Final     Alkaline Phosphatase   Date Value Ref Range Status   12/08/2024 95 40 - 150 U/L Final   03/31/2021 91 40 - 150 U/L Final     ALT   Date Value Ref Range Status   12/08/2024 20 0 - 70 U/L Final   03/31/2021 31 0 - 70 U/L Final     AST   Date Value Ref Range Status   12/08/2024 30 0 - 45 U/L Final     Comment:     Specimen is hemolyzed which can falsely elevate AST. Analysis of a non-hemolyzed specimen may result in a lower value.  Specimen is hemolyzed which can falsely elevate AST. Analysis of a non-hemolyzed specimen may result in a lower value.   03/31/2021 26 0 - 45 U/L Final       TROP  Lab Results   Component Value Date    TROPI <0.015 03/31/2021    TROPI <0.015 03/31/2021    TROPI <0.015 03/31/2021    TROPI <0.015 09/15/2019    TROPI <0.015 09/15/2019    TROPONIN 0.00 01/04/2020       CBC  CBC RESULTS:   Recent Labs   Lab Test 01/03/25  2316   WBC 5.3   RBC 4.52   HGB 15.1   HCT 43.7   MCV 97   MCH 33.4*   MCHC 34.6   RDW 13.4          LIPIDS  Recent Labs   Lab Test 12/27/24  0759 12/08/24  0411   CHOL 97 135   HDL 33* 51   LDL 52 74   TRIG 59 51       TSH  TSH   Date Value Ref Range Status   09/05/2024 3.92 0.30 - 4.20 uIU/mL Final   03/31/2021 1.79 0.40 - 4.00 mU/L Final       HBA1C  Lab Results   Component Value Date    A1C 5.6 12/23/2024    A1C 6.1 09/05/2024    A1C  5.8 12/14/2023    A1C 5.5 10/16/2012    A1C 5.9 11/28/2011         CARDIAC DATA:     Echo 12/9/24:  No cardiac source for embolus identified. Global and regional left ventricular  function is normal with an EF of 60-65%. Biplane LVEF is 61%.  Right ventricular function, chamber size, wall motion, and thickness are  normal.  No significant valvular abnormalities present.  IVC diameter <2.1 cm collapsing >50% with sniff suggests a normal RA pressure  of 3 mmHg.  No pericardial effusion is present.     This study was compared with the study from 6/21/2024. No significant changes  noted.    Cardiac Catheterization 12/27/24:    Prox RCA to Mid RCA lesion is 90% stenosed.    Prox RCA-1 lesion is 30% stenosed.    Mid LAD lesion is 30% stenosed.    Prox RCA-2 lesion is 40% stenosed.     - Severe stenosis of the mid RCA.  - Successful deployment of two drug eluting stents from proximal to distal RCA.     - Restart xarelto tomorrow.  - Brilinta 90mg BID for 1 year followed by switching to aspirin 81mg lifelong.   - Stop home aspirin as patient will now be on brillinta and xarelto.   - Increase atorvastatin to 80mg daily.         ASSESSMENT/PLAN:  In summary, Collin Frank is here today with the following -      # Severe CAD s/p PCI w/ MAGUI x2 to RCA (12/27/24)  # Paroxysmal atrial fibrillation s/p DCCV (12/11/23, 7/2/24)  # Hypertension  # Hyperlipidemia  # Factor V Leiden  # May-Thurner syndrome  # Recurrent LLE DVT    Blood pressures controlled. Most recent lipid panel on 12/27/24 with LDL 52, TG 59. Patient reports no cardiac symptoms in clinic today.    - Brilinta 90 mg BID for 1 year, followed by switching to aspirin 81mg lifelong  - Atorvastatin 80 mg daily for CAD  - Xarelto 20 mg daily, Toprol 25 mg daily for Afib  - Amlodipine 5 mg daily, Losartan 25 mg daily for HTN  - Outpatient cardiac rehab to be scheduled by patient  - Lifestyle modifications for risk factor management: maintain healthy weight, aerobic and  strength exercise with goal of 150 minutes per week, healthy diet with emphasis in fruits, vegetables and fiber, and avoid processed and prepackaged foods       Follow up:  - in 1 year for annual visit      CHITRA Kirkpatrick FNP-C  [facility] General Cardiology  Securely message with [employer]   Text page via [facility] Paging/Directory          Chart review time: 10 minutes  Visit time: 20 minutes  Chart completion/documentation: 3 minutes  Total time spent: 33 minutes

## 2025-01-14 ENCOUNTER — OFFICE VISIT (OUTPATIENT)
Dept: CARDIOLOGY | Facility: CLINIC | Age: 75
End: 2025-01-14
Payer: COMMERCIAL

## 2025-01-14 VITALS
BODY MASS INDEX: 34.87 KG/M2 | OXYGEN SATURATION: 99 % | SYSTOLIC BLOOD PRESSURE: 130 MMHG | DIASTOLIC BLOOD PRESSURE: 79 MMHG | HEART RATE: 62 BPM | WEIGHT: 226 LBS

## 2025-01-14 DIAGNOSIS — I24.9 ACUTE CORONARY SYNDROME (H): ICD-10-CM

## 2025-01-14 DIAGNOSIS — I10 BENIGN ESSENTIAL HYPERTENSION: ICD-10-CM

## 2025-01-14 DIAGNOSIS — Z98.890 S/P CORONARY ANGIOGRAM: Primary | ICD-10-CM

## 2025-01-14 RX ORDER — ATORVASTATIN CALCIUM 80 MG/1
80 TABLET, FILM COATED ORAL DAILY
Qty: 90 TABLET | Refills: 3 | Status: SHIPPED | OUTPATIENT
Start: 2025-01-14

## 2025-01-14 RX ORDER — LOSARTAN POTASSIUM 50 MG/1
25 TABLET ORAL DAILY
Qty: 45 TABLET | Refills: 3 | Status: SHIPPED | OUTPATIENT
Start: 2025-01-14

## 2025-01-14 NOTE — NURSING NOTE
"Chief Complaint   Patient presents with    Follow Up     S/p angio       Initial /79 (BP Location: Right arm, Patient Position: Chair, Cuff Size: Adult Regular)   Pulse 62   Wt 102.5 kg (226 lb)   SpO2 99%   BMI 34.87 kg/m   Estimated body mass index is 34.87 kg/m  as calculated from the following:    Height as of 1/10/25: 1.715 m (5' 7.5\").    Weight as of this encounter: 102.5 kg (226 lb)..  BP completed using cuff size: regular    Yumiko Boss, Visit Facilitator    "

## 2025-01-14 NOTE — LETTER
2025      RE: Collin Frank  [address]  [facility] [address]       Dear Colleague,    Thank you for the opportunity to participate in the care of your patient, Collin Frank, at the [facility]. Please see a copy of my visit note below.        [facility]    Patient Name: Collin Frank   : 1950   Date of Visit: 2025  Primary Care Physician: Collin Salazar MD         Collin Frank is a pleasant 74 year old male, who presents for s/p angiogram. Prior history significant for paroxysmal A fib on Xarelto, Factor V Leiden mutation, May-Thurner syndrome, recurrent LLE DVT, HTN, HLD, BPH, nephrolithiasis.    Patient had a recent admission for concern for ACS/unstable angina and was scheduled for outpatient angiogram, which he underwent on 24. This revealed severe stenosis of the mid RCA, now s/p MAGUI x2 to RCA. Atorvastatin was increased to 80 mg daily.    Collin did present to ER on 1/3/24 with concern for blood in his urine. His Hgb was 15 and he was neurologically intact. ER provider discussed risks vs. benefits of DAPT, and patient agreed with continuing both and was discharged in stable condition.    Today in clinic, patient states they have no concerns with angiogram access site. Denies redness, swelling, bruising, purulent draining. Also denies numbness or tingling in extremity distal to site.     He reports no cardiac symptoms - denies chest pain, SOB, palpitations, dizziness. States his symptoms prior to     Has not yet started cardiac rehab. He says he needs to call them back to get set up.    Home BPs: reports about the same as in clinic 120-130/70-80 but does not check often    Current Cardiac Medications  Amlodipine 5 mg daily  Atorvastatin 80 mg daily  Losartan 25 mg daily  Toprol 25 mg daily  Brilinta 90 mg BID  Xarelto 20 mg daily      PAST MEDICAL HISTORY:  Past Medical  History:   Diagnosis Date     Acute deep vein thrombosis (DVT) (H) 11/21/2011 11/2011- acute and extensive LLE DVTs, he underwent thrombolysis by IR        Atrial fibrillation with RVR (H) 12/11/2023     Chest pain, unspecified type 03/31/2021     Nephrolithiasis 09/19/2024     FRANCK (obstructive sleep apnea) - moderate (AHI 24) 1/11/2025 1/6/2025 [place] [facility] Sleep Study (222.0 lbs) - AHI 24.9, RDI 40.5, Supine .0, REM AHI 36.0, Low O2 89.0%, Time Spent <=88% 0 minutes / Time Spent <=89% 0.3 minutes. PLM index was 43.1 movements per hour. PLM Arousal Index was 14.1 per hour. Possible periodic breathing noted     Renal stones 03/21/2013    2 mm size non obstructing  2 right, 1 left==CT  [place] 3/13       Status post coronary angiogram 12/27/2024       PAST SURGICAL HISTORY:  Past Surgical History:   Procedure Laterality Date     ANESTHESIA CARDIOVERSION N/A 12/11/2023    Procedure: Anesthesia cardioversion;  Surgeon: GENERIC ANESTHESIA PROVIDER;  Location: [facility] OR     ANESTHESIA CARDIOVERSION N/A 7/2/2024    Procedure: Anesthesia cardioversion@1330;  Surgeon: GENERIC ANESTHESIA PROVIDER;  Location: [facility] OR     COLONOSCOPY  01/17/2008    Normal. Repeat in 10 years     COLONOSCOPY WITH CO2 INSUFFLATION N/A 02/01/2018    Procedure: COLONOSCOPY WITH CO2 INSUFFLATION;  COLON SCREEN/ ENGELMANN;  Surgeon: Jan Flores MD;  Location: [facility] OR     COMBINED CYSTOSCOPY, RETROGRADES, URETEROSCOPY, LASER HOLMIUM LITHOTRIPSY URETER(S), INSERT STENT Left 9/19/2024    Procedure: Cystoscopy, Left Ureteroscopy, Left Retrograde Pyelogram, Left Holmium Laser Lithotripsy, Left Ureteral Stent Placement;  Surgeon: Delfin Kimball MD;  Location: [facility] OR     CV CORONARY ANGIOGRAM N/A 12/27/2024    Procedure: Coronary Angiogram;  Surgeon: Arron Do MD;  [facility]     CV PCI N/A 12/27/2024    Procedure: Percutaneous Coronary Intervention;  Surgeon: Arron Do MD;   Location:  HEART CARDIAC CATH LAB     ORTHOPEDIC SURGERY      rt knee ORIF       FAMILY HISTORY:  Family History   Problem Relation Age of Onset     Alzheimer Disease Mother      Heart Disease Father      Prostate Cancer Father      Cancer Brother 65        pancreatic        Prostate Cancer Brother         2 stents 70% and 90% blockage     Heart Disease Sister        SOCIAL HISTORY:  Social History     Socioeconomic History     Marital status: Single   Tobacco Use     Smoking status: Former     Current packs/day: 0.00     Types: Cigarettes     Quit date: 2011     Years since quittin.9     Passive exposure: Never     Smokeless tobacco: Never   Vaping Use     Vaping status: Never Used   Substance and Sexual Activity     Alcohol use: No     Drug use: No     Sexual activity: Never   Other Topics Concern     Parent/sibling w/ CABG, MI or angioplasty before 65F 55M? No     Social Drivers of Health     Financial Resource Strain: Low Risk  (2024)    Financial Resource Strain      Within the past 12 months, have you or your family members you live with been unable to get utilities (heat, electricity) when it was really needed?: No   Food Insecurity: Low Risk  (2024)    Food Insecurity      Within the past 12 months, did you worry that your food would run out before you got money to buy more?: No      Within the past 12 months, did the food you bought just not last and you didn t have money to get more?: No   Transportation Needs: Low Risk  (2024)    Transportation Needs      Within the past 12 months, has lack of transportation kept you from medical appointments, getting your medicines, non-medical meetings or appointments, work, or from getting things that you need?: No   Physical Activity: Inactive (2024)    Exercise Vital Sign      Days of Exercise per Week: 0 days      Minutes of Exercise per Session: 0 min   Stress: No Stress Concern Present (2024)    [place]  [employer] - Occupational Stress Questionnaire      Feeling of Stress : Not at all   Social Connections: Moderately Isolated (12/23/2024)    Social Connection and Isolation Panel [NHANES]      Frequency of Communication with Friends and Family: More than three times a week      Frequency of Social Gatherings with Friends and Family: Twice a week      Attends [employer]ist Services: More than 4 times per year      Active Member of Clubs or Organizations: No      Attends Club or Organization Meetings: Never      Marital Status: Never    Interpersonal Safety: Low Risk  (12/23/2024)    Interpersonal Safety      Do you feel physically and emotionally safe where you currently live?: Yes      Within the past 12 months, have you been hit, slapped, kicked or otherwise physically hurt by someone?: No      Within the past 12 months, have you been humiliated or emotionally abused in other ways by your partner or ex-partner?: No   Housing Stability: Low Risk  (12/23/2024)    Housing Stability      Do you have housing? : Yes      Are you worried about losing your housing?: No       MEDICATIONS:  Current Outpatient Medications   Medication Sig Dispense Refill     amLODIPine (NORVASC) 5 MG tablet Take 1 tablet (5 mg) by mouth daily. 90 tablet 3     atorvastatin (LIPITOR) 80 MG tablet Take 1 tablet (80 mg) by mouth daily. 30 tablet 3     cholecalciferol (VITAMIN D) 1000 UNIT tablet Take 1,000 Units by mouth every morning. 100 tablet 3     losartan (COZAAR) 50 MG tablet Take 0.5 tablets (25 mg) by mouth daily. 45 tablet 3     metoprolol succinate ER (TOPROL XL) 25 MG 24 hr tablet Take 1 tablet (25 mg) by mouth daily. 90 tablet 3     ticagrelor (BRILINTA) 90 MG tablet Take 1 tablet (90 mg) by mouth 2 times daily. Start this evening. 180 tablet 3     triamcinolone (KENALOG) 0.1 % external cream Apply topically 2 times daily. as needed for leg rash 45 g 0     XARELTO ANTICOAGULANT 20 MG TABS tablet TAKE 1 TABLET  DAILY WITH DINNER 90 tablet 3     No current facility-administered medications for this visit.        ALLERGIES:   No Known Allergies    ROS:  A complete 10-point ROS was negative except as above.    PHYSICAL EXAM:  /79 (BP Location: Right arm, Patient Position: Chair, Cuff Size: Adult Regular)   Pulse 62   Wt 102.5 kg (226 lb)   SpO2 99%   BMI 34.87 kg/m    Gen: alert, interactive, NAD  Neck: supple, no JVD  CV: RRR, no m/r/g  Chest: CTAB, no wheezes or crackles  Abd: soft, NT, ND, +BS  Ext: no LE edema    LABS:    CMP  Last Comprehensive Metabolic Panel:  Sodium   Date Value Ref Range Status   01/03/2025 136 135 - 145 mmol/L Final   04/01/2021 140 133 - 144 mmol/L Final     Potassium   Date Value Ref Range Status   01/03/2025 4.2 3.4 - 5.3 mmol/L Final   12/12/2022 4.1 3.4 - 5.3 mmol/L Final   04/01/2021 3.9 3.4 - 5.3 mmol/L Final     Chloride   Date Value Ref Range Status   01/03/2025 105 98 - 107 mmol/L Final   12/12/2022 109 94 - 109 mmol/L Final   04/01/2021 108 94 - 109 mmol/L Final     Carbon Dioxide   Date Value Ref Range Status   04/01/2021 25 20 - 32 mmol/L Final     Carbon Dioxide (CO2)   Date Value Ref Range Status   01/03/2025 22 22 - 29 mmol/L Final   12/12/2022 27 20 - 32 mmol/L Final     Anion Gap   Date Value Ref Range Status   01/03/2025 9 7 - 15 mmol/L Final   12/12/2022 4 3 - 14 mmol/L Final   04/01/2021 7 3 - 14 mmol/L Final     Glucose   Date Value Ref Range Status   01/03/2025 103 (H) 70 - 99 mg/dL Final   12/12/2022 89 70 - 99 mg/dL Final   04/01/2021 90 70 - 99 mg/dL Final     GLUCOSE BY METER POCT   Date Value Ref Range Status   09/19/2024 105 (H) 70 - 99 mg/dL Final     Urea Nitrogen   Date Value Ref Range Status   01/03/2025 12.4 8.0 - 23.0 mg/dL Final   12/12/2022 15 7 - 30 mg/dL Final   04/01/2021 15 7 - 30 mg/dL Final     Creatinine   Date Value Ref Range Status   01/03/2025 0.92 0.67 - 1.17 mg/dL Final   04/01/2021 0.93 0.66 - 1.25 mg/dL Final     GFR Estimate   Date Value  Ref Range Status   01/03/2025 86 >60 mL/min/1.73m2 Final     Comment:     eGFR calculated using 2021 CKD-EPI equation.   04/01/2021 81 >60 mL/min/[1.73_m2] Final     Comment:     Non  GFR Calc  Starting 12/18/2018, serum creatinine based estimated GFR (eGFR) will be   calculated using the Chronic Kidney Disease Epidemiology Collaboration   (CKD-EPI) equation.       GFR, ESTIMATED POCT   Date Value Ref Range Status   08/30/2024 >60 >60 mL/min/1.73m2 Final     Calcium   Date Value Ref Range Status   01/03/2025 9.1 8.8 - 10.4 mg/dL Final     Comment:     Reference intervals for this test were updated on 7/16/2024 to reflect our healthy population more accurately. There may be differences in the flagging of prior results with similar values performed with this method. Those prior results can be interpreted in the context of the updated reference intervals.   04/01/2021 8.8 8.5 - 10.1 mg/dL Final     Bilirubin Total   Date Value Ref Range Status   12/08/2024 0.9 <=1.2 mg/dL Final   03/31/2021 0.8 0.2 - 1.3 mg/dL Final     Alkaline Phosphatase   Date Value Ref Range Status   12/08/2024 95 40 - 150 U/L Final   03/31/2021 91 40 - 150 U/L Final     ALT   Date Value Ref Range Status   12/08/2024 20 0 - 70 U/L Final   03/31/2021 31 0 - 70 U/L Final     AST   Date Value Ref Range Status   12/08/2024 30 0 - 45 U/L Final     Comment:     Specimen is hemolyzed which can falsely elevate AST. Analysis of a non-hemolyzed specimen may result in a lower value.  Specimen is hemolyzed which can falsely elevate AST. Analysis of a non-hemolyzed specimen may result in a lower value.   03/31/2021 26 0 - 45 U/L Final       TROP  Lab Results   Component Value Date    TROPI <0.015 03/31/2021    TROPI <0.015 03/31/2021    TROPI <0.015 03/31/2021    TROPI <0.015 09/15/2019    TROPI <0.015 09/15/2019    TROPONIN 0.00 01/04/2020       CBC  CBC RESULTS:   Recent Labs   Lab Test 01/03/25  2316   WBC 5.3   RBC 4.52   HGB 15.1   HCT  43.7   MCV 97   MCH 33.4*   MCHC 34.6   RDW 13.4          LIPIDS  Recent Labs   Lab Test 12/27/24  0759 12/08/24  0411   CHOL 97 135   HDL 33* 51   LDL 52 74   TRIG 59 51       TSH  TSH   Date Value Ref Range Status   09/05/2024 3.92 0.30 - 4.20 uIU/mL Final   03/31/2021 1.79 0.40 - 4.00 mU/L Final       HBA1C  Lab Results   Component Value Date    A1C 5.6 12/23/2024    A1C 6.1 09/05/2024    A1C 5.8 12/14/2023    A1C 5.5 10/16/2012    A1C 5.9 11/28/2011         CARDIAC DATA:     Echo 12/9/24:  No cardiac source for embolus identified. Global and regional left ventricular  function is normal with an EF of 60-65%. Biplane LVEF is 61%.  Right ventricular function, chamber size, wall motion, and thickness are  normal.  No significant valvular abnormalities present.  IVC diameter <2.1 cm collapsing >50% with sniff suggests a normal RA pressure  of 3 mmHg.  No pericardial effusion is present.     This study was compared with the study from 6/21/2024. No significant changes  noted.    Cardiac Catheterization 12/27/24:     Prox RCA to Mid RCA lesion is 90% stenosed.     Prox RCA-1 lesion is 30% stenosed.     Mid LAD lesion is 30% stenosed.     Prox RCA-2 lesion is 40% stenosed.     - Severe stenosis of the mid RCA.  - Successful deployment of two drug eluting stents from proximal to distal RCA.     - Restart xarelto tomorrow.  - Brilinta 90mg BID for 1 year followed by switching to aspirin 81mg lifelong.   - Stop home aspirin as patient will now be on brillinta and xarelto.   - Increase atorvastatin to 80mg daily.         ASSESSMENT/PLAN:  In summary, Collin Frank is here today with the following -      # Severe CAD s/p PCI w/ MAGUI x2 to RCA (12/27/24)  # Paroxysmal atrial fibrillation s/p DCCV (12/11/23, 7/2/24)  # Hypertension  # Hyperlipidemia  # Factor V Leiden  # May-Thurner syndrome  # Recurrent LLE DVT    Blood pressures controlled. Most recent lipid panel on 12/27/24 with LDL 52, TG 59. Patient reports no  cardiac symptoms in clinic today.    - Brilinta 90 mg BID for 1 year, followed by switching to aspirin 81mg lifelong  - Atorvastatin 80 mg daily for CAD  - Xarelto 20 mg daily, Toprol 25 mg daily for Afib  - Amlodipine 5 mg daily, Losartan 25 mg daily for HTN  - Outpatient cardiac rehab to be scheduled by patient  - Lifestyle modifications for risk factor management: maintain healthy weight, aerobic and strength exercise with goal of 150 minutes per week, healthy diet with emphasis in fruits, vegetables and fiber, and avoid processed and prepackaged foods       Follow up:  - in 1 year for annual visit      CHITRA Kirkpatrick, FNP-C  [facility] General Cardiology  Securely message with [employer]   Text page via [facility] Paging/Directory          Chart review time: 10 minutes  Visit time: 20 minutes  Chart completion/documentation: 3 minutes  Total time spent: 33 minutes       Please do not hesitate to contact me if you have any questions/concerns.     Sincerely,     CHITRA Kirkpatrick CNP

## 2025-01-14 NOTE — PATIENT INSTRUCTIONS
Thank you for coming to the [facility] @ [facility]; please note the following instructions:    1. Call [phone] to set up Cardiac Rehab therapy visits  2. Follow up with cardiology in 1 year for annual visit      If you have any questions regarding your visit please contact your care team:     Cardiology  Telephone Number   Eliane HAWKINS., RN  Yumi PADILLA, RN  Ceci BAINS, RN  Elizabeth SHEPARD, RMA  Daxa HALE, RMTAMIKO LUA, Clinic Facilitator  Yumiko PADILLA, Clinic Facilitator [phone] (option 1)   For scheduling appts:     [phone] (select option 1)       For the Device Clinic (Pacemakers and ICD's)  RN's :  Annalisa Fontaine   During business hours: [phone]    *After business hours:  [phone] (select option 4)      Normal test result notifications will be released via [email or web address] or mailed within 7 business days.  All other test results, will be communicated via telephone once reviewed by your cardiologist.    If you need a medication refill please contact your pharmacy.  Please allow 3 business days for your refill to be completed.    As always, thank you for trusting us with your health care needs!

## 2025-01-15 LAB — SLPCOMP: NORMAL

## 2025-01-17 ENCOUNTER — TELEPHONE (OUTPATIENT)
Dept: CARDIOLOGY | Facility: CLINIC | Age: 75
End: 2025-01-17
Payer: COMMERCIAL

## 2025-01-17 NOTE — TELEPHONE ENCOUNTER
Health Call Center    Phone Message    May a detailed message be left on voicemail: yes     Reason for Call: Other: Collin called requesting to speak with his care team regarding some pain he has had in his right should that just started yesterday 1/16. Please reach out to Collin to discuss. Thank you!     Action Taken: Other: Cardiology    Travel Screening: Not Applicable    Thank you!  Specialty [facility]       Date of Service:

## 2025-01-20 ENCOUNTER — APPOINTMENT (OUTPATIENT)
Dept: CT IMAGING | Facility: CLINIC | Age: 75
End: 2025-01-20
Attending: EMERGENCY MEDICINE
Payer: COMMERCIAL

## 2025-01-20 ENCOUNTER — HOSPITAL ENCOUNTER (EMERGENCY)
Facility: CLINIC | Age: 75
Discharge: HOME | End: 2025-01-21
Attending: EMERGENCY MEDICINE
Payer: COMMERCIAL

## 2025-01-20 DIAGNOSIS — R29.898 RIGHT HAND WEAKNESS: ICD-10-CM

## 2025-01-20 DIAGNOSIS — M25.511 RIGHT SHOULDER PAIN, UNSPECIFIED CHRONICITY: ICD-10-CM

## 2025-01-20 LAB
ALBUMIN SERPL BCG-MCNC: 3.9 G/DL (ref 3.5–5.2)
ALP SERPL-CCNC: 121 U/L (ref 40–150)
ALT SERPL W P-5'-P-CCNC: 23 U/L (ref 0–70)
ANION GAP SERPL CALCULATED.3IONS-SCNC: 9 MMOL/L (ref 7–15)
AST SERPL W P-5'-P-CCNC: 31 U/L (ref 0–45)
BASOPHILS # BLD AUTO: 0.1 10E3/UL (ref 0–0.2)
BASOPHILS NFR BLD AUTO: 2 %
BILIRUB SERPL-MCNC: 0.9 MG/DL
BUN SERPL-MCNC: 12.4 MG/DL (ref 8–23)
CALCIUM SERPL-MCNC: 9.5 MG/DL (ref 8.8–10.4)
CHLORIDE SERPL-SCNC: 105 MMOL/L (ref 98–107)
CREAT SERPL-MCNC: 0.92 MG/DL (ref 0.67–1.17)
EGFRCR SERPLBLD CKD-EPI 2021: 86 ML/MIN/1.73M2
EOSINOPHIL # BLD AUTO: 0.1 10E3/UL (ref 0–0.7)
EOSINOPHIL NFR BLD AUTO: 2 %
ERYTHROCYTE [DISTWIDTH] IN BLOOD BY AUTOMATED COUNT: 13.6 % (ref 10–15)
GLUCOSE SERPL-MCNC: 124 MG/DL (ref 70–99)
HCO3 SERPL-SCNC: 23 MMOL/L (ref 22–29)
HCT VFR BLD AUTO: 42.5 % (ref 40–53)
HGB BLD-MCNC: 14.6 G/DL (ref 13.3–17.7)
IMM GRANULOCYTES # BLD: 0 10E3/UL
IMM GRANULOCYTES NFR BLD: 0 %
LYMPHOCYTES # BLD AUTO: 1.2 10E3/UL (ref 0.8–5.3)
LYMPHOCYTES NFR BLD AUTO: 18 %
MCH RBC QN AUTO: 33 PG (ref 26.5–33)
MCHC RBC AUTO-ENTMCNC: 34.4 G/DL (ref 31.5–36.5)
MCV RBC AUTO: 96 FL (ref 78–100)
MONOCYTES # BLD AUTO: 0.6 10E3/UL (ref 0–1.3)
MONOCYTES NFR BLD AUTO: 9 %
NEUTROPHILS # BLD AUTO: 4.7 10E3/UL (ref 1.6–8.3)
NEUTROPHILS NFR BLD AUTO: 70 %
NRBC # BLD AUTO: 0 10E3/UL
NRBC BLD AUTO-RTO: 0 /100
PLATELET # BLD AUTO: 251 10E3/UL (ref 150–450)
POTASSIUM SERPL-SCNC: 4.4 MMOL/L (ref 3.4–5.3)
PROT SERPL-MCNC: 7 G/DL (ref 6.4–8.3)
RBC # BLD AUTO: 4.43 10E6/UL (ref 4.4–5.9)
SODIUM SERPL-SCNC: 137 MMOL/L (ref 135–145)
WBC # BLD AUTO: 6.7 10E3/UL (ref 4–11)

## 2025-01-20 PROCEDURE — 70496 CT ANGIOGRAPHY HEAD: CPT | Mod: 26 | Performed by: STUDENT IN AN ORGANIZED HEALTH CARE EDUCATION/TRAINING PROGRAM

## 2025-01-20 PROCEDURE — 99285 EMERGENCY DEPT VISIT HI MDM: CPT | Mod: 25 | Performed by: EMERGENCY MEDICINE

## 2025-01-20 PROCEDURE — 70450 CT HEAD/BRAIN W/O DYE: CPT

## 2025-01-20 PROCEDURE — 70498 CT ANGIOGRAPHY NECK: CPT | Mod: 26 | Performed by: STUDENT IN AN ORGANIZED HEALTH CARE EDUCATION/TRAINING PROGRAM

## 2025-01-20 PROCEDURE — 999N000285 HC STATISTIC VASC ACCESS LAB DRAW WITH PIV START

## 2025-01-20 PROCEDURE — 80053 COMPREHEN METABOLIC PANEL: CPT | Performed by: EMERGENCY MEDICINE

## 2025-01-20 PROCEDURE — 85025 COMPLETE CBC W/AUTO DIFF WBC: CPT | Performed by: EMERGENCY MEDICINE

## 2025-01-20 PROCEDURE — 70496 CT ANGIOGRAPHY HEAD: CPT

## 2025-01-20 PROCEDURE — 99285 EMERGENCY DEPT VISIT HI MDM: CPT | Performed by: EMERGENCY MEDICINE

## 2025-01-20 PROCEDURE — 250N000011 HC RX IP 250 OP 636: Performed by: EMERGENCY MEDICINE

## 2025-01-20 PROCEDURE — 36415 COLL VENOUS BLD VENIPUNCTURE: CPT | Performed by: EMERGENCY MEDICINE

## 2025-01-20 PROCEDURE — 999N000127 HC STATISTIC PERIPHERAL IV START W US GUIDANCE

## 2025-01-20 RX ORDER — IOPAMIDOL 755 MG/ML
67 INJECTION, SOLUTION INTRAVASCULAR ONCE
Status: COMPLETED | OUTPATIENT
Start: 2025-01-20 | End: 2025-01-20

## 2025-01-20 RX ADMIN — IOPAMIDOL 67 ML: 755 INJECTION, SOLUTION INTRAVENOUS at 19:52

## 2025-01-20 ASSESSMENT — ACTIVITIES OF DAILY LIVING (ADL)
ADLS_ACUITY_SCORE: 56

## 2025-01-20 NOTE — ED PROVIDER NOTES
"       [place] EMERGENCY DEPARTMENT ([facility])    1/20/25       ED PROVIDER NOTE    History     Chief Complaint   Patient presents with    Stroke Symptoms     HPI  Collin Frank is a 74 year old male with a past medical history of CAD, HTN, atrial fibrillation on xarelto, HLD, FRANCK, DVT/PE, and recent hospitalization for acute coronary syndrome s/p coronary angiogram and placement of 2 stents on 12/27/24 who presents to the emergency department with concerns for stroke.    Patient reports right shoulder pain and right upper extremity weakness that onset Wednesday (1/15/25). Patient reports soreness, states \"it felt as if someone had punched him in his right arm\". Patient states the pain improved and then today he noticed bruising to the medial aspect of his right upper extremity.  The weakness has completely resolved.  He was advised to go to emergency Department due to concerns for CVA with the weakness in his hand.  Patient denies any recent traumas or injuries. No numbness or tingling. Denies history of similar. No other symptoms noted.     Past Medical History  Past Medical History:   Diagnosis Date    Acute deep vein thrombosis (DVT) (H) 11/21/2011 11/2011- acute and extensive LLE DVTs, he underwent thrombolysis by IR       Atrial fibrillation with RVR (H) 12/11/2023    Chest pain, unspecified type 03/31/2021    Nephrolithiasis 09/19/2024    FRANCK (obstructive sleep apnea) - moderate (AHI 24) 1/11/2025 1/6/2025 [place] Diagnostic Sleep Study (222.0 lbs) - AHI 24.9, RDI 40.5, Supine .0, REM AHI 36.0, Low O2 89.0%, Time Spent <=88% 0 minutes / Time Spent <=89% 0.3 minutes. PLM index was 43.1 movements per hour. PLM Arousal Index was 14.1 per hour. Possible periodic breathing noted    Renal stones 03/21/2013    2 mm size non obstructing  2 right, 1 left==CT  [place] 3/13      Status post coronary angiogram 12/27/2024     Past Surgical History:   Procedure Laterality Date    ANESTHESIA " CARDIOVERSION N/A 2023    Procedure: Anesthesia cardioversion;  Surgeon: GENERIC ANESTHESIA PROVIDER;  Location: [facility] OR    ANESTHESIA CARDIOVERSION N/A 2024    Procedure: Anesthesia cardioversion@1330;  Surgeon: GENERIC ANESTHESIA PROVIDER;  Location: [facility] OR    COLONOSCOPY  2008    Normal. Repeat in 10 years    COLONOSCOPY WITH CO2 INSUFFLATION N/A 2018    Procedure: COLONOSCOPY WITH CO2 INSUFFLATION;  COLON SCREEN/ ENGELMANN;  Surgeon: Jan Flores MD;  Location: [facility] OR    COMBINED CYSTOSCOPY, RETROGRADES, URETEROSCOPY, LASER HOLMIUM LITHOTRIPSY URETER(S), INSERT STENT Left 2024    Procedure: Cystoscopy, Left Ureteroscopy, Left Retrograde Pyelogram, Left Holmium Laser Lithotripsy, Left Ureteral Stent Placement;  Surgeon: Delfin Kimball MD;  Location: [facility] OR    CV CORONARY ANGIOGRAM N/A 2024    Procedure: Coronary Angiogram;  Surgeon: Arron Do MD;  Location: [facility] HEART CARDIAC CATH LAB    CV PCI N/A 2024    Procedure: Percutaneous Coronary Intervention;  Surgeon: Arron Do MD;  Location: [facility] C[facility] LAB    ORTHOPEDIC SURGERY  1975    rt knee ORIF     amLODIPine (NORVASC) 5 MG tablet  atorvastatin (LIPITOR) 80 MG tablet  cholecalciferol (VITAMIN D) 1000 UNIT tablet  losartan (COZAAR) 50 MG tablet  metoprolol succinate ER (TOPROL XL) 25 MG 24 hr tablet  ticagrelor (BRILINTA) 90 MG tablet  triamcinolone (KENALOG) 0.1 % external cream  XARELTO ANTICOAGULANT 20 MG TABS tablet      No Known Allergies  Family History  Family History   Problem Relation Age of Onset    Alzheimer Disease Mother     Heart Disease Father     Prostate Cancer Father     Cancer Brother 65        pancreatic       Prostate Cancer Brother         2 stents 70% and 90% blockage    Heart Disease Sister      Social History   Social History     Tobacco Use    Smoking status: Former     Current packs/day: 0.00     Types: Cigarettes     Quit date: 2011      Years since quittin.9     Passive exposure: Never    Smokeless tobacco: Never   Vaping Use    Vaping status: Never Used   Substance Use Topics    Alcohol use: No    Drug use: No      Past medical history, past surgical history, medications, allergies, family history, and social history were reviewed with the patient. No additional pertinent items.     A complete review of systems was performed with pertinent positives and negatives noted in the HPI, and all other systems negative.    Physical Exam   BP: 135/73  Pulse: 59  Temp: 97.4  F (36.3  C)  Resp: 18  SpO2: 96 %  Physical Exam  Vitals and nursing note reviewed.   Constitutional:       General: He is not in acute distress.     Appearance: He is well-developed. He is not diaphoretic.   HENT:      Head: Normocephalic and atraumatic.      Mouth/Throat:      Pharynx: No oropharyngeal exudate.   Eyes:      General: No scleral icterus.        Right eye: No discharge.         Left eye: No discharge.      Pupils: Pupils are equal, round, and reactive to light.   Cardiovascular:      Rate and Rhythm: Normal rate and regular rhythm.      Heart sounds: Normal heart sounds. No murmur heard.     No friction rub. No gallop.   Pulmonary:      Effort: Pulmonary effort is normal. No respiratory distress.      Breath sounds: Normal breath sounds. No wheezing.   Chest:      Chest wall: No tenderness.   Abdominal:      General: Bowel sounds are normal. There is no distension.      Palpations: Abdomen is soft.      Tenderness: There is no abdominal tenderness.   Musculoskeletal:         General: No tenderness or deformity. Normal range of motion.      Cervical back: Normal range of motion and neck supple.   Skin:     General: Skin is warm and dry.      Coloration: Skin is not pale.      Findings: No erythema or rash.   Neurological:      Mental Status: He is alert and oriented to person, place, and time.      Cranial Nerves: No cranial nerve deficit.           ED Course,  Procedures, & Data      Procedures                No results found for any visits on 01/20/25.  Medications - No data to display  Labs Ordered and Resulted from Time of ED Arrival to Time of ED Departure - No data to display  No orders to display              Assessment & Plan    This 74-year-old male who presents with right shoulder/arm pain as well as an episode of weakness in the hand.  Pain has been improving and weakness has resolved.  Patient was advised to go here due to concerns for possible CVA.  Exam demonstrates no acute abnormalities.  Lab work shows no acute abnormalities.  CT and CTA of the head show no acute abnormalities.  Discussed case with Neurology.  They recommend MRI.  MRI has been ordered and pending at this time.  Patient is signed out to incoming physician pending results of MRI with plans for likely discharge.    I have reviewed the nursing notes. I have reviewed the findings, diagnosis, plan and need for follow up with the patient.    New Prescriptions    No medications on file       Final diagnoses:   None   Roya AREVALO, am serving as a trained medical scribe to document services personally performed by Jose Lott DO based on the provider's statements to me on January 20, 2025.  This document has been checked and approved by the attending provider.    Jose AREVALO DO, was physically present and have reviewed and verified the accuracy of this note documented by Roya Montemayor medical scribe.      Jose Lott DO   [facility] EMERGENCY DEPARTMENT  1/20/2025     Jose Lott DO  01/20/25 1968

## 2025-01-20 NOTE — TELEPHONE ENCOUNTER
"Saw adiel on Tuesday and then on Wednesday he states he experienced constant pain in right arm up by the collarbone \"right where you get the shot\" and \"I have never had this before, it lasted 2 days\".  Reports that he had a lack of movement due to weakness (could  but a weak ) and some soreness but now it has completely resolved.  States this is the same side as his angiogram incision which is completely healed.      12/9/25 classic symptoms of feeling weak, weakness of left arm, sweating.  Patient did have MRA of the brain:  Impression:  1. No evidence of acute infarction or intracranial hemorrhage.  2. No abnormal enhancing lesions intracranially.  3. Head MRA demonstrates no definite aneurysm or stenosis of the major  intracranial arteries.  4. Neck MRA demonstrates patent major cervical arteries.    Currently denies pain, chest pain, shortness of breath, confusion, numbness/tingling/weakness in any extremity, visual disturbances, balance issues.      Advised patient to continue to monitor symptoms at this time.  Will inform Adiel and will call patient if she has a different recommendation.  Tg is scheduled with neurology 2/21/25.    Eliane Luo RN  Cardiology Care Coordinator  [facility]  [phone] option 1            "

## 2025-01-20 NOTE — TELEPHONE ENCOUNTER
Spoke to patient again and relayed MD's response.  Patient is agreeable to go to the ED to be evaluated.    Eliane Luo, RN  Cardiology Care Coordinator  [facility]  [phone] option 1    Nellie Mcmahon, APRN CNP  You5 minutes ago (3:13 PM)     ASHA [place],    If he is still having symptoms, I would recommend him go to his preferred ER to be further evaluated. With any sort of stroke symptoms I wouldn't want anything to get missed.    Thanks,  Nellie

## 2025-01-20 NOTE — TELEPHONE ENCOUNTER
"Spoke to patient and relayed Bony response.  Patient verbalized understanding.  Patient also states \"well, my arm is still mildly sore but not like it was on Wednesday and Thursday and I can move it\" also states \"it still is somewhat weak but not like last week and my  is normal, I can grab things\".      Eliane Luo, RN  Cardiology Care Coordinator  [facility]  [phone] option 1      Nellie Mcmahon, APRN CNP  You11 minutes ago (2:16 PM)     ML  I agree with seeing neurology for follow-up as scheduled. If something like this happens again before that appointment with arm weakness or loss of sensation he should be evaluated in emergency room, as that is concerning for stroke.         "

## 2025-01-20 NOTE — ED TRIAGE NOTES
BIBA from home with complaints of right shoulder pain that began on Wednesday. Pt was talking to his Cardiologist as he had stents placed in December. Cards advised pt to come to the ED after they thought he was exhibiting stoke-like symptoms.    Terese Escalante, MATILDAN  Shift: [phone]

## 2025-01-21 ENCOUNTER — APPOINTMENT (OUTPATIENT)
Dept: MRI IMAGING | Facility: CLINIC | Age: 75
End: 2025-01-21
Attending: EMERGENCY MEDICINE
Payer: COMMERCIAL

## 2025-01-21 VITALS
HEART RATE: 58 BPM | TEMPERATURE: 97.4 F | OXYGEN SATURATION: 98 % | RESPIRATION RATE: 17 BRPM | DIASTOLIC BLOOD PRESSURE: 82 MMHG | SYSTOLIC BLOOD PRESSURE: 138 MMHG

## 2025-01-21 PROCEDURE — 255N000002 HC RX 255 OP 636: Performed by: EMERGENCY MEDICINE

## 2025-01-21 PROCEDURE — A9585 GADOBUTROL INJECTION: HCPCS | Performed by: EMERGENCY MEDICINE

## 2025-01-21 PROCEDURE — 70553 MRI BRAIN STEM W/O & W/DYE: CPT

## 2025-01-21 PROCEDURE — 70553 MRI BRAIN STEM W/O & W/DYE: CPT | Mod: 26 | Performed by: RADIOLOGY

## 2025-01-21 RX ORDER — GADOBUTROL 604.72 MG/ML
10 INJECTION INTRAVENOUS ONCE
Status: COMPLETED | OUTPATIENT
Start: 2025-01-21 | End: 2025-01-21

## 2025-01-21 RX ADMIN — GADOBUTROL 10 ML: 604.72 INJECTION INTRAVENOUS at 00:26

## 2025-01-21 ASSESSMENT — ACTIVITIES OF DAILY LIVING (ADL)
ADLS_ACUITY_SCORE: 56
ADLS_ACUITY_SCORE: 56

## 2025-01-21 NOTE — ED PROVIDER NOTES
The patient was accepted at shift change signout pending MRI results as well as neurorecommendations.  MRI resulted:    IMPRESSION:  1.  No acute intracranial process.  2.  Generalized brain atrophy and presumed microvascular ischemic changes as detailed above.    Results were reviewed with both the neurology resident as well as the patient.  The neurologist felt that the patient was already on optimal medication management, did not require any sort of acute intervention or management.  Patient be discharged home at this time, encouraged to follow-up with his outpatient provider.  He verbalizes understanding and is agreeable to the plan.    Dictation Disclaimer: Some of this Note has been completed with voice-recognition dictation software. Although errors are generally corrected real-time, there is the potential for a rare error to be present in the completed chart.       Donna Chun MD  01/21/25 0128

## 2025-01-21 NOTE — CONSULTS
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  He reports recent history of coronary stent placements on 12/27 and is currently on Xarelto and Brilinta.  He reached out to cardiology with his current symptoms who recommended him to visit ER for evaluation and to rule out stroke.  He reports weakness in the RUE but mentions that he has good  and denied being unable to do any activities that he was able to do before.  He also mention numbness in the right upper extremity which he describes it as does not feel quite right.  He denied missing any medications, history of stroke, difficulty with speech loss of vision, headache, difficulty with gait.  Upon presentation CT/CTA were done which were negative for hemorrhage or acute LVO.     TIA Evaluation Summarized    MRI and/or Head CT 1.  No acute intracranial process.  2.  Generalized brain atrophy and presumed microvascular ischemic changes as detailed above.   Intracranial Vasculature 1. No large vessel occlusion, high-grade stenosis, aneurysm, or high-flow vascular malformation.    Cervical Vasculature 1.  No hemodynamically significant stenosis or dissection in the neck vessels.  2.  Multilevel spondylosis, as described.     Echocardiogram N/A   EKG/Telemetry N/A   Other Testing Not Applicable      LDL 12/27/2024: 52 mg/dL   A1C 12/23/2024: 5.6 %       ABCD2 Patients Score   Age >= 60 years 1 point 1   Blood Pressure    SBP >= 140 or DBP >=  90     1 point 1   Clinical Features    - Unilateral weakness    - Speech disturbance w/o weakness    - Other    2 points  1 point    0 points 2   Duration of symptoms    >= 60 minutes    10-59 minutes    < 10 minutes   2 points  1 point  0 points 2   Diabetes  1 point 0   Patient s ABCD2 Score (0-7) = 6       Impression  Transient ischemic attack  frank Frank is a 74 year old male PMH significant for HTN, HLD, paroxysmal A-fib on Xarelto, factor V Leyden heterozygous mutation, May-Thurner syndrome, recurrent LLE DVT s/p pharmacal-mechanical thrombolysis and stent placement in 2011, BPH who presented to the ED with pain in the right shoulder, RUE weakness and subjective RUE numbness since Wednesday 1/15.  On assessment NIH is 1 for subjective numbness of RUE.  No weakness or sensory deficit or cranial nerve deficits noted on exam.  CT/CTA head and neck were done which were negative for LVO or hemorrhage.  His ABCD score is 6 which is very high and in the setting of all vascular risk factors it is reasonable to obtain an MRI brain.  MRI brain was negative for stroke.  Stroke workup is summarized in the table above.  Patient is already on Xarelto Brilinta and atorvastatin.  No further workup indicated    Recommendations   -MRI brain negative for stroke  -Continue Xarelto, Brilinta as indicated  -Continue PTA atorvastatin  -No further workup indicated  -Disposition per ED    Patient Follow-up    - Not indicated    Thank you for this consult. No further stroke evaluation is recommended, so we will sign off. Please contact us with any additional questions.    The patient was discussed with Stroke Staff Dr. Huff.    Teofilo Jaimes  Neurology Resident  Pager: Thaddeus  _____________________________________________________    Clinically Significant Risk Factors Present on Admission                # Drug Induced Coagulation Defect: home medication list includes an anticoagulant medication  # Drug Induced Platelet Defect: home  "medication list includes an antiplatelet medication   # Hypertension: Noted on problem list           # Obesity: Estimated body mass index is 34.87 kg/m  as calculated from the following:    Height as of 1/10/25: 1.715 m (5' 7.5\").    Weight as of 1/14/25: 102.5 kg (226 lb).                 Past Medical History    Past Medical History:   Diagnosis Date    Acute deep vein thrombosis (DVT) (H) 11/21/2011 11/2011- acute and extensive LLE DVTs, he underwent thrombolysis by IR       Atrial fibrillation with RVR (H) 12/11/2023    Chest pain, unspecified type 03/31/2021    Nephrolithiasis 09/19/2024    FRANCK (obstructive sleep apnea) - moderate (AHI 24) 1/11/2025 1/6/2025 [place] Diagnostic Sleep Study (222.0 lbs) - AHI 24.9, RDI 40.5, Supine .0, REM AHI 36.0, Low O2 89.0%, Time Spent <=88% 0 minutes / Time Spent <=89% 0.3 minutes. PLM index was 43.1 movements per hour. PLM Arousal Index was 14.1 per hour. Possible periodic breathing noted    Renal stones 03/21/2013    2 mm size non obstructing  2 right, 1 left==CT  [place] 3/13      Status post coronary angiogram 12/27/2024     Medications   Home Meds  Prior to Admission medications    Medication Sig Start Date End Date Taking? Authorizing Provider   amLODIPine (NORVASC) 5 MG tablet Take 1 tablet (5 mg) by mouth daily. 11/7/24   Jimy Maya MD   atorvastatin (LIPITOR) 80 MG tablet Take 1 tablet (80 mg) by mouth daily. 1/14/25   Nellie Mcmahon APRN CNP   cholecalciferol (VITAMIN D) 1000 UNIT tablet Take 1,000 Units by mouth every morning. 10/30/14   Isaac Membreno MD   losartan (COZAAR) 50 MG tablet Take 0.5 tablets (25 mg) by mouth daily. 1/14/25   Nellie Mcmahon APRN CNP   metoprolol succinate ER (TOPROL XL) 25 MG 24 hr tablet Take 1 tablet (25 mg) by mouth daily. 11/7/24   Jimy Maya MD   ticagrelor (BRILINTA) 90 MG tablet Take 1 tablet (90 mg) by mouth 2 times daily. Start this evening. 1/14/25   Nellie Mcmahon APRN CNP   triamcinolone (KENALOG) 0.1 % " external cream Apply topically 2 times daily. as needed for leg rash 1/10/25   Hernesto Diallo MD   XARELTO ANTICOAGULANT 20 MG TABS tablet TAKE 1 TABLET DAILY WITH DINNER 10/30/24   Collin Salazar MD       Scheduled Meds  No current facility-administered medications for this encounter.       Infusion Meds  No current facility-administered medications for this encounter.       Allergies   No Known Allergies       PHYSICAL EXAMINATION   Temp:  [97.4  F (36.3  C)] 97.4  F (36.3  C)  Pulse:  [59] 59  Resp:  [18] 18  BP: (135)/(73) 135/73  SpO2:  [96 %] 96 %    Neurologic  Mental Status:  alert, oriented x 3, follows commands, speech clear and fluent, naming and repetition normal  Cranial Nerves:  visual fields intact, PERRL, EOMI with normal smooth pursuit, facial sensation intact and symmetric, facial movements symmetric, hearing not formally tested but intact to conversation, palate elevation symmetric and uvula midline, no dysarthria, shoulder shrug strong bilaterally, tongue protrusion midline  Motor:  normal muscle tone and bulk, no abnormal movements, able to move all limbs spontaneously, strength 5/5 throughout upper and lower extremities, no pronator drift  Reflexes:  toes down-going  Sensory:  light touch sensation intact and symmetric throughout upper and lower extremities, no extinction on double simultaneous stimulation   Coordination:  normal finger-to-nose and heel-to-shin bilaterally without dysmetria, rapid alternating movements symmetric  Station/Gait:  deferred    Stroke Scales    NIHSS  1a. Level of Consciousness 0-->Alert, keenly responsive   1b. LOC Questions 0-->Answers both questions correctly   1c. LOC Commands 0-->Performs both tasks correctly   2.   Best Gaze 0-->Normal   3.   Visual 0-->No visual loss   4.   Facial Palsy 0-->Normal symmetrical movements   5a. Motor Arm, Left 0-->No drift, limb holds 90 (or 45) degrees for full 10 secs   5b. Motor Arm, Right 0-->No drift, limb holds 90 (or  45) degrees for full 10 secs   6a. Motor Leg, Left 0-->No drift, leg holds 30 degree position for full 5 secs   6b. Motor Leg, right 0-->No drift, leg holds 30 degree position for full 5 secs   7.   Limb Ataxia 0-->Absent   8.   Sensory 1-->Mild-to-moderate sensory loss, patient feels pinprick is less sharp or is dull on the affected side, or there is a loss of superficial pain with pinprick, but patient is aware of being touched   9.   Best Language 0-->No aphasia, normal   10. Dysarthria 0-->Normal   11. Extinction and Inattention  0-->No abnormality   Total 1 (01/21/25 8069)       Imaging  I personally reviewed all imaging; relevant findings per HPI.    Labs Data   CBC  Recent Labs   Lab 01/20/25  1810   WBC 6.7   RBC 4.43   HGB 14.6   HCT 42.5        Basic Metabolic Panel   Recent Labs   Lab 01/20/25  1810      POTASSIUM 4.4   CHLORIDE 105   CO2 23   BUN 12.4   CR 0.92   *   MARTA 9.5     Liver Panel  Recent Labs   Lab 01/20/25  1810   PROTTOTAL 7.0   ALBUMIN 3.9   BILITOTAL 0.9   ALKPHOS 121   AST 31   ALT 23     INR    Recent Labs   Lab Test 01/03/25  2316 12/27/24  0759 12/09/24  0636   INR 1.57* 1.31* 1.24*           Stroke Consult Data Data   This was a non-emergent, non-telestroke consult.

## 2025-01-23 ENCOUNTER — PATIENT OUTREACH (OUTPATIENT)
Dept: CARE COORDINATION | Facility: CLINIC | Age: 75
End: 2025-01-23
Payer: COMMERCIAL

## 2025-01-23 NOTE — PROGRESS NOTES
Clinic Care Coordination Contact  Community Health Worker Initial Outreach    CHW Initial Information Gathering:  Referral Source: ED Follow-Up  Current living arrangement:: Not Assessed  CHW Additional Questions  If ED/Hospital discharge, follow-up appointment scheduled as recommended?: Yes  Medication changes made following ED/Hospital discharge?: No  MyChart active?: No    Patient accepts CC: No, declined. No additional support is needed at this time. Unable to send Care Coordination introduction letter. Clinic Care Coordination services remain available via referral if needed.     Fe Jose [facility]  Care Coordination

## 2025-02-03 ENCOUNTER — HOSPITAL ENCOUNTER (OUTPATIENT)
Dept: CARDIAC REHAB | Facility: CLINIC | Age: 75
Discharge: HOME | End: 2025-02-03
Attending: STUDENT IN AN ORGANIZED HEALTH CARE EDUCATION/TRAINING PROGRAM
Payer: COMMERCIAL

## 2025-02-03 ENCOUNTER — TELEPHONE (OUTPATIENT)
Dept: FAMILY MEDICINE | Facility: CLINIC | Age: 75
End: 2025-02-03
Payer: COMMERCIAL

## 2025-02-03 ENCOUNTER — TELEPHONE (OUTPATIENT)
Dept: CARDIOLOGY | Facility: CLINIC | Age: 75
End: 2025-02-03
Payer: COMMERCIAL

## 2025-02-03 DIAGNOSIS — I24.9 ACUTE CORONARY SYNDROME (H): ICD-10-CM

## 2025-02-03 PROCEDURE — 93798 PHYS/QHP OP CAR RHAB W/ECG: CPT

## 2025-02-03 PROCEDURE — 93797 PHYS/QHP OP CAR RHAB WO ECG: CPT

## 2025-02-03 NOTE — TELEPHONE ENCOUNTER
[facility] Call Center    Phone Message    May a detailed message be left on voicemail: yes     Reason for Call: Other: Pt would like a call back as [facility] was trying to reach out to discuss the medication, pt stated [facility] would like the cardio team to reach out regarding meds, he did not leave if it was a medication or all medication, any questions or concerns please reach out to pt to discuss     Action Taken: Other: Cardio    Travel Screening: Not Applicable     Date of Service:

## 2025-02-03 NOTE — TELEPHONE ENCOUNTER
Specialty pharmacy calling and is questioning patients xarelto refill since he is also on brilinta.    In Cardiologies  progress note 1/14 it states:    - Restart xarelto tomorrow.  - Brilinta 90mg BID for 1 year followed by switching to aspirin 81mg lifelong.   - Stop home aspirin as patient will now be on brillinta and xarelto.     - Brilinta 90 mg BID for 1 year, followed by switching to aspirin 81mg lifelong  - Atorvastatin 80 mg daily for CAD  - Xarelto 20 mg daily, Toprol 25 mg daily for Afib     Note indicates that the patient will be on both medications until he switches from Brilinta to asprin after taking Brilinta for a year.    Meghann Hernandez RN on 2/3/2025 at 2:11 PM

## 2025-02-03 NOTE — TELEPHONE ENCOUNTER
Attempted to contact patient.  Line rang and rang, no answer, no vm available.  Will try again at a later time.    Eliane Luo, TIFFANIE  Cardiology Care Coordinator  [facility]  [phone] option 1

## 2025-02-12 ENCOUNTER — HOSPITAL ENCOUNTER (OUTPATIENT)
Dept: CARDIAC REHAB | Facility: CLINIC | Age: 75
Discharge: HOME | End: 2025-02-12
Attending: STUDENT IN AN ORGANIZED HEALTH CARE EDUCATION/TRAINING PROGRAM
Payer: COMMERCIAL

## 2025-02-12 PROCEDURE — 93798 PHYS/QHP OP CAR RHAB W/ECG: CPT

## 2025-02-17 ENCOUNTER — HOSPITAL ENCOUNTER (OUTPATIENT)
Dept: CARDIAC REHAB | Facility: CLINIC | Age: 75
Discharge: HOME | End: 2025-02-17
Attending: STUDENT IN AN ORGANIZED HEALTH CARE EDUCATION/TRAINING PROGRAM
Payer: COMMERCIAL

## 2025-02-17 PROCEDURE — 93798 PHYS/QHP OP CAR RHAB W/ECG: CPT

## 2025-02-19 ENCOUNTER — TELEPHONE (OUTPATIENT)
Dept: CARDIOLOGY | Facility: CLINIC | Age: 75
End: 2025-02-19
Payer: COMMERCIAL

## 2025-02-19 NOTE — TELEPHONE ENCOUNTER
[facility] Call Center    Phone Message    May a detailed message be left on voicemail: yes     Reason for Call: Other: Collin called stating if any prescriptions have been submitted through [facility], that they be cancelled. Please reach out to Collin with any questions. Thank you!     Action Taken: Other: Cardiology    Travel Screening: Not Applicable    Thank you!  [facility]       Date of Service:

## 2025-02-21 ENCOUNTER — PRE VISIT (OUTPATIENT)
Dept: NEUROLOGY | Facility: CLINIC | Age: 75
End: 2025-02-21

## 2025-02-21 ENCOUNTER — OFFICE VISIT (OUTPATIENT)
Dept: NEUROLOGY | Facility: CLINIC | Age: 75
End: 2025-02-21
Payer: COMMERCIAL

## 2025-02-21 VITALS
HEART RATE: 54 BPM | OXYGEN SATURATION: 98 % | SYSTOLIC BLOOD PRESSURE: 133 MMHG | DIASTOLIC BLOOD PRESSURE: 71 MMHG | RESPIRATION RATE: 20 BRPM

## 2025-02-21 DIAGNOSIS — R29.898 ARM WEAKNESS: Primary | ICD-10-CM

## 2025-02-21 PROCEDURE — 99204 OFFICE O/P NEW MOD 45 MIN: CPT | Performed by: INTERNAL MEDICINE

## 2025-02-21 ASSESSMENT — PAIN SCALES - GENERAL: PAINLEVEL_OUTOF10: NO PAIN (0)

## 2025-02-21 NOTE — PROGRESS NOTES
[facility] Neurology Consultation    Collin Frank MRN# [id number]   Age: 74 year old YOB: 1950     Requesting physician: Collin Lee     Reason for Consultation: possible TIA           Assessment and Plan:   Assessment:  Collin Frank is a 74 year old male who presents today for evaluation of possible TIA. Patient had left arm weakness episode in December 2024 and right arm weakness last month in January. Both episodes he presented to the ER. He had unremarkable TIA work-up. Based on description of symptoms I have lower concern for TIA. However he is already on optimal medications for stroke prevention. He has history of atrial fibrillation and is on Xarelto.     Plan:  - Continue Xarelto for stroke prevention in the setting of atrial fibrillation    Follow up in Neurology clinic as needed should new concerns arise.    Jack Ramon MD   of Neurology  [facility]  ---------------------------------------------------------------------------------------------------------------------------        History of Presenting Symptoms:   Collin Frank is a 74 year old male who presents today for evaluation of possible TIA.     Patient presented to the ER on 12/8 for left arm symptoms. Patient reports that he was in his kitchen and suddenly became sweaty and had left arm heaviness. He was still able move arm at least a little. Symptoms self resolved. He had TIA work-up in the ER. Work-up led to diagnosis of moderate to severe CAD and he had 2 cardiac stents placed 2 weeks later.     In January patient had another episode of right shoulder pain and weakness. He presented to the ER and had MRI imaging that was unremarkable.       Past Medical History:     Patient Active Problem List   Diagnosis    Factor 5 Leiden mutation, heterozygous    May-Thurner syndrome    Long term current use of anticoagulant    Vitamin D deficiency    Hepatic hemangioma    FH: prostate cancer     Benign non-nodular prostatic hyperplasia without lower urinary tract symptoms    Benign essential hypertension    Primary osteoarthritis of both knees    History of deep venous thrombosis    Class 2 severe obesity due to excess calories with serious comorbidity and body mass index (BMI) of 35.0 to 35.9 in adult (H)    Atrial fibrillation (H)    Hyperlipidemia    History of kidney stones    Arm weakness    Coronary artery disease involving native coronary artery of native heart    FRANCK (obstructive sleep apnea) - moderate (AHI 24)     Past Medical History:   Diagnosis Date    Acute deep vein thrombosis (DVT) (H) 11/21/2011 11/2011- acute and extensive LLE DVTs, he underwent thrombolysis by IR       Atrial fibrillation with RVR (H) 12/11/2023    Chest pain, unspecified type 03/31/2021    Nephrolithiasis 09/19/2024    FRANCK (obstructive sleep apnea) - moderate (AHI 24) 1/11/2025 1/6/2025 [place] Diagnostic Sleep Study (222.0 lbs) - AHI 24.9, RDI 40.5, Supine .0, REM AHI 36.0, Low O2 89.0%, Time Spent <=88% 0 minutes / Time Spent <=89% 0.3 minutes. PLM index was 43.1 movements per hour. PLM Arousal Index was 14.1 per hour. Possible periodic breathing noted    Renal stones 03/21/2013    2 mm size non obstructing  2 right, 1 left==CT  [place] 3/13      Status post coronary angiogram 12/27/2024        Past Surgical History:     Past Surgical History:   Procedure Laterality Date    ANESTHESIA CARDIOVERSION N/A 12/11/2023    Procedure: Anesthesia cardioversion;  Surgeon: GENERIC ANESTHESIA PROVIDER;  Location: [facility] OR    ANESTHESIA CARDIOVERSION N/A 7/2/2024    Procedure: Anesthesia cardioversion@1330;  Surgeon: GENERIC ANESTHESIA PROVIDER;  Location: [facility] OR    COLONOSCOPY  01/17/2008    Normal. Repeat in 10 years    COLONOSCOPY WITH CO2 INSUFFLATION N/A 02/01/2018    Procedure: COLONOSCOPY WITH CO2 INSUFFLATION;  COLON SCREEN/ ENGELMANN;  Surgeon: Jan Flores MD;  Location: [facility] OR    COMBINED CYSTOSCOPY,  RETROGRADES, URETEROSCOPY, LASER HOLMIUM LITHOTRIPSY URETER(S), INSERT STENT Left 2024    Procedure: Cystoscopy, Left Ureteroscopy, Left Retrograde Pyelogram, Left Holmium Laser Lithotripsy, Left Ureteral Stent Placement;  Surgeon: Delfin Kimball MD;  Location: [facility] OR    CV CORONARY ANGIOGRAM N/A 2024    Procedure: Coronary Angiogram;  Surgeon: Arron Do MD;  Location: [facility] HE[facility]ARD[facility]AC [facility]    CV PCI N/A 2024    Procedure: Percutaneous Coronary Intervention;  Surgeon: Arron Do MD;  [facility]    ORTHOPEDIC SURGERY  1975    rt knee ORIF        Social History:     Social History     Tobacco Use    Smoking status: Former     Current packs/day: 0.00     Types: Cigarettes     Quit date: 2011     Years since quittin.0     Passive exposure: Never    Smokeless tobacco: Never   Vaping Use    Vaping status: Never Used   Substance Use Topics    Alcohol use: No    Drug use: No        Family History:     Family History   Problem Relation Age of Onset    Alzheimer Disease Mother     Heart Disease Father     Prostate Cancer Father     Cancer Brother 65        pancreatic       Prostate Cancer Brother         2 stents 70% and 90% blockage    Heart Disease Sister         Medications:     Current Outpatient Medications   Medication Sig Dispense Refill    amLODIPine (NORVASC) 5 MG tablet Take 1 tablet (5 mg) by mouth daily. 90 tablet 3    atorvastatin (LIPITOR) 80 MG tablet Take 1 tablet (80 mg) by mouth daily. 90 tablet 3    cholecalciferol (VITAMIN D) 1000 UNIT tablet Take 1,000 Units by mouth every morning. 100 tablet 3    losartan (COZAAR) 50 MG tablet Take 0.5 tablets (25 mg) by mouth daily. 45 tablet 3    metoprolol succinate ER (TOPROL XL) 25 MG 24 hr tablet Take 1 tablet (25 mg) by mouth daily. 90 tablet 3    ticagrelor (BRILINTA) 90 MG tablet Take 1 tablet (90 mg) by mouth 2 times daily. Start this evening. 180 tablet 3     triamcinolone (KENALOG) 0.1 % external cream Apply topically 2 times daily. as needed for leg rash 45 g 0    XARELTO ANTICOAGULANT 20 MG TABS tablet TAKE 1 TABLET DAILY WITH DINNER 90 tablet 3     No current facility-administered medications for this visit.        Allergies:   No Known Allergies     Review of Systems:   As above     Physical Exam:   Vitals: /71 (BP Location: Right arm, Patient Position: Sitting, Cuff Size: Adult Large)   Pulse 54   Resp 20   SpO2 98%    General: Seated comfortably in no acute distress.  Lungs: breathing comfortably  Neurologic:     Mental Status: Fully alert, attentive. Language normal, speech clear and fluent, no paraphasic errors.      Cranial Nerves: Visual fields intact. PERRL. EOMI with normal smooth pursuit. Facial sensation intact/symmetric. Facial movements symmetric. Hearing not formally tested but intact to conversation. Palate elevation symmetric, uvula midline. No dysarthria. Shoulder shrug strong bilaterally. Tongue protrusion midline.     Motor: No tremors or other abnormal movements observed. Muscle tone normal throughout. No pronator drift. Strength 5/5 throughout upper and lower extremities.     Deep Tendon Reflexes: 1+/symmetric throughout upper and lower extremities. No clonus.      Sensory: Intact/symmetric to light touch throughout upper and lower extremities.      Coordination: Finger-nose-finger and heel-shin intact without dysmetria.      Gait: Steady casual gait.         Data: Pertinent prior to visit   Imaging:  MRI brain 1/2025  IMPRESSION:  1.  No acute intracranial process.  2.  Generalized brain atrophy and presumed microvascular ischemic changes as detailed above.    HEAD CT 1/2025  1.  No acute intracranial abnormality.  HEAD CTA:   1.  No large vessel occlusion, high-grade stenosis, aneurysm, or high-flow vascular malformation.  NECK CTA:  1.  No hemodynamically significant stenosis or dissection in the neck vessels.  2.  Multilevel  spondylosis, as described.    MRI brain, MRA head/neck 12/2024  Impression:  1. No evidence of acute infarction or intracranial hemorrhage.  2. No abnormal enhancing lesions intracranially.  3. Head MRA demonstrates no definite aneurysm or stenosis of the major  intracranial arteries.  4. Neck MRA demonstrates patent major cervical arteries.    LDL 52  A1c 5.6

## 2025-02-21 NOTE — PATIENT INSTRUCTIONS
Stroke to watch out include weakness or numbness on one half of the body, slurred speech, double vision, problems speaking, dizziness. If you have stroke symptoms it is important to go to the hospital as soon as possible.

## 2025-02-21 NOTE — NURSING NOTE
Chief Complaint   Patient presents with    New Patient     /71 (BP Location: Right arm, Patient Position: Sitting, Cuff Size: Adult Large)   Pulse 54   Resp 20   SpO2 98%     BLAS LISSETT

## 2025-02-21 NOTE — LETTER
2/21/2025       RE: Collin Frank  [address]  [place] [address]     Dear Colleague,    Thank you for referring your patient, Collin Frank, to the [facility] NEUROLOGY CL[facility] [place] [facility]. Please see a copy of my visit note below.    [facility] Neurology Consultation    Collin Frank MRN# [id number]   Age: 74 year old YOB: 1950     Requesting physician: Collin Lee     Reason for Consultation: possible TIA           Assessment and Plan:   Assessment:  Collin Frank is a 74 year old male who presents today for evaluation of possible TIA. Patient had left arm weakness episode in December 2024 and right arm weakness last month in January. Both episodes he presented to the ER. He had unremarkable TIA work-up. Based on description of symptoms I have lower concern for TIA. However he is already on optimal medications for stroke prevention. He has history of atrial fibrillation and is on Xarelto.     Plan:  - Continue Xarelto for stroke prevention in the setting of atrial fibrillation    Follow up in Neurology clinic as needed should new concerns arise.    Jack Ramon MD   of Neurology  [facility]  ---------------------------------------------------------------------------------------------------------------------------        History of Presenting Symptoms:   Collin Frank is a 74 year old male who presents today for evaluation of possible TIA.     Patient presented to the ER on 12/8 for left arm symptoms. Patient reports that he was in his kitchen and suddenly became sweaty and had left arm heaviness. He was still able move arm at least a little. Symptoms self resolved. He had TIA work-up in the ER. Work-up led to diagnosis of moderate to severe CAD and he had 2 cardiac stents placed 2 weeks later.     In January patient had another episode of right shoulder pain and  weakness. He presented to the ER and had MRI imaging that was unremarkable.       Past Medical History:     Patient Active Problem List   Diagnosis     Factor 5 Leiden mutation, heterozygous     May-Thurner syndrome     Long term current use of anticoagulant     Vitamin D deficiency     Hepatic hemangioma     FH: prostate cancer     Benign non-nodular prostatic hyperplasia without lower urinary tract symptoms     Benign essential hypertension     Primary osteoarthritis of both knees     History of deep venous thrombosis     Class 2 severe obesity due to excess calories with serious comorbidity and body mass index (BMI) of 35.0 to 35.9 in adult (H)     Atrial fibrillation (H)     Hyperlipidemia     History of kidney stones     Arm weakness     Coronary artery disease involving native coronary artery of native heart     FRANCK (obstructive sleep apnea) - moderate (AHI 24)     Past Medical History:   Diagnosis Date     Acute deep vein thrombosis (DVT) (H) 11/21/2011 11/2011- acute and extensive LLE DVTs, he underwent thrombolysis by IR        Atrial fibrillation with RVR (H) 12/11/2023     Chest pain, unspecified type 03/31/2021     Nephrolithiasis 09/19/2024     FRANCK (obstructive sleep apnea) - moderate (AHI 24) 1/11/2025 1/6/2025 [place] Diagnostic Sleep Study (222.0 lbs) - AHI 24.9, RDI 40.5, Supine .0, REM AHI 36.0, Low O2 89.0%, Time Spent <=88% 0 minutes / Time Spent <=89% 0.3 minutes. PLM index was 43.1 movements per hour. PLM Arousal Index was 14.1 per hour. Possible periodic breathing noted     Renal stones 03/21/2013    2 mm size non obstructing  2 right, 1 left==CT  [place] 3/13       Status post coronary angiogram 12/27/2024        Past Surgical History:     Past Surgical History:   Procedure Laterality Date     ANESTHESIA CARDIOVERSION N/A 12/11/2023    Procedure: Anesthesia cardioversion;  Surgeon: GENERIC ANESTHESIA PROVIDER;  [facility]: [facility] OR     ANESTHESIA CARDIOVERSION N/A 7/2/2024     Procedure: Anesthesia cardioversion@1330;  Surgeon: GENERIC ANESTHESIA PROVIDER;  Location: [facility] OR     COLONOSCOPY  2008    Normal. Repeat in 10 years     COLONOSCOPY WITH CO2 INSUFFLATION N/A 2018    Procedure: COLONOSCOPY WITH CO2 INSUFFLATION;  COLON SCREEN/ ENGELMANN;  Surgeon: Jan Flores MD;  Location: [facility] OR     COMBINED CYSTOSCOPY, RETROGRADES, URETEROSCOPY, LASER HOLMIUM LITHOTRIPSY URETER(S), INSERT STENT Left 2024    Procedure: Cystoscopy, Left Ureteroscopy, Left Retrograde Pyelogram, Left Holmium Laser Lithotripsy, Left Ureteral Stent Placement;  Surgeon: Delfin Kimball MD;  Location: [facility] OR     CV CORONARY ANGIOGRAM N/A 2024    Procedure: Coronary Angiogram;  Surgeon: Arron Do MD;  Location:  HEART CARDIAC CATH LAB     CV PCI N/A 2024    Procedure: Percutaneous Coronary Intervention;  Surgeon: Arron Do MD;  Location:  HEART CARDIAC CATH LAB     ORTHOPEDIC SURGERY  1975    rt knee ORIF        Social History:     Social History     Tobacco Use     Smoking status: Former     Current packs/day: 0.00     Types: Cigarettes     Quit date: 2011     Years since quittin.0     Passive exposure: Never     Smokeless tobacco: Never   Vaping Use     Vaping status: Never Used   Substance Use Topics     Alcohol use: No     Drug use: No        Family History:     Family History   Problem Relation Age of Onset     Alzheimer Disease Mother      Heart Disease Father      Prostate Cancer Father      Cancer Brother 65        pancreatic        Prostate Cancer Brother         2 stents 70% and 90% blockage     Heart Disease Sister         Medications:     Current Outpatient Medications   Medication Sig Dispense Refill     amLODIPine (NORVASC) 5 MG tablet Take 1 tablet (5 mg) by mouth daily. 90 tablet 3     atorvastatin (LIPITOR) 80 MG tablet Take 1 tablet (80 mg) by mouth daily. 90 tablet 3     cholecalciferol (VITAMIN D) 1000 UNIT  tablet Take 1,000 Units by mouth every morning. 100 tablet 3     losartan (COZAAR) 50 MG tablet Take 0.5 tablets (25 mg) by mouth daily. 45 tablet 3     metoprolol succinate ER (TOPROL XL) 25 MG 24 hr tablet Take 1 tablet (25 mg) by mouth daily. 90 tablet 3     ticagrelor (BRILINTA) 90 MG tablet Take 1 tablet (90 mg) by mouth 2 times daily. Start this evening. 180 tablet 3     triamcinolone (KENALOG) 0.1 % external cream Apply topically 2 times daily. as needed for leg rash 45 g 0     XARELTO ANTICOAGULANT 20 MG TABS tablet TAKE 1 TABLET DAILY WITH DINNER 90 tablet 3     No current facility-administered medications for this visit.        Allergies:   No Known Allergies     Review of Systems:   As above     Physical Exam:   Vitals: /71 (BP Location: Right arm, Patient Position: Sitting, Cuff Size: Adult Large)   Pulse 54   Resp 20   SpO2 98%    General: Seated comfortably in no acute distress.  Lungs: breathing comfortably  Neurologic:     Mental Status: Fully alert, attentive. Language normal, speech clear and fluent, no paraphasic errors.      Cranial Nerves: Visual fields intact. PERRL. EOMI with normal smooth pursuit. Facial sensation intact/symmetric. Facial movements symmetric. Hearing not formally tested but intact to conversation. Palate elevation symmetric, uvula midline. No dysarthria. Shoulder shrug strong bilaterally. Tongue protrusion midline.     Motor: No tremors or other abnormal movements observed. Muscle tone normal throughout. No pronator drift. Strength 5/5 throughout upper and lower extremities.     Deep Tendon Reflexes: 1+/symmetric throughout upper and lower extremities. No clonus.      Sensory: Intact/symmetric to light touch throughout upper and lower extremities.      Coordination: Finger-nose-finger and heel-shin intact without dysmetria.      Gait: Steady casual gait.         Data: Pertinent prior to visit   Imaging:  MRI brain 1/2025  IMPRESSION:  1.  No acute intracranial  process.  2.  Generalized brain atrophy and presumed microvascular ischemic changes as detailed above.    HEAD CT 1/2025  1.  No acute intracranial abnormality.  HEAD CTA:   1.  No large vessel occlusion, high-grade stenosis, aneurysm, or high-flow vascular malformation.  NECK CTA:  1.  No hemodynamically significant stenosis or dissection in the neck vessels.  2.  Multilevel spondylosis, as described.    MRI brain, MRA head/neck 12/2024  Impression:  1. No evidence of acute infarction or intracranial hemorrhage.  2. No abnormal enhancing lesions intracranially.  3. Head MRA demonstrates no definite aneurysm or stenosis of the major  intracranial arteries.  4. Neck MRA demonstrates patent major cervical arteries.    LDL 52  A1c 5.6       Again, thank you for allowing me to participate in the care of your patient.      Sincerely,    Jack Ramon MD

## 2025-02-24 ENCOUNTER — HOSPITAL ENCOUNTER (OUTPATIENT)
Dept: CARDIAC REHAB | Facility: CLINIC | Age: 75
Discharge: HOME | End: 2025-02-24
Attending: STUDENT IN AN ORGANIZED HEALTH CARE EDUCATION/TRAINING PROGRAM
Payer: COMMERCIAL

## 2025-02-24 PROCEDURE — 93798 PHYS/QHP OP CAR RHAB W/ECG: CPT

## 2025-02-25 NOTE — TELEPHONE ENCOUNTER
Writer spoke to patient.  Patient reported that he did not state he wants to cancel all rx to [facility] and states that [facility] contacted him last week with a question about a prescription but he was not aware of what information they needed.    Writer contacted [facility] and they cannot determine what the concern was there does not seem to be any issues.    Eliane Luo, RN  Cardiology Care Coordinator  [facility]  [phone] option 1

## 2025-02-27 ENCOUNTER — OFFICE VISIT (OUTPATIENT)
Dept: SLEEP MEDICINE | Facility: CLINIC | Age: 75
End: 2025-02-27
Payer: COMMERCIAL

## 2025-02-27 VITALS
HEART RATE: 61 BPM | DIASTOLIC BLOOD PRESSURE: 71 MMHG | BODY MASS INDEX: 33.25 KG/M2 | SYSTOLIC BLOOD PRESSURE: 111 MMHG | OXYGEN SATURATION: 97 % | RESPIRATION RATE: 20 BRPM | WEIGHT: 219.4 LBS | HEIGHT: 68 IN

## 2025-02-27 DIAGNOSIS — G47.33 OSA (OBSTRUCTIVE SLEEP APNEA): Primary | Chronic | ICD-10-CM

## 2025-02-27 ASSESSMENT — SLEEP AND FATIGUE QUESTIONNAIRES
HOW LIKELY ARE YOU TO NOD OFF OR FALL ASLEEP WHILE WATCHING TV: WOULD NEVER DOZE
HOW LIKELY ARE YOU TO NOD OFF OR FALL ASLEEP WHILE SITTING INACTIVE IN A PUBLIC PLACE: WOULD NEVER DOZE
HOW LIKELY ARE YOU TO NOD OFF OR FALL ASLEEP WHEN YOU ARE A PASSENGER IN A CAR FOR AN HOUR WITHOUT A BREAK: WOULD NEVER DOZE
HOW LIKELY ARE YOU TO NOD OFF OR FALL ASLEEP WHILE SITTING QUIETLY AFTER LUNCH WITHOUT ALCOHOL: WOULD NEVER DOZE
HOW LIKELY ARE YOU TO NOD OFF OR FALL ASLEEP WHILE SITTING AND TALKING TO SOMEONE: WOULD NEVER DOZE
HOW LIKELY ARE YOU TO NOD OFF OR FALL ASLEEP IN A CAR, WHILE STOPPED FOR A FEW MINUTES IN TRAFFIC: WOULD NEVER DOZE
HOW LIKELY ARE YOU TO NOD OFF OR FALL ASLEEP WHILE SITTING AND READING: WOULD NEVER DOZE
HOW LIKELY ARE YOU TO NOD OFF OR FALL ASLEEP WHILE LYING DOWN TO REST IN THE AFTERNOON WHEN CIRCUMSTANCES PERMIT: SLIGHT CHANCE OF DOZING

## 2025-02-27 NOTE — NURSING NOTE
"Chief Complaint   Patient presents with    Study Results       Initial /71   Pulse 61   Resp 20   Ht 1.715 m (5' 7.5\")   Wt 99.5 kg (219 lb 6.4 oz)   SpO2 97%   BMI 33.86 kg/m   Estimated body mass index is 33.86 kg/m  as calculated from the following:    Height as of this encounter: 1.715 m (5' 7.5\").    Weight as of this encounter: 99.5 kg (219 lb 6.4 oz).    Medication Reconciliation: complete  ESS: 1  Neck circumference: 16.25 inches / 41 centimeters.  DME: N/A  Vicenta Palmer CMA      "

## 2025-02-27 NOTE — PROGRESS NOTES
Sleep Study Follow-Up Visit:    Date on this visit: 2/27/2025    Collin Frank comes in today for follow-up of his sleep study done at the [facility] for Atrial fibrillation, hypertension, bruxism, fatigue (ESS 0), few symptoms of obstructive sleep apnea, no known snoring or apneas, but no bed partner. He was hesitant concerning the whole process       Polysomnogram : 1/6/2025 (222.0 lbs)     Sleep Architecture:    The total recording time of the polysomnogram was 561.2 minutes. The total sleep time was 161.5 minutes. Sleep latency was increased at 105.5 minutes without the use of a sleep aid. REM latency was 369.0 minutes. Arousal index was increased at 47.6 arousals per hour. Sleep efficiency was decreased at 28.8%. Wake after sleep onset was 294.0 minutes. The patient spent 14.6% of total sleep time in Stage N1, 65.0% in Stage N2, 11.1% in Stage N3, and 9.3% in REM. Time in REM supine was 0 minutes.     Respiration:    Events ? The polysomnogram revealed a presence of 36 obstructive, 8 central, and 7 mixed apneas resulting in an apnea index of 18.9 events per hour. There were 16 obstructive hypopneas and 0 central hypopneas resulting in an obstructive hypopnea index of 5.9 and central hypopnea index of 0 events per hour. The combined apnea/hypopnea index was 24.9 events per hour (central apnea/hypopnea index was 3.0 events per hour). The REM AHI was 36.0 events per hour. The supine AHI was n/a. The RERA index was 15.6 events per hour.  The RDI was 40.5 events per hour.  Snoring - was reported as moderate  Respiratory rate and pattern - Pattern was occasionally periodic with morphology and periodicity consistent with Cheyne-gutierrez breathing, however snoring was present  Sustained Sleep Associated Hypoventilation - Transcutaneous carbon dioxide monitoring was not used, however significant hypoventilation was not suggested by oximetry   Sleep Associated Hypoxemia - (Greater than 5 minutes O2 sat at  or below 88%) was not present. Baseline oxygen saturation was 94.9%. Lowest oxygen saturation was 89.0%. Time spent less than or equal to 88% was 0 minutes. Time spent less than or equal to 89% was 0.3 minutes.     Movement Activity:    Periodic Limb Activity - There were 116 PLMs during the entire study. The PLM index was 43.1 movements per hour. The PLM Arousal Index was 14.1 per hour.  REM EMG Activity - Excessive transient/sustained muscle activity was not present.  Nocturnal Behavior - Abnormal sleep related behaviors were not noted    Bruxism - None apparent.     Cardiac Summary:    The average pulse rate was 51.2 bpm. The minimum pulse rate was 41.0 bpm while the maximum pulse rate was 78.0 bpm.  Arrhythmias were not noted.     He is insistent he did not sleep on his side. I pulled up the study and showed him on his side and he remained doubtful.    These findings were reviewed with patient.     Past medical/surgical history, family history, social history, medications and allergies were reviewed.      Problem List:  Patient Active Problem List    Diagnosis Date Noted    Coronary artery disease involving native coronary artery of native heart 01/11/2025     Priority: High     Angiogram 212/2024  -   Prox RCA to Mid RCA lesion is 90% stenosed. Prox RCA-1 lesion is 30% stenosed. Mid LAD lesion is 30% stenosed. Prox RCA-2 lesion is 40% stenosed. Successful deployment two drug eluting stents from proximal to distal RCA.       FRANCK (obstructive sleep apnea) - moderate (AHI 24) 01/11/2025     Priority: Medium     1/6/2025 [facility] Study (222.0 lbs) - AHI 24.9, RDI 40.5, Supine .0, REM AHI 36.0, Low O2 89.0%, Time Spent <=88% 0 minutes / Time Spent <=89% 0.3 minutes. PLM index was 43.1 movements per hour. PLM Arousal Index was 14.1 per hour. Possible periodic breathing noted      Arm weakness 12/08/2024     Priority: Medium    Hyperlipidemia 06/26/2024     Priority: Medium    Atrial  fibrillation (H) 12/11/2023     Priority: Medium     Presented emergency room 12/11/2023 for weakness and shortness of breath for 3 days. EKG showed A-fib with RVR (heart rate 100-130's). Underwent ALON guided DC cardioversion which converted patient to sinus rhythm.   S/p cardioversion 7/2024      Benign essential hypertension 11/29/2018     Priority: Medium    Long term current use of anticoagulant 10/16/2012     Priority: Medium    History of deep venous thrombosis 11/21/2011     Priority: Medium     Admit 11/2011- acute and extensive LLE DVTs, s/p thrombolysis by IR       History of kidney stones 09/19/2024     Priority: Low     S/p Cystoscopy, Left Ureteroscopy, Left Retrograde Pyelogram, Left Holmium Laser Lithotripsy 9/19/24      Class 2 severe obesity due to excess calories with serious comorbidity and body mass index (BMI) of 35.0 to 35.9 in adult (H) 09/11/2023     Priority: Low    Primary osteoarthritis of both knees 07/03/2019     Priority: Low    Benign non-nodular prostatic hyperplasia without lower urinary tract symptoms 12/06/2016     Priority: Low    FH: prostate cancer 10/10/2013     Priority: Low    Hepatic hemangioma 03/21/2013     Priority: Low     1.5 x 1.4 x 1 cm US [place]  3/17/13      Vitamin D deficiency 10/22/2012     Priority: Low    Factor 5 Leiden mutation, heterozygous 11/30/2011     Priority: Low     Homozygous Mutant 2 copies Leiden mutation   Significant risk iof thrombolembolic events  [place] - 11-28-11  Candidate for lifelong anticoagulation         May-Thurner syndrome 11/30/2011     Priority: Low     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    Pulse Readings from Last 6 Encounters:   02/21/25 54   01/21/25 58   01/14/25 62   01/10/25 70   01/03/25 63   12/28/24 54       Impression/Plan:    Moderate obstructive sleep apnea in the non-supine position, possible periodic breathing   He declines treatment with CPAP because  he would not feel comfortable with it and does not want the hassle. He also seems ambivalent about a mandibular advancement device     Bradycardia   Appears to be chronic     I spent 25 minutes with patient including counseling, and 10 minutes with chart review, and documentation       CC: Francesco Salazar

## 2025-03-03 ENCOUNTER — HOSPITAL ENCOUNTER (OUTPATIENT)
Dept: CARDIAC REHAB | Facility: CLINIC | Age: 75
Discharge: HOME | End: 2025-03-03
Attending: STUDENT IN AN ORGANIZED HEALTH CARE EDUCATION/TRAINING PROGRAM
Payer: COMMERCIAL

## 2025-03-03 PROCEDURE — 93798 PHYS/QHP OP CAR RHAB W/ECG: CPT

## 2025-03-10 ENCOUNTER — HOSPITAL ENCOUNTER (OUTPATIENT)
Dept: CARDIAC REHAB | Facility: CLINIC | Age: 75
Discharge: HOME | End: 2025-03-10
Attending: STUDENT IN AN ORGANIZED HEALTH CARE EDUCATION/TRAINING PROGRAM
Payer: COMMERCIAL

## 2025-03-10 PROCEDURE — 93798 PHYS/QHP OP CAR RHAB W/ECG: CPT

## 2025-03-11 ENCOUNTER — TELEPHONE (OUTPATIENT)
Dept: CARDIOLOGY | Facility: CLINIC | Age: 75
End: 2025-03-11
Payer: COMMERCIAL

## 2025-03-11 DIAGNOSIS — I48.91 ATRIAL FIBRILLATION WITH RVR (H): ICD-10-CM

## 2025-03-11 DIAGNOSIS — I24.9 ACUTE CORONARY SYNDROME (H): ICD-10-CM

## 2025-03-11 RX ORDER — ATORVASTATIN CALCIUM 80 MG/1
80 TABLET, FILM COATED ORAL DAILY
Qty: 90 TABLET | Refills: 3 | Status: SHIPPED | OUTPATIENT
Start: 2025-03-11

## 2025-03-11 RX ORDER — METOPROLOL SUCCINATE 50 MG/1
50 TABLET, EXTENDED RELEASE ORAL DAILY
Qty: 90 TABLET | Refills: 3 | Status: SHIPPED | OUTPATIENT
Start: 2025-03-11

## 2025-03-11 NOTE — TELEPHONE ENCOUNTER
Patient is agreeable with the plan to continue atorvastatin 80 mg daily and increase metoprolol to 50 mg daily.      Writer will contact [facility] to discuss Rx.    Writer spoke with [facility], and confirmed and clarified for patient to continue 80 mg daily.  Rx for atorvastatin 80 mg daily is to be mailed April 3rd.      Metoprolol 25 mg Rx was mailed out to patient 3/10.    Writer informed and is aware to continue atorvastatin 80 mg daily and increase metoprolol to 50 mg daily.  He can finish his home supply by taking two of the 25 mg tablets together to equal 50 mg daily.  Patient verbalized understanding.      Eliane Luo RN  Cardiology Care Coordinator  [facility]  [phone] option 1

## 2025-03-11 NOTE — TELEPHONE ENCOUNTER
----- Message from Nellie Mcmahon sent at 3/11/2025 11:03 AM CDT -----  Regarding: RE: Cardiac Rehab - Afib during today's session  Steve Del Rio,    Yes would like him to stay on the 80 mg atorvastatin. Also would like to increase the metoprolol to 50, and get him scheduled with EP since he has history of DCCV. May need further procedures.    Thanks!  Nellie  ----- Message -----  From: Eliane Luo RN  Sent: 3/11/2025  10:58 AM CDT  To: CHITRA Kirkpatrick CNP  Subject: RE: Cardiac Rehab - Afib during today's sess#    Spoke to Collin, he said when he is home he feels good,  denies increased SOB, swelling, lightheadedness, palpitations, irregular heart beat.     Patient also states that he received a call from [facility] pharmacist and he was told not to take atorvastatin 80 mg daily and that there was a mistake and he is to continue to take the 10 mg tablet.  I reviewed the chart and atorvastatin 80 was increased from 40 mg daily in the hospt after his PCI in Dec..  He was previous on 10 mg daily prior to the 40 mg daily.  Sorry confusing.    I would assume we would have him continue 80 mg daily.    Let me know if you still want to increase metoprolol to 50 mg daily.  He takes metoprolol in the evening with his dinner.    Eliane  ----- Message -----  From: Nellie Mcmahon APRN CNP  Sent: 3/10/2025   2:57 PM CDT  To: SARAH Mishra; Eliane Luo RN  Subject: FW: Cardiac Rehab - Afib during today's sess#    Eliane -    Can you call Collin and see if he has had any symptoms with Afib at home since rehab? Would like to increase his metoprolol to 50 from 25 to help with rate control since it looks like it got a little on the higher side when he had the Afib.    Thanks!  Nellie  ----- Message -----  From: Kylee Long EP  Sent: 3/10/2025   2:53 PM CDT  To: Nellie Laduch, APRN CNP  Subject: Cardiac Rehab - Afib during today's session      Higinio Ballard!     Collin has completed 7 sessions of cardiac rehab. He was noted to be  in Afib during today's session with HR ranging from 103-155 bpm (usually 60-80 bpm with NSR). Collin reported taking all medications today and denied any symptoms throughout the session, just wanted to send this as an FYI. It looks like he has a hx of Afib but none thus far in rehab.     Please let me know if you have any questions or concerns from a rehab standpoint.     Thanks!  Kylee Long MS CEP   [facility] Cardiopulmonary Rehab

## 2025-03-14 ENCOUNTER — HOSPITAL ENCOUNTER (EMERGENCY)
Facility: CLINIC | Age: 75
Discharge: HOME | End: 2025-03-14
Attending: EMERGENCY MEDICINE | Admitting: EMERGENCY MEDICINE
Payer: COMMERCIAL

## 2025-03-14 ENCOUNTER — TELEPHONE (OUTPATIENT)
Dept: CARDIOLOGY | Facility: CLINIC | Age: 75
End: 2025-03-14

## 2025-03-14 VITALS
RESPIRATION RATE: 18 BRPM | OXYGEN SATURATION: 95 % | TEMPERATURE: 97.9 F | HEART RATE: 99 BPM | HEIGHT: 68 IN | WEIGHT: 224 LBS | SYSTOLIC BLOOD PRESSURE: 130 MMHG | BODY MASS INDEX: 33.95 KG/M2 | DIASTOLIC BLOOD PRESSURE: 88 MMHG

## 2025-03-14 DIAGNOSIS — R06.00 DYSPNEA, UNSPECIFIED TYPE: ICD-10-CM

## 2025-03-14 DIAGNOSIS — Z76.0 ENCOUNTER FOR MEDICATION REFILL: ICD-10-CM

## 2025-03-14 PROCEDURE — 99283 EMERGENCY DEPT VISIT LOW MDM: CPT | Performed by: EMERGENCY MEDICINE

## 2025-03-14 PROCEDURE — 250N000013 HC RX MED GY IP 250 OP 250 PS 637: Performed by: EMERGENCY MEDICINE

## 2025-03-14 RX ORDER — METOPROLOL SUCCINATE 25 MG/1
50 TABLET, EXTENDED RELEASE ORAL ONCE
Status: COMPLETED | OUTPATIENT
Start: 2025-03-14 | End: 2025-03-14

## 2025-03-14 RX ORDER — METOPROLOL SUCCINATE 50 MG/1
50 TABLET, EXTENDED RELEASE ORAL DAILY
Qty: 30 TABLET | Refills: 0 | Status: SHIPPED | OUTPATIENT
Start: 2025-03-14 | End: 2025-04-13

## 2025-03-14 RX ADMIN — METOPROLOL SUCCINATE 50 MG: 25 TABLET, EXTENDED RELEASE ORAL at 08:53

## 2025-03-14 ASSESSMENT — ACTIVITIES OF DAILY LIVING (ADL)
ADLS_ACUITY_SCORE: 56

## 2025-03-14 NOTE — ED NOTES
Writer could not refill pt's metoprolol from discharge pharmacy due to pt recently refilled in 3/11/2025. MD and pt was notified.

## 2025-03-14 NOTE — ED TRIAGE NOTES
Patient BIBA from home with c/o of SOB. Patient reported this started about 4am. Pt denies cough and chest pain. Patient stated he is out of his Metoprolol and is waiting for a refill. VSS in triage.     Triage Assessment (Adult)       Row Name 03/14/25 0700          Triage Assessment    Airway WDL WDL        Respiratory WDL    Respiratory WDL X;all     Rhythm/Pattern, Respiratory shortness of breath        Skin Circulation/Temperature WDL    Skin Circulation/Temperature WDL WDL        Cardiac WDL    Cardiac WDL WDL        Peripheral/Neurovascular WDL    Peripheral Neurovascular WDL WDL        Cognitive/Neuro/Behavioral WDL    Cognitive/Neuro/Behavioral WDL WDL        Aaron Coma Scale    Best Eye Response 4-->(E4) spontaneous     Best Motor Response 6-->(M6) obeys commands     Best Verbal Response 5-->(V5) oriented     Aaron Coma Scale Score 15

## 2025-03-14 NOTE — TELEPHONE ENCOUNTER
[facility] Call Center    Phone Message    May a detailed message be left on voicemail: yes     Reason for Call: Other: Collin called requesting to speak with his care team but didn't provide any details. Please reach out to Collin to discuss. Thank you!     Action Taken: Other: Cardiology    Travel Screening: Not Applicable    Thank you!  [facility]       Date of Service:

## 2025-03-14 NOTE — ED PROVIDER NOTES
[place] EMERGENCY DEPARTMENT ([facility])    3/14/25       ED PROVIDER NOTE    History     Chief Complaint   Patient presents with    Shortness of Breath     HPI  Collin Frank is a 74 year old male with a past medical history of CAD s/p PCI (12/2024), HTN, atrial fibrillation on xarelto, HLD, DVT/PE, and FRANCK who presents to the emergency department via EMS for medication refill of metoprolol.  Patient reports he has not received his metoprolol and feels that he needs and wants to get it here today.  He denies fevers or chills.  He did state that he has some shortness of breath but he thinks this is related to not taking his metoprolol.  He denies chest pain, abdominal pain, nausea or vomiting.  Denies cough.    Past Medical History  Past Medical History:   Diagnosis Date    Acute deep vein thrombosis (DVT) (H) 11/21/2011 11/2011- acute and extensive LLE DVTs, he underwent thrombolysis by IR       Atrial fibrillation with RVR (H) 12/11/2023    Chest pain, unspecified type 03/31/2021    Nephrolithiasis 09/19/2024    FRANCK (obstructive sleep apnea) - moderate (AHI 24) 1/11/2025 1/6/2025 [facility] Study (222.0 lbs) - AHI 24.9, RDI 40.5, Supine .0, REM AHI 36.0, Low O2 89.0%, Time Spent <=88% 0 minutes / Time Spent <=89% 0.3 minutes. PLM index was 43.1 movements per hour. PLM Arousal Index was 14.1 per hour. Possible periodic breathing noted    Renal stones 03/21/2013    2 mm size non obstructing  2 right, 1 left==CT  [place] 3/13      Status post coronary angiogram 12/27/2024     Past Surgical History:   Procedure Laterality Date    ANESTHESIA CARDIOVERSION N/A 12/11/2023    Procedure: Anesthesia cardioversion;  Surgeon: GENERIC ANESTHESIA PROVIDER;  Location: [facility] OR    ANESTHESIA CARDIOVERSION N/A 7/2/2024    Procedure: Anesthesia cardioversion@1330;  Surgeon: GENERIC ANESTHESIA PROVIDER;  Location: [facility] OR    COLONOSCOPY  01/17/2008    Normal. Repeat in 10 years    COLONOSCOPY  WITH CO2 INSUFFLATION N/A 2018    Procedure: COLONOSCOPY WITH CO2 INSUFFLATION;  COLON SCREEN/ ENGELMANN;  Surgeon: Jan Flores MD;  Location: [facility] OR    COMBINED CYSTOSCOPY, RETROGRADES, URETEROSCOPY, LASER HOLMIUM LITHOTRIPSY URETER(S), INSERT STENT Left 2024    Procedure: Cystoscopy, Left Ureteroscopy, Left Retrograde Pyelogram, Left Holmium Laser Lithotripsy, Left Ureteral Stent Placement;  Surgeon: Delfin Kimball MD;  Location: [facility] OR    CV CORONARY ANGIOGRAM N/A 2024    Procedure: Coronary Angiogram;  Surgeon: Arron Do MD;  Location:  [facility] CATH [facility]    CV PCI N/A 2024    Procedure: Percutaneous Coronary Intervention;  Surgeon: Arron Do MD;  Location:  [facility] CATH LAB    ORTHOPEDIC SURGERY  1975    rt knee ORIF     metoprolol succinate ER (TOPROL XL) 50 MG 24 hr tablet  amLODIPine (NORVASC) 5 MG tablet  atorvastatin (LIPITOR) 80 MG tablet  cholecalciferol (VITAMIN D) 1000 UNIT tablet  losartan (COZAAR) 50 MG tablet  metoprolol succinate ER (TOPROL XL) 50 MG 24 hr tablet  ticagrelor (BRILINTA) 90 MG tablet  triamcinolone (KENALOG) 0.1 % external cream  XARELTO ANTICOAGULANT 20 MG TABS tablet      No Known Allergies  Family History  Family History   Problem Relation Age of Onset    Alzheimer Disease Mother     Heart Disease Father     Prostate Cancer Father     Cancer Brother 65        pancreatic       Prostate Cancer Brother         2 stents 70% and 90% blockage    Heart Disease Sister      Social History   Social History     Tobacco Use    Smoking status: Former     Current packs/day: 0.00     Types: Cigarettes     Quit date: 2011     Years since quittin.1     Passive exposure: Never    Smokeless tobacco: Never   Vaping Use    Vaping status: Never Used   Substance Use Topics    Alcohol use: No    Drug use: No      Past medical history, past surgical history, medications, allergies, family history, and social  "history were reviewed with the patient. No additional pertinent items.   A medically appropriate review of systems was performed with pertinent positives and negatives noted in the HPI, and all other systems negative.    Physical Exam   BP: 130/88  Pulse: 99  Temp: 97.9  F (36.6  C)  Resp: 18  Height: 171.5 cm (5' 7.5\")  Weight: 101.6 kg (224 lb)  SpO2: 95 %  Physical Exam  Vitals and nursing note reviewed.   Constitutional:       General: He is not in acute distress.     Appearance: Normal appearance. He is not toxic-appearing.   HENT:      Head: Atraumatic.   Eyes:      General: No scleral icterus.     Conjunctiva/sclera: Conjunctivae normal.   Cardiovascular:      Rate and Rhythm: Normal rate.      Heart sounds: Normal heart sounds.   Pulmonary:      Effort: Pulmonary effort is normal. No respiratory distress.      Breath sounds: Normal breath sounds.   Abdominal:      Palpations: Abdomen is soft.      Tenderness: There is no abdominal tenderness.   Musculoskeletal:         General: No deformity.      Cervical back: Neck supple.   Skin:     General: Skin is warm.   Neurological:      General: No focal deficit present.      Mental Status: He is alert and oriented to person, place, and time.   Psychiatric:         Mood and Affect: Mood normal.         Behavior: Behavior normal.           ED Course, Procedures, & Data      Procedures                No results found for any visits on 03/14/25.  Medications   metoprolol succinate ER (TOPROL XL) 24 hr tablet 50 mg (50 mg Oral $Given 3/14/25 0853)     Labs Ordered and Resulted from Time of ED Arrival to Time of ED Departure - No data to display  No orders to display          Critical care was not performed.     Medical Decision Making  The patient's presentation was of low complexity (an acute and uncomplicated illness or injury).    The patient's evaluation involved:  review of external note(s) from 1 sources (see separate area of note for details)    The patient's " management necessitated moderate risk (prescription drug management including medications given in the ED).    Assessment & Plan    Collin Frank is a 74 year old male with a past medical history of CAD s/p PCI (12/2024), HTN, atrial fibrillation on xarelto, HLD, DVT/PE, and FRANCK who presents to the emergency department via EMS for medication refill of metoprolol.  Patient is nontoxic-appearing on exam, has vital signs within normal limits. Lung sounds are clear on auscultation and despite triage concerns of dyspnea the patient does not have any signs of respiratory distress or further concerns of dyspnea at this time. Patient given metoprolol and prescribed 30 day course to our pharmacy; discharged with outpatient follow up and return precautions.     I have reviewed the nursing notes. I have reviewed the findings, diagnosis, plan and need for follow up with the patient.    Current Discharge Medication List        START taking these medications    Details   !! metoprolol succinate ER (TOPROL XL) 50 MG 24 hr tablet Take 1 tablet (50 mg) by mouth daily.  Qty: 30 tablet, Refills: 0       !! - Potential duplicate medications found. Please discuss with provider.          Final diagnoses:   Dyspnea, unspecified type   Encounter for medication refill       Kosta Brower MD   [facility] EMERGENCY DEPARTMENT  3/14/2025     Kosta Brower MD  03/14/25 1033

## 2025-03-17 ENCOUNTER — HOSPITAL ENCOUNTER (EMERGENCY)
Facility: CLINIC | Age: 75
Discharge: HOME | End: 2025-03-17
Attending: EMERGENCY MEDICINE | Admitting: EMERGENCY MEDICINE
Payer: COMMERCIAL

## 2025-03-17 ENCOUNTER — APPOINTMENT (OUTPATIENT)
Dept: GENERAL RADIOLOGY | Facility: CLINIC | Age: 75
End: 2025-03-17
Payer: COMMERCIAL

## 2025-03-17 VITALS
HEIGHT: 68 IN | DIASTOLIC BLOOD PRESSURE: 84 MMHG | TEMPERATURE: 97.6 F | RESPIRATION RATE: 20 BRPM | BODY MASS INDEX: 32.89 KG/M2 | HEART RATE: 91 BPM | OXYGEN SATURATION: 98 % | WEIGHT: 217 LBS | SYSTOLIC BLOOD PRESSURE: 129 MMHG

## 2025-03-17 DIAGNOSIS — I48.19 PERSISTENT ATRIAL FIBRILLATION (H): ICD-10-CM

## 2025-03-17 LAB
ALBUMIN SERPL BCG-MCNC: 4 G/DL (ref 3.5–5.2)
ALP SERPL-CCNC: 118 U/L (ref 40–150)
ALT SERPL W P-5'-P-CCNC: 26 U/L (ref 0–70)
ANION GAP SERPL CALCULATED.3IONS-SCNC: 13 MMOL/L (ref 7–15)
AST SERPL W P-5'-P-CCNC: 27 U/L (ref 0–45)
ATRIAL RATE - MUSE: 86 BPM
BASOPHILS # BLD AUTO: 0.1 10E3/UL (ref 0–0.2)
BASOPHILS NFR BLD AUTO: 1 %
BILIRUB SERPL-MCNC: 1.3 MG/DL
BUN SERPL-MCNC: 10.4 MG/DL (ref 8–23)
CALCIUM SERPL-MCNC: 9.4 MG/DL (ref 8.8–10.4)
CHLORIDE SERPL-SCNC: 104 MMOL/L (ref 98–107)
CREAT SERPL-MCNC: 0.96 MG/DL (ref 0.67–1.17)
DIASTOLIC BLOOD PRESSURE - MUSE: NORMAL MMHG
EGFRCR SERPLBLD CKD-EPI 2021: 82 ML/MIN/1.73M2
EOSINOPHIL # BLD AUTO: 0.1 10E3/UL (ref 0–0.7)
EOSINOPHIL NFR BLD AUTO: 2 %
ERYTHROCYTE [DISTWIDTH] IN BLOOD BY AUTOMATED COUNT: 13.2 % (ref 10–15)
FLUAV RNA SPEC QL NAA+PROBE: NEGATIVE
FLUBV RNA RESP QL NAA+PROBE: NEGATIVE
GLUCOSE SERPL-MCNC: 95 MG/DL (ref 70–99)
HCO3 SERPL-SCNC: 21 MMOL/L (ref 22–29)
HCT VFR BLD AUTO: 44.8 % (ref 40–53)
HGB BLD-MCNC: 15.9 G/DL (ref 13.3–17.7)
IMM GRANULOCYTES # BLD: 0 10E3/UL
IMM GRANULOCYTES NFR BLD: 0 %
INTERPRETATION ECG - MUSE: NORMAL
LYMPHOCYTES # BLD AUTO: 1.4 10E3/UL (ref 0.8–5.3)
LYMPHOCYTES NFR BLD AUTO: 24 %
MCH RBC QN AUTO: 34 PG (ref 26.5–33)
MCHC RBC AUTO-ENTMCNC: 35.5 G/DL (ref 31.5–36.5)
MCV RBC AUTO: 96 FL (ref 78–100)
MONOCYTES # BLD AUTO: 0.5 10E3/UL (ref 0–1.3)
MONOCYTES NFR BLD AUTO: 9 %
NEUTROPHILS # BLD AUTO: 3.7 10E3/UL (ref 1.6–8.3)
NEUTROPHILS NFR BLD AUTO: 63 %
NRBC # BLD AUTO: 0 10E3/UL
NRBC BLD AUTO-RTO: 0 /100
NT-PROBNP SERPL-MCNC: 504 PG/ML (ref 0–1800)
P AXIS - MUSE: NORMAL DEGREES
PLATELET # BLD AUTO: 267 10E3/UL (ref 150–450)
POTASSIUM SERPL-SCNC: 4 MMOL/L (ref 3.4–5.3)
PR INTERVAL - MUSE: NORMAL MS
PROT SERPL-MCNC: 7.1 G/DL (ref 6.4–8.3)
QRS DURATION - MUSE: 84 MS
QT - MUSE: 370 MS
QTC - MUSE: 432 MS
R AXIS - MUSE: -33 DEGREES
RBC # BLD AUTO: 4.67 10E6/UL (ref 4.4–5.9)
RSV RNA SPEC NAA+PROBE: NEGATIVE
SARS-COV-2 RNA RESP QL NAA+PROBE: NEGATIVE
SODIUM SERPL-SCNC: 138 MMOL/L (ref 135–145)
SYSTOLIC BLOOD PRESSURE - MUSE: NORMAL MMHG
T AXIS - MUSE: 58 DEGREES
TROPONIN T SERPL HS-MCNC: 20 NG/L
VENTRICULAR RATE- MUSE: 82 BPM
WBC # BLD AUTO: 5.8 10E3/UL (ref 4–11)

## 2025-03-17 PROCEDURE — 82310 ASSAY OF CALCIUM: CPT

## 2025-03-17 PROCEDURE — 93010 ELECTROCARDIOGRAM REPORT: CPT

## 2025-03-17 PROCEDURE — 85004 AUTOMATED DIFF WBC COUNT: CPT

## 2025-03-17 PROCEDURE — 99285 EMERGENCY DEPT VISIT HI MDM: CPT | Mod: 25 | Performed by: EMERGENCY MEDICINE

## 2025-03-17 PROCEDURE — 84295 ASSAY OF SERUM SODIUM: CPT

## 2025-03-17 PROCEDURE — 82947 ASSAY GLUCOSE BLOOD QUANT: CPT

## 2025-03-17 PROCEDURE — 36415 COLL VENOUS BLD VENIPUNCTURE: CPT

## 2025-03-17 PROCEDURE — 99285 EMERGENCY DEPT VISIT HI MDM: CPT | Mod: FS | Performed by: EMERGENCY MEDICINE

## 2025-03-17 PROCEDURE — 87637 SARSCOV2&INF A&B&RSV AMP PRB: CPT

## 2025-03-17 PROCEDURE — 82247 BILIRUBIN TOTAL: CPT

## 2025-03-17 PROCEDURE — 83880 ASSAY OF NATRIURETIC PEPTIDE: CPT

## 2025-03-17 PROCEDURE — 84484 ASSAY OF TROPONIN QUANT: CPT

## 2025-03-17 PROCEDURE — 71046 X-RAY EXAM CHEST 2 VIEWS: CPT

## 2025-03-17 PROCEDURE — 93005 ELECTROCARDIOGRAM TRACING: CPT | Performed by: EMERGENCY MEDICINE

## 2025-03-17 ASSESSMENT — COLUMBIA-SUICIDE SEVERITY RATING SCALE - C-SSRS
6. HAVE YOU EVER DONE ANYTHING, STARTED TO DO ANYTHING, OR PREPARED TO DO ANYTHING TO END YOUR LIFE?: NO
1. IN THE PAST MONTH, HAVE YOU WISHED YOU WERE DEAD OR WISHED YOU COULD GO TO SLEEP AND NOT WAKE UP?: NO
2. HAVE YOU ACTUALLY HAD ANY THOUGHTS OF KILLING YOURSELF IN THE PAST MONTH?: NO

## 2025-03-17 ASSESSMENT — ACTIVITIES OF DAILY LIVING (ADL)
ADLS_ACUITY_SCORE: 56
ADLS_ACUITY_SCORE: 56

## 2025-03-17 NOTE — ED PROVIDER NOTES
[facility] [place] ([facility])    3/17/25      ED PROVIDER NOTE   History     Chief Complaint   Patient presents with    Shortness of Breath     Patient reports shortness of breath and feeling sluggish and slow. Symptoms started yesterday evening. Patient denies pain.     HPI  Collin Frank is a 74 year old male with prior history of CAD, A-fib on metoprolol, factor V Leiden mutation, obstructive sleep apnea, obesity, cardioversion 7/2024 who presents the emergency department with shortness of breath, sluggishness and fatigue.  He did present to the emergency department on 3/14/2025 with shortness of breath that he thought was due to his missed dose of metoprolol, he was given a dose of this.  He did touch base with his cardiologist Dr. Jimy Maya via telephone, reported that he still felt sluggish.  He was instructed to return to the ED, he was reluctant to do this as he did not feel that the ED did much for him.    Patient reports that his dyspnea and chest discomfort started on Friday, 3 days prior to arrival.  He does not report symptoms changing with activity.  Reports symptoms at rest.  He does not endorse any URI symptoms.  No nausea or vomiting. Does not endorse leg pain.     CORONARY ANGIOGRAM 12/27/24    Prox RCA to Mid RCA lesion is 90% stenosed.    Prox RCA-1 lesion is 30% stenosed.    Mid LAD lesion is 30% stenosed.    Prox RCA-2 lesion is 40% stenosed.     -Severe stenosis of the mid RCA.  -Successful deployment of two drug eluting stents from proximal to distal RCA.     Past Medical History:   Diagnosis Date    Acute deep vein thrombosis (DVT) (H) 11/21/2011 11/2011- acute and extensive LLE DVTs, he underwent thrombolysis by IR       Atrial fibrillation with RVR (H) 12/11/2023    Chest pain, unspecified type 03/31/2021    Nephrolithiasis 09/19/2024    FRANCK (obstructive sleep apnea) - moderate (AHI 24) 1/11/2025 1/6/2025 [facility] Sleep Study (222.0 lbs) - AHI  24.9, RDI 40.5, Supine .0, REM AHI 36.0, Low O2 89.0%, Time Spent <=88% 0 minutes / Time Spent <=89% 0.3 minutes. PLM index was 43.1 movements per hour. PLM Arousal Index was 14.1 per hour. Possible periodic breathing noted    Renal stones 2013    2 mm size non obstructing  2 right, 1 left==CT  [place] 3/13      Status post coronary angiogram 2024     Current Outpatient Medications   Medication Sig Dispense Refill    amLODIPine (NORVASC) 5 MG tablet Take 1 tablet (5 mg) by mouth daily. 90 tablet 3    atorvastatin (LIPITOR) 80 MG tablet Take 1 tablet (80 mg) by mouth daily. 90 tablet 3    cholecalciferol (VITAMIN D) 1000 UNIT tablet Take 1,000 Units by mouth every morning. 100 tablet 3    losartan (COZAAR) 50 MG tablet Take 0.5 tablets (25 mg) by mouth daily. 45 tablet 3    metoprolol succinate ER (TOPROL XL) 50 MG 24 hr tablet Take 1 tablet (50 mg) by mouth daily. 30 tablet 0    ticagrelor (BRILINTA) 90 MG tablet Take 1 tablet (90 mg) by mouth 2 times daily. Start this evening. 180 tablet 3    triamcinolone (KENALOG) 0.1 % external cream Apply topically 2 times daily. as needed for leg rash 45 g 0    XARELTO ANTICOAGULANT 20 MG TABS tablet TAKE 1 TABLET DAILY WITH DINNER 90 tablet 3    metoprolol succinate ER (TOPROL XL) 50 MG 24 hr tablet Take 1 tablet (50 mg) by mouth daily. 90 tablet 3     No Known Allergies    Social History     Socioeconomic History    Marital status: Single     Spouse name: Not on file    Number of children: Not on file    Years of education: Not on file    Highest education level: Not on file   Occupational History    Not on file   Tobacco Use    Smoking status: Former     Current packs/day: 0.00     Types: Cigarettes     Quit date: 2011     Years since quittin.1     Passive exposure: Never    Smokeless tobacco: Never   Vaping Use    Vaping status: Never Used   Substance and Sexual Activity    Alcohol use: No    Drug use: No    Sexual activity: Never   Other Topics  Concern    Parent/sibling w/ CABG, MI or angioplasty before 65F 55M? No   Social History Narrative    Not on file     Social Drivers of Health     Financial Resource Strain: Low Risk  (12/23/2024)    Financial Resource Strain     Within the past 12 months, have you or your family members you live with been unable to get utilities (heat, electricity) when it was really needed?: No   Food Insecurity: Low Risk  (12/23/2024)    Food Insecurity     Within the past 12 months, did you worry that your food would run out before you got money to buy more?: No     Within the past 12 months, did the food you bought just not last and you didn t have money to get more?: No   Transportation Needs: Low Risk  (12/23/2024)    Transportation Needs     Within the past 12 months, has lack of transportation kept you from medical appointments, getting your medicines, non-medical meetings or appointments, work, or from getting things that you need?: No   Physical Activity: Inactive (12/23/2024)    Exercise Vital Sign     Days of Exercise per Week: 0 days     Minutes of Exercise per Session: 0 min   Stress: No Stress Concern Present (12/23/2024)    [place] citizen [place] [employer] - Occupational Stress Questionnaire     Feeling of Stress : Not at all   Social Connections: Unknown (12/23/2024)    Social Connection and Isolation Panel [NHANES]     Frequency of Communication with Friends and Family: Not on file     Frequency of Social Gatherings with Friends and Family: Twice a week     Attends Buddhism Services: Not on file     Active Member of Clubs or Organizations: Not on file     Attends Club or Organization Meetings: Not on file     Marital Status: Not on file   Recent Concern: Social Connections - Moderately Isolated (12/23/2024)    Social Connection and Isolation Panel [NHANES]     Frequency of Communication with Friends and Family: More than three times a week     Frequency of Social Gatherings with Friends and Family: Twice  a week     Attends Caodaism Services: More than 4 times per year     Active Member of Clubs or Organizations: No     Attends Club or Organization Meetings: Never     Marital Status: Never    Interpersonal Safety: Low Risk  (12/23/2024)    Interpersonal Safety     Do you feel physically and emotionally safe where you currently live?: Yes     Within the past 12 months, have you been hit, slapped, kicked or otherwise physically hurt by someone?: No     Within the past 12 months, have you been humiliated or emotionally abused in other ways by your partner or ex-partner?: No   Housing Stability: Low Risk  (12/23/2024)    Housing Stability     Do you have housing? : Yes     Are you worried about losing your housing?: No     Past Surgical History:   Procedure Laterality Date    ANESTHESIA CARDIOVERSION N/A 12/11/2023    Procedure: Anesthesia cardioversion;  Surgeon: GENERIC ANESTHESIA PROVIDER;  Location: [facility] OR    ANESTHESIA CARDIOVERSION N/A 7/2/2024    Procedure: Anesthesia cardioversion@1330;  Surgeon: GENERIC ANESTHESIA PROVIDER;  Location: [facility] OR    COLONOSCOPY  01/17/2008    Normal. Repeat in 10 years    COLONOSCOPY WITH CO2 INSUFFLATION N/A 02/01/2018    Procedure: COLONOSCOPY WITH CO2 INSUFFLATION;  COLON SCREEN/ ENGELMANN;  Surgeon: Jan Flores MD;  Location: [facility] OR    COMBINED CYSTOSCOPY, RETROGRADES, URETEROSCOPY, LASER HOLMIUM LITHOTRIPSY URETER(S), INSERT STENT Left 9/19/2024    Procedure: Cystoscopy, Left Ureteroscopy, Left Retrograde Pyelogram, Left Holmium Laser Lithotripsy, Left Ureteral Stent Placement;  Surgeon: Delfin Kimball MD;  Location: [facility] OR    CV CORONARY ANGIOGRAM N/A 12/27/2024    Procedure: Coronary Angiogram;  Surgeon: Arron Do MD;  Location:  [facility] C[facility] LAB    CV PCI N/A 12/27/2024    Procedure: Percutaneous Coronary Intervention;  Surgeon: Arron Do MD;  Location:  HE[facility] CARD[facility] CATH LAB    ORTHOPEDIC SURGERY  1975    rt knee  "ORIF     Family History   Problem Relation Age of Onset    Alzheimer Disease Mother     Heart Disease Father     Prostate Cancer Father     Cancer Brother 65        pancreatic       Prostate Cancer Brother         2 stents 70% and 90% blockage    Heart Disease Sister          Review of Systems  Was otherwise negative except as stated above    Physical Exam   BP: 129/84  Pulse: 91  Temp: 97.6  F (36.4  C)  Resp: 20  Height: 171.5 cm (5' 7.5\")  Weight: 98.4 kg (217 lb)  SpO2: 98 %  Physical Exam  Constitutional:       General: He is not in acute distress.     Appearance: Normal appearance. He is not toxic-appearing.   HENT:      Head: Atraumatic.   Eyes:      General: No scleral icterus.     Conjunctiva/sclera: Conjunctivae normal.   Cardiovascular:      Rate and Rhythm: Normal rate. Rhythm irregularly irregular.      Heart sounds: Normal heart sounds.   Pulmonary:      Effort: Pulmonary effort is normal. No respiratory distress.      Breath sounds: Normal breath sounds.   Abdominal:      Palpations: Abdomen is soft.      Tenderness: There is no abdominal tenderness.   Musculoskeletal:         General: No deformity.      Cervical back: Neck supple.      Right lower le+ Edema present.      Left lower le+ Edema present.   Skin:     General: Skin is warm.   Neurological:      Mental Status: He is alert.           ED Course, Procedures, & Data      Orders Placed This Encounter   Procedures    XR Chest 2 Views    Comprehensive metabolic panel    Troponin T, High Sensitivity    Influenza A/B, RSV and SARS-CoV2 PCR (COVID-19)    Nt probnp inpatient (BNP)    CBC with platelets and differential    Adult Cardiology Eval  Referral    EKG 12-lead, tracing only    Special Precautions    CBC with platelets differential       Procedures              EKG Interpretation:      Interpreted by Jose Anderson  Time reviewed: 1400  Symptoms at time of EKG: dyspnea   Rhythm: afib   Rate: normal  Comparison to " prior: new afib since cardioversion 7/24  Clinical Impression: abnormal EKG       Results for orders placed or performed during the hospital encounter of 03/17/25   XR Chest 2 Views     Status: None    Narrative    EXAM: XR CHEST 2 VIEWS  LOCATION: [facility]  DATE: 3/17/2025    INDICATION: dyspnea  COMPARISON: PA and lateral views the chest 12/8/2024      Impression    IMPRESSION:     Cardiac silhouette is normal in size. Coronary stents are present. Mediastinal borders are well-defined. Hilar contours and lung vascularity are normal.    There are several adjacent thin linear bands of atelectasis in the left base laterally, unchanged from December. No new airspace or interstitial opacities. No wall thickening of the central airways. No pleural effusion.    Unchanged thoracic spondylosis characterized by degenerative osteophytes and disc space narrowing.    Comprehensive metabolic panel     Status: Abnormal   Result Value Ref Range    Sodium 138 135 - 145 mmol/L    Potassium 4.0 3.4 - 5.3 mmol/L    Carbon Dioxide (CO2) 21 (L) 22 - 29 mmol/L    Anion Gap 13 7 - 15 mmol/L    Urea Nitrogen 10.4 8.0 - 23.0 mg/dL    Creatinine 0.96 0.67 - 1.17 mg/dL    GFR Estimate 82 >60 mL/min/1.73m2    Calcium 9.4 8.8 - 10.4 mg/dL    Chloride 104 98 - 107 mmol/L    Glucose 95 70 - 99 mg/dL    Alkaline Phosphatase 118 40 - 150 U/L    AST 27 0 - 45 U/L    ALT 26 0 - 70 U/L    Protein Total 7.1 6.4 - 8.3 g/dL    Albumin 4.0 3.5 - 5.2 g/dL    Bilirubin Total 1.3 (H) <=1.2 mg/dL   Troponin T, High Sensitivity     Status: Normal   Result Value Ref Range    Troponin T, High Sensitivity 20 <=22 ng/L   Influenza A/B, RSV and SARS-CoV2 PCR (COVID-19) Nasopharyngeal     Status: Normal    Specimen: Nasopharyngeal; Swab   Result Value Ref Range    Influenza A PCR Negative Negative    Influenza B PCR Negative Negative    RSV PCR Negative Negative    SARS CoV2 PCR Negative Negative    Narrative     Testing was performed using the Xpert Xpress CoV2/Flu/RSV Assay on the [employer] GeneXpert Instrument. This test should be ordered for the detection of SARS-CoV2, influenza, and RSV viruses in individuals with signs and symptoms of respiratory tract infection. This test is for in vitro diagnostic use under the [place] FDA for laboratories certified under CLIA to perform high or moderate complexity testing. This test has been [place] FDA cleared. A negative result does not rule out the presence of PCR inhibitors in the specimen or target RNA in concentration below the limit of detection for the assay. If only one viral target is positive but coinfection with multiple targets is suspected, the sample should be re-tested with another FDA cleared, approved, or authorized test, if coninfection would change clinical management. This test was validated by the [facility] [employer]. These laboratories are certified under the Clinical Laboratory Improvement Amendments of 1988 (CLIA-88) as qualified to perfom high complexity laboratory testing.   Nt probnp inpatient (BNP)     Status: Normal   Result Value Ref Range    N terminal Pro BNP Inpatient 504 0 - 1,800 pg/mL   CBC with platelets and differential     Status: Abnormal   Result Value Ref Range    WBC Count 5.8 4.0 - 11.0 10e3/uL    RBC Count 4.67 4.40 - 5.90 10e6/uL    Hemoglobin 15.9 13.3 - 17.7 g/dL    Hematocrit 44.8 40.0 - 53.0 %    MCV 96 78 - 100 fL    MCH 34.0 (H) 26.5 - 33.0 pg    MCHC 35.5 31.5 - 36.5 g/dL    RDW 13.2 10.0 - 15.0 %    Platelet Count 267 150 - 450 10e3/uL    % Neutrophils 63 %    % Lymphocytes 24 %    % Monocytes 9 %    % Eosinophils 2 %    % Basophils 1 %    % Immature Granulocytes 0 %    NRBCs per 100 WBC 0 <1 /100    Absolute Neutrophils 3.7 1.6 - 8.3 10e3/uL    Absolute Lymphocytes 1.4 0.8 - 5.3 10e3/uL    Absolute Monocytes 0.5 0.0 - 1.3 10e3/uL    Absolute Eosinophils 0.1 0.0 - 0.7 10e3/uL    Absolute Basophils 0.1 0.0 - 0.2 10e3/uL     Absolute Immature Granulocytes 0.0 <=0.4 10e3/uL    Absolute NRBCs 0.0 10e3/uL   EKG 12-lead, tracing only     Status: None   Result Value Ref Range    Systolic Blood Pressure  mmHg    Diastolic Blood Pressure  mmHg    Ventricular Rate 82 BPM    Atrial Rate 86 BPM    IL Interval  ms    QRS Duration 84 ms     ms    QTc 432 ms    P Axis  degrees    R AXIS -33 degrees    T Axis 58 degrees    Interpretation ECG       Atrial fibrillation  Left axis deviation  Abnormal ECG  Unconfirmed report - interpretation of this ECG is computer generated - see medical record for final interpretation  Confirmed by - EMERGENCY ROOM, PHYSICIAN ([id number]),  RAYA MORENO ([id number]) on 3/17/2025 3:08:06 PM     CBC with platelets differential     Status: Abnormal    Narrative    The following orders were created for panel order CBC with platelets differential.  Procedure                               Abnormality         Status                     ---------                               -----------         ------                     CBC with platelets and ...[[id number]]  Abnormal            Final result                 Please view results for these tests on the individual orders.     Critical care was not performed.     Medical Decision Making  The patient's presentation was of moderate complexity (a chronic illness mild to moderate exacerbation, progression, or side effect of treatment).    The patient's evaluation involved:  review of external note(s) from 2 sources (see separate area of note for details)  ordering and/or review of 3+ test(s) in this encounter (see separate area of note for details)    The patient's management necessitated high risk (a decision regarding emergency major procedure (cardioversion)).    Assessment & Plan    Patient presenting with dyspnea, chest discomfort. Vitals in the ED stable. Physical exam significant for irregular rhythm, bilateral pitting edema. Initial differential diagnosis includes but  not limited to arrhythmia, ACS, other. Nursing notes reviewed.    Work up reassuring for ACS rule-out. Troponin negative, ECG does not show ST abnormalities, does not endorse worrying symptoms when giving history. Mildly elevated BNP from baseline, patient reports leg swelling is typical for him. Does not endorse orthopnea. ECG shows afib. Patient was cardioverted last year 7/2024 and ECGs since then have shown NSR. Patient reports compliance with medication regimen, and after speaking with cardiology, he was deemed safe for outpatient follow-up with cardioversion.    Chest xray does not show signs of acute pathology.    The complete clinical picture is most consistent with atrial fibrillation. After counseling on the diagnosis, work-up, and treatment plan, the patient was discharged to home. The patient was advised to follow-up with cardiology/electrophysiology in 3 to 5 days for cardioversion. The patient was advised to return to the ED if worsening symptoms, new concerning symptoms, or any urgent health concerns.  Patient's questions answered to the best of my ability.  Patient seen and discussed with attending physician Dr. Fuller who agrees with my plan of care.      Final diagnoses:   Persistent atrial fibrillation (H) - with history of A. Fib requiring cardioversion     You will be getting a call from the cardiology department to schedule a cardioversion.  We found that you are newly in atrial fibrillation on your ECG today, which is new since last July. We are not concerned about a cardiac emergency on your workup today, so this will be scheduled outpatient.  Please continue to take your medications as prescribed.  In the meantime, return to the ED if any new or worsening symptoms.  --  Jose Anderson PA-C   Emergency Medicine   [facility] EMERGENCY DEPARTMENT  3/17/2025      I have reviewed the nursing notes. I have reviewed the findings, diagnosis, plan and need for follow up with the  patient.         This data collected with the PA working in the Emergency Department.  Patient was seen and evaluated by myself and I repeated the history and physical exam with the patient.  The plan of care was discussed with them.  The key portions of the note including the entire assessment and plan reflect my documentation.    Angelito Fuller MD, Angelito Hillman MD  03/18/25 [id number]

## 2025-03-17 NOTE — TELEPHONE ENCOUNTER
Attempted to call patient. Phone rang. No answer. Unable to leave message.    Yumi Jacome, RN, BSN  Cardiology RN Care Coordinator   [place]   Phone: [phone]  Fax: [phone] ([place]) [phone] ([place])

## 2025-03-17 NOTE — DISCHARGE INSTRUCTIONS
You will be getting a call from the cardiology department to schedule a cardioversion.  We found that you are newly in atrial fibrillation on your ECG today, which is new since last July. We are not concerned about a cardiac emergency on your workup today, so this will be scheduled outpatient.  Please continue to take your medications as prescribed.  In the meantime, return to the ED if any new or worsening symptoms.

## 2025-03-17 NOTE — ED TRIAGE NOTES
Patient reports shortness of breath and feeling sluggish and slow. Symptoms started yesterday evening. Patient denies pain.     Triage Assessment (Adult)       Row Name 03/17/25 5531          Triage Assessment    Airway WDL WDL        Respiratory WDL    Respiratory WDL X;rhythm/pattern     Rhythm/Pattern, Respiratory shortness of breath        Skin Circulation/Temperature WDL    Skin Circulation/Temperature WDL WDL        Cardiac WDL    Cardiac WDL WDL        Peripheral/Neurovascular WDL    Peripheral Neurovascular WDL WDL        Cognitive/Neuro/Behavioral WDL    Cognitive/Neuro/Behavioral WDL WDL

## 2025-03-17 NOTE — TELEPHONE ENCOUNTER
Called and spoke with patient. Patient states he has resumed his metoprolol 50 mg. He has not noticed much change since being back on metoprolol. He states he still gets shortness of breath off and on. This can occur with activity or when at rest. He states he is uncertain how long it lasts. He states he still feels sluggish. Advised patient to go to ER. Patient declined going to ER at this time. He states he has a cardiac rehab appointment today and will further assess at that time. Patient states that if he is not improving he will go to ER. Informed patient that provider would be updated.    Yumi Jacome, RN, BSN  Cardiology RN Care Coordinator   [place]   Phone: [phone]  Fax: [phone] ([place]) [phone] ([place])

## 2025-03-18 ENCOUNTER — TELEPHONE (OUTPATIENT)
Dept: CARDIOLOGY | Facility: CLINIC | Age: 75
End: 2025-03-18
Payer: COMMERCIAL

## 2025-03-18 ENCOUNTER — PATIENT OUTREACH (OUTPATIENT)
Dept: CARE COORDINATION | Facility: CLINIC | Age: 75
End: 2025-03-18
Payer: COMMERCIAL

## 2025-03-18 NOTE — PROGRESS NOTES
Clinic Care Coordination Contact  Community Health Worker Initial Outreach    CHW Initial Information Gathering:  Referral Source: ED Follow-Up  Current living arrangement:: Not Assessed  CHW Additional Questions  If ED/Hospital discharge, follow-up appointment scheduled as recommended?: Yes  Medication changes made following ED/Hospital discharge?: No  MyChart active?: No    Patient accepts CC: No, declined. No additional support is needed at this time. Unable to send Care Coordination introduction letter. Clinic Care Coordination services remain available via referral if needed.    Fe Jose [facility]  Care Coordination

## 2025-03-18 NOTE — TELEPHONE ENCOUNTER
This encounter is being sent to inform the clinic that this patient has a referral from Jose Anderson  for the diagnoses of  History of atrial fibrillation [Z86.79]; New onset atrial fibrillation (H) [I48.91]  and has requested that this patient be seen within 3-5 days and/or with EP.  Based on the availability of our provider(s), we are unable to accommodate this request.    Were all sites offered this patient?  Yes    Does scheduling algorithm request to schedule next available?  Patient has been scheduled for the first available opening with Kristin Hsu PA-C on 5/28/25.  We have informed the patient that the clinic will review their referral and reach out if a sooner appointment is medically necessary.        Patient scheduled as above. Per NEW referral appointment needing in 3-5 days. Please assist patient. Thanks

## 2025-03-18 NOTE — TELEPHONE ENCOUNTER
Called and spoke with patient. Patient added to waitlist. Patient request only to be seen in [place]. See other telephone encounter for further follow up.    Yumi Jacome, RN, BSN  Cardiology RN Care Coordinator   [place]   Phone: [phone]  Fax: [phone] ([place]) [phone] ([place])

## 2025-03-18 NOTE — TELEPHONE ENCOUNTER
Called and spoke with patient. Patient reports he did go to ER yesterday when he was going in for his cardiac rehab appointment. His ER workup did show atrial fibrillation. Patient reports he is feeling about the same. He was discharged and was to follow up with Cardiology outpatient. Patient is taking metoprolol 50 mg daily. [facility] per patient request.     Patient added to waitlist for sooner EP appointment. Reviewed with patient when to go to ER. Informed patient that provider would be updated.     Yumi Jacome RN, BSN  Cardiology RN Care Coordinator   [place]   Phone: [phone]  Fax: [phone] ([place]) [phone] ([place])

## 2025-03-19 DIAGNOSIS — I48.91 ATRIAL FIBRILLATION WITH RVR (H): Primary | ICD-10-CM

## 2025-03-19 DIAGNOSIS — I48.91 ATRIAL FIBRILLATION (H): ICD-10-CM

## 2025-03-19 RX ORDER — SODIUM CHLORIDE 9 MG/ML
INJECTION, SOLUTION INTRAVENOUS CONTINUOUS
OUTPATIENT
Start: 2025-03-19

## 2025-03-19 RX ORDER — LIDOCAINE 40 MG/G
CREAM TOPICAL
OUTPATIENT
Start: 2025-03-19

## 2025-03-19 NOTE — TELEPHONE ENCOUNTER
Called and spoke with patient. Patient agreeable with proceeding with cardioversion. Patient confirmed that he has taken his Xarelto every day and has not missed a dose. Plan to work on getting patient scheduled for cardioversion.    Yumi Jacome RN, BSN  Cardiology RN Care Coordinator   [place]   Phone: [phone]  Fax: [phone] ([place]) [phone] ([place])

## 2025-03-19 NOTE — TELEPHONE ENCOUNTER
Lets schedule him for DCCV. No nic if he has not missed any xarelto within last 3 weeks    Dr CAN

## 2025-03-20 ENCOUNTER — TELEPHONE (OUTPATIENT)
Dept: CARDIOLOGY | Facility: CLINIC | Age: 75
End: 2025-03-20
Payer: COMMERCIAL

## 2025-03-20 NOTE — TELEPHONE ENCOUNTER
EP  attempted to call the patient, no voicemail and doesn't have my chart. Will try again later.     Matilde Garcia  Periop Electrophysiology   [phone]

## 2025-03-20 NOTE — TELEPHONE ENCOUNTER
----- Message from Yumi TRAN sent at 3/19/2025  4:45 PM CDT -----  Regarding: Cardioversion  Hi Matilde,    This patient is back in atrial fibrillation. Dr. Maya would like patient to have a cardioversion scheduled. Patient has been on Xarelto and has not missed a dose so no ALON needed. Letter started. Please let me know how I can help. Thank you!    Yumi

## 2025-03-24 ENCOUNTER — DOCUMENTATION ONLY (OUTPATIENT)
Dept: SLEEP MEDICINE | Facility: CLINIC | Age: 75
End: 2025-03-24
Payer: COMMERCIAL

## 2025-03-24 DIAGNOSIS — G47.33 OBSTRUCTIVE SLEEP APNEA (ADULT) (PEDIATRIC): Primary | ICD-10-CM

## 2025-03-26 ENCOUNTER — TELEPHONE (OUTPATIENT)
Dept: CARDIOLOGY | Facility: CLINIC | Age: 75
End: 2025-03-26
Payer: COMMERCIAL

## 2025-03-26 NOTE — TELEPHONE ENCOUNTER
Called and reviewed cardioversion instructions listed below with patient. No questions at this time.          You are scheduled for a Cardioversion, at [facility], [place]. The hospital is located at [address] [place] [address] on the [place] of the [place].  If you need to cancel this procedure, please call [phone].      Visitor Policy: Two visitors.     Date:_March 31, 2025___Time: __7am____To the [facility] Waiting Room at the [facility]  Cardioversion     Please do not eat anything for 8 hours prior to your procedure. You may have sips of water up until 2 hours prior to your arrival.  2. Medications to continue:  - Anticoagulant (Xarelto)  - Take all meds as prescribed, except for those noted below.  3. Medications to hold:               - Morning of: diuretic, oral diabetic meds, [ertugliflozin (Steglatro), canaglilozin (Invokana), dapagliflozin (Farxiga), empagliflozin (Jardiance)], short acting insulin, mixed insulin: take 66% of NPH.  - Day prior: Take 80% of long acting insulin.  - Day prior & day of, if daily or BID dosing: [liraglutide (Victoza, Saxenda), exenatide (Byetta), insulin glargine/lixisenatide (Soliqua)].              - 1 week prior, if weekly dosing: [semaglutide (Rybelsus, Ozempic, Wegovy), dulaglutide (Trulicity), exenatide ER (Bydureon), tirzepatide (Mounjaro)].  4. You will discharge same day. You will need a .     Yumi Jacome, RN, BSN  Cardiology RN Care Coordinator   [place]   Phone: [phone]  Fax: [phone] ([place]) [phone] ([place])

## 2025-03-27 ENCOUNTER — DOCUMENTATION ONLY (OUTPATIENT)
Dept: SLEEP MEDICINE | Facility: CLINIC | Age: 75
End: 2025-03-27
Payer: COMMERCIAL

## 2025-03-27 DIAGNOSIS — G47.33 OSA (OBSTRUCTIVE SLEEP APNEA): Primary | Chronic | ICD-10-CM

## 2025-03-27 NOTE — PROGRESS NOTES
Patient was offered choice of vendor and chose [facility].  Patient Collin Frank was set up at [facility]  on March 27, 2025. Patient received a [employer]med Airsense 10 Pressures were set at  5-15 cm H2O.   Patient s ramp is 5 cm H2O for Auto and FLEX/EPR is EPR, 2.  Patient received a [employer] Mask name: VITERA  Full Face mask size Small, heated tubing and heated humidifier.  Patient has the following compliance requirements: using and visit requirements  Patient has a follow up on TBD with Dr. Blanca.    Bradly Blankenship

## 2025-03-27 NOTE — H&P
Electrophysiology Pre-Procedure History and Physical    Collin Frank MRN# [id number]   Age: 74 year old YOB: 1950      Date of Procedure: 3/31/2025 [facility]      Date of Exam 3/31/2025 Facility (Same day)       HPI:  Collin Frank is a pleasant 74 year old male with PMH notable for paroxysmal A fib on Xarelto, CAD s/p DESx2 RCA (12/27/24), Factor V Leiden mutation, May-Thurner syndrome, recurrent LLE DVT, HTN, HLD, BPH, and nephrolithiasis. He presents today for cardioversion.    Per chart review, patient historically had presented to ED 12/2023 due to 3 day history of weakness and shortness of breath; he was found to be in AF RVR and underwent ALON/DCCV with restoration to sinus rhythm. He had additional ED visits for AF RVR in February and March 2024, both times patient reported not being aware he was in AF. He was back in AF in 06/2024 during clinic visit at which time he was started on Flecainide and underwent another DCCV. He presented to ED 12/2024 with complaints of diaphoresis and left shoulder pain. At this time, he was in NSR, but Coronary CTA showed severe stenosis of RCA for which he presented to cath lab and received MAGUI x2 to RCA. His flecainide was then discontinued due to CAD. Since that time he has been only on metoprolol 50mg daily for rate control.     During ED visit 3/17/25, patient reporting fatigue and shortness of breath. ECG showed AF; he was deemed safe to discharge and follow up for OP DCCV for which he presents today. He denies any missed doses of Xarelto in last 30 days .            Active problem list:     Patient Active Problem List    Diagnosis Date Noted    Coronary artery disease involving native coronary artery of native heart 01/11/2025     Priority: High     Angiogram 12/2024 -  Prox RCA to Mid RCA lesion is 90% stenosed. Prox RCA-1 lesion is 30% stenosed. Mid LAD lesion is 30% stenosed. Prox RCA-2 lesion is  40% stenosed. Successful deployment two drug eluting stents from proximal to distal RCA.       FRANCK (obstructive sleep apnea) - moderate (AHI 24) 01/11/2025     Priority: Medium     1/6/2025 [place] Diagnostic Sleep Study (222.0 lbs) - AHI 24.9, RDI 40.5, Supine .0, REM AHI 36.0, Low O2 89.0%, Time Spent <=88% 0 minutes / Time Spent <=89% 0.3 minutes. PLM index was 43.1 movements per hour. PLM Arousal Index was 14.1 per hour. Possible periodic breathing noted  Declined treatment 2/27/2025.       Arm weakness 12/08/2024     Priority: Medium    History of kidney stones 09/19/2024     Priority: Medium     S/p Cystoscopy, Left Ureteroscopy, Left Retrograde Pyelogram, Left Holmium Laser Lithotripsy 9/19/24      Hyperlipidemia 06/26/2024     Priority: Medium    Atrial fibrillation (H) 12/11/2023     Priority: Medium     Presented emergency room 12/11/2023 for weakness and shortness of breath for 3 days. EKG showed A-fib with RVR (heart rate 100-130's). Underwent ALON guided DC cardioversion which converted patient to sinus rhythm.   S/p cardioversion 7/2024      Class 2 severe obesity due to excess calories with serious comorbidity and body mass index (BMI) of 35.0 to 35.9 in adult (H) 09/11/2023     Priority: Medium    Primary osteoarthritis of both knees 07/03/2019     Priority: Medium    Benign essential hypertension 11/29/2018     Priority: Medium    Benign non-nodular prostatic hyperplasia without lower urinary tract symptoms 12/06/2016     Priority: Medium    FH: prostate cancer 10/10/2013     Priority: Medium    Hepatic hemangioma 03/21/2013     Priority: Medium     1.5 x 1.4 x 1 cm US [place]  3/17/13      Vitamin D deficiency 10/22/2012     Priority: Medium    Long term current use of anticoagulant 10/16/2012     Priority: Medium    Factor 5 Leiden mutation, heterozygous 11/30/2011     Priority: Medium     Homozygous Mutant 2 copies Leiden mutation   Significant risk iof thrombolembolic events  [place] -  11  Candidate for lifelong anticoagulation         May-Thurner syndrome 2011     Priority: Medium     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 [place]       History of deep venous thrombosis 2011     Priority: Medium     Admit 2011- acute and extensive LLE DVTs, s/p thrombolysis by IR               Medications (include herbals and vitamins):      Current Outpatient Medications   Medication Sig Dispense Refill    amLODIPine (NORVASC) 5 MG tablet Take 1 tablet (5 mg) by mouth daily. 90 tablet 3    atorvastatin (LIPITOR) 80 MG tablet Take 1 tablet (80 mg) by mouth daily. 90 tablet 3    cholecalciferol (VITAMIN D) 1000 UNIT tablet Take 1,000 Units by mouth every morning. 100 tablet 3    losartan (COZAAR) 50 MG tablet Take 0.5 tablets (25 mg) by mouth daily. 45 tablet 3    metoprolol succinate ER (TOPROL XL) 50 MG 24 hr tablet Take 1 tablet (50 mg) by mouth daily. 30 tablet 0    metoprolol succinate ER (TOPROL XL) 50 MG 24 hr tablet Take 1 tablet (50 mg) by mouth daily. 90 tablet 3    ticagrelor (BRILINTA) 90 MG tablet Take 1 tablet (90 mg) by mouth 2 times daily. Start this evening. 180 tablet 3    triamcinolone (KENALOG) 0.1 % external cream Apply topically 2 times daily. as needed for leg rash 45 g 0    XARELTO ANTICOAGULANT 20 MG TABS tablet TAKE 1 TABLET DAILY WITH DINNER 90 tablet 3     No current facility-administered medications for this encounter.           Medication List         Notice    Cannot display patient medications because the patient has not yet been checked in.              Allergies:    No Known Allergies          Social History:     Social History     Tobacco Use    Smoking status: Former     Current packs/day: 0.00     Types: Cigarettes     Quit date: 2011     Years since quittin.1     Passive exposure: Never    Smokeless tobacco: Never   Substance Use Topics    Alcohol use: No            Physical Exam:   All vitals have been reviewed  No data found.  No  intake/output data recorded.  Airway assessment:   Patient is able to open mouth wide  Patient is able to stick out tongue}      ENT:   Normocephalic, without obvious abnormality, atraumatic, sinuses nontender on palpation, external ears without lesions, oral pharynx with moist mucous membranes, tonsils without erythema or exudates, gums normal and good dentition.     Neck:   Supple, symmetrical, trachea midline, no adenopathy, thyroid symmetric, not enlarged and no tenderness, skin normal     Lungs:   No increased work of breathing, good air exchange, clear to auscultation bilaterally, no crackles or wheezing     Cardiovascular:   Irregularly irregular.              Lab / Radiology Results:     Lab Results   Component Value Date    WBC 5.8 03/17/2025    WBC 7.2 03/31/2021    RBC 4.67 03/17/2025    RBC 4.60 03/31/2021    HGB 15.9 03/17/2025    HGB 14.7 03/31/2021    HCT 44.8 03/17/2025    HCT 43.5 03/31/2021    MCV 96 03/17/2025    MCV 95 03/31/2021    RDW 13.2 03/17/2025    RDW 12.8 03/31/2021     03/17/2025     03/31/2021      Lab Results   Component Value Date    WBC 5.8 03/17/2025    WBC 7.2 03/31/2021     Lab Results   Component Value Date     03/17/2025     03/31/2021     Lab Results   Component Value Date    HGB 15.9 03/17/2025    HGB 14.7 03/31/2021    HCT 44.8 03/17/2025    HCT 43.5 03/31/2021     Lab Results   Component Value Date     03/17/2025     04/01/2021    CO2 21 03/17/2025    CO2 27 12/12/2022    CO2 25 04/01/2021    BUN 10.4 03/17/2025    BUN 15 12/12/2022    BUN 15 04/01/2021    CREAT 0.9 01/04/2020     Lab Results   Component Value Date     03/17/2025     04/01/2021    CO2 21 03/17/2025    CO2 27 12/12/2022    CO2 25 04/01/2021    BUN 10.4 03/17/2025    BUN 15 12/12/2022    BUN 15 04/01/2021    CREAT 0.9 01/04/2020       Lab Results   Component Value Date    TSH 3.92 09/05/2024    TSH 1.79 03/31/2021               Plan:   Patient's active  problems diagnostically and therapeutically optimized for the planned procedure. Patient here for cardioversion. Procedure explained in detail to patient including indications, risks, and benefits. Patient states understanding and wishes to procced.     CHITRA Mejias CNP  Electrophysiology Consult Service  Securely message with Ruth [facility]stad[employer] â€“ [employer]   Text page via [facility] Paging/Directory

## 2025-03-31 ENCOUNTER — ANESTHESIA (OUTPATIENT)
Dept: SURGERY | Facility: CLINIC | Age: 75
End: 2025-03-31
Payer: COMMERCIAL

## 2025-03-31 ENCOUNTER — HOSPITAL ENCOUNTER (OUTPATIENT)
Facility: CLINIC | Age: 75
Discharge: HOME | End: 2025-03-31
Attending: INTERNAL MEDICINE | Admitting: INTERNAL MEDICINE
Payer: COMMERCIAL

## 2025-03-31 ENCOUNTER — HOSPITAL ENCOUNTER (OUTPATIENT)
Dept: CARDIOLOGY | Facility: CLINIC | Age: 75
Discharge: HOME | End: 2025-03-31
Attending: INTERNAL MEDICINE
Payer: COMMERCIAL

## 2025-03-31 ENCOUNTER — ANESTHESIA EVENT (OUTPATIENT)
Dept: SURGERY | Facility: CLINIC | Age: 75
End: 2025-03-31
Payer: COMMERCIAL

## 2025-03-31 VITALS
DIASTOLIC BLOOD PRESSURE: 82 MMHG | SYSTOLIC BLOOD PRESSURE: 120 MMHG | OXYGEN SATURATION: 100 % | RESPIRATION RATE: 20 BRPM

## 2025-03-31 VITALS
RESPIRATION RATE: 16 BRPM | DIASTOLIC BLOOD PRESSURE: 71 MMHG | HEART RATE: 50 BPM | OXYGEN SATURATION: 98 % | SYSTOLIC BLOOD PRESSURE: 104 MMHG

## 2025-03-31 DIAGNOSIS — I48.91 ATRIAL FIBRILLATION (H): ICD-10-CM

## 2025-03-31 DIAGNOSIS — I48.91 ATRIAL FIBRILLATION WITH RVR (H): ICD-10-CM

## 2025-03-31 LAB
MAGNESIUM SERPL-MCNC: 2 MG/DL (ref 1.7–2.3)
POTASSIUM SERPL-SCNC: 3.7 MMOL/L (ref 3.4–5.3)

## 2025-03-31 PROCEDURE — 83735 ASSAY OF MAGNESIUM: CPT | Performed by: INTERNAL MEDICINE

## 2025-03-31 PROCEDURE — 93005 ELECTROCARDIOGRAM TRACING: CPT

## 2025-03-31 PROCEDURE — 999N000054 HC STATISTIC EKG NON-CHARGEABLE

## 2025-03-31 PROCEDURE — 92960 CARDIOVERSION ELECTRIC EXT: CPT | Performed by: NURSE PRACTITIONER

## 2025-03-31 PROCEDURE — 92960 CARDIOVERSION ELECTRIC EXT: CPT

## 2025-03-31 PROCEDURE — 250N000009 HC RX 250: Performed by: NURSE ANESTHETIST, CERTIFIED REGISTERED

## 2025-03-31 PROCEDURE — 370N000017 HC ANESTHESIA TECHNICAL FEE, PER MIN

## 2025-03-31 PROCEDURE — 84132 ASSAY OF SERUM POTASSIUM: CPT | Performed by: INTERNAL MEDICINE

## 2025-03-31 RX ORDER — SODIUM CHLORIDE 9 MG/ML
INJECTION, SOLUTION INTRAVENOUS CONTINUOUS
Status: DISCONTINUED | OUTPATIENT
Start: 2025-03-31 | End: 2025-04-01 | Stop reason: HOSPADM

## 2025-03-31 RX ADMIN — METHOHEXITAL SODIUM 50 MG: 500 INJECTION, POWDER, LYOPHILIZED, FOR SOLUTION INTRAMUSCULAR; INTRAVENOUS; RECTAL at 09:02

## 2025-03-31 ASSESSMENT — ACTIVITIES OF DAILY LIVING (ADL)
ADLS_ACUITY_SCORE: 56

## 2025-03-31 ASSESSMENT — LIFESTYLE VARIABLES: TOBACCO_USE: 1

## 2025-03-31 NOTE — PROGRESS NOTES
Collin arrived on 2A s/p cardioversion  SB/SR, VSS  Post EKG done and discharge reviewed while in Echo  Patient eating and drinking  Skin reddened from defib pads but he denies pain/itching  Will call sister Whitney for ride home shortly

## 2025-03-31 NOTE — DISCHARGE INSTRUCTIONS
GOING HOME AFTER YOUR CARDIOVERSION    FOR NEXT 24 HOURS:  An adult should stay with you.  Relax and take it easy.  DO NOT make any important legal decisions.  DO NOT drive or operate machines at home or at work.  DO NOT consume alcohol.   Resume your regular diet and drink plenty of fluids.  If you have any redness/skin sorness where the patches were placed, you may use aloe vera gel or 1% hydrocortisone cream to the skin (sold at drug stores)  Take Tylenol (Acetaminophen) per your provider's recommendations as needed to help relieve local pain.     CALL YOUR HEALTHCARE PROVIDER IF:  You develop nausea or vomiting  You develop hives or a rash or any unexplained itching  Have irregular heartbeat or fast pulse  Feel faint, dizzy, or lightheaded  Have chest pain with increased activity  Have bleeding issues from blood-thinning medicines    CALL 911 RIGHT AWAY IF YOU HAVE:  Pain in your chest, arm, shoulder, neck, or upper back  Shortness of breath  Loss of vision, speech, or strength or coordination in any body part  Weakness in your arm or leg  Uncontrolled bleeding  Feel unstable in any way     FOLLOW UP APPOINTMENT:    Follow up as scheduled with EP/Cardiology Provider in 4 weeks    ADDITIONAL INFORMATION:  Cardiovascular Clinic:   [address] [place] [address]. [place] [address]  Your Care Team:  EP Cardiology   Telephone Number     Nataliia Dias RN [phone]    After business hours: [phone], select option 4, ask for EP Fellow on call to be paged.     For scheduling appts or procedures:    Matilde Garcia   [phone]   For the Device Clinic (Pacemakers and ICD's)   RN's :   Annalisa Fontaine  During business hours: [phone]     After business hours:   [phone]- select option 4 and ask for job code [phone].       As always, Thank you for trusting us with your health care needs!

## 2025-03-31 NOTE — ANESTHESIA POSTPROCEDURE EVALUATION
Patient: Collin Frank    Procedure: Procedure(s):  Anesthesia cardioversion@1330       Anesthesia Type:  MAC    Note:  Disposition: Outpatient   Postop Pain Control: Uneventful            Sign Out: Well controlled pain   PONV: No   Neuro/Psych: Uneventful            Sign Out: Acceptable/Baseline neuro status   Airway/Respiratory: Uneventful            Sign Out: Acceptable/Baseline resp. status   CV/Hemodynamics: Uneventful            Sign Out: Acceptable CV status; No obvious hypovolemia; No obvious fluid overload   Other NRE: NONE   DID A NON-ROUTINE EVENT OCCUR? No           Last vitals:  Vitals:    03/31/25 0937 03/31/25 0954   BP: 117/73 120/82   Resp: 20    SpO2: 99% 100%       Electronically Signed By: Sharif Fernandez MD  March 31, 2025  11:31 AM

## 2025-03-31 NOTE — ANESTHESIA CARE TRANSFER NOTE
Patient: Collin Frank    Procedure: Procedure(s):  Anesthesia cardioversion@1330       Diagnosis: Atrial fibrillation (H) [I48.91]  Diagnosis Additional Information: No value filed.    Anesthesia Type:   No value filed.     Note:    Oropharynx: spontaneously breathing  Level of Consciousness: awake and drowsy  Oxygen Supplementation: nasal cannula    Independent Airway: airway patency satisfactory and stable  Dentition: dentition unchanged  Vital Signs Stable: post-procedure vital signs reviewed and stable  Report to RN Given: handoff report given  Patient transferred to: Phase II    Handoff Report: Identifed the Patient, Identified the Reponsible Provider, Reviewed the pertinent medical history, Discussed the surgical course, Reviewed Intra-OP anesthesia mangement and issues during anesthesia, Set expectations for post-procedure period and Allowed opportunity for questions and acknowledgement of understanding      Vitals:  Vitals Value Taken Time   /74    Temp     Pulse 30    Resp     SpO2 100        Electronically Signed By: CHITRA Bianchi CRNA  March 31, 2025  9:05 AM

## 2025-03-31 NOTE — ANESTHESIA PREPROCEDURE EVALUATION
Anesthesia Pre-Procedure Evaluation    Patient: Collin Frank   MRN: [id number] : 1950        Procedure : Procedure(s):  Anesthesia cardioversion          Collin Frank is a 73 year old male with past medical history of factor V Leiden mutation, chronic left lower extremity DVT and PE on Xarelto, HTN, prostate cancer who presents to the ER via ambulance for evaluation of generalized weakness, increasing shortness of breath for the past 3 days, episode of fatigue and diaphoresis this morning. Found to be in Afib RVR. Was NPO since 12/10, went for ALON/DCCV.     Past Medical History:   Diagnosis Date    Acute deep vein thrombosis (DVT) (H) 2011- acute and extensive LLE DVTs, he underwent thrombolysis by IR       Atrial fibrillation with RVR (H) 2023    Chest pain, unspecified type 2021    Nephrolithiasis 2024    FRANCK (obstructive sleep apnea) - moderate (AHI 24) 2025 [place] Diagnostic Sleep Study (222.0 lbs) - AHI 24.9, RDI 40.5, Supine .0, REM AHI 36.0, Low O2 89.0%, Time Spent <=88% 0 minutes / Time Spent <=89% 0.3 minutes. PLM index was 43.1 movements per hour. PLM Arousal Index was 14.1 per hour. Possible periodic breathing noted    Renal stones 2013    2 mm size non obstructing  2 right, 1 left==CT  [place] 3/13      Status post coronary angiogram 2024      Past Surgical History:   Procedure Laterality Date    ANESTHESIA CARDIOVERSION N/A 2023    Procedure: Anesthesia cardioversion;  Surgeon: GENERIC ANESTHESIA PROVIDER;  Location: [facility] OR    ANESTHESIA CARDIOVERSION N/A 2024    Procedure: Anesthesia cardioversion@1330;  Surgeon: GENERIC ANESTHESIA PROVIDER;  Location: [facility] OR    COLONOSCOPY  2008    Normal. Repeat in 10 years    COLONOSCOPY WITH CO2 INSUFFLATION N/A 2018    Procedure: COLONOSCOPY WITH CO2 INSUFFLATION;  COLON SCREEN/ ENGELMANN;  Surgeon: Jan Flores MD;  Location:  OR    COMBINED  CYSTOSCOPY, RETROGRADES, URETEROSCOPY, LASER HOLMIUM LITHOTRIPSY URETER(S), INSERT STENT Left 2024    Procedure: Cystoscopy, Left Ureteroscopy, Left Retrograde Pyelogram, Left Holmium Laser Lithotripsy, Left Ureteral Stent Placement;  Surgeon: Delfin Kimball MD;  Location: [facility] OR    CV CORONARY ANGIOGRAM N/A 2024    Procedure: Coronary Angiogram;  Surgeon: Arron Do MD;  [facility]    CV PCI N/A 2024    Procedure: Percutaneous Coronary Intervention;  Surgeon: Arron Do MD;  Location:  [facility]    ORTHOPEDIC SURGERY      rt knee ORIF      No Known Allergies   Social History     Tobacco Use    Smoking status: Former     Current packs/day: 0.00     Types: Cigarettes     Quit date: 2011     Years since quittin.1     Passive exposure: Never    Smokeless tobacco: Never   Substance Use Topics    Alcohol use: No      Wt Readings from Last 1 Encounters:   25 98.4 kg (217 lb)        Anesthesia Evaluation   Pt has had prior anesthetic. Type: MAC and General.        ROS/MED HX  ENT/Pulmonary:     (+)                tobacco use, Past use,                       Neurologic:       Cardiovascular:     (+)  hypertension- -   -  - -   Taking blood thinners                                   METS/Exercise Tolerance:     Hematologic:     (+) History of blood clots,    pt is anticoagulated,           Musculoskeletal:       GI/Hepatic:     (+)             liver disease,       Renal/Genitourinary:     (+) renal disease,             Endo:     (+)               Obesity,       Psychiatric/Substance Use:       Infectious Disease:       Malignancy:       Other:            Physical Exam    Airway  airway exam normal      Mallampati: III   TM distance: > 3 FB   Neck ROM: full   Mouth opening: > 3 cm    Respiratory Devices and Support         Dental       (+) Modest Abnormalities - crowns, retainers, 1 or 2 missing teeth      Cardiovascular    "       Rhythm and rate: irregular   (+) weak pulses       Pulmonary   pulmonary exam normal        breath sounds clear to auscultation           OUTSIDE LABS:  CBC:   Lab Results   Component Value Date    WBC 5.8 03/17/2025    WBC 6.7 01/20/2025    HGB 15.9 03/17/2025    HGB 14.6 01/20/2025    HCT 44.8 03/17/2025    HCT 42.5 01/20/2025     03/17/2025     01/20/2025     BMP:   Lab Results   Component Value Date     03/17/2025     01/20/2025    POTASSIUM 3.7 03/31/2025    POTASSIUM 4.0 03/17/2025    CHLORIDE 104 03/17/2025    CHLORIDE 105 01/20/2025    CO2 21 (L) 03/17/2025    CO2 23 01/20/2025    BUN 10.4 03/17/2025    BUN 12.4 01/20/2025    CR 0.96 03/17/2025    CR 0.92 01/20/2025    GLC 95 03/17/2025     (H) 01/20/2025     COAGS:   Lab Results   Component Value Date    PTT 30 01/03/2025    INR 1.57 (H) 01/03/2025     POC: No results found for: \"BGM\", \"HCG\", \"HCGS\"  HEPATIC:   Lab Results   Component Value Date    ALBUMIN 4.0 03/17/2025    PROTTOTAL 7.1 03/17/2025    ALT 26 03/17/2025    AST 27 03/17/2025    ALKPHOS 118 03/17/2025    BILITOTAL 1.3 (H) 03/17/2025     OTHER:   Lab Results   Component Value Date    A1C 5.6 12/23/2024    MARTA 9.4 03/17/2025    PHOS 2.9 03/14/2024    MAG 2.0 03/31/2025    LIPASE 19 12/08/2024    TSH 3.92 09/05/2024    SED 7 12/11/2023       Anesthesia Plan    ASA Status:  3, emergent    NPO Status:  NPO Appropriate    Anesthesia Type: MAC.     - Reason for MAC: chronic cardiopulmonary disease, immobility needed, straight local not clinically adequate              Consents    Anesthesia Plan(s) and associated risks, benefits, and realistic alternatives discussed. Questions answered and patient/representative(s) expressed understanding.     - Discussed: Risks, Benefits and Alternatives for BOTH SEDATION and the PROCEDURE were discussed     - Discussed with:  Patient      - Extended Intubation/Ventilatory Support Discussed: No.      - Patient is DNR/DNI " "Status: No     Use of blood products discussed: No .     Postoperative Care    Pain management: IV analgesics.   PONV prophylaxis: Ondansetron (or other 5HT-3), Dexamethasone or Solumedrol     Comments:    Other Comments: The material risks, benefits and alternatives were discussed in detail.  The patient agrees to proceed.  The patient has no other complaints at this time.       H&P reviewed: Unable to attach H&P to encounter due to EHR limitations. H&P Update: appropriate H&P reviewed, patient examined. No interval changes since H&P (within 30 days).        CHITRA Bianchi CRNA    I have reviewed the pertinent notes and labs in the chart from the past 30 days and (re)examined the patient.  Any updates or changes from those notes are reflected in this note.             # Drug Induced Coagulation Defect: home medication list includes an anticoagulant medication  # Drug Induced Platelet Defect: home medication list includes an antiplatelet medication  # Obesity: Estimated body mass index is 33.49 kg/m  as calculated from the following:    Height as of 3/17/25: 1.715 m (5' 7.5\").    Weight as of 3/17/25: 98.4 kg (217 lb).      "

## 2025-03-31 NOTE — SEDATION DOCUMENTATION
Pt arrived in ECHO department for scheduled cardioversion.   Procedure explained, questions answered and consent signed. Discharge instructions discussed with patient.  Pt's labs WNL today and is appropriately NPO.  No missed doses of his Xarelto. Anesthesia gave pt 50 mg IV brevitol for sedation and pt was DCCV at 150 Joules to a SR.  Pt denied pain after procedure and was transferred to unit 2A after awake and VSS.  Report given to 2A RN.

## 2025-03-31 NOTE — PROCEDURES
[facility]    Procedure: Cardioversion    Date/Time: 3/31/2025 8:41 AM    Performed by: Malina Kelley APRN CNP  Authorized by: Jimy Maya MD      UNIVERSAL PROTOCOL   Site Marked: NA  Prior Images Obtained and Reviewed:  NA  Required items: Required blood products, implants, devices and special equipment available    Patient identity confirmed:  Verbally with patient, arm band and provided demographic data  Patient was reevaluated immediately before administering moderate or deep sedation or anesthesia  Confirmation Checklist:  Patient's identity using two indicators and relevant allergies  Time out: Immediately prior to the procedure a time out was called    Universal Protocol: the Joint Commission Universal Protocol was followed       ANESTHESIA    Anesthesia was administered and monitored by anesthesiology.  See anesthesia documentation for details.    PROCEDURE DETAILS  Pre-procedure rhythm: atrial fibrillation  Patient position: patient was placed in a supine position  Chest area: chest area exposed  Electrodes: pads  Electrodes placed: anterior-posterior  Number of attempts: 1    Details of Attempts:  Patient reported taking uniterrupted anticoagulation for at least 1 month.  150J biphasic synchronized shock delivered with successful conversion.         PROCEDURE    Patient Tolerance:  Patient tolerated the procedure well with no immediate complications      CHITRA Mejias CNP  Electrophysiology Consult Service  Securely message with [employer]   Text page via [facility] Paging/Directory

## 2025-04-01 ENCOUNTER — DOCUMENTATION ONLY (OUTPATIENT)
Dept: SLEEP MEDICINE | Facility: CLINIC | Age: 75
End: 2025-04-01
Payer: COMMERCIAL

## 2025-04-01 DIAGNOSIS — G47.33 OSA (OBSTRUCTIVE SLEEP APNEA): Primary | Chronic | ICD-10-CM

## 2025-04-01 LAB
ATRIAL RATE - MUSE: NORMAL BPM
DIASTOLIC BLOOD PRESSURE - MUSE: NORMAL MMHG
INTERPRETATION ECG - MUSE: NORMAL
P AXIS - MUSE: NORMAL DEGREES
PR INTERVAL - MUSE: NORMAL MS
QRS DURATION - MUSE: 78 MS
QT - MUSE: 358 MS
QTC - MUSE: 433 MS
R AXIS - MUSE: -17 DEGREES
SYSTOLIC BLOOD PRESSURE - MUSE: NORMAL MMHG
T AXIS - MUSE: 74 DEGREES
VENTRICULAR RATE- MUSE: 88 BPM

## 2025-04-01 NOTE — PROGRESS NOTES
3 day Sleep therapy management telephone visit    Diagnostic AHI:PS.9         Confirmed with patient at time of call- Yes Patient is still interested in STM service.       Subjective measures:  Patient states he is doing well with CPAP when asked if his mask leak is bothering him he stated no. Patient will call if he has questions or concerns.         Objective data     Order Settings for PAP  CPAP min     CPAP max     CPAP fixed         Device settings from machine CPAP min 5.0     CPAP max 15.0      CPAP fixed      EPR Setting TWO    RESMED soft response  OFF     Assessment: Nighty usage most nights over four hours      Patient has the following upcoming sleep appts:      Replacement device: No  STM ordered by provider: Yes     Total time spent on accessing and  interpreting remote patient PAP therapy data  10 minutes    Total time spent counseling, coaching  and reviewing PAP therapy data with patient  3 minutes    [id number] no

## 2025-04-02 LAB
ATRIAL RATE - MUSE: 48 BPM
DIASTOLIC BLOOD PRESSURE - MUSE: NORMAL MMHG
INTERPRETATION ECG - MUSE: NORMAL
P AXIS - MUSE: 71 DEGREES
PR INTERVAL - MUSE: 192 MS
QRS DURATION - MUSE: 76 MS
QT - MUSE: 420 MS
QTC - MUSE: 422 MS
R AXIS - MUSE: -29 DEGREES
SYSTOLIC BLOOD PRESSURE - MUSE: NORMAL MMHG
T AXIS - MUSE: 22 DEGREES
VENTRICULAR RATE- MUSE: 61 BPM

## 2025-04-07 ENCOUNTER — HOSPITAL ENCOUNTER (OUTPATIENT)
Dept: CARDIAC REHAB | Facility: CLINIC | Age: 75
Discharge: HOME | End: 2025-04-07
Attending: STUDENT IN AN ORGANIZED HEALTH CARE EDUCATION/TRAINING PROGRAM
Payer: COMMERCIAL

## 2025-04-07 PROCEDURE — 93798 PHYS/QHP OP CAR RHAB W/ECG: CPT

## 2025-04-14 ENCOUNTER — HOSPITAL ENCOUNTER (OUTPATIENT)
Dept: CARDIAC REHAB | Facility: CLINIC | Age: 75
Discharge: HOME | End: 2025-04-14
Attending: STUDENT IN AN ORGANIZED HEALTH CARE EDUCATION/TRAINING PROGRAM
Payer: COMMERCIAL

## 2025-04-14 PROCEDURE — 93798 PHYS/QHP OP CAR RHAB W/ECG: CPT

## 2025-04-21 ENCOUNTER — HOSPITAL ENCOUNTER (OUTPATIENT)
Dept: CARDIAC REHAB | Facility: CLINIC | Age: 75
Discharge: HOME | End: 2025-04-21
Attending: STUDENT IN AN ORGANIZED HEALTH CARE EDUCATION/TRAINING PROGRAM
Payer: COMMERCIAL

## 2025-04-21 PROCEDURE — 93798 PHYS/QHP OP CAR RHAB W/ECG: CPT

## 2025-05-05 ENCOUNTER — HOSPITAL ENCOUNTER (OUTPATIENT)
Dept: CARDIAC REHAB | Facility: CLINIC | Age: 75
Discharge: HOME | End: 2025-05-05
Attending: STUDENT IN AN ORGANIZED HEALTH CARE EDUCATION/TRAINING PROGRAM
Payer: COMMERCIAL

## 2025-05-05 PROCEDURE — 93798 PHYS/QHP OP CAR RHAB W/ECG: CPT

## 2025-05-27 NOTE — PROGRESS NOTES
ELECTROPHYSIOLOGY CLINIC VISIT    Assessment/Recommendations   Assessment/Plan:    Collin Frank is a pleasant 74 year old male with PMH notable for paroxysmal A fib on Xarelto, CAD s/p DESx2 RCA (12/27/24), Factor V Leiden mutation, May-Thurner syndrome, recurrent LLE DVT, HTN, HLD, BPH, and nephrolithiasis.     Paroxysmal Atrial Fibrillation:   I had a long discussion with the patient about AF, including the natural history of the disease, risk of embolic events, role of antithrombotic therapy, role of rate control therapy, the role of antiarrhythmic medications, and the role of an ablation.  1. Stroke Prophylaxis: CHADSVASC +cad ++age +HTN ++DVT 6, corresponding to a 9.8% annual stroke / systemic embolism event rate. He is appropriately on Xarelto  2. Rate Control: Toprol 50mg daily.   3. Rhythm Control: Cardioversion, Antiarrhythmics and/or ablation are options for rhythm control. He has undergone several DCCV with most recent being 03/31/25. He has remained in NSR and has no complaints today.   4. Risk Factor Management: Statin, BP control, and FRANCK evaluation as indicated.     Follow up 1 year     History of Present Illness/Subjective    MrRosa Frank is a 74 year old male who comes in today for EP follow-up of pAF s/p DCCV.    Per chart review, patient historically had presented to ED 12/2023 due to 3 day history of weakness and shortness of breath; he was found to be in AF RVR and underwent ALON/DCCV with restoration to sinus rhythm. He had additional ED visits for AF RVR in February and March 2024, both times patient reported not being aware he was in AF. He was back in AF in 06/2024 during clinic visit at which time he was started on Flecainide and underwent another DCCV. He presented to ED 12/2024 with complaints of diaphoresis and left shoulder pain. At this time, he was in NSR, but Coronary CTA showed severe stenosis of RCA for which he presented to cath lab and received MAGUI x2 to RCA. His  flecainide was then discontinued due to CAD. Since that time he has been only on metoprolol 50mg daily for rate control.      During ED visit 3/17/25, patient reporting fatigue and shortness of breath. ECG showed AF; he was deemed safe to discharge and follow up for OP DCCV for which he presented 3/31/25. This resulted in conversion to NSR. He reports feeling well since tayla time and has had no recurrence of AF. He reports starting CPAP in February of this year. He is not very physically active, but reports he is able to do light housework without issue. He denies chest discomfort, palpitations, abdominal fullness/bloating or peripheral edema, shortness of breath, paroxysmal nocturnal dyspnea, orthopnea, lightheadedness, dizziness, pre-syncope, or syncope. Current cardiac medications include: amlodipine, atorvastatin, losartan, toprol, brilinta, xarelto.     I have reviewed and updated the patient's Past Medical History, Social History, Family History and Medication List.     Cardiographics (Personally Reviewed) :   Limited Echo 6/21/24:   Global and regional left ventricular function is normal with an EF of 55-60%.  Biplane LVEF is 55%.  Global right ventricular function is normal.  No pericardial effusion is present.  This study was compared with the study from 12/11/23 .No significant changes  noted.  ______________________________________________________________________________  Left Ventricle  Global and regional left ventricular function is normal with an EF of 55-60%.  Biplane LVEF is 55%.     Right Ventricle  The right ventricle is normal size. Global right ventricular function is  normal.     Pericardium  No pericardial effusion is present.     Compared to Previous Study  This study was compared with the study from 12/11/23 . No significant changes  noted.     Coronary Angiogram 12/27/24:    Prox RCA to Mid RCA lesion is 90% stenosed.    Prox RCA-1 lesion is 30% stenosed.    Mid LAD lesion is 30% stenosed.     Prox RCA-2 lesion is 40% stenosed.     -Severe stenosis of the mid RCA.  -Successful deployment of two drug eluting stents from proximal to distal RCA.      Physical Examination   There were no vitals taken for this visit.  Wt Readings from Last 3 Encounters:   03/17/25 98.4 kg (217 lb)   03/14/25 101.6 kg (224 lb)   02/27/25 99.5 kg (219 lb 6.4 oz)     General Appearance:   Alert, well-appearing and in no acute distress.   HEENT: Atraumatic, normocephalic. PERRL.  MMM.   Chest/Lungs:   Respirations unlabored.  Lungs are clear to auscultation.   Cardiovascular:   Regular rate and rhythm.  S1/S2. No murmur.    Abdomen:  Soft, nontender, nondistended.   Extremities: No cyanosis or clubbing. No edema.    Musculoskeletal: Moves all extremities.     Skin: Warm, dry, intact.    Neurologic: Mood and affect are appropriate.  Alert and oriented to person, place, time, and situation.          Medications  Allergies   Current Outpatient Medications   Medication Sig Dispense Refill    amLODIPine (NORVASC) 5 MG tablet Take 1 tablet (5 mg) by mouth daily. 90 tablet 3    atorvastatin (LIPITOR) 80 MG tablet Take 1 tablet (80 mg) by mouth daily. 90 tablet 3    cholecalciferol (VITAMIN D) 1000 UNIT tablet Take 1,000 Units by mouth every morning. 100 tablet 3    losartan (COZAAR) 50 MG tablet Take 0.5 tablets (25 mg) by mouth daily. 45 tablet 3    metoprolol succinate ER (TOPROL XL) 50 MG 24 hr tablet Take 1 tablet (50 mg) by mouth daily. 30 tablet 0    metoprolol succinate ER (TOPROL XL) 50 MG 24 hr tablet Take 1 tablet (50 mg) by mouth daily. 90 tablet 3    ticagrelor (BRILINTA) 90 MG tablet Take 1 tablet (90 mg) by mouth 2 times daily. Start this evening. 180 tablet 3    triamcinolone (KENALOG) 0.1 % external cream Apply topically 2 times daily. as needed for leg rash 45 g 0    XARELTO ANTICOAGULANT 20 MG TABS tablet TAKE 1 TABLET DAILY WITH DINNER 90 tablet 3    No Known Allergies      Lab Results (Personally Reviewed)     Chemistry/lipid CBC Cardiac Enzymes/BNP/TSH/INR   Lab Results   Component Value Date    BUN 10.4 03/17/2025     03/17/2025    CO2 21 (L) 03/17/2025     Creatinine   Date Value Ref Range Status   03/17/2025 0.96 0.67 - 1.17 mg/dL Final   04/01/2021 0.93 0.66 - 1.25 mg/dL Final       Lab Results   Component Value Date    CHOL 97 12/27/2024    HDL 33 (L) 12/27/2024    LDL 52 12/27/2024      Lab Results   Component Value Date    WBC 5.8 03/17/2025    HGB 15.9 03/17/2025    HCT 44.8 03/17/2025    MCV 96 03/17/2025     03/17/2025    Lab Results   Component Value Date    TSH 3.92 09/05/2024    INR 1.57 (H) 01/03/2025        The patient states understanding and is agreeable with the plan.   Kristin Hsu PA-C  Electrophysiology Consult Service  Securely message with [employer]   Text page via [facility] Paging/Directory           Total time spent on patient visit, reviewing notes, imaging, labs, orders, and completing necessary documentation: 20 minutes.

## 2025-05-28 ENCOUNTER — OFFICE VISIT (OUTPATIENT)
Dept: CARDIOLOGY | Facility: CLINIC | Age: 75
End: 2025-05-28
Payer: COMMERCIAL

## 2025-05-28 VITALS
OXYGEN SATURATION: 97 % | SYSTOLIC BLOOD PRESSURE: 135 MMHG | DIASTOLIC BLOOD PRESSURE: 81 MMHG | WEIGHT: 224 LBS | BODY MASS INDEX: 34.57 KG/M2 | HEART RATE: 57 BPM

## 2025-05-28 DIAGNOSIS — I48.0 PAROXYSMAL ATRIAL FIBRILLATION (H): Primary | ICD-10-CM

## 2025-05-28 PROCEDURE — 3079F DIAST BP 80-89 MM HG: CPT

## 2025-05-28 PROCEDURE — 99213 OFFICE O/P EST LOW 20 MIN: CPT

## 2025-05-28 PROCEDURE — 3075F SYST BP GE 130 - 139MM HG: CPT

## 2025-05-28 NOTE — PATIENT INSTRUCTIONS
Thank you for coming to the [facility] at [place]; please note the following instructions:    1. No changes to medications  2. Follow up in 1 year  3. The phone number for [facility] is 1 [phone]        If you have any questions regarding your visit, please contact your care team:     CARDIOLOGY  TELEPHONE NUMBER   Eliane MARTINEZ, Registered Nurse  Yumi PADILLA, Registered Nurse  Daxa HALE, Registered Medical Assistant  Cristin LUA, Clinic Assistant  Yasmeen PADILLA, Visit Facilitator  Mj HALE, Visit Facilitator [phone] (select option 1)    *After hours: [phone]   For Scheduling Appts:     [phone] (select option 1)    *After hours: [phone]   For the Device Clinic (Pacemakers and ICD's)  Annalisa TALAVERA, Registered Nurse   During business hours: [phone]    *After business hours:  [phone] (select option 4)      Normal test result notifications will be released via [employer] or mailed within 7 business days.  All other test results, will be communicated via telephone once reviewed by your cardiologist.    If you need a medication refill, please contact your pharmacy.  Please allow 3 business days for your refill to be completed.    As always, thank you for trusting us with your health care needs!

## 2025-05-28 NOTE — NURSING NOTE
"Chief Complaint   Patient presents with    Follow Up     Reason for visit: Return EP for S/P cardioversion       Initial /81 (BP Location: Left arm, Patient Position: Sitting, Cuff Size: Adult Regular)   Pulse 57   Wt 101.6 kg (224 lb)   SpO2 97%   BMI 34.57 kg/m   Estimated body mass index is 34.57 kg/m  as calculated from the following:    Height as of 3/17/25: 1.715 m (5' 7.5\").    Weight as of this encounter: 101.6 kg (224 lb)..  BP completed using cuff size: regular    MAREN Tabor    "

## 2025-05-28 NOTE — LETTER
5/28/2025      RE: Collin Frank  [address]  [facility] [address]       Dear Colleague,    Thank you for the opportunity to participate in the care of your patient, Collin Frank, at the [facility]. Please see a copy of my visit note below.        ELECTROPHYSIOLOGY CLINIC VISIT    Assessment/Recommendations   Assessment/Plan:    Collin Frank is a pleasant 74 year old male with PMH notable for paroxysmal A fib on Xarelto, CAD s/p DESx2 RCA (12/27/24), Factor V Leiden mutation, May-Thurner syndrome, recurrent LLE DVT, HTN, HLD, BPH, and nephrolithiasis.     Paroxysmal Atrial Fibrillation:   I had a long discussion with the patient about AF, including the natural history of the disease, risk of embolic events, role of antithrombotic therapy, role of rate control therapy, the role of antiarrhythmic medications, and the role of an ablation.  1. Stroke Prophylaxis: CHADSVASC +cad ++age +HTN ++DVT 6, corresponding to a 9.8% annual stroke / systemic embolism event rate. He is appropriately on Xarelto  2. Rate Control: Toprol 50mg daily.   3. Rhythm Control: Cardioversion, Antiarrhythmics and/or ablation are options for rhythm control. He has undergone several DCCV with most recent being 03/31/25. He has remained in NSR and has no complaints today.   4. Risk Factor Management: Statin, BP control, and FRANCK evaluation as indicated.     Follow up 1 year     History of Present Illness/Subjective    Mr. Collin Frank is a 74 year old male who comes in today for EP follow-up of pAF s/p DCCV.    Per chart review, patient historically had presented to ED 12/2023 due to 3 day history of weakness and shortness of breath; he was found to be in AF RVR and underwent ALON/DCCV with restoration to sinus rhythm. He had additional ED visits for AF RVR in February and March 2024, both times patient reported not being aware he was in  AF. He was back in AF in 06/2024 during clinic visit at which time he was started on Flecainide and underwent another DCCV. He presented to ED 12/2024 with complaints of diaphoresis and left shoulder pain. At this time, he was in NSR, but Coronary CTA showed severe stenosis of RCA for which he presented to cath lab and received MAGUI x2 to RCA. His flecainide was then discontinued due to CAD. Since that time he has been only on metoprolol 50mg daily for rate control.      During ED visit 3/17/25, patient reporting fatigue and shortness of breath. ECG showed AF; he was deemed safe to discharge and follow up for OP DCCV for which he presented 3/31/25. This resulted in conversion to NSR. He reports feeling well since tayla time and has had no recurrence of AF. He reports starting CPAP in February of this year. He is not very physically active, but reports he is able to do light housework without issue. He denies chest discomfort, palpitations, abdominal fullness/bloating or peripheral edema, shortness of breath, paroxysmal nocturnal dyspnea, orthopnea, lightheadedness, dizziness, pre-syncope, or syncope. Current cardiac medications include: amlodipine, atorvastatin, losartan, toprol, brilinta, xarelto.     I have reviewed and updated the patient's Past Medical History, Social History, Family History and Medication List.     Cardiographics (Personally Reviewed) :   Limited Echo 6/21/24:   Global and regional left ventricular function is normal with an EF of 55-60%.  Biplane LVEF is 55%.  Global right ventricular function is normal.  No pericardial effusion is present.  This study was compared with the study from 12/11/23 .No significant changes  noted.  ______________________________________________________________________________  Left Ventricle  Global and regional left ventricular function is normal with an EF of 55-60%.  Biplane LVEF is 55%.     Right Ventricle  The right ventricle is normal size. Global right  ventricular function is  normal.     Pericardium  No pericardial effusion is present.     Compared to Previous Study  This study was compared with the study from 12/11/23 . No significant changes  noted.     Coronary Angiogram 12/27/24:     Prox RCA to Mid RCA lesion is 90% stenosed.     Prox RCA-1 lesion is 30% stenosed.     Mid LAD lesion is 30% stenosed.     Prox RCA-2 lesion is 40% stenosed.     -Severe stenosis of the mid RCA.  -Successful deployment of two drug eluting stents from proximal to distal RCA.      Physical Examination   There were no vitals taken for this visit.  Wt Readings from Last 3 Encounters:   03/17/25 98.4 kg (217 lb)   03/14/25 101.6 kg (224 lb)   02/27/25 99.5 kg (219 lb 6.4 oz)     General Appearance:   Alert, well-appearing and in no acute distress.   HEENT: Atraumatic, normocephalic. PERRL.  MMM.   Chest/Lungs:   Respirations unlabored.  Lungs are clear to auscultation.   Cardiovascular:   Regular rate and rhythm.  S1/S2. No murmur.    Abdomen:  Soft, nontender, nondistended.   Extremities: No cyanosis or clubbing. No edema.    Musculoskeletal: Moves all extremities.     Skin: Warm, dry, intact.    Neurologic: Mood and affect are appropriate.  Alert and oriented to person, place, time, and situation.          Medications  Allergies   Current Outpatient Medications   Medication Sig Dispense Refill     amLODIPine (NORVASC) 5 MG tablet Take 1 tablet (5 mg) by mouth daily. 90 tablet 3     atorvastatin (LIPITOR) 80 MG tablet Take 1 tablet (80 mg) by mouth daily. 90 tablet 3     cholecalciferol (VITAMIN D) 1000 UNIT tablet Take 1,000 Units by mouth every morning. 100 tablet 3     losartan (COZAAR) 50 MG tablet Take 0.5 tablets (25 mg) by mouth daily. 45 tablet 3     metoprolol succinate ER (TOPROL XL) 50 MG 24 hr tablet Take 1 tablet (50 mg) by mouth daily. 30 tablet 0     metoprolol succinate ER (TOPROL XL) 50 MG 24 hr tablet Take 1 tablet (50 mg) by mouth daily. 90 tablet 3     ticagrelor  (BRILINTA) 90 MG tablet Take 1 tablet (90 mg) by mouth 2 times daily. Start this evening. 180 tablet 3     triamcinolone (KENALOG) 0.1 % external cream Apply topically 2 times daily. as needed for leg rash 45 g 0     XARELTO ANTICOAGULANT 20 MG TABS tablet TAKE 1 TABLET DAILY WITH DINNER 90 tablet 3    No Known Allergies      Lab Results (Personally Reviewed)    Chemistry/lipid CBC Cardiac Enzymes/BNP/TSH/INR   Lab Results   Component Value Date    BUN 10.4 03/17/2025     03/17/2025    CO2 21 (L) 03/17/2025     Creatinine   Date Value Ref Range Status   03/17/2025 0.96 0.67 - 1.17 mg/dL Final   04/01/2021 0.93 0.66 - 1.25 mg/dL Final       Lab Results   Component Value Date    CHOL 97 12/27/2024    HDL 33 (L) 12/27/2024    LDL 52 12/27/2024      Lab Results   Component Value Date    WBC 5.8 03/17/2025    HGB 15.9 03/17/2025    HCT 44.8 03/17/2025    MCV 96 03/17/2025     03/17/2025    Lab Results   Component Value Date    TSH 3.92 09/05/2024    INR 1.57 (H) 01/03/2025        The patient states understanding and is agreeable with the plan.   Kristin Hsu PA-C  Electrophysiology Consult Service  Securely message with [employer]   Text page via [facility] Paging/Directory           Total time spent on patient visit, reviewing notes, imaging, labs, orders, and completing necessary documentation: 20 minutes.                    Please do not hesitate to contact me if you have any questions/concerns.     Sincerely,     Kristin Hsu PA-C

## 2025-06-07 NOTE — Clinical Note
16  images have been sent and verified in PACs
Antiplatelet agents where given.
Balloon removed intact.
Balloon removed intact.
Hemodynamic equipment used: 5 lead ECG, Machine BP Cuff and pulse oximeter probe.
LCA Cine(s)  injected
Lab results reviewed and abnormals discussed with physician.
Patient education provided. 
Potential access sites were evaluated for patency using ultrasound.   The right jugular vein was selected. Access was obtained under with Sonosite guidance using a micropuncture 21 gauge needle with direct visualization of needle entry.   
Prepped: groin and right radial. Prepped with: ChloraPrep. The patient was draped. .
Procedure is scheduled in [facility].
R-Band utilized for closure of site.
RCA Cine(s)  injected
RCA Cine(s)  injected
Report given to Farida MORENO 2a
Sheath inserted in the right radial artery.
Stent deployed in the right coronary artery. Max pressure = 12 gabriella. Total duration = 10 seconds.
Stent deployed in the right coronary artery. Max pressure = 12 gabriella. Total duration = 10 seconds. Balloon reinflated a second time: Max pressure = 12 gabriella. Total duration = 10 seconds.
Stent inserted over the wire. 
Stent removed and unable to cross. 
The ECG shows a sinus bradycardia. ECG rate  = 50 bpm.
The first balloon was inserted into the right coronary artery.Max pressure = 12 gabriella. Total duration = 10 seconds.     Max pressure = 12 gabriella. Total duration = 10 seconds.  Balloon reinflated a fourth time: Max pressure = 12 gabriella. Total duration = 10 seconds.
Total IV Fluids Infused 750 mL.
No

## 2025-06-24 ENCOUNTER — OFFICE VISIT (OUTPATIENT)
Dept: SLEEP MEDICINE | Facility: CLINIC | Age: 75
End: 2025-06-24
Payer: COMMERCIAL

## 2025-06-24 VITALS
BODY MASS INDEX: 33.49 KG/M2 | DIASTOLIC BLOOD PRESSURE: 80 MMHG | RESPIRATION RATE: 24 BRPM | HEIGHT: 68 IN | SYSTOLIC BLOOD PRESSURE: 123 MMHG | OXYGEN SATURATION: 96 % | HEART RATE: 68 BPM | WEIGHT: 221 LBS

## 2025-06-24 DIAGNOSIS — G47.33 OSA (OBSTRUCTIVE SLEEP APNEA): Primary | ICD-10-CM

## 2025-06-24 DIAGNOSIS — E66.811 OBESITY (BMI 30.0-34.9): ICD-10-CM

## 2025-06-24 PROCEDURE — 99214 OFFICE O/P EST MOD 30 MIN: CPT | Performed by: INTERNAL MEDICINE

## 2025-06-24 PROCEDURE — 3074F SYST BP LT 130 MM HG: CPT | Performed by: INTERNAL MEDICINE

## 2025-06-24 PROCEDURE — 3079F DIAST BP 80-89 MM HG: CPT | Performed by: INTERNAL MEDICINE

## 2025-06-24 PROCEDURE — G2211 COMPLEX E/M VISIT ADD ON: HCPCS | Performed by: INTERNAL MEDICINE

## 2025-06-24 PROCEDURE — 1126F AMNT PAIN NOTED NONE PRSNT: CPT | Performed by: INTERNAL MEDICINE

## 2025-06-24 ASSESSMENT — SLEEP AND FATIGUE QUESTIONNAIRES
HOW LIKELY ARE YOU TO NOD OFF OR FALL ASLEEP WHILE SITTING INACTIVE IN A PUBLIC PLACE: WOULD NEVER DOZE
HOW LIKELY ARE YOU TO NOD OFF OR FALL ASLEEP WHEN YOU ARE A PASSENGER IN A CAR FOR AN HOUR WITHOUT A BREAK: WOULD NEVER DOZE
HOW LIKELY ARE YOU TO NOD OFF OR FALL ASLEEP WHILE SITTING AND READING: WOULD NEVER DOZE
HOW LIKELY ARE YOU TO NOD OFF OR FALL ASLEEP WHILE SITTING AND TALKING TO SOMEONE: WOULD NEVER DOZE
HOW LIKELY ARE YOU TO NOD OFF OR FALL ASLEEP WHILE WATCHING TV: WOULD NEVER DOZE
HOW LIKELY ARE YOU TO NOD OFF OR FALL ASLEEP WHILE SITTING QUIETLY AFTER LUNCH WITHOUT ALCOHOL: WOULD NEVER DOZE
HOW LIKELY ARE YOU TO NOD OFF OR FALL ASLEEP IN A CAR, WHILE STOPPED FOR A FEW MINUTES IN TRAFFIC: WOULD NEVER DOZE
HOW LIKELY ARE YOU TO NOD OFF OR FALL ASLEEP WHILE LYING DOWN TO REST IN THE AFTERNOON WHEN CIRCUMSTANCES PERMIT: MODERATE CHANCE OF DOZING

## 2025-06-24 NOTE — NURSING NOTE
2 year appointment reminder message was created and will be sent out 2 - 3 months prior to next appointment due date. Via staff message    Changed pressures on patient's cpap device manually in the clinic.     AVS was handed to patient.

## 2025-06-24 NOTE — PROGRESS NOTES
Additional 15 minutes on the date of service was spent performing the following:    -Preparing to see the patient  -Obtaining and/or reviewing separately obtained history   -Ordering medications, tests, or procedures   -Documenting clinical information in the electronic or other health record     Thank you for the opportunity to participate in the care of Collin Frank.     He is a 75 year old y/o male patient who comes to the sleep medicine clinic for follow up. The patient was diagnosed with FRANCK on 02/27/2025 (AHI=24.9).  This is the patient's first clinical visit since getting started on CPAP therapy.  He reports that his sleep quality and energy level have improved since starting CPAP.     Assessment and Plan:  In summary Collin Frank is a 75 year old year old male who is here for follow up.    1. FRANCK (obstructive sleep apnea) (Primary)  I congratulated patient on his excellent CPAP usage.  I propose that we narrow his pressure setting to a pressure of 6-10 CWP.  - COMPREHENSIVE DME    2. Obesity (BMI 30.0-34.9)  Healthy weigh management is recommended as part of the long term goal to improve FRANCK. I will give the patient a hand out on the topic in the AVS.           Lab reviewed: Discussed with patient.    Cpap Fu Template    Question 6/24/2025 11:02 AM CDT - Filed by Patient   Do you use a CPAP Machine at home? Yes   Overall, on a scale of 0-10 how would you rate your CPAP? 9   Is your mask comfortable? Yes   Is your mask leaking? No   Do you notice snoring with mask on? No   Do you notice gasping arousals with mask on? No   Are you having significant oral or nasal dryness? No   Is the pressure setting comfortable? Yes   What type of mask do you use? Full Face Mask   What is your typical bedtime? 11pm   How long does it take you to go to sleep on PAP therapy? 15min   What time do you typically get out of bed for the day? 6:30am   How many hours on average per night are you using PAP therapy? 7   How many  hours are you sleeping per night? 8   Do you feel well rested in the morning? Yes       KARIS:  KARIS Total Score: (Patient-Rptd) 0  Total score - Watkins: (Patient-Rptd) 2 (6/24/2025 11:03 AM)    Failed to redirect to the Timeline version of the REVFS SmartLink.   Patient Active Problem List   Diagnosis    Factor 5 Leiden mutation, heterozygous    May-Thurner syndrome    Long term current use of anticoagulant    Vitamin D deficiency    Hepatic hemangioma    FH: prostate cancer    Benign non-nodular prostatic hyperplasia without lower urinary tract symptoms    Benign essential hypertension    Primary osteoarthritis of both knees    History of deep venous thrombosis    Class 2 severe obesity due to excess calories with serious comorbidity and body mass index (BMI) of 35.0 to 35.9 in adult (H)    Atrial fibrillation (H)    Hyperlipidemia    History of kidney stones    Arm weakness    Coronary artery disease involving native coronary artery of native heart    FRANCK (obstructive sleep apnea) - moderate (AHI 24)       Past Medical History:   Diagnosis Date    Acute deep vein thrombosis (DVT) (H) 11/21/2011 11/2011- acute and extensive LLE DVTs, he underwent thrombolysis by IR       Atrial fibrillation with RVR (H) 12/11/2023    Chest pain, unspecified type 03/31/2021    Nephrolithiasis 09/19/2024    FRANCK (obstructive sleep apnea) - moderate (AHI 24) 1/11/2025 1/6/2025 [facility] (222.0 lbs) - AHI 24.9, RDI 40.5, Supine .0, REM AHI 36.0, Low O2 89.0%, Time Spent <=88% 0 minutes / Time Spent <=89% 0.3 minutes. PLM index was 43.1 movements per hour. PLM Arousal Index was 14.1 per hour. Possible periodic breathing noted    Renal stones 03/21/2013    2 mm size non obstructing  2 right, 1 left==CT  [place] 3/13      Status post coronary angiogram 12/27/2024       Past Surgical History:   Procedure Laterality Date    ANESTHESIA CARDIOVERSION N/A 12/11/2023    Procedure: Anesthesia cardioversion;   Surgeon: GENERIC ANESTHESIA PROVIDER;  Location: [facility] OR    ANESTHESIA CARDIOVERSION N/A 7/2/2024    Procedure: Anesthesia cardioversion@1330;  Surgeon: GENERIC ANESTHESIA PROVIDER;  Location: [facility] OR    ANESTHESIA CARDIOVERSION N/A 3/31/2025    Procedure: Anesthesia cardioversion@1330;  Surgeon: GENERIC ANESTHESIA PROVIDER;  Location: [facility] OR    COLONOSCOPY  01/17/2008    Normal. Repeat in 10 years    COLONOSCOPY WITH CO2 INSUFFLATION N/A 02/01/2018    Procedure: COLONOSCOPY WITH CO2 INSUFFLATION;  COLON SCREEN/ ENGELMANN;  Surgeon: Jan Flores MD;  Location: [facility] OR    COMBINED CYSTOSCOPY, RETROGRADES, URETEROSCOPY, LASER HOLMIUM LITHOTRIPSY URETER(S), INSERT STENT Left 9/19/2024    Procedure: Cystoscopy, Left Ureteroscopy, Left Retrograde Pyelogram, Left Holmium Laser Lithotripsy, Left Ureteral Stent Placement;  Surgeon: Delfin Kimball MD;  Location: [facility] OR    CV CORONARY ANGIOGRAM N/A 12/27/2024    Procedure: Coronary Angiogram;  Surgeon: Arron Do MD;  Location: [facility]    CV PCI N/A 12/27/2024    Procedure: Percutaneous Coronary Intervention;  Surgeon: Arron Do MD;  Location:  [facility] CARD[facility] CATH LAB    ORTHOPEDIC SURGERY  1975    rt knee ORIF       Current Outpatient Medications   Medication Sig Dispense Refill    amLODIPine (NORVASC) 5 MG tablet Take 1 tablet (5 mg) by mouth daily. 90 tablet 3    atorvastatin (LIPITOR) 80 MG tablet Take 1 tablet (80 mg) by mouth daily. 90 tablet 3    cholecalciferol (VITAMIN D) 1000 UNIT tablet Take 1,000 Units by mouth every morning. 100 tablet 3    losartan (COZAAR) 50 MG tablet Take 0.5 tablets (25 mg) by mouth daily. 45 tablet 3    metoprolol succinate ER (TOPROL XL) 50 MG 24 hr tablet Take 1 tablet (50 mg) by mouth daily. 90 tablet 3    ticagrelor (BRILINTA) 90 MG tablet Take 1 tablet (90 mg) by mouth 2 times daily. Start this evening. 180 tablet 3    XARELTO ANTICOAGULANT 20 MG TABS tablet TAKE 1 TABLET DAILY WITH  "DINNER 90 tablet 3       No Known Allergies    Physical Exam:  /80   Pulse 68   Resp 24   Ht 1.715 m (5' 7.52\")   Wt 100.2 kg (221 lb)   SpO2 96%   BMI 34.08 kg/m    BMI:Body mass index is 34.08 kg/m .   GEN: NAD, obese.  Patient walks with a cane.  Head: Normocephalic.  EYES: EOMI  Psych: normal mood, normal affect    Labs/Studies:      Lab Results   Component Value Date    TSH 3.92 09/05/2024    TSH 2.42 12/11/2023     Lab Results   Component Value Date    GLC 95 03/17/2025     (H) 01/20/2025     Lab Results   Component Value Date    HGB 15.9 03/17/2025    HGB 14.6 01/20/2025     Lab Results   Component Value Date    BUN 10.4 03/17/2025    BUN 12.4 01/20/2025    CR 0.96 03/17/2025    CR 0.92 01/20/2025     Lab Results   Component Value Date    AST 27 03/17/2025    AST 31 01/20/2025    ALT 26 03/17/2025    ALT 23 01/20/2025    ALKPHOS 118 03/17/2025    ALKPHOS 121 01/20/2025    BILITOTAL 1.3 (H) 03/17/2025    BILITOTAL 0.9 01/20/2025       Recent Labs   Lab Test 03/31/25  0751 03/17/25  1411 01/20/25  1810   NA  --  138 137   POTASSIUM 3.7 4.0 4.4   CHLORIDE  --  104 105   CO2  --  21* 23   ANIONGAP  --  13 9   GLC  --  95 124*   BUN  --  10.4 12.4   CR  --  0.96 0.92   MARTA  --  9.4 9.5        I reviewed the efficacy and compliance report from his device. Data summarized on the HPI and the PAP compliance flow sheet.     Patient was strongly advised in AVS to avoid driving, operating any heavy machinery or other hazardous situations while drowsy or sleepy. Patient was counseled on the importance of driving while alert, to pull over if drowsy, or nap before getting into the vehicle if sleepy.     Patient verbalized understanding of these issues, agrees with the plan and all questions were answered today. Patient was given an opportuntity to voice any other symptoms or concerns not listed above. Patient did not have any other symptoms or concerns.      Evan Juárez DO  Board Certified in " Internal Medicine and Sleep Medicine    (Note created with Dragon voice recognition and unintended spelling errors and word substitutions may occur)     Audio and visual devices were used for this virtual clinic visit with permission from patient.

## 2025-06-24 NOTE — NURSING NOTE
"Chief Complaint   Patient presents with    CPAP Follow Up       Initial /80   Pulse 68   Resp 24   Ht 1.715 m (5' 7.52\")   Wt 100.2 kg (221 lb)   SpO2 96%   BMI 34.08 kg/m   Estimated body mass index is 34.08 kg/m  as calculated from the following:    Height as of this encounter: 1.715 m (5' 7.52\").    Weight as of this encounter: 100.2 kg (221 lb).    Medication Reconciliation: complete    Neck circumference: 16.14 inches / 41 centimeters.    DME: Noel Reynoso CMA on 6/24/2025 at 11:15 AM      "

## 2025-06-24 NOTE — PATIENT INSTRUCTIONS
Please do not drive drowsy under any circumstances.  If you find yourself in a situation in which you are driving and feeling sleepy, please pull over right away in a safe place and take a quick nap before getting back on the road.    Your Body mass index is 34.08 kg/m .  Weight management is a personal decision.  If you are interested in exploring weight loss strategies, the following discussion covers the approaches that may be successful. Body mass index (BMI) is one way to tell whether you are at a healthy weight, overweight, or obese. It measures your weight in relation to your height.  A BMI of 18.5 to 24.9 is in the healthy range. A person with a BMI of 25 to 29.9 is considered overweight, and someone with a BMI of 30 or greater is considered obese. More than two-thirds of American adults are considered overweight or obese.  Being overweight or obese increases the risk for further weight gain. Excess weight may lead to heart disease and diabetes.  Creating and following plans for healthy eating and physical activity may help you improve your health.  Weight control is part of healthy lifestyle and includes exercise, emotional health, and healthy eating habits. Careful eating habits lifelong are the mainstay of weight control. Though there are significant health benefits from weight loss, long-term weight loss with diet alone may be very difficult to achieve- studies show long-term success with dietary management in less than 10% of people. Attaining a healthy weight may be especially difficult to achieve in those with severe obesity. In some cases, medications, devices and surgical management might be considered.  What can you do?  If you are overweight or obese and are interested in methods for weight loss, you should discuss this with your provider.   Consider reducing daily calorie intake by 500 calories.   Keep a food journal.   Avoiding skipping meals, consider cutting portions instead.    Diet combined  with exercise helps maintain muscle while optimizing fat loss. Strength training is particularly important for building and maintaining muscle mass. Exercise helps reduce stress, increase energy, and improves fitness. Increasing exercise without diet control, however, may not burn enough calories to loose weight.     Start walking three days a week 10-20 minutes at a time  Work towards walking thirty minutes five days a week   Eventually, increase the speed of your walking for 1-2 minutes at time    In addition, we recommend that you review healthy lifestyles and methods for weight loss available through the National Institutes of Health patient information sites:  http://win.niddk.nih.gov/publications/index.htm    And look into health and wellness programs that may be available through your health insurance provider, employer, local community center, or [employer].

## 2025-07-28 DIAGNOSIS — I48.91 ATRIAL FIBRILLATION WITH RVR (H): ICD-10-CM

## 2025-07-28 RX ORDER — METOPROLOL SUCCINATE 50 MG/1
50 TABLET, EXTENDED RELEASE ORAL DAILY
Qty: 90 TABLET | Refills: 3 | Status: SHIPPED | OUTPATIENT
Start: 2025-07-28

## 2025-07-28 NOTE — TELEPHONE ENCOUNTER
Pending Prescriptions:                       Disp   Refills    metoprolol succinate ER (TOPROL XL) 50 MG*90 tab*3            Sig: Take 1 tablet (50 mg) by mouth daily.

## 2025-07-28 NOTE — TELEPHONE ENCOUNTER
metoprolol succinate ER (TOPROL XL) 50 MG 24 hr tablet          Sig: Take 1 tablet (50 mg) by mouth daily.    Disp: 90 tablet    Refills: 3    Start: 7/28/2025    Class: E-Prescribe    For: Atrial fibrillation with RVR (H)    Last ordered: 4 months ago (3/14/2025) by CHITRA Kirkpatrick CNP    Beta-Blockers Protocol [id number]/28/2025 02:07 PM   Protocol Details Patient is age 6 or older    Medication is active on med list and the sig matches. RN to manually verify dose and sig if red X/fail.    Medication indicated for associated diagnosis    Most recent blood pressure on file in last 12 months    Recent (12 month) or future (90 days) visit with authorizing provider's specialty (provided they have been seen in the past 15 months)      To be filled at: [facility]  [place] [facility] [address]     Patient saw Nellie BUENROSTRO CNP on 1/14/25. Patient to follow up in 1 year. Patient was to increase metoprolol to 50 mg. See mychart encounter from 3/11/25. Prescription resent in for patient. Medication list to be mailed to patient as requested.    Yumi Jacome, RN, BSN  Cardiology RN Care Coordinator   [place]   Phone: [phone]  Fax: [phone] ([place]) [phone] ([place])

## 2025-07-28 NOTE — TELEPHONE ENCOUNTER
[facility] Call Center    Phone Message    May a detailed message be left on voicemail: yes     Reason for Call: Medication Refill Request    Has the patient contacted the pharmacy for the refill? Yes     Name of medication being requested:   metoprolol succinate ER (TOPROL XL) 50 MG 24 hr tablet [90360] (Order [id number])     Provider who prescribed the medication: Salome    Pharmacy:      [facility]  [place] [address]    Date medication is needed: pt stated he needs today and needs team to call him to completely go over his medication list. Something to do with insurance, providers, and pharmacy       Action Taken: Other: cardiology     Travel Screening: Not Applicable        Thank you!  [facility]        Date of Service:

## (undated) DEVICE — FLEXIVA PULSE ID TRACTIP

## (undated) DEVICE — Device

## (undated) DEVICE — ADAPTER CATH CHECK-FLO 9FR FLL 050885 G15476

## (undated) DEVICE — CATH ANGIO SUPERTORQUE PLUS JL4 6FRX100CM 533620

## (undated) DEVICE — CATH BALLOON EMERGE 2.5X12MM H7493918912250

## (undated) DEVICE — SLEEVE TR BAND RADIAL COMPRESSION DEVICE 24CM TRB24-REG

## (undated) DEVICE — CONNECTOR WATER VALVE PERFUSION PACK STR 020272801

## (undated) DEVICE — TUBING PRESSURE 30"

## (undated) DEVICE — PACK CYSTO UMMC CUSTOM

## (undated) DEVICE — LINEN TOWEL PACK X5 5464

## (undated) DEVICE — KIT DVC ANGIO IBASIXCOMPAK 13INX20ML 3WAY IN4430

## (undated) DEVICE — GW VASC .035IN DIA 260CML 7CML 3 MM RADIUS J CURVE 502455

## (undated) DEVICE — SOL WATER IRRIG 1000ML BOTTLE 07139-09

## (undated) DEVICE — CATH GUIDING BLUE YELLOW PTFE JR4 6FRX100CM 67008200

## (undated) DEVICE — TUBING SET THERMEDX UROLOGY SGL USE LL0006

## (undated) DEVICE — KIT HAND CONTROL ACIST 014644 AR-P54

## (undated) DEVICE — CATH URETERAL OPEN END 05FR 70CM 020015

## (undated) DEVICE — SUCTION MANIFOLD NEPTUNE 2 SYS 4 PORT 0702-020-000

## (undated) DEVICE — PAD CHUX UNDERPAD 23X24" 7136

## (undated) DEVICE — WIRE GUIDE HI-TRQ PILOT 50 JTIP 0.014"X190CM 1010480-HJ

## (undated) DEVICE — URETEROSCOPE FLEX SLG USE STD 7.7FR LITHOVUE M0067913500

## (undated) DEVICE — VALVE HEMOSTASIS .096" COPILOT MECH 1003331

## (undated) DEVICE — DRAPE C-ARM W/STRAPS 42X72" 07-CA104

## (undated) DEVICE — GUIDEWIRE SENSOR DUAL FLEX STR 0.035"X150CM M0066703080

## (undated) DEVICE — SHEATH URETERAL ACCESS NAVIGATOR HD 11/13FRX36CM M0062502220

## (undated) DEVICE — MANIFOLD KIT ANGIO AUTOMATED 014613

## (undated) DEVICE — SPECIMEN CONTAINER 5OZ STERILE 2600SA

## (undated) DEVICE — SYR 30ML SLIP TIP W/O NDL 302833

## (undated) DEVICE — PACK HEART LEFT CUSTOM

## (undated) DEVICE — SHTH INTRO 0.021IN ID 6FR DIA

## (undated) DEVICE — DRSG STERI STRIP 1/4X4" R1546

## (undated) RX ORDER — FENTANYL CITRATE-0.9 % NACL/PF 10 MCG/ML
PLASTIC BAG, INJECTION (ML) INTRAVENOUS
Status: DISPENSED
Start: 2024-12-27

## (undated) RX ORDER — POTASSIUM CHLORIDE 750 MG/1
TABLET, EXTENDED RELEASE ORAL
Status: DISPENSED
Start: 2024-12-27

## (undated) RX ORDER — LIDOCAINE HYDROCHLORIDE 10 MG/ML
INJECTION, SOLUTION EPIDURAL; INFILTRATION; INTRACAUDAL; PERINEURAL
Status: DISPENSED
Start: 2024-12-27

## (undated) RX ORDER — ONDANSETRON 2 MG/ML
INJECTION INTRAMUSCULAR; INTRAVENOUS
Status: DISPENSED
Start: 2024-09-19

## (undated) RX ORDER — NITROGLYCERIN 5 MG/ML
VIAL (ML) INTRAVENOUS
Status: DISPENSED
Start: 2024-12-27

## (undated) RX ORDER — FENTANYL CITRATE 50 UG/ML
INJECTION, SOLUTION INTRAMUSCULAR; INTRAVENOUS
Status: DISPENSED
Start: 2024-12-09

## (undated) RX ORDER — LIDOCAINE HYDROCHLORIDE 20 MG/ML
SOLUTION OROPHARYNGEAL
Status: DISPENSED
Start: 2023-12-11

## (undated) RX ORDER — GLYCOPYRROLATE 0.2 MG/ML
INJECTION, SOLUTION INTRAMUSCULAR; INTRAVENOUS
Status: DISPENSED
Start: 2024-09-19

## (undated) RX ORDER — EPHEDRINE SULFATE 50 MG/ML
INJECTION, SOLUTION INTRAMUSCULAR; INTRAVENOUS; SUBCUTANEOUS
Status: DISPENSED
Start: 2024-09-19

## (undated) RX ORDER — HEPARIN SODIUM 1000 [USP'U]/ML
INJECTION, SOLUTION INTRAVENOUS; SUBCUTANEOUS
Status: DISPENSED
Start: 2024-12-27

## (undated) RX ORDER — SIMETHICONE 40MG/0.6ML
SUSPENSION, DROPS(FINAL DOSAGE FORM)(ML) ORAL
Status: DISPENSED
Start: 2018-02-01

## (undated) RX ORDER — NICARDIPINE HCL-0.9% SOD CHLOR 1 MG/10 ML
SYRINGE (ML) INTRAVENOUS
Status: DISPENSED
Start: 2024-12-27

## (undated) RX ORDER — CEFAZOLIN SODIUM/WATER 2 G/20 ML
SYRINGE (ML) INTRAVENOUS
Status: DISPENSED
Start: 2024-09-19

## (undated) RX ORDER — FENTANYL CITRATE 50 UG/ML
INJECTION, SOLUTION INTRAMUSCULAR; INTRAVENOUS
Status: DISPENSED
Start: 2023-12-11

## (undated) RX ORDER — SODIUM CHLORIDE, SODIUM LACTATE, POTASSIUM CHLORIDE, CALCIUM CHLORIDE 600; 310; 30; 20 MG/100ML; MG/100ML; MG/100ML; MG/100ML
INJECTION, SOLUTION INTRAVENOUS
Status: DISPENSED
Start: 2024-09-19

## (undated) RX ORDER — FENTANYL CITRATE 50 UG/ML
INJECTION, SOLUTION INTRAMUSCULAR; INTRAVENOUS
Status: DISPENSED
Start: 2018-02-01

## (undated) RX ORDER — FENTANYL CITRATE 50 UG/ML
INJECTION, SOLUTION INTRAMUSCULAR; INTRAVENOUS
Status: DISPENSED
Start: 2024-09-19

## (undated) RX ORDER — ACETAMINOPHEN 325 MG/1
TABLET ORAL
Status: DISPENSED
Start: 2024-09-19

## (undated) RX ORDER — DEXAMETHASONE SODIUM PHOSPHATE 4 MG/ML
INJECTION, SOLUTION INTRA-ARTICULAR; INTRALESIONAL; INTRAMUSCULAR; INTRAVENOUS; SOFT TISSUE
Status: DISPENSED
Start: 2024-09-19

## (undated) RX ORDER — ASPIRIN 325 MG
TABLET ORAL
Status: DISPENSED
Start: 2024-12-27

## (undated) RX ORDER — FENTANYL CITRATE 50 UG/ML
INJECTION, SOLUTION INTRAMUSCULAR; INTRAVENOUS
Status: DISPENSED
Start: 2024-12-27